# Patient Record
Sex: MALE | Race: BLACK OR AFRICAN AMERICAN | NOT HISPANIC OR LATINO | Employment: OTHER | URBAN - METROPOLITAN AREA
[De-identification: names, ages, dates, MRNs, and addresses within clinical notes are randomized per-mention and may not be internally consistent; named-entity substitution may affect disease eponyms.]

---

## 2017-01-03 ENCOUNTER — PROCEDURE VISIT (OUTPATIENT)
Dept: UROLOGY | Facility: CLINIC | Age: 62
End: 2017-01-03
Payer: COMMERCIAL

## 2017-01-03 DIAGNOSIS — N40.2 PROSTATE NODULE: ICD-10-CM

## 2017-01-03 PROCEDURE — 55700 PR BIOPSY OF PROSTATE,NEEDLE/PUNCH: CPT | Mod: S$GLB,,, | Performed by: UROLOGY

## 2017-01-03 PROCEDURE — 88305 TISSUE EXAM BY PATHOLOGIST: CPT | Mod: 26,,, | Performed by: PATHOLOGY

## 2017-01-03 PROCEDURE — 76872 US TRANSRECTAL: CPT | Mod: S$GLB,,, | Performed by: UROLOGY

## 2017-01-03 PROCEDURE — 76942 ECHO GUIDE FOR BIOPSY: CPT | Mod: 59,S$GLB,, | Performed by: UROLOGY

## 2017-01-03 PROCEDURE — 88305 TISSUE EXAM BY PATHOLOGIST: CPT | Performed by: PATHOLOGY

## 2017-01-03 NOTE — PROCEDURES
Procedures   Pre-procedure diagnosis: right prostate nodule  Post-procedure diagnosis: same    Procedure: Transrectal ultrasound guided prostate biopsy    Complications: none    Blood loss: minimal    Surgeon: Lei Kunz MD    Anesthesia: 10cc lidocaine jelly, 20cc 1% lidocaine for prostatic block    TRUS size: 83.7cc    Procedure: The risks and benefits of the procedure were explained to the patient and consent was obtained.  The patient laid in the lateral decubitus position.  10cc of lidocaine jelly was used for local analgesia in the rectum.  The ultrasound probe was advanced into the rectum. The prostate was visualized.  There was a small median lobe.  20cc of 1% lidocaine without epi was used for a prostatic nerve block.  12 biopsies were then takes, 2 from the apex, mid and base from the right and left side.    The patient tolerated the procedure well and was discharged home.  He will follow-up in 2 weeks when the results are available for review.  He will finish the course of antibiotics.

## 2017-01-04 NOTE — TELEPHONE ENCOUNTER
----- Message from Casi Lopez sent at 1/4/2017  9:56 AM CST -----  Contact: pt [phone]  RX request - refill or new RX.  Is this a refill or new RX:refill    RX name and strength: flomax 0.4 mg  Directions: 1x a day  Is this a 30 day or 90 day RX:  30  Pharmacy name and phone #: [phone]  Comments:  Need authorization    RX request - refill or new RX.  Is this a refill or new RX:  refill  RX name and strength: amlodipine 5 mg  Directions: 1x a day  Is this a 30 day or 90 day RX:  30  Pharmacy name and phone #: [facility] [phone]  Comments:  Need authorization      RX request - refill or new RX.  Is this a refill or new RX:  refill  RX name and strength: meloxicam 15  Directions: 1x a day  Is this a 30 day or 90 day RX:  30  Pharmacy name and phone #: [facility] [phone]  Comments:  Need authorization

## 2017-01-05 RX ORDER — TAMSULOSIN HYDROCHLORIDE 0.4 MG/1
0.4 CAPSULE ORAL DAILY
Qty: 90 CAPSULE | Refills: 3 | Status: SHIPPED | OUTPATIENT
Start: 2017-01-05 | End: 2017-07-25

## 2017-01-05 RX ORDER — MELOXICAM 15 MG/1
15 TABLET ORAL DAILY
Qty: 90 TABLET | Refills: 3 | Status: SHIPPED | OUTPATIENT
Start: 2017-01-05 | End: 2018-03-19 | Stop reason: SDUPTHER

## 2017-01-05 RX ORDER — AMLODIPINE BESYLATE 5 MG/1
5 TABLET ORAL DAILY
Qty: 90 TABLET | Refills: 3 | Status: SHIPPED | OUTPATIENT
Start: 2017-01-05 | End: 2017-03-30 | Stop reason: SDUPTHER

## 2017-01-09 DIAGNOSIS — N40.2 PROSTATE NODULE: Primary | ICD-10-CM

## 2017-02-06 RX ORDER — METFORMIN HYDROCHLORIDE 500 MG/1
TABLET ORAL
Qty: 60 TABLET | Refills: 10 | Status: SHIPPED | OUTPATIENT
Start: 2017-02-06 | End: 2018-06-16 | Stop reason: SDUPTHER

## 2017-03-06 ENCOUNTER — TELEPHONE (OUTPATIENT)
Dept: INTERNAL MEDICINE | Facility: CLINIC | Age: 62
End: 2017-03-06

## 2017-03-06 DIAGNOSIS — E11.9 TYPE II OR UNSPECIFIED TYPE DIABETES MELLITUS WITHOUT MENTION OF COMPLICATION, NOT STATED AS UNCONTROLLED: Primary | ICD-10-CM

## 2017-03-06 DIAGNOSIS — E78.5 HYPERLIPIDEMIA, UNSPECIFIED HYPERLIPIDEMIA TYPE: ICD-10-CM

## 2017-03-06 NOTE — TELEPHONE ENCOUNTER
----- Message from Elle Son sent at 3/6/2017 10:05 AM CST -----  Contact: Self  Doctor appointment and lab have been scheduled.  Please link lab orders to the lab appointment.  Date of doctor appointment:  3/30  Physical or EP:  EP  Date of lab appointment:  3/25  Comments:

## 2017-03-25 ENCOUNTER — LAB VISIT (OUTPATIENT)
Dept: LAB | Facility: HOSPITAL | Age: 62
End: 2017-03-25
Attending: INTERNAL MEDICINE
Payer: COMMERCIAL

## 2017-03-25 DIAGNOSIS — E11.9 TYPE II OR UNSPECIFIED TYPE DIABETES MELLITUS WITHOUT MENTION OF COMPLICATION, NOT STATED AS UNCONTROLLED: ICD-10-CM

## 2017-03-25 DIAGNOSIS — E78.5 HYPERLIPIDEMIA, UNSPECIFIED HYPERLIPIDEMIA TYPE: ICD-10-CM

## 2017-03-25 LAB
ALBUMIN SERPL BCP-MCNC: 3.4 G/DL
ALP SERPL-CCNC: 73 U/L
ALT SERPL W/O P-5'-P-CCNC: 23 U/L
ANION GAP SERPL CALC-SCNC: 9 MMOL/L
AST SERPL-CCNC: 22 U/L
BILIRUB SERPL-MCNC: 0.6 MG/DL
BUN SERPL-MCNC: 16 MG/DL
CALCIUM SERPL-MCNC: 9.8 MG/DL
CHLORIDE SERPL-SCNC: 103 MMOL/L
CHOLEST/HDLC SERPL: 5.4 {RATIO}
CO2 SERPL-SCNC: 26 MMOL/L
CREAT SERPL-MCNC: 1.1 MG/DL
EST. GFR  (AFRICAN AMERICAN): >60 ML/MIN/1.73 M^2
EST. GFR  (NON AFRICAN AMERICAN): >60 ML/MIN/1.73 M^2
GLUCOSE SERPL-MCNC: 97 MG/DL
HDL/CHOLESTEROL RATIO: 18.4 %
HDLC SERPL-MCNC: 207 MG/DL
HDLC SERPL-MCNC: 38 MG/DL
LDLC SERPL CALC-MCNC: 142.6 MG/DL
NONHDLC SERPL-MCNC: 169 MG/DL
POTASSIUM SERPL-SCNC: 3.9 MMOL/L
PROT SERPL-MCNC: 7.6 G/DL
SODIUM SERPL-SCNC: 138 MMOL/L
TRIGL SERPL-MCNC: 132 MG/DL

## 2017-03-25 PROCEDURE — 36415 COLL VENOUS BLD VENIPUNCTURE: CPT | Mod: PO

## 2017-03-25 PROCEDURE — 83036 HEMOGLOBIN GLYCOSYLATED A1C: CPT

## 2017-03-25 PROCEDURE — 80061 LIPID PANEL: CPT

## 2017-03-25 PROCEDURE — 80053 COMPREHEN METABOLIC PANEL: CPT

## 2017-03-27 LAB
ESTIMATED AVG GLUCOSE: 114 MG/DL
HBA1C MFR BLD HPLC: 5.6 %

## 2017-03-30 ENCOUNTER — OFFICE VISIT (OUTPATIENT)
Dept: INTERNAL MEDICINE | Facility: CLINIC | Age: 62
End: 2017-03-30
Payer: COMMERCIAL

## 2017-03-30 VITALS
BODY MASS INDEX: 37.97 KG/M2 | WEIGHT: 256.38 LBS | HEIGHT: 69 IN | DIASTOLIC BLOOD PRESSURE: 86 MMHG | SYSTOLIC BLOOD PRESSURE: 128 MMHG | HEART RATE: 91 BPM | RESPIRATION RATE: 16 BRPM | TEMPERATURE: 98 F

## 2017-03-30 DIAGNOSIS — E66.01 SEVERE OBESITY (BMI 35.0-39.9) WITH COMORBIDITY: Chronic | ICD-10-CM

## 2017-03-30 DIAGNOSIS — E78.5 HYPERLIPIDEMIA, UNSPECIFIED HYPERLIPIDEMIA TYPE: Chronic | ICD-10-CM

## 2017-03-30 DIAGNOSIS — M17.12 PRIMARY OSTEOARTHRITIS OF LEFT KNEE: Chronic | ICD-10-CM

## 2017-03-30 DIAGNOSIS — E11.9 TYPE 2 DIABETES MELLITUS WITHOUT COMPLICATION, WITHOUT LONG-TERM CURRENT USE OF INSULIN: Primary | ICD-10-CM

## 2017-03-30 DIAGNOSIS — I10 ESSENTIAL HYPERTENSION: Chronic | ICD-10-CM

## 2017-03-30 DIAGNOSIS — N41.1 CHRONIC PROSTATITIS: Chronic | ICD-10-CM

## 2017-03-30 PROCEDURE — 99999 PR PBB SHADOW E&M-EST. PATIENT-LVL III: CPT | Mod: PBBFAC,,, | Performed by: INTERNAL MEDICINE

## 2017-03-30 PROCEDURE — 2022F DILAT RTA XM EVC RTNOPTHY: CPT | Mod: S$GLB,,, | Performed by: INTERNAL MEDICINE

## 2017-03-30 PROCEDURE — 3079F DIAST BP 80-89 MM HG: CPT | Mod: S$GLB,,, | Performed by: INTERNAL MEDICINE

## 2017-03-30 PROCEDURE — 4010F ACE/ARB THERAPY RXD/TAKEN: CPT | Mod: S$GLB,,, | Performed by: INTERNAL MEDICINE

## 2017-03-30 PROCEDURE — 99214 OFFICE O/P EST MOD 30 MIN: CPT | Mod: S$GLB,,, | Performed by: INTERNAL MEDICINE

## 2017-03-30 PROCEDURE — 3044F HG A1C LEVEL LT 7.0%: CPT | Mod: S$GLB,,, | Performed by: INTERNAL MEDICINE

## 2017-03-30 PROCEDURE — 1160F RVW MEDS BY RX/DR IN RCRD: CPT | Mod: S$GLB,,, | Performed by: INTERNAL MEDICINE

## 2017-03-30 PROCEDURE — 3074F SYST BP LT 130 MM HG: CPT | Mod: S$GLB,,, | Performed by: INTERNAL MEDICINE

## 2017-03-30 RX ORDER — ATORVASTATIN CALCIUM 20 MG/1
20 TABLET, FILM COATED ORAL DAILY
Qty: 90 TABLET | Refills: 3 | Status: SHIPPED | OUTPATIENT
Start: 2017-03-30 | End: 2017-07-25 | Stop reason: SDUPTHER

## 2017-03-30 RX ORDER — LISINOPRIL AND HYDROCHLOROTHIAZIDE 12.5; 2 MG/1; MG/1
TABLET ORAL
COMMUNITY
Start: 2017-02-05 | End: 2018-03-05 | Stop reason: SDUPTHER

## 2017-03-30 RX ORDER — AMLODIPINE BESYLATE 5 MG/1
5 TABLET ORAL DAILY
Qty: 90 TABLET | Refills: 3 | Status: SHIPPED | OUTPATIENT
Start: 2017-03-30 | End: 2018-05-12 | Stop reason: SDUPTHER

## 2017-03-30 NOTE — PROGRESS NOTES
Subjective:       Patient ID: Celso Hernandez is a 61 y.o. male.    Chief Complaint: Follow-up (4 month)    HPI   The patient presents for follow-up of diabetes and other medical conditions.  The patient has type 2 diabetes mellitus, hypertension, hyperlipidemia, and osteoarthritis, BPH.  He is status post prostate biopsy in 1/17.  Biopsies were negative for cancer but did show chronic prostatitis.  He is followed by Dr. Kunz of the Urology service.    He occasionally checks his blood sugar levels.  Recent blood sugars have ranged from 115-125.  He has not experienced any hypoglycemic episodes.  His vision is good.  No lower extremity paresthesias are noted.    The patient has arthritis with prominent symptoms in the left knee.  He is able to walk without pain and climb a ladder.  He does report that the joint does not feel as strong as it should be however.  Review of Systems   Constitutional: Negative for activity change, appetite change, fatigue and unexpected weight change.        The patient exercises intermittently.   Eyes: Negative for visual disturbance.   Respiratory: Negative for cough, chest tightness, shortness of breath and wheezing.    Cardiovascular: Negative for chest pain, palpitations and leg swelling.   Gastrointestinal: Negative for abdominal pain, blood in stool, nausea and vomiting.   Genitourinary: Negative for dysuria, frequency, hematuria and urgency.        Urinary stream is good following antibiotic therapy.   Musculoskeletal: Positive for arthralgias (mild weakness of the left knee joint is noted.). Negative for back pain, gait problem, joint swelling and myalgias.   Skin: Negative for color change and rash.   Neurological: Negative for dizziness, syncope, weakness, light-headedness, numbness and headaches.   Psychiatric/Behavioral: Negative for sleep disturbance.       Objective:      Physical Exam   Constitutional: He is oriented to person, place, and time. He appears well-developed and  well-nourished. No distress.   The patient has lost 4 pounds since 10/24/2016.   HENT:   Head: Normocephalic and atraumatic.   Eyes: Conjunctivae and EOM are normal. Pupils are equal, round, and reactive to light. No scleral icterus.   Neck: Normal range of motion. Neck supple. No JVD present. No thyromegaly present.   Cardiovascular: Normal rate, regular rhythm, normal heart sounds and intact distal pulses.  Exam reveals no gallop.    No murmur heard.  Pulmonary/Chest: Effort normal and breath sounds normal. No respiratory distress. He has no wheezes. He has no rales.   Abdominal: Soft. Bowel sounds are normal. He exhibits no mass. There is no tenderness.   Musculoskeletal: Normal range of motion. He exhibits no edema or tenderness.   Lymphadenopathy:     He has no cervical adenopathy.   Neurological: He is alert and oriented to person, place, and time. No cranial nerve deficit.   Sensory examination is intact in both feet on monofilament and vibration testing.   Skin: Skin is warm and dry. No rash noted.   No foot lesions are present.   Psychiatric: He has a normal mood and affect. His behavior is normal.   Nursing note and vitals reviewed.      Results for orders placed or performed in visit on 03/25/17   Microalbumin/creatinine urine ratio   Result Value Ref Range    Microalbum.,U,Random 6.0 ug/mL    Creatinine, Random Ur 105.0 23.0 - 375.0 mg/dL    Microalb Creat Ratio 5.7 0.0 - 30.0 ug/mg     Other labs were reviewed with the patient.  Hemoglobin A1c was 5.6%.  Assessment:       1. Type 2 diabetes mellitus without complication, without long-term current use of insulin    2. Essential hypertension    3. Hyperlipidemia, unspecified hyperlipidemia type    4. Primary osteoarthritis of left knee    5. Chronic prostatitis    6. Severe obesity (BMI 35.0-39.9) with comorbidity        Plan:       Celso was seen today for follow-up.  The patient has been encouraged to increase his exercise level and to continue to lose  weight.  He has been encouraged to be more compliant with use of atorvastatin.  He should take it with all of his other medications.  Blood tests and a follow-up visit within 4 months will be obtained.    Diagnoses and all orders for this visit:    Type 2 diabetes mellitus without complication, without long-term current use of insulin  -     Hemoglobin A1c; Future    Essential hypertension    Hyperlipidemia, unspecified hyperlipidemia type  -     Comprehensive metabolic panel; Future  -     Lipid panel; Future    Primary osteoarthritis of left knee    Chronic prostatitis    Severe obesity (BMI 35.0-39.9) with comorbidity    Other orders  -     amlodipine (NORVASC) 5 MG tablet; Take 1 tablet (5 mg total) by mouth once daily. For blood pressure control.  -     atorvastatin (LIPITOR) 20 MG tablet; Take 1 tablet (20 mg total) by mouth once daily.

## 2017-07-21 ENCOUNTER — LAB VISIT (OUTPATIENT)
Dept: LAB | Facility: HOSPITAL | Age: 62
End: 2017-07-21
Attending: INTERNAL MEDICINE
Payer: COMMERCIAL

## 2017-07-21 DIAGNOSIS — E78.5 HYPERLIPIDEMIA, UNSPECIFIED HYPERLIPIDEMIA TYPE: Chronic | ICD-10-CM

## 2017-07-21 DIAGNOSIS — E11.9 TYPE 2 DIABETES MELLITUS WITHOUT COMPLICATION, WITHOUT LONG-TERM CURRENT USE OF INSULIN: ICD-10-CM

## 2017-07-21 LAB
ALBUMIN SERPL BCP-MCNC: 3.6 G/DL
ALP SERPL-CCNC: 65 U/L
ALT SERPL W/O P-5'-P-CCNC: 21 U/L
ANION GAP SERPL CALC-SCNC: 10 MMOL/L
AST SERPL-CCNC: 28 U/L
BILIRUB SERPL-MCNC: 0.8 MG/DL
BUN SERPL-MCNC: 21 MG/DL
CALCIUM SERPL-MCNC: 10.5 MG/DL
CHLORIDE SERPL-SCNC: 105 MMOL/L
CHOLEST/HDLC SERPL: 5.5 {RATIO}
CO2 SERPL-SCNC: 23 MMOL/L
CREAT SERPL-MCNC: 1.3 MG/DL
EST. GFR  (AFRICAN AMERICAN): >60 ML/MIN/1.73 M^2
EST. GFR  (NON AFRICAN AMERICAN): 58.1 ML/MIN/1.73 M^2
ESTIMATED AVG GLUCOSE: 114 MG/DL
GLUCOSE SERPL-MCNC: 88 MG/DL
HBA1C MFR BLD HPLC: 5.6 %
HDL/CHOLESTEROL RATIO: 18.3 %
HDLC SERPL-MCNC: 218 MG/DL
HDLC SERPL-MCNC: 40 MG/DL
LDLC SERPL CALC-MCNC: 154 MG/DL
NONHDLC SERPL-MCNC: 178 MG/DL
POTASSIUM SERPL-SCNC: 4.5 MMOL/L
PROT SERPL-MCNC: 7.6 G/DL
SODIUM SERPL-SCNC: 138 MMOL/L
TRIGL SERPL-MCNC: 120 MG/DL

## 2017-07-21 PROCEDURE — 80053 COMPREHEN METABOLIC PANEL: CPT

## 2017-07-21 PROCEDURE — 36415 COLL VENOUS BLD VENIPUNCTURE: CPT | Mod: PO

## 2017-07-21 PROCEDURE — 80061 LIPID PANEL: CPT

## 2017-07-21 PROCEDURE — 83036 HEMOGLOBIN GLYCOSYLATED A1C: CPT

## 2017-07-25 ENCOUNTER — OFFICE VISIT (OUTPATIENT)
Dept: INTERNAL MEDICINE | Facility: CLINIC | Age: 62
End: 2017-07-25
Payer: COMMERCIAL

## 2017-07-25 VITALS
SYSTOLIC BLOOD PRESSURE: 110 MMHG | HEART RATE: 84 BPM | WEIGHT: 250.88 LBS | HEIGHT: 68 IN | BODY MASS INDEX: 38.02 KG/M2 | TEMPERATURE: 98 F | DIASTOLIC BLOOD PRESSURE: 80 MMHG

## 2017-07-25 DIAGNOSIS — I10 ESSENTIAL HYPERTENSION: Chronic | ICD-10-CM

## 2017-07-25 DIAGNOSIS — N40.1 BENIGN PROSTATIC HYPERPLASIA WITH URINARY OBSTRUCTION: ICD-10-CM

## 2017-07-25 DIAGNOSIS — E78.5 HYPERLIPIDEMIA, UNSPECIFIED HYPERLIPIDEMIA TYPE: Chronic | ICD-10-CM

## 2017-07-25 DIAGNOSIS — E66.01 SEVERE OBESITY (BMI 35.0-39.9) WITH COMORBIDITY: Chronic | ICD-10-CM

## 2017-07-25 DIAGNOSIS — M17.12 PRIMARY OSTEOARTHRITIS OF LEFT KNEE: Chronic | ICD-10-CM

## 2017-07-25 DIAGNOSIS — N13.8 BENIGN PROSTATIC HYPERPLASIA WITH URINARY OBSTRUCTION: ICD-10-CM

## 2017-07-25 DIAGNOSIS — E11.9 TYPE 2 DIABETES MELLITUS WITHOUT COMPLICATION, WITHOUT LONG-TERM CURRENT USE OF INSULIN: Primary | ICD-10-CM

## 2017-07-25 PROCEDURE — 99999 PR PBB SHADOW E&M-EST. PATIENT-LVL III: CPT | Mod: PBBFAC,,, | Performed by: INTERNAL MEDICINE

## 2017-07-25 PROCEDURE — 99214 OFFICE O/P EST MOD 30 MIN: CPT | Mod: S$GLB,,, | Performed by: INTERNAL MEDICINE

## 2017-07-25 PROCEDURE — 3044F HG A1C LEVEL LT 7.0%: CPT | Mod: S$GLB,,, | Performed by: INTERNAL MEDICINE

## 2017-07-25 PROCEDURE — 4010F ACE/ARB THERAPY RXD/TAKEN: CPT | Mod: S$GLB,,, | Performed by: INTERNAL MEDICINE

## 2017-07-25 RX ORDER — ATORVASTATIN CALCIUM 40 MG/1
40 TABLET, FILM COATED ORAL DAILY
Qty: 90 TABLET | Refills: 3 | Status: SHIPPED | OUTPATIENT
Start: 2017-07-25 | End: 2017-11-21 | Stop reason: SDUPTHER

## 2017-07-25 RX ORDER — TAMSULOSIN HYDROCHLORIDE 0.4 MG/1
CAPSULE ORAL
COMMUNITY
Start: 2017-05-06 | End: 2018-07-02

## 2017-07-30 RX ORDER — LISINOPRIL AND HYDROCHLOROTHIAZIDE 12.5; 2 MG/1; MG/1
TABLET ORAL
Qty: 60 TABLET | Refills: 10 | Status: SHIPPED | OUTPATIENT
Start: 2017-07-30 | End: 2018-10-05 | Stop reason: SDUPTHER

## 2017-07-31 NOTE — PROGRESS NOTES
Subjective:       Patient ID: Celso Hernandez is a 62 y.o. male.    Chief Complaint: Follow-up    HPI   The patient presents for follow-up of diabetes, hypertension, and hyperlipidemia.  He is tolerating his medications well without side effects.  Occasional blood glucose monitoring is performed.  He does not monitor his blood pressures.  His vision is stable.  No lower extremity paresthesias are present.  He is sleeping well at night.  He does have intermittent symptoms of acid reflux.  Uses Pepcid Complete which controls his symptoms.  We discussed using medication on a regular basis for prevention of symptoms.    He does have joint pains.  He does report experiencing a good response to a corticosteroid injection of the left knee.    Review of Systems   Constitutional: Negative for activity change, appetite change, fatigue and unexpected weight change.   Eyes: Negative for visual disturbance.   Respiratory: Negative for cough, chest tightness, shortness of breath and wheezing.    Cardiovascular: Negative for chest pain, palpitations and leg swelling.   Gastrointestinal: Negative for abdominal pain, blood in stool, nausea and vomiting.   Genitourinary: Negative for dysuria, hematuria and urgency.             Musculoskeletal: Positive for arthralgias. Negative for back pain, gait problem, joint swelling and myalgias.   Skin: Negative for color change and rash.   Neurological: Negative for dizziness, syncope, weakness, light-headedness, numbness and headaches.   Psychiatric/Behavioral: Negative for sleep disturbance.       Objective:      Physical Exam   Constitutional: He is oriented to person, place, and time. He appears well-developed and well-nourished. No distress.   HENT:   Head: Normocephalic and atraumatic.   Eyes: Conjunctivae and EOM are normal. Pupils are equal, round, and reactive to light. No scleral icterus.   Neck: Normal range of motion. Neck supple. No JVD present. No thyromegaly present.    Cardiovascular: Normal rate, regular rhythm, normal heart sounds and intact distal pulses.  Exam reveals no gallop.    No murmur heard.  Pulmonary/Chest: Effort normal and breath sounds normal. No respiratory distress. He has no wheezes. He has no rales.   Abdominal: Soft. Bowel sounds are normal. He exhibits no mass. There is no tenderness.   Musculoskeletal: Normal range of motion. He exhibits no edema or tenderness.   Lymphadenopathy:     He has no cervical adenopathy.   Neurological: He is alert and oriented to person, place, and time. No cranial nerve deficit.   Sensory examination is intact in both feet on monofilament and vibration testing.   Skin: Skin is warm and dry. No rash noted.   No foot lesions are present.   Psychiatric: He has a normal mood and affect. His behavior is normal.   Nursing note and vitals reviewed.      Results for orders placed or performed in visit on 07/21/17   Comprehensive metabolic panel   Result Value Ref Range    Sodium 138 136 - 145 mmol/L    Potassium 4.5 3.5 - 5.1 mmol/L    Chloride 105 95 - 110 mmol/L    CO2 23 23 - 29 mmol/L    Glucose 88 70 - 110 mg/dL    BUN, Bld 21 8 - 23 mg/dL    Creatinine 1.3 0.5 - 1.4 mg/dL    Calcium 10.5 8.7 - 10.5 mg/dL    Total Protein 7.6 6.0 - 8.4 g/dL    Albumin 3.6 3.5 - 5.2 g/dL    Total Bilirubin 0.8 0.1 - 1.0 mg/dL    Alkaline Phosphatase 65 55 - 135 U/L    AST 28 10 - 40 U/L    ALT 21 10 - 44 U/L    Anion Gap 10 8 - 16 mmol/L    eGFR if African American >60.0 >60 mL/min/1.73 m^2    eGFR if non  58.1 (A) >60 mL/min/1.73 m^2   Hemoglobin A1c   Result Value Ref Range    Hemoglobin A1C 5.6 4.0 - 5.6 %    Estimated Avg Glucose 114 68 - 131 mg/dL   Lipid panel   Result Value Ref Range    Cholesterol 218 (H) 120 - 199 mg/dL    Triglycerides 120 30 - 150 mg/dL    HDL 40 40 - 75 mg/dL    LDL Cholesterol 154.0 63.0 - 159.0 mg/dL    HDL/Chol Ratio 18.3 (L) 20.0 - 50.0 %    Total Cholesterol/HDL Ratio 5.5 (H) 2.0 - 5.0    Non-HDL  Cholesterol 178 mg/dL       Assessment:       1. Type 2 diabetes mellitus without complication, without long-term current use of insulin    2. Essential hypertension    3. Hyperlipidemia, unspecified hyperlipidemia type    4. Primary osteoarthritis of left knee    5. Severe obesity (BMI 35.0-39.9) with comorbidity    6. Benign prostatic hyperplasia with urinary obstruction        Plan:           Celso was seen today for follow-up.  Excellent diabetes control is noted.  The dose of Lipitor will be increased to 40 mg daily.  Antireflux measures were discussed.  The patient may use Pepcid Complete as discussed.  Podiatry consultation for diabetic foot care is ordered.  Fasting blood tests and a follow-up visit in 4 months will be obtained.    Diagnoses and all orders for this visit:    Type 2 diabetes mellitus without complication, without long-term current use of insulin  -     Ambulatory consult to Podiatry  -     Hemoglobin A1c; Future    Essential hypertension    Hyperlipidemia, unspecified hyperlipidemia type  -     Comprehensive metabolic panel; Future  -     Lipid panel; Future    Primary osteoarthritis of left knee    Severe obesity (BMI 35.0-39.9) with comorbidity    Benign prostatic hyperplasia with urinary obstruction  -     Prostate Specific Antigen, Diagnostic; Future    Other orders  -     atorvastatin (LIPITOR) 40 MG tablet; Take 1 tablet (40 mg total) by mouth once daily.

## 2017-08-10 ENCOUNTER — OFFICE VISIT (OUTPATIENT)
Dept: PODIATRY | Facility: CLINIC | Age: 62
End: 2017-08-10
Payer: COMMERCIAL

## 2017-08-10 ENCOUNTER — TELEPHONE (OUTPATIENT)
Dept: PODIATRY | Facility: CLINIC | Age: 62
End: 2017-08-10

## 2017-08-10 VITALS
HEART RATE: 79 BPM | DIASTOLIC BLOOD PRESSURE: 88 MMHG | WEIGHT: 250 LBS | HEIGHT: 68 IN | SYSTOLIC BLOOD PRESSURE: 134 MMHG | BODY MASS INDEX: 37.89 KG/M2

## 2017-08-10 DIAGNOSIS — E11.9 ENCOUNTER FOR COMPREHENSIVE DIABETIC FOOT EXAMINATION, TYPE 2 DIABETES MELLITUS: Primary | ICD-10-CM

## 2017-08-10 DIAGNOSIS — L85.3 DRY SKIN: ICD-10-CM

## 2017-08-10 DIAGNOSIS — R60.0 BILATERAL LOWER EXTREMITY EDEMA: ICD-10-CM

## 2017-08-10 PROCEDURE — 3075F SYST BP GE 130 - 139MM HG: CPT | Mod: S$GLB,,, | Performed by: PODIATRIST

## 2017-08-10 PROCEDURE — 4010F ACE/ARB THERAPY RXD/TAKEN: CPT | Mod: S$GLB,,, | Performed by: PODIATRIST

## 2017-08-10 PROCEDURE — 3079F DIAST BP 80-89 MM HG: CPT | Mod: S$GLB,,, | Performed by: PODIATRIST

## 2017-08-10 PROCEDURE — 99203 OFFICE O/P NEW LOW 30 MIN: CPT | Mod: S$GLB,,, | Performed by: PODIATRIST

## 2017-08-10 PROCEDURE — 3008F BODY MASS INDEX DOCD: CPT | Mod: S$GLB,,, | Performed by: PODIATRIST

## 2017-08-10 PROCEDURE — 99999 PR PBB SHADOW E&M-EST. PATIENT-LVL III: CPT | Mod: PBBFAC,,, | Performed by: PODIATRIST

## 2017-08-10 PROCEDURE — 3044F HG A1C LEVEL LT 7.0%: CPT | Mod: S$GLB,,, | Performed by: PODIATRIST

## 2017-08-10 NOTE — LETTER
August 10, 2017      Inder Quarles MD  [address]  [place] [address]           [place] [facility]  [address] [place] [address]  Phone: [phone]          Patient: Celso Hernandez   MR Number: [id number]   YOB: 1955   Date of Visit: 8/10/2017       Dear Dr. Inder Quarles:    Thank you for referring Celso Hernandez to me for evaluation. Attached you will find relevant portions of my assessment and plan of care.    If you have questions, please do not hesitate to call me. I look forward to following Celso Hernandez along with you.    Sincerely,    Mehrdad Mares, DPM    Enclosure  CC:  No Recipients    If you would like to receive this communication electronically, please contact [email or web address] or [phone] to request more information on [employer] Link access.    For providers and/or their staff who would like to refer a patient to [facility], please contact us through our one-stop-shop provider referral line, [facility], at [phone].    If you feel you have received this communication in error or would no longer like to receive these types of communications, please e-mail [email or web address]

## 2017-08-10 NOTE — PROGRESS NOTES
Subjective:      Patient ID: Celso Hernandez is a 62 y.o. male.    Chief Complaint: Diabetic Foot Exam and Diabetes Mellitus    Celso is a 62 y.o. male who presents to the clinic upon referral from Dr. Quarles  for evaluation and treatment of diabetic feet. Celso has a past medical history of Colon polyps; Diabetes mellitus type II; Hyperlipidemia; Hypertension; and Unspecified hemorrhoids without mention of complication. Patient relates no major problem with feet. .    PCP: Inder Quarles MD    Date Last Seen by PCP: 7/25/17    Current shoe gear: Tennis shoes    Hemoglobin A1C   Date Value Ref Range Status   07/21/2017 5.6 4.0 - 5.6 % Final     Comment:     According to ADA guidelines, hemoglobin A1c <7.0% represents  optimal control in non-pregnant diabetic patients. Different  metrics may apply to specific patient populations.   Standards of Medical Care in Diabetes-2016.  For the purpose of screening for the presence of diabetes:  <5.7%     Consistent with the absence of diabetes  5.7-6.4%  Consistent with increasing risk for diabetes   (prediabetes)  >or=6.5%  Consistent with diabetes  Currently, no consensus exists for use of hemoglobin A1c  for diagnosis of diabetes for children.  This Hemoglobin A1c assay has significant interference with fetal   hemoglobin   (HbF). The results are invalid for patients with abnormal amounts of   HbF,   including those with known Hereditary Persistence   of Fetal Hemoglobin. Heterozygous hemoglobin variants (HbAS, HbAC,   HbAD, HbAE, HbA2) do not significantly interfere with this assay;   however, presence of multiple variants in a sample may impact the %   interference.     03/25/2017 5.6 4.5 - 6.2 % Final     Comment:     According to ADA guidelines, hemoglobin A1C <7.0% represents  optimal control in non-pregnant diabetic patients.  Different  metrics may apply to specific populations.   Standards of Medical Care in Diabetes - 2016.  For the purpose of screening for the presence  "of diabetes:  <5.7%     Consistent with the absence of diabetes  5.7-6.4%  Consistent with increasing risk for diabetes   (prediabetes)  >or=6.5%  Consistent with diabetes  Currently no consensus exists for use of hemoglobin A1C  for diagnosis of diabetes for children.     10/22/2016 6.2 4.5 - 6.2 % Final     Comment:     According to ADA guidelines, hemoglobin A1C <7.0% represents  optimal control in non-pregnant diabetic patients.  Different  metrics may apply to specific populations.   Standards of Medical Care in Diabetes - 2016.  For the purpose of screening for the presence of diabetes:  <5.7%     Consistent with the absence of diabetes  5.7-6.4%  Consistent with increasing risk for diabetes   (prediabetes)  >or=6.5%  Consistent with diabetes  Currently no consensus exists for use of hemoglobin A1C  for diagnosis of diabetes for children.     10/22/2016 6.2 4.5 - 6.2 % Final     Comment:     According to ADA guidelines, hemoglobin A1C <7.0% represents  optimal control in non-pregnant diabetic patients.  Different  metrics may apply to specific populations.   Standards of Medical Care in Diabetes - 2016.  For the purpose of screening for the presence of diabetes:  <5.7%     Consistent with the absence of diabetes  5.7-6.4%  Consistent with increasing risk for diabetes   (prediabetes)  >or=6.5%  Consistent with diabetes  Currently no consensus exists for use of hemoglobin A1C  for diagnosis of diabetes for children.       Vitals:    08/10/17 1613   BP: 134/88   Pulse: 79   Weight: 113.4 kg (250 lb)   Height: 5' 8" (1.727 m)   PainSc: 0-No pain      Past Medical History:   Diagnosis Date    Colon polyps     Diabetes mellitus type II     Hyperlipidemia     Hypertension     Unspecified hemorrhoids without mention of complication        Past Surgical History:   Procedure Laterality Date    none         Family History   Problem Relation Age of Onset    Hyperlipidemia Mother     Cancer Sister      breast    " Hearing loss Sister        Social History     Social History    Marital status:      Spouse name: N/A    Number of children: N/A    Years of education: N/A     Social History Main Topics    Smoking status: Never Smoker    Smokeless tobacco: Never Used    Alcohol use 2.0 oz/week     4 Standard drinks or equivalent per week    Drug use: No    Sexual activity: Not Asked     Other Topics Concern    None     Social History Narrative    None       Current Outpatient Prescriptions   Medication Sig Dispense Refill    amlodipine (NORVASC) 5 MG tablet Take 1 tablet (5 mg total) by mouth once daily. For blood pressure control. 90 tablet 3    atorvastatin (LIPITOR) 40 MG tablet Take 1 tablet (40 mg total) by mouth once daily. 90 tablet 3    cholecalciferol, vitamin D3, (VITAMIN D3) 2,000 unit Cap Take 1 capsule by mouth once daily.      lisinopril-hydrochlorothiazide (PRINZIDE,ZESTORETIC) 20-12.5 mg per tablet       lisinopril-hydrochlorothiazide (PRINZIDE,ZESTORETIC) 20-12.5 mg per tablet TAKE TWO TABLETS BY MOUTH ONCE DAILY 60 tablet 10    meloxicam (MOBIC) 15 MG tablet Take 1 tablet (15 mg total) by mouth once daily. 90 tablet 3    metformin (GLUCOPHAGE) 500 MG tablet TAKE ONE TABLET BY MOUTH TWICE DAILY 60 tablet 10    tamsulosin (FLOMAX) 0.4 mg Cp24        No current facility-administered medications for this visit.        Review of patient's allergies indicates:  No Known Allergies        Review of Systems   Constitution: Negative for chills, fever, weakness and malaise/fatigue.   Cardiovascular: Positive for leg swelling. Negative for chest pain and claudication.   Respiratory: Negative for cough and shortness of breath.    Skin: Positive for dry skin. Negative for itching, nail changes, poor wound healing and rash.   Musculoskeletal: Positive for stiffness. Negative for back pain, joint pain, muscle cramps and muscle weakness.   Gastrointestinal: Negative for nausea and vomiting.   Neurological:  Negative for numbness and paresthesias.   Psychiatric/Behavioral: Negative for altered mental status.           Objective:      Physical Exam   Constitutional: He is oriented to person, place, and time. No distress.   Cardiovascular:   Pulses:       Dorsalis pedis pulses are 2+ on the right side, and 2+ on the left side.        Posterior tibial pulses are 2+ on the right side, and 2+ on the left side.   CFT< 3 secs all toes bilateral foot, skin temp warm bilateral foot, diminished digital hair growth bilateral foot, mild pitting lower extremity edema bilateral.     Musculoskeletal:   Mild pes planus foot type bilateral foot. Mild non-track bound hallux valgus bilateral foot. Mild reducible digital flexion contractures toes 2-5 bilateral foot.    No pain with ROM or MMT bilateral foot.   Feet:   Right Foot:   Protective Sensation: 10 sites tested. 10 sites sensed.   Skin Integrity: Positive for dry skin. Negative for ulcer, blister, skin breakdown, erythema, warmth or callus.   Left Foot:   Protective Sensation: 10 sites tested. 10 sites sensed.   Skin Integrity: Positive for dry skin. Negative for ulcer, blister, skin breakdown, erythema, warmth or callus.   Neurological: He is alert and oriented to person, place, and time. He has normal strength. A sensory deficit is present. Gait normal.   Slight to mild reduction in vibratory sensation bilateral foot.   Skin: Skin is warm, dry and intact. No ecchymosis and no rash noted. He is not diaphoretic. No erythema. Nails show no clubbing.   Nails 1-5 bilateral are normal in appearance and trimmed.    No open lesions or macerations bilateral lower extremity.    Mild dry skin bilateral foot.             Assessment:       Encounter Diagnoses   Name Primary?    Encounter for comprehensive diabetic foot examination, type 2 diabetes mellitus Yes    Dry skin     Bilateral lower extremity edema          Plan:       Celso was seen today for diabetic foot exam and diabetes  mellitus.    Diagnoses and all orders for this visit:    Encounter for comprehensive diabetic foot examination, type 2 diabetes mellitus    Dry skin    Bilateral lower extremity edema      I counseled the patient on his conditions, their implications and medical management.    Shoe inspection. Diabetic Foot Education. Patient reminded of the importance of good nutrition and blood sugar control to help prevent podiatric complications of diabetes. Patient instructed on proper foot hygeine. We discussed wearing proper shoe gear, daily foot inspections, never walking without protective shoe gear, never putting sharp instruments to feet.    Recommended replacing tennis shoes every 6-12 months.    Refused compression stockings.    May use vaseline OTC for dry skin daily avoiding use between toes.    RTC 1 year or prn. Considered low to intermediate risk for diabetic foot complication.

## 2017-08-10 NOTE — PATIENT INSTRUCTIONS
Diabetic Foot Care  Diabetes can lead to a number of foot complications. Fortunately, you can prevent most of these with a little extra foot care. If diabetes is not well controlled, it can cause damage to blood vessels and result in poor circulation to the foot. When the skin does not get enough blood flow, it becomes prone to pressure sores and ulcers, which heal slowly.  Diabetes can also damage nerves, interfering with the ability to feel pain and pressure. When you cant feel your foot normally, it is easy to injure your skin, bones, and joints without knowing it. For these reasons diabetes increases the risk of fungal infections, bunions, and ulcers. An ulcer is a sore or break in the skin. With ulcers, often the skin seems to have worn away. Deep ulcers can lead to bone infection.  Gangrene is the most serious foot complication of diabetes. It usually occurs on the tips of the toes as blackened areas of skin. The black area is dead tissue. In severe cases, gangrene spreads to involve the entire toe, other toes, and the entire foot. Foot or toe amputation may be required. Good foot care and blood sugar control can prevent this.  Home care  · Wear comfortable, well-fitting shoes.  · Wash your feet daily with warm water and mild soap.  · After drying, apply a moisturizing cream or lotion to the top and bottom of your feet. Don't put lotion between toes.  · Check your feet daily for skin breaks, blisters, swelling, or redness. Look between your toes as well. If you cannot see the bottoms of your feet, ask someone to look or use a mirror.  · Wear cotton socks and change them every day.  · Trim toenails carefully, and do not cut your cuticles.  · Strive to keep your blood sugar under control with a combination of medicines, diet, and activity.  · If you smoke and have diabetes, it is very important that you stop. Smoking reduces blood flow to your foot.  · Schedule foot exams at least every year, or more often if  you have foot problems.  · Put your feet up when sitting, wiggle toes, and move ankles to help improve blood flow.  Avoid activities that increase your risk of foot injury:  · Do not walk barefoot.  · Do not use heating pads or hot water bottles on your feet.  · Do not put your foot in a hot tub without first checking the temperature with your hand.  Follow-up care  Follow up with your healthcare provider, or as advised. Be sure to take off your shoes and socks before your appointment starts so your healthcare provider will be sure to check your feet. Report any cut, puncture, scrape, blister, or other injury to your foot. Also report if you have a bunion, hammertoes, ingrown toenail, or ulcer on your foot.  When to seek medical advice  Call your healthcare provider right away if any of these occur:  · Black skin color anywhere on the foot  · Open ulcer with pus draining from the wound  · Increasing foot or leg pain  · New areas of redness or swelling or tender areas of the foot  · Fever of 100.4°F (38°C) or greater  Date Last Reviewed: 5/25/2016  © 2726-4718 [employer]. [address] [place] [address], [place] [address]. All rights reserved. This information is not intended as a substitute for professional medical care. Always follow your healthcare professional's instructions.

## 2017-08-10 NOTE — TELEPHONE ENCOUNTER
----- Message from Edy George sent at 8/10/2017  2:31 PM CDT -----  Contact: [phone]  He has a 2:45pm appointment and he is running 15 minutes late.

## 2017-11-18 ENCOUNTER — LAB VISIT (OUTPATIENT)
Dept: LAB | Facility: HOSPITAL | Age: 62
End: 2017-11-18
Attending: INTERNAL MEDICINE
Payer: COMMERCIAL

## 2017-11-18 DIAGNOSIS — E11.9 TYPE 2 DIABETES MELLITUS WITHOUT COMPLICATION, WITHOUT LONG-TERM CURRENT USE OF INSULIN: ICD-10-CM

## 2017-11-18 DIAGNOSIS — E78.5 HYPERLIPIDEMIA, UNSPECIFIED HYPERLIPIDEMIA TYPE: Chronic | ICD-10-CM

## 2017-11-18 DIAGNOSIS — N40.1 BENIGN PROSTATIC HYPERPLASIA WITH URINARY OBSTRUCTION: ICD-10-CM

## 2017-11-18 DIAGNOSIS — N13.8 BENIGN PROSTATIC HYPERPLASIA WITH URINARY OBSTRUCTION: ICD-10-CM

## 2017-11-18 LAB
ALBUMIN SERPL BCP-MCNC: 3.6 G/DL
ALP SERPL-CCNC: 80 U/L
ALT SERPL W/O P-5'-P-CCNC: 24 U/L
ANION GAP SERPL CALC-SCNC: 7 MMOL/L
AST SERPL-CCNC: 29 U/L
BILIRUB SERPL-MCNC: 0.7 MG/DL
BUN SERPL-MCNC: 27 MG/DL
CALCIUM SERPL-MCNC: 10 MG/DL
CHLORIDE SERPL-SCNC: 102 MMOL/L
CHOLEST SERPL-MCNC: 229 MG/DL
CHOLEST/HDLC SERPL: 5.7 {RATIO}
CO2 SERPL-SCNC: 26 MMOL/L
COMPLEXED PSA SERPL-MCNC: 2.5 NG/ML
CREAT SERPL-MCNC: 1.4 MG/DL
EST. GFR  (AFRICAN AMERICAN): >60 ML/MIN/1.73 M^2
EST. GFR  (NON AFRICAN AMERICAN): 53.1 ML/MIN/1.73 M^2
ESTIMATED AVG GLUCOSE: 117 MG/DL
GLUCOSE SERPL-MCNC: 96 MG/DL
HBA1C MFR BLD HPLC: 5.7 %
HDLC SERPL-MCNC: 40 MG/DL
HDLC SERPL: 17.5 %
LDLC SERPL CALC-MCNC: 158 MG/DL
NONHDLC SERPL-MCNC: 189 MG/DL
POTASSIUM SERPL-SCNC: 4.8 MMOL/L
PROT SERPL-MCNC: 8.2 G/DL
SODIUM SERPL-SCNC: 135 MMOL/L
TRIGL SERPL-MCNC: 155 MG/DL

## 2017-11-18 PROCEDURE — 83036 HEMOGLOBIN GLYCOSYLATED A1C: CPT

## 2017-11-18 PROCEDURE — 80053 COMPREHEN METABOLIC PANEL: CPT

## 2017-11-18 PROCEDURE — 84153 ASSAY OF PSA TOTAL: CPT

## 2017-11-18 PROCEDURE — 36415 COLL VENOUS BLD VENIPUNCTURE: CPT | Mod: PO

## 2017-11-18 PROCEDURE — 80061 LIPID PANEL: CPT

## 2017-11-21 ENCOUNTER — OFFICE VISIT (OUTPATIENT)
Dept: INTERNAL MEDICINE | Facility: CLINIC | Age: 62
End: 2017-11-21
Payer: COMMERCIAL

## 2017-11-21 VITALS
WEIGHT: 254.63 LBS | TEMPERATURE: 98 F | DIASTOLIC BLOOD PRESSURE: 64 MMHG | HEIGHT: 68 IN | HEART RATE: 84 BPM | SYSTOLIC BLOOD PRESSURE: 110 MMHG | BODY MASS INDEX: 38.59 KG/M2

## 2017-11-21 DIAGNOSIS — E78.5 HYPERLIPIDEMIA, UNSPECIFIED HYPERLIPIDEMIA TYPE: Chronic | ICD-10-CM

## 2017-11-21 DIAGNOSIS — I10 ESSENTIAL HYPERTENSION: Chronic | ICD-10-CM

## 2017-11-21 DIAGNOSIS — E11.9 TYPE 2 DIABETES MELLITUS WITHOUT COMPLICATION, WITHOUT LONG-TERM CURRENT USE OF INSULIN: Primary | ICD-10-CM

## 2017-11-21 PROCEDURE — 99214 OFFICE O/P EST MOD 30 MIN: CPT | Mod: S$GLB,,, | Performed by: INTERNAL MEDICINE

## 2017-11-21 PROCEDURE — 99999 PR PBB SHADOW E&M-EST. PATIENT-LVL III: CPT | Mod: PBBFAC,,, | Performed by: INTERNAL MEDICINE

## 2017-11-21 RX ORDER — ATORVASTATIN CALCIUM 80 MG/1
80 TABLET, FILM COATED ORAL DAILY
Qty: 90 TABLET | Refills: 3 | Status: SHIPPED | OUTPATIENT
Start: 2017-11-21 | End: 2019-01-15 | Stop reason: SDUPTHER

## 2017-11-21 NOTE — PROGRESS NOTES
Subjective:       Patient ID: Celso Hernandez is a 62 y.o. male.    Chief Complaint: Follow-up    HPI   The patient presents for follow-up of diabetes therapy.  He has not been monitoring his blood sugars recently.  His vision has been good.  Occasional lower extremity numbness is noted after prolonged sitting.  He reports he has not been exercising much recently.  Occasional ankle swelling is noted.  No PND or orthopnea is noted.  Occasional rhinitis symptoms are present.  He is tolerating his blood pressure medication well.  Review of Systems   Constitutional: Negative for activity change, appetite change, fatigue and unexpected weight change.   HENT: Positive for congestion and postnasal drip.    Eyes: Negative for visual disturbance.   Respiratory: Negative for cough, chest tightness, shortness of breath and wheezing.    Cardiovascular: Positive for leg swelling. Negative for chest pain and palpitations.   Gastrointestinal: Negative for abdominal pain, blood in stool, nausea and vomiting.   Genitourinary: Negative for dysuria, hematuria and urgency.   Musculoskeletal: Negative for arthralgias, back pain, gait problem, joint swelling and myalgias.   Skin: Negative for color change and rash.   Neurological: Positive for numbness. Negative for dizziness, syncope, weakness, light-headedness and headaches.   Psychiatric/Behavioral: Negative for sleep disturbance.       Objective:      Physical Exam   Constitutional: He is oriented to person, place, and time. He appears well-developed and well-nourished. No distress.   HENT:   Head: Normocephalic and atraumatic.   Eyes: Conjunctivae and EOM are normal. Pupils are equal, round, and reactive to light. No scleral icterus.   Neck: Normal range of motion. Neck supple. No JVD present. No thyromegaly present.   Cardiovascular: Normal rate, regular rhythm, normal heart sounds and intact distal pulses.  Exam reveals no gallop.    No murmur heard.  Pulmonary/Chest: Effort normal  and breath sounds normal. No respiratory distress. He has no wheezes. He has no rales.   Abdominal: Soft. Bowel sounds are normal. He exhibits no mass. There is no tenderness.   Musculoskeletal: Normal range of motion. He exhibits no edema or tenderness.   Lymphadenopathy:     He has no cervical adenopathy.   Neurological: He is alert and oriented to person, place, and time. No cranial nerve deficit.   Sensory examination is intact in both feet on monofilament and vibration testing.   Skin: Skin is warm and dry. No rash noted.   No foot lesions are present.   Psychiatric: He has a normal mood and affect. His behavior is normal.   Nursing note and vitals reviewed.      Results for orders placed or performed in visit on 11/18/17   Comprehensive metabolic panel   Result Value Ref Range    Sodium 135 (L) 136 - 145 mmol/L    Potassium 4.8 3.5 - 5.1 mmol/L    Chloride 102 95 - 110 mmol/L    CO2 26 23 - 29 mmol/L    Glucose 96 70 - 110 mg/dL    BUN, Bld 27 (H) 8 - 23 mg/dL    Creatinine 1.4 0.5 - 1.4 mg/dL    Calcium 10.0 8.7 - 10.5 mg/dL    Total Protein 8.2 6.0 - 8.4 g/dL    Albumin 3.6 3.5 - 5.2 g/dL    Total Bilirubin 0.7 0.1 - 1.0 mg/dL    Alkaline Phosphatase 80 55 - 135 U/L    AST 29 10 - 40 U/L    ALT 24 10 - 44 U/L    Anion Gap 7 (L) 8 - 16 mmol/L    eGFR if African American >60.0 >60 mL/min/1.73 m^2    eGFR if non  53.1 (A) >60 mL/min/1.73 m^2   Lipid panel   Result Value Ref Range    Cholesterol 229 (H) 120 - 199 mg/dL    Triglycerides 155 (H) 30 - 150 mg/dL    HDL 40 40 - 75 mg/dL    LDL Cholesterol 158.0 63.0 - 159.0 mg/dL    HDL/Chol Ratio 17.5 (L) 20.0 - 50.0 %    Total Cholesterol/HDL Ratio 5.7 (H) 2.0 - 5.0    Non-HDL Cholesterol 189 mg/dL   Hemoglobin A1c   Result Value Ref Range    Hemoglobin A1C 5.7 (H) 4.0 - 5.6 %    Estimated Avg Glucose 117 68 - 131 mg/dL   Prostate Specific Antigen, Diagnostic   Result Value Ref Range    PSA DIAGNOSTIC 2.5 0.00 - 4.00 ng/mL       Assessment:       1.  Type 2 diabetes mellitus without complication, without long-term current use of insulin    2. Essential hypertension    3. Hyperlipidemia, unspecified hyperlipidemia type        Plan:         Celso was seen today for follow-up.  The patient declines taking the flu vaccine.  The dose of Lipitor will be increased to 80 mg daily.  Laboratory studies will be repeated in 3 months.  A follow-up visit in 4 months is recommended.    Diagnoses and all orders for this visit:    Type 2 diabetes mellitus without complication, without long-term current use of insulin  -     Hemoglobin A1c; Future    Essential hypertension    Hyperlipidemia, unspecified hyperlipidemia type  -     Comprehensive metabolic panel; Future  -     Lipid panel; Future    Other orders  -     atorvastatin (LIPITOR) 80 MG tablet; Take 1 tablet (80 mg total) by mouth once daily.

## 2018-03-03 ENCOUNTER — LAB VISIT (OUTPATIENT)
Dept: LAB | Facility: HOSPITAL | Age: 63
End: 2018-03-03
Attending: INTERNAL MEDICINE
Payer: COMMERCIAL

## 2018-03-03 DIAGNOSIS — E11.9 TYPE 2 DIABETES MELLITUS WITHOUT COMPLICATION, WITHOUT LONG-TERM CURRENT USE OF INSULIN: ICD-10-CM

## 2018-03-03 DIAGNOSIS — E78.5 HYPERLIPIDEMIA, UNSPECIFIED HYPERLIPIDEMIA TYPE: Chronic | ICD-10-CM

## 2018-03-03 LAB
ALBUMIN SERPL BCP-MCNC: 3.6 G/DL
ALP SERPL-CCNC: 88 U/L
ALT SERPL W/O P-5'-P-CCNC: 27 U/L
ANION GAP SERPL CALC-SCNC: 8 MMOL/L
AST SERPL-CCNC: 31 U/L
BILIRUB SERPL-MCNC: 0.6 MG/DL
BUN SERPL-MCNC: 22 MG/DL
CALCIUM SERPL-MCNC: 10 MG/DL
CHLORIDE SERPL-SCNC: 103 MMOL/L
CHOLEST SERPL-MCNC: 209 MG/DL
CHOLEST/HDLC SERPL: 5.4 {RATIO}
CO2 SERPL-SCNC: 25 MMOL/L
CREAT SERPL-MCNC: 1.1 MG/DL
EST. GFR  (AFRICAN AMERICAN): >60 ML/MIN/1.73 M^2
EST. GFR  (NON AFRICAN AMERICAN): >60 ML/MIN/1.73 M^2
ESTIMATED AVG GLUCOSE: 114 MG/DL
GLUCOSE SERPL-MCNC: 73 MG/DL
HBA1C MFR BLD HPLC: 5.6 %
HDLC SERPL-MCNC: 39 MG/DL
HDLC SERPL: 18.7 %
LDLC SERPL CALC-MCNC: 143 MG/DL
NONHDLC SERPL-MCNC: 170 MG/DL
POTASSIUM SERPL-SCNC: 4.6 MMOL/L
PROT SERPL-MCNC: 8 G/DL
SODIUM SERPL-SCNC: 136 MMOL/L
TRIGL SERPL-MCNC: 135 MG/DL

## 2018-03-03 PROCEDURE — 83036 HEMOGLOBIN GLYCOSYLATED A1C: CPT

## 2018-03-03 PROCEDURE — 80061 LIPID PANEL: CPT

## 2018-03-03 PROCEDURE — 80053 COMPREHEN METABOLIC PANEL: CPT

## 2018-03-03 PROCEDURE — 36415 COLL VENOUS BLD VENIPUNCTURE: CPT | Mod: PO

## 2018-03-05 ENCOUNTER — OFFICE VISIT (OUTPATIENT)
Dept: INTERNAL MEDICINE | Facility: CLINIC | Age: 63
End: 2018-03-05
Payer: COMMERCIAL

## 2018-03-05 VITALS
HEIGHT: 68 IN | TEMPERATURE: 99 F | DIASTOLIC BLOOD PRESSURE: 73 MMHG | SYSTOLIC BLOOD PRESSURE: 103 MMHG | OXYGEN SATURATION: 98 % | BODY MASS INDEX: 37.96 KG/M2 | HEART RATE: 94 BPM | WEIGHT: 250.44 LBS

## 2018-03-05 DIAGNOSIS — I10 ESSENTIAL HYPERTENSION: Chronic | ICD-10-CM

## 2018-03-05 DIAGNOSIS — M17.12 PRIMARY OSTEOARTHRITIS OF LEFT KNEE: Chronic | ICD-10-CM

## 2018-03-05 DIAGNOSIS — E78.5 HYPERLIPIDEMIA, UNSPECIFIED HYPERLIPIDEMIA TYPE: Chronic | ICD-10-CM

## 2018-03-05 DIAGNOSIS — E11.9 TYPE 2 DIABETES MELLITUS WITHOUT COMPLICATION, WITHOUT LONG-TERM CURRENT USE OF INSULIN: Primary | ICD-10-CM

## 2018-03-05 DIAGNOSIS — N40.2 PROSTATE NODULE: ICD-10-CM

## 2018-03-05 DIAGNOSIS — M79.671 RIGHT FOOT PAIN: ICD-10-CM

## 2018-03-05 DIAGNOSIS — N40.1 BENIGN PROSTATIC HYPERPLASIA WITH URINARY OBSTRUCTION: ICD-10-CM

## 2018-03-05 DIAGNOSIS — N13.8 BENIGN PROSTATIC HYPERPLASIA WITH URINARY OBSTRUCTION: ICD-10-CM

## 2018-03-05 PROCEDURE — 3078F DIAST BP <80 MM HG: CPT | Mod: S$GLB,,, | Performed by: INTERNAL MEDICINE

## 2018-03-05 PROCEDURE — 99214 OFFICE O/P EST MOD 30 MIN: CPT | Mod: S$GLB,,, | Performed by: INTERNAL MEDICINE

## 2018-03-05 PROCEDURE — 3074F SYST BP LT 130 MM HG: CPT | Mod: S$GLB,,, | Performed by: INTERNAL MEDICINE

## 2018-03-05 PROCEDURE — 99999 PR PBB SHADOW E&M-EST. PATIENT-LVL IV: CPT | Mod: PBBFAC,,, | Performed by: INTERNAL MEDICINE

## 2018-03-12 NOTE — PROGRESS NOTES
Subjective:       Patient ID: Celso Hernandez is a 62 y.o. male.    Chief Complaint: Follow-up    HPI   The patient presents for follow-up of diabetes, hypertension, hyperlipidemia, BPH, and right foot pain.  He has been noting pain in the right foot with walking.  There is no history of injury.  He currently walks 2 miles a day.    Blood sugar levels have been good.  He intermittently checks his blood sugars.  No hypoglycemic episodes have been noted.  No lower extremity paresthesias are noted.    Urine flow is good with use of Flomax.  He experiences one to 2 episodes of nocturia daily.  There is a history of prostate nodule being diagnosed by his urologist.    Review of Systems   Constitutional: Negative for activity change, appetite change, fatigue and unexpected weight change.   Eyes: Negative for visual disturbance.   Respiratory: Negative for cough, chest tightness, shortness of breath and wheezing.    Cardiovascular: Negative for chest pain, palpitations and leg swelling.   Gastrointestinal: Negative for abdominal pain, blood in stool, nausea and vomiting.   Genitourinary: Negative for dysuria, hematuria and urgency.   Musculoskeletal: Negative for arthralgias, back pain, gait problem, joint swelling and myalgias.   Skin: Negative for color change and rash.   Neurological: Negative for dizziness, syncope, weakness, light-headedness, numbness and headaches.   Psychiatric/Behavioral: Negative for sleep disturbance.       Objective:      Physical Exam   Constitutional: He is oriented to person, place, and time. He appears well-developed and well-nourished. No distress.   The patient has lost 4 pounds since 11/21/17.   HENT:   Head: Normocephalic and atraumatic.   Eyes: Conjunctivae and EOM are normal. Pupils are equal, round, and reactive to light. No scleral icterus.   Neck: Normal range of motion. Neck supple. No JVD present. No thyromegaly present.   Cardiovascular: Normal rate, regular rhythm, normal heart  sounds and intact distal pulses.  Exam reveals no gallop.    No murmur heard.  Pulmonary/Chest: Effort normal and breath sounds normal. No respiratory distress. He has no wheezes. He has no rales.   Abdominal: Soft. Bowel sounds are normal. He exhibits no mass. There is no tenderness.   Musculoskeletal: Normal range of motion. He exhibits no edema or tenderness.   Lymphadenopathy:     He has no cervical adenopathy.   Neurological: He is alert and oriented to person, place, and time. No cranial nerve deficit.   Sensory examination is intact in both feet on monofilament and vibration testing.   Skin: Skin is warm and dry. No rash noted.   Examination of the right foot shows the presence of callus between the first and second toes.  Peeling between the fourth and fifth toes is also present.  No ulcerations are noted.   Psychiatric: He has a normal mood and affect. His behavior is normal.   Nursing note and vitals reviewed.      Results for orders placed or performed in visit on 03/03/18   Comprehensive metabolic panel   Result Value Ref Range    Sodium 136 136 - 145 mmol/L    Potassium 4.6 3.5 - 5.1 mmol/L    Chloride 103 95 - 110 mmol/L    CO2 25 23 - 29 mmol/L    Glucose 73 70 - 110 mg/dL    BUN, Bld 22 8 - 23 mg/dL    Creatinine 1.1 0.5 - 1.4 mg/dL    Calcium 10.0 8.7 - 10.5 mg/dL    Total Protein 8.0 6.0 - 8.4 g/dL    Albumin 3.6 3.5 - 5.2 g/dL    Total Bilirubin 0.6 0.1 - 1.0 mg/dL    Alkaline Phosphatase 88 55 - 135 U/L    AST 31 10 - 40 U/L    ALT 27 10 - 44 U/L    Anion Gap 8 8 - 16 mmol/L    eGFR if African American >60.0 >60 mL/min/1.73 m^2    eGFR if non African American >60.0 >60 mL/min/1.73 m^2   Lipid panel   Result Value Ref Range    Cholesterol 209 (H) 120 - 199 mg/dL    Triglycerides 135 30 - 150 mg/dL    HDL 39 (L) 40 - 75 mg/dL    LDL Cholesterol 143.0 63.0 - 159.0 mg/dL    HDL/Chol Ratio 18.7 (L) 20.0 - 50.0 %    Total Cholesterol/HDL Ratio 5.4 (H) 2.0 - 5.0    Non-HDL Cholesterol 170 mg/dL    Hemoglobin A1c   Result Value Ref Range    Hemoglobin A1C 5.6 4.0 - 5.6 %    Estimated Avg Glucose 114 68 - 131 mg/dL       Assessment:       1. Type 2 diabetes mellitus without complication, without long-term current use of insulin    2. Essential hypertension    3. Hyperlipidemia, unspecified hyperlipidemia type    4. Primary osteoarthritis of left knee    5. Benign prostatic hyperplasia with urinary obstruction    6. Prostate nodule    7. Right foot pain        Plan:           Celso was seen today for follow-up.  The patient will follow-up with gastroenterology regarding colon polyps.  Urology consultation will be obtained for follow-up of BPH and history of prostate nodule.  Podiatry consultation will be obtained regarding right foot pain.  Fasting blood tests and a follow-up visit in 4 months will be obtained.    Diagnoses and all orders for this visit:    Type 2 diabetes mellitus without complication, without long-term current use of insulin  -     Ambulatory consult to Podiatry  -     Hemoglobin A1c; Future  -     Lipid panel; Future  -     Comprehensive metabolic panel; Future  -     CBC auto differential; Future    Essential hypertension  -     Hemoglobin A1c; Future  -     Lipid panel; Future  -     Comprehensive metabolic panel; Future  -     CBC auto differential; Future    Hyperlipidemia, unspecified hyperlipidemia type  -     Hemoglobin A1c; Future  -     Lipid panel; Future  -     Comprehensive metabolic panel; Future  -     CBC auto differential; Future    Primary osteoarthritis of left knee    Benign prostatic hyperplasia with urinary obstruction  -     Ambulatory consult to Urology    Prostate nodule  -     Ambulatory consult to Urology    Right foot pain  -     Ambulatory consult to Podiatry

## 2018-03-19 RX ORDER — MELOXICAM 15 MG/1
TABLET ORAL
Qty: 90 TABLET | Refills: 3 | Status: SHIPPED | OUTPATIENT
Start: 2018-03-19 | End: 2019-04-11 | Stop reason: SDUPTHER

## 2018-03-26 ENCOUNTER — OFFICE VISIT (OUTPATIENT)
Dept: PODIATRY | Facility: CLINIC | Age: 63
End: 2018-03-26
Payer: COMMERCIAL

## 2018-03-26 VITALS
DIASTOLIC BLOOD PRESSURE: 90 MMHG | HEIGHT: 68 IN | HEART RATE: 94 BPM | WEIGHT: 250 LBS | BODY MASS INDEX: 37.89 KG/M2 | SYSTOLIC BLOOD PRESSURE: 142 MMHG

## 2018-03-26 DIAGNOSIS — R60.0 BILATERAL LOWER EXTREMITY EDEMA: ICD-10-CM

## 2018-03-26 DIAGNOSIS — B35.3 TINEA PEDIS OF RIGHT FOOT: ICD-10-CM

## 2018-03-26 DIAGNOSIS — E11.9 TYPE 2 DIABETES MELLITUS WITHOUT COMPLICATION, WITHOUT LONG-TERM CURRENT USE OF INSULIN: Primary | ICD-10-CM

## 2018-03-26 PROCEDURE — 3044F HG A1C LEVEL LT 7.0%: CPT | Mod: CPTII,S$GLB,, | Performed by: PODIATRIST

## 2018-03-26 PROCEDURE — 99213 OFFICE O/P EST LOW 20 MIN: CPT | Mod: S$GLB,,, | Performed by: PODIATRIST

## 2018-03-26 PROCEDURE — 99999 PR PBB SHADOW E&M-EST. PATIENT-LVL III: CPT | Mod: PBBFAC,,, | Performed by: PODIATRIST

## 2018-03-26 PROCEDURE — 3080F DIAST BP >= 90 MM HG: CPT | Mod: CPTII,S$GLB,, | Performed by: PODIATRIST

## 2018-03-26 PROCEDURE — 3077F SYST BP >= 140 MM HG: CPT | Mod: CPTII,S$GLB,, | Performed by: PODIATRIST

## 2018-03-26 RX ORDER — ECONAZOLE NITRATE 10 MG/G
CREAM TOPICAL 2 TIMES DAILY
Qty: 85 G | Refills: 1 | Status: SHIPPED | OUTPATIENT
Start: 2018-03-26 | End: 2018-07-02

## 2018-03-26 NOTE — PATIENT INSTRUCTIONS
Diabetic Foot Care  Diabetes can lead to a number of foot complications. Fortunately, you can prevent most of these with a little extra foot care. If diabetes is not well controlled, it can cause damage to blood vessels and result in poor circulation to the foot. When the skin does not get enough blood flow, it becomes prone to pressure sores and ulcers, which heal slowly.  Diabetes can also damage nerves, interfering with the ability to feel pain and pressure. When you cant feel your foot normally, it is easy to injure your skin, bones, and joints without knowing it. For these reasons diabetes increases the risk of fungal infections, bunions, and ulcers. An ulcer is a sore or break in the skin. With ulcers, often the skin seems to have worn away. Deep ulcers can lead to bone infection.  Gangrene is the most serious foot complication of diabetes. It usually occurs on the tips of the toes as blackened areas of skin. The black area is dead tissue. In severe cases, gangrene spreads to involve the entire toe, other toes, and the entire foot. Foot or toe amputation may be required. Good foot care and blood sugar control can prevent this.  Home care  · Wear comfortable, well-fitting shoes.  · Wash your feet daily with warm water and mild soap.  · After drying, apply a moisturizing cream or lotion to the top and bottom of your feet. Don't put lotion between toes.  · Check your feet daily for skin breaks, blisters, swelling, or redness. Look between your toes as well. If you cannot see the bottoms of your feet, ask someone to look or use a mirror.  · Wear cotton socks and change them every day.  · Trim toenails carefully, and do not cut your cuticles.  · Strive to keep your blood sugar under control with a combination of medicines, diet, and activity.  · If you smoke and have diabetes, it is very important that you stop. Smoking reduces blood flow to your foot.  · Schedule foot exams at least every year, or more often if  you have foot problems.  · Put your feet up when sitting, wiggle toes, and move ankles to help improve blood flow.  Avoid activities that increase your risk of foot injury:  · Do not walk barefoot.  · Do not use heating pads or hot water bottles on your feet.  · Do not put your foot in a hot tub without first checking the temperature with your hand.  Follow-up care  Follow up with your healthcare provider, or as advised. Be sure to take off your shoes and socks before your appointment starts so your healthcare provider will be sure to check your feet. Report any cut, puncture, scrape, blister, or other injury to your foot. Also report if you have a bunion, hammertoes, ingrown toenail, or ulcer on your foot.  When to seek medical advice  Call your healthcare provider right away if any of these occur:  · Black skin color anywhere on the foot  · Open ulcer with pus draining from the wound  · Increasing foot or leg pain  · New areas of redness or swelling or tender areas of the foot  · Fever of 100.4°F (38°C) or greater  Date Last Reviewed: 5/25/2016  © 4169-4470 [employer]. [address] [place] [address], [place] [address]. All rights reserved. This information is not intended as a substitute for professional medical care. Always follow your healthcare professional's instructions.

## 2018-03-27 NOTE — PROGRESS NOTES
Subjective:      Patient ID: Celso Hernandez is a 62 y.o. male.    Chief Complaint: Diabetes Mellitus (Dr. Jeri Quarles 11/21/17); Diabetic Foot Exam; and Routine Foot Care    Celso is a 62 y.o. male who presents to the clinic upon referral from Dr. Fany coughlin. provider found  for evaluation and treatment of diabetic feet. Celso has a past medical history of Colon polyps; Diabetes mellitus type II; Hyperlipidemia; Hypertension; and Unspecified hemorrhoids without mention of complication. Patient relates no major problem with feet. .    PCP: Inder Quarles MD    Date Last Seen by PCP: 7/25/17    Current shoe gear: Tennis shoes    Hemoglobin A1C   Date Value Ref Range Status   03/03/2018 5.6 4.0 - 5.6 % Final     Comment:     According to ADA guidelines, hemoglobin A1c <7.0% represents  optimal control in non-pregnant diabetic patients. Different  metrics may apply to specific patient populations.   Standards of Medical Care in Diabetes-2016.  For the purpose of screening for the presence of diabetes:  <5.7%     Consistent with the absence of diabetes  5.7-6.4%  Consistent with increasing risk for diabetes   (prediabetes)  >or=6.5%  Consistent with diabetes  Currently, no consensus exists for use of hemoglobin A1c  for diagnosis of diabetes for children.  This Hemoglobin A1c assay has significant interference with fetal   hemoglobin   (HbF). The results are invalid for patients with abnormal amounts of   HbF,   including those with known Hereditary Persistence   of Fetal Hemoglobin. Heterozygous hemoglobin variants (HbAS, HbAC,   HbAD, HbAE, HbA2) do not significantly interfere with this assay;   however, presence of multiple variants in a sample may impact the %   interference.     11/18/2017 5.7 (H) 4.0 - 5.6 % Final     Comment:     According to ADA guidelines, hemoglobin A1c <7.0% represents  optimal control in non-pregnant diabetic patients. Different  metrics may apply to specific patient populations.   Standards of  "Medical Care in Diabetes-2016.  For the purpose of screening for the presence of diabetes:  <5.7%     Consistent with the absence of diabetes  5.7-6.4%  Consistent with increasing risk for diabetes   (prediabetes)  >or=6.5%  Consistent with diabetes  Currently, no consensus exists for use of hemoglobin A1c  for diagnosis of diabetes for children.  This Hemoglobin A1c assay has significant interference with fetal   hemoglobin   (HbF). The results are invalid for patients with abnormal amounts of   HbF,   including those with known Hereditary Persistence   of Fetal Hemoglobin. Heterozygous hemoglobin variants (HbAS, HbAC,   HbAD, HbAE, HbA2) do not significantly interfere with this assay;   however, presence of multiple variants in a sample may impact the %   interference.     07/21/2017 5.6 4.0 - 5.6 % Final     Comment:     According to ADA guidelines, hemoglobin A1c <7.0% represents  optimal control in non-pregnant diabetic patients. Different  metrics may apply to specific patient populations.   Standards of Medical Care in Diabetes-2016.  For the purpose of screening for the presence of diabetes:  <5.7%     Consistent with the absence of diabetes  5.7-6.4%  Consistent with increasing risk for diabetes   (prediabetes)  >or=6.5%  Consistent with diabetes  Currently, no consensus exists for use of hemoglobin A1c  for diagnosis of diabetes for children.  This Hemoglobin A1c assay has significant interference with fetal   hemoglobin   (HbF). The results are invalid for patients with abnormal amounts of   HbF,   including those with known Hereditary Persistence   of Fetal Hemoglobin. Heterozygous hemoglobin variants (HbAS, HbAC,   HbAD, HbAE, HbA2) do not significantly interfere with this assay;   however, presence of multiple variants in a sample may impact the %   interference.       Vitals:    03/26/18 1503   BP: (!) 142/90   Pulse: 94   Weight: 113.4 kg (250 lb)   Height: 5' 8" (1.727 m)   PainSc: 0-No pain    "   Past Medical History:   Diagnosis Date    Colon polyps     Diabetes mellitus type II     Hyperlipidemia     Hypertension     Unspecified hemorrhoids without mention of complication        Past Surgical History:   Procedure Laterality Date    none         Family History   Problem Relation Age of Onset    Hyperlipidemia Mother     Cancer Sister      breast    Hearing loss Sister        Social History     Social History    Marital status:      Spouse name: N/A    Number of children: N/A    Years of education: N/A     Social History Main Topics    Smoking status: Never Smoker    Smokeless tobacco: Never Used    Alcohol use 2.0 oz/week     4 Standard drinks or equivalent per week    Drug use: No    Sexual activity: Not on file     Other Topics Concern    Not on file     Social History Narrative    No narrative on file       Current Outpatient Prescriptions   Medication Sig Dispense Refill    amlodipine (NORVASC) 5 MG tablet Take 1 tablet (5 mg total) by mouth once daily. For blood pressure control. 90 tablet 3    atorvastatin (LIPITOR) 80 MG tablet Take 1 tablet (80 mg total) by mouth once daily. 90 tablet 3    cholecalciferol, vitamin D3, (VITAMIN D3) 2,000 unit Cap Take 1 capsule by mouth once daily.      lisinopril-hydrochlorothiazide (PRINZIDE,ZESTORETIC) 20-12.5 mg per tablet TAKE TWO TABLETS BY MOUTH ONCE DAILY 60 tablet 10    meloxicam (MOBIC) 15 MG tablet TAKE ONE TABLET BY MOUTH ONCE DAILY 90 tablet 3    metformin (GLUCOPHAGE) 500 MG tablet TAKE ONE TABLET BY MOUTH TWICE DAILY 60 tablet 10    tamsulosin (FLOMAX) 0.4 mg Cp24       econazole nitrate 1 % cream Apply topically 2 (two) times daily. 85 g 1     No current facility-administered medications for this visit.        Review of patient's allergies indicates:  No Known Allergies        Review of Systems   Constitution: Negative for chills, fever, weakness and malaise/fatigue.   Cardiovascular: Positive for leg swelling.  Negative for chest pain and claudication.   Respiratory: Negative for cough and shortness of breath.    Skin: Positive for dry skin. Negative for itching, nail changes, poor wound healing and rash.   Musculoskeletal: Positive for stiffness. Negative for back pain, joint pain, muscle cramps and muscle weakness.   Gastrointestinal: Negative for nausea and vomiting.   Neurological: Negative for numbness and paresthesias.   Psychiatric/Behavioral: Negative for altered mental status.           Objective:      Physical Exam   Constitutional: He is oriented to person, place, and time. No distress.   Cardiovascular:   Pulses:       Dorsalis pedis pulses are 2+ on the right side, and 2+ on the left side.        Posterior tibial pulses are 2+ on the right side, and 2+ on the left side.   CFT< 3 secs all toes bilateral foot, skin temp warm bilateral foot, diminished digital hair growth bilateral foot, mild to moderate pitting lower extremity edema bilateral.     Musculoskeletal:   Mild pes planus foot type bilateral foot. Mild non-track bound hallux valgus bilateral foot. Mild reducible digital flexion contractures toes 2-5 bilateral foot.    No pain with ROM or MMT bilateral foot.   Feet:   Right Foot:   Protective Sensation: 10 sites tested. 10 sites sensed.   Skin Integrity: Positive for dry skin. Negative for ulcer, blister, skin breakdown, erythema, warmth or callus.   Left Foot:   Protective Sensation: 10 sites tested. 10 sites sensed.   Skin Integrity: Positive for dry skin. Negative for ulcer, blister, skin breakdown, erythema, warmth or callus.   Neurological: He is alert and oriented to person, place, and time. He has normal strength. A sensory deficit is present. Gait normal.   Slight reduction in vibratory sensation bilateral foot.   Skin: Skin is warm, dry and intact. Capillary refill takes less than 2 seconds. Rash noted. No ecchymosis noted. He is not diaphoretic. No erythema. No pallor. Nails show no clubbing.    Nails 1-5 bilateral are normal in appearance and trimmed.    Skin is dry and scaly to plantar foot and interdigital areas with mild papular eruption along the medial arch. Also note slightly raised dry hyperkeratotic macules along the hallux and fifth toe.    No open lesions or macerations bilateral lower extremity.    Skin turgor and elasticity WNLs bilateral lower extremity'             Assessment:       Encounter Diagnoses   Name Primary?    Type 2 diabetes mellitus without complication, without long-term current use of insulin Yes    Bilateral lower extremity edema     Tinea pedis of right foot          Plan:       Celso was seen today for diabetes mellitus, diabetic foot exam and routine foot care.    Diagnoses and all orders for this visit:    Type 2 diabetes mellitus without complication, without long-term current use of insulin    Bilateral lower extremity edema    Tinea pedis of right foot    Other orders  -     econazole nitrate 1 % cream; Apply topically 2 (two) times daily.      I counseled the patient on his conditions, their implications and medical management.    Shoe inspection. Diabetic Foot Education. Patient reminded of the importance of good nutrition and blood sugar control to help prevent podiatric complications of diabetes. Patient instructed on proper foot hygeine. We discussed wearing proper shoe gear, daily foot inspections, never walking without protective shoe gear, never putting sharp instruments to feet.    Recommended replacing tennis shoes every 6-12 months.    Refused compression stockings.    Discussed treating contact surfaces and shoes with Lysol and exposing to direct sun light.    Discussed proper foot and hand hygiene.    RTC 1 year or prn.

## 2018-05-14 RX ORDER — AMLODIPINE BESYLATE 5 MG/1
TABLET ORAL
Qty: 90 TABLET | Refills: 3 | Status: SHIPPED | OUTPATIENT
Start: 2018-05-14 | End: 2018-11-05

## 2018-06-18 RX ORDER — METFORMIN HYDROCHLORIDE 500 MG/1
TABLET ORAL
Qty: 180 TABLET | Refills: 2 | Status: SHIPPED | OUTPATIENT
Start: 2018-06-18 | End: 2019-04-09 | Stop reason: SDUPTHER

## 2018-06-26 RX ORDER — TAMSULOSIN HYDROCHLORIDE 0.4 MG/1
CAPSULE ORAL
Qty: 90 CAPSULE | Refills: 3 | Status: SHIPPED | OUTPATIENT
Start: 2018-06-26 | End: 2019-08-03 | Stop reason: SDUPTHER

## 2018-06-30 ENCOUNTER — LAB VISIT (OUTPATIENT)
Dept: LAB | Facility: HOSPITAL | Age: 63
End: 2018-06-30
Attending: INTERNAL MEDICINE
Payer: COMMERCIAL

## 2018-06-30 DIAGNOSIS — E78.5 HYPERLIPIDEMIA, UNSPECIFIED HYPERLIPIDEMIA TYPE: Chronic | ICD-10-CM

## 2018-06-30 DIAGNOSIS — I10 ESSENTIAL HYPERTENSION: Chronic | ICD-10-CM

## 2018-06-30 DIAGNOSIS — E11.9 TYPE 2 DIABETES MELLITUS WITHOUT COMPLICATION, WITHOUT LONG-TERM CURRENT USE OF INSULIN: ICD-10-CM

## 2018-06-30 LAB
ALBUMIN SERPL BCP-MCNC: 3.5 G/DL
ALP SERPL-CCNC: 89 U/L
ALT SERPL W/O P-5'-P-CCNC: 27 U/L
ANION GAP SERPL CALC-SCNC: 11 MMOL/L
AST SERPL-CCNC: 31 U/L
BASOPHILS # BLD AUTO: 0.09 K/UL
BASOPHILS NFR BLD: 0.7 %
BILIRUB SERPL-MCNC: 0.6 MG/DL
BUN SERPL-MCNC: 23 MG/DL
CALCIUM SERPL-MCNC: 10 MG/DL
CHLORIDE SERPL-SCNC: 102 MMOL/L
CHOLEST SERPL-MCNC: 237 MG/DL
CHOLEST/HDLC SERPL: 6.4 {RATIO}
CO2 SERPL-SCNC: 21 MMOL/L
CREAT SERPL-MCNC: 1.4 MG/DL
DIFFERENTIAL METHOD: ABNORMAL
EOSINOPHIL # BLD AUTO: 0.4 K/UL
EOSINOPHIL NFR BLD: 3.4 %
ERYTHROCYTE [DISTWIDTH] IN BLOOD BY AUTOMATED COUNT: 13.5 %
EST. GFR  (AFRICAN AMERICAN): >60 ML/MIN/1.73 M^2
EST. GFR  (NON AFRICAN AMERICAN): 53.1 ML/MIN/1.73 M^2
ESTIMATED AVG GLUCOSE: 123 MG/DL
GLUCOSE SERPL-MCNC: 131 MG/DL
HBA1C MFR BLD HPLC: 5.9 %
HCT VFR BLD AUTO: 52.7 %
HDLC SERPL-MCNC: 37 MG/DL
HDLC SERPL: 15.6 %
HGB BLD-MCNC: 17.6 G/DL
IMM GRANULOCYTES # BLD AUTO: 0.03 K/UL
IMM GRANULOCYTES NFR BLD AUTO: 0.2 %
LDLC SERPL CALC-MCNC: 168.4 MG/DL
LYMPHOCYTES # BLD AUTO: 3.4 K/UL
LYMPHOCYTES NFR BLD: 27.9 %
MCH RBC QN AUTO: 29.6 PG
MCHC RBC AUTO-ENTMCNC: 33.4 G/DL
MCV RBC AUTO: 89 FL
MONOCYTES # BLD AUTO: 1.4 K/UL
MONOCYTES NFR BLD: 11.5 %
NEUTROPHILS # BLD AUTO: 6.8 K/UL
NEUTROPHILS NFR BLD: 56.3 %
NONHDLC SERPL-MCNC: 200 MG/DL
NRBC BLD-RTO: 0 /100 WBC
PLATELET # BLD AUTO: 247 K/UL
PMV BLD AUTO: 11.5 FL
POTASSIUM SERPL-SCNC: 4.2 MMOL/L
PROT SERPL-MCNC: 7.8 G/DL
RBC # BLD AUTO: 5.94 M/UL
SODIUM SERPL-SCNC: 134 MMOL/L
TRIGL SERPL-MCNC: 158 MG/DL
WBC # BLD AUTO: 12.07 K/UL

## 2018-06-30 PROCEDURE — 80053 COMPREHEN METABOLIC PANEL: CPT

## 2018-06-30 PROCEDURE — 80061 LIPID PANEL: CPT

## 2018-06-30 PROCEDURE — 36415 COLL VENOUS BLD VENIPUNCTURE: CPT | Mod: PO

## 2018-06-30 PROCEDURE — 83036 HEMOGLOBIN GLYCOSYLATED A1C: CPT

## 2018-06-30 PROCEDURE — 85025 COMPLETE CBC W/AUTO DIFF WBC: CPT

## 2018-07-02 ENCOUNTER — OFFICE VISIT (OUTPATIENT)
Dept: INTERNAL MEDICINE | Facility: CLINIC | Age: 63
End: 2018-07-02
Payer: COMMERCIAL

## 2018-07-02 VITALS
HEIGHT: 68 IN | DIASTOLIC BLOOD PRESSURE: 72 MMHG | BODY MASS INDEX: 38.76 KG/M2 | HEART RATE: 80 BPM | TEMPERATURE: 98 F | SYSTOLIC BLOOD PRESSURE: 110 MMHG | WEIGHT: 255.75 LBS

## 2018-07-02 DIAGNOSIS — M17.12 PRIMARY OSTEOARTHRITIS OF LEFT KNEE: Chronic | ICD-10-CM

## 2018-07-02 DIAGNOSIS — N40.1 BENIGN PROSTATIC HYPERPLASIA WITH URINARY OBSTRUCTION: ICD-10-CM

## 2018-07-02 DIAGNOSIS — E78.2 MIXED HYPERLIPIDEMIA: Chronic | ICD-10-CM

## 2018-07-02 DIAGNOSIS — N13.8 BENIGN PROSTATIC HYPERPLASIA WITH URINARY OBSTRUCTION: ICD-10-CM

## 2018-07-02 DIAGNOSIS — E11.9 TYPE 2 DIABETES MELLITUS WITHOUT COMPLICATION, WITHOUT LONG-TERM CURRENT USE OF INSULIN: Primary | ICD-10-CM

## 2018-07-02 DIAGNOSIS — I10 ESSENTIAL HYPERTENSION: Chronic | ICD-10-CM

## 2018-07-02 DIAGNOSIS — E55.9 VITAMIN D DEFICIENCY: Chronic | ICD-10-CM

## 2018-07-02 PROCEDURE — 3044F HG A1C LEVEL LT 7.0%: CPT | Mod: CPTII,S$GLB,, | Performed by: INTERNAL MEDICINE

## 2018-07-02 PROCEDURE — 3008F BODY MASS INDEX DOCD: CPT | Mod: CPTII,S$GLB,, | Performed by: INTERNAL MEDICINE

## 2018-07-02 PROCEDURE — 3078F DIAST BP <80 MM HG: CPT | Mod: CPTII,S$GLB,, | Performed by: INTERNAL MEDICINE

## 2018-07-02 PROCEDURE — 99999 PR PBB SHADOW E&M-EST. PATIENT-LVL III: CPT | Mod: PBBFAC,,, | Performed by: INTERNAL MEDICINE

## 2018-07-02 PROCEDURE — 3074F SYST BP LT 130 MM HG: CPT | Mod: CPTII,S$GLB,, | Performed by: INTERNAL MEDICINE

## 2018-07-02 PROCEDURE — 99214 OFFICE O/P EST MOD 30 MIN: CPT | Mod: S$GLB,,, | Performed by: INTERNAL MEDICINE

## 2018-07-02 RX ORDER — BIMATOPROST 0.1 MG/ML
SOLUTION/ DROPS OPHTHALMIC
Refills: 1 | Status: ON HOLD | COMMUNITY
Start: 2018-06-14 | End: 2019-10-08 | Stop reason: CLARIF

## 2018-07-10 NOTE — PROGRESS NOTES
Subjective:       Patient ID: Celso Hernandez is a 62 y.o. male.    Chief Complaint: Follow-up    HPI     The patient presents for follow-up of diabetes, hypertension, mixed hyperlipidemia, vitamin-D deficiency, and BPH.  He also has arthritis involving the left knee.  He has not experienced any exacerbation of joint pain or joint swelling.  The patient admits to being noncompliant with atorvastatin often forgetting to take it at night.  We discussed taking the medication in the morning with his other medications as an alternative.    Blood sugar levels have ranged from 120-125.  No hypoglycemia has been noted.  He has not been exercising regularly for the last 1-1/2 months.  Review of Systems   Constitutional: Negative for activity change, appetite change, fatigue and unexpected weight change.   Eyes: Negative for visual disturbance.   Respiratory: Negative for cough, chest tightness, shortness of breath and wheezing.    Cardiovascular: Negative for chest pain, palpitations and leg swelling.   Gastrointestinal: Negative for abdominal pain, blood in stool, nausea and vomiting.   Genitourinary: Positive for frequency. Negative for dysuria, hematuria and urgency.        Nocturia x 2  is reported.   Musculoskeletal: Positive for arthralgias. Negative for back pain, gait problem, joint swelling and myalgias.   Skin: Negative for color change and rash.   Neurological: Negative for dizziness, syncope, weakness, light-headedness, numbness and headaches.   Psychiatric/Behavioral: Negative for sleep disturbance.       Objective:      Physical Exam   Constitutional: He is oriented to person, place, and time. He appears well-developed and well-nourished. No distress.   HENT:   Head: Normocephalic and atraumatic.   Eyes: Conjunctivae and EOM are normal. Pupils are equal, round, and reactive to light. No scleral icterus.   Neck: Normal range of motion. Neck supple. No JVD present. No thyromegaly present.   Cardiovascular: Normal  rate, regular rhythm, normal heart sounds and intact distal pulses.  Exam reveals no gallop.    No murmur heard.  Pulmonary/Chest: Effort normal and breath sounds normal. No respiratory distress. He has no wheezes. He has no rales.   Abdominal: Soft. Bowel sounds are normal. He exhibits no mass. There is no tenderness.   Musculoskeletal: Normal range of motion. He exhibits no edema or tenderness.   Lymphadenopathy:     He has no cervical adenopathy.   Neurological: He is alert and oriented to person, place, and time. No cranial nerve deficit.   Sensory examination is intact in both feet on monofilament and vibration testing.   Skin: Skin is warm and dry. No rash noted.   No foot lesions are present.   Psychiatric: He has a normal mood and affect. His behavior is normal.   Nursing note and vitals reviewed.      Results for orders placed or performed in visit on 06/30/18   Hemoglobin A1c   Result Value Ref Range    Hemoglobin A1C 5.9 (H) 4.0 - 5.6 %    Estimated Avg Glucose 123 68 - 131 mg/dL   Lipid panel   Result Value Ref Range    Cholesterol 237 (H) 120 - 199 mg/dL    Triglycerides 158 (H) 30 - 150 mg/dL    HDL 37 (L) 40 - 75 mg/dL    LDL Cholesterol 168.4 (H) 63.0 - 159.0 mg/dL    HDL/Chol Ratio 15.6 (L) 20.0 - 50.0 %    Total Cholesterol/HDL Ratio 6.4 (H) 2.0 - 5.0    Non-HDL Cholesterol 200 mg/dL   Comprehensive metabolic panel   Result Value Ref Range    Sodium 134 (L) 136 - 145 mmol/L    Potassium 4.2 3.5 - 5.1 mmol/L    Chloride 102 95 - 110 mmol/L    CO2 21 (L) 23 - 29 mmol/L    Glucose 131 (H) 70 - 110 mg/dL    BUN, Bld 23 8 - 23 mg/dL    Creatinine 1.4 0.5 - 1.4 mg/dL    Calcium 10.0 8.7 - 10.5 mg/dL    Total Protein 7.8 6.0 - 8.4 g/dL    Albumin 3.5 3.5 - 5.2 g/dL    Total Bilirubin 0.6 0.1 - 1.0 mg/dL    Alkaline Phosphatase 89 55 - 135 U/L    AST 31 10 - 40 U/L    ALT 27 10 - 44 U/L    Anion Gap 11 8 - 16 mmol/L    eGFR if African American >60.0 >60 mL/min/1.73 m^2    eGFR if non  53.1  (A) >60 mL/min/1.73 m^2   CBC auto differential   Result Value Ref Range    WBC 12.07 3.90 - 12.70 K/uL    RBC 5.94 4.60 - 6.20 M/uL    Hemoglobin 17.6 14.0 - 18.0 g/dL    Hematocrit 52.7 40.0 - 54.0 %    MCV 89 82 - 98 fL    MCH 29.6 27.0 - 31.0 pg    MCHC 33.4 32.0 - 36.0 g/dL    RDW 13.5 11.5 - 14.5 %    Platelets 247 150 - 350 K/uL    MPV 11.5 9.2 - 12.9 fL    Immature Granulocytes 0.2 0.0 - 0.5 %    Gran # (ANC) 6.8 1.8 - 7.7 K/uL    Immature Grans (Abs) 0.03 0.00 - 0.04 K/uL    Lymph # 3.4 1.0 - 4.8 K/uL    Mono # 1.4 (H) 0.3 - 1.0 K/uL    Eos # 0.4 0.0 - 0.5 K/uL    Baso # 0.09 0.00 - 0.20 K/uL    nRBC 0 0 /100 WBC    Gran% 56.3 38.0 - 73.0 %    Lymph% 27.9 18.0 - 48.0 %    Mono% 11.5 4.0 - 15.0 %    Eosinophil% 3.4 0.0 - 8.0 %    Basophil% 0.7 0.0 - 1.9 %    Differential Method Automated        Assessment:       1. Type 2 diabetes mellitus without complication, without long-term current use of insulin    2. Vitamin D deficiency    3. Essential hypertension    4. Mixed hyperlipidemia    5. Benign prostatic hyperplasia with urinary obstruction    6. Primary osteoarthritis of left knee        Plan:           Celso was seen today for follow-up.  Blood tests will be obtained in 4 months along with a follow-up visit.  The patient may use atorvastatin at noontime with his other medications.  This should improve compliance.     Diagnoses and all orders for this visit:    Type 2 diabetes mellitus without complication, without long-term current use of insulin  -     Comprehensive metabolic panel; Future  -     Vitamin D; Future  -     Prostate Specific Antigen, Diagnostic; Future  -     Hemoglobin A1c; Future  -     Lipid panel; Future    Vitamin D deficiency  -     Comprehensive metabolic panel; Future  -     Vitamin D; Future  -     Prostate Specific Antigen, Diagnostic; Future  -     Hemoglobin A1c; Future  -     Lipid panel; Future    Essential hypertension  -     Comprehensive metabolic panel; Future  -     Vitamin  D; Future  -     Prostate Specific Antigen, Diagnostic; Future  -     Hemoglobin A1c; Future  -     Lipid panel; Future    Mixed hyperlipidemia    Benign prostatic hyperplasia with urinary obstruction  -     Comprehensive metabolic panel; Future  -     Vitamin D; Future  -     Prostate Specific Antigen, Diagnostic; Future  -     Hemoglobin A1c; Future  -     Lipid panel; Future    Primary osteoarthritis of left knee

## 2018-10-05 RX ORDER — LISINOPRIL AND HYDROCHLOROTHIAZIDE 12.5; 2 MG/1; MG/1
TABLET ORAL
Qty: 60 TABLET | Refills: 10 | Status: SHIPPED | OUTPATIENT
Start: 2018-10-05 | End: 2019-03-27 | Stop reason: SDUPTHER

## 2018-11-02 ENCOUNTER — LAB VISIT (OUTPATIENT)
Dept: LAB | Facility: HOSPITAL | Age: 63
End: 2018-11-02
Attending: INTERNAL MEDICINE
Payer: COMMERCIAL

## 2018-11-02 DIAGNOSIS — E11.9 TYPE 2 DIABETES MELLITUS WITHOUT COMPLICATION, WITHOUT LONG-TERM CURRENT USE OF INSULIN: ICD-10-CM

## 2018-11-02 DIAGNOSIS — I10 ESSENTIAL HYPERTENSION: Chronic | ICD-10-CM

## 2018-11-02 DIAGNOSIS — E55.9 VITAMIN D DEFICIENCY: Chronic | ICD-10-CM

## 2018-11-02 DIAGNOSIS — N13.8 BENIGN PROSTATIC HYPERPLASIA WITH URINARY OBSTRUCTION: ICD-10-CM

## 2018-11-02 DIAGNOSIS — N40.1 BENIGN PROSTATIC HYPERPLASIA WITH URINARY OBSTRUCTION: ICD-10-CM

## 2018-11-02 LAB
25(OH)D3+25(OH)D2 SERPL-MCNC: 17 NG/ML
ALBUMIN SERPL BCP-MCNC: 3.7 G/DL
ALP SERPL-CCNC: 71 U/L
ALT SERPL W/O P-5'-P-CCNC: 27 U/L
ANION GAP SERPL CALC-SCNC: 7 MMOL/L
AST SERPL-CCNC: 30 U/L
BILIRUB SERPL-MCNC: 1.1 MG/DL
BUN SERPL-MCNC: 20 MG/DL
CALCIUM SERPL-MCNC: 10.8 MG/DL
CHLORIDE SERPL-SCNC: 102 MMOL/L
CHOLEST SERPL-MCNC: 241 MG/DL
CHOLEST/HDLC SERPL: 6.2 {RATIO}
CO2 SERPL-SCNC: 28 MMOL/L
COMPLEXED PSA SERPL-MCNC: 2.2 NG/ML
CREAT SERPL-MCNC: 1.2 MG/DL
EST. GFR  (AFRICAN AMERICAN): >60 ML/MIN/1.73 M^2
EST. GFR  (NON AFRICAN AMERICAN): >60 ML/MIN/1.73 M^2
ESTIMATED AVG GLUCOSE: 120 MG/DL
GLUCOSE SERPL-MCNC: 88 MG/DL
HBA1C MFR BLD HPLC: 5.8 %
HDLC SERPL-MCNC: 39 MG/DL
HDLC SERPL: 16.2 %
LDLC SERPL CALC-MCNC: 176.4 MG/DL
NONHDLC SERPL-MCNC: 202 MG/DL
POTASSIUM SERPL-SCNC: 4.7 MMOL/L
PROT SERPL-MCNC: 7.9 G/DL
SODIUM SERPL-SCNC: 137 MMOL/L
TRIGL SERPL-MCNC: 128 MG/DL

## 2018-11-02 PROCEDURE — 83036 HEMOGLOBIN GLYCOSYLATED A1C: CPT

## 2018-11-02 PROCEDURE — 80061 LIPID PANEL: CPT

## 2018-11-02 PROCEDURE — 36415 COLL VENOUS BLD VENIPUNCTURE: CPT | Mod: PO

## 2018-11-02 PROCEDURE — 80053 COMPREHEN METABOLIC PANEL: CPT

## 2018-11-02 PROCEDURE — 82306 VITAMIN D 25 HYDROXY: CPT

## 2018-11-02 PROCEDURE — 84153 ASSAY OF PSA TOTAL: CPT

## 2018-11-05 ENCOUNTER — OFFICE VISIT (OUTPATIENT)
Dept: INTERNAL MEDICINE | Facility: CLINIC | Age: 63
End: 2018-11-05
Payer: COMMERCIAL

## 2018-11-05 VITALS
SYSTOLIC BLOOD PRESSURE: 105 MMHG | WEIGHT: 255.31 LBS | DIASTOLIC BLOOD PRESSURE: 70 MMHG | TEMPERATURE: 99 F | RESPIRATION RATE: 14 BRPM | HEART RATE: 81 BPM | BODY MASS INDEX: 38.82 KG/M2

## 2018-11-05 DIAGNOSIS — I10 ESSENTIAL HYPERTENSION: Chronic | ICD-10-CM

## 2018-11-05 DIAGNOSIS — E11.9 TYPE 2 DIABETES MELLITUS WITHOUT COMPLICATION, WITHOUT LONG-TERM CURRENT USE OF INSULIN: Primary | ICD-10-CM

## 2018-11-05 DIAGNOSIS — Z12.11 ENCOUNTER FOR SCREENING COLONOSCOPY: ICD-10-CM

## 2018-11-05 DIAGNOSIS — S90.821A BLISTER (NONTHERMAL), RIGHT FOOT, INITIAL ENCOUNTER: ICD-10-CM

## 2018-11-05 DIAGNOSIS — E78.2 MIXED HYPERLIPIDEMIA: Chronic | ICD-10-CM

## 2018-11-05 DIAGNOSIS — E55.9 VITAMIN D DEFICIENCY: Chronic | ICD-10-CM

## 2018-11-05 DIAGNOSIS — K63.5 POLYP OF COLON, UNSPECIFIED PART OF COLON, UNSPECIFIED TYPE: ICD-10-CM

## 2018-11-05 PROCEDURE — 3008F BODY MASS INDEX DOCD: CPT | Mod: CPTII,S$GLB,, | Performed by: INTERNAL MEDICINE

## 2018-11-05 PROCEDURE — 99999 PR PBB SHADOW E&M-EST. PATIENT-LVL III: CPT | Mod: PBBFAC,,, | Performed by: INTERNAL MEDICINE

## 2018-11-05 PROCEDURE — 3044F HG A1C LEVEL LT 7.0%: CPT | Mod: CPTII,S$GLB,, | Performed by: INTERNAL MEDICINE

## 2018-11-05 PROCEDURE — 3078F DIAST BP <80 MM HG: CPT | Mod: CPTII,S$GLB,, | Performed by: INTERNAL MEDICINE

## 2018-11-05 PROCEDURE — 3074F SYST BP LT 130 MM HG: CPT | Mod: CPTII,S$GLB,, | Performed by: INTERNAL MEDICINE

## 2018-11-05 PROCEDURE — 99214 OFFICE O/P EST MOD 30 MIN: CPT | Mod: S$GLB,,, | Performed by: INTERNAL MEDICINE

## 2018-11-05 NOTE — PROGRESS NOTES
Subjective:       Patient ID: Celso Hernandez is a 63 y.o. male.    Chief Complaint: Follow-up; Hypertension; Hyperlipidemia; and Diabetes    HPI   The patient presents for follow-up of medical conditions which include type 2 diabetes mellitus, hypertension, hyperlipidemia.  The patient has been tolerating his medications well without side effects.  He occasionally checks his blood sugar levels.  Blood pressures have averaged 120/80 on self-monitoring.  He walks 1-1/2 to 2 miles a day for exercise.  He is only using 1 metformin daily for his diabetes and 1 lisinopril -HCTZ daily for his blood pressure.    Review of Systems   Constitutional: Negative for activity change, appetite change, fatigue and unexpected weight change.   Eyes: Negative for visual disturbance.   Respiratory: Negative for cough, chest tightness, shortness of breath and wheezing.    Cardiovascular: Negative for chest pain, palpitations and leg swelling.   Gastrointestinal: Negative for abdominal pain, blood in stool, nausea and vomiting.   Genitourinary: Negative for dysuria, hematuria and urgency.   Musculoskeletal: Negative for arthralgias, back pain, gait problem, joint swelling and myalgias.   Skin: Negative for color change and rash.        A blister is noted on the right foot.   Neurological: Negative for dizziness, syncope, weakness, light-headedness, numbness and headaches.   Psychiatric/Behavioral: Negative for sleep disturbance.       Objective:      Physical Exam   Constitutional: He is oriented to person, place, and time. He appears well-developed and well-nourished. No distress.   The patient's weight has remained stable since 07/02/2018.   HENT:   Head: Normocephalic and atraumatic.   Eyes: Conjunctivae and EOM are normal. Pupils are equal, round, and reactive to light. No scleral icterus.   Neck: Normal range of motion. Neck supple. No JVD present. No thyromegaly present.   Cardiovascular: Normal rate, regular rhythm, normal heart  sounds and intact distal pulses. Exam reveals no gallop.   No murmur heard.  Pulmonary/Chest: Effort normal and breath sounds normal. No respiratory distress. He has no wheezes. He has no rales.   Abdominal: Soft. Bowel sounds are normal. He exhibits no mass. There is no tenderness.   Musculoskeletal: Normal range of motion. He exhibits no edema or tenderness.   Lymphadenopathy:     He has no cervical adenopathy.   Neurological: He is alert and oriented to person, place, and time. No cranial nerve deficit.   Sensory examination is intact in both feet on monofilament and vibration testing.   Skin: Skin is warm and dry. No rash noted.   A small vesicle is noted on the plantar surface of the right foot.   Psychiatric: He has a normal mood and affect. His behavior is normal.   Nursing note and vitals reviewed.      Results for orders placed or performed in visit on 11/02/18   Comprehensive metabolic panel   Result Value Ref Range    Sodium 137 136 - 145 mmol/L    Potassium 4.7 3.5 - 5.1 mmol/L    Chloride 102 95 - 110 mmol/L    CO2 28 23 - 29 mmol/L    Glucose 88 70 - 110 mg/dL    BUN, Bld 20 8 - 23 mg/dL    Creatinine 1.2 0.5 - 1.4 mg/dL    Calcium 10.8 (H) 8.7 - 10.5 mg/dL    Total Protein 7.9 6.0 - 8.4 g/dL    Albumin 3.7 3.5 - 5.2 g/dL    Total Bilirubin 1.1 (H) 0.1 - 1.0 mg/dL    Alkaline Phosphatase 71 55 - 135 U/L    AST 30 10 - 40 U/L    ALT 27 10 - 44 U/L    Anion Gap 7 (L) 8 - 16 mmol/L    eGFR if African American >60.0 >60 mL/min/1.73 m^2    eGFR if non African American >60.0 >60 mL/min/1.73 m^2   Vitamin D   Result Value Ref Range    Vit D, 25-Hydroxy 17 (L) 30 - 96 ng/mL   Prostate Specific Antigen, Diagnostic   Result Value Ref Range    PSA DIAGNOSTIC 2.2 0.00 - 4.00 ng/mL   Hemoglobin A1c   Result Value Ref Range    Hemoglobin A1C 5.8 (H) 4.0 - 5.6 %    Estimated Avg Glucose 120 68 - 131 mg/dL   Lipid panel   Result Value Ref Range    Cholesterol 241 (H) 120 - 199 mg/dL    Triglycerides 128 30 - 150 mg/dL     HDL 39 (L) 40 - 75 mg/dL    LDL Cholesterol 176.4 (H) 63.0 - 159.0 mg/dL    HDL/Chol Ratio 16.2 (L) 20.0 - 50.0 %    Total Cholesterol/HDL Ratio 6.2 (H) 2.0 - 5.0    Non-HDL Cholesterol 202 mg/dL       Assessment:       1. Type 2 diabetes mellitus without complication, without long-term current use of insulin    2. Essential hypertension    3. Mixed hyperlipidemia    4. Vitamin D deficiency    5. Polyp of colon, unspecified part of colon, unspecified type    6. Blister (nonthermal), right foot, initial encounter    7. Encounter for screening colonoscopy        Plan:           Celso was seen today for follow-up, hypertension, hyperlipidemia and diabetes.  Amlodipine will be discontinued in view of the low blood pressure reading.  The patient should continue his current medications.  Podiatry referral will be ordered for diabetic foot care.  The patient use vitamin-D supplement as discussed.  Screening colonoscopy will be ordered in view of his history of colon polyps..  Blood tests and follow-up visit in 4 months will be obtained.    Diagnoses and all orders for this visit:    Type 2 diabetes mellitus without complication, without long-term current use of insulin  -     Ambulatory consult to Podiatry  -     Comprehensive metabolic panel; Future  -     Hemoglobin A1c; Future  -     Lipid panel; Future    Essential hypertension  -     Comprehensive metabolic panel; Future  -     Hemoglobin A1c; Future  -     Lipid panel; Future    Mixed hyperlipidemia    Vitamin D deficiency    Polyp of colon, unspecified part of colon, unspecified type  -     Case request GI: COLONOSCOPY    Blister (nonthermal), right foot, initial encounter  -     Ambulatory consult to Podiatry    Encounter for screening colonoscopy  -     Case request GI: COLONOSCOPY

## 2019-01-16 RX ORDER — ATORVASTATIN CALCIUM 80 MG/1
TABLET, FILM COATED ORAL
Qty: 90 TABLET | Refills: 3 | Status: SHIPPED | OUTPATIENT
Start: 2019-01-16 | End: 2020-01-24

## 2019-01-22 ENCOUNTER — TELEPHONE (OUTPATIENT)
Dept: INTERNAL MEDICINE | Facility: CLINIC | Age: 64
End: 2019-01-22

## 2019-01-22 NOTE — TELEPHONE ENCOUNTER
----- Message from Marce Healy sent at 1/22/2019 12:10 PM CST -----  Contact: Pt Mobile/Home [phone]   Patient is calling in regards to him saying that he spoke with you about him suppose to be scheduled for a colonoscopy. He would like a call back to see what is the status on it please.

## 2019-03-27 RX ORDER — LISINOPRIL AND HYDROCHLOROTHIAZIDE 12.5; 2 MG/1; MG/1
2 TABLET ORAL DAILY
Qty: 180 TABLET | Refills: 2 | Status: SHIPPED | OUTPATIENT
Start: 2019-03-27 | End: 2019-04-09 | Stop reason: SDUPTHER

## 2019-04-08 ENCOUNTER — TELEPHONE (OUTPATIENT)
Dept: INTERNAL MEDICINE | Facility: CLINIC | Age: 64
End: 2019-04-08

## 2019-04-08 NOTE — TELEPHONE ENCOUNTER
----- Message from Marce Healy sent at 4/8/2019 10:10 AM CDT -----  Contact: Pt self Mobile/Home [phone]  Patient would like a call back from you in regards to a pain in the lower part of his stomach.

## 2019-04-09 ENCOUNTER — OFFICE VISIT (OUTPATIENT)
Dept: INTERNAL MEDICINE | Facility: CLINIC | Age: 64
End: 2019-04-09
Payer: COMMERCIAL

## 2019-04-09 VITALS
HEIGHT: 68 IN | BODY MASS INDEX: 38.29 KG/M2 | WEIGHT: 252.63 LBS | DIASTOLIC BLOOD PRESSURE: 93 MMHG | HEART RATE: 104 BPM | SYSTOLIC BLOOD PRESSURE: 112 MMHG | TEMPERATURE: 99 F

## 2019-04-09 DIAGNOSIS — K64.9 HEMORRHOIDS, UNSPECIFIED HEMORRHOID TYPE: ICD-10-CM

## 2019-04-09 DIAGNOSIS — E66.01 SEVERE OBESITY (BMI 35.0-39.9) WITH COMORBIDITY: Chronic | ICD-10-CM

## 2019-04-09 DIAGNOSIS — E55.9 VITAMIN D DEFICIENCY: Chronic | ICD-10-CM

## 2019-04-09 DIAGNOSIS — N41.1 CHRONIC PROSTATITIS: Chronic | ICD-10-CM

## 2019-04-09 DIAGNOSIS — N40.1 BENIGN PROSTATIC HYPERPLASIA WITH URINARY OBSTRUCTION: ICD-10-CM

## 2019-04-09 DIAGNOSIS — K57.92 DIVERTICULITIS: Primary | ICD-10-CM

## 2019-04-09 DIAGNOSIS — I10 ESSENTIAL HYPERTENSION: Chronic | ICD-10-CM

## 2019-04-09 DIAGNOSIS — E78.2 MIXED HYPERLIPIDEMIA: Chronic | ICD-10-CM

## 2019-04-09 DIAGNOSIS — R35.1 NOCTURIA: ICD-10-CM

## 2019-04-09 DIAGNOSIS — N13.8 BENIGN PROSTATIC HYPERPLASIA WITH URINARY OBSTRUCTION: ICD-10-CM

## 2019-04-09 DIAGNOSIS — E11.9 TYPE 2 DIABETES MELLITUS WITHOUT COMPLICATION, WITHOUT LONG-TERM CURRENT USE OF INSULIN: ICD-10-CM

## 2019-04-09 DIAGNOSIS — K63.5 POLYP OF COLON, UNSPECIFIED PART OF COLON, UNSPECIFIED TYPE: ICD-10-CM

## 2019-04-09 PROCEDURE — 3080F DIAST BP >= 90 MM HG: CPT | Mod: CPTII,S$GLB,, | Performed by: FAMILY MEDICINE

## 2019-04-09 PROCEDURE — 3008F PR BODY MASS INDEX (BMI) DOCUMENTED: ICD-10-PCS | Mod: CPTII,S$GLB,, | Performed by: FAMILY MEDICINE

## 2019-04-09 PROCEDURE — 99999 PR PBB SHADOW E&M-EST. PATIENT-LVL III: CPT | Mod: PBBFAC,,, | Performed by: FAMILY MEDICINE

## 2019-04-09 PROCEDURE — 3044F PR MOST RECENT HEMOGLOBIN A1C LEVEL <7.0%: ICD-10-PCS | Mod: CPTII,S$GLB,, | Performed by: FAMILY MEDICINE

## 2019-04-09 PROCEDURE — 3044F HG A1C LEVEL LT 7.0%: CPT | Mod: CPTII,S$GLB,, | Performed by: FAMILY MEDICINE

## 2019-04-09 PROCEDURE — 3008F BODY MASS INDEX DOCD: CPT | Mod: CPTII,S$GLB,, | Performed by: FAMILY MEDICINE

## 2019-04-09 PROCEDURE — 99214 PR OFFICE/OUTPT VISIT, EST, LEVL IV, 30-39 MIN: ICD-10-PCS | Mod: S$GLB,,, | Performed by: FAMILY MEDICINE

## 2019-04-09 PROCEDURE — 99999 PR PBB SHADOW E&M-EST. PATIENT-LVL III: ICD-10-PCS | Mod: PBBFAC,,, | Performed by: FAMILY MEDICINE

## 2019-04-09 PROCEDURE — 3080F PR MOST RECENT DIASTOLIC BLOOD PRESSURE >= 90 MM HG: ICD-10-PCS | Mod: CPTII,S$GLB,, | Performed by: FAMILY MEDICINE

## 2019-04-09 PROCEDURE — 3074F PR MOST RECENT SYSTOLIC BLOOD PRESSURE < 130 MM HG: ICD-10-PCS | Mod: CPTII,S$GLB,, | Performed by: FAMILY MEDICINE

## 2019-04-09 PROCEDURE — 3074F SYST BP LT 130 MM HG: CPT | Mod: CPTII,S$GLB,, | Performed by: FAMILY MEDICINE

## 2019-04-09 PROCEDURE — 99214 OFFICE O/P EST MOD 30 MIN: CPT | Mod: S$GLB,,, | Performed by: FAMILY MEDICINE

## 2019-04-09 RX ORDER — LISINOPRIL AND HYDROCHLOROTHIAZIDE 12.5; 2 MG/1; MG/1
1 TABLET ORAL DAILY
Qty: 90 TABLET | Refills: 1 | Status: SHIPPED | OUTPATIENT
Start: 2019-04-09 | End: 2019-09-05

## 2019-04-09 RX ORDER — METFORMIN HYDROCHLORIDE 500 MG/1
500 TABLET ORAL
Qty: 90 TABLET | Refills: 1 | Status: SHIPPED | OUTPATIENT
Start: 2019-04-09 | End: 2019-12-19 | Stop reason: SDUPTHER

## 2019-04-10 ENCOUNTER — TELEPHONE (OUTPATIENT)
Dept: ENDOSCOPY | Facility: HOSPITAL | Age: 64
End: 2019-04-10

## 2019-04-10 ENCOUNTER — TELEPHONE (OUTPATIENT)
Dept: INTERNAL MEDICINE | Facility: CLINIC | Age: 64
End: 2019-04-10

## 2019-04-10 NOTE — TELEPHONE ENCOUNTER
Attempted to contact patient regarding Colonoscopy order and helping him get scheduled.    Patient did not answer and message was left for him to return call to Endoscopy Scheduling Department at [phone], where anyone can help get him set up for his procedure.

## 2019-04-10 NOTE — TELEPHONE ENCOUNTER
----- Message from Marce Healy sent at 4/8/2019 10:10 AM CDT -----  Contact: Pt self Mobile/Home [phone]  Patient would like a call back from you in regards to a pain in the lower part of his stomach.

## 2019-04-11 RX ORDER — MELOXICAM 15 MG/1
TABLET ORAL
Qty: 30 TABLET | Refills: 11 | Status: SHIPPED | OUTPATIENT
Start: 2019-04-11 | End: 2020-05-04

## 2019-04-11 NOTE — PROGRESS NOTES
Subjective:   Patient ID: Celso Hernandez is a 63 y.o. male.    Chief Complaint: Abdominal Pain      HPI  62 yo male with LLQ abd pain for about two weeks. Sometimes worse approx 30 min after a meal and often worse with movement. No fever or chills. No hematochezia or melena. No nvd. No ho diverticulitis  Had colonoscopy in 2014  Patient queried and denies any further complaints.      ALLERGIES AND MEDICATIONS: updated and reviewed.  Review of patient's allergies indicates:  No Known Allergies    Current Outpatient Medications:     atorvastatin (LIPITOR) 80 MG tablet, TAKE ONE TABLET BY MOUTH ONCE DAILY, Disp: 90 tablet, Rfl: 3    cholecalciferol, vitamin D3, (VITAMIN D3) 2,000 unit Cap, Take 1 capsule by mouth once daily., Disp: , Rfl:     lisinopril-hydrochlorothiazide (PRINZIDE,ZESTORETIC) 20-12.5 mg per tablet, Take 1 tablet by mouth once daily., Disp: 90 tablet, Rfl: 1    LUMIGAN 0.01 % Drop, , Disp: , Rfl: 1    meloxicam (MOBIC) 15 MG tablet, TAKE ONE TABLET BY MOUTH ONCE DAILY, Disp: 90 tablet, Rfl: 3    metFORMIN (GLUCOPHAGE) 500 MG tablet, Take 1 tablet (500 mg total) by mouth daily with breakfast., Disp: 90 tablet, Rfl: 1    tamsulosin (FLOMAX) 0.4 mg Cp24, TAKE ONE CAPSULE BY MOUTH ONCE DAILY, Disp: 90 capsule, Rfl: 3    Review of Systems   Constitutional: Negative for activity change, appetite change, chills, diaphoresis, fatigue, fever and unexpected weight change.   HENT: Negative for congestion, ear discharge, ear pain, facial swelling, hearing loss, nosebleeds, postnasal drip, rhinorrhea, sinus pressure, sneezing, sore throat, tinnitus, trouble swallowing and voice change.    Eyes: Negative for photophobia, pain, discharge, redness, itching and visual disturbance.   Respiratory: Negative for cough, chest tightness, shortness of breath and wheezing.    Cardiovascular: Negative for chest pain, palpitations and leg swelling.   Gastrointestinal: Negative for abdominal distention, abdominal pain,  "anal bleeding, blood in stool, constipation, diarrhea, nausea, rectal pain and vomiting.   Endocrine: Negative for cold intolerance, heat intolerance, polydipsia, polyphagia and polyuria.   Genitourinary: Negative for difficulty urinating, dysuria and flank pain.   Musculoskeletal: Negative for arthralgias, back pain, joint swelling, myalgias and neck pain.   Skin: Negative for rash.   Neurological: Negative for dizziness, tremors, seizures, syncope, speech difficulty, weakness, light-headedness, numbness and headaches.   Psychiatric/Behavioral: Negative for behavioral problems, confusion, decreased concentration, dysphoric mood, sleep disturbance and suicidal ideas. The patient is not nervous/anxious and is not hyperactive.        Objective:     Vitals:    04/09/19 1602   BP: (!) 112/93   Pulse: 104   Temp: 98.8 °F (37.1 °C)   TempSrc: Oral   Weight: 114.6 kg (252 lb 10.4 oz)   Height: 5' 8" (1.727 m)   PainSc:   3   PainLoc: Abdomen     Body mass index is 38.41 kg/m².    Physical Exam   Constitutional: He is oriented to person, place, and time. He appears well-developed and well-nourished. He is cooperative. He does not have a sickly appearance. No distress.   HENT:   Head: Normocephalic and atraumatic.   Right Ear: Hearing, tympanic membrane, external ear and ear canal normal. No tenderness.   Left Ear: Hearing, tympanic membrane, external ear and ear canal normal. No tenderness.   Nose: Nose normal.   Mouth/Throat: Oropharynx is clear and moist.   Eyes: Pupils are equal, round, and reactive to light. Conjunctivae and lids are normal. Right eye exhibits no discharge. Left eye exhibits no discharge. Right conjunctiva is not injected. Left conjunctiva is not injected. No scleral icterus. Right eye exhibits normal extraocular motion. Left eye exhibits normal extraocular motion.   Neck: Normal range of motion. Neck supple. No JVD present. Carotid bruit is not present. No tracheal deviation and no edema present. No " thyromegaly present.   Cardiovascular: Normal rate, regular rhythm, normal heart sounds and normal pulses. Exam reveals no friction rub.   No murmur heard.  Pulmonary/Chest: Effort normal and breath sounds normal. No accessory muscle usage. No respiratory distress. He has no wheezes. He has no rhonchi. He has no rales.   Abdominal: Soft. Bowel sounds are normal. He exhibits no distension, no abdominal bruit, no pulsatile midline mass and no mass. There is no hepatosplenomegaly. There is tenderness in the left lower quadrant. There is no rebound, no guarding, no CVA tenderness, no tenderness at McBurney's point and negative Bhagat's sign.       Musculoskeletal: He exhibits no edema.   Lymphadenopathy:        Head (right side): No submandibular, no preauricular and no posterior auricular adenopathy present.        Head (left side): No submandibular, no preauricular and no posterior auricular adenopathy present.     He has no cervical adenopathy.   Neurological: He is alert and oriented to person, place, and time. GCS eye subscore is 4. GCS verbal subscore is 5. GCS motor subscore is 6.   Skin: Skin is warm and dry. No ecchymosis and no rash noted. Rash is not maculopapular and not urticarial. He is not diaphoretic. No cyanosis or erythema. Nails show no clubbing.   Psychiatric: He has a normal mood and affect. His speech is normal and behavior is normal. Thought content normal. His mood appears not anxious. His affect is not angry and not inappropriate. He does not exhibit a depressed mood.   Nursing note and vitals reviewed.      Assessment and Plan:   Celso was seen today for abdominal pain.    Diagnoses and all orders for this visit:    Diverticulitis    Essential hypertension    Mixed hyperlipidemia    Chronic prostatitis    Nocturia    Benign prostatic hyperplasia with urinary obstruction    Severe obesity (BMI 35.0-39.9) with comorbidity    Vitamin D deficiency    Type 2 diabetes mellitus without complication,  without long-term current use of insulin    Polyp of colon, unspecified part of colon, unspecified type    Hemorrhoids, unspecified hemorrhoid type    Other orders  -     lisinopril-hydrochlorothiazide (PRINZIDE,ZESTORETIC) 20-12.5 mg per tablet; Take 1 tablet by mouth once daily.  -     metFORMIN (GLUCOPHAGE) 500 MG tablet; Take 1 tablet (500 mg total) by mouth daily with breakfast.    very long dw pt about likely dx, treatment plan inc diet and abx, etc. Time spent in the evaluation and management of this patient exceeded 45min and greater than 50% of this time was in face-to-face education regarding diagnoses, medications, plan, and follow-up.  He declined tx ama and will let us know if he worsens. For severe pain or f/c, go to er. Patient expressed understanding of the plan as evidenced by brief summary back to me.       Follow up in about 2 weeks (around 4/23/2019).    THIS NOTE WILL BE SHARED WITH THE PATIENT.

## 2019-06-08 ENCOUNTER — PATIENT MESSAGE (OUTPATIENT)
Dept: ENDOSCOPY | Facility: HOSPITAL | Age: 64
End: 2019-06-08

## 2019-08-05 RX ORDER — TAMSULOSIN HYDROCHLORIDE 0.4 MG/1
CAPSULE ORAL
Qty: 90 CAPSULE | Refills: 3 | Status: SHIPPED | OUTPATIENT
Start: 2019-08-05 | End: 2020-02-18

## 2019-09-03 ENCOUNTER — TELEPHONE (OUTPATIENT)
Dept: INTERNAL MEDICINE | Facility: CLINIC | Age: 64
End: 2019-09-03

## 2019-09-03 NOTE — TELEPHONE ENCOUNTER
----- Message from Claire Fajardo sent at 9/3/2019 11:16 AM CDT -----  Contact: self   Caller is requesting a sooner appointment. Caller declined first available appointment listed below. Caller will not accept being placed on the wait list and is requesting a message be sent to the provider.    When is the next available appointment:  11/11  Did you offer to schedule the next available appt and put the patient on the wait list?:   yes  What visit type: EP  Symptoms:  Stomach pains  Patient preference of timeframe to be scheduled:  ASAP  What is the reason the patient is requesting a sooner appointment? (insurance terminating, changing jobs):    Would the patient rather a call back or a response via [facility]?:  Call back  Comments:

## 2019-09-05 ENCOUNTER — OFFICE VISIT (OUTPATIENT)
Dept: INTERNAL MEDICINE | Facility: CLINIC | Age: 64
End: 2019-09-05
Payer: COMMERCIAL

## 2019-09-05 ENCOUNTER — HOSPITAL ENCOUNTER (OUTPATIENT)
Dept: RADIOLOGY | Facility: HOSPITAL | Age: 64
Discharge: HOME | End: 2019-09-05
Attending: INTERNAL MEDICINE
Payer: COMMERCIAL

## 2019-09-05 VITALS
WEIGHT: 256.63 LBS | RESPIRATION RATE: 15 BRPM | BODY MASS INDEX: 39.02 KG/M2 | TEMPERATURE: 98 F | HEART RATE: 100 BPM | SYSTOLIC BLOOD PRESSURE: 104 MMHG | OXYGEN SATURATION: 93 % | DIASTOLIC BLOOD PRESSURE: 78 MMHG

## 2019-09-05 DIAGNOSIS — E78.2 MIXED HYPERLIPIDEMIA: Chronic | ICD-10-CM

## 2019-09-05 DIAGNOSIS — E11.9 TYPE 2 DIABETES MELLITUS WITHOUT COMPLICATION, WITHOUT LONG-TERM CURRENT USE OF INSULIN: Primary | ICD-10-CM

## 2019-09-05 DIAGNOSIS — N13.8 BENIGN PROSTATIC HYPERPLASIA WITH URINARY OBSTRUCTION: ICD-10-CM

## 2019-09-05 DIAGNOSIS — M17.12 PRIMARY OSTEOARTHRITIS OF LEFT KNEE: Chronic | ICD-10-CM

## 2019-09-05 DIAGNOSIS — E55.9 VITAMIN D DEFICIENCY: Chronic | ICD-10-CM

## 2019-09-05 DIAGNOSIS — R10.10 PAIN OF UPPER ABDOMEN: ICD-10-CM

## 2019-09-05 DIAGNOSIS — M89.8X6 PAIN OF RIGHT TIBIA: ICD-10-CM

## 2019-09-05 DIAGNOSIS — R53.83 FATIGUE, UNSPECIFIED TYPE: ICD-10-CM

## 2019-09-05 DIAGNOSIS — I10 ESSENTIAL HYPERTENSION: Chronic | ICD-10-CM

## 2019-09-05 DIAGNOSIS — D12.6 ADENOMATOUS POLYP OF COLON, UNSPECIFIED PART OF COLON: ICD-10-CM

## 2019-09-05 DIAGNOSIS — M79.604 PAIN OF RIGHT LOWER EXTREMITY: ICD-10-CM

## 2019-09-05 DIAGNOSIS — N40.1 BENIGN PROSTATIC HYPERPLASIA WITH URINARY OBSTRUCTION: ICD-10-CM

## 2019-09-05 DIAGNOSIS — L84 FOOT CALLUS: ICD-10-CM

## 2019-09-05 PROCEDURE — 3078F PR MOST RECENT DIASTOLIC BLOOD PRESSURE < 80 MM HG: ICD-10-PCS | Mod: CPTII,S$GLB,, | Performed by: INTERNAL MEDICINE

## 2019-09-05 PROCEDURE — 99214 PR OFFICE/OUTPT VISIT, EST, LEVL IV, 30-39 MIN: ICD-10-PCS | Mod: S$GLB,,, | Performed by: INTERNAL MEDICINE

## 2019-09-05 PROCEDURE — 99999 PR PBB SHADOW E&M-EST. PATIENT-LVL IV: ICD-10-PCS | Mod: PBBFAC,,, | Performed by: INTERNAL MEDICINE

## 2019-09-05 PROCEDURE — 99999 PR PBB SHADOW E&M-EST. PATIENT-LVL IV: CPT | Mod: PBBFAC,,, | Performed by: INTERNAL MEDICINE

## 2019-09-05 PROCEDURE — 3078F DIAST BP <80 MM HG: CPT | Mod: CPTII,S$GLB,, | Performed by: INTERNAL MEDICINE

## 2019-09-05 PROCEDURE — 99214 OFFICE O/P EST MOD 30 MIN: CPT | Mod: S$GLB,,, | Performed by: INTERNAL MEDICINE

## 2019-09-05 PROCEDURE — 3074F PR MOST RECENT SYSTOLIC BLOOD PRESSURE < 130 MM HG: ICD-10-PCS | Mod: CPTII,S$GLB,, | Performed by: INTERNAL MEDICINE

## 2019-09-05 PROCEDURE — 73590 X-RAY EXAM OF LOWER LEG: CPT | Mod: TC,PO,RT

## 2019-09-05 PROCEDURE — 73590 X-RAY EXAM OF LOWER LEG: CPT | Mod: 26,RT,, | Performed by: RADIOLOGY

## 2019-09-05 PROCEDURE — 1101F PT FALLS ASSESS-DOCD LE1/YR: CPT | Mod: CPTII,S$GLB,, | Performed by: INTERNAL MEDICINE

## 2019-09-05 PROCEDURE — 73590 XR TIBIA FIBULA 2 VIEW RIGHT: ICD-10-PCS | Mod: 26,RT,, | Performed by: RADIOLOGY

## 2019-09-05 PROCEDURE — 3044F HG A1C LEVEL LT 7.0%: CPT | Mod: CPTII,S$GLB,, | Performed by: INTERNAL MEDICINE

## 2019-09-05 PROCEDURE — 3008F BODY MASS INDEX DOCD: CPT | Mod: CPTII,S$GLB,, | Performed by: INTERNAL MEDICINE

## 2019-09-05 PROCEDURE — 3008F PR BODY MASS INDEX (BMI) DOCUMENTED: ICD-10-PCS | Mod: CPTII,S$GLB,, | Performed by: INTERNAL MEDICINE

## 2019-09-05 PROCEDURE — 3044F PR MOST RECENT HEMOGLOBIN A1C LEVEL <7.0%: ICD-10-PCS | Mod: CPTII,S$GLB,, | Performed by: INTERNAL MEDICINE

## 2019-09-05 PROCEDURE — 3074F SYST BP LT 130 MM HG: CPT | Mod: CPTII,S$GLB,, | Performed by: INTERNAL MEDICINE

## 2019-09-05 PROCEDURE — 1101F PR PT FALLS ASSESS DOC 0-1 FALLS W/OUT INJ PAST YR: ICD-10-PCS | Mod: CPTII,S$GLB,, | Performed by: INTERNAL MEDICINE

## 2019-09-05 RX ORDER — LOSARTAN POTASSIUM AND HYDROCHLOROTHIAZIDE 12.5; 1 MG/1; MG/1
1 TABLET ORAL DAILY
Qty: 90 TABLET | Refills: 3 | Status: SHIPPED | OUTPATIENT
Start: 2019-09-05 | End: 2019-12-24 | Stop reason: RX

## 2019-09-05 NOTE — PROGRESS NOTES
Subjective:       Patient ID: Celso Hernandez is a 64 y.o. male.    Chief Complaint: Abdominal Pain    HPI   The patient presents for evaluation abdominal pain and other medical conditions.  Active medical conditions include type 2 diabetes mellitus, hypertension, adenomatous colon polyps, hyperlipidemia BPH, nocturia, chronic prostatitis, vitamin-D deficiency.    The patient has been experiencing recurrent abdominal pain since February 2019.  Episodes occur for 3-4 days at a time.  His last pain episode was 3 days ago.  His pain is in the epigastrium and radiates laterally.  He describes an initial pressure-like pain at becomes sharp as it radiates laterally.  He does experience occasional nocturnal awakening with pain.  Pepcid Complete alleviates the pain. Symptoms are exacerbated after eating.  He reports his diet has remained unchanged.  He does complain of feeling weak.  His blood sugars have been stable.  His most recent blood sugar was 130.  Bowel movements are unchanged.  No rectal bleeding or melena is noted.    The patient has a personal history of adenomatous colon polyps.  His last colonoscopy was in August 2014.  A follow-up examination in 5 years was recommended.      Review of Systems   Constitutional: Positive for fatigue. Negative for activity change, appetite change and unexpected weight change.   Eyes: Negative for visual disturbance.   Respiratory: Negative for cough, chest tightness, shortness of breath and wheezing.    Cardiovascular: Negative for chest pain, palpitations and leg swelling.   Gastrointestinal: Negative for abdominal pain, blood in stool, nausea and vomiting.   Genitourinary: Negative for dysuria, hematuria and urgency.   Musculoskeletal: Negative for arthralgias, back pain, gait problem, joint swelling and myalgias.   Skin: Negative for color change and rash.   Neurological: Negative for dizziness, syncope, weakness, light-headedness, numbness and headaches.    Psychiatric/Behavioral: Negative for sleep disturbance.       Objective:      Physical Exam   Constitutional: He is oriented to person, place, and time. He appears well-developed and well-nourished. No distress.   The patient has gained 4 lb since 04/09/2019.   HENT:   Head: Normocephalic and atraumatic.   Eyes: Pupils are equal, round, and reactive to light. Conjunctivae and EOM are normal. No scleral icterus.   Neck: Normal range of motion. Neck supple. No JVD present. No thyromegaly present.   Cardiovascular: Normal rate, regular rhythm, normal heart sounds and intact distal pulses. Exam reveals no gallop.   No murmur heard.  Pulmonary/Chest: Effort normal and breath sounds normal. No respiratory distress. He has no wheezes. He has no rales.   Abdominal: Soft. Bowel sounds are normal. He exhibits no mass. There is no tenderness.   Musculoskeletal: Normal range of motion. He exhibits tenderness. He exhibits no edema.   Tenderness of the proximal right tibia is noted on palpation.   Lymphadenopathy:     He has no cervical adenopathy.   Neurological: He is alert and oriented to person, place, and time. No cranial nerve deficit.   Sensory examination is intact in both feet on monofilament testing.   Skin: Skin is warm and dry. No rash noted.   Calluses are noted involving the sole of the right foot and the right 3rd toe.  No foot ulcerations are present.   Psychiatric: He has a normal mood and affect. His behavior is normal.   Nursing note and vitals reviewed.      Results for orders placed or performed in visit on 11/02/18   Comprehensive metabolic panel   Result Value Ref Range    Sodium 137 136 - 145 mmol/L    Potassium 4.7 3.5 - 5.1 mmol/L    Chloride 102 95 - 110 mmol/L    CO2 28 23 - 29 mmol/L    Glucose 88 70 - 110 mg/dL    BUN, Bld 20 8 - 23 mg/dL    Creatinine 1.2 0.5 - 1.4 mg/dL    Calcium 10.8 (H) 8.7 - 10.5 mg/dL    Total Protein 7.9 6.0 - 8.4 g/dL    Albumin 3.7 3.5 - 5.2 g/dL    Total Bilirubin 1.1  (H) 0.1 - 1.0 mg/dL    Alkaline Phosphatase 71 55 - 135 U/L    AST 30 10 - 40 U/L    ALT 27 10 - 44 U/L    Anion Gap 7 (L) 8 - 16 mmol/L    eGFR if African American >60.0 >60 mL/min/1.73 m^2    eGFR if non African American >60.0 >60 mL/min/1.73 m^2   Vitamin D   Result Value Ref Range    Vit D, 25-Hydroxy 17 (L) 30 - 96 ng/mL   Prostate Specific Antigen, Diagnostic   Result Value Ref Range    PSA DIAGNOSTIC 2.2 0.00 - 4.00 ng/mL   Hemoglobin A1c   Result Value Ref Range    Hemoglobin A1C 5.8 (H) 4.0 - 5.6 %    Estimated Avg Glucose 120 68 - 131 mg/dL   Lipid panel   Result Value Ref Range    Cholesterol 241 (H) 120 - 199 mg/dL    Triglycerides 128 30 - 150 mg/dL    HDL 39 (L) 40 - 75 mg/dL    LDL Cholesterol 176.4 (H) 63.0 - 159.0 mg/dL    Hdl/Cholesterol Ratio 16.2 (L) 20.0 - 50.0 %    Total Cholesterol/HDL Ratio 6.2 (H) 2.0 - 5.0    Non-HDL Cholesterol 202 mg/dL       Assessment:       1. Type 2 diabetes mellitus without complication, without long-term current use of insulin    2. Pain of upper abdomen    3. Essential hypertension    4. Benign prostatic hyperplasia with urinary obstruction    5. Mixed hyperlipidemia    6. Vitamin D deficiency    7. Primary osteoarthritis of left knee    8. Fatigue, unspecified type    9. Foot callus        Plan:     Celso was seen today for abdominal pain and chronic medical conditions.  An abdominal ultrasound will be obtained.  An EGD and colonoscopy will be ordered.  Lisinopril/HCT will be discontinued due to the concerns regarding the increased incidence of lung cancer in patients on ACE inhibitors.  Losartan/HCTZ will be substituted.    X-ray of the right tibia will be obtained.  Podiatry consultation for diabetic foot care will be ordered.  Fasting blood tests will be obtained later this week.  The patient is to return to clinic in 4 months for routine follow-up.    Diagnoses and all orders for this visit:    Type 2 diabetes mellitus without complication, without long-term  current use of insulin  -     Comprehensive metabolic panel; Future  -     CBC auto differential; Future  -     Hemoglobin A1c; Future  -     Lipid panel; Future  -     TSH; Future  -     PSA, Screening; Future  -     Lipase; Future  -     Ambulatory consult to Podiatry    Pain of upper abdomen  -     Comprehensive metabolic panel; Future  -     CBC auto differential; Future  -     Hemoglobin A1c; Future  -     Lipid panel; Future  -     TSH; Future  -     PSA, Screening; Future  -     Lipase; Future  -     US Abdomen Complete; Future  -     Case request GI: EGD (ESOPHAGOGASTRODUODENOSCOPY), COLONOSCOPY    Essential hypertension  -     Comprehensive metabolic panel; Future  -     CBC auto differential; Future  -     Hemoglobin A1c; Future  -     Lipid panel; Future  -     TSH; Future  -     PSA, Screening; Future  -     Lipase; Future    Benign prostatic hyperplasia with urinary obstruction    Mixed hyperlipidemia    Vitamin D deficiency    Primary osteoarthritis of left knee    Fatigue, unspecified type    Foot callus  -     Ambulatory consult to Podiatry    Adenomatous polyp of colon, unspecified part of colon    Pain of right lower extremity    Pain of right tibia  -     X-Ray Tibia Fibula 2 View Right; Future    Other orders  -     losartan-hydrochlorothiazide 100-12.5 mg (HYZAAR) 100-12.5 mg Tab; Take 1 tablet by mouth once daily. For blood pressure

## 2019-09-14 ENCOUNTER — LAB VISIT (OUTPATIENT)
Dept: LAB | Facility: HOSPITAL | Age: 64
End: 2019-09-14
Attending: INTERNAL MEDICINE
Payer: COMMERCIAL

## 2019-09-14 DIAGNOSIS — R10.10 PAIN OF UPPER ABDOMEN: ICD-10-CM

## 2019-09-14 DIAGNOSIS — E11.9 TYPE 2 DIABETES MELLITUS WITHOUT COMPLICATION, WITHOUT LONG-TERM CURRENT USE OF INSULIN: ICD-10-CM

## 2019-09-14 DIAGNOSIS — I10 ESSENTIAL HYPERTENSION: Chronic | ICD-10-CM

## 2019-09-14 LAB
ALBUMIN SERPL BCP-MCNC: 3.6 G/DL (ref 3.5–5.2)
ALP SERPL-CCNC: 92 U/L (ref 55–135)
ALT SERPL W/O P-5'-P-CCNC: 36 U/L (ref 10–44)
ANION GAP SERPL CALC-SCNC: 9 MMOL/L (ref 8–16)
AST SERPL-CCNC: 32 U/L (ref 10–40)
BASOPHILS # BLD AUTO: 0.11 K/UL (ref 0–0.2)
BASOPHILS NFR BLD: 1 % (ref 0–1.9)
BILIRUB SERPL-MCNC: 0.7 MG/DL (ref 0.1–1)
BUN SERPL-MCNC: 16 MG/DL (ref 8–23)
CALCIUM SERPL-MCNC: 9.4 MG/DL (ref 8.7–10.5)
CHLORIDE SERPL-SCNC: 105 MMOL/L (ref 95–110)
CHOLEST SERPL-MCNC: 217 MG/DL (ref 120–199)
CHOLEST/HDLC SERPL: 5.6 {RATIO} (ref 2–5)
CO2 SERPL-SCNC: 22 MMOL/L (ref 23–29)
COMPLEXED PSA SERPL-MCNC: 3.4 NG/ML (ref 0–4)
CREAT SERPL-MCNC: 1.1 MG/DL (ref 0.5–1.4)
DIFFERENTIAL METHOD: ABNORMAL
EOSINOPHIL # BLD AUTO: 0.4 K/UL (ref 0–0.5)
EOSINOPHIL NFR BLD: 4 % (ref 0–8)
ERYTHROCYTE [DISTWIDTH] IN BLOOD BY AUTOMATED COUNT: 13.6 % (ref 11.5–14.5)
EST. GFR  (AFRICAN AMERICAN): >60 ML/MIN/1.73 M^2
EST. GFR  (NON AFRICAN AMERICAN): >60 ML/MIN/1.73 M^2
ESTIMATED AVG GLUCOSE: 126 MG/DL (ref 68–131)
GLUCOSE SERPL-MCNC: 97 MG/DL (ref 70–110)
HBA1C MFR BLD HPLC: 6 % (ref 4–5.6)
HCT VFR BLD AUTO: 52.4 % (ref 40–54)
HDLC SERPL-MCNC: 39 MG/DL (ref 40–75)
HDLC SERPL: 18 % (ref 20–50)
HGB BLD-MCNC: 17.5 G/DL (ref 14–18)
IMM GRANULOCYTES # BLD AUTO: 0.04 K/UL (ref 0–0.04)
IMM GRANULOCYTES NFR BLD AUTO: 0.4 % (ref 0–0.5)
LDLC SERPL CALC-MCNC: 155.6 MG/DL (ref 63–159)
LIPASE SERPL-CCNC: 144 U/L (ref 4–60)
LYMPHOCYTES # BLD AUTO: 3.5 K/UL (ref 1–4.8)
LYMPHOCYTES NFR BLD: 31.4 % (ref 18–48)
MCH RBC QN AUTO: 29.2 PG (ref 27–31)
MCHC RBC AUTO-ENTMCNC: 33.4 G/DL (ref 32–36)
MCV RBC AUTO: 87 FL (ref 82–98)
MONOCYTES # BLD AUTO: 1.2 K/UL (ref 0.3–1)
MONOCYTES NFR BLD: 10.9 % (ref 4–15)
NEUTROPHILS # BLD AUTO: 5.8 K/UL (ref 1.8–7.7)
NEUTROPHILS NFR BLD: 52.3 % (ref 38–73)
NONHDLC SERPL-MCNC: 178 MG/DL
NRBC BLD-RTO: 0 /100 WBC
PLATELET # BLD AUTO: 207 K/UL (ref 150–350)
PMV BLD AUTO: 11.2 FL (ref 9.2–12.9)
POTASSIUM SERPL-SCNC: 4.2 MMOL/L (ref 3.5–5.1)
PROT SERPL-MCNC: 7.4 G/DL (ref 6–8.4)
RBC # BLD AUTO: 6 M/UL (ref 4.6–6.2)
SODIUM SERPL-SCNC: 136 MMOL/L (ref 136–145)
TRIGL SERPL-MCNC: 112 MG/DL (ref 30–150)
TSH SERPL DL<=0.005 MIU/L-ACNC: 1.3 UIU/ML (ref 0.4–4)
WBC # BLD AUTO: 11.1 K/UL (ref 3.9–12.7)

## 2019-09-14 PROCEDURE — 80061 LIPID PANEL: CPT

## 2019-09-14 PROCEDURE — 83690 ASSAY OF LIPASE: CPT

## 2019-09-14 PROCEDURE — 84443 ASSAY THYROID STIM HORMONE: CPT

## 2019-09-14 PROCEDURE — 80053 COMPREHEN METABOLIC PANEL: CPT

## 2019-09-14 PROCEDURE — 84153 ASSAY OF PSA TOTAL: CPT

## 2019-09-14 PROCEDURE — 36415 COLL VENOUS BLD VENIPUNCTURE: CPT | Mod: PO

## 2019-09-14 PROCEDURE — 85025 COMPLETE CBC W/AUTO DIFF WBC: CPT

## 2019-09-14 PROCEDURE — 83036 HEMOGLOBIN GLYCOSYLATED A1C: CPT

## 2019-09-16 ENCOUNTER — PATIENT OUTREACH (OUTPATIENT)
Dept: ADMINISTRATIVE | Facility: OTHER | Age: 64
End: 2019-09-16

## 2019-09-16 DIAGNOSIS — E11.9 DIABETES MELLITUS WITHOUT COMPLICATION: Primary | ICD-10-CM

## 2019-09-17 RX ORDER — METFORMIN HYDROCHLORIDE 500 MG/1
TABLET ORAL
Qty: 90 TABLET | Refills: 1 | OUTPATIENT
Start: 2019-09-17

## 2019-09-18 ENCOUNTER — OFFICE VISIT (OUTPATIENT)
Dept: PODIATRY | Facility: CLINIC | Age: 64
End: 2019-09-18
Payer: COMMERCIAL

## 2019-09-18 VITALS
HEIGHT: 68 IN | DIASTOLIC BLOOD PRESSURE: 91 MMHG | BODY MASS INDEX: 38.8 KG/M2 | SYSTOLIC BLOOD PRESSURE: 157 MMHG | HEART RATE: 96 BPM | WEIGHT: 256 LBS

## 2019-09-18 DIAGNOSIS — E66.01 SEVERE OBESITY (BMI 35.0-39.9) WITH COMORBIDITY: Chronic | ICD-10-CM

## 2019-09-18 DIAGNOSIS — E11.9 TYPE 2 DIABETES MELLITUS WITHOUT COMPLICATION, WITHOUT LONG-TERM CURRENT USE OF INSULIN: Primary | ICD-10-CM

## 2019-09-18 PROCEDURE — 3044F HG A1C LEVEL LT 7.0%: CPT | Mod: CPTII,S$GLB,, | Performed by: PODIATRIST

## 2019-09-18 PROCEDURE — 1101F PT FALLS ASSESS-DOCD LE1/YR: CPT | Mod: CPTII,S$GLB,, | Performed by: PODIATRIST

## 2019-09-18 PROCEDURE — 99213 OFFICE O/P EST LOW 20 MIN: CPT | Mod: S$GLB,,, | Performed by: PODIATRIST

## 2019-09-18 PROCEDURE — 99999 PR PBB SHADOW E&M-EST. PATIENT-LVL III: CPT | Mod: PBBFAC,,, | Performed by: PODIATRIST

## 2019-09-18 PROCEDURE — 3044F PR MOST RECENT HEMOGLOBIN A1C LEVEL <7.0%: ICD-10-PCS | Mod: CPTII,S$GLB,, | Performed by: PODIATRIST

## 2019-09-18 PROCEDURE — 3008F BODY MASS INDEX DOCD: CPT | Mod: CPTII,S$GLB,, | Performed by: PODIATRIST

## 2019-09-18 PROCEDURE — 3077F SYST BP >= 140 MM HG: CPT | Mod: CPTII,S$GLB,, | Performed by: PODIATRIST

## 2019-09-18 PROCEDURE — 3077F PR MOST RECENT SYSTOLIC BLOOD PRESSURE >= 140 MM HG: ICD-10-PCS | Mod: CPTII,S$GLB,, | Performed by: PODIATRIST

## 2019-09-18 PROCEDURE — 99213 PR OFFICE/OUTPT VISIT, EST, LEVL III, 20-29 MIN: ICD-10-PCS | Mod: S$GLB,,, | Performed by: PODIATRIST

## 2019-09-18 PROCEDURE — 3008F PR BODY MASS INDEX (BMI) DOCUMENTED: ICD-10-PCS | Mod: CPTII,S$GLB,, | Performed by: PODIATRIST

## 2019-09-18 PROCEDURE — 1101F PR PT FALLS ASSESS DOC 0-1 FALLS W/OUT INJ PAST YR: ICD-10-PCS | Mod: CPTII,S$GLB,, | Performed by: PODIATRIST

## 2019-09-18 PROCEDURE — 3080F DIAST BP >= 90 MM HG: CPT | Mod: CPTII,S$GLB,, | Performed by: PODIATRIST

## 2019-09-18 PROCEDURE — 99999 PR PBB SHADOW E&M-EST. PATIENT-LVL III: ICD-10-PCS | Mod: PBBFAC,,, | Performed by: PODIATRIST

## 2019-09-18 PROCEDURE — 3080F PR MOST RECENT DIASTOLIC BLOOD PRESSURE >= 90 MM HG: ICD-10-PCS | Mod: CPTII,S$GLB,, | Performed by: PODIATRIST

## 2019-09-18 NOTE — PROGRESS NOTES
Subjective:      Patient ID: Celso Hernandez is a 64 y.o. male.    Chief Complaint: Diabetes Mellitus (Dr. Quarles 9/5/19); Diabetic Foot Exam; and Routine Foot Care    Celso is a 64 y.o. male who presents to the clinic for evaluation and treatment of high risk feet. Celso has a past medical history of Colon polyps, Diabetes mellitus type II, Hyperlipidemia, Hypertension, and Unspecified hemorrhoids without mention of complication. The patient's chief complaint is long, thick toenails. This patient has documented high risk feet requiring routine maintenance secondary to diabetes mellitis and those secondary complications of diabetes, as mentioned..denies numbness and tingling. No pedal complaints.     PCP: Inder Quarles MD    Date Last Seen by PCP: in epic     Current shoe gear:  Affected Foot: Casual shoes     Unaffected Foot: Casual shoes    Hemoglobin A1C   Date Value Ref Range Status   09/14/2019 6.0 (H) 4.0 - 5.6 % Final     Comment:     ADA Screening Guidelines:  5.7-6.4%  Consistent with prediabetes  >or=6.5%  Consistent with diabetes  High levels of fetal hemoglobin interfere with the HbA1C  assay. Heterozygous hemoglobin variants (HbS, HgC, etc)do  not significantly interfere with this assay.   However, presence of multiple variants may affect accuracy.     11/02/2018 5.8 (H) 4.0 - 5.6 % Final     Comment:     ADA Screening Guidelines:  5.7-6.4%  Consistent with prediabetes  >or=6.5%  Consistent with diabetes  High levels of fetal hemoglobin interfere with the HbA1C  assay. Heterozygous hemoglobin variants (HbS, HgC, etc)do  not significantly interfere with this assay.   However, presence of multiple variants may affect accuracy.     06/30/2018 5.9 (H) 4.0 - 5.6 % Final     Comment:     ADA Screening Guidelines:  5.7-6.4%  Consistent with prediabetes  >or=6.5%  Consistent with diabetes  High levels of fetal hemoglobin interfere with the HbA1C  assay. Heterozygous hemoglobin variants (HbS, HgC, etc)do  not  significantly interfere with this assay.   However, presence of multiple variants may affect accuracy.         Review of Systems   Constitution: Negative for decreased appetite, fever and malaise/fatigue.   HENT: Negative for congestion.    Cardiovascular: Negative for chest pain and leg swelling.   Respiratory: Negative for cough and shortness of breath.    Skin: Negative for color change, nail changes and rash.   Musculoskeletal: Positive for stiffness. Negative for arthritis, joint pain, joint swelling and muscle weakness.   Gastrointestinal: Negative for bloating, abdominal pain, nausea and vomiting.   Neurological: Negative for headaches, numbness and weakness.   Psychiatric/Behavioral: Negative for altered mental status.             Past Medical History:   Diagnosis Date    Colon polyps     Diabetes mellitus type II     Hyperlipidemia     Hypertension     Unspecified hemorrhoids without mention of complication        Past Surgical History:   Procedure Laterality Date    COLONOSCOPY N/A 8/19/2014    Performed by AKASH [facility]y MD at [facility] (4[facility] F[facility])    none         Family History   Problem Relation Age of Onset    Hyperlipidemia Mother     Cancer Sister         breast    Hearing loss Sister        Social History     Socioeconomic History    Marital status:      Spouse name: Not on file    Number of children: Not on file    Years of education: Not on file    Highest education level: Not on file   Occupational History    Not on file   Social Needs    Financial resource strain: Not on file    Food insecurity:     Worry: Not on file     Inability: Not on file    Transportation needs:     Medical: Not on file     Non-medical: Not on file   Tobacco Use    Smoking status: Never Smoker    Smokeless tobacco: Never Used   Substance and Sexual Activity    Alcohol use: Yes     Alcohol/week: 2.0 oz     Types: 4 Standard drinks or equivalent per week    Drug use: No    Sexual activity: Not  "on file   Lifestyle    Physical activity:     Days per week: Not on file     Minutes per session: Not on file    Stress: Not on file   Relationships    Social connections:     Talks on phone: Not on file     Gets together: Not on file     Attends Mosque service: Not on file     Active member of club or organization: Not on file     Attends meetings of clubs or organizations: Not on file     Relationship status: Not on file   Other Topics Concern    Not on file   Social History Narrative    Not on file       Current Outpatient Medications   Medication Sig Dispense Refill    atorvastatin (LIPITOR) 80 MG tablet TAKE ONE TABLET BY MOUTH ONCE DAILY 90 tablet 3    cholecalciferol, vitamin D3, (VITAMIN D3) 2,000 unit Cap Take 1 capsule by mouth once daily.      losartan-hydrochlorothiazide 100-12.5 mg (HYZAAR) 100-12.5 mg Tab Take 1 tablet by mouth once daily. For blood pressure 90 tablet 3    LUMIGAN 0.01 % Drop   1    meloxicam (MOBIC) 15 MG tablet TAKE 1 TABLET BY MOUTH ONCE DAILY 30 tablet 11    metFORMIN (GLUCOPHAGE) 500 MG tablet Take 1 tablet (500 mg total) by mouth daily with breakfast. 90 tablet 1    tamsulosin (FLOMAX) 0.4 mg Cap TAKE 1 CAPSULE BY MOUTH ONCE DAILY 90 capsule 3     No current facility-administered medications for this visit.        Review of patient's allergies indicates:  No Known Allergies    Vitals:    09/18/19 1526   BP: (!) 157/91   Pulse: 96   Weight: 116.1 kg (256 lb)   Height: 5' 8" (1.727 m)   PainSc: 0-No pain       Objective:      Physical Exam    Vascular: Distal DP/PT pulses palpable 2/4. CRT < 3 sec to tips of toes. No vericosities noted to LEs. Hair growth present LE, warm to touch LE, No edema noted to LE.    Dermatologic: No open lesions, lacerations or wounds. Interdigital spaces clean, dry and intact. No erythema, rubor, calor noted LE  B/l feet: mild interdigital maceration w/o signs of infection.     Musculoskeletal: MMT 5/5 in DF/PF/Inv/Ev resistance with no " reproduction of pain in any direction. Passive range of motion of ankle and pedal joints is painless. No calf tenderness LE, Compartments soft/compressible. ROM of ankles with < 10 deg DF is noted b/l     Neurological: Light touch, proprioception, and sharp/dull sensation are all intact. Protective threshold with the [place]-Wienstein monofilament is intact. Vibratory sensation intact.         Assessment:       Encounter Diagnoses   Name Primary?    Type 2 diabetes mellitus without complication, without long-term current use of insulin Yes    Severe obesity (BMI 35.0-39.9) with comorbidity          Plan:       Celso was seen today for diabetes mellitus, diabetic foot exam and routine foot care.    Diagnoses and all orders for this visit:    Type 2 diabetes mellitus without complication, without long-term current use of insulin    Severe obesity (BMI 35.0-39.9) with comorbidity      I counseled the patient on his conditions, their implications and medical management.    64 y.o. male with diabetic foot risk assessment.    -nails x 10 sharply debrided with nail nipper (courtesy)  -Shoe inspection. General Foot Education. Patient reminded of the importance of good nutrition. Patient instructed on proper foot hygeine. We discussed wearing proper shoe gear, daily foot inspections, never walking without protective shoe gear, caution putting sharp instruments to feet. Discussed general foot care:  Wear comfortable, proper fitting shoes. Wash feet daily. Dry well. After drying, apply moisturizer to feet (no lotion to webspaces). Inspect feet daily for skin breaks, blisters, swelling, or redness. Wear cotton socks (preferably white)  Change socks every day. Do NOT walk barefoot. Do NOT use heating pads or warm/hot water soaks   -It was discussed the importance of wearing shoes with adequate room in toe box to accommodate toe deformities. Recommended New Balance/[employer]ics shoe brands with adequate arch supports to alleviate  abnormal pressure and improve stability of foot while walking. Avoid flat shoes and barefoot walking as these will exacerbate or worsen symptoms.   -Advised for optimal glucose control and maintenance per primary care physician. Patient was also educated on healthy diet that is naturally rich in nutrients and low in fat and calories.   -Discussed reducing caloric intake, increase physical activity, weight loss, exercise, low salt diet, which may include blood sugar control to help with foot and ankle complications.  -The nature of the condition, options for management, as well as potential risks and complications were discussed in detail with patient. Patient was amenable to my recommendations and left my office fully informed and will follow up as instructed or sooner if necessary.    -Patient was advised of signs and symptoms of infection including redness, drainage, purulence, odor, streaking, fever, chills and I advised patient to seek medical attention (ER or urgent care) if these symptoms arise.   -f/u 12 months       Note dictated with voice recognition software, please excuse any grammatical errors.

## 2019-09-18 NOTE — LETTER
September 18, 2019      Inder Quarles MD  [address] [facility] [place]  [place] [address]           [facility]  [address], [address]  [facility] [address]  Phone: [phone]  Fax: [phone]          Patient: Celso Hernandez   MR Number: [id number]   YOB: 1955   Date of Visit: 9/18/2019       Dear Dr. Inder Quarles:    Thank you for referring Celso Hernandez to me for evaluation. Attached you will find relevant portions of my assessment and plan of care.    If you have questions, please do not hesitate to call me. I look forward to following Celso Hernandez along with you.    Sincerely,    Radha Beltran, RUPERTO    Enclosure  CC:  No Recipients    If you would like to receive this communication electronically, please contact [email or web address] or [phone] to request more information on [employer] Link access.    For providers and/or their staff who would like to refer a patient to [facility], please contact us through our one-stop-shop provider referral line, [facility], at [phone].    If you feel you have received this communication in error or would no longer like to receive these types of communications, please e-mail [email or web address]

## 2019-09-19 ENCOUNTER — OFFICE VISIT (OUTPATIENT)
Dept: GASTROENTEROLOGY | Facility: CLINIC | Age: 64
End: 2019-09-19
Payer: COMMERCIAL

## 2019-09-19 VITALS — BODY MASS INDEX: 39.09 KG/M2 | HEIGHT: 68 IN | WEIGHT: 257.94 LBS

## 2019-09-19 DIAGNOSIS — D12.6 ADENOMATOUS POLYP OF COLON, UNSPECIFIED PART OF COLON: ICD-10-CM

## 2019-09-19 DIAGNOSIS — K21.9 GASTROESOPHAGEAL REFLUX DISEASE, ESOPHAGITIS PRESENCE NOT SPECIFIED: ICD-10-CM

## 2019-09-19 DIAGNOSIS — R74.8 ELEVATED LIPASE: ICD-10-CM

## 2019-09-19 DIAGNOSIS — R10.13 EPIGASTRIC PAIN: Primary | ICD-10-CM

## 2019-09-19 DIAGNOSIS — E66.01 SEVERE OBESITY WITH BODY MASS INDEX (BMI) OF 35.0 TO 35.9 AND COMORBIDITY: ICD-10-CM

## 2019-09-19 PROCEDURE — 99999 PR PBB SHADOW E&M-EST. PATIENT-LVL III: CPT | Mod: PBBFAC,,, | Performed by: INTERNAL MEDICINE

## 2019-09-19 PROCEDURE — 99205 OFFICE O/P NEW HI 60 MIN: CPT | Mod: S$GLB,,, | Performed by: INTERNAL MEDICINE

## 2019-09-19 PROCEDURE — 99999 PR PBB SHADOW E&M-EST. PATIENT-LVL III: ICD-10-PCS | Mod: PBBFAC,,, | Performed by: INTERNAL MEDICINE

## 2019-09-19 PROCEDURE — 99205 PR OFFICE/OUTPT VISIT, NEW, LEVL V, 60-74 MIN: ICD-10-PCS | Mod: S$GLB,,, | Performed by: INTERNAL MEDICINE

## 2019-09-19 RX ORDER — SODIUM, POTASSIUM,MAG SULFATES 17.5-3.13G
1 SOLUTION, RECONSTITUTED, ORAL ORAL DAILY
Qty: 1 KIT | Refills: 0 | Status: SHIPPED | OUTPATIENT
Start: 2019-09-19 | End: 2019-09-21

## 2019-09-19 NOTE — H&P (VIEW-ONLY)
GASTROENTEROLOGY CLINIC NOTE    Reason for visit: The primary encounter diagnosis was Epigastric pain. Diagnoses of Adenomatous polyp of colon, unspecified part of colon, Gastroesophageal reflux disease, esophagitis presence not specified, Elevated lipase, and Severe obesity with body mass index (BMI) of 35.0 to 35.9 and comorbidity were also pertinent to this visit.  Referring provider/PCP: Inder Quarles MD      HPI:  Celso Hernandez is a 64 y.o. male here today for evaluation of epigastric pain, need for interval colonoscopy. New patient.    Patient has been approximately 6 months of epigastric pain. It does not radiate.  It can occur 3 to 4 times a day.  It is not strictly related to p.o. intake.  Sometimes he will go a week or so without having the pain. It is a sharp pain that has some pressure characteristics.  At times it awakes him from his sleep.  He takes Pepcid and this completely relieves the pain. No blood in the stool no melena.  He is taking Mobic every day for knee pain. He does not take a PPI.  No nausea no vomiting.  The pain is nonexertional, nonpositional.  At times it is worse when he hits bumps in the car.  He has elevated lipase on his recent labs.  He does admit to fairly significant alcohol intake about twice a week.  He states this is more than he should drink.  He does not intake daily alcohol.  He believes he drank a significant amount of alcohol prior to having the lab done with the elevated lipase.  No prior history of pancreatitis.  No back pain.  No nausea or vomiting.     The patient has a personal history of adenomatous colon polyps.  His last colonoscopy was in August 2014.  A follow-up examination in 5 years was recommended.      Prior Endoscopy:  EGD:    Colon:  8/2014 tin  Impression:           - Two 1 to 4 mm polyps in the descending colon and                         at the appendiceal orifice. Resected and retrieved.                        - The examined portion of  the ileum was normal.  Recommendation:       - Repeat colonoscopy in 3 - 5 years for                         surveillance based on pathology results.    (Portions of this note were dictated using voice recognition software and may contain dictation related errors in spelling/grammar/syntax not found on text review)    Review of Systems   Constitutional: Negative for fever and weight loss.   HENT: Negative for nosebleeds and sore throat.    Eyes: Negative for double vision and photophobia.   Respiratory: Negative for cough and shortness of breath.    Cardiovascular: Negative for chest pain and leg swelling.   Gastrointestinal: Positive for abdominal pain. Negative for blood in stool, melena, nausea and vomiting.   Genitourinary: Negative for dysuria and hematuria.   Musculoskeletal: Positive for joint pain. Negative for neck pain.   Skin: Negative for itching and rash.   Neurological: Negative for dizziness and headaches.   Psychiatric/Behavioral: Negative for hallucinations. The patient does not have insomnia.        Past Medical History: has a past medical history of Colon polyps, Diabetes mellitus type II, Hyperlipidemia, Hypertension, and Unspecified hemorrhoids without mention of complication.    Past Surgical History: has a past surgical history that includes none.    Family History:family history includes Cancer in his sister; Hearing loss in his sister; Hyperlipidemia in his mother.    Allergies: Review of patient's allergies indicates:  No Known Allergies    Social History: reports that he has never smoked. He has never used smokeless tobacco. He reports that he drinks about 2.0 oz of alcohol per week. He reports that he does not use drugs.    Home medications:   Current Outpatient Medications on File Prior to Visit   Medication Sig Dispense Refill    atorvastatin (LIPITOR) 80 MG tablet TAKE ONE TABLET BY MOUTH ONCE DAILY 90 tablet 3    cholecalciferol, vitamin D3, (VITAMIN D3) 2,000 unit Cap Take 1  "capsule by mouth once daily.      losartan-hydrochlorothiazide 100-12.5 mg (HYZAAR) 100-12.5 mg Tab Take 1 tablet by mouth once daily. For blood pressure 90 tablet 3    LUMIGAN 0.01 % Drop   1    meloxicam (MOBIC) 15 MG tablet TAKE 1 TABLET BY MOUTH ONCE DAILY 30 tablet 11    metFORMIN (GLUCOPHAGE) 500 MG tablet Take 1 tablet (500 mg total) by mouth daily with breakfast. 90 tablet 1    tamsulosin (FLOMAX) 0.4 mg Cap TAKE 1 CAPSULE BY MOUTH ONCE DAILY 90 capsule 3     No current facility-administered medications on file prior to visit.        Vital signs:  Ht 5' 8" (1.727 m)   Wt 117 kg (257 lb 15 oz)   BMI 39.22 kg/m²     Physical Exam   Constitutional: He is oriented to person, place, and time. He appears well-developed and well-nourished. No distress.   HENT:   Head: Normocephalic and atraumatic.   Eyes: Conjunctivae are normal. No scleral icterus.   Neck: Neck supple. No thyromegaly present.   Cardiovascular: Normal rate, regular rhythm and intact distal pulses.   Pulmonary/Chest: Effort normal and breath sounds normal. No respiratory distress.   Abdominal: Soft. Bowel sounds are normal. He exhibits no distension and no mass. There is no tenderness.   Musculoskeletal: Normal range of motion. He exhibits no tenderness.   Neurological: He is alert and oriented to person, place, and time.   Skin: Skin is warm and dry. No rash noted.   Psychiatric: He has a normal mood and affect. His behavior is normal.   Vitals reviewed.      Routine labs:  Lab Results   Component Value Date    WBC 11.10 09/14/2019    HGB 17.5 09/14/2019    HCT 52.4 09/14/2019    MCV 87 09/14/2019     09/14/2019     No results found for: INR  No results found for: IRON, FERRITIN, TIBC, FESATURATED  Lab Results   Component Value Date     09/14/2019    K 4.2 09/14/2019     09/14/2019    CO2 22 (L) 09/14/2019    BUN 16 09/14/2019    CREATININE 1.1 09/14/2019     Lab Results   Component Value Date    ALBUMIN 3.6 09/14/2019    " ALT 36 09/14/2019    AST 32 09/14/2019    ALKPHOS 92 09/14/2019    BILITOT 0.7 09/14/2019     No results found for: GLUCOSE    Imaging:  Reviewed    I have reviewed his prior labs, progress notes, medications.      Assessment:    1. Epigastric pain    2. Adenomatous polyp of colon, unspecified part of colon    3. Gastroesophageal reflux disease, esophagitis presence not specified    4. Elevated lipase    5. Severe obesity with body mass index (BMI) of 35.0 to 35.9 and comorbidity        Plan:    Orders Placed This Encounter    sodium,potassium,mag sulfates (SUPREP BOWEL PREP KIT) 17.5-3.13-1.6 gram SolR    Case request GI: EGD (ESOPHAGOGASTRODUODENOSCOPY), COLONOSCOPY   - higher than avg risk given severe obesity with complication    I suspect he has a component of peptic ulcer disease versus reflux disease given his fairly substantial symptomatic improvement with taking Pepcid.  I am concerned about the elevated lipase however he does intake a significant amount alcohol.    We will start with an EGD and colonoscopy.  Likely will need to start a PPI after the endoscopy.  Pending on the findings, and likely will proceed with a CT of the abdomen pelvis, pancreas protocol, given the elevated lipase.  He was counseled on reducing his alcohol intake.    We will have him back in clinic within 2 months, we will repeat the lipase and consider a CT at that time.      RTC  2 months      Landon Guajardo MD  [facility] [place]

## 2019-09-19 NOTE — LETTER
September 19, 2019      Inder Quarles MD  [address] [facility]  [place] [address]           [facility]  [address] [place], [place] [address], [address]  [facility] [address]  Phone: [phone]  Fax: [phone]          Patient: Celso Hernandez   MR Number: [id number]   YOB: 1955   Date of Visit: 9/19/2019       Dear Dr. Inder Quarles:    Thank you for referring Celso Hernandez to me for evaluation. Attached you will find relevant portions of my assessment and plan of care.    If you have questions, please do not hesitate to call me. I look forward to following Celso Hernandez along with you.    Sincerely,    Landon Guajardo MD    Enclosure  CC:  No Recipients    If you would like to receive this communication electronically, please contact [email or web address] or [phone] to request more information on [employer] Link access.    For providers and/or their staff who would like to refer a patient to [facility], please contact us through our one-stop-shop provider referral line, [facility], at [phone].    If you feel you have received this communication in error or would no longer like to receive these types of communications, please e-mail [email or web address]

## 2019-09-19 NOTE — PROGRESS NOTES
GASTROENTEROLOGY CLINIC NOTE    Reason for visit: The primary encounter diagnosis was Epigastric pain. Diagnoses of Adenomatous polyp of colon, unspecified part of colon, Gastroesophageal reflux disease, esophagitis presence not specified, Elevated lipase, and Severe obesity with body mass index (BMI) of 35.0 to 35.9 and comorbidity were also pertinent to this visit.  Referring provider/PCP: Inder Quarles MD      HPI:  Celso Hernandez is a 64 y.o. male here today for evaluation of epigastric pain, need for interval colonoscopy. New patient.    Patient has been approximately 6 months of epigastric pain. It does not radiate.  It can occur 3 to 4 times a day.  It is not strictly related to p.o. intake.  Sometimes he will go a week or so without having the pain. It is a sharp pain that has some pressure characteristics.  At times it awakes him from his sleep.  He takes Pepcid and this completely relieves the pain. No blood in the stool no melena.  He is taking Mobic every day for knee pain. He does not take a PPI.  No nausea no vomiting.  The pain is nonexertional, nonpositional.  At times it is worse when he hits bumps in the car.  He has elevated lipase on his recent labs.  He does admit to fairly significant alcohol intake about twice a week.  He states this is more than he should drink.  He does not intake daily alcohol.  He believes he drank a significant amount of alcohol prior to having the lab done with the elevated lipase.  No prior history of pancreatitis.  No back pain.  No nausea or vomiting.     The patient has a personal history of adenomatous colon polyps.  His last colonoscopy was in August 2014.  A follow-up examination in 5 years was recommended.      Prior Endoscopy:  EGD:    Colon:  8/2014 tin  Impression:           - Two 1 to 4 mm polyps in the descending colon and                         at the appendiceal orifice. Resected and retrieved.                        - The examined portion of  the ileum was normal.  Recommendation:       - Repeat colonoscopy in 3 - 5 years for                         surveillance based on pathology results.    (Portions of this note were dictated using voice recognition software and may contain dictation related errors in spelling/grammar/syntax not found on text review)    Review of Systems   Constitutional: Negative for fever and weight loss.   HENT: Negative for nosebleeds and sore throat.    Eyes: Negative for double vision and photophobia.   Respiratory: Negative for cough and shortness of breath.    Cardiovascular: Negative for chest pain and leg swelling.   Gastrointestinal: Positive for abdominal pain. Negative for blood in stool, melena, nausea and vomiting.   Genitourinary: Negative for dysuria and hematuria.   Musculoskeletal: Positive for joint pain. Negative for neck pain.   Skin: Negative for itching and rash.   Neurological: Negative for dizziness and headaches.   Psychiatric/Behavioral: Negative for hallucinations. The patient does not have insomnia.        Past Medical History: has a past medical history of Colon polyps, Diabetes mellitus type II, Hyperlipidemia, Hypertension, and Unspecified hemorrhoids without mention of complication.    Past Surgical History: has a past surgical history that includes none.    Family History:family history includes Cancer in his sister; Hearing loss in his sister; Hyperlipidemia in his mother.    Allergies: Review of patient's allergies indicates:  No Known Allergies    Social History: reports that he has never smoked. He has never used smokeless tobacco. He reports that he drinks about 2.0 oz of alcohol per week. He reports that he does not use drugs.    Home medications:   Current Outpatient Medications on File Prior to Visit   Medication Sig Dispense Refill    atorvastatin (LIPITOR) 80 MG tablet TAKE ONE TABLET BY MOUTH ONCE DAILY 90 tablet 3    cholecalciferol, vitamin D3, (VITAMIN D3) 2,000 unit Cap Take 1  "capsule by mouth once daily.      losartan-hydrochlorothiazide 100-12.5 mg (HYZAAR) 100-12.5 mg Tab Take 1 tablet by mouth once daily. For blood pressure 90 tablet 3    LUMIGAN 0.01 % Drop   1    meloxicam (MOBIC) 15 MG tablet TAKE 1 TABLET BY MOUTH ONCE DAILY 30 tablet 11    metFORMIN (GLUCOPHAGE) 500 MG tablet Take 1 tablet (500 mg total) by mouth daily with breakfast. 90 tablet 1    tamsulosin (FLOMAX) 0.4 mg Cap TAKE 1 CAPSULE BY MOUTH ONCE DAILY 90 capsule 3     No current facility-administered medications on file prior to visit.        Vital signs:  Ht 5' 8" (1.727 m)   Wt 117 kg (257 lb 15 oz)   BMI 39.22 kg/m²     Physical Exam   Constitutional: He is oriented to person, place, and time. He appears well-developed and well-nourished. No distress.   HENT:   Head: Normocephalic and atraumatic.   Eyes: Conjunctivae are normal. No scleral icterus.   Neck: Neck supple. No thyromegaly present.   Cardiovascular: Normal rate, regular rhythm and intact distal pulses.   Pulmonary/Chest: Effort normal and breath sounds normal. No respiratory distress.   Abdominal: Soft. Bowel sounds are normal. He exhibits no distension and no mass. There is no tenderness.   Musculoskeletal: Normal range of motion. He exhibits no tenderness.   Neurological: He is alert and oriented to person, place, and time.   Skin: Skin is warm and dry. No rash noted.   Psychiatric: He has a normal mood and affect. His behavior is normal.   Vitals reviewed.      Routine labs:  Lab Results   Component Value Date    WBC 11.10 09/14/2019    HGB 17.5 09/14/2019    HCT 52.4 09/14/2019    MCV 87 09/14/2019     09/14/2019     No results found for: INR  No results found for: IRON, FERRITIN, TIBC, FESATURATED  Lab Results   Component Value Date     09/14/2019    K 4.2 09/14/2019     09/14/2019    CO2 22 (L) 09/14/2019    BUN 16 09/14/2019    CREATININE 1.1 09/14/2019     Lab Results   Component Value Date    ALBUMIN 3.6 09/14/2019    " ALT 36 09/14/2019    AST 32 09/14/2019    ALKPHOS 92 09/14/2019    BILITOT 0.7 09/14/2019     No results found for: GLUCOSE    Imaging:  Reviewed    I have reviewed his prior labs, progress notes, medications.      Assessment:    1. Epigastric pain    2. Adenomatous polyp of colon, unspecified part of colon    3. Gastroesophageal reflux disease, esophagitis presence not specified    4. Elevated lipase    5. Severe obesity with body mass index (BMI) of 35.0 to 35.9 and comorbidity        Plan:    Orders Placed This Encounter    sodium,potassium,mag sulfates (SUPREP BOWEL PREP KIT) 17.5-3.13-1.6 gram SolR    Case request GI: EGD (ESOPHAGOGASTRODUODENOSCOPY), COLONOSCOPY   - higher than avg risk given severe obesity with complication    I suspect he has a component of peptic ulcer disease versus reflux disease given his fairly substantial symptomatic improvement with taking Pepcid.  I am concerned about the elevated lipase however he does intake a significant amount alcohol.    We will start with an EGD and colonoscopy.  Likely will need to start a PPI after the endoscopy.  Pending on the findings, and likely will proceed with a CT of the abdomen pelvis, pancreas protocol, given the elevated lipase.  He was counseled on reducing his alcohol intake.    We will have him back in clinic within 2 months, we will repeat the lipase and consider a CT at that time.      RTC  2 months      Landon Guajardo MD  [facility] [place]

## 2019-09-19 NOTE — PATIENT INSTRUCTIONS
SUPREP Instructions    You are scheduled for a colonoscopy with Dr. Guajardo on 10-8-19 at [facility] located at [address] [place] [address].  Check in at the admit desk, first floor of the hospital (which is the building on the left).     You will receive a call 2-3 days before your colonoscopy to tell you the time to arrive.  If you have not received a call by the day before your procedure, call the Endoscopy Lab at [phone].    To ensure that your test is accurate and complete, you MUST follow these instructions listed below.  If you have any questions, please call our office at [phone].  Plan on being at the hospital for your procedure for 3-4 hours.    1.  Follow a CLEAR LIQUID DIET for the entire day before your scheduled colonoscopy.  This means no solid food the entire day starting when you wake.  You may have as much of the clear liquids as you want throughout the day.   CLEAR LIQUID DIET:   - Avoid Red, Orange, Purple, and/or Blue food coloring   - NO DAIRY   - You can have:  Coffee with sugar (no creamer), tea, water, soda, apple or white grape juice, chicken or beef broth/bouillon (no meat, noodles, or veggies), green/yellow popsicles, green/yellow Jell-O, lemonade.    2.  AT 5 pm the evening before your colonoscopy, POUR ONE (1) BOTTLE OF SUPREP INTO THE MIXING CONTAINER, PROVIDED INSIDE THE BOX.  ADD WATER TO THE LINE ON THE CONTAINER AND MIX IT WELL.  DRINK THE ENTIRE CONTAINER AND THEN DRINK TWO (2) MORE CONTAINERS OF WATER OVER THE NEXT 1 HOUR.  This is sometimes easier to drink if this solution is cold, so you can mix the solution a few hours ahead of time and place in the refrigerator prior to drinking.  You have to drink the solution within 24 hours of mixing it.  Do NOT put this solution over ice.  It IS ok to drink with a straw.    3.  The endoscopy department will call you 2 days before your colonoscopy to tell you the exact time to arrive, AND to tell you the exact time to  drink the 2nd portion of your prep (which will be FIVE HOURS BEFORE YOUR ARRIVAL TIME).  At this time given to you, POUR ONE (1) BOTTLE OF SUPREP INTO THE MIXING CONTAINER, PROVIDED INSIDE THE BOX.  ADD WATER TO THE LINE ON THE CONTAINER AND MIX IT WELL.  DRINK THE ENTIRE CONTAINER AND THEN DRINK TWO (2) MORE CONTAINERS OF WATER OVER THE NEXT 1 HOUR.  This is sometimes easier to drink if this solution is cold, so you can mix the solution a few hours ahead of time and place in the refrigerator prior to drinking.  You have to drink the solution within 24 hours of mixing it.  Do NOT put this solution over ice.  It IS ok to drink with a straw. Once this is complete, you may not have ANYTHING else by mouth!    4.  You must have someone with you to DRIVE YOU HOME since you will be receiving IV sedation for the colonoscopy.    5.  It is ok to take your heart, blood pressure, and seizure medications in the morning of your test with a SIP of water.  Hold other medications until after your procedure.  Do NOT have anything else to eat or drink the morning of your colonoscopy.  It is ok to brush your teeth.    6.  If you are on blood thinners THAT YOU HAVE BEEN INSTRUCTED TO HOLD BY YOUR DOCTOR FOR THIS PROCEDURE, then do NOT take this the morning of your colonoscopy.  Do NOT stop these medications on your own, they must be approved to be held by your doctor.  Your colonoscopy can NOT be done if you are on these medications.  Examples of blood thinners include: Coumadin, Aggrenox, Plavix, Pradaxa, Reapro, Pletal, Xarelto, Ticagrelor, Brilinta, Eliquis, and high dose aspirin (325 mg).  You do not have to stop baby aspirin 81 mg.    7.  IF YOU ARE DIABETIC:  NO INSULIN OR ORAL MEDICATIONS THE MORNING OF THE COLONOSCOPY.  TAKE ONLY HALF THE DOSE OF YOUR INSULIN THE DAY BEFORE THE COLONOSCOPY.  DO NOT TAKE ANY ORAL DIABETIC MEDICATIONS THE DAY BEFORE THE COLONOSCOPY.  IF YOU ARE AN INSULIN DEPENDENT DIABETIC WITH UNSTABLE BLOOD  SUGARS, NOTIFY YOUR PRIMARY CARE PHYSICIAN FOR INSTRUCTIONS.

## 2019-09-27 ENCOUNTER — TELEPHONE (OUTPATIENT)
Dept: GASTROENTEROLOGY | Facility: CLINIC | Age: 64
End: 2019-09-27

## 2019-09-27 NOTE — TELEPHONE ENCOUNTER
----- Message from Maria Teresa Bronson sent at 9/27/2019  2:10 PM CDT -----  Contact: Self  Pt is calling to speak with Staff regarding being scheduled for an upper & lower procedure; Colonscopy.  Pt says someone was supposed to contact him but hasn't & requests a returned call.    He can be reached at [phone].    Thank you.

## 2019-09-27 NOTE — TELEPHONE ENCOUNTER
Returned patient's call. Informed patient he was already scheduled for his procedure on 10/8/2019 with Dr. Guajardo. Staff asked patient if he wanted to reschedule. Patient stated he will keep that date.

## 2019-09-29 ENCOUNTER — HOSPITAL ENCOUNTER (EMERGENCY)
Facility: HOSPITAL | Age: 64
Discharge: HOME | End: 2019-09-29
Attending: EMERGENCY MEDICINE
Payer: COMMERCIAL

## 2019-09-29 VITALS
SYSTOLIC BLOOD PRESSURE: 113 MMHG | HEART RATE: 84 BPM | WEIGHT: 257 LBS | DIASTOLIC BLOOD PRESSURE: 63 MMHG | TEMPERATURE: 98 F | RESPIRATION RATE: 16 BRPM | OXYGEN SATURATION: 95 % | BODY MASS INDEX: 39.08 KG/M2

## 2019-09-29 DIAGNOSIS — R33.9 URINARY RETENTION: Primary | ICD-10-CM

## 2019-09-29 LAB
BILIRUB UR QL STRIP: NEGATIVE
CLARITY UR: CLEAR
COLOR UR: YELLOW
GLUCOSE UR QL STRIP: NEGATIVE
HGB UR QL STRIP: ABNORMAL
KETONES UR QL STRIP: NEGATIVE
LEUKOCYTE ESTERASE UR QL STRIP: NEGATIVE
MICROSCOPIC COMMENT: NORMAL
NITRITE UR QL STRIP: NEGATIVE
PH UR STRIP: 5 [PH] (ref 5–8)
PROT UR QL STRIP: NEGATIVE
RBC #/AREA URNS HPF: 0 /HPF (ref 0–4)
SP GR UR STRIP: <=1.005 (ref 1–1.03)
URN SPEC COLLECT METH UR: ABNORMAL
UROBILINOGEN UR STRIP-ACNC: NEGATIVE EU/DL

## 2019-09-29 PROCEDURE — 81000 URINALYSIS NONAUTO W/SCOPE: CPT

## 2019-09-29 PROCEDURE — 51702 INSERT TEMP BLADDER CATH: CPT

## 2019-09-29 PROCEDURE — 99283 EMERGENCY DEPT VISIT LOW MDM: CPT | Mod: 25

## 2019-09-29 NOTE — DISCHARGE INSTRUCTIONS
Please follow-up with your urologist for evaluation of urinary retention.  Please return to the ER for any concerning reason.

## 2019-09-29 NOTE — ED TRIAGE NOTES
Pt. To the ER with c/o having urinary retention for the past 4 hours. Pt. Has a history of prostate enlargement and needed a potts in the past. Dr. Dunaway at the bedside.

## 2019-09-29 NOTE — ED PROVIDER NOTES
Encounter Date: 9/29/2019    SCRIBE #1 NOTE: I, Janet Daniels, am scribing for, and in the presence of,  Dr. Dunaway. I have scribed the entire note.       History     Chief Complaint   Patient presents with    Urinary Retention     Patient complains of urinary retention. Has not been able to urinate for 4 hours. Past history of enlarged prostate.      Celso Hernandez is a 64 y.o. male who  has a past medical history of Colon polyps, Diabetes mellitus type II, Hyperlipidemia, Hypertension, and Unspecified hemorrhoids without mention of complication.    The patient presents to the ED due to urinary retention. He reports onset of symptoms was a few hours ago. The patient has been having difficulty urinating. He admits to a history of enlarged prostate. The patient has associated abdominal distension and discomfort but denies any nausea, vomiting, diarrhea, fever, chills, chest pain or shortness of breath. He has experienced similar symptoms about 12 years ago.     The history is provided by the patient.     Review of patient's allergies indicates:  No Known Allergies  Past Medical History:   Diagnosis Date    Colon polyps     Diabetes mellitus type II     Hyperlipidemia     Hypertension     Unspecified hemorrhoids without mention of complication      Past Surgical History:   Procedure Laterality Date    none       Family History   Problem Relation Age of Onset    Hyperlipidemia Mother     Cancer Sister         breast    Hearing loss Sister      Social History     Tobacco Use    Smoking status: Never Smoker    Smokeless tobacco: Never Used   Substance Use Topics    Alcohol use: Yes     Alcohol/week: 3.3 standard drinks     Types: 4 Standard drinks or equivalent per week    Drug use: No     Review of Systems   Constitutional: Negative for chills and fever.   Gastrointestinal: Positive for abdominal pain. Negative for diarrhea, nausea and vomiting.   Genitourinary: Positive for decreased urine volume and  difficulty urinating.   All other systems reviewed and are negative.      Physical Exam     Initial Vitals [09/29/19 0435]   BP Pulse Resp Temp SpO2   (!) 142/85 90 18 98 °F (36.7 °C) 98 %      MAP       --         Physical Exam    Nursing note and vitals reviewed.  Constitutional: He appears well-developed and well-nourished. He is not diaphoretic. No distress.   HENT:   Head: Normocephalic and atraumatic.   Mouth/Throat: Oropharynx is clear and moist.   Eyes: Conjunctivae and EOM are normal.   Neck: Normal range of motion. Neck supple.   Cardiovascular: Normal rate, regular rhythm and normal heart sounds. Exam reveals no gallop and no friction rub.    No murmur heard.  Pulmonary/Chest: Breath sounds normal. He has no wheezes. He has no rhonchi. He has no rales.   Abdominal: Soft. He exhibits distension (suprapubic region likley due to retention). There is no tenderness. There is no rebound and no guarding.   Musculoskeletal: Normal range of motion. He exhibits no edema or tenderness.   Lymphadenopathy:     He has no cervical adenopathy.   Neurological: He is alert and oriented to person, place, and time. He has normal strength.   Skin: Skin is warm and dry. No rash noted.         ED Course   Procedures  Labs Reviewed   URINALYSIS, REFLEX TO URINE CULTURE - Abnormal; Notable for the following components:       Result Value    Specific Gravity, UA <=1.005 (*)     Occult Blood UA 1+ (*)     All other components within normal limits    Narrative:     Preferred Collection Type->Urine, Clean Catch   URINALYSIS MICROSCOPIC    Narrative:     Preferred Collection Type->Urine, Clean Catch          Imaging Results    None          Medical Decision Making:   Initial Assessment:   This is a 64-year-old male, history of hypertension, diabetes, enlarged prostate urinary tension, who presents to the ER for evaluation of acute onset urinary retention.  Happened earlier today, difficulty urinating.  Patient reports abdominal  distention and discomfort, similar to when he had urinary retention a few years ago.  No fever, chills, chest pain shortness of breath. Patient had a large bladder with pain to palpation.  Patient was taken to a room, placed a Lantigua by nurse, 800 cc of urine was removed.  Will obtain UA, and reassess, will plan to discharge with Lantigua and urology follow-up.    Clinical Tests:   Lab Tests: Ordered and Reviewed            Scribe Attestation:   Scribe #1: I performed the above scribed service and the documentation accurately describes the services I performed. I attest to the accuracy of the note.    Attending Attestation:           Physician Attestation for Scribe:  Physician Attestation Statement for Scribe #1: I, Dr. Dunaway, reviewed documentation, as scribed by Janet Daniels in my presence, and it is both accurate and complete.                 ED Course as of Sep 29 0538   Sun Sep 29, 2019   0527 Resting comfortably in bed, no acute distress, UA negative for infection, patient reports he feels much better.  Discussed with patient diagnosis of urinary retention, likely due to enlarged prostate, discussed Lantigua care, discussed plan to discharge home Lantigua bag, urology follow-up.  Patient understood and agreed.  Offered Pyridium to help with possible bladder spasm from Lantigua however patient declined.  The patient will be discharged, he will follow up with his own urologist, return precautions discussed.    [SE]      ED Course User Index  [SE] Alexandria Dunaway MD     Clinical Impression:     1. Urinary retention          Disposition:   Disposition: Discharged  Condition: Stable                        Alexandria Dunaway MD  09/29/19 0582

## 2019-09-30 ENCOUNTER — PES CALL (OUTPATIENT)
Dept: ADMINISTRATIVE | Facility: CLINIC | Age: 64
End: 2019-09-30

## 2019-09-30 ENCOUNTER — TELEPHONE (OUTPATIENT)
Dept: UROLOGY | Facility: CLINIC | Age: 64
End: 2019-09-30

## 2019-09-30 NOTE — TELEPHONE ENCOUNTER
----- Message from Cari Snyder sent at 9/30/2019  8:06 AM CDT -----  Contact: pt @ [phone]  Calling to speak with someone in Dr. Kunz's office regarding a catheter that was place this past weekend, asking to see the doctor to possibly have removed. Please call.

## 2019-10-01 ENCOUNTER — TELEPHONE (OUTPATIENT)
Dept: INTERNAL MEDICINE | Facility: CLINIC | Age: 64
End: 2019-10-01

## 2019-10-01 DIAGNOSIS — R10.10 PAIN OF UPPER ABDOMEN: Primary | ICD-10-CM

## 2019-10-01 DIAGNOSIS — R74.8 ELEVATED LIPASE: ICD-10-CM

## 2019-10-01 NOTE — TELEPHONE ENCOUNTER
The pancreas enzyme level lipase was elevated on the recent blood tests.  This is often seen with there is inflammation of the pancreas gland.  A CT scan of the abdomen with special views of the pancreas will be obtained for further evaluation.  Patient should also obtain the abdominal ultrasound that was previously ordered.  A letter has been sent to the patient.

## 2019-10-02 NOTE — TELEPHONE ENCOUNTER
Spoke with he was notified of his test results and recommendations , he updated me on whats been going with his care , he now has a catheter for urinary retention , and is having gi issues.he wants to wait until this is completed before doing the ct scan ,the gi physician is not sure he will need the test.

## 2019-10-04 ENCOUNTER — TELEPHONE (OUTPATIENT)
Dept: ENDOSCOPY | Facility: HOSPITAL | Age: 64
End: 2019-10-04

## 2019-10-04 ENCOUNTER — PATIENT OUTREACH (OUTPATIENT)
Dept: ADMINISTRATIVE | Facility: OTHER | Age: 64
End: 2019-10-04

## 2019-10-04 NOTE — TELEPHONE ENCOUNTER
Spoke with patient about arrival time @. 0845    Prep instructions reviewed: the day before the procedure, follow a clear liquid diet all day, then start the first 1/2 of prep at 5pm and take 2nd 1/2 of prep @. 0345 Pt must be completely NPO when prep completed @. 0545             Medications: Do not take Insulin or oral diabetic medications the day of the procedure.  Take as prescribed: heart, seizure and blood pressure medication in the morning with a sip of water (less than an ounce).  Take any breathing medications and bring inhalers to hospital with you Leave all valuables and jewelry at home.     Wear comfortable clothes to procedure to change into hospital gown You cannot drive for 24 hours after your procedure because you will receive sedation for your procedure to make you comfortable.  A ride must be provided at discharge.

## 2019-10-07 ENCOUNTER — OFFICE VISIT (OUTPATIENT)
Dept: UROLOGY | Facility: CLINIC | Age: 64
End: 2019-10-07
Payer: COMMERCIAL

## 2019-10-07 ENCOUNTER — TELEPHONE (OUTPATIENT)
Dept: UROLOGY | Facility: CLINIC | Age: 64
End: 2019-10-07

## 2019-10-07 VITALS
HEIGHT: 68 IN | HEART RATE: 109 BPM | SYSTOLIC BLOOD PRESSURE: 124 MMHG | DIASTOLIC BLOOD PRESSURE: 87 MMHG | WEIGHT: 257.69 LBS | BODY MASS INDEX: 39.06 KG/M2

## 2019-10-07 DIAGNOSIS — R97.20 ELEVATED PSA: ICD-10-CM

## 2019-10-07 DIAGNOSIS — N40.1 BPH WITH OBSTRUCTION/LOWER URINARY TRACT SYMPTOMS: ICD-10-CM

## 2019-10-07 DIAGNOSIS — N13.8 BPH WITH OBSTRUCTION/LOWER URINARY TRACT SYMPTOMS: ICD-10-CM

## 2019-10-07 DIAGNOSIS — R33.9 URINARY RETENTION: ICD-10-CM

## 2019-10-07 DIAGNOSIS — Z46.6 ENCOUNTER FOR FOLEY CATHETER REMOVAL: Primary | ICD-10-CM

## 2019-10-07 PROCEDURE — 3008F BODY MASS INDEX DOCD: CPT | Mod: CPTII,S$GLB,, | Performed by: PHYSICIAN ASSISTANT

## 2019-10-07 PROCEDURE — 1101F PT FALLS ASSESS-DOCD LE1/YR: CPT | Mod: CPTII,S$GLB,, | Performed by: PHYSICIAN ASSISTANT

## 2019-10-07 PROCEDURE — 3008F PR BODY MASS INDEX (BMI) DOCUMENTED: ICD-10-PCS | Mod: CPTII,S$GLB,, | Performed by: PHYSICIAN ASSISTANT

## 2019-10-07 PROCEDURE — 51700 PR IRRIGATION, BLADDER: ICD-10-PCS | Mod: S$GLB,,, | Performed by: PHYSICIAN ASSISTANT

## 2019-10-07 PROCEDURE — 3074F SYST BP LT 130 MM HG: CPT | Mod: CPTII,S$GLB,, | Performed by: PHYSICIAN ASSISTANT

## 2019-10-07 PROCEDURE — 1101F PR PT FALLS ASSESS DOC 0-1 FALLS W/OUT INJ PAST YR: ICD-10-PCS | Mod: CPTII,S$GLB,, | Performed by: PHYSICIAN ASSISTANT

## 2019-10-07 PROCEDURE — 99214 PR OFFICE/OUTPT VISIT, EST, LEVL IV, 30-39 MIN: ICD-10-PCS | Mod: 25,S$GLB,, | Performed by: PHYSICIAN ASSISTANT

## 2019-10-07 PROCEDURE — 3079F PR MOST RECENT DIASTOLIC BLOOD PRESSURE 80-89 MM HG: ICD-10-PCS | Mod: CPTII,S$GLB,, | Performed by: PHYSICIAN ASSISTANT

## 2019-10-07 PROCEDURE — 3074F PR MOST RECENT SYSTOLIC BLOOD PRESSURE < 130 MM HG: ICD-10-PCS | Mod: CPTII,S$GLB,, | Performed by: PHYSICIAN ASSISTANT

## 2019-10-07 PROCEDURE — 3079F DIAST BP 80-89 MM HG: CPT | Mod: CPTII,S$GLB,, | Performed by: PHYSICIAN ASSISTANT

## 2019-10-07 PROCEDURE — 99214 OFFICE O/P EST MOD 30 MIN: CPT | Mod: 25,S$GLB,, | Performed by: PHYSICIAN ASSISTANT

## 2019-10-07 PROCEDURE — 51700 IRRIGATION OF BLADDER: CPT | Mod: S$GLB,,, | Performed by: PHYSICIAN ASSISTANT

## 2019-10-07 PROCEDURE — 99999 PR PBB SHADOW E&M-EST. PATIENT-LVL IV: CPT | Mod: PBBFAC,,, | Performed by: PHYSICIAN ASSISTANT

## 2019-10-07 PROCEDURE — 99999 PR PBB SHADOW E&M-EST. PATIENT-LVL IV: ICD-10-PCS | Mod: PBBFAC,,, | Performed by: PHYSICIAN ASSISTANT

## 2019-10-07 NOTE — PATIENT INSTRUCTIONS
Patient was instructed to drink plenty of fluids today.  Instructed patient to call at 2p.m. to give an update on urine output.  I instructed patient to return to clinic or emergency department (if after clinic hours) to have potts catheter put back in if unable to urinate within 5 hours of potts catheter removal or starts to experience bladder pressure/pain, decrease flow, straining/difficulty urinating, urinary frequency.

## 2019-10-07 NOTE — PROGRESS NOTES
CHIEF COMPLAINT:    Mr. Hernandez is a 64 y.o. male presenting for urinary retention.    PRESENTING ILLNESS:    Celso Hernandez is a 64 y.o. male with a PMH of BPH, prostate nodule who presents for urinary retention.  Patient presented to the ED on 9/29/19 for urinary retention.  Patient reports the inability to void after a night of drinks.  That night he reports his stream getting smaller and it becoming difficult for him to void.   A potts catheter was placed in the ED and 800cc of urine was removed.    Prior to that night, he denies straining to void, weak stream. He reports his urination was normal.   He has been taking flomax.  No urinary complaints prior to the night he went into retention.    He reports urinary retention >5 years ago where he had to have a catheter placed.      History of enlarged prostate and right prostate nodule.   (-) prostate Biopsy in 1/3/2017 with Dr. Kunz.  He never followed back up after his biopsy.  Recently had his PSA checked (9/14/19) 3.4 which is up from 2.2 (11/2/2018).  He reports a history of incomplete bladder emptying.   His catheter has been draining well.    He reports irregular bowel movements.      REVIEW OF SYSTEMS:    Constitutional: Negative for fever and chills.   HENT: Negative for hearing loss.   Eyes: Negative for visual disturbance.   Respiratory: Negative for shortness of breath.   Cardiovascular: Negative for chest pain.   Gastrointestinal: Negative for vomiting.   Genitourinary: See HPI  Neurological: Negative for dizziness.   Hematological: Does not bruise/bleed easily.   Psychiatric/Behavioral: Negative for confusion.       PATIENT HISTORY:    Past Medical History:   Diagnosis Date    Colon polyps     Diabetes mellitus type II     Hyperlipidemia     Hypertension     Unspecified hemorrhoids without mention of complication        Past Surgical History:   Procedure Laterality Date    none         Family History   Problem Relation Age of Onset     Hyperlipidemia Mother     Cancer Sister         breast    Hearing loss Sister        Social History     Socioeconomic History    Marital status:      Spouse name: Not on file    Number of children: Not on file    Years of education: Not on file    Highest education level: Not on file   Occupational History    Not on file   Social Needs    Financial resource strain: Not on file    Food insecurity:     Worry: Not on file     Inability: Not on file    Transportation needs:     Medical: Not on file     Non-medical: Not on file   Tobacco Use    Smoking status: Never Smoker    Smokeless tobacco: Never Used   Substance and Sexual Activity    Alcohol use: Yes     Alcohol/week: 3.3 standard drinks     Types: 4 Standard drinks or equivalent per week    Drug use: No    Sexual activity: Not on file   Lifestyle    Physical activity:     Days per week: Not on file     Minutes per session: Not on file    Stress: Not on file   Relationships    Social connections:     Talks on phone: Not on file     Gets together: Not on file     Attends Baptism service: Not on file     Active member of club or organization: Not on file     Attends meetings of clubs or organizations: Not on file     Relationship status: Not on file   Other Topics Concern    Not on file   Social History Narrative    Not on file       Allergies:  Patient has no known allergies.    Medications:    Current Outpatient Medications:     atorvastatin (LIPITOR) 80 MG tablet, TAKE ONE TABLET BY MOUTH ONCE DAILY, Disp: 90 tablet, Rfl: 3    losartan-hydrochlorothiazide 100-12.5 mg (HYZAAR) 100-12.5 mg Tab, Take 1 tablet by mouth once daily. For blood pressure, Disp: 90 tablet, Rfl: 3    meloxicam (MOBIC) 15 MG tablet, TAKE 1 TABLET BY MOUTH ONCE DAILY, Disp: 30 tablet, Rfl: 11    metFORMIN (GLUCOPHAGE) 500 MG tablet, Take 1 tablet (500 mg total) by mouth daily with breakfast., Disp: 90 tablet, Rfl: 1    tamsulosin (FLOMAX) 0.4 mg Cap, TAKE 1  CAPSULE BY MOUTH ONCE DAILY, Disp: 90 capsule, Rfl: 3    cholecalciferol, vitamin D3, (VITAMIN D3) 2,000 unit Cap, Take 1 capsule by mouth once daily., Disp: , Rfl:     LUMIGAN 0.01 % Drop, , Disp: , Rfl: 1    PHYSICAL EXAMINATION:    Constitutional: He appears well-developed and well-nourished.  He is in no apparent distress.    Eyes: No scleral icterus noted bilaterally. No discharge bilaterally.    Nose: No rhinorrhea    Cardiovascular: Normal rate.  No pitting edema noted in lower extremities bilaterally    Pulmonary/Chest: Effort normal. No respiratory distress.     Abdominal:  He exhibits no distension.  There is no CVA tenderness.     Lymphadenopathy:        Right: No supraclavicular adenopathy present.        Left: No supraclavicular adenopathy present.     Neurological: He is alert and oriented to person, place, and time.     Skin: Skin is warm and dry.     Psych: Cooperative with normal affect.    Genitourinary: Normal anal sphincter tone. No rectal mass.    The prostate is enlarged.  Prostate is smooth, non tender.  I did not feel any nodules on today's exam.     Physical Exam      LABS:      Lab Results   Component Value Date    PSA 3.4 09/14/2019    PSA 1.9 05/14/2016    PSA 2.5 09/03/2015    PSA 1.6 03/01/2014    PSA 1.7 10/08/2011    PSA 1.7 11/06/2010    PSA 1.7 08/29/2009    PSA 1.2 12/11/2007    PSA 1.1 05/19/2006    PSA 1.2 02/17/2004    PSADIAG 2.2 11/02/2018    PSADIAG 2.5 11/18/2017       IMPRESSION:    Encounter Diagnoses   Name Primary?    Encounter for Lantigua catheter removal Yes    BPH with obstruction/lower urinary tract symptoms     Urinary retention     Elevated PSA          PLAN:    Voiding trial performed by Nurse Patel.  140ml of sterile water was instilled into bladder.  Lantigua catheter was removed. Patient urinated 150ml without difficulty.     Voiding trial passed  Patient was instructed to drink plenty of fluids today.  Instructed patient to call at 2p.m. to give an update on  urine output.  I instructed patient to return to clinic or emergency department (if after clinic hours) to have potts catheter put back in if unable to urinate within 5 hours of potts catheter removal or starts to experience bladder pressure/pain, decrease flow, straining/difficulty urinating, urinary frequency.    Patient voiced understanding.    Discussed elevated PSA.  Patient reports being scheduled for a colonoscopy tomorrow.  Given his upcoming colonoscopy and recent catheter removal, I will wait 1 month to recheck his PSA as such factors can cause it to be high.         Continue flomax.      Return to clinic in 6 weeks for a  pvr and to discuss repeat psa.    I spent 25 minutes with the patient of which more than half was spent in direct consultation with the patient in regards to our treatment and plan.    Hamida Ayala PA-C

## 2019-10-07 NOTE — TELEPHONE ENCOUNTER
Spoke with pt and he states that he urinating fine and has drank a few bottles of water and has voided 4-5x since leaving. Instructed to call back if he has any issues. Pt verbalized understanding.     ----- Message from Phylicia Torres sent at 10/7/2019  2:20 PM CDT -----  Contact: pt#[phone]  Pt is calling to give update since cath was removed today. Please call

## 2019-10-08 ENCOUNTER — TELEPHONE (OUTPATIENT)
Dept: INTERNAL MEDICINE | Facility: CLINIC | Age: 64
End: 2019-10-08

## 2019-10-08 ENCOUNTER — ANESTHESIA EVENT (OUTPATIENT)
Dept: ENDOSCOPY | Facility: HOSPITAL | Age: 64
End: 2019-10-08
Payer: COMMERCIAL

## 2019-10-08 ENCOUNTER — ANESTHESIA (OUTPATIENT)
Dept: ENDOSCOPY | Facility: HOSPITAL | Age: 64
End: 2019-10-08
Payer: COMMERCIAL

## 2019-10-08 ENCOUNTER — HOSPITAL ENCOUNTER (OUTPATIENT)
Facility: HOSPITAL | Age: 64
Discharge: HOME | End: 2019-10-08
Attending: INTERNAL MEDICINE | Admitting: INTERNAL MEDICINE
Payer: COMMERCIAL

## 2019-10-08 VITALS
HEIGHT: 68 IN | WEIGHT: 255 LBS | HEART RATE: 69 BPM | RESPIRATION RATE: 20 BRPM | BODY MASS INDEX: 38.65 KG/M2 | TEMPERATURE: 98 F | OXYGEN SATURATION: 100 % | SYSTOLIC BLOOD PRESSURE: 130 MMHG | DIASTOLIC BLOOD PRESSURE: 83 MMHG

## 2019-10-08 DIAGNOSIS — D12.6 ADENOMA OF COLON: ICD-10-CM

## 2019-10-08 DIAGNOSIS — K26.9 DUODENAL ULCER: ICD-10-CM

## 2019-10-08 DIAGNOSIS — K63.5 POLYP OF COLON, UNSPECIFIED PART OF COLON, UNSPECIFIED TYPE: Primary | ICD-10-CM

## 2019-10-08 LAB — GLUCOSE SERPL-MCNC: 95 MG/DL (ref 70–110)

## 2019-10-08 PROCEDURE — 43239 EGD BIOPSY SINGLE/MULTIPLE: CPT | Performed by: INTERNAL MEDICINE

## 2019-10-08 PROCEDURE — 63600175 PHARM REV CODE 636 W HCPCS: Performed by: NURSE ANESTHETIST, CERTIFIED REGISTERED

## 2019-10-08 PROCEDURE — 82962 GLUCOSE BLOOD TEST: CPT | Performed by: INTERNAL MEDICINE

## 2019-10-08 PROCEDURE — 25000003 PHARM REV CODE 250: Performed by: NURSE ANESTHETIST, CERTIFIED REGISTERED

## 2019-10-08 PROCEDURE — 43239 EGD BIOPSY SINGLE/MULTIPLE: CPT | Mod: 51,,, | Performed by: INTERNAL MEDICINE

## 2019-10-08 PROCEDURE — 45380 COLONOSCOPY AND BIOPSY: CPT | Mod: 33,,, | Performed by: INTERNAL MEDICINE

## 2019-10-08 PROCEDURE — 45380 COLONOSCOPY AND BIOPSY: CPT

## 2019-10-08 PROCEDURE — 63600175 PHARM REV CODE 636 W HCPCS: Performed by: INTERNAL MEDICINE

## 2019-10-08 PROCEDURE — 43239 PR EGD, FLEX, W/BIOPSY, SGL/MULTI: ICD-10-PCS | Mod: 51,,, | Performed by: INTERNAL MEDICINE

## 2019-10-08 PROCEDURE — 88305 TISSUE EXAM BY PATHOLOGIST: CPT | Mod: 26,,, | Performed by: PATHOLOGY

## 2019-10-08 PROCEDURE — 37000009 HC ANESTHESIA EA ADD 15 MINS: Performed by: INTERNAL MEDICINE

## 2019-10-08 PROCEDURE — 88305 TISSUE SPECIMEN TO PATHOLOGY - SURGERY: ICD-10-PCS | Mod: 26,,, | Performed by: PATHOLOGY

## 2019-10-08 PROCEDURE — 37000008 HC ANESTHESIA 1ST 15 MINUTES: Performed by: INTERNAL MEDICINE

## 2019-10-08 PROCEDURE — 45380 PR COLONOSCOPY,BIOPSY: ICD-10-PCS | Mod: 33,,, | Performed by: INTERNAL MEDICINE

## 2019-10-08 PROCEDURE — 88305 TISSUE EXAM BY PATHOLOGIST: CPT | Performed by: PATHOLOGY

## 2019-10-08 PROCEDURE — 27201012 HC FORCEPS, HOT/COLD, DISP: Performed by: INTERNAL MEDICINE

## 2019-10-08 RX ORDER — SODIUM CHLORIDE 0.9 % (FLUSH) 0.9 %
10 SYRINGE (ML) INJECTION
Status: DISCONTINUED | OUTPATIENT
Start: 2019-10-08 | End: 2019-10-08 | Stop reason: HOSPADM

## 2019-10-08 RX ORDER — PHENYLEPHRINE HYDROCHLORIDE 10 MG/ML
INJECTION INTRAVENOUS
Status: DISCONTINUED | OUTPATIENT
Start: 2019-10-08 | End: 2019-10-08

## 2019-10-08 RX ORDER — PROPOFOL 10 MG/ML
INJECTION, EMULSION INTRAVENOUS CONTINUOUS PRN
Status: DISCONTINUED | OUTPATIENT
Start: 2019-10-08 | End: 2019-10-08

## 2019-10-08 RX ORDER — SODIUM CHLORIDE 9 MG/ML
INJECTION, SOLUTION INTRAVENOUS CONTINUOUS
Status: DISCONTINUED | OUTPATIENT
Start: 2019-10-08 | End: 2019-10-08 | Stop reason: HOSPADM

## 2019-10-08 RX ORDER — KETAMINE HCL IN 0.9 % NACL 50 MG/5 ML
SYRINGE (ML) INTRAVENOUS
Status: DISCONTINUED | OUTPATIENT
Start: 2019-10-08 | End: 2019-10-08

## 2019-10-08 RX ORDER — PANTOPRAZOLE SODIUM 40 MG/1
40 TABLET, DELAYED RELEASE ORAL DAILY
Qty: 30 TABLET | Refills: 11 | Status: ON HOLD | OUTPATIENT
Start: 2019-10-08 | End: 2020-01-29

## 2019-10-08 RX ORDER — PROPOFOL 10 MG/ML
INJECTION, EMULSION INTRAVENOUS
Status: DISCONTINUED | OUTPATIENT
Start: 2019-10-08 | End: 2019-10-08

## 2019-10-08 RX ORDER — GLYCOPYRROLATE 0.2 MG/ML
INJECTION INTRAMUSCULAR; INTRAVENOUS
Status: DISCONTINUED | OUTPATIENT
Start: 2019-10-08 | End: 2019-10-08

## 2019-10-08 RX ORDER — LIDOCAINE HCL/PF 100 MG/5ML
SYRINGE (ML) INTRAVENOUS
Status: DISCONTINUED | OUTPATIENT
Start: 2019-10-08 | End: 2019-10-08

## 2019-10-08 RX ORDER — MIDAZOLAM HYDROCHLORIDE 1 MG/ML
INJECTION, SOLUTION INTRAMUSCULAR; INTRAVENOUS
Status: DISCONTINUED | OUTPATIENT
Start: 2019-10-08 | End: 2019-10-08

## 2019-10-08 RX ADMIN — PHENYLEPHRINE HYDROCHLORIDE 100 MCG: 10 INJECTION INTRAVENOUS at 10:10

## 2019-10-08 RX ADMIN — SODIUM CHLORIDE 20 ML/HR: 0.9 INJECTION, SOLUTION INTRAVENOUS at 08:10

## 2019-10-08 RX ADMIN — SODIUM CHLORIDE: 0.9 INJECTION, SOLUTION INTRAVENOUS at 10:10

## 2019-10-08 RX ADMIN — PHENYLEPHRINE HYDROCHLORIDE 200 MCG: 10 INJECTION INTRAVENOUS at 10:10

## 2019-10-08 RX ADMIN — Medication 10 MG: at 09:10

## 2019-10-08 RX ADMIN — GLYCOPYRROLATE 0.2 MG: 0.2 INJECTION, SOLUTION INTRAMUSCULAR; INTRAVENOUS at 09:10

## 2019-10-08 RX ADMIN — LIDOCAINE HYDROCHLORIDE 50 MG: 20 INJECTION, SOLUTION INTRAVENOUS at 09:10

## 2019-10-08 RX ADMIN — TOPICAL ANESTHETIC 1 EACH: 200 SPRAY DENTAL; PERIODONTAL at 09:10

## 2019-10-08 RX ADMIN — PROPOFOL 60 MG: 10 INJECTION, EMULSION INTRAVENOUS at 09:10

## 2019-10-08 RX ADMIN — MIDAZOLAM 2 MG: 1 INJECTION INTRAMUSCULAR; INTRAVENOUS at 09:10

## 2019-10-08 RX ADMIN — PROPOFOL 120 MCG/KG/MIN: 10 INJECTION, EMULSION INTRAVENOUS at 09:10

## 2019-10-08 NOTE — [employer] PATIENT INSTRUCTIONS
Discharge Summary/Instructions after an Endoscopic Procedure  Patient Name: Celso Hernandez  Patient MRN: [id number]  Patient YOB: 1955 Tuesday, October 08, 2019  Landon Guajardo MD  RESTRICTIONS:  During your procedure today, you received medications for sedation.  These   medications may affect your judgment, balance and coordination.  Therefore,   for 24 hours, you have the following restrictions:   - DO NOT drive a car, operate machinery, make legal/financial decisions,   sign important papers or drink alcohol.    ACTIVITY:  Today: no heavy lifting, straining or running due to procedural   sedation/anesthesia.  The following day: return to full activity including work.  DIET:  Eat and drink normally unless instructed otherwise.     TREATMENT FOR COMMON SIDE EFFECTS:  - Mild abdominal pain, nausea, belching, bloating or excessive gas:  rest,   eat lightly and use a heating pad.  - Sore Throat: treat with throat lozenges and/or gargle with warm salt   water.  - Because air was used during the procedure, expelling large amounts of air   from your rectum or belching is normal.  - If a bowel prep was taken, you may not have a bowel movement for 1-3 days.    This is normal.  SYMPTOMS TO WATCH FOR AND REPORT TO YOUR PHYSICIAN:  1. Abdominal pain or bloating, other than gas cramps.  2. Chest pain.  3. Back pain.  4. Signs of infection such as: chills or fever occurring within 24 hours   after the procedure.  5. Rectal bleeding, which would show as bright red, maroon, or black stools.   (A tablespoon of blood from the rectum is not serious, especially if   hemorrhoids are present.)  6. Vomiting.  7. Weakness or dizziness.  GO DIRECTLY TO THE NEAREST EMERGENCY ROOM IF YOU HAVE ANY OF THE FOLLOWING:      Difficulty breathing              Chills and/or fever over 101 F   Persistent vomiting and/or vomiting blood   Severe abdominal pain   Severe chest pain   Black, tarry stools   Bleeding- more than one  tablespoon   Any other symptom or condition that you feel may need urgent attention  Your doctor recommends these additional instructions:  If any biopsies were taken, your doctors clinic will contact you in 1 to 2   weeks with any results.  - Discharge patient to home.   - Patient has a contact number available for emergencies.  The signs and   symptoms of potential delayed complications were discussed with the   patient.  Return to normal activities tomorrow.  Written discharge   instructions were provided to the patient.   - Continue present medications.   - Resume previous diet.   - Await pathology results.   - Repeat colonoscopy in 3 years for surveillance.  For questions, problems or results please call your physician - Landon Guajardo MD at Work:  [phone].  EMERGENCY PHONE NUMBER: [phone],  LAB RESULTS: [phone]  IF A COMPLICATION OR EMERGENCY SITUATION ARISES AND YOU ARE UNABLE TO REACH   YOUR PHYSICIAN - GO DIRECTLY TO THE EMERGENCY ROOM.  Landon Guajardo MD  10/8/2019 10:38:58 AM  This report has been verified and signed electronically.  [employer]

## 2019-10-08 NOTE — PLAN OF CARE
Past Medical History:   Diagnosis Date    Colon polyps     Diabetes mellitus type II     Hyperlipidemia     Hypertension     Multiple duodenal ulcers 10/08/2019    Unspecified hemorrhoids without mention of complication      Recovery complete. Patient recovered to baseline. Discharge instructions reviewed with patient. VSS.

## 2019-10-08 NOTE — DISCHARGE INSTRUCTIONS
Pantoprazole tablets  What is this medicine?  PANTOPRAZOLE (pan TOE pra zole) prevents the production of acid in the stomach. It is used to treat gastroesophageal reflux disease (GERD), inflammation of the esophagus, and Zollinger-Perez syndrome.  How should I use this medicine?  Take this medicine by mouth. Swallow the tablets whole with a drink of water. Follow the directions on the prescription label. Do not crush, break, or chew. Take your medicine at regular intervals. Do not take your medicine more often than directed.  Talk to your pediatrician regarding the use of this medicine in children. While this drug may be prescribed for children as young as 5 years for selected conditions, precautions do apply.  What side effects may I notice from receiving this medicine?  Side effects that you should report to your doctor or health care professional as soon as possible:  · allergic reactions like skin rash, itching or hives, swelling of the face, lips, or tongue  · bone, muscle or joint pain  · breathing problems  · chest pain or chest tightness  · dark yellow or brown urine  · dizziness  · fast, irregular heartbeat  · feeling faint or lightheaded  · fever or sore throat  · muscle spasm  · palpitations  · rash on cheeks or arms that gets worse in the sun  · redness, blistering, peeling or loosening of the skin, including inside the mouth  · seizures  · tremors  · unusual bleeding or bruising  · unusually weak or tired  · yellowing of the eyes or skin  Side effects that usually do not require medical attention (Report these to your doctor or health care professional if they continue or are bothersome.):  · constipation  · diarrhea  · dry mouth  · headache  · nausea  What may interact with this medicine?  Do not take this medicine with any of the following medications:  · atazanavir  · nelfinavir  This medicine may also interact with the following medications:  · ampicillin  · delavirdine  · erlotinib  · iron  salts  · medicines for fungal infections like ketoconazole, itraconazole and voriconazole  · methotrexate  · mycophenolate mofetil  · warfarin  What if I miss a dose?  If you miss a dose, take it as soon as you can. If it is almost time for your next dose, take only that dose. Do not take double or extra doses.  Where should I keep my medicine?  Keep out of the reach of children.  Store at room temperature between 15 and 30 degrees C (59 and 86 degrees F). Protect from light and moisture. Throw away any unused medicine after the expiration date.  What should I tell my health care provider before I take this medicine?  They need to know if you have any of these conditions:  · liver disease  · low levels of magnesium in the blood  · lupus  · an unusual or allergic reaction to omeprazole, lansoprazole, pantoprazole, rabeprazole, other medicines, foods, dyes, or preservatives  · pregnant or trying to get pregnant  · breast-feeding  What should I watch for while using this medicine?  It can take several days before your stomach pain gets better. Check with your doctor or health care professional if your condition does not start to get better, or if it gets worse.  You may need blood work done while you are taking this medicine.  NOTE:This sheet is a summary. It may not cover all possible information. If you have questions about this medicine, talk to your doctor, pharmacist, or health care provider. Copyright© 2017 [employer]        Eating a High-Fiber Diet  Fiber is what gives strength and structure to plants. Most grains, beans, vegetables, and fruits contain fiber. Foods rich in fiber are often low in calories and fat, and they fill you up more. They may also reduce your risks for certain health problems. To find out the amount of fiber in canned, packaged, or frozen foods, read the Nutrition Facts label. It tells you how much fiber is in a serving.    Types of fiber and their benefits  There are two types of  fiber: insoluble and soluble. They both aid digestion and help you maintain a healthy weight.  · Insoluble fiber. This is found in whole grains, cereals, certain fruits and vegetables such as apple skin, corn, and carrots. Insoluble fiber may prevent constipation and reduce the risk for certain types of cancer.  · Soluble fiber. This type of fiber is in oats, beans, and certain fruits and vegetables such as strawberries and peas. Soluble fiber can reduce cholesterol, which may help lower the risk for heart disease. It also helps control blood sugar levels.  Look for high-fiber foods  Try these foods to add fiber to your diet:  · Whole-grain breads and cereals. Try to eat 6 to 8 ounces a day. Include wheat and oat bran cereals, whole-wheat muffins or toast, and corn tortillas in your meals.  · Fruits. Try to eat 2 cups a day. Apples, oranges, strawberries, pears, and bananas are good sources. (Note: Fruit juice is low in fiber.)  · Vegetables. Try to eat at least 2.5 cups a day. Add asparagus, carrots, broccoli, peas, and corn to your meals.  · Beans. One cup of cooked lentils gives you over 15 grams of fiber. Try navy beans, lentils, and chickpeas.  · Seeds. A small handful of seeds gives you about 3 grams of fiber. Try sunflower seeds.  Keep track of your fiber  Keep track of how much fiber you eat. Start by reading food labels. Then eat a variety of foods high in fiber. As you begin to eat more fiber, ask your healthcare provider how much water you should be drinking to keep your digestive system working smoothly.  You should aim for a certain amount of fiber in your diet each day. If you are a woman, that amount is between 25 and 28 grams per day. Men should aim for 30 to 33 grams per day. After age 50, your daily fiber needs drop to 22 grams for women and 28 grams for men.  Before you reach for the fiber supplements, think about this. Fiber is found naturally in healthy whole foods. It gives you that feeling of  fullness after you eat. Taking fiber supplements or eating fiber-enriched foods will not give you this full feeling.  Your fiber intake is a good measure for the quality of your overall diet. If you are missing out on your daily amount of fiber, you may be lacking other important nutrients as well.  Date Last Reviewed: 5/11/2015  © 7338-8165 [employer]. [address] [place] [address]. All rights reserved. This information is not intended as a substitute for professional medical care. Always follow your healthcare professional's instructions.        Understanding Colon and Rectal Polyps    The colon (also called the large intestine) is a muscular tube that forms the last part of the digestive tract. It absorbs water and stores food waste. The colon is about 4 to 6 feet long. The rectum is the last 6 inches of the colon. The colon and rectum have a smooth lining composed of millions of cells. Changes in these cells can lead to growths in the colon that can become cancerous and should be removed. Multiple tests are available to screen for colon cancer, but the colonoscopy is the most recommended test. During colonoscopy, these polyps can be removed. How often you need this test depends on many things including your condition, your family history, symptoms, and what the findings were at the previous colonoscopy.   When the colon lining changes  Changes that happen in the cells that line the colon or rectum can lead to growths called polyps. Over a period of years, polyps can turn cancerous. Removing polyps early may prevent cancer from ever forming.  Polyps  Polyps are fleshy clumps of tissue that form on the lining of the colon or rectum. Small polyps are usually benign (not cancerous). However, over time, cells in a polyp can change and become cancerous. Certain types of polyps known as adenomatous polyps are premalignant. The risk for invasive cancer increases with the size of the polyp and  certain cell and gene features. This means that they can become cancerous if they're not removed. Hyperplastic polyps are benign. They can grow quite large and not turn cancerous.   Cancer  Almost all colorectal cancers start when polyp cells begin growing abnormally. As a cancerous tumor grows, it may involve more and more of the colon or rectum. In time, cancer can also grow beyond the colon or rectum and spread to nearby organs or to glands called lymph nodes. The cells can also travel to other parts of the body. This is known as metastasis. The earlier a cancerous tumor is removed, the better the chance of preventing its spread.    Date Last Reviewed: 8/1/2016  © 7144-5952 [employer]. [address] [place] [address], [place] [address]. All rights reserved. This information is not intended as a substitute for professional medical care. Always follow your healthcare professional's instructions.        Understanding Gastric Ulcers    A gastric ulcer is an open sore in the stomach lining. It is sometimes called a peptic ulcer. This is a more general term for ulcers that may be in the stomach or the upper part of the small intestine. Ulcers can cause pain. But they may also have no symptoms for a long time.  What causes gastric ulcers?  Gastric ulcers have a few common causes. To find the cause of your ulcer, your healthcare provider will give you an exam and take your health history. He or she may also order some tests. The main causes of gastric ulcers include:  · Infection with the H. pylori (Helicobacter pylori) bacteria. This damages the stomach lining. Digestive juices can then harm the digestive tract.  · Long-term use of some over-the-counter pain medicines. This reduces the bodys ability to protect the stomach from damage.  Other causes of gastric ulcers include:  · Heavy alcohol use  · Having a family history of ulcers  · In rare cases, a tumor in the digestive tract may cause an ulcer  Symptoms of a  gastric ulcer  Ulcer symptoms may appear and then go away for a time. Symptoms of a gastric ulcer may include:  · Stomach pain, often a dull or burning feeling toward the top of your belly  · Feeling full or bloated  · Heartburn or acid reflux  · Upset stomach (nausea) or vomiting  · Vomiting blood  · Lack of appetite  · Weight loss  · Black stool  · Red blood in the stool  Treatment for a gastric ulcer  Treatment for gastric ulcers may depend on what is causing them. Treatment may include:  · Not using over-the-counter medicines. You will likely need to stop taking these medicines. But in some cases these medicines cant be safely stopped. Check with your healthcare provider to see what is best for you.   · Taking medicines to ease symptoms. These medicines may help to reduce the amount of acid your stomach makes. They also may help coat your stomach lining.  · Taking antibiotics. If your ulcer was caused by H. pylori, your provider will likely prescribe antibiotics to get rid of the infection.  · Having an endoscopy. This is often done to check the stomach and diagnose the ulcer. In some cases it can also treat the ulcer. It involves passing a flexible tube through your mouth into your stomach and small intestine.  · Using a tube (catheter) to stop the bleeding. A thin tube is passed into one of your blood vessels. Special tools are used to help stop the bleeding.  · Having surgery. You often may need this if the ulcer has caused severe symptoms.  Making some lifestyle changes can reduce ulcer symptoms. It may also prevent more damage to your digestive tract. These changes include:  · Not taking over-the-counter pain medicines. Talk with your provider about using another type of pain reliever.  · Not taking aspirin unless your provider has advised it  · Limiting the amount of alcohol you drink  · Quitting smoking  Possible complications of a gastric ulcer  Gastric ulcers can have serious complications. These can  include:  · Bleeding into the stomach  · A hole (perforation) in the stomach  · A blockage that interferes with food moving from your stomach to the small intestine  An ongoing infection with H. pylori may be a risk factor for stomach cancer. This is one reason it is important to get rid of this bacteria.  When to call your healthcare provider  Call your healthcare provider right away if you have any of these:  · Vomiting blood, or vomit that looks like coffee grounds  · Bloody, black, or tarry-looking stools  · Fever of 100.4°F (38°C) or higher, or as directed  · Pain that gets worse  · Symptoms that dont get better with treatment, or symptoms that get worse  · New symptoms   Date Last Reviewed: 5/1/2016  © 0326-2168 [employer]. [address] [place] [address], [place] [address]. All rights reserved. This information is not intended as a substitute for professional medical care. Always follow your healthcare professional's instructions.

## 2019-10-08 NOTE — TELEPHONE ENCOUNTER
----- Message from David Weldon sent at 10/8/2019  3:09 PM CDT -----  Contact: Self [phone]  Patient will like to speak to the nurse in regarding to FMLA, pt states the Insurance Company never  it, please advise

## 2019-10-08 NOTE — TRANSFER OF CARE
"Anesthesia Transfer of Care Note    Patient: Celso Hernandez    Procedure(s) Performed: Procedure(s) (LRB):  EGD (ESOPHAGOGASTRODUODENOSCOPY) (N/A)  COLONOSCOPY suprep (N/A)    Patient location: GI    Anesthesia Type: MAC    Transport from OR: Transported from OR on room air with adequate spontaneous ventilation    Post pain: adequate analgesia    Post assessment: no apparent anesthetic complications and tolerated procedure well    Post vital signs: stable    Level of consciousness: awake, alert and oriented    Nausea/Vomiting: no nausea/vomiting    Complications: none    Transfer of care protocol was followed      Last vitals:   Visit Vitals  /84 (BP Location: Left arm, Patient Position: Lying)   Pulse 93   Temp 36.6 °C (97.9 °F) (Temporal)   Resp 20   Ht 5' 8" (1.727 m)   Wt 115.7 kg (255 lb)   SpO2 97%   BMI 38.77 kg/m²     "

## 2019-10-08 NOTE — [employer] PATIENT INSTRUCTIONS
Discharge Summary/Instructions after an Endoscopic Procedure  Patient Name: Celso Hernandez  Patient MRN: [id number]  Patient YOB: 1955 Tuesday, October 08, 2019  Landon Guajardo MD  RESTRICTIONS:  During your procedure today, you received medications for sedation.  These   medications may affect your judgment, balance and coordination.  Therefore,   for 24 hours, you have the following restrictions:   - DO NOT drive a car, operate machinery, make legal/financial decisions,   sign important papers or drink alcohol.    ACTIVITY:  Today: no heavy lifting, straining or running due to procedural   sedation/anesthesia.  The following day: return to full activity including work.  DIET:  Eat and drink normally unless instructed otherwise.     TREATMENT FOR COMMON SIDE EFFECTS:  - Mild abdominal pain, nausea, belching, bloating or excessive gas:  rest,   eat lightly and use a heating pad.  - Sore Throat: treat with throat lozenges and/or gargle with warm salt   water.  - Because air was used during the procedure, expelling large amounts of air   from your rectum or belching is normal.  - If a bowel prep was taken, you may not have a bowel movement for 1-3 days.    This is normal.  SYMPTOMS TO WATCH FOR AND REPORT TO YOUR PHYSICIAN:  1. Abdominal pain or bloating, other than gas cramps.  2. Chest pain.  3. Back pain.  4. Signs of infection such as: chills or fever occurring within 24 hours   after the procedure.  5. Rectal bleeding, which would show as bright red, maroon, or black stools.   (A tablespoon of blood from the rectum is not serious, especially if   hemorrhoids are present.)  6. Vomiting.  7. Weakness or dizziness.  GO DIRECTLY TO THE NEAREST EMERGENCY ROOM IF YOU HAVE ANY OF THE FOLLOWING:      Difficulty breathing              Chills and/or fever over 101 F   Persistent vomiting and/or vomiting blood   Severe abdominal pain   Severe chest pain   Black, tarry stools   Bleeding- more than one  tablespoon   Any other symptom or condition that you feel may need urgent attention  Your doctor recommends these additional instructions:  If any biopsies were taken, your doctors clinic will contact you in 1 to 2   weeks with any results.  - Discharge patient to home.   - Patient has a contact number available for emergencies.  The signs and   symptoms of potential delayed complications were discussed with the   patient.  Return to normal activities tomorrow.  Written discharge   instructions were provided to the patient.   - Resume previous diet.   - Continue present medications.   - Await pathology results.   - No aspirin, ibuprofen, naproxen, or other non-steroidal anti-inflammatory   drugs. unless needed for cardiovascular protection  - Use a proton pump inhibitor PO daily.   - Would proceed with CT of abd (already ordered by his PCP) to further   evaluate elevated lipase.  For questions, problems or results please call your physician - Landon Guajardo MD at Work:  [phone].  EMERGENCY PHONE NUMBER: [phone],  LAB RESULTS: [phone]  IF A COMPLICATION OR EMERGENCY SITUATION ARISES AND YOU ARE UNABLE TO REACH   YOUR PHYSICIAN - GO DIRECTLY TO THE EMERGENCY ROOM.  Landon Guajardo MD  10/8/2019 10:40:57 AM  This report has been verified and signed electronically.  [employer]

## 2019-10-08 NOTE — ANESTHESIA PREPROCEDURE EVALUATION
10/08/2019  Celso Hernandez is a 64 y.o., male for EGD and colonoscopy under MAC    Past Medical History:   Diagnosis Date    Colon polyps     Diabetes mellitus type II     Hyperlipidemia     Hypertension     Unspecified hemorrhoids without mention of complication          Anesthesia Evaluation    I have reviewed the Patient Summary Reports.    I have reviewed the Nursing Notes.   I have reviewed the Medications.     Review of Systems  Social:  Non-Smoker    for     Physical Exam  General:  Obesity    Airway/Jaw/Neck:  Airway Findings: Mallampati: II      Chest/Lungs:  Chest/Lungs Clear    Heart/Vascular:  Heart Findings: Normal            Anesthesia Plan  Type of Anesthesia, risks & benefits discussed:  Anesthesia Type:  MAC  Patient's Preference:   Intra-op Monitoring Plan:   Intra-op Monitoring Plan Comments:   Post Op Pain Control Plan:   Post Op Pain Control Plan Comments:   Induction:    Beta Blocker:  Patient is not currently on a Beta-Blocker (No further documentation required).       Informed Consent: Patient understands risks and agrees with Anesthesia plan.  Questions answered. Anesthesia consent signed with patient.  ASA Score: 3     Day of Surgery Review of History & Physical:            Ready For Surgery From Anesthesia Perspective.

## 2019-10-08 NOTE — INTERVAL H&P NOTE
The patient has been examined and the H&P has been reviewed:    I concur with the findings and no changes have occurred since H&P was written.    Anesthesia/Surgery risks, benefits and alternative options discussed and understood by patient/family.          Active Hospital Problems    Diagnosis  POA    Adenoma of colon [D12.6]  Yes      Resolved Hospital Problems   No resolved problems to display.

## 2019-10-08 NOTE — PLAN OF CARE
Dr. Cota visited at bedside, discussed findings and recommendations with patient and family member; all questions asked and answered. Verbalized understanding of information given. Handout provided at time of discharge.

## 2019-10-08 NOTE — ANESTHESIA POSTPROCEDURE EVALUATION
Anesthesia Post Evaluation    Patient: Celso Hernandez    Procedure(s) Performed: Procedure(s) (LRB):  EGD (ESOPHAGOGASTRODUODENOSCOPY) (N/A)  COLONOSCOPY suprep (N/A)    Final Anesthesia Type: MAC  Patient location during evaluation: GI PACU  Patient participation: Yes- Able to Participate  Level of consciousness: awake and alert and oriented  Post-procedure vital signs: reviewed and stable  Pain management: adequate  Airway patency: patent  PONV status at discharge: No PONV  Anesthetic complications: no      Cardiovascular status: blood pressure returned to baseline, hemodynamically stable and stable  Respiratory status: unassisted, spontaneous ventilation and room air  Hydration status: euvolemic  Follow-up not needed.          Vitals Value Taken Time   /84 10/8/2019  8:26 AM   Temp 36.6 °C (97.9 °F) 10/8/2019  8:26 AM   Pulse 93 10/8/2019  8:26 AM   Resp 20 10/8/2019  8:26 AM   SpO2 97 % 10/8/2019  8:26 AM         No case tracking events are documented in the log.      Pain/Keely Score: No data recorded

## 2019-10-10 ENCOUNTER — TELEPHONE (OUTPATIENT)
Dept: INTERNAL MEDICINE | Facility: CLINIC | Age: 64
End: 2019-10-10

## 2019-10-10 NOTE — TELEPHONE ENCOUNTER
----- Message from Darius Dumont sent at 10/10/2019  9:35 AM CDT -----  Contact: Patient [phone]  Patient would like to get medical advice.  Symptoms (please be specific):  FMLA forms faxed requested    Comments:  Patient stating the other request was sent to the facility requested but still in needing the [place] medical certification documents form Dr office fax still, would like a call to inform has been faxed.    Please call an advise  Thank you

## 2019-10-10 NOTE — TELEPHONE ENCOUNTER
Spoke to pt. Pt advised that there are two sets of forms. I advised that the [employer] forms were completed and faxed already.  Pt stated that there was also a second set of forms for FMLA. He stated that he received a message today that his time is running out it have it submitted.  Pt advised that Dr. Quarles is out of the office. Advised that I will advise his nurse of urgency.  Pt also asked to be called when completed.

## 2019-10-11 ENCOUNTER — HOSPITAL ENCOUNTER (OUTPATIENT)
Dept: RADIOLOGY | Facility: HOSPITAL | Age: 64
Discharge: HOME | End: 2019-10-11
Attending: INTERNAL MEDICINE
Payer: COMMERCIAL

## 2019-10-11 DIAGNOSIS — R74.8 ELEVATED LIPASE: ICD-10-CM

## 2019-10-11 DIAGNOSIS — R10.10 PAIN OF UPPER ABDOMEN: ICD-10-CM

## 2019-10-11 PROCEDURE — 74170 CT ABDOMEN W WO CONTRAST: ICD-10-PCS | Mod: 26,,, | Performed by: RADIOLOGY

## 2019-10-11 PROCEDURE — 74170 CT ABD WO CNTRST FLWD CNTRST: CPT | Mod: TC

## 2019-10-11 PROCEDURE — 25500020 PHARM REV CODE 255: Performed by: INTERNAL MEDICINE

## 2019-10-11 PROCEDURE — 74170 CT ABD WO CNTRST FLWD CNTRST: CPT | Mod: 26,,, | Performed by: RADIOLOGY

## 2019-10-11 RX ADMIN — IOHEXOL 100 ML: 350 INJECTION, SOLUTION INTRAVENOUS at 03:10

## 2019-10-14 ENCOUNTER — TELEPHONE (OUTPATIENT)
Dept: INTERNAL MEDICINE | Facility: CLINIC | Age: 64
End: 2019-10-14

## 2019-10-14 NOTE — TELEPHONE ENCOUNTER
Spoke with patient he was notified that his [facility] papers were faxed to [facility].p   Patient would also like results of ct scan ,upper gi ,and colonoscopy.

## 2019-10-14 NOTE — TELEPHONE ENCOUNTER
----- Message from Darius Dumont sent at 10/14/2019 11:39 AM CDT -----  Contact: Patient [phone]  [facility] update status.    Patient call to check the update status of the forms being filled out and ready for .    Please call an advise  Thank you

## 2019-10-15 DIAGNOSIS — K29.70 HELICOBACTER PYLORI GASTRITIS: Primary | ICD-10-CM

## 2019-10-15 DIAGNOSIS — B96.81 HELICOBACTER PYLORI GASTRITIS: Primary | ICD-10-CM

## 2019-10-15 RX ORDER — AMOXICILLIN 500 MG/1
1000 TABLET, FILM COATED ORAL EVERY 12 HOURS
Qty: 56 TABLET | Refills: 0 | Status: SHIPPED | OUTPATIENT
Start: 2019-10-15 | End: 2019-10-29

## 2019-10-15 RX ORDER — CLARITHROMYCIN 500 MG/1
500 TABLET, FILM COATED ORAL 2 TIMES DAILY
Qty: 28 TABLET | Refills: 0 | Status: SHIPPED | OUTPATIENT
Start: 2019-10-15 | End: 2019-10-29

## 2019-10-15 NOTE — PROGRESS NOTES
Called in treatment triple therapy for H pylori    Treat for H pylori x 14 days  - pantoprazole 40mg BID  - amoxicillin 1gm BID  - clarithromycin 500mg BID      Will need clinic visit in approx 8 weeks to discuss eradication testing.

## 2019-10-16 ENCOUNTER — TELEPHONE (OUTPATIENT)
Dept: GASTROENTEROLOGY | Facility: CLINIC | Age: 64
End: 2019-10-16

## 2019-10-16 NOTE — TELEPHONE ENCOUNTER
----- Message from Lary Lobato sent at 10/16/2019  1:20 PM CDT -----  Contact: Douglas with [facility] - [phone]  The below has a drug reaction with other medications. Please advise             clarithromycin (BIAXIN) 500 MG tablet 28 tablet

## 2019-10-16 NOTE — TELEPHONE ENCOUNTER
----- Message from Poly George sent at 10/16/2019 11:55 AM CDT -----  Contact: [phone]  Patient called to leave you fax# [phone], did not disclose who fax belongs to, is requesting a callback.. Please advise.

## 2019-10-17 ENCOUNTER — TELEPHONE (OUTPATIENT)
Dept: GASTROENTEROLOGY | Facility: CLINIC | Age: 64
End: 2019-10-17

## 2019-10-17 DIAGNOSIS — B96.81 HELICOBACTER PYLORI GASTRITIS: Primary | ICD-10-CM

## 2019-10-17 DIAGNOSIS — K29.70 HELICOBACTER PYLORI GASTRITIS: Primary | ICD-10-CM

## 2019-10-17 RX ORDER — TETRACYCLINE HYDROCHLORIDE 500 MG/1
500 CAPSULE ORAL 4 TIMES DAILY
Qty: 56 CAPSULE | Refills: 0 | Status: SHIPPED | OUTPATIENT
Start: 2019-10-17 | End: 2019-10-31

## 2019-10-17 RX ORDER — METRONIDAZOLE 250 MG/1
250 TABLET ORAL 4 TIMES DAILY
Qty: 56 TABLET | Refills: 0 | Status: SHIPPED | OUTPATIENT
Start: 2019-10-17 | End: 2019-10-31

## 2019-10-17 NOTE — TELEPHONE ENCOUNTER
----- Message from Shruthi Robert sent at 10/17/2019 11:08 AM CDT -----  Contact: Self [phone]  Patient is calling to talk to nurse in regards to questions on his medication.

## 2019-10-17 NOTE — TELEPHONE ENCOUNTER
Spoke with pt and he said that he can not stop the Tamsulosin right now because he has something going on with his bladder. He picked up the Amoxicillin from the pharmacy, but not the clarithromycin. He would like to know if he can be prescribed a different antibiotic that does not interfere with the Tamsulosin.

## 2019-10-17 NOTE — TELEPHONE ENCOUNTER
The other option is quadruple therapy...    Quadruple therapy (Four medicines) treatment for H. Pylori with all of the following four medicines taken together for 14 days of treatment:  - sent Rx to [facility]  - he should not take the amoxicillin as the only regimen with amoxicillin uses clarithromycin    Protonix, take twice a day for 14 days  Tetracycline 500mg by mouth every 6 hours for 14 days  Metronidazole 250mg by mouth every 6 hours for 14 days  Bismuth Subsalicylate 524mg by mouth every 6 hours for 14 days  (the bismuth is OTC and he may have to ask the pharmacist to assist him with getting the medicine and correct dose)    Don't drink alcohol on these medicines. Take after meals and drink plenty of water to make sure all pills clear esophagus.

## 2019-10-18 ENCOUNTER — TELEPHONE (OUTPATIENT)
Dept: GASTROENTEROLOGY | Facility: CLINIC | Age: 64
End: 2019-10-18

## 2019-10-18 NOTE — TELEPHONE ENCOUNTER
----- Message from Pepper Gilmore sent at 10/18/2019 11:20 AM CDT -----  Contact: patient  Patient called to request another prescription for his current stomach infection.    Please call [phone] to discuss today.

## 2019-10-18 NOTE — TELEPHONE ENCOUNTER
----- Message from Rosalia Zaina sent at 10/18/2019  9:44 AM CDT -----  Contact: SERGIO SEWELL [[id number]]  [phone]    Patient is waiting for a prescription refill. The medication he was prescribed has an interaction with medicine he is currently taking and he needs an alternative. Patient stated he spoke with Carolee on Wednesday and has not heard anything since.    [facility] WAN Guido

## 2019-10-23 ENCOUNTER — TELEPHONE (OUTPATIENT)
Dept: INTERNAL MEDICINE | Facility: CLINIC | Age: 64
End: 2019-10-23

## 2019-10-23 NOTE — TELEPHONE ENCOUNTER
----- Message from Yara Enriquez sent at 10/23/2019  2:26 PM CDT -----  Contact: Patient [phone]  Stated paperwork is being faxed from [employer] regarding his disability and wanted to give you a heads up.    Thank you

## 2019-10-30 ENCOUNTER — PATIENT OUTREACH (OUTPATIENT)
Dept: ADMINISTRATIVE | Facility: OTHER | Age: 64
End: 2019-10-30

## 2019-11-01 ENCOUNTER — OFFICE VISIT (OUTPATIENT)
Dept: OPTOMETRY | Facility: CLINIC | Age: 64
End: 2019-11-01
Payer: COMMERCIAL

## 2019-11-01 DIAGNOSIS — H52.4 HYPEROPIA WITH ASTIGMATISM AND PRESBYOPIA, BILATERAL: ICD-10-CM

## 2019-11-01 DIAGNOSIS — H40.1134 PRIMARY OPEN ANGLE GLAUCOMA (POAG) OF BOTH EYES, INDETERMINATE STAGE: Primary | ICD-10-CM

## 2019-11-01 DIAGNOSIS — E11.9 TYPE 2 DIABETES MELLITUS WITHOUT RETINOPATHY: ICD-10-CM

## 2019-11-01 DIAGNOSIS — H52.203 HYPEROPIA WITH ASTIGMATISM AND PRESBYOPIA, BILATERAL: ICD-10-CM

## 2019-11-01 DIAGNOSIS — H52.03 HYPEROPIA WITH ASTIGMATISM AND PRESBYOPIA, BILATERAL: ICD-10-CM

## 2019-11-01 PROCEDURE — 92015 DETERMINE REFRACTIVE STATE: CPT | Mod: S$GLB,,, | Performed by: OPTOMETRIST

## 2019-11-01 PROCEDURE — 92004 PR EYE EXAM, NEW PATIENT,COMPREHESV: ICD-10-PCS | Mod: S$GLB,,, | Performed by: OPTOMETRIST

## 2019-11-01 PROCEDURE — 99999 PR PBB SHADOW E&M-EST. PATIENT-LVL III: CPT | Mod: PBBFAC,,, | Performed by: OPTOMETRIST

## 2019-11-01 PROCEDURE — 99999 PR PBB SHADOW E&M-EST. PATIENT-LVL III: ICD-10-PCS | Mod: PBBFAC,,, | Performed by: OPTOMETRIST

## 2019-11-01 PROCEDURE — 92004 COMPRE OPH EXAM NEW PT 1/>: CPT | Mod: S$GLB,,, | Performed by: OPTOMETRIST

## 2019-11-01 PROCEDURE — 92015 PR REFRACTION: ICD-10-PCS | Mod: S$GLB,,, | Performed by: OPTOMETRIST

## 2019-11-01 RX ORDER — LATANOPROST 50 UG/ML
1 SOLUTION/ DROPS OPHTHALMIC NIGHTLY
Qty: 2.5 ML | Refills: 11 | Status: SHIPPED | OUTPATIENT
Start: 2019-11-01 | End: 2019-12-17 | Stop reason: SDUPTHER

## 2019-11-01 NOTE — PROGRESS NOTES
HPI     KEITH:1-2yrs Dr Huffman  Glasses? no  Contacts? no  H/o eye surgery, injections or laser: no  H/o eye injury: no  Known eye conditions? Glaucoma- was prescribed Lumigan 1.5 yrs ago but Rx   ran out  Family h/o eye conditions? no  Eye gtts?visine PRN     (-) Flashes (-) Floaters (-) Mucous   (-) Tearing (-) Itching (-) Burning   (-) Headaches (-) Eye Pain/discomfort (-) Irritation   (-) Redness (-) Double vision (+) Blurry vision overall va OU     Diabetic? (+)  A1c?  (Hemoglobin A1C       Date                     Value               Ref Range             Status                09/14/2019               6.0 (H)             4.0 - 5.6 %           Final              Comment:    ADA Screening Guidelines:  5.7-6.4%  Consistent with   prediabetes  >or=6.5%  Consistent with diabetes  High levels of fetal   hemoglobin interfere with the HbA1C  assay. Heterozygous hemoglobin   variants (HbS, HgC, etc)do  not significantly interfere with this assay.     However, presence of multiple variants may affect accuracy.         11/02/2018               5.8 (H)             4.0 - 5.6 %           Final              Comment:    ADA Screening Guidelines:  5.7-6.4%  Consistent with   prediabetes  >or=6.5%  Consistent with diabetes  High levels of fetal   hemoglobin interfere with the HbA1C  assay. Heterozygous hemoglobin   variants (HbS, HgC, etc)do  not significantly interfere with this assay.     However, presence of multiple variants may affect accuracy.         06/30/2018               5.9 (H)             4.0 - 5.6 %           Final              Comment:    ADA Screening Guidelines:  5.7-6.4%  Consistent with   prediabetes  >or=6.5%  Consistent with diabetes  High levels of fetal   hemoglobin interfere with the HbA1C  assay. Heterozygous hemoglobin   variants (HbS, HgC, etc)do  not significantly interfere with this assay.     However, presence of multiple variants may affect accuracy.    ----------)        Last edited by Andrea WEAVER  Kirk, OD on 11/1/2019  8:57 AM. (History)        ROS     Negative for: Constitutional, Gastrointestinal, Neurological, Skin,   Genitourinary, Musculoskeletal, HENT, Endocrine, Cardiovascular, Eyes,   Respiratory, Psychiatric, Allergic/Imm, Heme/Lymph    Last edited by Andrea Bradley, OD on 11/1/2019  8:57 AM. (History)        Assessment /Plan     For exam results, see Encounter Report.    Primary open angle glaucoma (POAG) of both eyes, indeterminate stage  -     Blake Visual Field - OU - Extended - Both Eyes; Future  -     Posterior Segment OCT Optic Nerve- Both eyes; Future  -     latanoprost 0.005 % ophthalmic solution; Place 1 drop into both eyes every evening.  Dispense: 2.5 mL; Refill: 11    Type 2 diabetes mellitus without retinopathy    Hyperopia with astigmatism and presbyopia, bilateral      1. (-) FHx. IOP 21 OD 17 OS. Pt has been off of Lumigan for several months. Prev pt of Dr Huffman. C/d 0.55 OD, 0.65 OS. Educated pt on findings. Rx Latanoprost qHS OU. RTC 6 weeks IOP/pachy/OCT/HVf.   2. BS control. No signs of diabetic retinopathy. Monitor with annual exam.  3. SRx released to patient. Patient educated on lens options. Normal ocular health. RTC 1 year for routine exam.

## 2019-11-04 ENCOUNTER — TELEPHONE (OUTPATIENT)
Dept: INTERNAL MEDICINE | Facility: CLINIC | Age: 64
End: 2019-11-04

## 2019-11-04 ENCOUNTER — TELEPHONE (OUTPATIENT)
Dept: GASTROENTEROLOGY | Facility: CLINIC | Age: 64
End: 2019-11-04

## 2019-11-04 NOTE — TELEPHONE ENCOUNTER
----- Message from Krysta Guido sent at 11/1/2019 12:44 PM CDT -----  Contact: [phone]  Patient called and stated he contact the disability office for his FMLA forms, and was told that they will be faxing over documentation for the doctor to complete.     Patient stated because the documents are time sensitive he would like a call back letting him know that the office received the forms.    Please advise, thank you.

## 2019-11-04 NOTE — TELEPHONE ENCOUNTER
----- Message from Krysta Guido sent at 11/1/2019 12:44 PM CDT -----  Contact: [phone]  Patient called and stated he contact the disability office for his FMLA forms, and was told that they will be faxing over documentation for the doctor to complete.     Patient stated because the documents are time sensitive he would like a call back letting him know that the office received the forms.    Please advise, thank you.

## 2019-11-04 NOTE — TELEPHONE ENCOUNTER
Spoke with pt and he would like FMLA paperwork filled out. I informed pt that we can only fill this out from the Gastro standpoint since we saw him on 9/19/19. Patient stated that the due date for the paperwork would be 11/6/19. Dr. Guajardo will be away from the clinic until Friday so patient would have to get an extension. Pt said that he would fax over FMLA forms.

## 2019-11-04 NOTE — TELEPHONE ENCOUNTER
----- Message from Krysta Guido sent at 11/1/2019 12:44 PM CDT -----  Contact: [phone]  Patient called and stated he contact the disability office for his FMLA forms, and was told that they will be faxing over documentation for the doctor to complete.     Patient stated because the documents are time sensitive he would like a call back letting him know that the office received the forms.    Please advise, thank you.

## 2019-11-04 NOTE — TELEPHONE ENCOUNTER
----- Message from Rosalia Mahajan sent at 11/4/2019 11:38 AM CST -----  Contact: SERGIO SEWELL [[id number]]  [phone]    Patient stated he needs to speak to Carolee and it is urgent.

## 2019-11-04 NOTE — TELEPHONE ENCOUNTER
----- Message from Krysta Guido sent at 11/1/2019 12:44 PM CDT -----  Contact: [phone]  Patient called and stated he contact the disability office for his FMLA forms, and was told that they will be faxing over documentation for the doctor to complete.     Patient stated because the documents are time sensitive he would like a call back letting him know that the office received the forms.    Please advise, thank you.

## 2019-11-07 ENCOUNTER — OFFICE VISIT (OUTPATIENT)
Dept: INTERNAL MEDICINE | Facility: CLINIC | Age: 64
End: 2019-11-07
Payer: COMMERCIAL

## 2019-11-07 VITALS
DIASTOLIC BLOOD PRESSURE: 78 MMHG | WEIGHT: 253.31 LBS | BODY MASS INDEX: 38.39 KG/M2 | RESPIRATION RATE: 18 BRPM | TEMPERATURE: 98 F | HEIGHT: 68 IN | SYSTOLIC BLOOD PRESSURE: 110 MMHG | HEART RATE: 109 BPM

## 2019-11-07 DIAGNOSIS — R53.1 WEAKNESS GENERALIZED: ICD-10-CM

## 2019-11-07 DIAGNOSIS — N40.1 BENIGN PROSTATIC HYPERPLASIA WITH URINARY OBSTRUCTION: ICD-10-CM

## 2019-11-07 DIAGNOSIS — I10 ESSENTIAL HYPERTENSION: Chronic | ICD-10-CM

## 2019-11-07 DIAGNOSIS — E78.2 MIXED HYPERLIPIDEMIA: Chronic | ICD-10-CM

## 2019-11-07 DIAGNOSIS — A04.8 BACTERIAL INFECTION DUE TO H. PYLORI: Chronic | ICD-10-CM

## 2019-11-07 DIAGNOSIS — N13.8 BENIGN PROSTATIC HYPERPLASIA WITH URINARY OBSTRUCTION: ICD-10-CM

## 2019-11-07 DIAGNOSIS — E11.9 TYPE 2 DIABETES MELLITUS WITHOUT COMPLICATION, WITHOUT LONG-TERM CURRENT USE OF INSULIN: Primary | ICD-10-CM

## 2019-11-07 DIAGNOSIS — R53.83 DECREASED STAMINA: ICD-10-CM

## 2019-11-07 DIAGNOSIS — Z87.898 H/O URINARY RETENTION: ICD-10-CM

## 2019-11-07 DIAGNOSIS — K63.5 POLYP OF COLON, UNSPECIFIED PART OF COLON, UNSPECIFIED TYPE: ICD-10-CM

## 2019-11-07 DIAGNOSIS — R53.83 FATIGUE DUE TO TREATMENT: ICD-10-CM

## 2019-11-07 PROCEDURE — 3078F DIAST BP <80 MM HG: CPT | Mod: CPTII,S$GLB,, | Performed by: INTERNAL MEDICINE

## 2019-11-07 PROCEDURE — 99214 OFFICE O/P EST MOD 30 MIN: CPT | Mod: S$GLB,,, | Performed by: INTERNAL MEDICINE

## 2019-11-07 PROCEDURE — 1101F PR PT FALLS ASSESS DOC 0-1 FALLS W/OUT INJ PAST YR: ICD-10-PCS | Mod: CPTII,S$GLB,, | Performed by: INTERNAL MEDICINE

## 2019-11-07 PROCEDURE — 3008F PR BODY MASS INDEX (BMI) DOCUMENTED: ICD-10-PCS | Mod: CPTII,S$GLB,, | Performed by: INTERNAL MEDICINE

## 2019-11-07 PROCEDURE — 99214 PR OFFICE/OUTPT VISIT, EST, LEVL IV, 30-39 MIN: ICD-10-PCS | Mod: S$GLB,,, | Performed by: INTERNAL MEDICINE

## 2019-11-07 PROCEDURE — 3044F HG A1C LEVEL LT 7.0%: CPT | Mod: CPTII,S$GLB,, | Performed by: INTERNAL MEDICINE

## 2019-11-07 PROCEDURE — 3074F SYST BP LT 130 MM HG: CPT | Mod: CPTII,S$GLB,, | Performed by: INTERNAL MEDICINE

## 2019-11-07 PROCEDURE — 3044F PR MOST RECENT HEMOGLOBIN A1C LEVEL <7.0%: ICD-10-PCS | Mod: CPTII,S$GLB,, | Performed by: INTERNAL MEDICINE

## 2019-11-07 PROCEDURE — 3078F PR MOST RECENT DIASTOLIC BLOOD PRESSURE < 80 MM HG: ICD-10-PCS | Mod: CPTII,S$GLB,, | Performed by: INTERNAL MEDICINE

## 2019-11-07 PROCEDURE — 99999 PR PBB SHADOW E&M-EST. PATIENT-LVL III: CPT | Mod: PBBFAC,,, | Performed by: INTERNAL MEDICINE

## 2019-11-07 PROCEDURE — 1101F PT FALLS ASSESS-DOCD LE1/YR: CPT | Mod: CPTII,S$GLB,, | Performed by: INTERNAL MEDICINE

## 2019-11-07 PROCEDURE — 3008F BODY MASS INDEX DOCD: CPT | Mod: CPTII,S$GLB,, | Performed by: INTERNAL MEDICINE

## 2019-11-07 PROCEDURE — 3074F PR MOST RECENT SYSTOLIC BLOOD PRESSURE < 130 MM HG: ICD-10-PCS | Mod: CPTII,S$GLB,, | Performed by: INTERNAL MEDICINE

## 2019-11-07 PROCEDURE — 99999 PR PBB SHADOW E&M-EST. PATIENT-LVL III: ICD-10-PCS | Mod: PBBFAC,,, | Performed by: INTERNAL MEDICINE

## 2019-11-08 ENCOUNTER — TELEPHONE (OUTPATIENT)
Dept: OPTOMETRY | Facility: CLINIC | Age: 64
End: 2019-11-08

## 2019-11-08 ENCOUNTER — TELEPHONE (OUTPATIENT)
Dept: GASTROENTEROLOGY | Facility: CLINIC | Age: 64
End: 2019-11-08

## 2019-11-08 NOTE — TELEPHONE ENCOUNTER
Spoke with pt and in formed him that we can not fill[out the disability papers. Pt stated that his PCP filled them out. He would like the papers mailed back to his home address.

## 2019-11-08 NOTE — PROGRESS NOTES
Subjective:       Patient ID: Celso Hernandez is a 64 y.o. male.    Chief Complaint: Follow-up    HPI   The patient presents for follow-up medical conditions which include recent H pylori infection with associated abdominal.  The patient's recovery has been complicated recurrent diarrhea, generalized weakness, and urinary retention.  He still notes easy fatigability and  experiences lightheadedness with physical exertion such as walking or climbing stairs.  He  notes increased urinary frequency and nocturia.  He states his urinary flow has improved.  He has not experienced any incontinence or recurrent urinary retention since last month.  He relates his abdominal pain has resolved.  He is averaging 2-3 bowel movements per day; previously he averaged 1 bowel movement per day.  He relates that his appetite has improved.  He denies having any abdominal cramping, nausea, or vomiting at this time.    Review of Systems   Constitutional: Positive for fatigue. Negative for activity change, appetite change and unexpected weight change.   Eyes: Negative for visual disturbance.   Respiratory: Negative for cough, chest tightness, shortness of breath and wheezing.    Cardiovascular: Negative for chest pain, palpitations and leg swelling.   Gastrointestinal: Positive for diarrhea. Negative for abdominal pain, blood in stool, nausea and vomiting.   Genitourinary: Negative for dysuria, hematuria and urgency.   Musculoskeletal: Negative for arthralgias, back pain, gait problem, joint swelling and myalgias.   Skin: Negative for color change and rash.   Neurological: Positive for weakness and light-headedness. Negative for dizziness, syncope, numbness and headaches.   Psychiatric/Behavioral: Negative for sleep disturbance.        He is resting well at night except for episodes of nocturia every 2-3 hours.       Objective:      Physical Exam   Constitutional: He is oriented to person, place, and time. He appears well-developed and  well-nourished. No distress.   The patient has lost 3 lb since 09/05/2019.   HENT:   Head: Normocephalic and atraumatic.   Mouth/Throat: Oropharynx is clear and moist. No oropharyngeal exudate.   Eyes: Pupils are equal, round, and reactive to light. Conjunctivae and EOM are normal. No scleral icterus.   Neck: Normal range of motion. Neck supple. No JVD present. No thyromegaly present.   Cardiovascular: Normal rate, regular rhythm, normal heart sounds and intact distal pulses. Exam reveals no gallop.   No murmur heard.  Pulmonary/Chest: Effort normal and breath sounds normal. No respiratory distress. He has no wheezes. He has no rales.   Abdominal: Soft. Bowel sounds are normal. He exhibits no distension and no mass. There is no tenderness.   Musculoskeletal: Normal range of motion. He exhibits no edema or tenderness.   Lymphadenopathy:     He has no cervical adenopathy.   Neurological: He is alert and oriented to person, place, and time. No cranial nerve deficit.   Sensory examination is intact in both feet on monofilament testing.  Gait is normal.  No ataxia is noted.   Skin: Skin is warm and dry. No rash noted.   No foot lesions are present.   Psychiatric: He has a normal mood and affect. His behavior is normal.   Nursing note and vitals reviewed.      Assessment:       1. Type 2 diabetes mellitus without complication, without long-term current use of insulin    2. Benign prostatic hyperplasia with urinary obstruction    3. Essential hypertension    4. Bacterial infection due to H. pylori    5. Polyp of colon, unspecified part of colon, unspecified type    6. Mixed hyperlipidemia    7. Weakness generalized    8. Decreased stamina    9. Fatigue due to treatment    10. H/O urinary retention        Plan:     Celso was seen today for follow-up.  The patient has been gradually recovering from the effects of H pylori infection and subsequent aggressive antibiotic therapy.  Intermittent episodes of diarrhea are noted.  Abdominal pain has resolved however the patient still notes exertional weakness and loss of stamina.  He has noted lightheadedness with exertion.  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  His current level of generalized weakness has limited his level of physical activity such as walking and climbing.  He is noting urinary symptoms of increased frequency and urgency but he denies having any urinary leakage.  He has not had another episode of urinary retention since his episode last month.    Updated laboratory testing will be obtained on 11/16/2019 along with other labs already scheduled.  The patient will follow up with his urologist on 11/18/2019 as scheduled.  The patient will also follow up with his gastroenterologist on 11/26/2019 regarding his H pylori infection and current GI symptoms.  At this time the patient remains unable to return to work.  Also, clearance from his urologist and gastroenterologist will be sought concerning his return work activities A follow-up visit in 2 months will be obtained as scheduled.  The patient has been encouraged to gradually increase his physical activity level on a daily basis to improve stamina and strength.  Current medications will be continued.    Diagnoses and all orders for this visit:    Type 2 diabetes mellitus without complication, without long-term current use of insulin  -     Comprehensive metabolic panel; Future  -     Hemoglobin A1c; Future  -     CBC auto differential; Future  -     Lipid panel; Future  -     Urine culture; Future  -     URINALYSIS; Future    Benign prostatic hyperplasia with urinary obstruction  -     Comprehensive metabolic panel; Future  -     Hemoglobin A1c; Future  -     CBC auto differential; Future  -     Lipid panel; Future  -     Urine culture; Future  -     URINALYSIS; Future    Essential hypertension  -     Comprehensive metabolic panel; Future  -     Hemoglobin A1c;  Future  -     CBC auto differential; Future  -     Lipid panel; Future  -     Urine culture; Future  -     URINALYSIS; Future    Bacterial infection due to H. pylori    Polyp of colon, unspecified part of colon, unspecified type    Mixed hyperlipidemia    Weakness generalized    Decreased stamina    Fatigue due to treatment    H/O urinary retention

## 2019-11-13 ENCOUNTER — TELEPHONE (OUTPATIENT)
Dept: INTERNAL MEDICINE | Facility: CLINIC | Age: 64
End: 2019-11-13

## 2019-11-13 ENCOUNTER — PATIENT OUTREACH (OUTPATIENT)
Dept: ADMINISTRATIVE | Facility: OTHER | Age: 64
End: 2019-11-13

## 2019-11-16 ENCOUNTER — LAB VISIT (OUTPATIENT)
Dept: LAB | Facility: HOSPITAL | Age: 64
End: 2019-11-16
Attending: INTERNAL MEDICINE
Payer: COMMERCIAL

## 2019-11-16 DIAGNOSIS — N40.1 BENIGN PROSTATIC HYPERPLASIA WITH URINARY OBSTRUCTION: ICD-10-CM

## 2019-11-16 DIAGNOSIS — R97.20 ELEVATED PSA: ICD-10-CM

## 2019-11-16 DIAGNOSIS — N13.8 BENIGN PROSTATIC HYPERPLASIA WITH URINARY OBSTRUCTION: ICD-10-CM

## 2019-11-16 DIAGNOSIS — I10 ESSENTIAL HYPERTENSION: Chronic | ICD-10-CM

## 2019-11-16 DIAGNOSIS — N13.8 BPH WITH OBSTRUCTION/LOWER URINARY TRACT SYMPTOMS: ICD-10-CM

## 2019-11-16 DIAGNOSIS — N40.1 BPH WITH OBSTRUCTION/LOWER URINARY TRACT SYMPTOMS: ICD-10-CM

## 2019-11-16 DIAGNOSIS — E11.9 TYPE 2 DIABETES MELLITUS WITHOUT COMPLICATION, WITHOUT LONG-TERM CURRENT USE OF INSULIN: ICD-10-CM

## 2019-11-16 LAB
ALBUMIN SERPL BCP-MCNC: 3.3 G/DL (ref 3.5–5.2)
ALBUMIN SERPL BCP-MCNC: 3.3 G/DL (ref 3.5–5.2)
ALP SERPL-CCNC: 97 U/L (ref 55–135)
ALP SERPL-CCNC: 97 U/L (ref 55–135)
ALT SERPL W/O P-5'-P-CCNC: 39 U/L (ref 10–44)
ALT SERPL W/O P-5'-P-CCNC: 39 U/L (ref 10–44)
ANION GAP SERPL CALC-SCNC: 8 MMOL/L (ref 8–16)
ANION GAP SERPL CALC-SCNC: 8 MMOL/L (ref 8–16)
AST SERPL-CCNC: 27 U/L (ref 10–40)
AST SERPL-CCNC: 27 U/L (ref 10–40)
BASOPHILS # BLD AUTO: 0.1 K/UL (ref 0–0.2)
BASOPHILS NFR BLD: 1 % (ref 0–1.9)
BILIRUB SERPL-MCNC: 0.7 MG/DL (ref 0.1–1)
BILIRUB SERPL-MCNC: 0.7 MG/DL (ref 0.1–1)
BUN SERPL-MCNC: 21 MG/DL (ref 8–23)
BUN SERPL-MCNC: 21 MG/DL (ref 8–23)
CALCIUM SERPL-MCNC: 9.6 MG/DL (ref 8.7–10.5)
CALCIUM SERPL-MCNC: 9.6 MG/DL (ref 8.7–10.5)
CHLORIDE SERPL-SCNC: 106 MMOL/L (ref 95–110)
CHLORIDE SERPL-SCNC: 106 MMOL/L (ref 95–110)
CHOLEST SERPL-MCNC: 269 MG/DL (ref 120–199)
CHOLEST SERPL-MCNC: 269 MG/DL (ref 120–199)
CHOLEST/HDLC SERPL: 7.5 {RATIO} (ref 2–5)
CHOLEST/HDLC SERPL: 7.5 {RATIO} (ref 2–5)
CO2 SERPL-SCNC: 25 MMOL/L (ref 23–29)
CO2 SERPL-SCNC: 25 MMOL/L (ref 23–29)
COMPLEXED PSA SERPL-MCNC: 2.8 NG/ML (ref 0–4)
CREAT SERPL-MCNC: 1.2 MG/DL (ref 0.5–1.4)
CREAT SERPL-MCNC: 1.2 MG/DL (ref 0.5–1.4)
DIFFERENTIAL METHOD: ABNORMAL
EOSINOPHIL # BLD AUTO: 0.4 K/UL (ref 0–0.5)
EOSINOPHIL NFR BLD: 4.2 % (ref 0–8)
ERYTHROCYTE [DISTWIDTH] IN BLOOD BY AUTOMATED COUNT: 14 % (ref 11.5–14.5)
EST. GFR  (AFRICAN AMERICAN): >60 ML/MIN/1.73 M^2
EST. GFR  (AFRICAN AMERICAN): >60 ML/MIN/1.73 M^2
EST. GFR  (NON AFRICAN AMERICAN): >60 ML/MIN/1.73 M^2
EST. GFR  (NON AFRICAN AMERICAN): >60 ML/MIN/1.73 M^2
ESTIMATED AVG GLUCOSE: 137 MG/DL (ref 68–131)
ESTIMATED AVG GLUCOSE: 137 MG/DL (ref 68–131)
GLUCOSE SERPL-MCNC: 119 MG/DL (ref 70–110)
GLUCOSE SERPL-MCNC: 119 MG/DL (ref 70–110)
HBA1C MFR BLD HPLC: 6.4 % (ref 4–5.6)
HBA1C MFR BLD HPLC: 6.4 % (ref 4–5.6)
HCT VFR BLD AUTO: 54.5 % (ref 40–54)
HDLC SERPL-MCNC: 36 MG/DL (ref 40–75)
HDLC SERPL-MCNC: 36 MG/DL (ref 40–75)
HDLC SERPL: 13.4 % (ref 20–50)
HDLC SERPL: 13.4 % (ref 20–50)
HGB BLD-MCNC: 16.9 G/DL (ref 14–18)
IMM GRANULOCYTES # BLD AUTO: 0.03 K/UL (ref 0–0.04)
IMM GRANULOCYTES NFR BLD AUTO: 0.3 % (ref 0–0.5)
LDLC SERPL CALC-MCNC: 174 MG/DL (ref 63–159)
LDLC SERPL CALC-MCNC: 174 MG/DL (ref 63–159)
LYMPHOCYTES # BLD AUTO: 3.6 K/UL (ref 1–4.8)
LYMPHOCYTES NFR BLD: 34.4 % (ref 18–48)
MCH RBC QN AUTO: 28.1 PG (ref 27–31)
MCHC RBC AUTO-ENTMCNC: 31 G/DL (ref 32–36)
MCV RBC AUTO: 91 FL (ref 82–98)
MONOCYTES # BLD AUTO: 1.2 K/UL (ref 0.3–1)
MONOCYTES NFR BLD: 11.3 % (ref 4–15)
NEUTROPHILS # BLD AUTO: 5.1 K/UL (ref 1.8–7.7)
NEUTROPHILS NFR BLD: 48.8 % (ref 38–73)
NONHDLC SERPL-MCNC: 233 MG/DL
NONHDLC SERPL-MCNC: 233 MG/DL
NRBC BLD-RTO: 0 /100 WBC
PLATELET # BLD AUTO: 208 K/UL (ref 150–350)
PMV BLD AUTO: 11.4 FL (ref 9.2–12.9)
POTASSIUM SERPL-SCNC: 4.2 MMOL/L (ref 3.5–5.1)
POTASSIUM SERPL-SCNC: 4.2 MMOL/L (ref 3.5–5.1)
PROT SERPL-MCNC: 7.5 G/DL (ref 6–8.4)
PROT SERPL-MCNC: 7.5 G/DL (ref 6–8.4)
RBC # BLD AUTO: 6.02 M/UL (ref 4.6–6.2)
SODIUM SERPL-SCNC: 139 MMOL/L (ref 136–145)
SODIUM SERPL-SCNC: 139 MMOL/L (ref 136–145)
TRIGL SERPL-MCNC: 295 MG/DL (ref 30–150)
TRIGL SERPL-MCNC: 295 MG/DL (ref 30–150)
WBC # BLD AUTO: 10.36 K/UL (ref 3.9–12.7)

## 2019-11-16 PROCEDURE — 85025 COMPLETE CBC W/AUTO DIFF WBC: CPT

## 2019-11-16 PROCEDURE — 80053 COMPREHEN METABOLIC PANEL: CPT

## 2019-11-16 PROCEDURE — 84153 ASSAY OF PSA TOTAL: CPT

## 2019-11-16 PROCEDURE — 83036 HEMOGLOBIN GLYCOSYLATED A1C: CPT

## 2019-11-16 PROCEDURE — 36415 COLL VENOUS BLD VENIPUNCTURE: CPT | Mod: PO

## 2019-11-16 PROCEDURE — 80061 LIPID PANEL: CPT

## 2019-11-18 ENCOUNTER — OFFICE VISIT (OUTPATIENT)
Dept: UROLOGY | Facility: CLINIC | Age: 64
End: 2019-11-18
Payer: COMMERCIAL

## 2019-11-18 VITALS
SYSTOLIC BLOOD PRESSURE: 138 MMHG | HEART RATE: 92 BPM | HEIGHT: 68 IN | DIASTOLIC BLOOD PRESSURE: 87 MMHG | WEIGHT: 260.81 LBS | BODY MASS INDEX: 39.53 KG/M2

## 2019-11-18 DIAGNOSIS — N40.1 BPH WITH OBSTRUCTION/LOWER URINARY TRACT SYMPTOMS: ICD-10-CM

## 2019-11-18 DIAGNOSIS — N40.2 PROSTATE NODULE: ICD-10-CM

## 2019-11-18 DIAGNOSIS — N13.8 BPH WITH OBSTRUCTION/LOWER URINARY TRACT SYMPTOMS: ICD-10-CM

## 2019-11-18 DIAGNOSIS — R33.9 URINARY RETENTION: Primary | ICD-10-CM

## 2019-11-18 PROCEDURE — 1101F PT FALLS ASSESS-DOCD LE1/YR: CPT | Mod: CPTII,S$GLB,, | Performed by: PHYSICIAN ASSISTANT

## 2019-11-18 PROCEDURE — 51798 US URINE CAPACITY MEASURE: CPT | Mod: S$GLB,,, | Performed by: PHYSICIAN ASSISTANT

## 2019-11-18 PROCEDURE — 99999 PR PBB SHADOW E&M-EST. PATIENT-LVL III: CPT | Mod: PBBFAC,,, | Performed by: PHYSICIAN ASSISTANT

## 2019-11-18 PROCEDURE — 3079F PR MOST RECENT DIASTOLIC BLOOD PRESSURE 80-89 MM HG: ICD-10-PCS | Mod: CPTII,S$GLB,, | Performed by: PHYSICIAN ASSISTANT

## 2019-11-18 PROCEDURE — 99213 OFFICE O/P EST LOW 20 MIN: CPT | Mod: S$GLB,,, | Performed by: PHYSICIAN ASSISTANT

## 2019-11-18 PROCEDURE — 3075F SYST BP GE 130 - 139MM HG: CPT | Mod: CPTII,S$GLB,, | Performed by: PHYSICIAN ASSISTANT

## 2019-11-18 PROCEDURE — 3008F BODY MASS INDEX DOCD: CPT | Mod: CPTII,S$GLB,, | Performed by: PHYSICIAN ASSISTANT

## 2019-11-18 PROCEDURE — 3075F PR MOST RECENT SYSTOLIC BLOOD PRESS GE 130-139MM HG: ICD-10-PCS | Mod: CPTII,S$GLB,, | Performed by: PHYSICIAN ASSISTANT

## 2019-11-18 PROCEDURE — 1101F PR PT FALLS ASSESS DOC 0-1 FALLS W/OUT INJ PAST YR: ICD-10-PCS | Mod: CPTII,S$GLB,, | Performed by: PHYSICIAN ASSISTANT

## 2019-11-18 PROCEDURE — 3079F DIAST BP 80-89 MM HG: CPT | Mod: CPTII,S$GLB,, | Performed by: PHYSICIAN ASSISTANT

## 2019-11-18 PROCEDURE — 99999 PR PBB SHADOW E&M-EST. PATIENT-LVL III: ICD-10-PCS | Mod: PBBFAC,,, | Performed by: PHYSICIAN ASSISTANT

## 2019-11-18 PROCEDURE — 99213 PR OFFICE/OUTPT VISIT, EST, LEVL III, 20-29 MIN: ICD-10-PCS | Mod: S$GLB,,, | Performed by: PHYSICIAN ASSISTANT

## 2019-11-18 PROCEDURE — 51798 PR MEAS,POST-VOID RES,US,NON-IMAGING: ICD-10-PCS | Mod: S$GLB,,, | Performed by: PHYSICIAN ASSISTANT

## 2019-11-18 PROCEDURE — 3008F PR BODY MASS INDEX (BMI) DOCUMENTED: ICD-10-PCS | Mod: CPTII,S$GLB,, | Performed by: PHYSICIAN ASSISTANT

## 2019-11-18 NOTE — PROGRESS NOTES
CHIEF COMPLAINT:    Mr. Hernandez is a 64 y.o. male presenting for retention follow up  PRESENTING ILLNESS:    Celso Hernandez is a 64 y.o. male with a PMH of BPH, retention, prostate nodule who presents for retention follow up.   He was last seen in clinic on 10/7/19 for a voiding trial in which he passed.  He is here today to check a PVR to ensure he is emptying well.    For the first 10 days after the catheter was removed he reports intermittency, not being able to control his stream and dysuria.  Since then he has no urinary complaints. Currently no hesitancy, intermittency, frequency, and dysuria.    He is still taking flomax.      His PSA was checked on 9/14/19 and was 3.4 which is up from 2.2 (11/2/2018).  It was repeated 11/16/19 and is now 2.8.    History of enlarged prostate and right prostate nodule.   (-) prostate Biopsy in 1/3/2017 with Dr. Kunz.       REVIEW OF SYSTEMS:    Constitutional: Negative for fever and chills.   HENT: Negative for hearing loss.   Eyes: Negative for visual disturbance.   Respiratory: Negative for shortness of breath.   Cardiovascular: Negative for chest pain.   Gastrointestinal: Negative for vomiting, and constipation.   Genitourinary: See HPI  Neurological: Negative for dizziness.   Hematological: Does not bruise/bleed easily.   Psychiatric/Behavioral: Negative for confusion.       PATIENT HISTORY:    Past Medical History:   Diagnosis Date    Colon polyps     Diabetes mellitus type II     Hyperlipidemia     Hypertension     Multiple duodenal ulcers 10/08/2019    Unspecified hemorrhoids without mention of complication        Past Surgical History:   Procedure Laterality Date    COLONOSCOPY N/A 10/8/2019    Procedure: COLONOSCOPY suprep;  Surgeon: Landon Guajardo MD;  Location: [facility];  Service: General;  Laterality: N/A;    COLONOSCOPY W/ POLYPECTOMY  10/08/2019    ESOPHAGOGASTRODUODENOSCOPY  10/08/2019    w/biopsies    ESOPHAGOGASTRODUODENOSCOPY N/A 10/8/2019     Procedure: EGD (ESOPHAGOGASTRODUODENOSCOPY);  Surgeon: Landon Guajardo MD;  Location: [facility];  Service: General;  Laterality: N/A;    none         Family History   Problem Relation Age of Onset    Hyperlipidemia Mother     Cancer Sister         breast    Hearing loss Sister        Social History     Socioeconomic History    Marital status:      Spouse name: Not on file    Number of children: Not on file    Years of education: Not on file    Highest education level: Not on file   Occupational History    Not on file   Social Needs    Financial resource strain: Not on file    Food insecurity:     Worry: Not on file     Inability: Not on file    Transportation needs:     Medical: Not on file     Non-medical: Not on file   Tobacco Use    Smoking status: Never Smoker    Smokeless tobacco: Never Used   Substance and Sexual Activity    Alcohol use: Yes     Alcohol/week: 3.3 standard drinks     Types: 4 Standard drinks or equivalent per week    Drug use: No    Sexual activity: Not on file   Lifestyle    Physical activity:     Days per week: Not on file     Minutes per session: Not on file    Stress: Not on file   Relationships    Social connections:     Talks on phone: Not on file     Gets together: Not on file     Attends [employer] service: Not on file     Active member of club or organization: Not on file     Attends meetings of clubs or organizations: Not on file     Relationship status: Not on file   Other Topics Concern    Not on file   Social History Narrative    Not on file       Allergies:  Patient has no known allergies.    Medications:    Current Outpatient Medications:     atorvastatin (LIPITOR) 80 MG tablet, TAKE ONE TABLET BY MOUTH ONCE DAILY, Disp: 90 tablet, Rfl: 3    latanoprost 0.005 % ophthalmic solution, Place 1 drop into both eyes every evening., Disp: 2.5 mL, Rfl: 11    losartan-hydrochlorothiazide 100-12.5 mg (HYZAAR) 100-12.5 mg Tab, Take 1 tablet by mouth once  daily. For blood pressure, Disp: 90 tablet, Rfl: 3    metFORMIN (GLUCOPHAGE) 500 MG tablet, Take 1 tablet (500 mg total) by mouth daily with breakfast., Disp: 90 tablet, Rfl: 1    pantoprazole (PROTONIX) 40 MG tablet, Take 1 tablet (40 mg total) by mouth once daily., Disp: 30 tablet, Rfl: 11    tamsulosin (FLOMAX) 0.4 mg Cap, TAKE 1 CAPSULE BY MOUTH ONCE DAILY, Disp: 90 capsule, Rfl: 3    cholecalciferol, vitamin D3, (VITAMIN D3) 2,000 unit Cap, Take 1 capsule by mouth once daily., Disp: , Rfl:     meloxicam (MOBIC) 15 MG tablet, TAKE 1 TABLET BY MOUTH ONCE DAILY (Patient not taking: Reported on 11/7/2019), Disp: 30 tablet, Rfl: 11    PHYSICAL EXAMINATION:    Constitutional: He appears well-developed and well-nourished.  He is in no apparent distress.    Eyes: No scleral icterus noted bilaterally. No discharge bilaterally.    Nose: No rhinorrhea    Cardiovascular: Normal rate.  No pitting edema noted in lower extremities bilaterally    Pulmonary/Chest: Effort normal. No respiratory distress.     Abdominal:  He exhibits no distension.  There is no CVA tenderness.     Lymphadenopathy:        Right: No supraclavicular adenopathy present.        Left: No supraclavicular adenopathy present.     Neurological: He is alert and oriented to person, place, and time.     Skin: Skin is warm and dry.     Psych: Cooperative with normal affect.    Genitourinary: EMILY done 10/7/19  Physical Exam    Bladder scan performed by Nurse Patel.  PVR 63ml    LABS:    U/a: 1.010, pH 5, negative.    Lab Results   Component Value Date    PSA 3.4 09/14/2019    PSA 1.9 05/14/2016    PSA 2.5 09/03/2015    PSA 1.6 03/01/2014    PSA 1.7 10/08/2011    PSA 1.7 11/06/2010    PSA 1.7 08/29/2009    PSA 1.2 12/11/2007    PSA 1.1 05/19/2006    PSA 1.2 02/17/2004    PSADIAG 2.8 11/16/2019    PSADIAG 2.2 11/02/2018    PSADIAG 2.5 11/18/2017       IMPRESSION:    Encounter Diagnoses   Name Primary?    Urinary retention Yes    BPH with obstruction/lower  urinary tract symptoms     Prostate nodule          PLAN:    Retention resolved.   Continue flomax.    Reviewed PSA.  PSA back to baseline  Patient with history of prostate nodule, he will follow up in 6 months with PSA and EMILY.     Hamida Ayala PA-C

## 2019-11-20 ENCOUNTER — TELEPHONE (OUTPATIENT)
Dept: INTERNAL MEDICINE | Facility: CLINIC | Age: 64
End: 2019-11-20

## 2019-11-20 NOTE — TELEPHONE ENCOUNTER
----- Message from Lavern Bauer sent at 11/20/2019 11:55 AM CST -----  Contact: [phone]  Patient is requesting a call from the office in regards to his disability paperwork.  Please advise ,thanks

## 2019-11-21 ENCOUNTER — OFFICE VISIT (OUTPATIENT)
Dept: INTERNAL MEDICINE | Facility: CLINIC | Age: 64
End: 2019-11-21
Payer: COMMERCIAL

## 2019-11-21 VITALS
RESPIRATION RATE: 18 BRPM | BODY MASS INDEX: 39.2 KG/M2 | HEIGHT: 68 IN | WEIGHT: 258.63 LBS | DIASTOLIC BLOOD PRESSURE: 84 MMHG | SYSTOLIC BLOOD PRESSURE: 120 MMHG | HEART RATE: 80 BPM | TEMPERATURE: 99 F

## 2019-11-21 DIAGNOSIS — E11.9 TYPE 2 DIABETES MELLITUS WITHOUT COMPLICATION, WITHOUT LONG-TERM CURRENT USE OF INSULIN: ICD-10-CM

## 2019-11-21 DIAGNOSIS — N40.1 BENIGN PROSTATIC HYPERPLASIA WITH URINARY OBSTRUCTION: ICD-10-CM

## 2019-11-21 DIAGNOSIS — R10.32 LEFT GROIN PAIN: ICD-10-CM

## 2019-11-21 DIAGNOSIS — N13.8 BENIGN PROSTATIC HYPERPLASIA WITH URINARY OBSTRUCTION: ICD-10-CM

## 2019-11-21 DIAGNOSIS — I10 ESSENTIAL HYPERTENSION: Chronic | ICD-10-CM

## 2019-11-21 DIAGNOSIS — M54.16 LUMBAR BACK PAIN WITH RADICULOPATHY AFFECTING LEFT LOWER EXTREMITY: Primary | ICD-10-CM

## 2019-11-21 DIAGNOSIS — M54.16 LUMBAR RADICULOPATHY: ICD-10-CM

## 2019-11-21 PROCEDURE — 3079F PR MOST RECENT DIASTOLIC BLOOD PRESSURE 80-89 MM HG: ICD-10-PCS | Mod: CPTII,S$GLB,, | Performed by: INTERNAL MEDICINE

## 2019-11-21 PROCEDURE — 3044F HG A1C LEVEL LT 7.0%: CPT | Mod: CPTII,S$GLB,, | Performed by: INTERNAL MEDICINE

## 2019-11-21 PROCEDURE — 99999 PR PBB SHADOW E&M-EST. PATIENT-LVL III: CPT | Mod: PBBFAC,,, | Performed by: INTERNAL MEDICINE

## 2019-11-21 PROCEDURE — 3074F SYST BP LT 130 MM HG: CPT | Mod: CPTII,S$GLB,, | Performed by: INTERNAL MEDICINE

## 2019-11-21 PROCEDURE — 99213 PR OFFICE/OUTPT VISIT, EST, LEVL III, 20-29 MIN: ICD-10-PCS | Mod: S$GLB,,, | Performed by: INTERNAL MEDICINE

## 2019-11-21 PROCEDURE — 3008F PR BODY MASS INDEX (BMI) DOCUMENTED: ICD-10-PCS | Mod: CPTII,S$GLB,, | Performed by: INTERNAL MEDICINE

## 2019-11-21 PROCEDURE — 3044F PR MOST RECENT HEMOGLOBIN A1C LEVEL <7.0%: ICD-10-PCS | Mod: CPTII,S$GLB,, | Performed by: INTERNAL MEDICINE

## 2019-11-21 PROCEDURE — 3079F DIAST BP 80-89 MM HG: CPT | Mod: CPTII,S$GLB,, | Performed by: INTERNAL MEDICINE

## 2019-11-21 PROCEDURE — 99213 OFFICE O/P EST LOW 20 MIN: CPT | Mod: S$GLB,,, | Performed by: INTERNAL MEDICINE

## 2019-11-21 PROCEDURE — 1101F PT FALLS ASSESS-DOCD LE1/YR: CPT | Mod: CPTII,S$GLB,, | Performed by: INTERNAL MEDICINE

## 2019-11-21 PROCEDURE — 3008F BODY MASS INDEX DOCD: CPT | Mod: CPTII,S$GLB,, | Performed by: INTERNAL MEDICINE

## 2019-11-21 PROCEDURE — 99999 PR PBB SHADOW E&M-EST. PATIENT-LVL III: ICD-10-PCS | Mod: PBBFAC,,, | Performed by: INTERNAL MEDICINE

## 2019-11-21 PROCEDURE — 1101F PR PT FALLS ASSESS DOC 0-1 FALLS W/OUT INJ PAST YR: ICD-10-PCS | Mod: CPTII,S$GLB,, | Performed by: INTERNAL MEDICINE

## 2019-11-21 PROCEDURE — 3074F PR MOST RECENT SYSTOLIC BLOOD PRESSURE < 130 MM HG: ICD-10-PCS | Mod: CPTII,S$GLB,, | Performed by: INTERNAL MEDICINE

## 2019-11-21 RX ORDER — TRAMADOL HYDROCHLORIDE 50 MG/1
TABLET ORAL
Qty: 60 TABLET | Refills: 2 | Status: SHIPPED | OUTPATIENT
Start: 2019-11-21 | End: 2020-06-05

## 2019-11-21 RX ORDER — CYCLOBENZAPRINE HCL 10 MG
10 TABLET ORAL NIGHTLY PRN
Qty: 30 TABLET | Refills: 2 | Status: SHIPPED | OUTPATIENT
Start: 2019-11-21 | End: 2019-12-01

## 2019-11-21 NOTE — PROGRESS NOTES
Subjective:       Patient ID: Celso Hernandez is a 64 y.o. male.    Chief Complaint: Pain (left side in the groin area)    HPI   The patient presents today with complaints left lumbar pain radiating into left groin area the past 2 days.    The patient states 2 nights ago he was seated on his sofa at home.  When he got P began experiencing left lower back pain which gradually progressed in severity.  The pain radiates into left groin, medial thigh, and left testicle.  The pain varies in intensity.  Pain is noted at rest and with walking.  Walking seems to exacerbate the pain in the hip and groin area.  Pain is diminished by lying supine in bed.  When he awoke yesterday morning the pain had nearly resolved; however when he started ambulating he began experiencing pain again in the lower back, left hip, left groin as.  He has not taking any pain medications.    He denies experiencing any fever, chills, or sweats.  No dysuria gross hematuria has been noted.  Bowel movements are normal.  No urinary retention or incontinence has been noted. He has not experienced any nausea or vomiting.  His appetite is normal.    He does not have a history of kidney stones.  He saw his urologist on 11/18/2019.  He was noted to have 63 cc of postvoid residual urine in the bladder.  Flomax therapy was continued.  Patient's urinary retention was felt to have resolved.  Gastrointestinal symptoms been improving.  His appointment with his gastroenterologist is pending at this.  The patient had been noting continued unsteadiness of gait.  He was concerned about difficulty climbing ladders with his job as an .      Review of Systems   Constitutional: Positive for activity change and fatigue. Negative for chills, fever and unexpected weight change.   Respiratory: Negative for shortness of breath.    Cardiovascular: Negative for chest pain.   Gastrointestinal: Negative for abdominal pain, diarrhea, nausea and vomiting.   Genitourinary:  Positive for testicular pain. Negative for difficulty urinating, dysuria, frequency, hematuria, scrotal swelling and urgency.   Musculoskeletal: Positive for arthralgias and back pain. Negative for joint swelling and myalgias.   Skin: Negative for rash.   Psychiatric/Behavioral: Negative for agitation and dysphoric mood. The patient is not nervous/anxious.        Objective:      Physical Exam   Constitutional: He is oriented to person, place, and time. He appears well-developed and well-nourished.   The patient is complaining of left lumbar and hip pain.   HENT:   Head: Normocephalic and atraumatic.   Eyes: Conjunctivae are normal. No scleral icterus.   Cardiovascular: Normal rate, regular rhythm, normal heart sounds and intact distal pulses.   Pulmonary/Chest: Effort normal and breath sounds normal. He exhibits no tenderness.   Abdominal: Soft. Bowel sounds are normal. He exhibits no distension and no mass. There is no tenderness. No hernia.   Musculoskeletal:   Left lumbar paraspinous muscle tenderness with spasm is present on palpation.  No vertebral percussion tenderness is present.  Negative straight leg raising test bilaterally. Hip range of motion is intact. No tenderness is present along the lateral aspect of the hips on palpation.   Neurological: He is alert and oriented to person, place, and time. No sensory deficit. He exhibits normal muscle tone.   Lower extremity strength is symmetrical-5/5 bilaterally. Gait is antalgic with a limp noted on the left side.   Skin: Skin is warm and dry.   Psychiatric: He has a normal mood and affect. His behavior is normal.   Nursing note and vitals reviewed.      Assessment:       1. Lumbar back pain with radiculopathy affecting left lower extremity    2. Left groin pain    3. Benign prostatic hyperplasia with urinary obstruction    4. Type 2 diabetes mellitus without complication, without long-term current use of insulin    5. Essential hypertension    6. Lumbar  radiculopathy        Plan:       Celso was seen today for pain. He is experiencing acute left lumbar pain which is radiating into the hip groin and scrotal area on the left side.  Symptoms are consistent with the left lumbar radiculopathy.  Left renal colic appears less likely.  Urine studies will be obtained with lumbar spine x-rays.  Flexeril and meloxicam will be prescribed.  The patient may use tramadol as needed for severe pain episodes.  In the meantime, he should avoid lifting, bending, stooping, crawling or climbing.  We discussed use of conservative measures to treat his acute lumbar and radicular pain. No interventional procedures are indicated at this time.  Continued medical leave from work is recommended until his symptoms have significantly improved.  A follow-up evaluation in approximately 3 weeks is recommended.    Diagnoses and all orders for this visit:    Lumbar back pain with radiculopathy affecting left lower extremity    Left groin pain  -     URINALYSIS; Future  -     Urine culture; Future    Benign prostatic hyperplasia with urinary obstruction  -     URINALYSIS; Future  -     Urine culture; Future    Type 2 diabetes mellitus without complication, without long-term current use of insulin    Essential hypertension    Lumbar radiculopathy  -     X-Ray Lumbar Spine Ap And Lateral; Future    Other orders  -     cyclobenzaprine (FLEXERIL) 10 MG tablet; Take 1 tablet (10 mg total) by mouth nightly as needed for Muscle spasms.  -     traMADol (ULTRAM) 50 mg tablet; Take 1-2 tabs bid prn severe pain

## 2019-11-22 ENCOUNTER — TELEPHONE (OUTPATIENT)
Dept: INTERNAL MEDICINE | Facility: CLINIC | Age: 64
End: 2019-11-22

## 2019-11-22 ENCOUNTER — HOSPITAL ENCOUNTER (OUTPATIENT)
Dept: RADIOLOGY | Facility: HOSPITAL | Age: 64
Discharge: HOME | End: 2019-11-22
Attending: INTERNAL MEDICINE
Payer: COMMERCIAL

## 2019-11-22 DIAGNOSIS — M54.16 LUMBAR RADICULOPATHY: ICD-10-CM

## 2019-11-22 PROCEDURE — 72100 X-RAY EXAM L-S SPINE 2/3 VWS: CPT | Mod: 26,,, | Performed by: RADIOLOGY

## 2019-11-22 PROCEDURE — 72100 XR LUMBAR SPINE AP AND LATERAL: ICD-10-PCS | Mod: 26,,, | Performed by: RADIOLOGY

## 2019-11-22 PROCEDURE — 72100 X-RAY EXAM L-S SPINE 2/3 VWS: CPT | Mod: TC,PO

## 2019-11-22 NOTE — TELEPHONE ENCOUNTER
----- Message from Jenny Crenshaw sent at 11/22/2019 12:12 PM CST -----  Contact: [facility] [phone]  You prescribed flexeril for this patient and pharmacy states that it is contraindicated in patients with hyperthyroidism. Please call to advise if you want this to be filled .

## 2019-11-22 NOTE — TELEPHONE ENCOUNTER
----- Message from Yara Enriquez sent at 11/22/2019  2:21 PM CST -----  Contact: Patient [phone]  Patient calling to check the status of Rx cyclobenzaprine (FLEXERIL) 10 MG tablet. Pharmacy still will not give it to him.    Please call and advise.    Thank You

## 2019-11-25 ENCOUNTER — PATIENT OUTREACH (OUTPATIENT)
Dept: ADMINISTRATIVE | Facility: OTHER | Age: 64
End: 2019-11-25

## 2019-11-26 ENCOUNTER — OFFICE VISIT (OUTPATIENT)
Dept: GASTROENTEROLOGY | Facility: CLINIC | Age: 64
End: 2019-11-26
Payer: COMMERCIAL

## 2019-11-26 DIAGNOSIS — K29.70 HELICOBACTER PYLORI GASTRITIS: Primary | ICD-10-CM

## 2019-11-26 DIAGNOSIS — B96.81 HELICOBACTER PYLORI GASTRITIS: Primary | ICD-10-CM

## 2019-11-26 DIAGNOSIS — K59.00 CONSTIPATION, UNSPECIFIED CONSTIPATION TYPE: ICD-10-CM

## 2019-11-26 PROCEDURE — 99999 PR PBB SHADOW E&M-EST. PATIENT-LVL III: CPT | Mod: PBBFAC,,, | Performed by: INTERNAL MEDICINE

## 2019-11-26 PROCEDURE — 99999 PR PBB SHADOW E&M-EST. PATIENT-LVL III: ICD-10-PCS | Mod: PBBFAC,,, | Performed by: INTERNAL MEDICINE

## 2019-11-26 PROCEDURE — 99214 PR OFFICE/OUTPT VISIT, EST, LEVL IV, 30-39 MIN: ICD-10-PCS | Mod: S$GLB,,, | Performed by: INTERNAL MEDICINE

## 2019-11-26 PROCEDURE — 99214 OFFICE O/P EST MOD 30 MIN: CPT | Mod: S$GLB,,, | Performed by: INTERNAL MEDICINE

## 2019-11-26 NOTE — PATIENT INSTRUCTIONS
Constipation:  Use miralax 4.1 oz bottle and ddrink over 1 - 2 nights  If that doesn't work, use 2 bottles mag citrate    Daily regimen:  Use metamucil or benefiber and take 1 tablespoon twice a day  Increase your water intake - at least 100 oz / day   Senna - S over the counter and take daily    ==============  Will need testing for H pylori eradication with stool antigen:    1. Off all Antibiotics for 4 (Four) weeks before stool collection.   2. Off all Proton Pump Inhibitors medications for 2 (Two) weeks before collecting stool for H. Pylori Antigen:  :  Nexium (esomeprazole)  Prilosec (omeprazole)   Protonix (pantoprazole)  Prevacid (lansoprazole)  Aciphex (rabeprazole)  Dexilant (dexlansoprazole)   Zegerid   3. Off all H2 blockers medications for 2 (Two) weeks before collecting stool for H. Pylori Antigen:  Zantac (ranitidine)  Tagamet (cimetadine)  Axid (nizatidine)   Pepcid (famotidine)  4. Off Pepto-Bismol for 4 (four) weeks prior to collecting stool for H. Pylori Antigen.

## 2019-11-26 NOTE — PROGRESS NOTES
GASTROENTEROLOGY CLINIC NOTE    Reason for visit: The primary encounter diagnosis was Helicobacter pylori gastritis. A diagnosis of Constipation, unspecified constipation type was also pertinent to this visit.  Referring provider/PCP: Inder Quarles MD      HPI:  Celso Hernandez is a 64 y.o. male here today for follow up of H pylori and constipation. Wife accompanies him.    Patient completed his H-pylori quadruple therapy about 1 month ago.  He states the medications were little unsettling to stomach but he is overall feeling much better now.  He does not have any further epigastric pain. He had H pylori positive gastritis with some duodenal ulcers on endoscopy as well.  We discussed the treatment and testing for eradication.  Handout was given on the instructions and instructions were verbally given.    He states recently he has been constipated and has not had a bowel movement in 6 days.  He has tried to sit on the toilet and strain but he has been ineffective.  He usually goes every other day but has been less mobile now with his recent back pain and he is currently in a wheelchair right now.  He is getting disability is working on this with his PCP.      Prior Endoscopy:  EGD:  10/2019 with me  Impression:           - Normal esophagus.                        - Congestive gastropathy. Biopsied. Suspect H                         pylori, biopsied.                        - Non-bleeding erosive gastropathy. Biopsied.                        - Multiple non-bleeding duodenal ulcers with a                         clean ulcer base (Kelvin Class III). likely NSAID                         induced  PATH + h pylori  Treated with quad therapy    Colon: 10/2019 with me  Impression:           - Six 2 to 4 mm polyps in the rectum, in the                         sigmoid colon and in the cecum, removed with a                         jumbo cold forceps. Resected and retrieved.                        - The examined portion of  the ileum was normal.  Repeat 5 years.    (Portions of this note were dictated using voice recognition software and may contain dictation related errors in spelling/grammar/syntax not found on text review)    Review of Systems   Constitutional: Negative for chills and fever.   Gastrointestinal: Positive for constipation. Negative for abdominal pain.   Musculoskeletal: Positive for back pain.       Past Medical History: has a past medical history of Colon polyps, Diabetes mellitus type II, Hyperlipidemia, Hypertension, Multiple duodenal ulcers, and Unspecified hemorrhoids without mention of complication.    Past Surgical History: has a past surgical history that includes none; Esophagogastroduodenoscopy (10/08/2019); Colonoscopy w/ polypectomy (10/08/2019); Esophagogastroduodenoscopy (N/A, 10/8/2019); and Colonoscopy (N/A, 10/8/2019).    Family History:family history includes Cancer in his sister; Hearing loss in his sister; Hyperlipidemia in his mother.    Allergies: Review of patient's allergies indicates:  No Known Allergies    Social History: reports that he has never smoked. He has never used smokeless tobacco. He reports that he drinks about 3.3 standard drinks of alcohol per week. He reports that he does not use drugs.    Home medications:   Current Outpatient Medications on File Prior to Visit   Medication Sig Dispense Refill    atorvastatin (LIPITOR) 80 MG tablet TAKE ONE TABLET BY MOUTH ONCE DAILY 90 tablet 3    cholecalciferol, vitamin D3, (VITAMIN D3) 2,000 unit Cap Take 1 capsule by mouth once daily.      cyclobenzaprine (FLEXERIL) 10 MG tablet Take 1 tablet (10 mg total) by mouth nightly as needed for Muscle spasms. 30 tablet 2    latanoprost 0.005 % ophthalmic solution Place 1 drop into both eyes every evening. 2.5 mL 11    losartan-hydrochlorothiazide 100-12.5 mg (HYZAAR) 100-12.5 mg Tab Take 1 tablet by mouth once daily. For blood pressure 90 tablet 3    meloxicam (MOBIC) 15 MG tablet TAKE 1  TABLET BY MOUTH ONCE DAILY (Patient not taking: Reported on 11/7/2019) 30 tablet 11    metFORMIN (GLUCOPHAGE) 500 MG tablet Take 1 tablet (500 mg total) by mouth daily with breakfast. 90 tablet 1    pantoprazole (PROTONIX) 40 MG tablet Take 1 tablet (40 mg total) by mouth once daily. 30 tablet 11    tamsulosin (FLOMAX) 0.4 mg Cap TAKE 1 CAPSULE BY MOUTH ONCE DAILY 90 capsule 3    traMADol (ULTRAM) 50 mg tablet Take 1-2 tabs bid prn severe pain 60 tablet 2     No current facility-administered medications on file prior to visit.        Vital signs:  There were no vitals taken for this visit.    Physical Exam   Constitutional: He appears well-nourished. No distress.   Sitting in wheelchair   Eyes: Conjunctivae are normal. No scleral icterus.   Pulmonary/Chest: Effort normal. No respiratory distress.   Abdominal: Soft. He exhibits no distension. There is no tenderness. There is no guarding.   Vitals reviewed.      Routine labs:  Lab Results   Component Value Date    WBC 10.36 11/16/2019    HGB 16.9 11/16/2019    HCT 54.5 (H) 11/16/2019    MCV 91 11/16/2019     11/16/2019     No results found for: INR  No results found for: IRON, FERRITIN, TIBC, FESATURATED  Lab Results   Component Value Date     11/16/2019     11/16/2019    K 4.2 11/16/2019    K 4.2 11/16/2019     11/16/2019     11/16/2019    CO2 25 11/16/2019    CO2 25 11/16/2019    BUN 21 11/16/2019    BUN 21 11/16/2019    CREATININE 1.2 11/16/2019    CREATININE 1.2 11/16/2019     Lab Results   Component Value Date    ALBUMIN 3.3 (L) 11/16/2019    ALBUMIN 3.3 (L) 11/16/2019    ALT 39 11/16/2019    ALT 39 11/16/2019    AST 27 11/16/2019    AST 27 11/16/2019    ALKPHOS 97 11/16/2019    ALKPHOS 97 11/16/2019    BILITOT 0.7 11/16/2019    BILITOT 0.7 11/16/2019     No results found for: GLUCOSE    Imaging:  Reviewed  Ct with / without  No pancreatic mass or ductal dilatation.  No peripancreatic inflammatory stranding.  Several nonspecific  subcentimeter peripancreatic lymph nodes.      Assessment:    1. Helicobacter pylori gastritis    2. Constipation, unspecified constipation type        Plan:    Orders Placed This Encounter    H. pylori antigen, stool     Instructions given for eradication testing    Constipation  - instruction given for miralax purge  - daily regimen of senna - S and fiber daily with increased water  - stressed importance of exercise       RTC prn    A total of 25 minutes were spent face-to-face with the patient during this encounter and over half of that time was spent on counseling and coordination of care.       Landon Guajardo MD  [facility] [place]

## 2019-12-12 ENCOUNTER — PATIENT OUTREACH (OUTPATIENT)
Dept: ADMINISTRATIVE | Facility: OTHER | Age: 64
End: 2019-12-12

## 2019-12-16 RX ORDER — METFORMIN HYDROCHLORIDE 500 MG/1
TABLET ORAL
Refills: 0 | OUTPATIENT
Start: 2019-12-16

## 2019-12-17 ENCOUNTER — CLINICAL SUPPORT (OUTPATIENT)
Dept: OPHTHALMOLOGY | Facility: CLINIC | Age: 64
End: 2019-12-17
Payer: COMMERCIAL

## 2019-12-17 ENCOUNTER — OFFICE VISIT (OUTPATIENT)
Dept: OPTOMETRY | Facility: CLINIC | Age: 64
End: 2019-12-17
Payer: COMMERCIAL

## 2019-12-17 DIAGNOSIS — H04.123 DRY EYE SYNDROME OF BOTH EYES: ICD-10-CM

## 2019-12-17 DIAGNOSIS — H40.1132 PRIMARY OPEN ANGLE GLAUCOMA (POAG) OF BOTH EYES, MODERATE STAGE: Primary | ICD-10-CM

## 2019-12-17 DIAGNOSIS — H40.1134 PRIMARY OPEN ANGLE GLAUCOMA (POAG) OF BOTH EYES, INDETERMINATE STAGE: ICD-10-CM

## 2019-12-17 PROCEDURE — 92012 PR EYE EXAM, EST PATIENT,INTERMED: ICD-10-PCS | Mod: S$GLB,,, | Performed by: OPTOMETRIST

## 2019-12-17 PROCEDURE — 76514 PR  US, EYE, FOR CORNEAL THICKNESS: ICD-10-PCS | Mod: S$GLB,,, | Performed by: OPTOMETRIST

## 2019-12-17 PROCEDURE — 76514 ECHO EXAM OF EYE THICKNESS: CPT | Mod: S$GLB,,, | Performed by: OPTOMETRIST

## 2019-12-17 PROCEDURE — 92083 HUMPHREY VISUAL FIELD - OU - BOTH EYES: ICD-10-PCS | Mod: S$GLB,,, | Performed by: OPTOMETRIST

## 2019-12-17 PROCEDURE — 92133 POSTERIOR SEGMENT OCT OPTIC NERVE(OCULAR COHERENCE TOMOGRAPHY) - OU - BOTH EYES: ICD-10-PCS | Mod: S$GLB,,, | Performed by: OPTOMETRIST

## 2019-12-17 PROCEDURE — 92012 INTRM OPH EXAM EST PATIENT: CPT | Mod: S$GLB,,, | Performed by: OPTOMETRIST

## 2019-12-17 PROCEDURE — 92083 EXTENDED VISUAL FIELD XM: CPT | Mod: S$GLB,,, | Performed by: OPTOMETRIST

## 2019-12-17 PROCEDURE — 92133 CPTRZD OPH DX IMG PST SGM ON: CPT | Mod: S$GLB,,, | Performed by: OPTOMETRIST

## 2019-12-17 PROCEDURE — 99999 PR PBB SHADOW E&M-EST. PATIENT-LVL II: ICD-10-PCS | Mod: PBBFAC,,, | Performed by: OPTOMETRIST

## 2019-12-17 PROCEDURE — 99999 PR PBB SHADOW E&M-EST. PATIENT-LVL II: CPT | Mod: PBBFAC,,, | Performed by: OPTOMETRIST

## 2019-12-17 RX ORDER — LATANOPROST 50 UG/ML
1 SOLUTION/ DROPS OPHTHALMIC NIGHTLY
Qty: 2.5 ML | Refills: 11 | Status: SHIPPED | OUTPATIENT
Start: 2019-12-17 | End: 2020-11-30

## 2019-12-17 RX ORDER — TIMOLOL MALEATE 5 MG/ML
1 SOLUTION/ DROPS OPHTHALMIC 2 TIMES DAILY
Qty: 10 ML | Refills: 11 | Status: SHIPPED | OUTPATIENT
Start: 2019-12-17 | End: 2020-12-15

## 2019-12-17 NOTE — PROGRESS NOTES
HPI     Pt is coming in for IOP check for glaucoma   Latanoprost QHS OU currently 7/7 days pt is using the drops    Last edited by Kyara Burrell on 12/17/2019  2:42 PM. (History)            Assessment /Plan     For exam results, see Encounter Report.    Primary open angle glaucoma (POAG) of both eyes, moderate stage  -     latanoprost 0.005 % ophthalmic solution; Place 1 drop into both eyes every evening.  Dispense: 2.5 mL; Refill: 11  -     timolol maleate 0.5% (TIMOPTIC) 0.5 % Drop; Place 1 drop into both eyes 2 (two) times daily.  Dispense: 10 mL; Refill: 11    Dry eye syndrome of both eyes      1. (-) FHx. IOP 17 OD, 21 OS. Last IOP 21 OD 17 OS. Pt has been off of Lumigan for several months. Prev pt of Dr Huffman. C/d 0.55 OD, 0.65 OS. 12/17/19 HVF OD low reliability w/inf arcuate, OS sup arcuate. OCT OD thin NS, TS, T, TI and G, OS Thin TS, T, TI and G. Educated pt on findings w/understanding. Need for IOp to be lower. Add Timolol BId OU today. Cont Latanoprost QHS OU. Importance of drop compliance and f/u discussed with pt. RTC 6 weeks iOP check.  2. Pt has c/o redness primarily in the morning. Recommend Systane Ultra or Refresh Optive BID-TID OU to aid with symptoms of dry eyes.

## 2019-12-18 ENCOUNTER — TELEPHONE (OUTPATIENT)
Dept: GASTROENTEROLOGY | Facility: CLINIC | Age: 64
End: 2019-12-18

## 2019-12-18 ENCOUNTER — LAB VISIT (OUTPATIENT)
Dept: LAB | Facility: HOSPITAL | Age: 64
End: 2019-12-18
Attending: INTERNAL MEDICINE
Payer: COMMERCIAL

## 2019-12-18 DIAGNOSIS — B96.81 HELICOBACTER PYLORI GASTRITIS: ICD-10-CM

## 2019-12-18 DIAGNOSIS — K29.70 HELICOBACTER PYLORI GASTRITIS: ICD-10-CM

## 2019-12-18 PROCEDURE — 87338 HPYLORI STOOL AG IA: CPT

## 2019-12-18 NOTE — TELEPHONE ENCOUNTER
----- Message from Naz Mccauley sent at 12/18/2019 10:21 AM CST -----  Contact: [phone]/self  Patient would like to know where should he drop off the stool sample   Please call back to assist at [phone]

## 2019-12-19 RX ORDER — METFORMIN HYDROCHLORIDE 500 MG/1
500 TABLET ORAL
Qty: 90 TABLET | Refills: 3 | Status: SHIPPED | OUTPATIENT
Start: 2019-12-19 | End: 2021-02-28

## 2019-12-24 ENCOUNTER — OFFICE VISIT (OUTPATIENT)
Dept: INTERNAL MEDICINE | Facility: CLINIC | Age: 64
End: 2019-12-24
Payer: COMMERCIAL

## 2019-12-24 ENCOUNTER — LAB VISIT (OUTPATIENT)
Dept: LAB | Facility: HOSPITAL | Age: 64
End: 2019-12-24
Attending: INTERNAL MEDICINE
Payer: COMMERCIAL

## 2019-12-24 VITALS
DIASTOLIC BLOOD PRESSURE: 88 MMHG | RESPIRATION RATE: 16 BRPM | BODY MASS INDEX: 40.69 KG/M2 | WEIGHT: 267.63 LBS | HEART RATE: 83 BPM | OXYGEN SATURATION: 96 % | TEMPERATURE: 98 F | SYSTOLIC BLOOD PRESSURE: 154 MMHG

## 2019-12-24 DIAGNOSIS — E78.2 MIXED HYPERLIPIDEMIA: ICD-10-CM

## 2019-12-24 DIAGNOSIS — E11.9 TYPE 2 DIABETES MELLITUS WITHOUT COMPLICATION, WITHOUT LONG-TERM CURRENT USE OF INSULIN: ICD-10-CM

## 2019-12-24 DIAGNOSIS — I10 ESSENTIAL HYPERTENSION: Primary | ICD-10-CM

## 2019-12-24 DIAGNOSIS — N13.8 BENIGN PROSTATIC HYPERPLASIA WITH URINARY OBSTRUCTION: ICD-10-CM

## 2019-12-24 DIAGNOSIS — N40.1 BENIGN PROSTATIC HYPERPLASIA WITH URINARY OBSTRUCTION: ICD-10-CM

## 2019-12-24 DIAGNOSIS — I10 ESSENTIAL HYPERTENSION: ICD-10-CM

## 2019-12-24 LAB
ANION GAP SERPL CALC-SCNC: 9 MMOL/L (ref 8–16)
BUN SERPL-MCNC: 16 MG/DL (ref 8–23)
CALCIUM SERPL-MCNC: 9.2 MG/DL (ref 8.7–10.5)
CHLORIDE SERPL-SCNC: 106 MMOL/L (ref 95–110)
CO2 SERPL-SCNC: 25 MMOL/L (ref 23–29)
CREAT SERPL-MCNC: 1.2 MG/DL (ref 0.5–1.4)
EST. GFR  (AFRICAN AMERICAN): >60 ML/MIN/1.73 M^2
EST. GFR  (NON AFRICAN AMERICAN): >60 ML/MIN/1.73 M^2
ESTIMATED AVG GLUCOSE: 137 MG/DL (ref 68–131)
GLUCOSE SERPL-MCNC: 115 MG/DL (ref 70–110)
H PYLORI AG STL QL IA: NOT DETECTED
HBA1C MFR BLD HPLC: 6.4 % (ref 4–5.6)
POTASSIUM SERPL-SCNC: 4.3 MMOL/L (ref 3.5–5.1)
SODIUM SERPL-SCNC: 140 MMOL/L (ref 136–145)

## 2019-12-24 PROCEDURE — 3044F HG A1C LEVEL LT 7.0%: CPT | Mod: CPTII,S$GLB,, | Performed by: INTERNAL MEDICINE

## 2019-12-24 PROCEDURE — 80048 BASIC METABOLIC PNL TOTAL CA: CPT

## 2019-12-24 PROCEDURE — 1101F PR PT FALLS ASSESS DOC 0-1 FALLS W/OUT INJ PAST YR: ICD-10-PCS | Mod: CPTII,S$GLB,, | Performed by: INTERNAL MEDICINE

## 2019-12-24 PROCEDURE — 3079F DIAST BP 80-89 MM HG: CPT | Mod: CPTII,S$GLB,, | Performed by: INTERNAL MEDICINE

## 2019-12-24 PROCEDURE — 99999 PR PBB SHADOW E&M-EST. PATIENT-LVL III: ICD-10-PCS | Mod: PBBFAC,,, | Performed by: INTERNAL MEDICINE

## 2019-12-24 PROCEDURE — 99999 PR PBB SHADOW E&M-EST. PATIENT-LVL III: CPT | Mod: PBBFAC,,, | Performed by: INTERNAL MEDICINE

## 2019-12-24 PROCEDURE — 3077F SYST BP >= 140 MM HG: CPT | Mod: CPTII,S$GLB,, | Performed by: INTERNAL MEDICINE

## 2019-12-24 PROCEDURE — 36415 COLL VENOUS BLD VENIPUNCTURE: CPT | Mod: PO

## 2019-12-24 PROCEDURE — 3079F PR MOST RECENT DIASTOLIC BLOOD PRESSURE 80-89 MM HG: ICD-10-PCS | Mod: CPTII,S$GLB,, | Performed by: INTERNAL MEDICINE

## 2019-12-24 PROCEDURE — 1101F PT FALLS ASSESS-DOCD LE1/YR: CPT | Mod: CPTII,S$GLB,, | Performed by: INTERNAL MEDICINE

## 2019-12-24 PROCEDURE — 99214 PR OFFICE/OUTPT VISIT, EST, LEVL IV, 30-39 MIN: ICD-10-PCS | Mod: S$GLB,,, | Performed by: INTERNAL MEDICINE

## 2019-12-24 PROCEDURE — 3077F PR MOST RECENT SYSTOLIC BLOOD PRESSURE >= 140 MM HG: ICD-10-PCS | Mod: CPTII,S$GLB,, | Performed by: INTERNAL MEDICINE

## 2019-12-24 PROCEDURE — 3044F PR MOST RECENT HEMOGLOBIN A1C LEVEL <7.0%: ICD-10-PCS | Mod: CPTII,S$GLB,, | Performed by: INTERNAL MEDICINE

## 2019-12-24 PROCEDURE — 3008F PR BODY MASS INDEX (BMI) DOCUMENTED: ICD-10-PCS | Mod: CPTII,S$GLB,, | Performed by: INTERNAL MEDICINE

## 2019-12-24 PROCEDURE — 3008F BODY MASS INDEX DOCD: CPT | Mod: CPTII,S$GLB,, | Performed by: INTERNAL MEDICINE

## 2019-12-24 PROCEDURE — 83036 HEMOGLOBIN GLYCOSYLATED A1C: CPT

## 2019-12-24 PROCEDURE — 99214 OFFICE O/P EST MOD 30 MIN: CPT | Mod: S$GLB,,, | Performed by: INTERNAL MEDICINE

## 2019-12-24 RX ORDER — HYDROCHLOROTHIAZIDE 12.5 MG/1
12.5 TABLET ORAL DAILY
Qty: 90 TABLET | Refills: 3 | Status: SHIPPED | OUTPATIENT
Start: 2019-12-24 | End: 2021-03-04

## 2019-12-24 RX ORDER — LOSARTAN POTASSIUM 100 MG/1
100 TABLET ORAL DAILY
Qty: 90 TABLET | Refills: 3 | Status: SHIPPED | OUTPATIENT
Start: 2019-12-24 | End: 2021-12-12 | Stop reason: DRUGHIGH

## 2019-12-24 NOTE — PROGRESS NOTES
Subjective:       Patient ID: Celso Hernandez is a 64 y.o. male.    Chief Complaint: Follow-up; Diabetes; and Hypertension    HPI     The patient presents for follow-up of medical conditions.  At his last visit of 11/21/2019 he was experiencing left lumbar pain radiating into left growing left leg.  Review of Systems   Constitutional: Negative for activity change, appetite change, fatigue and unexpected weight change.   Eyes: Negative for visual disturbance.   Respiratory: Negative for cough, chest tightness, shortness of breath and wheezing.    Cardiovascular: Negative for chest pain, palpitations and leg swelling.   Gastrointestinal: Negative for abdominal pain, blood in stool, nausea and vomiting.   Genitourinary: Negative for dysuria, hematuria and urgency.   Musculoskeletal: Negative for arthralgias, back pain, gait problem, joint swelling and myalgias.   Skin: Negative for color change and rash.   Neurological: Negative for dizziness, syncope, weakness, light-headedness, numbness and headaches.   Psychiatric/Behavioral: Negative for sleep disturbance.       Objective:      Physical Exam   Constitutional: He is oriented to person, place, and time. He appears well-developed and well-nourished. No distress.   The patient has gained 9 lb since 11/21/2019.   HENT:   Head: Normocephalic and atraumatic.   Eyes: Pupils are equal, round, and reactive to light. Conjunctivae and EOM are normal. No scleral icterus.   Neck: Normal range of motion. Neck supple. No JVD present. No thyromegaly present.   Cardiovascular: Normal rate, regular rhythm, normal heart sounds and intact distal pulses. Exam reveals no gallop.   No murmur heard.  Pulmonary/Chest: Effort normal and breath sounds normal. No respiratory distress. He has no wheezes. He has no rales.   Abdominal: Soft. Bowel sounds are normal. He exhibits no mass. There is no tenderness.   Musculoskeletal: Normal range of motion. He exhibits no edema or tenderness.   Negative  straight leg raising test bilaterally. No lumbar paraspinous muscle tenderness palpation.  Mild left SI joint tenderness is present.  Hip range of motion is intact.   Lymphadenopathy:     He has no cervical adenopathy.   Neurological: He is alert and oriented to person, place, and time. No cranial nerve deficit.   Sensory examination is intact in both feet on monofilament and vibration testing.   Skin: Skin is warm and dry. No rash noted.   No foot lesions are present.   Psychiatric: He has a normal mood and affect. His behavior is normal.   Nursing note and vitals reviewed.      Results for orders placed or performed in visit on 11/22/19   Urine culture   Result Value Ref Range    Urine Culture, Routine No significant growth    URINALYSIS   Result Value Ref Range    Specimen UA Urine, Clean Catch     Color, UA Yellow Yellow, Straw, [place]    Appearance, UA Clear Clear    pH, UA 5.0 5.0 - 8.0    Specific Gravity, UA 1.015 1.005 - 1.030    Protein, UA Negative Negative    Glucose, UA Negative Negative    Ketones, UA Negative Negative    Bilirubin (UA) Negative Negative    Occult Blood UA Negative Negative    Nitrite, UA Negative Negative    Leukocytes, UA Negative Negative       Assessment:       1. Essential hypertension    2. Type 2 diabetes mellitus without complication, without long-term current use of insulin    3. Mixed hyperlipidemia    4. Benign prostatic hyperplasia with urinary obstruction        Plan:       Celso was seen today for follow-up, diabetes and hypertension.  The patient will be given separate prescriptions for losartan and hydrochlorothiazide since the combination pill is currently on back order.  Blood tests will be obtained today.  A follow-up visit in 4 months is recommended.  The patient should continue weight loss efforts.    The patient has been on recent medical leave since 11/21/2019.  The patient may return to work on 12/09/2019.    Diagnoses and all orders for this visit:    Essential  hypertension    Type 2 diabetes mellitus without complication, without long-term current use of insulin    Mixed hyperlipidemia    Benign prostatic hyperplasia with urinary obstruction    Other orders  -     losartan (COZAAR) 100 MG tablet; Take 1 tablet (100 mg total) by mouth once daily.  -     hydroCHLOROthiazide (HYDRODIURIL) 12.5 MG Tab; Take 1 tablet (12.5 mg total) by mouth once daily.             Pt. was seen and examined. Agree with above. Plan d/w the team.  Pt. c/o abd pain and diarrhea for 10d. Resolved today. able to tolerate  reg diet now.   On exam: abd soft, nt, nd  CT reviewed which showed distal thrombus in SMA with moderate narrowing  A/P  SMA thrombus, unclear of chronicity and if it would explain her symptoms.   Would recommend 3m course of A/C for now  will reimage as outpt

## 2020-01-24 RX ORDER — ATORVASTATIN CALCIUM 80 MG/1
TABLET, FILM COATED ORAL
Qty: 90 TABLET | Refills: 3 | Status: SHIPPED | OUTPATIENT
Start: 2020-01-24 | End: 2020-11-29

## 2020-01-25 ENCOUNTER — HOSPITAL ENCOUNTER (EMERGENCY)
Facility: HOSPITAL | Age: 65
Discharge: HOME | End: 2020-01-25
Attending: EMERGENCY MEDICINE
Payer: COMMERCIAL

## 2020-01-25 VITALS
OXYGEN SATURATION: 99 % | HEART RATE: 78 BPM | DIASTOLIC BLOOD PRESSURE: 91 MMHG | HEIGHT: 68 IN | TEMPERATURE: 98 F | SYSTOLIC BLOOD PRESSURE: 182 MMHG | WEIGHT: 262 LBS | BODY MASS INDEX: 39.71 KG/M2 | RESPIRATION RATE: 18 BRPM

## 2020-01-25 DIAGNOSIS — R33.9 URINARY RETENTION: Primary | ICD-10-CM

## 2020-01-25 LAB
ALBUMIN SERPL BCP-MCNC: 3.7 G/DL (ref 3.5–5.2)
ALP SERPL-CCNC: 74 U/L (ref 55–135)
ALT SERPL W/O P-5'-P-CCNC: 30 U/L (ref 10–44)
ANION GAP SERPL CALC-SCNC: 13 MMOL/L (ref 8–16)
AST SERPL-CCNC: 31 U/L (ref 10–40)
BACTERIA #/AREA URNS HPF: ABNORMAL /HPF
BASOPHILS # BLD AUTO: 0.02 K/UL (ref 0–0.2)
BASOPHILS NFR BLD: 0.2 % (ref 0–1.9)
BILIRUB SERPL-MCNC: 0.6 MG/DL (ref 0.1–1)
BILIRUB UR QL STRIP: NEGATIVE
BUN SERPL-MCNC: 10 MG/DL (ref 8–23)
CALCIUM SERPL-MCNC: 8.9 MG/DL (ref 8.7–10.5)
CHLORIDE SERPL-SCNC: 98 MMOL/L (ref 95–110)
CLARITY UR: CLEAR
CO2 SERPL-SCNC: 19 MMOL/L (ref 23–29)
COLOR UR: YELLOW
CREAT SERPL-MCNC: 1 MG/DL (ref 0.5–1.4)
DIFFERENTIAL METHOD: ABNORMAL
EOSINOPHIL # BLD AUTO: 0 K/UL (ref 0–0.5)
EOSINOPHIL NFR BLD: 0.1 % (ref 0–8)
ERYTHROCYTE [DISTWIDTH] IN BLOOD BY AUTOMATED COUNT: 14.9 % (ref 11.5–14.5)
EST. GFR  (AFRICAN AMERICAN): >60 ML/MIN/1.73 M^2
EST. GFR  (NON AFRICAN AMERICAN): >60 ML/MIN/1.73 M^2
GLUCOSE SERPL-MCNC: 151 MG/DL (ref 70–110)
GLUCOSE UR QL STRIP: NEGATIVE
HCT VFR BLD AUTO: 50.2 % (ref 40–54)
HGB BLD-MCNC: 16.9 G/DL (ref 14–18)
HGB UR QL STRIP: ABNORMAL
KETONES UR QL STRIP: NEGATIVE
LEUKOCYTE ESTERASE UR QL STRIP: NEGATIVE
LYMPHOCYTES # BLD AUTO: 1.3 K/UL (ref 1–4.8)
LYMPHOCYTES NFR BLD: 10.5 % (ref 18–48)
MCH RBC QN AUTO: 29.3 PG (ref 27–31)
MCHC RBC AUTO-ENTMCNC: 33.7 G/DL (ref 32–36)
MCV RBC AUTO: 87 FL (ref 82–98)
MICROSCOPIC COMMENT: ABNORMAL
MONOCYTES # BLD AUTO: 1.4 K/UL (ref 0.3–1)
MONOCYTES NFR BLD: 11.5 % (ref 4–15)
NEUTROPHILS # BLD AUTO: 9.3 K/UL (ref 1.8–7.7)
NEUTROPHILS NFR BLD: 77.7 % (ref 38–73)
NITRITE UR QL STRIP: NEGATIVE
PH UR STRIP: 6 [PH] (ref 5–8)
PLATELET # BLD AUTO: 183 K/UL (ref 150–350)
PMV BLD AUTO: 10.1 FL (ref 9.2–12.9)
POCT GLUCOSE: 139 MG/DL (ref 70–110)
POTASSIUM SERPL-SCNC: 3.7 MMOL/L (ref 3.5–5.1)
PROT SERPL-MCNC: 7.8 G/DL (ref 6–8.4)
PROT UR QL STRIP: NEGATIVE
RBC # BLD AUTO: 5.76 M/UL (ref 4.6–6.2)
RBC #/AREA URNS HPF: 4 /HPF (ref 0–4)
SODIUM SERPL-SCNC: 130 MMOL/L (ref 136–145)
SP GR UR STRIP: 1.01 (ref 1–1.03)
SQUAMOUS #/AREA URNS HPF: 1 /HPF
URN SPEC COLLECT METH UR: ABNORMAL
UROBILINOGEN UR STRIP-ACNC: NEGATIVE EU/DL
WBC # BLD AUTO: 11.95 K/UL (ref 3.9–12.7)
WBC #/AREA URNS HPF: 0 /HPF (ref 0–5)

## 2020-01-25 PROCEDURE — 80053 COMPREHEN METABOLIC PANEL: CPT

## 2020-01-25 PROCEDURE — 82962 GLUCOSE BLOOD TEST: CPT

## 2020-01-25 PROCEDURE — 99283 EMERGENCY DEPT VISIT LOW MDM: CPT | Mod: 25

## 2020-01-25 PROCEDURE — 81000 URINALYSIS NONAUTO W/SCOPE: CPT

## 2020-01-25 PROCEDURE — 85025 COMPLETE CBC W/AUTO DIFF WBC: CPT

## 2020-01-25 PROCEDURE — 51702 INSERT TEMP BLADDER CATH: CPT

## 2020-01-25 NOTE — ED NOTES
To ER with c/o not urinating since last night.  Pt awake and alert, oriented x4.  Resp even and unlabored with clear breath sounds. Denies abd pain, nausea or vomiting.  Pt with pain to bladder 8/10.  No peripheral edema noted at this time.  16 F potts cath inserted with 1100 cc clear yellow urine noted.

## 2020-01-25 NOTE — ED PROVIDER NOTES
Encounter Date: 1/25/2020       History     Chief Complaint   Patient presents with    Male  Problem     To ER with c/o not being able to urinates since 2200 last night.     Afebrile 64-year-old male with PMH of DM, HLD, HTN, BPH, duodenal ulcer and hemorrhoids presents to the ED for evaluation of urinary retention.  He states that his last void was last night.  He does report a history of this requiring Lantigua catheter in the past and that he is followed by Urology.  He does report abdominal distention and suprapubic discomfort. He mook penile discharge. He is taking Flomax as directed.    The history is provided by the patient.     Review of patient's allergies indicates:  No Known Allergies  Past Medical History:   Diagnosis Date    Colon polyps     Diabetes mellitus type II     Hyperlipidemia     Hypertension     Multiple duodenal ulcers 10/08/2019    Unspecified hemorrhoids without mention of complication      Past Surgical History:   Procedure Laterality Date    COLONOSCOPY N/A 10/8/2019    Procedure: COLONOSCOPY suprep;  Surgeon: Landon Guajardo MD;  Location: [facility];  Service: General;  Laterality: N/A;    COLONOSCOPY W/ POLYPECTOMY  10/08/2019    ESOPHAGOGASTRODUODENOSCOPY  10/08/2019    w/biopsies    ESOPHAGOGASTRODUODENOSCOPY N/A 10/8/2019    Procedure: EGD (ESOPHAGOGASTRODUODENOSCOPY);  Surgeon: Landon Guajardo MD;  Location: [facility];  Service: General;  Laterality: N/A;    none       Family History   Problem Relation Age of Onset    Hyperlipidemia Mother     Cancer Sister         breast    Hearing loss Sister      Social History     Tobacco Use    Smoking status: Never Smoker    Smokeless tobacco: Never Used   Substance Use Topics    Alcohol use: Yes     Alcohol/week: 3.3 standard drinks     Types: 4 Standard drinks or equivalent per week    Drug use: No     Review of Systems   Constitutional: Negative for chills and fever.   Respiratory: Negative for shortness of breath.     Cardiovascular: Negative for chest pain, palpitations and leg swelling.   Gastrointestinal: Positive for abdominal pain. Negative for constipation, diarrhea, nausea and vomiting.   Genitourinary: Positive for decreased urine volume and difficulty urinating. Negative for discharge, dysuria, hematuria, penile pain, penile swelling, scrotal swelling and testicular pain.   Musculoskeletal: Negative for arthralgias and back pain.   Skin: Negative for rash.   Allergic/Immunologic: Positive for immunocompromised state (DM).   Neurological: Negative for weakness.   Hematological: Does not bruise/bleed easily.       Physical Exam     Initial Vitals [01/25/20 0857]   BP Pulse Resp Temp SpO2   (!) 214/98 108 20 97.9 °F (36.6 °C) 95 %      MAP       --         Physical Exam    Constitutional: Vital signs are normal. He appears well-developed and well-nourished. He is cooperative.  Non-toxic appearance. He does not appear ill. He appears distressed.   HENT:   Head: Normocephalic and atraumatic.   Mouth/Throat: Mucous membranes are not dry.   Eyes: Conjunctivae and lids are normal.   Neck: Trachea normal and normal range of motion. Neck supple. No stridor present. No tracheal deviation present.   Cardiovascular: Normal rate and regular rhythm.   Pulmonary/Chest: No respiratory distress.   Abdominal: Soft. Normal appearance. He exhibits distension.   Tenderness to palpation of the suprapubic region; distention   Musculoskeletal: Normal range of motion.   Neurological: He is alert and oriented to person, place, and time. He has normal strength. GCS eye subscore is 4. GCS verbal subscore is 5. GCS motor subscore is 6.   Skin: Skin is warm, dry and intact. No rash noted.   Psychiatric: He has a normal mood and affect. His speech is normal and behavior is normal. Thought content normal.         ED Course   Procedures  Labs Reviewed   CBC W/ AUTO DIFFERENTIAL   COMPREHENSIVE METABOLIC PANEL   URINALYSIS, REFLEX TO URINE CULTURE    POCT GLUCOSE MONITORING CONTINUOUS          Imaging Results    None          Medical Decision Making:   Initial Assessment:   64-year-old male with past medical history of BPH and urinary retention presents to the ED for evaluation of urinary retention.  He states symptoms began last night.  He states that he has not had any urination since this time.  He does report some abdominal distention and lower abdominal discomfort.  He denies any other symptoms at this time including fever, chills, hematuria, penile pain or discharge. He has not tried any medications other than previously prescribed Flomax for the symptoms. He does follow with Urology.  He appears well however in some distress secondary to complaint.  Abdomen with distention and suprapubic discomfort.  This did improve on re-evaluation after Lantigua catheter was placed.  Remaining exam unremarkable.  Differential Diagnosis:   Differential Diagnosis includes, but is not limited to:  STI, urethritis, testicular/appendix torsion, epididymitis, orchitis, UTI, kidney stone, urinary retention, appendicitis, prostatitis, testicular mass/neoplasm, incarcerated/strangulated hernia.    Clinical Tests:   Lab Tests: Ordered and Reviewed  ED Management:  Given the last void, suprapubic discomfort and distention a Lantigua catheter was placed.  Patient did put out immediately approximately 1100 with moderate improvement symptoms. He continued to void well in the ED.  Given the duration and complaint labs assess for any acute kidney injury obstructive process were sent.  Unremarkable labs.  Urine does reveal 1 bacteria however squamous cell appreciated and no leukocytes or nitrite.  Did discuss all findings with patient and that he should follow up with Urology for voiding trial and that he would benefit from going home with Lantigua given the complaint.  He was instructed to return should any new symptoms onset her symptoms worsen. Strict instructions to follow up with primary  care physician or reference provided for further assessment and evaluation. Given instructions to return for any acute symptoms and verbalized understanding of this medical plan.                                   Clinical Impression:       ICD-10-CM ICD-9-CM   1. Urinary retention R33.9 788.20                             LAWRENCE Segura  01/25/20 1136

## 2020-01-27 ENCOUNTER — OFFICE VISIT (OUTPATIENT)
Dept: UROLOGY | Facility: CLINIC | Age: 65
End: 2020-01-27
Payer: COMMERCIAL

## 2020-01-27 ENCOUNTER — TELEPHONE (OUTPATIENT)
Dept: UROLOGY | Facility: CLINIC | Age: 65
End: 2020-01-27

## 2020-01-27 VITALS
WEIGHT: 265.88 LBS | HEART RATE: 81 BPM | HEIGHT: 68 IN | DIASTOLIC BLOOD PRESSURE: 91 MMHG | SYSTOLIC BLOOD PRESSURE: 150 MMHG | BODY MASS INDEX: 40.3 KG/M2

## 2020-01-27 DIAGNOSIS — R33.9 URINARY RETENTION: ICD-10-CM

## 2020-01-27 DIAGNOSIS — N40.1 BPH WITH OBSTRUCTION/LOWER URINARY TRACT SYMPTOMS: ICD-10-CM

## 2020-01-27 DIAGNOSIS — Z46.6 ENCOUNTER FOR FOLEY CATHETER REMOVAL: Primary | ICD-10-CM

## 2020-01-27 DIAGNOSIS — N13.8 BPH WITH OBSTRUCTION/LOWER URINARY TRACT SYMPTOMS: ICD-10-CM

## 2020-01-27 PROCEDURE — 3077F SYST BP >= 140 MM HG: CPT | Mod: CPTII,S$GLB,, | Performed by: PHYSICIAN ASSISTANT

## 2020-01-27 PROCEDURE — 3008F PR BODY MASS INDEX (BMI) DOCUMENTED: ICD-10-PCS | Mod: CPTII,S$GLB,, | Performed by: PHYSICIAN ASSISTANT

## 2020-01-27 PROCEDURE — 51700 PR IRRIGATION, BLADDER: ICD-10-PCS | Mod: S$GLB,,, | Performed by: PHYSICIAN ASSISTANT

## 2020-01-27 PROCEDURE — 3008F BODY MASS INDEX DOCD: CPT | Mod: CPTII,S$GLB,, | Performed by: PHYSICIAN ASSISTANT

## 2020-01-27 PROCEDURE — 99213 PR OFFICE/OUTPT VISIT, EST, LEVL III, 20-29 MIN: ICD-10-PCS | Mod: 25,S$GLB,, | Performed by: PHYSICIAN ASSISTANT

## 2020-01-27 PROCEDURE — 1100F PR PT FALLS ASSESS DOC 2+ FALLS/FALL W/INJURY/YR: ICD-10-PCS | Mod: CPTII,S$GLB,, | Performed by: PHYSICIAN ASSISTANT

## 2020-01-27 PROCEDURE — 99213 OFFICE O/P EST LOW 20 MIN: CPT | Mod: 25,S$GLB,, | Performed by: PHYSICIAN ASSISTANT

## 2020-01-27 PROCEDURE — 3288F PR FALLS RISK ASSESSMENT DOCUMENTED: ICD-10-PCS | Mod: CPTII,S$GLB,, | Performed by: PHYSICIAN ASSISTANT

## 2020-01-27 PROCEDURE — 99999 PR PBB SHADOW E&M-EST. PATIENT-LVL III: ICD-10-PCS | Mod: PBBFAC,,, | Performed by: PHYSICIAN ASSISTANT

## 2020-01-27 PROCEDURE — 3077F PR MOST RECENT SYSTOLIC BLOOD PRESSURE >= 140 MM HG: ICD-10-PCS | Mod: CPTII,S$GLB,, | Performed by: PHYSICIAN ASSISTANT

## 2020-01-27 PROCEDURE — 99999 PR PBB SHADOW E&M-EST. PATIENT-LVL III: CPT | Mod: PBBFAC,,, | Performed by: PHYSICIAN ASSISTANT

## 2020-01-27 PROCEDURE — 51700 IRRIGATION OF BLADDER: CPT | Mod: S$GLB,,, | Performed by: PHYSICIAN ASSISTANT

## 2020-01-27 PROCEDURE — 1100F PTFALLS ASSESS-DOCD GE2>/YR: CPT | Mod: CPTII,S$GLB,, | Performed by: PHYSICIAN ASSISTANT

## 2020-01-27 PROCEDURE — 3080F PR MOST RECENT DIASTOLIC BLOOD PRESSURE >= 90 MM HG: ICD-10-PCS | Mod: CPTII,S$GLB,, | Performed by: PHYSICIAN ASSISTANT

## 2020-01-27 PROCEDURE — 3080F DIAST BP >= 90 MM HG: CPT | Mod: CPTII,S$GLB,, | Performed by: PHYSICIAN ASSISTANT

## 2020-01-27 PROCEDURE — 3288F FALL RISK ASSESSMENT DOCD: CPT | Mod: CPTII,S$GLB,, | Performed by: PHYSICIAN ASSISTANT

## 2020-01-27 NOTE — PROGRESS NOTES
" CHIEF COMPLAINT:    Mr. Hernandez is a 64 y.o. male presenting for urinary retention.    PRESENTING ILLNESS:    Celso Hernandez is a 64 y.o. male with a PMH of BPH, retention, prostate nodule who presents for urinary retention.    Saturday night he started having trouble urinating and presented to the ED on 1/25/20 due to inability to void.  Potts catheter was placed in the ED with 1100cc clear urine return.  He denies difficulty urinating prior to Saturday.  He has a history of frequency but did not notice an increase in frequency.  No noticeable change in stream.  He reports his retention came on suddenly.  He reports taking flomax consistently for the most part, maybe "missing a dose here and there".      History of retention in September 2019 requiring a potts catheter.    History of enlarged prostate and right prostate nodule.   (-) prostate Biopsy in 1/3/2017 with Dr. Kunz.      REVIEW OF SYSTEMS:    Constitutional: Negative for fever and chills.   HENT: Negative for hearing loss.   Eyes: Negative for visual disturbance.   Respiratory: Negative for shortness of breath.   Cardiovascular: Negative for chest pain.   Gastrointestinal: Negative for vomiting, and constipation.   Genitourinary: See HPI  Neurological: Negative for dizziness.   Hematological: Does not bruise/bleed easily.   Psychiatric/Behavioral: Negative for confusion.       PATIENT HISTORY:    Past Medical History:   Diagnosis Date    Colon polyps     Diabetes mellitus type II     Hyperlipidemia     Hypertension     Multiple duodenal ulcers 10/08/2019    Unspecified hemorrhoids without mention of complication        Past Surgical History:   Procedure Laterality Date    COLONOSCOPY N/A 10/8/2019    Procedure: COLONOSCOPY suprep;  Surgeon: Landon Guajardo MD;  Location: [facility];  Service: General;  Laterality: N/A;    COLONOSCOPY W/ POLYPECTOMY  10/08/2019    ESOPHAGOGASTRODUODENOSCOPY  10/08/2019    w/biopsies    " ESOPHAGOGASTRODUODENOSCOPY N/A 10/8/2019    Procedure: EGD (ESOPHAGOGASTRODUODENOSCOPY);  Surgeon: Landon Guajardo MD;  Location: [facility];  Service: General;  Laterality: N/A;    none         Family History   Problem Relation Age of Onset    Hyperlipidemia Mother     Cancer Sister         breast    Hearing loss Sister        Social History     Socioeconomic History    Marital status:      Spouse name: Not on file    Number of children: Not on file    Years of education: Not on file    Highest education level: Not on file   Occupational History    Not on file   Social Needs    Financial resource strain: Not on file    Food insecurity:     Worry: Not on file     Inability: Not on file    Transportation needs:     Medical: Not on file     Non-medical: Not on file   Tobacco Use    Smoking status: Never Smoker    Smokeless tobacco: Never Used   Substance and Sexual Activity    Alcohol use: Yes     Alcohol/week: 3.3 standard drinks     Types: 4 Standard drinks or equivalent per week    Drug use: No    Sexual activity: Not on file   Lifestyle    Physical activity:     Days per week: Not on file     Minutes per session: Not on file    Stress: Not on file   Relationships    Social connections:     Talks on phone: Not on file     Gets together: Not on file     Attends Che[employer] service: Not on file     Active member of club or organization: Not on file     Attends meetings of clubs or organizations: Not on file     Relationship status: Not on file   Other Topics Concern    Not on file   Social History Narrative    Not on file       Allergies:  Patient has no known allergies.    Medications:    Current Outpatient Medications:     atorvastatin (LIPITOR) 80 MG tablet, TAKE 1 TABLET BY MOUTH ONCE DAILY, Disp: 90 tablet, Rfl: 3    hydroCHLOROthiazide (HYDRODIURIL) 12.5 MG Tab, Take 1 tablet (12.5 mg total) by mouth once daily., Disp: 90 tablet, Rfl: 3    latanoprost 0.005 % ophthalmic solution,  Place 1 drop into both eyes every evening., Disp: 2.5 mL, Rfl: 11    losartan (COZAAR) 100 MG tablet, Take 1 tablet (100 mg total) by mouth once daily., Disp: 90 tablet, Rfl: 3    meloxicam (MOBIC) 15 MG tablet, TAKE 1 TABLET BY MOUTH ONCE DAILY, Disp: 30 tablet, Rfl: 11    metFORMIN (GLUCOPHAGE) 500 MG tablet, Take 1 tablet (500 mg total) by mouth daily with breakfast., Disp: 90 tablet, Rfl: 3    tamsulosin (FLOMAX) 0.4 mg Cap, TAKE 1 CAPSULE BY MOUTH ONCE DAILY, Disp: 90 capsule, Rfl: 3    timolol maleate 0.5% (TIMOPTIC) 0.5 % Drop, Place 1 drop into both eyes 2 (two) times daily., Disp: 10 mL, Rfl: 11    traMADol (ULTRAM) 50 mg tablet, Take 1-2 tabs bid prn severe pain, Disp: 60 tablet, Rfl: 2    cholecalciferol, vitamin D3, (VITAMIN D3) 2,000 unit Cap, Take 1 capsule by mouth once daily., Disp: , Rfl:     pantoprazole (PROTONIX) 40 MG tablet, Take 1 tablet (40 mg total) by mouth once daily. (Patient not taking: Reported on 1/27/2020), Disp: 30 tablet, Rfl: 11    PHYSICAL EXAMINATION:    Constitutional: He appears well-developed and well-nourished.  He is in no apparent distress.    Eyes: No scleral icterus noted bilaterally. No discharge bilaterally.    Nose: No rhinorrhea    Cardiovascular: Normal rate.  No pitting edema noted in lower extremities bilaterally    Pulmonary/Chest: Effort normal. No respiratory distress.     Abdominal:  He exhibits no distension.  There is no CVA tenderness.     Lymphadenopathy:        Right: No supraclavicular adenopathy present.        Left: No supraclavicular adenopathy present.     Neurological: He is alert and oriented to person, place, and time.     Skin: Skin is warm and dry.     Psych: Cooperative with normal affect.    Physical Exam      LABS:    Lab Results   Component Value Date    PSA 3.4 09/14/2019    PSA 1.9 05/14/2016    PSA 2.5 09/03/2015    PSA 1.6 03/01/2014    PSA 1.7 10/08/2011    PSA 1.7 11/06/2010    PSA 1.7 08/29/2009    PSA 1.2 12/11/2007    PSA 1.1  05/19/2006    PSA 1.2 02/17/2004    PSADIAG 2.8 11/16/2019    PSADIAG 2.2 11/02/2018    PSADIAG 2.5 11/18/2017       IMPRESSION:    Encounter Diagnoses   Name Primary?    Encounter for Potts catheter removal Yes    BPH with obstruction/lower urinary tract symptoms     Urinary retention          PLAN:    Voiding trial performed by Nurse Tanya.  80ml of sterile water was instilled into bladder before bladder began to spasm.  Potts catheter was removed. Patient was instructed to drink plenty of fluids.       Instructed patient to call at 2p.m. to give an update on urine output.    I instructed patient to return to clinic to have potts catheter put back in if unable to urinate within 5 hours of potts catheter removal or starts to experience bladder pressure/pain, decrease flow, straining/difficulty urinating, urinary frequency.      Continue flomax.        Hamida Ayala PA-C

## 2020-01-27 NOTE — TELEPHONE ENCOUNTER
I called patient.  He states he is voiding well.  He denies straining.  No bladder pressure/pain.    He will follow up in a couple of weeks to check a pvr.

## 2020-01-28 ENCOUNTER — HOSPITAL ENCOUNTER (OUTPATIENT)
Facility: HOSPITAL | Age: 65
Discharge: HOME HEALTH | End: 2020-01-31
Attending: EMERGENCY MEDICINE | Admitting: FAMILY MEDICINE
Payer: COMMERCIAL

## 2020-01-28 DIAGNOSIS — R33.9 URINARY RETENTION: ICD-10-CM

## 2020-01-28 DIAGNOSIS — A41.9 SEPSIS DUE TO URINARY TRACT INFECTION: ICD-10-CM

## 2020-01-28 DIAGNOSIS — N39.0 SEPSIS DUE TO URINARY TRACT INFECTION: ICD-10-CM

## 2020-01-28 DIAGNOSIS — A41.9 SEPSIS, DUE TO UNSPECIFIED ORGANISM, UNSPECIFIED WHETHER ACUTE ORGAN DYSFUNCTION PRESENT: Primary | ICD-10-CM

## 2020-01-28 DIAGNOSIS — N30.00 ACUTE CYSTITIS WITHOUT HEMATURIA: ICD-10-CM

## 2020-01-28 DIAGNOSIS — R00.0 TACHYCARDIA: ICD-10-CM

## 2020-01-28 DIAGNOSIS — E86.0 DEHYDRATION: ICD-10-CM

## 2020-01-28 LAB
BACTERIA #/AREA URNS HPF: ABNORMAL /HPF
BASOPHILS # BLD AUTO: 0.03 K/UL (ref 0–0.2)
BASOPHILS NFR BLD: 0.2 % (ref 0–1.9)
BILIRUB UR QL STRIP: NEGATIVE
CLARITY UR: CLEAR
COLOR UR: YELLOW
DIFFERENTIAL METHOD: ABNORMAL
EOSINOPHIL # BLD AUTO: 0.1 K/UL (ref 0–0.5)
EOSINOPHIL NFR BLD: 0.5 % (ref 0–8)
ERYTHROCYTE [DISTWIDTH] IN BLOOD BY AUTOMATED COUNT: 14.6 % (ref 11.5–14.5)
GLUCOSE UR QL STRIP: NEGATIVE
HCT VFR BLD AUTO: 49 % (ref 40–54)
HGB BLD-MCNC: 16.8 G/DL (ref 14–18)
HGB UR QL STRIP: ABNORMAL
INFLUENZA A, MOLECULAR: NEGATIVE
INFLUENZA B, MOLECULAR: NEGATIVE
KETONES UR QL STRIP: NEGATIVE
LEUKOCYTE ESTERASE UR QL STRIP: ABNORMAL
LYMPHOCYTES # BLD AUTO: 0.7 K/UL (ref 1–4.8)
LYMPHOCYTES NFR BLD: 3.9 % (ref 18–48)
MCH RBC QN AUTO: 29.9 PG (ref 27–31)
MCHC RBC AUTO-ENTMCNC: 34.3 G/DL (ref 32–36)
MCV RBC AUTO: 87 FL (ref 82–98)
MICROSCOPIC COMMENT: ABNORMAL
MONOCYTES # BLD AUTO: 1.4 K/UL (ref 0.3–1)
MONOCYTES NFR BLD: 7.5 % (ref 4–15)
NEUTROPHILS # BLD AUTO: 16.6 K/UL (ref 1.8–7.7)
NEUTROPHILS NFR BLD: 87.9 % (ref 38–73)
NITRITE UR QL STRIP: POSITIVE
PH UR STRIP: 5 [PH] (ref 5–8)
PLATELET # BLD AUTO: 187 K/UL (ref 150–350)
PMV BLD AUTO: 10.7 FL (ref 9.2–12.9)
PROT UR QL STRIP: NEGATIVE
RBC # BLD AUTO: 5.61 M/UL (ref 4.6–6.2)
RBC #/AREA URNS HPF: 4 /HPF (ref 0–4)
SP GR UR STRIP: 1.02 (ref 1–1.03)
SPECIMEN SOURCE: NORMAL
SQUAMOUS #/AREA URNS HPF: 1 /HPF
URN SPEC COLLECT METH UR: ABNORMAL
UROBILINOGEN UR STRIP-ACNC: 1 EU/DL
WBC # BLD AUTO: 18.96 K/UL (ref 3.9–12.7)
WBC #/AREA URNS HPF: 15 /HPF (ref 0–5)

## 2020-01-28 PROCEDURE — 87086 URINE CULTURE/COLONY COUNT: CPT

## 2020-01-28 PROCEDURE — 96367 TX/PROPH/DG ADDL SEQ IV INF: CPT | Mod: 59

## 2020-01-28 PROCEDURE — 80053 COMPREHEN METABOLIC PANEL: CPT

## 2020-01-28 PROCEDURE — 99291 CRITICAL CARE FIRST HOUR: CPT | Mod: 25

## 2020-01-28 PROCEDURE — 87502 INFLUENZA DNA AMP PROBE: CPT

## 2020-01-28 PROCEDURE — 87077 CULTURE AEROBIC IDENTIFY: CPT

## 2020-01-28 PROCEDURE — 51702 INSERT TEMP BLADDER CATH: CPT

## 2020-01-28 PROCEDURE — 87088 URINE BACTERIA CULTURE: CPT

## 2020-01-28 PROCEDURE — 63600175 PHARM REV CODE 636 W HCPCS: Performed by: EMERGENCY MEDICINE

## 2020-01-28 PROCEDURE — 81000 URINALYSIS NONAUTO W/SCOPE: CPT

## 2020-01-28 PROCEDURE — 85025 COMPLETE CBC W/AUTO DIFF WBC: CPT

## 2020-01-28 PROCEDURE — 25000003 PHARM REV CODE 250: Performed by: EMERGENCY MEDICINE

## 2020-01-28 PROCEDURE — 87186 SC STD MICRODIL/AGAR DIL: CPT

## 2020-01-28 PROCEDURE — 87040 BLOOD CULTURE FOR BACTERIA: CPT

## 2020-01-28 PROCEDURE — 96365 THER/PROPH/DIAG IV INF INIT: CPT

## 2020-01-28 PROCEDURE — 93005 ELECTROCARDIOGRAM TRACING: CPT | Mod: 59

## 2020-01-28 RX ORDER — SODIUM CHLORIDE 9 MG/ML
1000 INJECTION, SOLUTION INTRAVENOUS
Status: COMPLETED | OUTPATIENT
Start: 2020-01-28 | End: 2020-01-28

## 2020-01-28 RX ORDER — ACETAMINOPHEN 325 MG/1
650 TABLET ORAL
Status: COMPLETED | OUTPATIENT
Start: 2020-01-28 | End: 2020-01-28

## 2020-01-28 RX ADMIN — VANCOMYCIN HYDROCHLORIDE 2000 MG: 100 INJECTION, POWDER, LYOPHILIZED, FOR SOLUTION INTRAVENOUS at 11:01

## 2020-01-28 RX ADMIN — DEXTROSE MONOHYDRATE 4.5 G: 50 INJECTION, SOLUTION INTRAVENOUS at 10:01

## 2020-01-28 RX ADMIN — SODIUM CHLORIDE 1000 ML: 0.9 INJECTION, SOLUTION INTRAVENOUS at 10:01

## 2020-01-28 RX ADMIN — ACETAMINOPHEN 650 MG: 325 TABLET ORAL at 09:01

## 2020-01-29 ENCOUNTER — PATIENT OUTREACH (OUTPATIENT)
Dept: ADMINISTRATIVE | Facility: OTHER | Age: 65
End: 2020-01-29

## 2020-01-29 PROBLEM — N39.0 SEPSIS DUE TO URINARY TRACT INFECTION: Status: ACTIVE | Noted: 2020-01-29

## 2020-01-29 PROBLEM — R33.9 URINARY RETENTION: Status: ACTIVE | Noted: 2020-01-29

## 2020-01-29 PROBLEM — A41.9 SEPSIS: Status: ACTIVE | Noted: 2020-01-29

## 2020-01-29 LAB
ALBUMIN SERPL BCP-MCNC: 3.1 G/DL (ref 3.5–5.2)
ALBUMIN SERPL BCP-MCNC: 3.2 G/DL (ref 3.5–5.2)
ALP SERPL-CCNC: 102 U/L (ref 55–135)
ALP SERPL-CCNC: 106 U/L (ref 55–135)
ALT SERPL W/O P-5'-P-CCNC: 35 U/L (ref 10–44)
ALT SERPL W/O P-5'-P-CCNC: 39 U/L (ref 10–44)
ANION GAP SERPL CALC-SCNC: 9 MMOL/L (ref 8–16)
ANION GAP SERPL CALC-SCNC: 9 MMOL/L (ref 8–16)
ANISOCYTOSIS BLD QL SMEAR: SLIGHT
AST SERPL-CCNC: 36 U/L (ref 10–40)
AST SERPL-CCNC: 38 U/L (ref 10–40)
BASOPHILS # BLD AUTO: ABNORMAL K/UL (ref 0–0.2)
BASOPHILS NFR BLD: 0 % (ref 0–1.9)
BILIRUB SERPL-MCNC: 0.8 MG/DL (ref 0.1–1)
BILIRUB SERPL-MCNC: 1.3 MG/DL (ref 0.1–1)
BUN SERPL-MCNC: 15 MG/DL (ref 8–23)
BUN SERPL-MCNC: 18 MG/DL (ref 8–23)
CALCIUM SERPL-MCNC: 8.7 MG/DL (ref 8.7–10.5)
CALCIUM SERPL-MCNC: 9.2 MG/DL (ref 8.7–10.5)
CHLORIDE SERPL-SCNC: 100 MMOL/L (ref 95–110)
CHLORIDE SERPL-SCNC: 102 MMOL/L (ref 95–110)
CO2 SERPL-SCNC: 21 MMOL/L (ref 23–29)
CO2 SERPL-SCNC: 25 MMOL/L (ref 23–29)
CREAT SERPL-MCNC: 1 MG/DL (ref 0.5–1.4)
CREAT SERPL-MCNC: 1.1 MG/DL (ref 0.5–1.4)
DIFFERENTIAL METHOD: ABNORMAL
EOSINOPHIL # BLD AUTO: ABNORMAL K/UL (ref 0–0.5)
EOSINOPHIL NFR BLD: 0 % (ref 0–8)
ERYTHROCYTE [DISTWIDTH] IN BLOOD BY AUTOMATED COUNT: 14.9 % (ref 11.5–14.5)
EST. GFR  (AFRICAN AMERICAN): >60 ML/MIN/1.73 M^2
EST. GFR  (AFRICAN AMERICAN): >60 ML/MIN/1.73 M^2
EST. GFR  (NON AFRICAN AMERICAN): >60 ML/MIN/1.73 M^2
EST. GFR  (NON AFRICAN AMERICAN): >60 ML/MIN/1.73 M^2
ESTIMATED AVG GLUCOSE: 128 MG/DL (ref 68–131)
GLUCOSE SERPL-MCNC: 109 MG/DL (ref 70–110)
GLUCOSE SERPL-MCNC: 128 MG/DL (ref 70–110)
HBA1C MFR BLD HPLC: 6.1 % (ref 4–5.6)
HCT VFR BLD AUTO: 47.2 % (ref 40–54)
HGB BLD-MCNC: 16.2 G/DL (ref 14–18)
LACTATE SERPL-SCNC: 1.5 MMOL/L (ref 0.5–2.2)
LYMPHOCYTES # BLD AUTO: ABNORMAL K/UL (ref 1–4.8)
LYMPHOCYTES NFR BLD: 8 % (ref 18–48)
MAGNESIUM SERPL-MCNC: 1.5 MG/DL (ref 1.6–2.6)
MCH RBC QN AUTO: 29.8 PG (ref 27–31)
MCHC RBC AUTO-ENTMCNC: 34.3 G/DL (ref 32–36)
MCV RBC AUTO: 87 FL (ref 82–98)
MONOCYTES # BLD AUTO: ABNORMAL K/UL (ref 0.3–1)
MONOCYTES NFR BLD: 9 % (ref 4–15)
NEUTROPHILS NFR BLD: 72 % (ref 38–73)
NEUTS BAND NFR BLD MANUAL: 11 %
PHOSPHATE SERPL-MCNC: 3.2 MG/DL (ref 2.7–4.5)
PLATELET # BLD AUTO: 196 K/UL (ref 150–350)
PLATELET BLD QL SMEAR: ABNORMAL
PMV BLD AUTO: 10 FL (ref 9.2–12.9)
POCT GLUCOSE: 143 MG/DL (ref 70–110)
POCT GLUCOSE: 157 MG/DL (ref 70–110)
POCT GLUCOSE: 159 MG/DL (ref 70–110)
POCT GLUCOSE: 161 MG/DL (ref 70–110)
POCT GLUCOSE: 162 MG/DL (ref 70–110)
POCT GLUCOSE: 171 MG/DL (ref 70–110)
POTASSIUM SERPL-SCNC: 3.6 MMOL/L (ref 3.5–5.1)
POTASSIUM SERPL-SCNC: 3.9 MMOL/L (ref 3.5–5.1)
PROT SERPL-MCNC: 6.9 G/DL (ref 6–8.4)
PROT SERPL-MCNC: 7.2 G/DL (ref 6–8.4)
RBC # BLD AUTO: 5.43 M/UL (ref 4.6–6.2)
SODIUM SERPL-SCNC: 132 MMOL/L (ref 136–145)
SODIUM SERPL-SCNC: 134 MMOL/L (ref 136–145)
WBC # BLD AUTO: 24.19 K/UL (ref 3.9–12.7)

## 2020-01-29 PROCEDURE — 36415 COLL VENOUS BLD VENIPUNCTURE: CPT

## 2020-01-29 PROCEDURE — 83605 ASSAY OF LACTIC ACID: CPT

## 2020-01-29 PROCEDURE — 85007 BL SMEAR W/DIFF WBC COUNT: CPT | Mod: NCS

## 2020-01-29 PROCEDURE — 94761 N-INVAS EAR/PLS OXIMETRY MLT: CPT

## 2020-01-29 PROCEDURE — 63600175 PHARM REV CODE 636 W HCPCS: Performed by: NURSE PRACTITIONER

## 2020-01-29 PROCEDURE — 96376 TX/PRO/DX INJ SAME DRUG ADON: CPT

## 2020-01-29 PROCEDURE — 96366 THER/PROPH/DIAG IV INF ADDON: CPT

## 2020-01-29 PROCEDURE — 83735 ASSAY OF MAGNESIUM: CPT

## 2020-01-29 PROCEDURE — 25000003 PHARM REV CODE 250: Performed by: FAMILY MEDICINE

## 2020-01-29 PROCEDURE — 80053 COMPREHEN METABOLIC PANEL: CPT

## 2020-01-29 PROCEDURE — 84100 ASSAY OF PHOSPHORUS: CPT

## 2020-01-29 PROCEDURE — 83036 HEMOGLOBIN GLYCOSYLATED A1C: CPT

## 2020-01-29 PROCEDURE — 85027 COMPLETE CBC AUTOMATED: CPT

## 2020-01-29 PROCEDURE — G0378 HOSPITAL OBSERVATION PER HR: HCPCS

## 2020-01-29 PROCEDURE — 25000003 PHARM REV CODE 250: Performed by: NURSE PRACTITIONER

## 2020-01-29 RX ORDER — LOSARTAN POTASSIUM 50 MG/1
100 TABLET ORAL DAILY
Status: DISCONTINUED | OUTPATIENT
Start: 2020-01-29 | End: 2020-01-31 | Stop reason: HOSPADM

## 2020-01-29 RX ORDER — TAMSULOSIN HYDROCHLORIDE 0.4 MG/1
1 CAPSULE ORAL DAILY
Status: DISCONTINUED | OUTPATIENT
Start: 2020-01-29 | End: 2020-01-31 | Stop reason: HOSPADM

## 2020-01-29 RX ORDER — LATANOPROST 50 UG/ML
1 SOLUTION/ DROPS OPHTHALMIC NIGHTLY
Status: DISCONTINUED | OUTPATIENT
Start: 2020-01-29 | End: 2020-01-31 | Stop reason: HOSPADM

## 2020-01-29 RX ORDER — HYDROCHLOROTHIAZIDE 12.5 MG/1
12.5 TABLET ORAL DAILY
Status: DISCONTINUED | OUTPATIENT
Start: 2020-01-29 | End: 2020-01-31 | Stop reason: HOSPADM

## 2020-01-29 RX ORDER — GLUCAGON 1 MG
1 KIT INJECTION
Status: DISCONTINUED | OUTPATIENT
Start: 2020-01-29 | End: 2020-01-31 | Stop reason: HOSPADM

## 2020-01-29 RX ORDER — LANOLIN ALCOHOL/MO/W.PET/CERES
400 CREAM (GRAM) TOPICAL 2 TIMES DAILY
Status: DISCONTINUED | OUTPATIENT
Start: 2020-01-29 | End: 2020-01-31 | Stop reason: HOSPADM

## 2020-01-29 RX ORDER — IBUPROFEN 200 MG
24 TABLET ORAL
Status: DISCONTINUED | OUTPATIENT
Start: 2020-01-29 | End: 2020-01-31 | Stop reason: HOSPADM

## 2020-01-29 RX ORDER — IBUPROFEN 200 MG
16 TABLET ORAL
Status: DISCONTINUED | OUTPATIENT
Start: 2020-01-29 | End: 2020-01-31 | Stop reason: HOSPADM

## 2020-01-29 RX ORDER — PANTOPRAZOLE SODIUM 40 MG/1
40 TABLET, DELAYED RELEASE ORAL DAILY
Status: DISCONTINUED | OUTPATIENT
Start: 2020-01-29 | End: 2020-01-31 | Stop reason: HOSPADM

## 2020-01-29 RX ORDER — ONDANSETRON 2 MG/ML
4 INJECTION INTRAMUSCULAR; INTRAVENOUS EVERY 6 HOURS PRN
Status: DISCONTINUED | OUTPATIENT
Start: 2020-01-29 | End: 2020-01-31 | Stop reason: HOSPADM

## 2020-01-29 RX ORDER — ACETAMINOPHEN 325 MG/1
650 TABLET ORAL EVERY 6 HOURS PRN
Status: DISCONTINUED | OUTPATIENT
Start: 2020-01-29 | End: 2020-01-31 | Stop reason: HOSPADM

## 2020-01-29 RX ORDER — ATORVASTATIN CALCIUM 40 MG/1
80 TABLET, FILM COATED ORAL DAILY
Status: DISCONTINUED | OUTPATIENT
Start: 2020-01-29 | End: 2020-01-31 | Stop reason: HOSPADM

## 2020-01-29 RX ORDER — INSULIN ASPART 100 [IU]/ML
0-5 INJECTION, SOLUTION INTRAVENOUS; SUBCUTANEOUS
Status: DISCONTINUED | OUTPATIENT
Start: 2020-01-29 | End: 2020-01-31 | Stop reason: HOSPADM

## 2020-01-29 RX ORDER — TIMOLOL MALEATE 5 MG/ML
1 SOLUTION/ DROPS OPHTHALMIC 2 TIMES DAILY
Status: DISCONTINUED | OUTPATIENT
Start: 2020-01-29 | End: 2020-01-31 | Stop reason: HOSPADM

## 2020-01-29 RX ADMIN — PIPERACILLIN SODIUM AND TAZOBACTAM SODIUM 4.5 G: 4; .5 INJECTION, POWDER, LYOPHILIZED, FOR SOLUTION INTRAVENOUS at 04:01

## 2020-01-29 RX ADMIN — VANCOMYCIN HYDROCHLORIDE 1750 MG: 100 INJECTION, POWDER, LYOPHILIZED, FOR SOLUTION INTRAVENOUS at 11:01

## 2020-01-29 RX ADMIN — MAGNESIUM OXIDE 400 MG (241.3 MG MAGNESIUM) TABLET 400 MG: TABLET at 04:01

## 2020-01-29 RX ADMIN — PANTOPRAZOLE SODIUM 40 MG: 40 TABLET, DELAYED RELEASE ORAL at 08:01

## 2020-01-29 RX ADMIN — TIMOLOL MALEATE 1 DROP: 5 SOLUTION OPHTHALMIC at 08:01

## 2020-01-29 RX ADMIN — TIMOLOL MALEATE 1 DROP: 5 SOLUTION OPHTHALMIC at 09:01

## 2020-01-29 RX ADMIN — TAMSULOSIN HYDROCHLORIDE 0.4 MG: 0.4 CAPSULE ORAL at 08:01

## 2020-01-29 RX ADMIN — PIPERACILLIN SODIUM AND TAZOBACTAM SODIUM 4.5 G: 4; .5 INJECTION, POWDER, LYOPHILIZED, FOR SOLUTION INTRAVENOUS at 06:01

## 2020-01-29 RX ADMIN — ATORVASTATIN CALCIUM 80 MG: 40 TABLET, FILM COATED ORAL at 08:01

## 2020-01-29 RX ADMIN — HYDROCHLOROTHIAZIDE 12.5 MG: 12.5 TABLET ORAL at 08:01

## 2020-01-29 RX ADMIN — LATANOPROST 1 DROP: 50 SOLUTION OPHTHALMIC at 06:01

## 2020-01-29 RX ADMIN — LATANOPROST 1 DROP: 50 SOLUTION OPHTHALMIC at 09:01

## 2020-01-29 RX ADMIN — LOSARTAN POTASSIUM 100 MG: 50 TABLET ORAL at 08:01

## 2020-01-29 NOTE — ASSESSMENT & PLAN NOTE
Urinary retention    UA: concerning for UTI  Urine Cultures: pending  Blood Cultures: pending  Continue Vanc and Zosyn  Continue flomax

## 2020-01-29 NOTE — ASSESSMENT & PLAN NOTE
Urinary retention    UA: concerning for UTI  Urine Cultures: NGTD  Blood Cultures: NGTD  Continue Vanc and Zosyn  Continue flomax

## 2020-01-29 NOTE — PLAN OF CARE
Pt vitals were maintained. Pt denied any pain. Iv antibiotics were given, no temp from pt and pt BG was in normal range. Pt potts was intact with no discomfort.

## 2020-01-29 NOTE — ED PROVIDER NOTES
"Encounter Date: 1/28/2020    SCRIBE #1 NOTE: I, Lennieeliz Medina, am scribing for, and in the presence of,  Dr. Gisbon. I have scribed the entire note.       History     Chief Complaint   Patient presents with    Urinary Retention     pt states "I have a full bladder and I can't empty it." pt states he was seen a couple days ago with similar symptoms     Celso Hernandez is a 64 y.o. male who  has a past medical history of Colon polyps, Diabetes mellitus type II, Hyperlipidemia, Hypertension, Multiple duodenal ulcers (10/08/2019), and Unspecified hemorrhoids without mention of complication.    The patient presents to the ED due to urinary retention.  He states that he last urinated around 1 hour ago, but endorses some dribbling with decreased stream since that time.   He has history of urinary retention, and was seen in the ED on 1/25 and had a potts catheter inserted. It was removed on 1/27 after evaluation by Urology. He endorses some associated dysuria starting today, and noticed he had a low grade fever at home earlier today. He denies any blood in his urine, abdominal pain, N/V, recent sick contacts or other recent illness.  He did not take any medications prior to arrival. He denies any recent ABX or other complaints currently.    The history is provided by the patient.     Review of patient's allergies indicates:  No Known Allergies  Past Medical History:   Diagnosis Date    Colon polyps     Diabetes mellitus type II     Hyperlipidemia     Hypertension     Multiple duodenal ulcers 10/08/2019    Unspecified hemorrhoids without mention of complication      Past Surgical History:   Procedure Laterality Date    COLONOSCOPY N/A 10/8/2019    Procedure: COLONOSCOPY suprep;  Surgeon: Landon Guajardo MD;  Location: [facility];  Service: General;  Laterality: N/A;    COLONOSCOPY W/ POLYPECTOMY  10/08/2019    ESOPHAGOGASTRODUODENOSCOPY  10/08/2019    w/biopsies    ESOPHAGOGASTRODUODENOSCOPY N/A 10/8/2019    Procedure: " EGD (ESOPHAGOGASTRODUODENOSCOPY);  Surgeon: Landon Guajardo MD;  Location: [facility];  Service: General;  Laterality: N/A;    none       Family History   Problem Relation Age of Onset    Hyperlipidemia Mother     Cancer Sister         breast    Hearing loss Sister      Social History     Tobacco Use    Smoking status: Never Smoker    Smokeless tobacco: Never Used   Substance Use Topics    Alcohol use: Yes     Alcohol/week: 3.3 standard drinks     Types: 4 Standard drinks or equivalent per week    Drug use: No     Review of Systems   Constitutional: Positive for fever. Negative for chills.   HENT: Negative for sore throat.    Respiratory: Negative for shortness of breath.    Cardiovascular: Negative for chest pain.   Gastrointestinal: Negative for abdominal pain, constipation, diarrhea, nausea and vomiting.   Genitourinary: Positive for decreased urine volume, difficulty urinating and dysuria. Negative for frequency and urgency.   Musculoskeletal: Negative for back pain.   Skin: Negative for rash and wound.   Neurological: Negative for weakness.   Hematological: Does not bruise/bleed easily.   Psychiatric/Behavioral: Negative for agitation, behavioral problems and confusion.       Physical Exam     Initial Vitals [01/28/20 2048]   BP Pulse Resp Temp SpO2   (!) 182/97 (!) 149 20 (!) 102.5 °F (39.2 °C) 96 %      MAP       --         Physical Exam    Nursing note and vitals reviewed.  Constitutional: He appears well-developed and well-nourished. He is not diaphoretic. No distress.   HENT:   Head: Normocephalic and atraumatic.   Mouth/Throat: Oropharynx is clear and moist.   Eyes: EOM are normal. Pupils are equal, round, and reactive to light.   Neck: No tracheal deviation present.   Cardiovascular: Regular rhythm, normal heart sounds and intact distal pulses.   Tachycardic   Pulmonary/Chest: Breath sounds normal. No stridor. No respiratory distress.   Abdominal: Soft. Bowel sounds are normal. He exhibits no  distension and no mass. There is tenderness (mild) in the suprapubic area. There is no rigidity, no rebound, no guarding, no CVA tenderness, no tenderness at McBurney's point and negative Bhagat's sign.   Obese abdomen   Musculoskeletal: Normal range of motion. He exhibits no edema.   Neurological: He is alert and oriented to person, place, and time. No cranial nerve deficit or sensory deficit.   Skin: Skin is warm and dry. Capillary refill takes less than 2 seconds. No rash noted.   Psychiatric: He has a normal mood and affect. His behavior is normal. Thought content normal.         ED Course   Procedures  Labs Reviewed   CBC W/ AUTO DIFFERENTIAL - Abnormal; Notable for the following components:       Result Value    WBC 18.96 (*)     RDW 14.6 (*)     Gran # (ANC) 16.6 (*)     Lymph # 0.7 (*)     Mono # 1.4 (*)     Gran% 87.9 (*)     Lymph% 3.9 (*)     All other components within normal limits   URINALYSIS, REFLEX TO URINE CULTURE - Abnormal; Notable for the following components:    Occult Blood UA Trace (*)     Nitrite, UA Positive (*)     Leukocytes, UA Trace (*)     All other components within normal limits    Narrative:     Preferred Collection Type->Urine, Clean Catch   URINALYSIS MICROSCOPIC - Abnormal; Notable for the following components:    WBC, UA 15 (*)     Bacteria Moderate (*)     All other components within normal limits    Narrative:     Preferred Collection Type->Urine, Clean Catch   COMPREHENSIVE METABOLIC PANEL - Abnormal; Notable for the following components:    Sodium 132 (*)     CO2 21 (*)     Albumin 3.1 (*)     All other components within normal limits   INFLUENZA A & B BY MOLECULAR   CULTURE, BLOOD   CULTURE, BLOOD   CULTURE, URINE   LACTIC ACID, PLASMA     EKG Readings: (Independently Interpreted)   Sinus tachycardia with a rate of 137 bpm. Non-specifc ST changes in inferior leads. No ST elevation. No signs of ischemia. Normal intervals. Grossly stable from 12/2014.       Imaging Results           X-Ray Chest AP Portable (Final result)  Result time 01/28/20 22:16:44    Final result by Cristi Sheldon MD (01/28/20 22:16:44)                 Impression:      No acute cardiopulmonary process identified.      Electronically signed by: Cristi Sheldon MD  Date:    01/28/2020  Time:    22:16             Narrative:    EXAMINATION:  XR CHEST AP PORTABLE    CLINICAL HISTORY:  Sepsis;    TECHNIQUE:  Single frontal view of the chest was performed.    COMPARISON:  September 2015.    FINDINGS:  Cardiac silhouette is normal in size.  Lungs are expanded.  There is mild elevation of the right hemidiaphragm.  No evidence of focal consolidative process, pneumothorax, or significant effusion.  No acute osseous abnormality identified.                                 Medical Decision Making:   History:   Old Medical Records: I decided to obtain old medical records.  Old Records Summarized: other records.       <> Summary of Records: The patient was seen in the ED on 1/25 due to urinary retention and a potts catheter was placed. He followed up in the clinic on 1/27 and had the catheter removed.  Initial Assessment:   65 yo M with history of urinary retention presents to ED due to difficulty urinating starting 1 hour prior to arrival.  Febrile and tachycardic on arrival. Due to concern for infection, sepsis evaluation initiated. IVF, broad-spectrum ABX given. Labs, blood cultures obtained.  Potts inserted with improvement in urinary retention.  Will follow-up on results and anticipate admission for continued IV ABX and further management of possible sepsis.  Differential Diagnosis:   Differential Diagnosis includes, but is not limited to:  Sepsis, bacteremia, UTI, pneumonia, cellulitis, abscess, indwelling line/catheter infection, cholecystitis, viral URI, gastroenteritis, viral syndrome, sinusitis, otitis media/externa, neoplasm, drug reaction, serotonin syndrome, intoxication/withdrawal syndrome.    Independently  Interpreted Test(s):   I have ordered and independently interpreted EKG Reading(s) - see prior notes  Clinical Tests:   Lab Tests: Ordered and Reviewed  Radiological Study: Ordered and Reviewed  Medical Tests: Ordered and Reviewed  Sepsis Perfusion Assessment: I attest, a sepsis perfusion exam was performed within 6 hours of Septic Shock presentation, following fluid resuscitation.  ED Management:  Lantigua inserted with improvement in symptoms.  UA nitrite+ with 15 WBCs, suspected source of sepsis given urinary retention.  Labs appear stable from baseline, Cr WNL, lactate WNL.    Vitals have improved, but patient remains febrile and tachycardic.  Will request admission for further management of UTI, urinary retention, sepsis.  Case discussed with [facility] hospital medicine.     On re-evaluation, the patient's status has remained stable.  At this time, I believe the patient should be admitted to the hospital for further evaluation and management of sepsis.  [facility] service was contacted and the case was discussed.   The consulting physician/team agrees with plan and will admit under their service.   The patient and family were updated with test results, overall impression, and further plan of care. All questions were answered. The patient expressed understanding and agrees with the current plan.                Attending Attestation:         Attending Critical Care:   Critical Care Times:   Direct Patient Care (initial evaluation, reassessments, and time considering the case)................................................................15 minutes.   Additional History from reviewing old medical records or taking additional history from the family, EMS, PCP, etc.......................5 minutes.   Ordering, Reviewing, and Interpreting Diagnostic Studies...............................................................................................................5 minutes.    Documentation..................................................................................................................................................................................8 minutes.   Consultation with other Physicians. .................................................................................................................................................5 minutes.   Consultation with the patient's family directly relating to the patient's condition, care, and DNR status (when patient unable)......5 minutes.   ==============================================================  · Total Critical Care Time - exclusive of procedural time: 43 minutes.  ==============================================================  Critical care was necessary to treat or prevent imminent or life-threatening deterioration of the following conditions: sepsis.   Critical care was time spent personally by me on the following activities: obtaining history from patient or relative, examination of patient, ordering lab, x-rays, and/or EKG, interpretation of cardiac measurements, re-evaluation of patient's conition, ordering and performing treatments and interventions, development of treatment plan with patient or relative, review of old charts, discussion with consultants and evaluation of patient's response to treatment.   Critical Care Condition: critical                             Clinical Impression:     1. Sepsis, due to unspecified organism, unspecified whether acute organ dysfunction present    2. Tachycardia    3. Urinary retention    4. Acute cystitis without hematuria    5. Dehydration          Disposition:   Disposition: Admitted  Condition: Fair        I, Dr. Fidencio Gibson, personally performed the services described in this documentation. All medical record entries made by the scribe were at my direction and in my presence.  I have reviewed the chart and agree that the record reflects my personal  performance and is accurate and complete.     Fidencio Gibson MD.         Fidencio Gibson MD  01/29/20 0136

## 2020-01-29 NOTE — HPI
64 y.o. male who has a past medical history of colon polyps, Diabetes mellitus type II, Hyperlipidemia, Hypertension, Multiple duodenal ulcers, and unspecified hemorrhoids without mention of complication who presents to the ED due to urinary retention.  He has history of urinary retention, and was seen in the ED on 1/25/20 and had a potts catheter was inserted. He followed up with urology on 1/27/20 and potts catheter was removed. He endorses some associated dysuria starting today, and noticed he had a low grade fever at home earlier today. He endorses decreased urinary stream.  He denies any blood in his urine, abdominal pain, nausea, vomiting, chills, recent sick contacts or other recent illness.  ED findings: WBCs 18.96, Na 132, U/A concerning for UTI, Urine and Blood cultures pending, chest x-ray showed no acute processes.  Patient admitted to hospital medicine for further medical management.

## 2020-01-29 NOTE — SUBJECTIVE & OBJECTIVE
Past Medical History:   Diagnosis Date    Colon polyps     Diabetes mellitus type II     Hyperlipidemia     Hypertension     Multiple duodenal ulcers 10/08/2019    Unspecified hemorrhoids without mention of complication        Past Surgical History:   Procedure Laterality Date    COLONOSCOPY N/A 10/8/2019    Procedure: COLONOSCOPY suprep;  Surgeon: Landon Guajardo MD;  Location: [facility];  Service: General;  Laterality: N/A;    COLONOSCOPY W/ POLYPECTOMY  10/08/2019    ESOPHAGOGASTRODUODENOSCOPY  10/08/2019    w/biopsies    ESOPHAGOGASTRODUODENOSCOPY N/A 10/8/2019    Procedure: EGD (ESOPHAGOGASTRODUODENOSCOPY);  Surgeon: Landon Guajardo MD;  Location: [facility];  Service: General;  Laterality: N/A;    none         Review of patient's allergies indicates:  No Known Allergies    No current facility-administered medications on file prior to encounter.      Current Outpatient Medications on File Prior to Encounter   Medication Sig    atorvastatin (LIPITOR) 80 MG tablet TAKE 1 TABLET BY MOUTH ONCE DAILY    cholecalciferol, vitamin D3, (VITAMIN D3) 2,000 unit Cap Take 1 capsule by mouth once daily.    hydroCHLOROthiazide (HYDRODIURIL) 12.5 MG Tab Take 1 tablet (12.5 mg total) by mouth once daily.    latanoprost 0.005 % ophthalmic solution Place 1 drop into both eyes every evening.    losartan (COZAAR) 100 MG tablet Take 1 tablet (100 mg total) by mouth once daily.    meloxicam (MOBIC) 15 MG tablet TAKE 1 TABLET BY MOUTH ONCE DAILY    metFORMIN (GLUCOPHAGE) 500 MG tablet Take 1 tablet (500 mg total) by mouth daily with breakfast.    pantoprazole (PROTONIX) 40 MG tablet Take 1 tablet (40 mg total) by mouth once daily. (Patient not taking: Reported on 1/27/2020)    tamsulosin (FLOMAX) 0.4 mg Cap TAKE 1 CAPSULE BY MOUTH ONCE DAILY    timolol maleate 0.5% (TIMOPTIC) 0.5 % Drop Place 1 drop into both eyes 2 (two) times daily.    traMADol (ULTRAM) 50 mg tablet Take 1-2 tabs bid prn severe pain     Family  History     Problem Relation (Age of Onset)    Cancer Sister    Hearing loss Sister    Hyperlipidemia Mother        Tobacco Use    Smoking status: Never Smoker    Smokeless tobacco: Never Used   Substance and Sexual Activity    Alcohol use: Yes     Alcohol/week: 3.3 standard drinks     Types: 4 Standard drinks or equivalent per week    Drug use: No    Sexual activity: Not on file     Review of Systems   Constitutional: Positive for fever. Negative for chills.   HENT: Negative for congestion, postnasal drip and rhinorrhea.    Eyes: Negative for visual disturbance.   Gastrointestinal: Negative for abdominal distention.   Genitourinary: Positive for difficulty urinating and dysuria.   Musculoskeletal: Negative for arthralgias and back pain.   Skin: Negative for color change.   Neurological: Negative for dizziness, weakness and light-headedness.   Hematological: Does not bruise/bleed easily.   Psychiatric/Behavioral: Negative for agitation.     Objective:     Vital Signs (Most Recent):  Temp: 98.2 °F (36.8 °C) (01/29/20 0155)  Pulse: 92 (01/29/20 0155)  Resp: 17 (01/29/20 0155)  BP: 117/73 (01/29/20 0155)  SpO2: 98 % (01/29/20 0155) Vital Signs (24h Range):  Temp:  [98.2 °F (36.8 °C)-102.8 °F (39.3 °C)] 98.2 °F (36.8 °C)  Pulse:  [] 92  Resp:  [12-27] 17  SpO2:  [93 %-98 %] 98 %  BP: (117-182)/(65-97) 117/73     Weight: 119.3 kg (263 lb)  Body mass index is 39.99 kg/m².    Physical Exam   Constitutional: He is oriented to person, place, and time. He appears well-developed and well-nourished.   HENT:   Head: Normocephalic and atraumatic.   Eyes: Pupils are equal, round, and reactive to light. EOM are normal.   Neck: Normal range of motion. Neck supple.   Cardiovascular: Normal rate.   Pulmonary/Chest: Effort normal and breath sounds normal.   Abdominal: Soft. Bowel sounds are normal. There is tenderness (mild tenderness noted in epigastric area).   Musculoskeletal: He exhibits no edema or deformity.    Neurological: He is alert and oriented to person, place, and time.   Skin: Skin is warm and dry.   Psychiatric: He has a normal mood and affect. His behavior is normal.         CRANIAL NERVES     CN III, IV, VI   Pupils are equal, round, and reactive to light.  Extraocular motions are normal.        Significant Labs:   A1C:   Recent Labs   Lab 09/14/19  1040 11/16/19  0959 12/24/19  0839   HGBA1C 6.0* 6.4*  6.4* 6.4*     ABilirubin:   Recent Labs   Lab 01/25/20  0919 01/28/20  2247   BILITOT 0.6 0.8     BMP:   Recent Labs   Lab 01/28/20 2247      *   K 3.6      CO2 21*   BUN 18   CREATININE 1.0   CALCIUM 8.7     CBC:   Recent Labs   Lab 01/28/20  2145   WBC 18.96*   HGB 16.8   HCT 49.0        CMP:   Recent Labs   Lab 01/28/20 2247   *   K 3.6      CO2 21*      BUN 18   CREATININE 1.0   CALCIUM 8.7   PROT 6.9   ALBUMIN 3.1*   BILITOT 0.8   ALKPHOS 106   AST 38   ALT 35   ANIONGAP 9   EGFRNONAA >60     Lactic Acid:   Recent Labs   Lab 01/29/20  0001   LACTATE 1.5       Pathology Results  (Last 10 years)    None        POCT Glucose:   Recent Labs   Lab 01/29/20  0138 01/29/20  0226   POCTGLUCOSE 171* 159*     TSH:   Recent Labs   Lab 09/14/19  1040   TSH 1.301     Urine Culture: No results for input(s): LABURIN in the last 48 hours.  Urine Studies:   Recent Labs   Lab 01/28/20 2106   COLORU Yellow   APPEARANCEUA Clear   PHUR 5.0   SPECGRAV 1.025   PROTEINUA Negative   GLUCUA Negative   KETONESU Negative   BILIRUBINUA Negative   OCCULTUA Trace*   NITRITE Positive*   UROBILINOGEN 1.0   LEUKOCYTESUR Trace*   RBCUA 4   WBCUA 15*   BACTERIA Moderate*   SQUAMEPITHEL 1     Recent Lab Results       01/29/20  0226   01/29/20  0138   01/29/20  0001   01/28/20  2247   01/28/20  2145        Influenza A, Molecular         Negative     Influenza B, Molecular         Negative     Albumin       3.1       Alkaline Phosphatase       106       ALT       35       Anion Gap       9        Appearance, UA               AST       38  Comment:  Specimen slightly hemolyzed       Bacteria, UA               Baso #         0.03     Basophil%         0.2     Bilirubin (UA)               BILIRUBIN TOTAL       0.8  Comment:  For infants and newborns, interpretation of results should be based  on gestational age, weight and in agreement with clinical  observations.  Premature Infant recommended reference ranges:  Up to 24 hours.............<8.0 mg/dL  Up to 48 hours............<12.0 mg/dL  3-5 days..................<15.0 mg/dL  6-29 days.................<15.0 mg/dL         BUN, Bld       18       Calcium       8.7       Chloride       102       CO2       21       Color, UA               Creatinine       1.0       Differential Method         Automated     eGFR if        >60       eGFR if non        >60  Comment:  Calculation used to obtain the estimated glomerular filtration  rate (eGFR) is the CKD-EPI equation.          Eos #         0.1     Eosinophil%         0.5     Flu A & B Source         Nasal swab     Glucose       109       Glucose, UA               Gran # (ANC)         16.6     Gran%         87.9     Hematocrit         49.0     Hemoglobin         16.8     Ketones, UA               Lactate, Quirino     1.5  Comment:  Falsely low lactic acid results can be found in samples   containing >=13.0 mg/dL total bilirubin and/or >=3.5 mg/dL   direct bilirubin.  Specimen slightly hemolyzed           Leukocytes, UA               Lymph #         0.7     Lymph%         3.9     MCH         29.9     MCHC         34.3     MCV         87     Microscopic Comment               Mono #         1.4     Mono%         7.5     MPV         10.7     NITRITE UA               Occult Blood UA               pH, UA               Platelets         187     POCT Glucose 159 171           Potassium       3.6  Comment:  Specimen slightly hemolyzed       PROTEIN TOTAL       6.9       Protein, UA               RBC          5.61     RBC, UA               RDW         14.6     Sodium       132       Specific Burnett, UA               Specimen UA               Squam Epithel, UA               UROBILINOGEN UA               WBC, UA               WBC         18.96                      01/28/20  2106        Influenza A, Molecular       Influenza B, Molecular       Albumin       Alkaline Phosphatase       ALT       Anion Gap       Appearance, UA Clear     AST       Bacteria, UA Moderate     Baso #       Basophil%       Bilirubin (UA) Negative     BILIRUBIN TOTAL       BUN, Bld       Calcium       Chloride       CO2       Color, UA Yellow     Creatinine       Differential Method       eGFR if        eGFR if non        Eos #       Eosinophil%       Flu A & B Source       Glucose       Glucose, UA Negative     Gran # (ANC)       Gran%       Hematocrit       Hemoglobin       Ketones, UA Negative     Lactate, Quirino       Leukocytes, UA Trace     Lymph #       Lymph%       MCH       MCHC       MCV       Microscopic Comment SEE COMMENT  Comment:  Other formed elements not mentioned in the report are not   present in the microscopic examination.        Mono #       Mono%       MPV       NITRITE UA Positive     Occult Blood UA Trace     pH, UA 5.0     Platelets       POCT Glucose       Potassium       PROTEIN TOTAL       Protein, UA Negative  Comment:  Recommend a 24 hour urine protein or a urine   protein/creatinine ratio if globulin induced proteinuria is  clinically suspected.       RBC       RBC, UA 4     RDW       Sodium       Specific Gravity, UA 1.025     Specimen UA Urine, Catheterized     Squam Epithel, UA 1     UROBILINOGEN UA 1.0     WBC, UA 15     WBC           All pertinent labs within the past 24 hours have been reviewed.    Significant Imaging: I have reviewed all pertinent imaging results/findings within the past 24 hours.

## 2020-01-29 NOTE — ASSESSMENT & PLAN NOTE
Hemoglobin A1c pending  POC glucose checks ac meals and nightly  Low dose SSI PRN  Hypoglycemia protocol PRN  Hold oral hypoglycemic agents  Diabetic Diet

## 2020-01-29 NOTE — PROGRESS NOTES
[facility] Hospital Medicine  Progress Note    Patient Name: Celso Hernandez  MRN: [id number]  Patient Class: OP- Observation   Admission Date: 1/28/2020  Length of Stay: 0 days  Attending Physician: Cheryl Kaiser*  Primary Care Provider: Inder Quarles MD        Subjective:     Principal Problem:Sepsis due to urinary tract infection        HPI:  64 y.o. male who has a past medical history of colon polyps, Diabetes mellitus type II, Hyperlipidemia, Hypertension, Multiple duodenal ulcers, and unspecified hemorrhoids without mention of complication who presents to the ED due to urinary retention.  He has history of urinary retention, and was seen in the ED on 1/25/20 and had a potts catheter was inserted. He followed up with urology on 1/27/20 and potts catheter was removed. He endorses some associated dysuria starting today, and noticed he had a low grade fever at home earlier today. He endorses decreased urinary stream.  He denies any blood in his urine, abdominal pain, nausea, vomiting, chills, recent sick contacts or other recent illness.  ED findings: WBCs 18.96, Na 132, U/A concerning for UTI, Urine and Blood cultures pending, chest x-ray showed no acute processes.  Patient admitted to hospital medicine for further medical management.    Overview/Hospital Course:  No notes on file    Interval History: awake and alert, reported suprapubic pain    All cx neg so far continue Zosyn and vanc, possible de-escalate in AM. D/c potts  Replace mag    Review of Systems   Constitutional: Negative for chills and fever.   Eyes: Negative for visual disturbance.   Gastrointestinal: Positive for abdominal pain. Negative for abdominal distention.   Genitourinary: Positive for dysuria. Negative for difficulty urinating.   Musculoskeletal: Negative for arthralgias and back pain.   Skin: Negative for color change.   Neurological: Negative for dizziness, weakness and light-headedness.   Hematological: Does  not bruise/bleed easily.   Psychiatric/Behavioral: Negative for agitation.     Objective:     Vital Signs (Most Recent):  Temp: 98.2 °F (36.8 °C) (01/29/20 1111)  Pulse: 99 (01/29/20 1554)  Resp: 17 (01/29/20 1111)  BP: 125/73 (01/29/20 1111)  SpO2: 98 % (01/29/20 1554) Vital Signs (24h Range):  Temp:  [98.2 °F (36.8 °C)-102.8 °F (39.3 °C)] 98.2 °F (36.8 °C)  Pulse:  [] 99  Resp:  [12-27] 17  SpO2:  [93 %-98 %] 98 %  BP: (117-182)/(65-97) 125/73     Weight: 126 kg (277 lb 12.5 oz)  Body mass index is 42.24 kg/m².    Intake/Output Summary (Last 24 hours) at 1/29/2020 1610  Last data filed at 1/29/2020 0131  Gross per 24 hour   Intake 1600 ml   Output 1000 ml   Net 600 ml      Physical Exam   Constitutional: He is oriented to person, place, and time. He appears well-developed and well-nourished.   HENT:   Head: Normocephalic and atraumatic.   Neck: Neck supple.   Cardiovascular: Normal rate.   Pulmonary/Chest: Effort normal and breath sounds normal.   Abdominal: Soft. Bowel sounds are normal. There is tenderness.   Obese     Musculoskeletal: He exhibits no edema or deformity.   Neurological: He is alert and oriented to person, place, and time.   Skin: Skin is warm and dry.   Psychiatric: He has a normal mood and affect.       Significant Labs:   Bilirubin:   Recent Labs   Lab 01/25/20  0919 01/28/20 2247 01/29/20  0611   BILITOT 0.6 0.8 1.3*     CBC:   Recent Labs   Lab 01/28/20 2145 01/29/20  0611   WBC 18.96* 24.19*   HGB 16.8 16.2   HCT 49.0 47.2    196     CMP:   Recent Labs   Lab 01/28/20 2247 01/29/20  0611   * 134*   K 3.6 3.9    100   CO2 21* 25    128*   BUN 18 15   CREATININE 1.0 1.1   CALCIUM 8.7 9.2   PROT 6.9 7.2   ALBUMIN 3.1* 3.2*   BILITOT 0.8 1.3*   ALKPHOS 106 102   AST 38 36   ALT 35 39   ANIONGAP 9 9   EGFRNONAA >60 >60     Cardiac Markers: No results for input(s): CKMB, MYOGLOBIN, BNP, TROPISTAT in the last 48 hours.  Lactic Acid:   Recent Labs   Lab  01/29/20  0001   LACTATE 1.5     Magnesium:   Recent Labs   Lab 01/29/20  0611   MG 1.5*     Troponin: No results for input(s): TROPONINI in the last 48 hours.  Urine Culture: No results for input(s): LABURIN in the last 48 hours.  Urine Studies:   Recent Labs   Lab 01/28/20  2106   COLORU Yellow   APPEARANCEUA Clear   PHUR 5.0   SPECGRAV 1.025   PROTEINUA Negative   GLUCUA Negative   KETONESU Negative   BILIRUBINUA Negative   OCCULTUA Trace*   NITRITE Positive*   UROBILINOGEN 1.0   LEUKOCYTESUR Trace*   RBCUA 4   WBCUA 15*   BACTERIA Moderate*   SQUAMEPITHEL 1       Significant Imaging: I have reviewed all pertinent imaging results/findings within the past 24 hours.      Assessment/Plan:      * Sepsis due to urinary tract infection  Urinary retention    UA: concerning for UTI  Urine Cultures: NGTD  Blood Cultures: NGTD  Continue Vanc and Zosyn  Continue flomax    Mixed hyperlipidemia  Continue statin      Essential hypertension  Continue HCTZ, and losartan      Diabetes mellitus, type 2  Hemoglobin A1c pending  POC glucose checks ac meals and nightly  Low dose SSI PRN  Hypoglycemia protocol PRN  Hold oral hypoglycemic agents  Diabetic Diet              VTE Risk Mitigation (From admission, onward)         Ordered     Place sequential compression device  Until discontinued      01/29/20 0600                      Cheryl Kaiser MD  Department of Hospital Medicine   [facility]

## 2020-01-29 NOTE — PLAN OF CARE
met with patient at bedside. Wife present during visit.  Patient reported he independent with adls.  Not current with any home health agencies and does not use DME.    Patient admitted for UTI.  Will likely need urology follow up.    This  put name on white board. Discharge planning brochure  given to patient.  Patient verbalized understanding.       01/29/20 151   Discharge Assessment   Assessment Type Discharge Planning Assessment   Confirmed/corrected address and phone number on facesheet? Yes   Assessment information obtained from? Patient   Expected Length of Stay (days) 2   Communicated expected length of stay with patient/caregiver yes   Prior to hospitilization cognitive status: Alert/Oriented   Prior to hospitalization functional status: Independent   Current cognitive status: Alert/Oriented   Current Functional Status: Independent   Lives With spouse   Able to Return to Prior Arrangements yes   Is patient able to care for self after discharge? Yes   Who are your caregiver(s) and their phone number(s)?   (Spouse: Carol Hernandez [phone])   Readmission Within the Last 30 Days no previous admission in last 30 days   Patient currently being followed by outpatient case management? No   Equipment Currently Used at Home none   Do you have any problems affording any of your prescribed medications? No   Is the patient taking medications as prescribed? yes   Does the patient have transportation home? Yes   Transportation Anticipated family or friend will provide   Discharge Plan A Home with family   Discharge Plan B Other   DME Needed Upon Discharge  none   Patient/Family in Agreement with Plan yes

## 2020-01-29 NOTE — PLAN OF CARE
Patient admitted with Dx of Sepsis and urinary retention. Patient is AAOX4. Resp. Unlabored, clear lungs sound. No skin breakdowns on admission. Denies pain or any other discomfort at this time. Antibiotic therapy initiated. Care plan discussed with patient and family. Fall protocol and safety measures explained. Instructed to report any change and to call for assistance to get out from the bed. Patient verbalized understanding. Bed alarm activated. Call light at reach.

## 2020-01-29 NOTE — ED NOTES
Updated pt on status of moving to the floor. Turned the lights off at pt request. Pt denies all other needs at this time.

## 2020-01-29 NOTE — PLAN OF CARE
Patient A&O x 4. On IV antibiotics no adverse reactions noted. With intact potts catheter draining to clear yellowish urine. Pt c/o feeling pressure in his bladder, non tender upon palpation. Bladder scan revealed 44ml retention. Dr. Reeves informed. With orders to delay potts removal until after the morning rounds.

## 2020-01-29 NOTE — H&P
"[facility] Hospital Medicine  History & Physical    Patient Name: Celso Hernandez  MRN: [id number]  Admission Date: 1/28/2020  Attending Physician: Cheryl Kaiser*   Primary Care Provider: Inder Quarles MD         Patient information was obtained from patient, past medical records and ER records.     Subjective:     Principal Problem:Sepsis due to urinary tract infection    Chief Complaint:   Chief Complaint   Patient presents with    Urinary Retention     pt states "I have a full bladder and I can't empty it." pt states he was seen a couple days ago with similar symptoms        HPI: 64 y.o. male who has a past medical history of colon polyps, Diabetes mellitus type II, Hyperlipidemia, Hypertension, Multiple duodenal ulcers, and unspecified hemorrhoids without mention of complication who presents to the ED due to urinary retention.  He has history of urinary retention, and was seen in the ED on 1/25/20 and had a potts catheter was inserted. He followed up with urology on 1/27/20 and potts catheter was removed. He endorses some associated dysuria starting today, and noticed he had a low grade fever at home earlier today. He endorses decreased urinary stream.  He denies any blood in his urine, abdominal pain, nausea, vomiting, chills, recent sick contacts or other recent illness.  ED findings: WBCs 18.96, Na 132, U/A concerning for UTI, Urine and Blood cultures pending, chest x-ray showed no acute processes.  Patient admitted to hospital medicine for further medical management.    Past Medical History:   Diagnosis Date    Colon polyps     Diabetes mellitus type II     Hyperlipidemia     Hypertension     Multiple duodenal ulcers 10/08/2019    Unspecified hemorrhoids without mention of complication        Past Surgical History:   Procedure Laterality Date    COLONOSCOPY N/A 10/8/2019    Procedure: COLONOSCOPY suprep;  Surgeon: Landon Guajardo MD;  Location: [facility];  Service: " General;  Laterality: N/A;    COLONOSCOPY W/ POLYPECTOMY  10/08/2019    ESOPHAGOGASTRODUODENOSCOPY  10/08/2019    w/biopsies    ESOPHAGOGASTRODUODENOSCOPY N/A 10/8/2019    Procedure: EGD (ESOPHAGOGASTRODUODENOSCOPY);  Surgeon: Landon Guajardo MD;  Location: [facility];  Service: General;  Laterality: N/A;    none         Review of patient's allergies indicates:  No Known Allergies    No current facility-administered medications on file prior to encounter.      Current Outpatient Medications on File Prior to Encounter   Medication Sig    atorvastatin (LIPITOR) 80 MG tablet TAKE 1 TABLET BY MOUTH ONCE DAILY    cholecalciferol, vitamin D3, (VITAMIN D3) 2,000 unit Cap Take 1 capsule by mouth once daily.    hydroCHLOROthiazide (HYDRODIURIL) 12.5 MG Tab Take 1 tablet (12.5 mg total) by mouth once daily.    latanoprost 0.005 % ophthalmic solution Place 1 drop into both eyes every evening.    losartan (COZAAR) 100 MG tablet Take 1 tablet (100 mg total) by mouth once daily.    meloxicam (MOBIC) 15 MG tablet TAKE 1 TABLET BY MOUTH ONCE DAILY    metFORMIN (GLUCOPHAGE) 500 MG tablet Take 1 tablet (500 mg total) by mouth daily with breakfast.    pantoprazole (PROTONIX) 40 MG tablet Take 1 tablet (40 mg total) by mouth once daily. (Patient not taking: Reported on 1/27/2020)    tamsulosin (FLOMAX) 0.4 mg Cap TAKE 1 CAPSULE BY MOUTH ONCE DAILY    timolol maleate 0.5% (TIMOPTIC) 0.5 % Drop Place 1 drop into both eyes 2 (two) times daily.    traMADol (ULTRAM) 50 mg tablet Take 1-2 tabs bid prn severe pain     Family History     Problem Relation (Age of Onset)    Cancer Sister    Hearing loss Sister    Hyperlipidemia Mother        Tobacco Use    Smoking status: Never Smoker    Smokeless tobacco: Never Used   Substance and Sexual Activity    Alcohol use: Yes     Alcohol/week: 3.3 standard drinks     Types: 4 Standard drinks or equivalent per week    Drug use: No    Sexual activity: Not on file     Review of Systems    Constitutional: Positive for fever. Negative for chills.   HENT: Negative for congestion, postnasal drip and rhinorrhea.    Eyes: Negative for visual disturbance.   Gastrointestinal: Negative for abdominal distention.   Genitourinary: Positive for difficulty urinating and dysuria.   Musculoskeletal: Negative for arthralgias and back pain.   Skin: Negative for color change.   Neurological: Negative for dizziness, weakness and light-headedness.   Hematological: Does not bruise/bleed easily.   Psychiatric/Behavioral: Negative for agitation.     Objective:     Vital Signs (Most Recent):  Temp: 98.2 °F (36.8 °C) (01/29/20 0155)  Pulse: 92 (01/29/20 0155)  Resp: 17 (01/29/20 0155)  BP: 117/73 (01/29/20 0155)  SpO2: 98 % (01/29/20 0155) Vital Signs (24h Range):  Temp:  [98.2 °F (36.8 °C)-102.8 °F (39.3 °C)] 98.2 °F (36.8 °C)  Pulse:  [] 92  Resp:  [12-27] 17  SpO2:  [93 %-98 %] 98 %  BP: (117-182)/(65-97) 117/73     Weight: 119.3 kg (263 lb)  Body mass index is 39.99 kg/m².    Physical Exam   Constitutional: He is oriented to person, place, and time. He appears well-developed and well-nourished.   HENT:   Head: Normocephalic and atraumatic.   Eyes: Pupils are equal, round, and reactive to light. EOM are normal.   Neck: Normal range of motion. Neck supple.   Cardiovascular: Normal rate.   Pulmonary/Chest: Effort normal and breath sounds normal.   Abdominal: Soft. Bowel sounds are normal. There is tenderness (mild tenderness noted in epigastric area).   Musculoskeletal: He exhibits no edema or deformity.   Neurological: He is alert and oriented to person, place, and time.   Skin: Skin is warm and dry.   Psychiatric: He has a normal mood and affect. His behavior is normal.         CRANIAL NERVES     CN III, IV, VI   Pupils are equal, round, and reactive to light.  Extraocular motions are normal.        Significant Labs:   A1C:   Recent Labs   Lab 09/14/19  1040 11/16/19  0959 12/24/19  0839   HGBA1C 6.0* 6.4*  6.4*  6.4*     ABilirubin:   Recent Labs   Lab 01/25/20  0919 01/28/20  2247   BILITOT 0.6 0.8     BMP:   Recent Labs   Lab 01/28/20  2247      *   K 3.6      CO2 21*   BUN 18   CREATININE 1.0   CALCIUM 8.7     CBC:   Recent Labs   Lab 01/28/20  2145   WBC 18.96*   HGB 16.8   HCT 49.0        CMP:   Recent Labs   Lab 01/28/20 2247   *   K 3.6      CO2 21*      BUN 18   CREATININE 1.0   CALCIUM 8.7   PROT 6.9   ALBUMIN 3.1*   BILITOT 0.8   ALKPHOS 106   AST 38   ALT 35   ANIONGAP 9   EGFRNONAA >60     Lactic Acid:   Recent Labs   Lab 01/29/20  0001   LACTATE 1.5       Pathology Results  (Last 10 years)    None        POCT Glucose:   Recent Labs   Lab 01/29/20  0138 01/29/20  0226   POCTGLUCOSE 171* 159*     TSH:   Recent Labs   Lab 09/14/19  1040   TSH 1.301     Urine Culture: No results for input(s): LABURIN in the last 48 hours.  Urine Studies:   Recent Labs   Lab 01/28/20  2106   COLORU Yellow   APPEARANCEUA Clear   PHUR 5.0   SPECGRAV 1.025   PROTEINUA Negative   GLUCUA Negative   KETONESU Negative   BILIRUBINUA Negative   OCCULTUA Trace*   NITRITE Positive*   UROBILINOGEN 1.0   LEUKOCYTESUR Trace*   RBCUA 4   WBCUA 15*   BACTERIA Moderate*   SQUAMEPITHEL 1     Recent Lab Results       01/29/20  0226   01/29/20  0138   01/29/20  0001   01/28/20  2247   01/28/20  2145        Influenza A, Molecular         Negative     Influenza B, Molecular         Negative     Albumin       3.1       Alkaline Phosphatase       106       ALT       35       Anion Gap       9       Appearance, UA               AST       38  Comment:  Specimen slightly hemolyzed       Bacteria, UA               Baso #         0.03     Basophil%         0.2     Bilirubin (UA)               BILIRUBIN TOTAL       0.8  Comment:  For infants and newborns, interpretation of results should be based  on gestational age, weight and in agreement with clinical  observations.  Premature Infant recommended reference  ranges:  Up to 24 hours.............<8.0 mg/dL  Up to 48 hours............<12.0 mg/dL  3-5 days..................<15.0 mg/dL  6-29 days.................<15.0 mg/dL         BUN, Bld       18       Calcium       8.7       Chloride       102       CO2       21       Color, UA               Creatinine       1.0       Differential Method         Automated     eGFR if        >60       eGFR if non        >60  Comment:  Calculation used to obtain the estimated glomerular filtration  rate (eGFR) is the CKD-EPI equation.          Eos #         0.1     Eosinophil%         0.5     Flu A & B Source         Nasal swab     Glucose       109       Glucose, UA               Gran # (ANC)         16.6     Gran%         87.9     Hematocrit         49.0     Hemoglobin         16.8     Ketones, UA               Lactate, Quirino     1.5  Comment:  Falsely low lactic acid results can be found in samples   containing >=13.0 mg/dL total bilirubin and/or >=3.5 mg/dL   direct bilirubin.  Specimen slightly hemolyzed           Leukocytes, UA               Lymph #         0.7     Lymph%         3.9     MCH         29.9     MCHC         34.3     MCV         87     Microscopic Comment               Mono #         1.4     Mono%         7.5     MPV         10.7     NITRITE UA               Occult Blood UA               pH, UA               Platelets         187     POCT Glucose 159 171           Potassium       3.6  Comment:  Specimen slightly hemolyzed       PROTEIN TOTAL       6.9       Protein, UA               RBC         5.61     RBC, UA               RDW         14.6     Sodium       132       Specific [place], UA               Specimen UA               Squam Epithel, UA               UROBILINOGEN UA               WBC, UA               WBC         18.96                      01/28/20  2106        Influenza A, Molecular       Influenza B, Molecular       Albumin       Alkaline Phosphatase       ALT       Anion Gap        Appearance, UA Clear     AST       Bacteria, UA Moderate     Baso #       Basophil%       Bilirubin (UA) Negative     BILIRUBIN TOTAL       BUN, Bld       Calcium       Chloride       CO2       Color, UA Yellow     Creatinine       Differential Method       eGFR if        eGFR if non        Eos #       Eosinophil%       Flu A & B Source       Glucose       Glucose, UA Negative     Gran # (ANC)       Gran%       Hematocrit       Hemoglobin       Ketones, UA Negative     Lactate, Quirino       Leukocytes, UA Trace     Lymph #       Lymph%       MCH       MCHC       MCV       Microscopic Comment SEE COMMENT  Comment:  Other formed elements not mentioned in the report are not   present in the microscopic examination.        Mono #       Mono%       MPV       NITRITE UA Positive     Occult Blood UA Trace     pH, UA 5.0     Platelets       POCT Glucose       Potassium       PROTEIN TOTAL       Protein, UA Negative  Comment:  Recommend a 24 hour urine protein or a urine   protein/creatinine ratio if globulin induced proteinuria is  clinically suspected.       RBC       RBC, UA 4     RDW       Sodium       Specific Gravity, UA 1.025     Specimen UA Urine, Catheterized     Squam Epithel, UA 1     UROBILINOGEN UA 1.0     WBC, UA 15     WBC           All pertinent labs within the past 24 hours have been reviewed.    Significant Imaging: I have reviewed all pertinent imaging results/findings within the past 24 hours.    Assessment/Plan:     * Sepsis due to urinary tract infection  Urinary retention    UA: concerning for UTI  Urine Cultures: pending  Blood Cultures: pending  Continue Vanc and Zosyn  Continue flomax    Mixed hyperlipidemia  Continue statin      Essential hypertension  Continue HCTZ, and losartan      Diabetes mellitus, type 2  Hemoglobin A1c pending  POC glucose checks ac meals and nightly  Low dose SSI PRN  Hypoglycemia protocol PRN  Hold oral hypoglycemic agents  Diabetic  Diet              VTE Risk Mitigation (From admission, onward)         Ordered     Place sequential compression device  Until discontinued      01/29/20 0600                   Kenisha Gonzalez NP  Department of Hospital Medicine   [facility]

## 2020-01-29 NOTE — SUBJECTIVE & OBJECTIVE
Interval History: awake and alert, reported suprapubic pain    All cx neg so far continue Zosyn and vanc, possible de-escalate in AM. D/c potts  Replace mag    Review of Systems   Constitutional: Negative for chills and fever.   Eyes: Negative for visual disturbance.   Gastrointestinal: Positive for abdominal pain. Negative for abdominal distention.   Genitourinary: Positive for dysuria. Negative for difficulty urinating.   Musculoskeletal: Negative for arthralgias and back pain.   Skin: Negative for color change.   Neurological: Negative for dizziness, weakness and light-headedness.   Hematological: Does not bruise/bleed easily.   Psychiatric/Behavioral: Negative for agitation.     Objective:     Vital Signs (Most Recent):  Temp: 98.2 °F (36.8 °C) (01/29/20 1111)  Pulse: 99 (01/29/20 1554)  Resp: 17 (01/29/20 1111)  BP: 125/73 (01/29/20 1111)  SpO2: 98 % (01/29/20 1554) Vital Signs (24h Range):  Temp:  [98.2 °F (36.8 °C)-102.8 °F (39.3 °C)] 98.2 °F (36.8 °C)  Pulse:  [] 99  Resp:  [12-27] 17  SpO2:  [93 %-98 %] 98 %  BP: (117-182)/(65-97) 125/73     Weight: 126 kg (277 lb 12.5 oz)  Body mass index is 42.24 kg/m².    Intake/Output Summary (Last 24 hours) at 1/29/2020 1610  Last data filed at 1/29/2020 0131  Gross per 24 hour   Intake 1600 ml   Output 1000 ml   Net 600 ml      Physical Exam   Constitutional: He is oriented to person, place, and time. He appears well-developed and well-nourished.   HENT:   Head: Normocephalic and atraumatic.   Neck: Neck supple.   Cardiovascular: Normal rate.   Pulmonary/Chest: Effort normal and breath sounds normal.   Abdominal: Soft. Bowel sounds are normal. There is tenderness.   Obese     Musculoskeletal: He exhibits no edema or deformity.   Neurological: He is alert and oriented to person, place, and time.   Skin: Skin is warm and dry.   Psychiatric: He has a normal mood and affect.       Significant Labs:   Bilirubin:   Recent Labs   Lab 01/25/20  0919 01/28/20  2243  01/29/20  0611   BILITOT 0.6 0.8 1.3*     CBC:   Recent Labs   Lab 01/28/20  2145 01/29/20  0611   WBC 18.96* 24.19*   HGB 16.8 16.2   HCT 49.0 47.2    196     CMP:   Recent Labs   Lab 01/28/20  2247 01/29/20  0611   * 134*   K 3.6 3.9    100   CO2 21* 25    128*   BUN 18 15   CREATININE 1.0 1.1   CALCIUM 8.7 9.2   PROT 6.9 7.2   ALBUMIN 3.1* 3.2*   BILITOT 0.8 1.3*   ALKPHOS 106 102   AST 38 36   ALT 35 39   ANIONGAP 9 9   EGFRNONAA >60 >60     Cardiac Markers: No results for input(s): CKMB, MYOGLOBIN, BNP, TROPISTAT in the last 48 hours.  Lactic Acid:   Recent Labs   Lab 01/29/20  0001   LACTATE 1.5     Magnesium:   Recent Labs   Lab 01/29/20  0611   MG 1.5*     Troponin: No results for input(s): TROPONINI in the last 48 hours.  Urine Culture: No results for input(s): LABURIN in the last 48 hours.  Urine Studies:   Recent Labs   Lab 01/28/20 2106   COLORU Yellow   APPEARANCEUA Clear   PHUR 5.0   SPECGRAV 1.025   PROTEINUA Negative   GLUCUA Negative   KETONESU Negative   BILIRUBINUA Negative   OCCULTUA Trace*   NITRITE Positive*   UROBILINOGEN 1.0   LEUKOCYTESUR Trace*   RBCUA 4   WBCUA 15*   BACTERIA Moderate*   SQUAMEPITHEL 1       Significant Imaging: I have reviewed all pertinent imaging results/findings within the past 24 hours.

## 2020-01-29 NOTE — PROGRESS NOTES
Pharmacokinetic Initial Assessment: IV Vancomycin    Assessment/Plan:    Initiate intravenous vancomycin with loading dose of 2000 mg once followed by a maintenance dose of vancomycin 1750mg IV every 12 hours  Desired empiric serum trough concentration is 10 to 20 mcg/mL  Draw vancomycin trough level 30 min prior to fourth dose on 1/30/20 at approximately 1100.   Pharmacy will continue to follow and monitor vancomycin.      Please contact pharmacy at extension [phone] with any questions regarding this assessment.     Thank you for the consult,   Holden TUNDE Nikkie       Patient brief summary:  Celso Hernandez is a 64 y.o. male initiated on antimicrobial therapy with IV Vancomycin for treatment of suspected urinary tract infection    Drug Allergies:   Review of patient's allergies indicates:  No Known Allergies    Actual Body Weight:   119.3 kg    Renal Function:   Estimated Creatinine Clearance: 92.5 mL/min (based on SCr of 1 mg/dL).,     Dialysis Method (if applicable):  N/A    CBC (last 72 hours):  Recent Labs   Lab Result Units 01/28/20  2145   WBC K/uL 18.96*   Hemoglobin g/dL 16.8   Hematocrit % 49.0   Platelets K/uL 187   Gran% % 87.9*   Lymph% % 3.9*   Mono% % 7.5   Eosinophil% % 0.5   Basophil% % 0.2   Differential Method  Automated       Metabolic Panel (last 72 hours):  Recent Labs   Lab Result Units 01/28/20  2106 01/28/20  2247   Sodium mmol/L  --  132*   Potassium mmol/L  --  3.6   Chloride mmol/L  --  102   CO2 mmol/L  --  21*   Glucose mg/dL  --  109   Glucose, UA  Negative  --    BUN, Bld mg/dL  --  18   Creatinine mg/dL  --  1.0   Albumin g/dL  --  3.1*   Total Bilirubin mg/dL  --  0.8   Alkaline Phosphatase U/L  --  106   AST U/L  --  38   ALT U/L  --  35       Drug levels (last 3 results):  No results for input(s): VANCOMYCINRA, VANCOMYCINPE, VANCOMYCINTR in the last 72 hours.    Microbiologic Results:  Microbiology Results (last 7 days)       Procedure Component Value Units Date/Time    Blood culture x  two cultures. Draw prior to antibiotics. [[id number]] Collected:  01/28/20 2145    Order Status:  Sent Specimen:  Blood from Peripheral, Antecubital, Right Updated:  01/29/20 0147    Blood culture x two cultures. Draw prior to antibiotics. [[id number]] Collected:  01/28/20 2201    Order Status:  Sent Specimen:  Blood from Peripheral, Hand, Right Updated:  01/29/20 0147    Influenza A & B by Molecular [[id number]] Collected:  01/28/20 2145    Order Status:  Completed Specimen:  Nasopharyngeal Swab Updated:  01/28/20 2246     Influenza A, Molecular Negative     Influenza B, Molecular Negative     Flu A & B Source Nasal swab    Urine culture [[id number]] Collected:  01/28/20 2106    Order Status:  No result Specimen:  Urine Updated:  01/28/20 2210

## 2020-01-30 LAB
BACTERIA UR CULT: ABNORMAL
BASOPHILS # BLD AUTO: 0.04 K/UL (ref 0–0.2)
BASOPHILS NFR BLD: 0.2 % (ref 0–1.9)
DIFFERENTIAL METHOD: ABNORMAL
EOSINOPHIL # BLD AUTO: 0.2 K/UL (ref 0–0.5)
EOSINOPHIL NFR BLD: 0.9 % (ref 0–8)
ERYTHROCYTE [DISTWIDTH] IN BLOOD BY AUTOMATED COUNT: 15.1 % (ref 11.5–14.5)
HCT VFR BLD AUTO: 47.3 % (ref 40–54)
HGB BLD-MCNC: 15.8 G/DL (ref 14–18)
LYMPHOCYTES # BLD AUTO: 2 K/UL (ref 1–4.8)
LYMPHOCYTES NFR BLD: 8.2 % (ref 18–48)
MAGNESIUM SERPL-MCNC: 1.7 MG/DL (ref 1.6–2.6)
MCH RBC QN AUTO: 29.3 PG (ref 27–31)
MCHC RBC AUTO-ENTMCNC: 33.4 G/DL (ref 32–36)
MCV RBC AUTO: 88 FL (ref 82–98)
MONOCYTES # BLD AUTO: 2.3 K/UL (ref 0.3–1)
MONOCYTES NFR BLD: 9.6 % (ref 4–15)
NEUTROPHILS # BLD AUTO: 19.4 K/UL (ref 1.8–7.7)
NEUTROPHILS NFR BLD: 81.1 % (ref 38–73)
PHOSPHATE SERPL-MCNC: 2 MG/DL (ref 2.7–4.5)
PLATELET # BLD AUTO: 206 K/UL (ref 150–350)
PMV BLD AUTO: 10.2 FL (ref 9.2–12.9)
POCT GLUCOSE: 114 MG/DL (ref 70–110)
POCT GLUCOSE: 142 MG/DL (ref 70–110)
POCT GLUCOSE: 160 MG/DL (ref 70–110)
POCT GLUCOSE: 162 MG/DL (ref 70–110)
RBC # BLD AUTO: 5.4 M/UL (ref 4.6–6.2)
WBC # BLD AUTO: 24.01 K/UL (ref 3.9–12.7)

## 2020-01-30 PROCEDURE — 25000003 PHARM REV CODE 250: Performed by: NURSE PRACTITIONER

## 2020-01-30 PROCEDURE — 84100 ASSAY OF PHOSPHORUS: CPT

## 2020-01-30 PROCEDURE — 96376 TX/PRO/DX INJ SAME DRUG ADON: CPT

## 2020-01-30 PROCEDURE — 51702 INSERT TEMP BLADDER CATH: CPT

## 2020-01-30 PROCEDURE — 63600175 PHARM REV CODE 636 W HCPCS: Performed by: FAMILY MEDICINE

## 2020-01-30 PROCEDURE — 63600175 PHARM REV CODE 636 W HCPCS: Performed by: NURSE PRACTITIONER

## 2020-01-30 PROCEDURE — G0378 HOSPITAL OBSERVATION PER HR: HCPCS

## 2020-01-30 PROCEDURE — 85025 COMPLETE CBC W/AUTO DIFF WBC: CPT

## 2020-01-30 PROCEDURE — 51701 INSERT BLADDER CATHETER: CPT

## 2020-01-30 PROCEDURE — 36415 COLL VENOUS BLD VENIPUNCTURE: CPT

## 2020-01-30 PROCEDURE — 25000003 PHARM REV CODE 250: Performed by: FAMILY MEDICINE

## 2020-01-30 PROCEDURE — 83735 ASSAY OF MAGNESIUM: CPT

## 2020-01-30 PROCEDURE — 94761 N-INVAS EAR/PLS OXIMETRY MLT: CPT

## 2020-01-30 RX ADMIN — PANTOPRAZOLE SODIUM 40 MG: 40 TABLET, DELAYED RELEASE ORAL at 09:01

## 2020-01-30 RX ADMIN — PIPERACILLIN SODIUM AND TAZOBACTAM SODIUM 4.5 G: 4; .5 INJECTION, POWDER, LYOPHILIZED, FOR SOLUTION INTRAVENOUS at 03:01

## 2020-01-30 RX ADMIN — TIMOLOL MALEATE 1 DROP: 5 SOLUTION OPHTHALMIC at 09:01

## 2020-01-30 RX ADMIN — ACETAMINOPHEN 650 MG: 325 TABLET ORAL at 11:01

## 2020-01-30 RX ADMIN — MAGNESIUM OXIDE 400 MG (241.3 MG MAGNESIUM) TABLET 400 MG: TABLET at 08:01

## 2020-01-30 RX ADMIN — HYDROCHLOROTHIAZIDE 12.5 MG: 12.5 TABLET ORAL at 09:01

## 2020-01-30 RX ADMIN — TIMOLOL MALEATE 1 DROP: 5 SOLUTION OPHTHALMIC at 08:01

## 2020-01-30 RX ADMIN — TAMSULOSIN HYDROCHLORIDE 0.4 MG: 0.4 CAPSULE ORAL at 09:01

## 2020-01-30 RX ADMIN — ATORVASTATIN CALCIUM 80 MG: 40 TABLET, FILM COATED ORAL at 09:01

## 2020-01-30 RX ADMIN — CEFTRIAXONE 1 G: 1 INJECTION, SOLUTION INTRAVENOUS at 09:01

## 2020-01-30 RX ADMIN — LOSARTAN POTASSIUM 100 MG: 50 TABLET ORAL at 09:01

## 2020-01-30 RX ADMIN — LATANOPROST 1 DROP: 50 SOLUTION OPHTHALMIC at 08:01

## 2020-01-30 RX ADMIN — MAGNESIUM OXIDE 400 MG (241.3 MG MAGNESIUM) TABLET 400 MG: TABLET at 09:01

## 2020-01-30 NOTE — PLAN OF CARE
Requested to room. Panned camera with patient permission. Pt stated he was trying to turn the monitor off and refused the VN to round on him. Pt informed that it will be noted .

## 2020-01-30 NOTE — ASSESSMENT & PLAN NOTE
Urinary retention    UA: concerning for UTI  Urine Cultures: NGTD  Blood Cultures: NGTD  Continue Vanc and Zosyn. Switch to Rocephin  Continue flomax

## 2020-01-30 NOTE — SUBJECTIVE & OBJECTIVE
Interval History: awake and alert, reported suprapubic pain    All cx neg so far, wbc up trending switch to Rocephin    Replace mag    Review of Systems   Constitutional: Negative for chills and fever.   Eyes: Negative for visual disturbance.   Gastrointestinal: Positive for abdominal pain. Negative for abdominal distention.   Genitourinary: Positive for dysuria. Negative for difficulty urinating.   Musculoskeletal: Negative for arthralgias and back pain.   Skin: Negative for color change.   Neurological: Negative for dizziness, weakness and light-headedness.   Hematological: Does not bruise/bleed easily.   Psychiatric/Behavioral: Negative for agitation.     Objective:     Vital Signs (Most Recent):  Temp: 98 °F (36.7 °C) (01/30/20 1130)  Pulse: 80 (01/30/20 1146)  Resp: 20 (01/30/20 1130)  BP: (!) 145/73 (01/30/20 1130)  SpO2: 97 % (01/30/20 1216) Vital Signs (24h Range):  Temp:  [98 °F (36.7 °C)-99.1 °F (37.3 °C)] 98 °F (36.7 °C)  Pulse:  [80-99] 80  Resp:  [17-20] 20  SpO2:  [95 %-98 %] 97 %  BP: (117-145)/(64-75) 145/73     Weight: 126.8 kg (279 lb 8.7 oz)  Body mass index is 42.5 kg/m².    Intake/Output Summary (Last 24 hours) at 1/30/2020 1428  Last data filed at 1/30/2020 1414  Gross per 24 hour   Intake 200 ml   Output 3900 ml   Net -3700 ml      Physical Exam   Constitutional: He is oriented to person, place, and time. He appears well-developed and well-nourished.   HENT:   Head: Normocephalic and atraumatic.   Neck: Neck supple.   Cardiovascular: Normal rate.   Pulmonary/Chest: Effort normal and breath sounds normal.   Abdominal: Soft. Bowel sounds are normal. There is tenderness.   Obese     Musculoskeletal: He exhibits no edema or deformity.   Neurological: He is alert and oriented to person, place, and time.   Skin: Skin is warm and dry.   Psychiatric: He has a normal mood and affect.       Significant Labs:   Bilirubin:   Recent Labs   Lab 01/25/20  0919 01/28/20  2247 01/29/20  0611   BILITOT 0.6 0.8  1.3*     CBC:   Recent Labs   Lab 01/28/20  2145 01/29/20  0611 01/30/20  0546   WBC 18.96* 24.19* 24.01*   HGB 16.8 16.2 15.8   HCT 49.0 47.2 47.3    196 206     CMP:   Recent Labs   Lab 01/28/20  2247 01/29/20  0611   * 134*   K 3.6 3.9    100   CO2 21* 25    128*   BUN 18 15   CREATININE 1.0 1.1   CALCIUM 8.7 9.2   PROT 6.9 7.2   ALBUMIN 3.1* 3.2*   BILITOT 0.8 1.3*   ALKPHOS 106 102   AST 38 36   ALT 35 39   ANIONGAP 9 9   EGFRNONAA >60 >60     Cardiac Markers: No results for input(s): CKMB, MYOGLOBIN, BNP, TROPISTAT in the last 48 hours.  Lactic Acid:   Recent Labs   Lab 01/29/20  0001   LACTATE 1.5     Magnesium:   Recent Labs   Lab 01/29/20  0611 01/30/20  0546   MG 1.5* 1.7     Troponin: No results for input(s): TROPONINI in the last 48 hours.  Urine Culture:   Recent Labs   Lab 01/28/20 2106   LABURIN GRAM NEGATIVE YAW  >100,000 cfu/ml  Identification and susceptibility pending  *     Urine Studies:   Recent Labs   Lab 01/28/20 2106   COLORU Yellow   APPEARANCEUA Clear   PHUR 5.0   SPECGRAV 1.025   PROTEINUA Negative   GLUCUA Negative   KETONESU Negative   BILIRUBINUA Negative   OCCULTUA Trace*   NITRITE Positive*   UROBILINOGEN 1.0   LEUKOCYTESUR Trace*   RBCUA 4   WBCUA 15*   BACTERIA Moderate*   SQUAMEPITHEL 1       Significant Imaging: I have reviewed all pertinent imaging results/findings within the past 24 hours.

## 2020-01-30 NOTE — PLAN OF CARE
Pt. Awake and alert.  Lantigua Dc'd per order.  Pt with complaints of pain in the bladder area; bladder scan of 850 cc.  In and out cath performed per order for output of 1000 cc.  Pain relieved post urine removal.  Safety maintained.  Wife at bedside.  Pt instructed to call for assistance; verbalized understanding.

## 2020-01-30 NOTE — PLAN OF CARE
Pt vitals were maintained. Pt denied any pain. During shift change am nurse gave report stating that potts was originally d/c but pt started to have flank and lower ABD pain. And MD stated to keep the potts in until she visit the PT the next morning and then will D/c potts. Pt tolerated IV antibiotics well. No other issues with pt

## 2020-01-30 NOTE — PROGRESS NOTES
Therapy with vancomycin complete and/or consult discontinued by provider.  Pharmacy will sign off, please re-consult as needed.    Marybel Barrow, SuriD, BCPS

## 2020-01-30 NOTE — PROGRESS NOTES
[facility] Hospital Medicine  Progress Note    Patient Name: Celso Hernandez  MRN: [id number]  Patient Class: OP- Observation   Admission Date: 1/28/2020  Length of Stay: 0 days  Attending Physician: Cheryl Kaiser*  Primary Care Provider: Inder Quarles MD        Subjective:     Principal Problem:Sepsis due to urinary tract infection        HPI:  64 y.o. male who has a past medical history of colon polyps, Diabetes mellitus type II, Hyperlipidemia, Hypertension, Multiple duodenal ulcers, and unspecified hemorrhoids without mention of complication who presents to the ED due to urinary retention.  He has history of urinary retention, and was seen in the ED on 1/25/20 and had a potts catheter was inserted. He followed up with urology on 1/27/20 and potts catheter was removed. He endorses some associated dysuria starting today, and noticed he had a low grade fever at home earlier today. He endorses decreased urinary stream.  He denies any blood in his urine, abdominal pain, nausea, vomiting, chills, recent sick contacts or other recent illness.  ED findings: WBCs 18.96, Na 132, U/A concerning for UTI, Urine and Blood cultures pending, chest x-ray showed no acute processes.  Patient admitted to hospital medicine for further medical management.    Overview/Hospital Course:  No notes on file    Interval History: awake and alert, reported suprapubic pain    All cx neg so far, wbc up trending switch to Rocephin    Replace mag    Review of Systems   Constitutional: Negative for chills and fever.   Eyes: Negative for visual disturbance.   Gastrointestinal: Positive for abdominal pain. Negative for abdominal distention.   Genitourinary: Positive for dysuria. Negative for difficulty urinating.   Musculoskeletal: Negative for arthralgias and back pain.   Skin: Negative for color change.   Neurological: Negative for dizziness, weakness and light-headedness.   Hematological: Does not bruise/bleed easily.    Psychiatric/Behavioral: Negative for agitation.     Objective:     Vital Signs (Most Recent):  Temp: 98 °F (36.7 °C) (01/30/20 1130)  Pulse: 80 (01/30/20 1146)  Resp: 20 (01/30/20 1130)  BP: (!) 145/73 (01/30/20 1130)  SpO2: 97 % (01/30/20 1216) Vital Signs (24h Range):  Temp:  [98 °F (36.7 °C)-99.1 °F (37.3 °C)] 98 °F (36.7 °C)  Pulse:  [80-99] 80  Resp:  [17-20] 20  SpO2:  [95 %-98 %] 97 %  BP: (117-145)/(64-75) 145/73     Weight: 126.8 kg (279 lb 8.7 oz)  Body mass index is 42.5 kg/m².    Intake/Output Summary (Last 24 hours) at 1/30/2020 1428  Last data filed at 1/30/2020 1414  Gross per 24 hour   Intake 200 ml   Output 3900 ml   Net -3700 ml      Physical Exam   Constitutional: He is oriented to person, place, and time. He appears well-developed and well-nourished.   HENT:   Head: Normocephalic and atraumatic.   Neck: Neck supple.   Cardiovascular: Normal rate.   Pulmonary/Chest: Effort normal and breath sounds normal.   Abdominal: Soft. Bowel sounds are normal. There is tenderness.   Obese     Musculoskeletal: He exhibits no edema or deformity.   Neurological: He is alert and oriented to person, place, and time.   Skin: Skin is warm and dry.   Psychiatric: He has a normal mood and affect.       Significant Labs:   Bilirubin:   Recent Labs   Lab 01/25/20  0919 01/28/20 2247 01/29/20  0611   BILITOT 0.6 0.8 1.3*     CBC:   Recent Labs   Lab 01/28/20  2145 01/29/20  0611 01/30/20  0546   WBC 18.96* 24.19* 24.01*   HGB 16.8 16.2 15.8   HCT 49.0 47.2 47.3    196 206     CMP:   Recent Labs   Lab 01/28/20 2247 01/29/20  0611   * 134*   K 3.6 3.9    100   CO2 21* 25    128*   BUN 18 15   CREATININE 1.0 1.1   CALCIUM 8.7 9.2   PROT 6.9 7.2   ALBUMIN 3.1* 3.2*   BILITOT 0.8 1.3*   ALKPHOS 106 102   AST 38 36   ALT 35 39   ANIONGAP 9 9   EGFRNONAA >60 >60     Cardiac Markers: No results for input(s): CKMB, MYOGLOBIN, BNP, TROPISTAT in the last 48 hours.  Lactic Acid:   Recent Labs   Lab  01/29/20  0001   LACTATE 1.5     Magnesium:   Recent Labs   Lab 01/29/20  0611 01/30/20  0546   MG 1.5* 1.7     Troponin: No results for input(s): TROPONINI in the last 48 hours.  Urine Culture:   Recent Labs   Lab 01/28/20 2106   LABURIN GRAM NEGATIVE YAW  >100,000 cfu/ml  Identification and susceptibility pending  *     Urine Studies:   Recent Labs   Lab 01/28/20 2106   COLORU Yellow   APPEARANCEUA Clear   PHUR 5.0   SPECGRAV 1.025   PROTEINUA Negative   GLUCUA Negative   KETONESU Negative   BILIRUBINUA Negative   OCCULTUA Trace*   NITRITE Positive*   UROBILINOGEN 1.0   LEUKOCYTESUR Trace*   RBCUA 4   WBCUA 15*   BACTERIA Moderate*   SQUAMEPITHEL 1       Significant Imaging: I have reviewed all pertinent imaging results/findings within the past 24 hours.      Assessment/Plan:      * Sepsis due to urinary tract infection  Urinary retention    UA: concerning for UTI  Urine Cultures: NGTD  Blood Cultures: NGTD  Continue Vanc and Zosyn. Switch to Rocephin  Continue flomax    Mixed hyperlipidemia  Continue statin      Essential hypertension  Continue HCTZ, and losartan      Diabetes mellitus, type 2  Hemoglobin A1c pending  POC glucose checks ac meals and nightly  Low dose SSI PRN  Hypoglycemia protocol PRN  Hold oral hypoglycemic agents  Diabetic Diet              VTE Risk Mitigation (From admission, onward)         Ordered     Place sequential compression device  Until discontinued      01/29/20 0600                      Cheryl Kaiser MD  Department of Hospital Medicine   [facility]

## 2020-01-30 NOTE — PLAN OF CARE
VN rounds: VN cued into pt's room with pt's permission. Pt resting in bed. Fall risk protocol discussed with pt. VN instructed pt to call for assistance. Pt aware and agreeable. Pt c/o severe pain. Floor nurse at bedside. Stated pt having retention and will do in & out cath. Recheck b/p 180/120, pulse 120.. Allowed time for questions.  Will cont to be available and intervene as needed.

## 2020-01-31 VITALS
HEIGHT: 68 IN | BODY MASS INDEX: 41.1 KG/M2 | SYSTOLIC BLOOD PRESSURE: 121 MMHG | WEIGHT: 271.19 LBS | OXYGEN SATURATION: 96 % | TEMPERATURE: 98 F | HEART RATE: 93 BPM | RESPIRATION RATE: 20 BRPM | DIASTOLIC BLOOD PRESSURE: 70 MMHG

## 2020-01-31 LAB
ANION GAP SERPL CALC-SCNC: 7 MMOL/L (ref 8–16)
BASOPHILS # BLD AUTO: 0.04 K/UL (ref 0–0.2)
BASOPHILS NFR BLD: 0.3 % (ref 0–1.9)
BUN SERPL-MCNC: 18 MG/DL (ref 8–23)
CALCIUM SERPL-MCNC: 9.6 MG/DL (ref 8.7–10.5)
CHLORIDE SERPL-SCNC: 101 MMOL/L (ref 95–110)
CO2 SERPL-SCNC: 24 MMOL/L (ref 23–29)
CREAT SERPL-MCNC: 1.1 MG/DL (ref 0.5–1.4)
DIFFERENTIAL METHOD: ABNORMAL
EOSINOPHIL # BLD AUTO: 0.1 K/UL (ref 0–0.5)
EOSINOPHIL NFR BLD: 1.2 % (ref 0–8)
ERYTHROCYTE [DISTWIDTH] IN BLOOD BY AUTOMATED COUNT: 15 % (ref 11.5–14.5)
EST. GFR  (AFRICAN AMERICAN): >60 ML/MIN/1.73 M^2
EST. GFR  (NON AFRICAN AMERICAN): >60 ML/MIN/1.73 M^2
GLUCOSE SERPL-MCNC: 133 MG/DL (ref 70–110)
HCT VFR BLD AUTO: 50.7 % (ref 40–54)
HGB BLD-MCNC: 17.1 G/DL (ref 14–18)
LYMPHOCYTES # BLD AUTO: 0.8 K/UL (ref 1–4.8)
LYMPHOCYTES NFR BLD: 7 % (ref 18–48)
MAGNESIUM SERPL-MCNC: 2 MG/DL (ref 1.6–2.6)
MCH RBC QN AUTO: 29.5 PG (ref 27–31)
MCHC RBC AUTO-ENTMCNC: 33.7 G/DL (ref 32–36)
MCV RBC AUTO: 88 FL (ref 82–98)
MONOCYTES # BLD AUTO: 1.1 K/UL (ref 0.3–1)
MONOCYTES NFR BLD: 9.5 % (ref 4–15)
NEUTROPHILS # BLD AUTO: 9.6 K/UL (ref 1.8–7.7)
NEUTROPHILS NFR BLD: 82 % (ref 38–73)
PHOSPHATE SERPL-MCNC: 2.9 MG/DL (ref 2.7–4.5)
PLATELET # BLD AUTO: 187 K/UL (ref 150–350)
PMV BLD AUTO: 9.5 FL (ref 9.2–12.9)
POCT GLUCOSE: 118 MG/DL (ref 70–110)
POCT GLUCOSE: 157 MG/DL (ref 70–110)
POTASSIUM SERPL-SCNC: 3.8 MMOL/L (ref 3.5–5.1)
RBC # BLD AUTO: 5.79 M/UL (ref 4.6–6.2)
SODIUM SERPL-SCNC: 132 MMOL/L (ref 136–145)
WBC # BLD AUTO: 11.74 K/UL (ref 3.9–12.7)

## 2020-01-31 PROCEDURE — 80048 BASIC METABOLIC PNL TOTAL CA: CPT

## 2020-01-31 PROCEDURE — 90471 IMMUNIZATION ADMIN: CPT | Performed by: FAMILY MEDICINE

## 2020-01-31 PROCEDURE — 63600175 PHARM REV CODE 636 W HCPCS: Performed by: FAMILY MEDICINE

## 2020-01-31 PROCEDURE — 25000003 PHARM REV CODE 250: Performed by: NURSE PRACTITIONER

## 2020-01-31 PROCEDURE — 94761 N-INVAS EAR/PLS OXIMETRY MLT: CPT

## 2020-01-31 PROCEDURE — 90670 PCV13 VACCINE IM: CPT | Performed by: FAMILY MEDICINE

## 2020-01-31 PROCEDURE — 85025 COMPLETE CBC W/AUTO DIFF WBC: CPT

## 2020-01-31 PROCEDURE — 83735 ASSAY OF MAGNESIUM: CPT

## 2020-01-31 PROCEDURE — 90472 IMMUNIZATION ADMIN EACH ADD: CPT | Performed by: FAMILY MEDICINE

## 2020-01-31 PROCEDURE — G0378 HOSPITAL OBSERVATION PER HR: HCPCS

## 2020-01-31 PROCEDURE — 96376 TX/PRO/DX INJ SAME DRUG ADON: CPT

## 2020-01-31 PROCEDURE — 90662 IIV NO PRSV INCREASED AG IM: CPT | Performed by: FAMILY MEDICINE

## 2020-01-31 PROCEDURE — 36415 COLL VENOUS BLD VENIPUNCTURE: CPT

## 2020-01-31 PROCEDURE — 25000003 PHARM REV CODE 250: Performed by: FAMILY MEDICINE

## 2020-01-31 PROCEDURE — 84100 ASSAY OF PHOSPHORUS: CPT

## 2020-01-31 RX ORDER — CIPROFLOXACIN 500 MG/1
500 TABLET ORAL 2 TIMES DAILY
Qty: 20 TABLET | Refills: 0 | Status: SHIPPED | OUTPATIENT
Start: 2020-01-31 | End: 2020-02-10

## 2020-01-31 RX ORDER — FINASTERIDE 5 MG/1
5 TABLET, FILM COATED ORAL DAILY
Qty: 30 TABLET | Refills: 11 | Status: SHIPPED | OUTPATIENT
Start: 2020-01-31 | End: 2020-02-18 | Stop reason: SDUPTHER

## 2020-01-31 RX ORDER — FINASTERIDE 5 MG/1
5 TABLET, FILM COATED ORAL DAILY
Status: DISCONTINUED | OUTPATIENT
Start: 2020-01-31 | End: 2020-01-31 | Stop reason: HOSPADM

## 2020-01-31 RX ORDER — PANTOPRAZOLE SODIUM 40 MG/1
40 TABLET, DELAYED RELEASE ORAL DAILY
Qty: 30 TABLET | Refills: 11 | Status: SHIPPED | OUTPATIENT
Start: 2020-01-31 | End: 2020-04-01 | Stop reason: CLARIF

## 2020-01-31 RX ORDER — LANOLIN ALCOHOL/MO/W.PET/CERES
400 CREAM (GRAM) TOPICAL 2 TIMES DAILY
Refills: 0 | COMMUNITY
Start: 2020-01-31 | End: 2020-04-01 | Stop reason: CLARIF

## 2020-01-31 RX ORDER — DOCUSATE SODIUM 100 MG/1
100 CAPSULE, LIQUID FILLED ORAL 2 TIMES DAILY
Status: DISCONTINUED | OUTPATIENT
Start: 2020-01-31 | End: 2020-01-31 | Stop reason: HOSPADM

## 2020-01-31 RX ORDER — DOCUSATE SODIUM 100 MG/1
100 CAPSULE, LIQUID FILLED ORAL 2 TIMES DAILY
Qty: 60 CAPSULE | Refills: 2 | Status: SHIPPED | OUTPATIENT
Start: 2020-01-31 | End: 2020-04-01 | Stop reason: CLARIF

## 2020-01-31 RX ADMIN — PNEUMOCOCCAL 13-VALENT CONJUGATE VACCINE 0.5 ML: 2.2; 2.2; 2.2; 2.2; 2.2; 4.4; 2.2; 2.2; 2.2; 2.2; 2.2; 2.2; 2.2 INJECTION, SUSPENSION INTRAMUSCULAR at 02:01

## 2020-01-31 RX ADMIN — ATORVASTATIN CALCIUM 80 MG: 40 TABLET, FILM COATED ORAL at 08:01

## 2020-01-31 RX ADMIN — LOSARTAN POTASSIUM 100 MG: 50 TABLET ORAL at 08:01

## 2020-01-31 RX ADMIN — HYDROCHLOROTHIAZIDE 12.5 MG: 12.5 TABLET ORAL at 08:01

## 2020-01-31 RX ADMIN — MAGNESIUM OXIDE 400 MG (241.3 MG MAGNESIUM) TABLET 400 MG: TABLET at 08:01

## 2020-01-31 RX ADMIN — TAMSULOSIN HYDROCHLORIDE 0.4 MG: 0.4 CAPSULE ORAL at 08:01

## 2020-01-31 RX ADMIN — PANTOPRAZOLE SODIUM 40 MG: 40 TABLET, DELAYED RELEASE ORAL at 08:01

## 2020-01-31 RX ADMIN — FINASTERIDE 5 MG: 5 TABLET, FILM COATED ORAL at 11:01

## 2020-01-31 RX ADMIN — TIMOLOL MALEATE 1 DROP: 5 SOLUTION OPHTHALMIC at 08:01

## 2020-01-31 RX ADMIN — CEFTRIAXONE 1 G: 1 INJECTION, SOLUTION INTRAVENOUS at 08:01

## 2020-01-31 RX ADMIN — INFLUENZA A VIRUS A/MICHIGAN/45/2015 X-275 (H1N1) ANTIGEN (FORMALDEHYDE INACTIVATED), INFLUENZA A VIRUS A/SINGAPORE/INFIMH-16-0019/2016 IVR-186 (H3N2) ANTIGEN (FORMALDEHYDE INACTIVATED), AND INFLUENZA B VIRUS B/MARYLAND/15/2016 BX-69A (A B/COLORADO/6/2017-LIKE VIRUS) ANTIGEN (FORMALDEHYDE INACTIVATED) 0.5 ML: 60; 60; 60 INJECTION, SUSPENSION INTRAMUSCULAR at 02:01

## 2020-01-31 NOTE — PLAN OF CARE
Discharge rounds on patient. Discussed followup appointments, blue discharge folder, discharge nurse will go over home medications and reasons for medications and final discharge instructions. All patient/caregiver questions answered. Patient verbalized understanding.         01/31/20 1152   Final Note   Assessment Type Final Discharge Note   Anticipated Discharge Disposition Home   What phone number can be called within the next 1-3 days to see how you are doing after discharge? [phone]   Hospital Follow Up  Appt(s) scheduled? Yes   Discharge plans and expectations educations in teach back method with documentation complete? Yes   Right Care Referral Info   Post Acute Recommendation No Care   Referral Type None     Future Appointments   Date Time Provider Department Center   2/3/2020  1:20 PM Hamida Ayala PA-C [facility] UROLOGY [facility]   4/28/2020  9:40 AM Inder Quarles MD [facility] [place]

## 2020-01-31 NOTE — ASSESSMENT & PLAN NOTE
Urinary retention  UA: concerning for UTI-  Urine Cultures:  E.coli  Blood Cultures: NGTD  Continue Rocephin  Continue Flomax, add Finasteride per urology

## 2020-01-31 NOTE — PLAN OF CARE
Patient resting in bed, AAOx4. Medications administered as ordered. No complaints of pain or discomfort. Lantigua in place draining clear yellow urine. Patient ambulatory around room. Encouraged to call with needs or concerns. Will continue to monitor.

## 2020-01-31 NOTE — HPI
Celso Hernandez is a 64 y.o male with a history of BPH, prostate nodule and urinary retention. He takes flomax. He has has several voiding trials in the past. He is an established patient of LAWRENCE Davies at [facility]. He completed a successful voiding trial with her on 1/25/2020. He presented to the ED on 1/27/2020 with dysuria, decreased stream and fevers.

## 2020-01-31 NOTE — PROGRESS NOTES
[facility] Hospital Medicine  Progress Note    Patient Name: Celso Hernandez  MRN: [id number]  Patient Class: OP- Observation   Admission Date: 1/28/2020  Length of Stay: 0 days  Attending Physician: Cheryl Kaiser*  Primary Care Provider: Inder Quarles MD        Subjective:     Principal Problem:Sepsis due to urinary tract infection        HPI:  64 y.o. male who has a past medical history of colon polyps, Diabetes mellitus type II, Hyperlipidemia, Hypertension, Multiple duodenal ulcers, and unspecified hemorrhoids without mention of complication who presents to the ED due to urinary retention.  He has history of urinary retention, and was seen in the ED on 1/25/20 and had a potts catheter was inserted. He followed up with urology on 1/27/20 and potts catheter was removed. He endorses some associated dysuria starting today, and noticed he had a low grade fever at home earlier today. He endorses decreased urinary stream.  He denies any blood in his urine, abdominal pain, nausea, vomiting, chills, recent sick contacts or other recent illness.  ED findings: WBCs 18.96, Na 132, U/A concerning for UTI, Urine and Blood cultures pending, chest x-ray showed no acute processes.  Patient admitted to hospital medicine for further medical management.    Overview/Hospital Course:  Admitted for UTI and now with urinary retention, requiring potts, urology recommended adding Finasteride to Flomax. Leave potts in until FU in outpatient clinics    Interval History: awake and alert, re-inserted potts yesterday due to decrease output < 50cc bladder scan with > 800 cc  Kidney USS, consult urology.     Urine culture with Klebsiella sensitive to Ceftriaxone- continue and de-escalate switch to PO today  Discharge today on Cipro.      Review of Systems   Constitutional: Negative for chills and fever.   Gastrointestinal: Negative for abdominal distention and abdominal pain.   Genitourinary: Negative for difficulty  urinating and dysuria.   Musculoskeletal: Negative for arthralgias and back pain.   Skin: Negative for color change.   Neurological: Negative for weakness and light-headedness.   Psychiatric/Behavioral: Negative for agitation.     Objective:     Vital Signs (Most Recent):  Temp: 99.9 °F (37.7 °C) (01/31/20 0715)  Pulse: 92 (01/31/20 0730)  Resp: 20 (01/31/20 0715)  BP: (!) 141/80 (01/31/20 0715)  SpO2: 95 % (01/31/20 0400) Vital Signs (24h Range):  Temp:  [97.6 °F (36.4 °C)-100.7 °F (38.2 °C)] 99.9 °F (37.7 °C)  Pulse:  [] 92  Resp:  [16-20] 20  SpO2:  [93 %-97 %] 95 %  BP: (113-214)/() 141/80     Weight: 123 kg (271 lb 2.7 oz)  Body mass index is 41.23 kg/m².    Intake/Output Summary (Last 24 hours) at 1/31/2020 0832  Last data filed at 1/31/2020 0600  Gross per 24 hour   Intake 700 ml   Output 3350 ml   Net -2650 ml      Physical Exam   Constitutional: He is oriented to person, place, and time. He appears well-developed and well-nourished.   HENT:   Head: Normocephalic and atraumatic.   Neck: Neck supple.   Cardiovascular: Normal rate.   Pulmonary/Chest: Effort normal and breath sounds normal.   Abdominal: Soft. Bowel sounds are normal. There is no tenderness.   Obese     Musculoskeletal: He exhibits no edema or deformity.   Neurological: He is alert and oriented to person, place, and time.   Skin: Skin is warm and dry.   Psychiatric: He has a normal mood and affect.       Significant Labs:   Bilirubin:   Recent Labs   Lab 01/25/20  0919 01/28/20  2247 01/29/20  0611   BILITOT 0.6 0.8 1.3*     CBC:   Recent Labs   Lab 01/30/20  0546 01/31/20  0722   WBC 24.01* 11.74   HGB 15.8 17.1   HCT 47.3 50.7    187     CMP:   No results for input(s): NA, K, CL, CO2, GLU, BUN, CREATININE, CALCIUM, PROT, ALBUMIN, BILITOT, ALKPHOS, AST, ALT, ANIONGAP, EGFRNONAA in the last 48 hours.    Invalid input(s): ESTGFAFRICA  Cardiac Markers: No results for input(s): CKMB, MYOGLOBIN, BNP, TROPISTAT in the last 48  hours.  Lactic Acid:   No results for input(s): LACTATE in the last 48 hours.  Magnesium:   Recent Labs   Lab 01/30/20  0546 01/31/20  0722   MG 1.7 2.0     Troponin: No results for input(s): TROPONINI in the last 48 hours.  Urine Culture:   No results for input(s): LABURIN in the last 48 hours.  Urine Studies:   No results for input(s): COLORU, APPEARANCEUA, PHUR, SPECGRAV, PROTEINUA, GLUCUA, KETONESU, BILIRUBINUA, OCCULTUA, NITRITE, UROBILINOGEN, LEUKOCYTESUR, RBCUA, WBCUA, BACTERIA, SQUAMEPITHEL, HYALINECASTS in the last 48 hours.    Invalid input(s): WRIGHTSUR    Significant Imaging: I have reviewed all pertinent imaging results/findings within the past 24 hours.      Assessment/Plan:      * Sepsis due to urinary tract infection  Urinary retention  UA: concerning for UTI-  Urine Cultures:  E.coli  Blood Cultures: NGTD  Continue Rocephin  Continue Flomax, add Finasteride per urology    Urinary retention  Benign prostatic hyperplasia with urinary obstruction  Reinserted Lantigua  Consult urology appreciates rec's - leave in Lantigua until outpatient follow up 2/10, for voiding trial, add finasteride    Mixed hyperlipidemia  Continue statin      Essential hypertension  Continue HCTZ, and losartan      Diabetes mellitus, type 2  Hemoglobin A1c pending  POC glucose checks ac meals and nightly  Low dose SSI PRN  Hypoglycemia protocol PRN  Hold oral hypoglycemic agents  Diabetic Diet              VTE Risk Mitigation (From admission, onward)         Ordered     Place sequential compression device  Until discontinued      01/29/20 0600                      Cheryl Kaiser MD  Department of Hospital Medicine   [facility]

## 2020-01-31 NOTE — ASSESSMENT & PLAN NOTE
- Continue Flomax and Finasteride daily  - Patient will be discharged with potts. He has a follow-up visit with LAWRENCE Davies on 2/3/2020. He would like to discuss with her at that visit voiding trial vs. Urodynamics study.

## 2020-01-31 NOTE — DISCHARGE SUMMARY
[facility] Hospital Medicine  Discharge Summary      Patient Name: Celso Hernandez  MRN: [id number]  Admission Date: 1/28/2020  Hospital Length of Stay: 0 days  Discharge Date and Time: 1/31/2020  2:38 PM  Attending Physician: Cheryl Kaiser*   Discharging Provider: Cheryl Kaiser MD  Primary Care Provider: Inder Quarles MD      HPI:   64 y.o. male who has a past medical history of colon polyps, Diabetes mellitus type II, Hyperlipidemia, Hypertension, Multiple duodenal ulcers, and unspecified hemorrhoids without mention of complication who presents to the ED due to urinary retention.  He has history of urinary retention, and was seen in the ED on 1/25/20 and had a potts catheter was inserted. He followed up with urology on 1/27/20 and potts catheter was removed. He endorses some associated dysuria starting today, and noticed he had a low grade fever at home earlier today. He endorses decreased urinary stream.  He denies any blood in his urine, abdominal pain, nausea, vomiting, chills, recent sick contacts or other recent illness.  ED findings: WBCs 18.96, Na 132, U/A concerning for UTI, Urine and Blood cultures pending, chest x-ray showed no acute processes.  Patient admitted to hospital medicine for further medical management.    * No surgery found *      Hospital Course:   Admitted for UTI and now with urinary retention, requiring potts, urology recommended adding Finasteride to Flomax. Leave potts in until FU in outpatient clinics     Consults:   Consults (From admission, onward)        Status Ordering Provider     Inpatient consult to Social Work/Case Management  Once     Provider:  (Not yet assigned)    Acknowledged CHERYL KAISER     Inpatient consult to Urology  Once     Provider:  Arti Navarro MD    Acknowledged CHERYL KAISER          * Sepsis due to urinary tract infection  Urinary retention  UA: concerning for UTI-  Urine Cultures:   E.coli  Blood Cultures: NGTD  Continue Rocephin  Continue Flomax, add Finasteride per urology    Urinary retention  Benign prostatic hyperplasia with urinary obstruction  Reinserted Lantigua  Consult urology appreciates rec's - leave in Lantigua until outpatient follow up 2/10, for voiding trial, add finasteride    Benign prostatic hyperplasia with urinary obstruction        Essential hypertension  Continue HCTZ, and losartan      Diabetes mellitus, type 2  Hemoglobin A1c pending  POC glucose checks ac meals and nightly  Low dose SSI PRN  Hypoglycemia protocol PRN  Hold oral hypoglycemic agents  Diabetic Diet              Final Active Diagnoses:    Diagnosis Date Noted POA    PRINCIPAL PROBLEM:  Sepsis due to urinary tract infection [A41.9, N39.0] 01/29/2020 Yes    Urinary retention [R33.9] 01/29/2020 Yes    Benign prostatic hyperplasia with urinary obstruction [N40.1, N13.8] 01/19/2015 Yes    Mixed hyperlipidemia [E78.2] 03/10/2014 Yes     Chronic    Essential hypertension [I10]  Yes     Chronic    Diabetes mellitus, type 2 [E11.9]  Yes      Problems Resolved During this Admission:       Discharged Condition: stable    [place] [facility]    Follow Up:  Follow-up Information     Inder Quarles MD In 1 week.    Specialty:  Internal Medicine  Contact information:  [address] [facility] [address]  [phone]             Hamida Ayala PA-C On 2/3/2020.    Specialty:  Urology  Contact information:  [address] [facility] [facility] [address]  [phone]                 Patient Instructions:      Ambulatory referral to Home Health   Referral Priority: Routine Referral Type: Home Health   Referral Reason: Specialty Services Required   Requested Specialty: Home Health Services   Number of Visits Requested: 1     Diet Cardiac     Activity as tolerated       Significant Diagnostic Studies:     Pending Diagnostic Studies:     Procedure Component Value Units Date/Time    Basic metabolic  panel [[id number]]     Order Status:  Sent Lab Status:  No result     Specimen:  Blood          Medications:  Reconciled Home Medications:      Medication List      START taking these medications    ciprofloxacin HCl 500 MG tablet  Commonly known as:  CIPRO  Take 1 tablet (500 mg total) by mouth 2 (two) times daily. for 10 days     finasteride 5 mg tablet  Commonly known as:  PROSCAR  Take 1 tablet (5 mg total) by mouth once daily.     magnesium oxide 400 mg (241.3 mg magnesium) tablet  Commonly known as:  MAG-OX  Take 1 tablet (400 mg total) by mouth 2 (two) times daily.     Stool Softener 100 MG capsule  Generic drug:  docusate sodium  Take 1 capsule (100 mg total) by mouth 2 (two) times daily.        CONTINUE taking these medications    atorvastatin 80 MG tablet  Commonly known as:  LIPITOR  TAKE 1 TABLET BY MOUTH ONCE DAILY     hydroCHLOROthiazide 12.5 MG Tab  Commonly known as:  HYDRODIURIL  Take 1 tablet (12.5 mg total) by mouth once daily.     latanoprost 0.005 % ophthalmic solution  Place 1 drop into both eyes every evening.     losartan 100 MG tablet  Commonly known as:  COZAAR  Take 1 tablet (100 mg total) by mouth once daily.     meloxicam 15 MG tablet  Commonly known as:  MOBIC  TAKE 1 TABLET BY MOUTH ONCE DAILY     metFORMIN 500 MG tablet  Commonly known as:  GLUCOPHAGE  Take 1 tablet (500 mg total) by mouth daily with breakfast.     pantoprazole 40 MG tablet  Commonly known as:  PROTONIX  Take 1 tablet (40 mg total) by mouth once daily.     tamsulosin 0.4 mg Cap  Commonly known as:  FLOMAX  TAKE 1 CAPSULE BY MOUTH ONCE DAILY     timolol maleate 0.5% 0.5 % Drop  Commonly known as:  TIMOPTIC  Place 1 drop into both eyes 2 (two) times daily.     traMADol 50 mg tablet  Commonly known as:  ULTRAM  Take 1-2 tabs bid prn severe pain     Vitamin D3 50 mcg (2,000 unit) Cap  Generic drug:  cholecalciferol (vitamin D3)  Take 1 capsule by mouth once daily.            Indwelling Lines/Drains at time of discharge:    Lines/Drains/Airways     Drain                 Urethral Catheter 01/30/20 1900 Straight-tip 14 Fr. less than 1 day                Time spent on the discharge of patient: 35 minutes  Patient was seen and examined on the date of discharge and determined to be suitable for discharge.         Cheryl Kaiser MD  Department of Hospital Medicine  [facility]

## 2020-01-31 NOTE — PROGRESS NOTES
TN met with patient and wife at bedside. Patient and wife are declining home health services at this time. TN provide a list of home health agencies to patient's wife. Discharge brochure given to patient and wife.TN instructed patient and wife to contact TN if they have any further questions or concerns.

## 2020-01-31 NOTE — DISCHARGE INSTRUCTIONS
Lantigua Catheter, Care (English) View Edit Remove   Indwelling Urinary Catheter, Discharge Instructions (English) View Edit Remove   Sepsis, Understanding (English) View Edit Remove   Urinary Tract Infections in Men (English) View Edit Remove   Urinary Tract Infections (UTIs), Understanding (English) View Edit Remove   Ciprofloxacin tablets (English) View Edit Remove   Finasteride (Proscar) tablets (English) View Edit Remove     Diet, Discharge Instructions for Eating a Low-Salt (English) View Edit Remove

## 2020-01-31 NOTE — PLAN OF CARE
VN note: VN cued into patient's room for introduction. Patient off floor in ultrasound, wife at bedside. VN informed wife that VN would be working closely along side bedside nurse, PCT, and the rest of the care team and making rounds throughout the shift. She verbalized understanding. Allowed time for questions. VN will continue to be available to patient and intervene prn.

## 2020-01-31 NOTE — NURSING
Pt with complaints of feeling of bladder fullness post in and out cath. Nurse notified MD.  Orders put in by MD for potts cath.  Pt with relief from potts insertion.

## 2020-01-31 NOTE — NURSING
Pt switched over from standard urimeter to leg bag for potts use at home. Teaching done, pt verbalized understanding.    Yes

## 2020-01-31 NOTE — ASSESSMENT & PLAN NOTE
Benign prostatic hyperplasia with urinary obstruction  Reinserted Lantigua  Consult urology appreciates rec's - leave in Lantigua until outpatient follow up 2/10, for voiding trial, add finasteride

## 2020-01-31 NOTE — CONSULTS
[facility]  Urology  Consult Note    Patient Name: Celso Hernandez  MRN: [id number]  Admission Date: 1/28/2020  Hospital Length of Stay: 0   Code Status: Full Code   Attending Provider: Cheryl Kaiser*   Consulting Provider: Mirella Frederick NP  Primary Care Physician: Inder Quarles MD  Principal Problem:Sepsis due to urinary tract infection    Inpatient consult to Urology  Consult performed by: Mirella Frederick NP  Consult ordered by: Cheryl Kaiser MD  Assessment/Recommendations: - Continue Flomax and Finasteride daily  - Patient will be discharged with potts. He has a follow-up visit with LAWRENCE Davies on 2/3/2020. He would like to discuss with her at that visit voiding trial vs. Urodynamics study.           Subjective:     HPI:   Celso Hernandez is a 64 y.o male with a history of BPH, prostate nodule and urinary retention. He takes flomax. He has has several voiding trials in the past. He is an established patient of LAWRENCE Davies at [facility]. He completed a successful voiding trial with her on 1/25/2020. He presented to the ED on 1/27/2020 with dysuria, decreased stream and fevers.     Past Medical History:   Diagnosis Date    Colon polyps     Diabetes mellitus type II     Hyperlipidemia     Hypertension     Multiple duodenal ulcers 10/08/2019    Unspecified hemorrhoids without mention of complication        Past Surgical History:   Procedure Laterality Date    COLONOSCOPY N/A 10/8/2019    Procedure: COLONOSCOPY suprep;  Surgeon: Landon Guajardo MD;  Location: [facility];  Service: General;  Laterality: N/A;    COLONOSCOPY W/ POLYPECTOMY  10/08/2019    ESOPHAGOGASTRODUODENOSCOPY  10/08/2019    w/biopsies    ESOPHAGOGASTRODUODENOSCOPY N/A 10/8/2019    Procedure: EGD (ESOPHAGOGASTRODUODENOSCOPY);  Surgeon: Landon Guajardo MD;  Location: [facility];  Service: General;  Laterality: N/A;    none         Review of patient's allergies indicates:  No Known  Allergies    Family History     Problem Relation (Age of Onset)    Cancer Sister    Hearing loss Sister    Hyperlipidemia Mother          Tobacco Use    Smoking status: Never Smoker    Smokeless tobacco: Never Used   Substance and Sexual Activity    Alcohol use: Yes     Alcohol/week: 3.3 standard drinks     Types: 4 Standard drinks or equivalent per week     Comment: socially    Drug use: No    Sexual activity: Not on file       Review of Systems   Constitutional: Positive for fever.   HENT: Negative for facial swelling.    Respiratory: Negative for shortness of breath.    Cardiovascular: Negative for chest pain.   Gastrointestinal: Negative for abdominal distention.   Genitourinary: Positive for decreased urine volume and dysuria.   Musculoskeletal: Negative for arthralgias.   Skin: Negative for color change.   Neurological: Negative for dizziness.   Psychiatric/Behavioral: Negative for agitation.       Objective:     Temp:  [97.6 °F (36.4 °C)-100.7 °F (38.2 °C)] 99.9 °F (37.7 °C)  Pulse:  [] 92  Resp:  [16-20] 20  SpO2:  [93 %-97 %] 95 %  BP: (113-214)/() 141/80     Body mass index is 41.23 kg/m².    Date 01/31/20 0700 - 02/01/20 0659   Shift 7286-3134 6187-8344 9796-5014 24 Hour Total   INTAKE   Shift Total(mL/kg)       OUTPUT   Urine(mL/kg/hr) 425   425   Shift Total(mL/kg) 425(3.5)   425(3.5)   Weight (kg) 123 123 123 123     Bladder Scan Volume (mL): 880 mL (01/30/20 1513)    Drains     Drain                 Urethral Catheter 01/30/20 1900 Straight-tip 14 Fr. less than 1 day                Physical Exam   Constitutional: He is oriented to person, place, and time. He appears well-developed and well-nourished.   HENT:   Head: Normocephalic and atraumatic.   Eyes: Pupils are equal, round, and reactive to light.   Neck: Normal range of motion. Neck supple.   Pulmonary/Chest: Effort normal.   Abdominal: Soft.   Genitourinary:   Genitourinary Comments: Lantigua in place draining clear yellow urine     Musculoskeletal: Normal range of motion.   Neurological: He is alert and oriented to person, place, and time.   Skin: Skin is warm and dry.         Significant Labs:    BMP:  Recent Labs   Lab 01/25/20  0919 01/28/20  2247 01/29/20  0611   * 132* 134*   K 3.7 3.6 3.9   CL 98 102 100   CO2 19* 21* 25   BUN 10 18 15   CREATININE 1.0 1.0 1.1   CALCIUM 8.9 8.7 9.2       CBC:  Recent Labs   Lab 01/29/20  0611 01/30/20  0546 01/31/20  0722   WBC 24.19* 24.01* 11.74   HGB 16.2 15.8 17.1   HCT 47.2 47.3 50.7    206 187       All pertinent labs results from the past 24 hours have been reviewed.    Significant Imaging:  All pertinent imaging results/findings from the past 24 hours have been reviewed.                    Assessment and Plan:     Urinary retention  - Continue Flomax and Finasteride daily  - Patient will be discharged with potts. He has a follow-up visit with LAWRENCE Davies on 2/3/2020. He would like to discuss with her at that visit voiding trial vs. Urodynamics study.         VTE Risk Mitigation (From admission, onward)         Ordered     Place sequential compression device  Until discontinued      01/29/20 0600                Thank you for your consult.      Mirella Frederick NP  Urology  [facility]

## 2020-01-31 NOTE — SUBJECTIVE & OBJECTIVE
Past Medical History:   Diagnosis Date    Colon polyps     Diabetes mellitus type II     Hyperlipidemia     Hypertension     Multiple duodenal ulcers 10/08/2019    Unspecified hemorrhoids without mention of complication        Past Surgical History:   Procedure Laterality Date    COLONOSCOPY N/A 10/8/2019    Procedure: COLONOSCOPY suprep;  Surgeon: Landon Guajardo MD;  Location: [facility];  Service: General;  Laterality: N/A;    COLONOSCOPY W/ POLYPECTOMY  10/08/2019    ESOPHAGOGASTRODUODENOSCOPY  10/08/2019    w/biopsies    ESOPHAGOGASTRODUODENOSCOPY N/A 10/8/2019    Procedure: EGD (ESOPHAGOGASTRODUODENOSCOPY);  Surgeon: Landon Guajardo MD;  Location: [facility];  Service: General;  Laterality: N/A;    none         Review of patient's allergies indicates:  No Known Allergies    Family History     Problem Relation (Age of Onset)    Cancer Sister    Hearing loss Sister    Hyperlipidemia Mother          Tobacco Use    Smoking status: Never Smoker    Smokeless tobacco: Never Used   Substance and Sexual Activity    Alcohol use: Yes     Alcohol/week: 3.3 standard drinks     Types: 4 Standard drinks or equivalent per week     Comment: socially    Drug use: No    Sexual activity: Not on file       Review of Systems   Constitutional: Positive for fever.   HENT: Negative for facial swelling.    Respiratory: Negative for shortness of breath.    Cardiovascular: Negative for chest pain.   Gastrointestinal: Negative for abdominal distention.   Genitourinary: Positive for decreased urine volume and dysuria.   Musculoskeletal: Negative for arthralgias.   Skin: Negative for color change.   Neurological: Negative for dizziness.   Psychiatric/Behavioral: Negative for agitation.       Objective:     Temp:  [97.6 °F (36.4 °C)-100.7 °F (38.2 °C)] 99.9 °F (37.7 °C)  Pulse:  [] 92  Resp:  [16-20] 20  SpO2:  [93 %-97 %] 95 %  BP: (113-214)/() 141/80     Body mass index is 41.23 kg/m².    Date 01/31/20 0700 -  02/01/20 0659   Shift 2541-4106 4165-9552 9424-0127 24 Hour Total   INTAKE   Shift Total(mL/kg)       OUTPUT   Urine(mL/kg/hr) 425   425   Shift Total(mL/kg) 425(3.5)   425(3.5)   Weight (kg) 123 123 123 123     Bladder Scan Volume (mL): 880 mL (01/30/20 1513)    Drains     Drain                 Urethral Catheter 01/30/20 1900 Straight-tip 14 Fr. less than 1 day                Physical Exam   Constitutional: He is oriented to person, place, and time. He appears well-developed and well-nourished.   HENT:   Head: Normocephalic and atraumatic.   Eyes: Pupils are equal, round, and reactive to light.   Neck: Normal range of motion. Neck supple.   Pulmonary/Chest: Effort normal.   Abdominal: Soft.   Genitourinary:   Genitourinary Comments: Lantigua in place draining clear yellow urine    Musculoskeletal: Normal range of motion.   Neurological: He is alert and oriented to person, place, and time.   Skin: Skin is warm and dry.         Significant Labs:    BMP:  Recent Labs   Lab 01/25/20  0919 01/28/20  2247 01/29/20  0611   * 132* 134*   K 3.7 3.6 3.9   CL 98 102 100   CO2 19* 21* 25   BUN 10 18 15   CREATININE 1.0 1.0 1.1   CALCIUM 8.9 8.7 9.2       CBC:  Recent Labs   Lab 01/29/20  0611 01/30/20  0546 01/31/20  0722   WBC 24.19* 24.01* 11.74   HGB 16.2 15.8 17.1   HCT 47.2 47.3 50.7    206 187       All pertinent labs results from the past 24 hours have been reviewed.    Significant Imaging:  All pertinent imaging results/findings from the past 24 hours have been reviewed.

## 2020-01-31 NOTE — SUBJECTIVE & OBJECTIVE
Interval History: awake and alert, re-inserted potts yesterday due to decrease output < 50cc bladder scan with > 800 cc  Kidney USS, consult urology.     Urine culture with Klebsiella sensitive to Ceftriaxone- continue and de-escalate switch to PO today  Discharge today on Cipro.      Review of Systems   Constitutional: Negative for chills and fever.   Gastrointestinal: Negative for abdominal distention and abdominal pain.   Genitourinary: Negative for difficulty urinating and dysuria.   Musculoskeletal: Negative for arthralgias and back pain.   Skin: Negative for color change.   Neurological: Negative for weakness and light-headedness.   Psychiatric/Behavioral: Negative for agitation.     Objective:     Vital Signs (Most Recent):  Temp: 99.9 °F (37.7 °C) (01/31/20 0715)  Pulse: 92 (01/31/20 0730)  Resp: 20 (01/31/20 0715)  BP: (!) 141/80 (01/31/20 0715)  SpO2: 95 % (01/31/20 0400) Vital Signs (24h Range):  Temp:  [97.6 °F (36.4 °C)-100.7 °F (38.2 °C)] 99.9 °F (37.7 °C)  Pulse:  [] 92  Resp:  [16-20] 20  SpO2:  [93 %-97 %] 95 %  BP: (113-214)/() 141/80     Weight: 123 kg (271 lb 2.7 oz)  Body mass index is 41.23 kg/m².    Intake/Output Summary (Last 24 hours) at 1/31/2020 0832  Last data filed at 1/31/2020 0600  Gross per 24 hour   Intake 700 ml   Output 3350 ml   Net -2650 ml      Physical Exam   Constitutional: He is oriented to person, place, and time. He appears well-developed and well-nourished.   HENT:   Head: Normocephalic and atraumatic.   Neck: Neck supple.   Cardiovascular: Normal rate.   Pulmonary/Chest: Effort normal and breath sounds normal.   Abdominal: Soft. Bowel sounds are normal. There is no tenderness.   Obese     Musculoskeletal: He exhibits no edema or deformity.   Neurological: He is alert and oriented to person, place, and time.   Skin: Skin is warm and dry.   Psychiatric: He has a normal mood and affect.       Significant Labs:   Bilirubin:   Recent Labs   Lab 01/25/20  0919  01/28/20  2247 01/29/20  0611   BILITOT 0.6 0.8 1.3*     CBC:   Recent Labs   Lab 01/30/20  0546 01/31/20  0722   WBC 24.01* 11.74   HGB 15.8 17.1   HCT 47.3 50.7    187     CMP:   No results for input(s): NA, K, CL, CO2, GLU, BUN, CREATININE, CALCIUM, PROT, ALBUMIN, BILITOT, ALKPHOS, AST, ALT, ANIONGAP, EGFRNONAA in the last 48 hours.    Invalid input(s): ESTGFAFRICA  Cardiac Markers: No results for input(s): CKMB, MYOGLOBIN, BNP, TROPISTAT in the last 48 hours.  Lactic Acid:   No results for input(s): LACTATE in the last 48 hours.  Magnesium:   Recent Labs   Lab 01/30/20  0546 01/31/20  0722   MG 1.7 2.0     Troponin: No results for input(s): TROPONINI in the last 48 hours.  Urine Culture:   No results for input(s): LABURIN in the last 48 hours.  Urine Studies:   No results for input(s): COLORU, APPEARANCEUA, PHUR, SPECGRAV, PROTEINUA, GLUCUA, KETONESU, BILIRUBINUA, OCCULTUA, NITRITE, UROBILINOGEN, LEUKOCYTESUR, RBCUA, WBCUA, BACTERIA, SQUAMEPITHEL, HYALINECASTS in the last 48 hours.    Invalid input(s): WRIGHTSUR    Significant Imaging: I have reviewed all pertinent imaging results/findings within the past 24 hours.

## 2020-01-31 NOTE — NURSING
AVS packet delivered to patient with spouse at bedside. AVS reviewed thoroughly with patient and spouse. All meds explained with name, dose, reason, and side effects. All questions answered with no further questions at this time. IV discontinued. Transport requested.

## 2020-01-31 NOTE — HOSPITAL COURSE
Admitted for UTI and now with urinary retention, requiring potts, urology recommended adding Finasteride to Flomax. Leave potts in until FU in outpatient clinics

## 2020-02-03 ENCOUNTER — OFFICE VISIT (OUTPATIENT)
Dept: UROLOGY | Facility: CLINIC | Age: 65
End: 2020-02-03
Payer: COMMERCIAL

## 2020-02-03 VITALS
BODY MASS INDEX: 39.09 KG/M2 | SYSTOLIC BLOOD PRESSURE: 111 MMHG | WEIGHT: 257.94 LBS | HEART RATE: 81 BPM | DIASTOLIC BLOOD PRESSURE: 76 MMHG | HEIGHT: 68 IN

## 2020-02-03 DIAGNOSIS — R33.9 URINARY RETENTION: ICD-10-CM

## 2020-02-03 DIAGNOSIS — N40.1 BPH WITH OBSTRUCTION/LOWER URINARY TRACT SYMPTOMS: Primary | ICD-10-CM

## 2020-02-03 DIAGNOSIS — N13.8 BPH WITH OBSTRUCTION/LOWER URINARY TRACT SYMPTOMS: Primary | ICD-10-CM

## 2020-02-03 LAB
BACTERIA BLD CULT: NORMAL
BACTERIA BLD CULT: NORMAL

## 2020-02-03 PROCEDURE — 3078F DIAST BP <80 MM HG: CPT | Mod: CPTII,S$GLB,, | Performed by: PHYSICIAN ASSISTANT

## 2020-02-03 PROCEDURE — 3074F SYST BP LT 130 MM HG: CPT | Mod: CPTII,S$GLB,, | Performed by: PHYSICIAN ASSISTANT

## 2020-02-03 PROCEDURE — 99999 PR PBB SHADOW E&M-EST. PATIENT-LVL III: CPT | Mod: PBBFAC,,, | Performed by: PHYSICIAN ASSISTANT

## 2020-02-03 PROCEDURE — 3078F PR MOST RECENT DIASTOLIC BLOOD PRESSURE < 80 MM HG: ICD-10-PCS | Mod: CPTII,S$GLB,, | Performed by: PHYSICIAN ASSISTANT

## 2020-02-03 PROCEDURE — 3074F PR MOST RECENT SYSTOLIC BLOOD PRESSURE < 130 MM HG: ICD-10-PCS | Mod: CPTII,S$GLB,, | Performed by: PHYSICIAN ASSISTANT

## 2020-02-03 PROCEDURE — 3288F PR FALLS RISK ASSESSMENT DOCUMENTED: ICD-10-PCS | Mod: CPTII,S$GLB,, | Performed by: PHYSICIAN ASSISTANT

## 2020-02-03 PROCEDURE — 99214 PR OFFICE/OUTPT VISIT, EST, LEVL IV, 30-39 MIN: ICD-10-PCS | Mod: S$GLB,,, | Performed by: PHYSICIAN ASSISTANT

## 2020-02-03 PROCEDURE — 1100F PR PT FALLS ASSESS DOC 2+ FALLS/FALL W/INJURY/YR: ICD-10-PCS | Mod: CPTII,S$GLB,, | Performed by: PHYSICIAN ASSISTANT

## 2020-02-03 PROCEDURE — 3008F BODY MASS INDEX DOCD: CPT | Mod: CPTII,S$GLB,, | Performed by: PHYSICIAN ASSISTANT

## 2020-02-03 PROCEDURE — 3008F PR BODY MASS INDEX (BMI) DOCUMENTED: ICD-10-PCS | Mod: CPTII,S$GLB,, | Performed by: PHYSICIAN ASSISTANT

## 2020-02-03 PROCEDURE — 1100F PTFALLS ASSESS-DOCD GE2>/YR: CPT | Mod: CPTII,S$GLB,, | Performed by: PHYSICIAN ASSISTANT

## 2020-02-03 PROCEDURE — 3288F FALL RISK ASSESSMENT DOCD: CPT | Mod: CPTII,S$GLB,, | Performed by: PHYSICIAN ASSISTANT

## 2020-02-03 PROCEDURE — 99999 PR PBB SHADOW E&M-EST. PATIENT-LVL III: ICD-10-PCS | Mod: PBBFAC,,, | Performed by: PHYSICIAN ASSISTANT

## 2020-02-03 PROCEDURE — 99214 OFFICE O/P EST MOD 30 MIN: CPT | Mod: S$GLB,,, | Performed by: PHYSICIAN ASSISTANT

## 2020-02-03 RX ORDER — CIPROFLOXACIN 250 MG/1
500 TABLET, FILM COATED ORAL ONCE
Status: CANCELLED | OUTPATIENT
Start: 2020-02-03 | End: 2020-02-03

## 2020-02-03 RX ORDER — LIDOCAINE HYDROCHLORIDE 20 MG/ML
JELLY TOPICAL ONCE
Status: CANCELLED | OUTPATIENT
Start: 2020-02-03 | End: 2020-02-03

## 2020-02-03 NOTE — PROGRESS NOTES
CHIEF COMPLAINT:    Mr. Hernandez is a 64 y.o. male presenting for urinary retention  PRESENTING ILLNESS:    Celso Hernandez is a 64 y.o. male with a PMH of urinary retention, prostate nodule who presents for urinary retention.     Patient was last seen in clinic on 1/27/20 for a voiding trial.  He presented to the ED 2 days prior for urinary retention.  Potts catheter was removed on 1/27/20.  Patient then presented to the ED 2 days later for urinary retention. He also reported fever and dysuria.  He was admitted and treated for sepsis.  He was treated with rocephin.  Finasteride was initiated.  He was discharged with cipro which he is currently taking.  Blood cultures NGTD.     Renal ultrasound 1/31/20: Chronic renal medical disease, decreased renal vascular flow left kidney.    He reports that his urine stream was stop and go after his catheter was removed on 1/27.  He also has to push to start his stream.      History of retention in September 2019 requiring a potts catheter.     History of enlarged prostate and right prostate nodule.   (-) prostate Biopsy in 1/3/2017 with Dr. Kunz.      REVIEW OF SYSTEMS:    Constitutional: Negative for fever and chills.   HENT: Negative for hearing loss.   Eyes: Negative for visual disturbance.   Respiratory: Negative for shortness of breath.   Cardiovascular: Negative for chest pain.   Gastrointestinal: Negative for vomiting, and constipation.   Genitourinary: See HPI  Neurological: Negative for dizziness.   Hematological: Does not bruise/bleed easily.   Psychiatric/Behavioral: Negative for confusion.       PATIENT HISTORY:    Past Medical History:   Diagnosis Date    Colon polyps     Diabetes mellitus type II     Hyperlipidemia     Hypertension     Multiple duodenal ulcers 10/08/2019    Unspecified hemorrhoids without mention of complication        Past Surgical History:   Procedure Laterality Date    COLONOSCOPY N/A 10/8/2019    Procedure: COLONOSCOPY suprep;  Surgeon:  Landon Guajardo MD;  Location: [facility];  Service: General;  Laterality: N/A;    COLONOSCOPY W/ POLYPECTOMY  10/08/2019    ESOPHAGOGASTRODUODENOSCOPY  10/08/2019    w/biopsies    ESOPHAGOGASTRODUODENOSCOPY N/A 10/8/2019    Procedure: EGD (ESOPHAGOGASTRODUODENOSCOPY);  Surgeon: Landon Guajardo MD;  Location: [facility];  Service: General;  Laterality: N/A;    none         Family History   Problem Relation Age of Onset    Hyperlipidemia Mother     Cancer Sister         breast    Hearing loss Sister        Social History     Socioeconomic History    Marital status:      Spouse name: Not on file    Number of children: Not on file    Years of education: Not on file    Highest education level: Not on file   Occupational History    Not on file   Social Needs    Financial resource strain: Not on file    Food insecurity:     Worry: Not on file     Inability: Not on file    Transportation needs:     Medical: Not on file     Non-medical: Not on file   Tobacco Use    Smoking status: Never Smoker    Smokeless tobacco: Never Used   Substance and Sexual Activity    Alcohol use: Yes     Alcohol/week: 3.3 standard drinks     Types: 4 Standard drinks or equivalent per week     Comment: socially    Drug use: No    Sexual activity: Not on file   Lifestyle    Physical activity:     Days per week: Not on file     Minutes per session: Not on file    Stress: Not on file   Relationships    Social connections:     Talks on phone: Not on file     Gets together: Not on file     Attends Baptism service: Not on file     Active member of club or organization: Not on file     Attends meetings of clubs or organizations: Not on file     Relationship status: Not on file   Other Topics Concern    Not on file   Social History Narrative    Not on file       Allergies:  Patient has no known allergies.    Medications:    Current Outpatient Medications:     atorvastatin (LIPITOR) 80 MG tablet, TAKE 1 TABLET BY MOUTH  ONCE DAILY, Disp: 90 tablet, Rfl: 3    cholecalciferol, vitamin D3, (VITAMIN D3) 2,000 unit Cap, Take 1 capsule by mouth once daily., Disp: , Rfl:     ciprofloxacin HCl (CIPRO) 500 MG tablet, Take 1 tablet (500 mg total) by mouth 2 (two) times daily. for 10 days, Disp: 20 tablet, Rfl: 0    docusate sodium (COLACE) 100 MG capsule, Take 1 capsule (100 mg total) by mouth 2 (two) times daily., Disp: 60 capsule, Rfl: 2    finasteride (PROSCAR) 5 mg tablet, Take 1 tablet (5 mg total) by mouth once daily., Disp: 30 tablet, Rfl: 11    hydroCHLOROthiazide (HYDRODIURIL) 12.5 MG Tab, Take 1 tablet (12.5 mg total) by mouth once daily., Disp: 90 tablet, Rfl: 3    latanoprost 0.005 % ophthalmic solution, Place 1 drop into both eyes every evening., Disp: 2.5 mL, Rfl: 11    losartan (COZAAR) 100 MG tablet, Take 1 tablet (100 mg total) by mouth once daily., Disp: 90 tablet, Rfl: 3    magnesium oxide (MAG-OX) 400 mg (241.3 mg magnesium) tablet, Take 1 tablet (400 mg total) by mouth 2 (two) times daily., Disp: , Rfl: 0    meloxicam (MOBIC) 15 MG tablet, TAKE 1 TABLET BY MOUTH ONCE DAILY, Disp: 30 tablet, Rfl: 11    metFORMIN (GLUCOPHAGE) 500 MG tablet, Take 1 tablet (500 mg total) by mouth daily with breakfast., Disp: 90 tablet, Rfl: 3    pantoprazole (PROTONIX) 40 MG tablet, Take 1 tablet (40 mg total) by mouth once daily., Disp: 30 tablet, Rfl: 11    tamsulosin (FLOMAX) 0.4 mg Cap, TAKE 1 CAPSULE BY MOUTH ONCE DAILY, Disp: 90 capsule, Rfl: 3    timolol maleate 0.5% (TIMOPTIC) 0.5 % Drop, Place 1 drop into both eyes 2 (two) times daily., Disp: 10 mL, Rfl: 11    traMADol (ULTRAM) 50 mg tablet, Take 1-2 tabs bid prn severe pain, Disp: 60 tablet, Rfl: 2    PHYSICAL EXAMINATION:    Constitutional: He appears well-developed and well-nourished.  He is in no apparent distress.    Eyes: No scleral icterus noted bilaterally. No discharge bilaterally.    Nose: No rhinorrhea    Cardiovascular: Normal rate.  No pitting edema noted  in lower extremities bilaterally    Pulmonary/Chest: Effort normal. No respiratory distress.     Abdominal:  He exhibits no distension.  There is no CVA tenderness.     Lymphadenopathy:        Right: No supraclavicular adenopathy present.        Left: No supraclavicular adenopathy present.     Neurological: He is alert and oriented to person, place, and time.     Skin: Skin is warm and dry.     Psych: Cooperative with normal affect.    Yellow urine noted in leg bag.      Physical Exam      LABS:      Lab Results   Component Value Date    PSA 3.4 09/14/2019    PSA 1.9 05/14/2016    PSA 2.5 09/03/2015    PSA 1.6 03/01/2014    PSA 1.7 10/08/2011    PSA 1.7 11/06/2010    PSA 1.7 08/29/2009    PSA 1.2 12/11/2007    PSA 1.1 05/19/2006    PSA 1.2 02/17/2004    PSADIAG 2.8 11/16/2019    PSADIAG 2.2 11/02/2018    PSADIAG 2.5 11/18/2017       IMPRESSION:    Encounter Diagnoses   Name Primary?    BPH with obstruction/lower urinary tract symptoms Yes    Urinary retention          PLAN:  Will increase flomax to BID.  He was instructed to reduce dosage if he starts to feel lightheaded, faint, etc.   Continue finasteride  Complete cipro    Will schedule suds cysto.  If patient passes voiding trial next week then I will cancel urodynamics.      Follow up in 1 week for a voiding trial.      Hamida Ayala PA-C

## 2020-02-03 NOTE — PROGRESS NOTES
3:30pm- TN received a call from the patient stating that he would like to be set up with home health and that his home health choice is [facility]. TN sent referral via [facility].    5:00pm- Patient has been accepted to [facility] for HH service. Patient is aware.

## 2020-02-05 ENCOUNTER — OFFICE VISIT (OUTPATIENT)
Dept: UROLOGY | Facility: CLINIC | Age: 65
End: 2020-02-05
Payer: COMMERCIAL

## 2020-02-05 VITALS
DIASTOLIC BLOOD PRESSURE: 86 MMHG | WEIGHT: 257.94 LBS | HEART RATE: 79 BPM | BODY MASS INDEX: 39.22 KG/M2 | SYSTOLIC BLOOD PRESSURE: 126 MMHG

## 2020-02-05 DIAGNOSIS — R33.9 URINARY RETENTION: ICD-10-CM

## 2020-02-05 DIAGNOSIS — T83.011A MALFUNCTION OF FOLEY CATHETER, INITIAL ENCOUNTER: Primary | ICD-10-CM

## 2020-02-05 PROCEDURE — 3079F PR MOST RECENT DIASTOLIC BLOOD PRESSURE 80-89 MM HG: ICD-10-PCS | Mod: CPTII,S$GLB,, | Performed by: PHYSICIAN ASSISTANT

## 2020-02-05 PROCEDURE — 1100F PTFALLS ASSESS-DOCD GE2>/YR: CPT | Mod: CPTII,S$GLB,, | Performed by: PHYSICIAN ASSISTANT

## 2020-02-05 PROCEDURE — 99999 PR PBB SHADOW E&M-EST. PATIENT-LVL III: ICD-10-PCS | Mod: PBBFAC,,, | Performed by: PHYSICIAN ASSISTANT

## 2020-02-05 PROCEDURE — 3008F PR BODY MASS INDEX (BMI) DOCUMENTED: ICD-10-PCS | Mod: CPTII,S$GLB,, | Performed by: PHYSICIAN ASSISTANT

## 2020-02-05 PROCEDURE — G0180 MD CERTIFICATION HHA PATIENT: HCPCS | Mod: ,,, | Performed by: FAMILY MEDICINE

## 2020-02-05 PROCEDURE — 99213 OFFICE O/P EST LOW 20 MIN: CPT | Mod: 25,S$GLB,, | Performed by: PHYSICIAN ASSISTANT

## 2020-02-05 PROCEDURE — 3079F DIAST BP 80-89 MM HG: CPT | Mod: CPTII,S$GLB,, | Performed by: PHYSICIAN ASSISTANT

## 2020-02-05 PROCEDURE — G0180 PR HOME HEALTH MD CERTIFICATION: ICD-10-PCS | Mod: ,,, | Performed by: FAMILY MEDICINE

## 2020-02-05 PROCEDURE — 3288F PR FALLS RISK ASSESSMENT DOCUMENTED: ICD-10-PCS | Mod: CPTII,S$GLB,, | Performed by: PHYSICIAN ASSISTANT

## 2020-02-05 PROCEDURE — 3074F PR MOST RECENT SYSTOLIC BLOOD PRESSURE < 130 MM HG: ICD-10-PCS | Mod: CPTII,S$GLB,, | Performed by: PHYSICIAN ASSISTANT

## 2020-02-05 PROCEDURE — 3008F BODY MASS INDEX DOCD: CPT | Mod: CPTII,S$GLB,, | Performed by: PHYSICIAN ASSISTANT

## 2020-02-05 PROCEDURE — 99213 PR OFFICE/OUTPT VISIT, EST, LEVL III, 20-29 MIN: ICD-10-PCS | Mod: 25,S$GLB,, | Performed by: PHYSICIAN ASSISTANT

## 2020-02-05 PROCEDURE — 3288F FALL RISK ASSESSMENT DOCD: CPT | Mod: CPTII,S$GLB,, | Performed by: PHYSICIAN ASSISTANT

## 2020-02-05 PROCEDURE — 1100F PR PT FALLS ASSESS DOC 2+ FALLS/FALL W/INJURY/YR: ICD-10-PCS | Mod: CPTII,S$GLB,, | Performed by: PHYSICIAN ASSISTANT

## 2020-02-05 PROCEDURE — 51702 INSERT TEMP BLADDER CATH: CPT | Mod: S$GLB,,, | Performed by: PHYSICIAN ASSISTANT

## 2020-02-05 PROCEDURE — 51702 PR INSERTION OF TEMPORARY INDWELLING BLADDER CATHETER, SIMPLE: ICD-10-PCS | Mod: S$GLB,,, | Performed by: PHYSICIAN ASSISTANT

## 2020-02-05 PROCEDURE — 3074F SYST BP LT 130 MM HG: CPT | Mod: CPTII,S$GLB,, | Performed by: PHYSICIAN ASSISTANT

## 2020-02-05 PROCEDURE — 99999 PR PBB SHADOW E&M-EST. PATIENT-LVL III: CPT | Mod: PBBFAC,,, | Performed by: PHYSICIAN ASSISTANT

## 2020-02-05 NOTE — LETTER
February 5, 2020      Inder Quarles MD  [address] [facility] [place]  [place] [address]           [facility] - Urology 4th Floor  [address]  [place] [address]  Phone: [phone]          Patient: Celso Hernandez   MR Number: [id number]   YOB: 1955   Date of Visit: 2/5/2020       Dear Dr. Inder Quarles:    Thank you for referring Celso Hernandez to me for evaluation. Attached you will find relevant portions of my assessment and plan of care.    If you have questions, please do not hesitate to call me. I look forward to following Celso Hernandez along with you.    Sincerely,    Hamida Ayala PA-C    Enclosure  CC:  No Recipients    If you would like to receive this communication electronically, please contact [email or web address] or [phone] to request more information on [employer] access.    For providers and/or their staff who would like to refer a patient to [facility], please contact us through our one-stop-shop provider referral line, [facility] , at [phone].    If you feel you have received this communication in error or would no longer like to receive these types of communications, please e-mail [email or web address]

## 2020-02-05 NOTE — PROGRESS NOTES
CHIEF COMPLAINT:    Mr. Hernandez is a 64 y.o. male presenting for catheter not draining.  PRESENTING ILLNESS:    Celso Hernandez is a 64 y.o. male with a PMH of BPH, urinary retention, prostate nodule who presents for catheter not draining.      He was last seen in clinic 2 days ago following his admission for sepsis and urinary retention.      He last emptied his bag at 9:30 last night.  He noticed around midnight that no urine was in his bag.  He reports bladder pressure.  He reports pushing urine around the catheter.  He denies gross hematuria.  Denies his catheter pulling.    He is still taking cipro.    Prior to this he has not had any other issues with his catheter draining.    He is taking flomax 0.8mg. He is tolerating it well.  He is also taking finasteride.      REVIEW OF SYSTEMS:    Constitutional: Negative for fever and chills.   HENT: Negative for hearing loss.   Eyes: Negative for visual disturbance.   Respiratory: Negative for shortness of breath.   Cardiovascular: Negative for chest pain.   Gastrointestinal: Negative for vomiting, and constipation.   Genitourinary: See HPI  Neurological: Negative for dizziness.   Hematological: Does not bruise/bleed easily.   Psychiatric/Behavioral: Negative for confusion.       PATIENT HISTORY:    Past Medical History:   Diagnosis Date    Colon polyps     Diabetes mellitus type II     Hyperlipidemia     Hypertension     Multiple duodenal ulcers 10/08/2019    Unspecified hemorrhoids without mention of complication        Past Surgical History:   Procedure Laterality Date    COLONOSCOPY N/A 10/8/2019    Procedure: COLONOSCOPY suprep;  Surgeon: Landon Guajardo MD;  Location: [facility];  Service: General;  Laterality: N/A;    COLONOSCOPY W/ POLYPECTOMY  10/08/2019    ESOPHAGOGASTRODUODENOSCOPY  10/08/2019    w/biopsies    ESOPHAGOGASTRODUODENOSCOPY N/A 10/8/2019    Procedure: EGD (ESOPHAGOGASTRODUODENOSCOPY);  Surgeon: Landon Guajardo MD;  Location: [facility];   Service: General;  Laterality: N/A;    none         Family History   Problem Relation Age of Onset    Hyperlipidemia Mother     Cancer Sister         breast    Hearing loss Sister        Social History     Socioeconomic History    Marital status:      Spouse name: Not on file    Number of children: Not on file    Years of education: Not on file    Highest education level: Not on file   Occupational History    Not on file   Social Needs    Financial resource strain: Not on file    Food insecurity:     Worry: Not on file     Inability: Not on file    Transportation needs:     Medical: Not on file     Non-medical: Not on file   Tobacco Use    Smoking status: Never Smoker    Smokeless tobacco: Never Used   Substance and Sexual Activity    Alcohol use: Yes     Alcohol/week: 3.3 standard drinks     Types: 4 Standard drinks or equivalent per week     Comment: socially    Drug use: No    Sexual activity: Not on file   Lifestyle    Physical activity:     Days per week: Not on file     Minutes per session: Not on file    Stress: Not on file   Relationships    Social connections:     Talks on phone: Not on file     Gets together: Not on file     Attends Faith service: Not on file     Active member of club or organization: Not on file     Attends meetings of clubs or organizations: Not on file     Relationship status: Not on file   Other Topics Concern    Not on file   Social History Narrative    Not on file       Allergies:  Patient has no known allergies.    Medications:    Current Outpatient Medications:     atorvastatin (LIPITOR) 80 MG tablet, TAKE 1 TABLET BY MOUTH ONCE DAILY, Disp: 90 tablet, Rfl: 3    cholecalciferol, vitamin D3, (VITAMIN D3) 2,000 unit Cap, Take 1 capsule by mouth once daily., Disp: , Rfl:     ciprofloxacin HCl (CIPRO) 500 MG tablet, Take 1 tablet (500 mg total) by mouth 2 (two) times daily. for 10 days, Disp: 20 tablet, Rfl: 0    docusate sodium (COLACE) 100 MG  capsule, Take 1 capsule (100 mg total) by mouth 2 (two) times daily., Disp: 60 capsule, Rfl: 2    finasteride (PROSCAR) 5 mg tablet, Take 1 tablet (5 mg total) by mouth once daily., Disp: 30 tablet, Rfl: 11    hydroCHLOROthiazide (HYDRODIURIL) 12.5 MG Tab, Take 1 tablet (12.5 mg total) by mouth once daily., Disp: 90 tablet, Rfl: 3    latanoprost 0.005 % ophthalmic solution, Place 1 drop into both eyes every evening., Disp: 2.5 mL, Rfl: 11    losartan (COZAAR) 100 MG tablet, Take 1 tablet (100 mg total) by mouth once daily., Disp: 90 tablet, Rfl: 3    magnesium oxide (MAG-OX) 400 mg (241.3 mg magnesium) tablet, Take 1 tablet (400 mg total) by mouth 2 (two) times daily., Disp: , Rfl: 0    meloxicam (MOBIC) 15 MG tablet, TAKE 1 TABLET BY MOUTH ONCE DAILY, Disp: 30 tablet, Rfl: 11    metFORMIN (GLUCOPHAGE) 500 MG tablet, Take 1 tablet (500 mg total) by mouth daily with breakfast., Disp: 90 tablet, Rfl: 3    pantoprazole (PROTONIX) 40 MG tablet, Take 1 tablet (40 mg total) by mouth once daily., Disp: 30 tablet, Rfl: 11    tamsulosin (FLOMAX) 0.4 mg Cap, TAKE 1 CAPSULE BY MOUTH ONCE DAILY, Disp: 90 capsule, Rfl: 3    timolol maleate 0.5% (TIMOPTIC) 0.5 % Drop, Place 1 drop into both eyes 2 (two) times daily., Disp: 10 mL, Rfl: 11    traMADol (ULTRAM) 50 mg tablet, Take 1-2 tabs bid prn severe pain, Disp: 60 tablet, Rfl: 2    PHYSICAL EXAMINATION:    Constitutional: He appears well-developed and well-nourished.  He is in no apparent distress.    Eyes: No scleral icterus noted bilaterally. No discharge bilaterally.    Nose: No rhinorrhea    Cardiovascular: Normal rate.      Pulmonary/Chest: Effort normal. No respiratory distress.     Abdominal:  He exhibits no distension.  .     Neurological: He is alert and oriented to person, place, and time.     Skin: Skin is warm and dry.     Psych: Cooperative with normal affect.    Physical Exam    No urine noted in leg bag upon arrival.      LABS:    Lab Results    Component Value Date    PSA 3.4 09/14/2019    PSA 1.9 05/14/2016    PSA 2.5 09/03/2015    PSA 1.6 03/01/2014    PSA 1.7 10/08/2011    PSA 1.7 11/06/2010    PSA 1.7 08/29/2009    PSA 1.2 12/11/2007    PSA 1.1 05/19/2006    PSA 1.2 02/17/2004    PSADIAG 2.8 11/16/2019    PSADIAG 2.2 11/02/2018    PSADIAG 2.5 11/18/2017       IMPRESSION:    Encounter Diagnoses   Name Primary?    Malfunction of Potts catheter, initial encounter Yes    Urinary retention          PLAN:  Unable to flush catheter.    14fr catheter removed by Nurse Christine and noted to be occluded.  A 16fr potts catheter was placed by Nurse Christine.  10cc of water used to inflate the balloon.  Catheter attached to bag and secured to leg.  Leg bag secured to leg.    400 ml of yellow urine was drained from bladder.    Continue cipro.   Continue flomax and finasteride.       I spent 15 minutes with the patient of which more than half was spent in direct consultation with the patient in regards to our treatment and plan.      Follow up next week for a voiding trial.        Hamida Ayala PA-C

## 2020-02-11 ENCOUNTER — OFFICE VISIT (OUTPATIENT)
Dept: UROLOGY | Facility: CLINIC | Age: 65
End: 2020-02-11
Payer: COMMERCIAL

## 2020-02-11 VITALS
WEIGHT: 259.5 LBS | DIASTOLIC BLOOD PRESSURE: 86 MMHG | BODY MASS INDEX: 39.33 KG/M2 | SYSTOLIC BLOOD PRESSURE: 130 MMHG | HEIGHT: 68 IN | HEART RATE: 108 BPM

## 2020-02-11 DIAGNOSIS — N13.8 BPH WITH OBSTRUCTION/LOWER URINARY TRACT SYMPTOMS: Primary | ICD-10-CM

## 2020-02-11 DIAGNOSIS — N40.1 BPH WITH OBSTRUCTION/LOWER URINARY TRACT SYMPTOMS: Primary | ICD-10-CM

## 2020-02-11 DIAGNOSIS — R33.9 URINARY RETENTION: ICD-10-CM

## 2020-02-11 PROCEDURE — 3079F PR MOST RECENT DIASTOLIC BLOOD PRESSURE 80-89 MM HG: ICD-10-PCS | Mod: CPTII,S$GLB,, | Performed by: PHYSICIAN ASSISTANT

## 2020-02-11 PROCEDURE — 51702 INSERT TEMP BLADDER CATH: CPT | Mod: S$GLB,,, | Performed by: PHYSICIAN ASSISTANT

## 2020-02-11 PROCEDURE — 3288F FALL RISK ASSESSMENT DOCD: CPT | Mod: CPTII,S$GLB,, | Performed by: PHYSICIAN ASSISTANT

## 2020-02-11 PROCEDURE — 3008F PR BODY MASS INDEX (BMI) DOCUMENTED: ICD-10-PCS | Mod: CPTII,S$GLB,, | Performed by: PHYSICIAN ASSISTANT

## 2020-02-11 PROCEDURE — 99215 OFFICE O/P EST HI 40 MIN: CPT | Mod: 25,S$GLB,, | Performed by: PHYSICIAN ASSISTANT

## 2020-02-11 PROCEDURE — 99999 PR PBB SHADOW E&M-EST. PATIENT-LVL IV: ICD-10-PCS | Mod: PBBFAC,,, | Performed by: PHYSICIAN ASSISTANT

## 2020-02-11 PROCEDURE — 3075F SYST BP GE 130 - 139MM HG: CPT | Mod: CPTII,S$GLB,, | Performed by: PHYSICIAN ASSISTANT

## 2020-02-11 PROCEDURE — 3008F BODY MASS INDEX DOCD: CPT | Mod: CPTII,S$GLB,, | Performed by: PHYSICIAN ASSISTANT

## 2020-02-11 PROCEDURE — 99215 PR OFFICE/OUTPT VISIT, EST, LEVL V, 40-54 MIN: ICD-10-PCS | Mod: 25,S$GLB,, | Performed by: PHYSICIAN ASSISTANT

## 2020-02-11 PROCEDURE — 87086 URINE CULTURE/COLONY COUNT: CPT

## 2020-02-11 PROCEDURE — 1100F PTFALLS ASSESS-DOCD GE2>/YR: CPT | Mod: CPTII,S$GLB,, | Performed by: PHYSICIAN ASSISTANT

## 2020-02-11 PROCEDURE — 3075F PR MOST RECENT SYSTOLIC BLOOD PRESS GE 130-139MM HG: ICD-10-PCS | Mod: CPTII,S$GLB,, | Performed by: PHYSICIAN ASSISTANT

## 2020-02-11 PROCEDURE — 1100F PR PT FALLS ASSESS DOC 2+ FALLS/FALL W/INJURY/YR: ICD-10-PCS | Mod: CPTII,S$GLB,, | Performed by: PHYSICIAN ASSISTANT

## 2020-02-11 PROCEDURE — 3079F DIAST BP 80-89 MM HG: CPT | Mod: CPTII,S$GLB,, | Performed by: PHYSICIAN ASSISTANT

## 2020-02-11 PROCEDURE — 3288F PR FALLS RISK ASSESSMENT DOCUMENTED: ICD-10-PCS | Mod: CPTII,S$GLB,, | Performed by: PHYSICIAN ASSISTANT

## 2020-02-11 PROCEDURE — 51702 PR INSERTION OF TEMPORARY INDWELLING BLADDER CATHETER, SIMPLE: ICD-10-PCS | Mod: S$GLB,,, | Performed by: PHYSICIAN ASSISTANT

## 2020-02-11 PROCEDURE — 99999 PR PBB SHADOW E&M-EST. PATIENT-LVL IV: CPT | Mod: PBBFAC,,, | Performed by: PHYSICIAN ASSISTANT

## 2020-02-11 NOTE — PROGRESS NOTES
CHIEF COMPLAINT:    Mr. Hernandez is a 64 y.o. male presenting for voiding trial.    PRESENTING ILLNESS:    Celso Hernandez is a 64 y.o. male with a PMH of BPH, urinary retention, prostate nodule who presents for voiding trial.      Patient seen on 1/27/20 following an episode of urinary retention.  Catheter was placed in the ED on 1/25/20.  His catheter was removed on 1/27/20.  He presented to the hospital the following day due to difficulty urinating.  He was admitted for sepsis and discharged with cipro.  He presented back to clinic on 2/3/20 and his flomax was increased.  He returned 2 days later due to his catheter not draining.  His catheter was replaced.  He presents today for a voiding trial.  He reports his catheter draining without difficulty.  He does not have any urinary complaints.  He has been taking flomax 0.8mg and finasteride.  He completes the cipro today.         REVIEW OF SYSTEMS:    Constitutional: Negative for fever and chills.   HENT: Negative for hearing loss.   Eyes: Negative for visual disturbance.   Respiratory: Negative for shortness of breath.   Cardiovascular: Negative for chest pain.   Gastrointestinal: Negative for vomiting, and constipation.   Genitourinary: See HPI  Neurological: Negative for dizziness.   Hematological: Does not bruise/bleed easily.   Psychiatric/Behavioral: Negative for confusion.       PATIENT HISTORY:    Past Medical History:   Diagnosis Date    Colon polyps     Diabetes mellitus type II     Hyperlipidemia     Hypertension     Multiple duodenal ulcers 10/08/2019    Unspecified hemorrhoids without mention of complication        Past Surgical History:   Procedure Laterality Date    COLONOSCOPY N/A 10/8/2019    Procedure: COLONOSCOPY suprep;  Surgeon: Landon Guajardo MD;  Location: [facility];  Service: General;  Laterality: N/A;    COLONOSCOPY W/ POLYPECTOMY  10/08/2019    ESOPHAGOGASTRODUODENOSCOPY  10/08/2019    w/biopsies    ESOPHAGOGASTRODUODENOSCOPY N/A  10/8/2019    Procedure: EGD (ESOPHAGOGASTRODUODENOSCOPY);  Surgeon: Landon Guajardo MD;  Location: [facility];  Service: General;  Laterality: N/A;    none         Family History   Problem Relation Age of Onset    Hyperlipidemia Mother     Cancer Sister         breast    Hearing loss Sister        Social History     Socioeconomic History    Marital status:      Spouse name: Not on file    Number of children: Not on file    Years of education: Not on file    Highest education level: Not on file   Occupational History    Not on file   Social Needs    Financial resource strain: Not on file    Food insecurity:     Worry: Not on file     Inability: Not on file    Transportation needs:     Medical: Not on file     Non-medical: Not on file   Tobacco Use    Smoking status: Never Smoker    Smokeless tobacco: Never Used   Substance and Sexual Activity    Alcohol use: Yes     Alcohol/week: 3.3 standard drinks     Types: 4 Standard drinks or equivalent per week     Comment: socially    Drug use: No    Sexual activity: Not on file   Lifestyle    Physical activity:     Days per week: Not on file     Minutes per session: Not on file    Stress: Not on file   Relationships    Social connections:     Talks on phone: Not on file     Gets together: Not on file     Attends Oriental orthodox service: Not on file     Active member of club or organization: Not on file     Attends meetings of clubs or organizations: Not on file     Relationship status: Not on file   Other Topics Concern    Not on file   Social History Narrative    Not on file       Allergies:  Patient has no known allergies.    Medications:    Current Outpatient Medications:     atorvastatin (LIPITOR) 80 MG tablet, TAKE 1 TABLET BY MOUTH ONCE DAILY, Disp: 90 tablet, Rfl: 3    cholecalciferol, vitamin D3, (VITAMIN D3) 2,000 unit Cap, Take 1 capsule by mouth once daily., Disp: , Rfl:     docusate sodium (COLACE) 100 MG capsule, Take 1 capsule (100 mg  total) by mouth 2 (two) times daily., Disp: 60 capsule, Rfl: 2    finasteride (PROSCAR) 5 mg tablet, Take 1 tablet (5 mg total) by mouth once daily., Disp: 30 tablet, Rfl: 11    hydroCHLOROthiazide (HYDRODIURIL) 12.5 MG Tab, Take 1 tablet (12.5 mg total) by mouth once daily., Disp: 90 tablet, Rfl: 3    latanoprost 0.005 % ophthalmic solution, Place 1 drop into both eyes every evening., Disp: 2.5 mL, Rfl: 11    losartan (COZAAR) 100 MG tablet, Take 1 tablet (100 mg total) by mouth once daily., Disp: 90 tablet, Rfl: 3    magnesium oxide (MAG-OX) 400 mg (241.3 mg magnesium) tablet, Take 1 tablet (400 mg total) by mouth 2 (two) times daily., Disp: , Rfl: 0    meloxicam (MOBIC) 15 MG tablet, TAKE 1 TABLET BY MOUTH ONCE DAILY, Disp: 30 tablet, Rfl: 11    metFORMIN (GLUCOPHAGE) 500 MG tablet, Take 1 tablet (500 mg total) by mouth daily with breakfast., Disp: 90 tablet, Rfl: 3    pantoprazole (PROTONIX) 40 MG tablet, Take 1 tablet (40 mg total) by mouth once daily., Disp: 30 tablet, Rfl: 11    tamsulosin (FLOMAX) 0.4 mg Cap, TAKE 1 CAPSULE BY MOUTH ONCE DAILY, Disp: 90 capsule, Rfl: 3    timolol maleate 0.5% (TIMOPTIC) 0.5 % Drop, Place 1 drop into both eyes 2 (two) times daily., Disp: 10 mL, Rfl: 11    traMADol (ULTRAM) 50 mg tablet, Take 1-2 tabs bid prn severe pain, Disp: 60 tablet, Rfl: 2    PHYSICAL EXAMINATION:    Constitutional: He appears well-developed and well-nourished.  He is in no apparent distress.    Eyes: No scleral icterus noted bilaterally. No discharge bilaterally.    Nose: No rhinorrhea    Cardiovascular: Normal rate.  No pitting edema noted in lower extremities bilaterally    Pulmonary/Chest: Effort normal. No respiratory distress.     Abdominal:  He exhibits no distension.  There is no CVA tenderness.     Lymphadenopathy:        Right: No supraclavicular adenopathy present.        Left: No supraclavicular adenopathy present.     Neurological: He is alert and oriented to person, place, and  time.     Skin: Skin is warm and dry.     Psych: Cooperative with normal affect.    EMILY done 10/2019    Physical Exam    Yellow urine noted in leg bag.    LABS:      Lab Results   Component Value Date    PSA 3.4 09/14/2019    PSA 1.9 05/14/2016    PSA 2.5 09/03/2015    PSA 1.6 03/01/2014    PSA 1.7 10/08/2011    PSA 1.7 11/06/2010    PSA 1.7 08/29/2009    PSA 1.2 12/11/2007    PSA 1.1 05/19/2006    PSA 1.2 02/17/2004    PSADIAG 2.8 11/16/2019    PSADIAG 2.2 11/02/2018    PSADIAG 2.5 11/18/2017       IMPRESSION:    Encounter Diagnoses   Name Primary?    BPH with obstruction/lower urinary tract symptoms Yes    Urinary retention          PLAN:  Attempted voiding trial.  Water would not drain to difficult.  Resistance met when water was instilled into bladder using the plunger.  Therefore catheter was removed. There was some orange residue and mucus in catheter.  Patient was instructed to drink plenty of fluids and then return at 2p to check a pvr.   He voiced understanding.      ----------------------------------------------------------------------------------------------------------------  Patient returned at 2p.  He drank 5 bottles of water and urinated 350ml.  He is complaining of bladder pressure.  PVR by bladder scan showed >450ml  A 16fr catheter was placed by me and 900ml of light yellow urine was drained from bladder. 10cc of sterile water used to inflate the balloon.  Catheter connected to bag and secured to leg.      Urine sent for culture   Continue flomax and finasteride.   Rescheduled suds/cysto for next week.  Discussed procedure in detail to patient and wife.      Follow up for urodynamics.     I spent 45 minutes with the patient of which more than half was spent in direct consultation with the patient in regards to our treatment and plan.    Hamida Ayala PA-C

## 2020-02-12 LAB — BACTERIA UR CULT: NO GROWTH

## 2020-02-18 ENCOUNTER — TELEPHONE (OUTPATIENT)
Dept: UROLOGY | Facility: CLINIC | Age: 65
End: 2020-02-18

## 2020-02-18 ENCOUNTER — PROCEDURE VISIT (OUTPATIENT)
Dept: UROLOGY | Facility: CLINIC | Age: 65
End: 2020-02-18
Payer: COMMERCIAL

## 2020-02-18 VITALS
DIASTOLIC BLOOD PRESSURE: 88 MMHG | SYSTOLIC BLOOD PRESSURE: 161 MMHG | WEIGHT: 258 LBS | HEIGHT: 68 IN | HEART RATE: 75 BPM | TEMPERATURE: 99 F | BODY MASS INDEX: 39.1 KG/M2

## 2020-02-18 DIAGNOSIS — R33.9 URINARY RETENTION: Primary | ICD-10-CM

## 2020-02-18 DIAGNOSIS — E11.9 TYPE 2 DIABETES MELLITUS WITHOUT COMPLICATION, WITHOUT LONG-TERM CURRENT USE OF INSULIN: Primary | ICD-10-CM

## 2020-02-18 DIAGNOSIS — N13.8 BPH WITH OBSTRUCTION/LOWER URINARY TRACT SYMPTOMS: ICD-10-CM

## 2020-02-18 DIAGNOSIS — N40.1 BPH WITH OBSTRUCTION/LOWER URINARY TRACT SYMPTOMS: ICD-10-CM

## 2020-02-18 DIAGNOSIS — R33.9 URINARY RETENTION: ICD-10-CM

## 2020-02-18 PROCEDURE — 51728 CYSTOMETROGRAM W/VP: CPT | Mod: 26,51,S$GLB, | Performed by: UROLOGY

## 2020-02-18 PROCEDURE — 51741 ELECTRO-UROFLOWMETRY FIRST: CPT | Mod: 26,51,S$GLB, | Performed by: UROLOGY

## 2020-02-18 PROCEDURE — 52000 CYSTOURETHROSCOPY: CPT | Mod: 59,S$GLB,, | Performed by: UROLOGY

## 2020-02-18 PROCEDURE — 51728 PR COMPLEX CYSTOMETROGRAM VOIDING PRESSURE STUDIES: ICD-10-PCS | Mod: 26,51,S$GLB, | Performed by: UROLOGY

## 2020-02-18 PROCEDURE — 51741 PR UROFLOWMETRY, COMPLEX: ICD-10-PCS | Mod: 26,51,S$GLB, | Performed by: UROLOGY

## 2020-02-18 PROCEDURE — 51797 PR VOIDING PRESS STUDY INTRA-ABDOMINAL VOID: ICD-10-PCS | Mod: 26,S$GLB,, | Performed by: UROLOGY

## 2020-02-18 PROCEDURE — 51784 PR ANAL/URINARY MUSCLE STUDY: ICD-10-PCS | Mod: 26,S$GLB,, | Performed by: UROLOGY

## 2020-02-18 PROCEDURE — 51797 INTRAABDOMINAL PRESSURE TEST: CPT | Mod: 26,S$GLB,, | Performed by: UROLOGY

## 2020-02-18 PROCEDURE — 52000 PR CYSTOURETHROSCOPY: ICD-10-PCS | Mod: 59,S$GLB,, | Performed by: UROLOGY

## 2020-02-18 PROCEDURE — 51784 ANAL/URINARY MUSCLE STUDY: CPT | Mod: 26,S$GLB,, | Performed by: UROLOGY

## 2020-02-18 RX ORDER — TAMSULOSIN HYDROCHLORIDE 0.4 MG/1
0.8 CAPSULE ORAL DAILY
Qty: 180 CAPSULE | Refills: 3 | Status: SHIPPED | OUTPATIENT
Start: 2020-02-18 | End: 2021-10-05 | Stop reason: ALTCHOICE

## 2020-02-18 RX ORDER — CIPROFLOXACIN 250 MG/1
500 TABLET, FILM COATED ORAL ONCE
Status: COMPLETED | OUTPATIENT
Start: 2020-02-18 | End: 2020-02-18

## 2020-02-18 RX ORDER — LIDOCAINE HYDROCHLORIDE 20 MG/ML
JELLY TOPICAL ONCE
Status: COMPLETED | OUTPATIENT
Start: 2020-02-18 | End: 2020-02-18

## 2020-02-18 RX ORDER — FINASTERIDE 5 MG/1
5 TABLET, FILM COATED ORAL DAILY
Qty: 30 TABLET | Refills: 11 | Status: SHIPPED | OUTPATIENT
Start: 2020-02-18 | End: 2021-04-26

## 2020-02-18 RX ORDER — AMOXICILLIN AND CLAVULANATE POTASSIUM 875; 125 MG/1; MG/1
1 TABLET, FILM COATED ORAL 2 TIMES DAILY
Qty: 14 TABLET | Refills: 0 | Status: SHIPPED | OUTPATIENT
Start: 2020-02-18 | End: 2020-02-25

## 2020-02-18 RX ADMIN — CIPROFLOXACIN 500 MG: 250 TABLET, FILM COATED ORAL at 10:02

## 2020-02-18 RX ADMIN — LIDOCAINE HYDROCHLORIDE: 20 JELLY TOPICAL at 10:02

## 2020-02-18 NOTE — TELEPHONE ENCOUNTER
Urinary retention  -     Case Request Operating Room: TURP (TRANSURETHRAL RESECTION OF PROSTATE) bipolar

## 2020-02-18 NOTE — PATIENT INSTRUCTIONS
SIMPLE URODYNAMIC STUDY (SUDS) & CYSTOSCOPY  UROLOGY CLINIC DISCHARGE INSTRUCTIONS    You have had a procedure that will require time to properly heal. Follow the instructions you have been given on how to care for yourself once you are home. Below is additional information to help in your recovery.    ACTIVITY  · There are no restrictions in activity. Start doing again the things you did before the procedure.  · You may experience a slight burning sensation. You may notice a small amount of blood in your urine. This will clear up within a day. Call the clinic if this continues beyond 48 hours.    DIET  · Continue your normal diet. You may eat the same foods you ate before your procedure.  · Drink plenty of fluids during the first 24-48 hours following your procedure.    MEDICATIONS  · Resume all other previous medications from your prescribing physician.  · Continue any pre=procedure antibiotics until they are all gone.    SIGNS AND SYMPTOMS TO REPORT TO THE DOCTOR  · Chills or fever greater than 101° F within 24 hours of procedure.  · Changes in urination, such as increased bleeding, foul smell, cloudy urine, or painful urination.  · Call your doctor with any questions or concerns.    For any emergency situation, call 161 immediately or go to your nearest emergency room.    [facility]  [phone]    _                                                                                                                                                                                             If any problems after hours or weekends, you may call [phone] and ask for the urology resident on call.

## 2020-02-18 NOTE — PROCEDURES
Simple Urodynamics w/ Cysto  Date/Time: 2/18/2020 9:30 AM  Performed by: Arcadio Vaughn MD  Authorized by: Hamida Ayala PA-C   Comments: Procedure Date:  02/18/2020    Procedure:   Diagnostic Cystourethroscopy   Complex Cystometrogram   Voiding / Pressure Study with Intrarectal Balloon   Complex Uroflow   Electromyogram of Anal Sphincter.     Pre-OP Diagnosis:   Urinary retention, bph with obstruction   Post-OP Diagnosis:   same   Anesthesia:   Anesthesia Administered:   Intraurethral instillation of 10 mL 2% lidocaine (Xylocaine) jelly.   Findings:   --- Bladder ---   CYSTOMETROGRAM ( Filling Phase ):   Cystometric Numeric Data:   - First Desire (Sensation): 120 mL at 5cm of water.   - Normal Desire: 179mL at 6 cm of water.   - Strong Desire: 234 mL at 88 cm of water.   - Urgency (Imminent Void) : 234 mL at 100 cm of water.   - Maximum Cystometric Capacity: 235 mL.   Compliance:   - low.   Leak Point Pressure:   - Valsalva ( Abdominal ) Leak Point Pressure: none.   UROFLOW:   - Voided Volume: 71 mL.   - Residual Urine: 175 mL.   - Maximum Flow Rate: 2 mL/sec.   - Flow Pattern: obstructive and low flow  VOIDING PRESSURE STUDY ( Voiding Phase ):   Detrusor Pressure:   - Maximum Detrusor Pressure: 120cm of water.   - Detrusor Pressure at Maximum Flow: 115 cm of water.   - Detrusor Contraction Characteristics: Sustained contraction(s).   ELECTROMYOGRAM:   - normal.     ---Diagnostic Cystourethroscopy ---   Normal urethra.    Prostate: 7 cm trilobar obstruction with darby intravesical lobe of the prostate.  Many stone debri particles with no obvious stone.    Width of Bladder Neck Opening: Approximately 18 Fr.   Normal bladder.   Normal ureteral orifices bilaterally.       Description of Procedure:   Informed Consent:   - Risks, benefits and alternatives of procedure discussed with   patient and informed consent obtained.   Patient Position:   - Supine.   --- Bladder ---   Prep and Drape:   - Patient prepped and  draped in usual sterile fashion using povidone   iodine (Betadine).   --- Diagnostic Cystourethroscopy ---   Instruments:   - 16 Fr flexible cystoscope with 0 degree lens.   Procedure Details:   - Cystoscope passed under vision into bladder.   - Bladder and urethra examined in their entirety with findings as   above.   --- Urodynamic Studies ---   Procedure Details:   Cystometrogram:   - Catheter(s) passed into the bladder.   - Rectal balloon inserted.   - Catheter(s) connected to infusion medium and to pressure recording   device.   - Infusion Rate: 30 mL / min.   Electromyogram:   - Perineal electromyogram pad placed and connected to electromyogram   recording device.   Equipment:   - Catheters: Double lumen catheter.   - Medium: Liquid.   - Pressure Recording Device: Calibrated electronic equipment.   Complications:   No immediate complications.    CONCLUSIONS:   1. Urinary retnetion  He learned how to do CIC using 14 F catheter.  He also watched the video instruction.  Sample catheters given.    2. BPH with obstruction. ( at least 80 grams or more in size).  Is on flomax 0.8 mg and finasteride.  Has a hx of recurrent urinary retention.  Recommend bipolar TURP. First get rid of large intravesical lobe obstruction then creat a nice channel of the prostate in order to improve his bladder emptying.  Nature and risks of the surgery, including but not limited to pain, hematuria, infection, incontinence, impotence, persistent urinary symptoms discussed in depth.  Patient was advised to stop aspirin and any other blood thinning medication 1 week before surgery.  All questions were answered regarding surgery, hospital course and recovery.  Patient understand and agrees to proceed with the proposed surgery.  Will get pre-op anesthesia clearance.    3. Decreased bladder capacity with bladder instability.  Most likely he will need OAB meds after TURP and be able to void well.      Post-OP Plan:   Patient was discharged home  in a stable condition.  Medications: Augmentin  Follow up:  Bipolar TURP     Type 2 diabetes mellitus without complication, without long-term current use of insulin     BPH with obstruction/lower urinary tract symptoms  Simple Urodynamics w/ Cysto  tamsulosin (FLOMAX) 0.4 mg Cap; Take 2 capsules (0.8 mg total) by mouth once daily.  Dispense: 180 capsule; Refill: 3  finasteride (PROSCAR) 5 mg tablet; Take 1 tablet (5 mg total) by mouth once daily.  Dispense: 30 tablet; Refill: 11  Basic metabolic panel; Future; Expected date: 02/18/2020  CBC Without Differential; Future; Expected date: 02/18/2020  Type & Screen; Future; Expected date: 02/18/2020  Urine culture; Future; Expected date: 02/18/2020     Urinary retention  Simple Urodynamics w/ Cysto  amoxicillin-clavulanate 875-125mg (AUGMENTIN) 875-125 mg per tablet; Take 1 tablet by mouth 2 (two) times daily. for 7 days  Dispense: 14 tablet; Refill: 0  Urine culture; Future; Expected date: 02/18/2020     Other orders  ciprofloxacin HCl tablet 500 mg  lidocaine HCl 2% urojet

## 2020-02-20 ENCOUNTER — TELEPHONE (OUTPATIENT)
Dept: INTERNAL MEDICINE | Facility: CLINIC | Age: 65
End: 2020-02-20

## 2020-02-20 NOTE — TELEPHONE ENCOUNTER
Fax received from [facility]    Medication Reconciliation, placed on Dr Quarles's desk for signature.

## 2020-02-21 ENCOUNTER — TELEPHONE (OUTPATIENT)
Dept: INTERNAL MEDICINE | Facility: CLINIC | Age: 65
End: 2020-02-21

## 2020-02-21 DIAGNOSIS — N13.8 BPH WITH OBSTRUCTION/LOWER URINARY TRACT SYMPTOMS: ICD-10-CM

## 2020-02-21 DIAGNOSIS — N40.1 BPH WITH OBSTRUCTION/LOWER URINARY TRACT SYMPTOMS: ICD-10-CM

## 2020-02-21 DIAGNOSIS — R33.9 URINARY RETENTION: Primary | ICD-10-CM

## 2020-02-21 NOTE — TELEPHONE ENCOUNTER
----- Message from Steph Sinclair sent at 2/21/2020  1:27 PM CST -----  Contact: Gov[facility]/[phone] ext [phone]  Type:Home Health(orders,updates,clarifications,etc)    Home Health Agency/Nurse: [facility].     Phone number: [phone] ex [phone]    Reason for call:    Comments: would like to get additional home health visit once a week and prn for catheter care. Fax # [phone].

## 2020-02-24 NOTE — TELEPHONE ENCOUNTER
[facility] is closed today.    Left message with answering service for Govind.  Please call urology for orders.

## 2020-02-27 NOTE — TELEPHONE ENCOUNTER
Contact: [facility]/[phone] ext [phone]  Type:Home Health(orders,updates,clarifications,etc)     Home Health Agency/Nurse: [facility].      Phone number: [phone] ex [phone]     Reason for call:     Comments: would like to get additional home health visit once a week and prn for catheter care. Fax # [phone].      Simple Urodynamics w/ Cysto  Date/Time: 2/18/2020 9:30 AM  Performed by: Arcadio Vaughn MD  Authorized by: Hamida Ayala PA-C   Comments: Procedure Date:  02/18/2020    Procedure:   Diagnostic Cystourethroscopy   Complex Cystometrogram   Voiding / Pressure Study with Intrarectal Balloon   Complex Uroflow   Electromyogram of Anal Sphincter.     Pre-OP Diagnosis:   Urinary retention, bph with obstruction   Post-OP Diagnosis:   same   Anesthesia:   Anesthesia Administered:   Intraurethral instillation of 10 mL 2% lidocaine (Xylocaine) jelly.   Findings:   --- Bladder ---   CYSTOMETROGRAM ( Filling Phase ):   Cystometric Numeric Data:   - First Desire (Sensation): 120 mL at 5cm of water.   - Normal Desire: 179mL at 6 cm of water.   - Strong Desire: 234 mL at 88 cm of water.   - Urgency (Imminent Void) : 234 mL at 100 cm of water.   - Maximum Cystometric Capacity: 235 mL.   Compliance:   - low.   Leak Point Pressure:   - Valsalva ( Abdominal ) Leak Point Pressure: none.   UROFLOW:   - Voided Volume: 71 mL.   - Residual Urine: 175 mL.   - Maximum Flow Rate: 2 mL/sec.   - Flow Pattern: obstructive and low flow  VOIDING PRESSURE STUDY ( Voiding Phase ):   Detrusor Pressure:   - Maximum Detrusor Pressure: 120cm of water.   - Detrusor Pressure at Maximum Flow: 115 cm of water.   - Detrusor Contraction Characteristics: Sustained contraction(s).   ELECTROMYOGRAM:   - normal.     ---Diagnostic Cystourethroscopy ---   Normal urethra.    Prostate: 7 cm trilobar obstruction with darby intravesical lobe of the prostate.  Many stone debri particles with no obvious stone.    Width of  Bladder Neck Opening: Approximately 18 Fr.   Normal bladder.   Normal ureteral orifices bilaterally.       Description of Procedure:   Informed Consent:   - Risks, benefits and alternatives of procedure discussed with   patient and informed consent obtained.   Patient Position:   - Supine.   --- Bladder ---   Prep and Drape:   - Patient prepped and draped in usual sterile fashion using povidone   iodine (Betadine).   --- Diagnostic Cystourethroscopy ---   Instruments:   - 16 Fr flexible cystoscope with 0 degree lens.   Procedure Details:   - Cystoscope passed under vision into bladder.   - Bladder and urethra examined in their entirety with findings as   above.   --- Urodynamic Studies ---   Procedure Details:   Cystometrogram:   - Catheter(s) passed into the bladder.   - Rectal balloon inserted.   - Catheter(s) connected to infusion medium and to pressure recording   device.   - Infusion Rate: 30 mL / min.   Electromyogram:   - Perineal electromyogram pad placed and connected to electromyogram   recording device.   Equipment:   - Catheters: Double lumen catheter.   - Medium: Liquid.   - Pressure Recording Device: Calibrated electronic equipment.   Complications:   No immediate complications.    CONCLUSIONS:   1. Urinary retnetion  He learned how to do CIC using 14 F catheter.  He also watched the video instruction.  Sample catheters given.    2. BPH with obstruction. ( at least 80 grams or more in size).  Is on flomax 0.8 mg and finasteride.  Has a hx of recurrent urinary retention.  Recommend bipolar TURP. First get rid of large intravesical lobe obstruction then creat a nice channel of the prostate in order to improve his bladder emptying.  Nature and risks of the surgery, including but not limited to pain, hematuria, infection, incontinence, impotence, persistent urinary symptoms discussed in depth.  Patient was advised to stop aspirin and any other blood thinning medication 1 week before surgery.  All questions  were answered regarding surgery, hospital course and recovery.  Patient understand and agrees to proceed with the proposed surgery.  Will get pre-op anesthesia clearance.    3. Decreased bladder capacity with bladder instability.  Most likely he will need OAB meds after TURP and be able to void well.       Urinary retention    BPH with obstruction/lower urinary tract symptoms    He is scheduled to undergo bipolar TURP on 3/25/20.  Please find out what orders are needed.  He can do CIC 3 x a day using 14 F catheter for incomplete bladder emptying indefinitely.

## 2020-02-28 ENCOUNTER — TELEPHONE (OUTPATIENT)
Dept: UROLOGY | Facility: CLINIC | Age: 65
End: 2020-02-28

## 2020-02-28 NOTE — PROGRESS NOTES
HVF done;rel/fix fair OD Good OS coop. Good ou./chart checked for latex allergy.-[facility]   no

## 2020-02-28 NOTE — TELEPHONE ENCOUNTER
Spoke with angelina , home health nurse. They discharged him since he no longer has the catheter, however he is c/o of voiding every 1 1/2 hours .

## 2020-03-02 NOTE — TELEPHONE ENCOUNTER
He is scheduled to undergo TURP on 3/25/20/  As long as he is able to void, I prefer to avoid having an indwelling catheter.  If pt prefers to keep an indwelling catheter, go ahead and place a 16 F potts catheter with urine culture.  We need to cover him with abx prior to his surgery.

## 2020-03-10 ENCOUNTER — EXTERNAL HOME HEALTH (OUTPATIENT)
Dept: HOME HEALTH SERVICES | Facility: HOSPITAL | Age: 65
End: 2020-03-10
Payer: COMMERCIAL

## 2020-03-17 ENCOUNTER — TELEPHONE (OUTPATIENT)
Dept: INTERNAL MEDICINE | Facility: CLINIC | Age: 65
End: 2020-03-17

## 2020-03-17 NOTE — TELEPHONE ENCOUNTER
----- Message from Summer Su sent at 3/17/2020  1:49 PM CDT -----  Contact: Sally colon/ [facility]   Pt has return to work and insurance will no longer provide home health. States they have not seen pt since 2/19/2020.

## 2020-03-18 ENCOUNTER — OFFICE VISIT (OUTPATIENT)
Dept: UROLOGY | Facility: CLINIC | Age: 65
End: 2020-03-18
Payer: COMMERCIAL

## 2020-03-18 ENCOUNTER — LAB VISIT (OUTPATIENT)
Dept: LAB | Facility: HOSPITAL | Age: 65
End: 2020-03-18
Attending: UROLOGY
Payer: COMMERCIAL

## 2020-03-18 DIAGNOSIS — N40.1 BPH WITH OBSTRUCTION/LOWER URINARY TRACT SYMPTOMS: ICD-10-CM

## 2020-03-18 DIAGNOSIS — N40.1 BPH WITH OBSTRUCTION/LOWER URINARY TRACT SYMPTOMS: Primary | ICD-10-CM

## 2020-03-18 DIAGNOSIS — N13.8 BPH WITH OBSTRUCTION/LOWER URINARY TRACT SYMPTOMS: Primary | ICD-10-CM

## 2020-03-18 DIAGNOSIS — N13.8 BPH WITH OBSTRUCTION/LOWER URINARY TRACT SYMPTOMS: ICD-10-CM

## 2020-03-18 LAB
ANION GAP SERPL CALC-SCNC: 8 MMOL/L (ref 8–16)
BUN SERPL-MCNC: 20 MG/DL (ref 8–23)
CALCIUM SERPL-MCNC: 10.2 MG/DL (ref 8.7–10.5)
CHLORIDE SERPL-SCNC: 101 MMOL/L (ref 95–110)
CO2 SERPL-SCNC: 25 MMOL/L (ref 23–29)
CREAT SERPL-MCNC: 1.3 MG/DL (ref 0.5–1.4)
ERYTHROCYTE [DISTWIDTH] IN BLOOD BY AUTOMATED COUNT: 13.7 % (ref 11.5–14.5)
EST. GFR  (AFRICAN AMERICAN): >60 ML/MIN/1.73 M^2
EST. GFR  (NON AFRICAN AMERICAN): 57.3 ML/MIN/1.73 M^2
GLUCOSE SERPL-MCNC: 101 MG/DL (ref 70–110)
HCT VFR BLD AUTO: 53.2 % (ref 40–54)
HGB BLD-MCNC: 17.6 G/DL (ref 14–18)
MCH RBC QN AUTO: 29.3 PG (ref 27–31)
MCHC RBC AUTO-ENTMCNC: 33.1 G/DL (ref 32–36)
MCV RBC AUTO: 89 FL (ref 82–98)
PLATELET # BLD AUTO: 196 K/UL (ref 150–350)
PMV BLD AUTO: 10.3 FL (ref 9.2–12.9)
POTASSIUM SERPL-SCNC: 4.8 MMOL/L (ref 3.5–5.1)
RBC # BLD AUTO: 6.01 M/UL (ref 4.6–6.2)
SODIUM SERPL-SCNC: 134 MMOL/L (ref 136–145)
WBC # BLD AUTO: 8.91 K/UL (ref 3.9–12.7)

## 2020-03-18 PROCEDURE — 85027 COMPLETE CBC AUTOMATED: CPT

## 2020-03-18 PROCEDURE — 87086 URINE CULTURE/COLONY COUNT: CPT

## 2020-03-18 PROCEDURE — 99499 NO LOS: ICD-10-PCS | Mod: S$GLB,,, | Performed by: UROLOGY

## 2020-03-18 PROCEDURE — 36415 COLL VENOUS BLD VENIPUNCTURE: CPT

## 2020-03-18 PROCEDURE — 99499 UNLISTED E&M SERVICE: CPT | Mod: S$GLB,,, | Performed by: UROLOGY

## 2020-03-18 PROCEDURE — 80048 BASIC METABOLIC PNL TOTAL CA: CPT

## 2020-03-18 NOTE — PROGRESS NOTES
Urology ([facility]) H&P  Staff: Arcadio Vaughn MD    CC: BPH with urinary retention.     HPI: Celso Hernandez is a 64 y.o. male with BPH, urinary retention, UTI.        Patient presents today for discussion regarding TURP. He has been to the ED several times over the past few months for urinary retention. He was treated for UTI and admitted overnight on one occasion. Urine grew Klebsiella on that occasion. He has failed voiding trials. He underwent SUDS/cysto which revealed low compliance (2.3mL/cmH2O), capacity 250mL, high Pdet at Q max (799yfL5O at 2mL/s), PVR of 175.     - has been been on finasteride.  - has been on flomax.  - He does not have a catheter in place. He has been voiding on his own. He learned CIC after his SUDS but has not had to do it.   - Has had cystoscopy. This demonstrated 7 cm trilobar obstruction with large median lobe of the prostate, stone debris present.  - has not had history of elevated PSA. TRUS 1/3/2017 demonstrated volume 83.7cc. Biopsy demonstrated no cancer.  - Last urine culture on 2/11/2020 demonstrated no growth.     Has a history of hypertension, hyperlipidemia, DM II (last A1c 6.1)    ROS:  Neg except per HPI    Past Medical History:   Diagnosis Date    Colon polyps     Diabetes mellitus type II     Hyperlipidemia     Hypertension     Multiple duodenal ulcers 10/08/2019    Unspecified hemorrhoids without mention of complication        Past Surgical History:   Procedure Laterality Date    COLONOSCOPY N/A 10/8/2019    Procedure: COLONOSCOPY suprep;  Surgeon: Landon Guajardo MD;  Location: [facility];  Service: General;  Laterality: N/A;    COLONOSCOPY W/ POLYPECTOMY  10/08/2019    ESOPHAGOGASTRODUODENOSCOPY  10/08/2019    w/biopsies    ESOPHAGOGASTRODUODENOSCOPY N/A 10/8/2019    Procedure: EGD (ESOPHAGOGASTRODUODENOSCOPY);  Surgeon: Landon Guajardo MD;  Location: [facility];  Service: General;  Laterality: N/A;    none         Social History     Socioeconomic History     Marital status:      Spouse name: Not on file    Number of children: Not on file    Years of education: Not on file    Highest education level: Not on file   Occupational History    Not on file   Social Needs    Financial resource strain: Not on file    Food insecurity:     Worry: Not on file     Inability: Not on file    Transportation needs:     Medical: Not on file     Non-medical: Not on file   Tobacco Use    Smoking status: Never Smoker    Smokeless tobacco: Never Used   Substance and Sexual Activity    Alcohol use: Yes     Alcohol/week: 3.3 standard drinks     Types: 4 Standard drinks or equivalent per week     Comment: socially    Drug use: No    Sexual activity: Not on file   Lifestyle    Physical activity:     Days per week: Not on file     Minutes per session: Not on file    Stress: Not on file   Relationships    Social connections:     Talks on phone: Not on file     Gets together: Not on file     Attends Yarsanism service: Not on file     Active member of club or organization: Not on file     Attends meetings of clubs or organizations: Not on file     Relationship status: Not on file   Other Topics Concern    Not on file   Social History Narrative    Not on file       Family History   Problem Relation Age of Onset    Hyperlipidemia Mother     Cancer Sister         breast    Hearing loss Sister        Review of patient's allergies indicates:  No Known Allergies    Current Outpatient Medications on File Prior to Visit   Medication Sig Dispense Refill    atorvastatin (LIPITOR) 80 MG tablet TAKE 1 TABLET BY MOUTH ONCE DAILY 90 tablet 3    cholecalciferol, vitamin D3, (VITAMIN D3) 2,000 unit Cap Take 1 capsule by mouth once daily.      docusate sodium (COLACE) 100 MG capsule Take 1 capsule (100 mg total) by mouth 2 (two) times daily. 60 capsule 2    finasteride (PROSCAR) 5 mg tablet Take 1 tablet (5 mg total) by mouth once daily. 30 tablet 11    hydroCHLOROthiazide  "(HYDRODIURIL) 12.5 MG Tab Take 1 tablet (12.5 mg total) by mouth once daily. 90 tablet 3    latanoprost 0.005 % ophthalmic solution Place 1 drop into both eyes every evening. 2.5 mL 11    losartan (COZAAR) 100 MG tablet Take 1 tablet (100 mg total) by mouth once daily. 90 tablet 3    magnesium oxide (MAG-OX) 400 mg (241.3 mg magnesium) tablet Take 1 tablet (400 mg total) by mouth 2 (two) times daily.  0    meloxicam (MOBIC) 15 MG tablet TAKE 1 TABLET BY MOUTH ONCE DAILY 30 tablet 11    metFORMIN (GLUCOPHAGE) 500 MG tablet Take 1 tablet (500 mg total) by mouth daily with breakfast. 90 tablet 3    pantoprazole (PROTONIX) 40 MG tablet Take 1 tablet (40 mg total) by mouth once daily. 30 tablet 11    tamsulosin (FLOMAX) 0.4 mg Cap Take 2 capsules (0.8 mg total) by mouth once daily. 180 capsule 3    timolol maleate 0.5% (TIMOPTIC) 0.5 % Drop Place 1 drop into both eyes 2 (two) times daily. 10 mL 11    traMADol (ULTRAM) 50 mg tablet Take 1-2 tabs bid prn severe pain 60 tablet 2     No current facility-administered medications on file prior to visit.        Anticoagulation:  No    Physical Exam:  Estimated body mass index is 39.23 kg/m² as calculated from the following:    Height as of 2/18/20: 5' 8" (1.727 m).    Weight as of 2/18/20: 117 kg (258 lb).      AAOx4, NAD, WDWN  NC/AT, EOMI, PER, sclerae anicteric, speech normal, tongue midline  Nl effort  RRR  Soft, non-tender, non-distended  Phallus uncircumcised, orthotopic meatus, no meatal stenosis  EMILY 50+ g, no nodules or asymmetry, could not palpate base    Labs:    Urine dipstick today - negative for all compoenents    Lab Results   Component Value Date    WBC 11.74 01/31/2020    HGB 17.1 01/31/2020    HCT 50.7 01/31/2020    MCV 88 01/31/2020     01/31/2020       BMP  Lab Results   Component Value Date     (L) 01/31/2020    K 3.8 01/31/2020     01/31/2020    CO2 24 01/31/2020    BUN 18 01/31/2020    CREATININE 1.1 01/31/2020    CALCIUM 9.6 " 01/31/2020    ANIONGAP 7 (L) 01/31/2020    ESTGFRAFRICA >60 01/31/2020    EGFRNONAA >60 01/31/2020       Lab Results   Component Value Date    PSA 3.4 09/14/2019    PSA 1.9 05/14/2016    PSA 2.5 09/03/2015    PSA 1.6 03/01/2014    PSA 1.7 10/08/2011    PSA 1.7 11/06/2010    PSA 1.7 08/29/2009    PSA 1.2 12/11/2007    PSA 1.1 05/19/2006    PSA 1.2 02/17/2004    PSADIAG 2.8 11/16/2019    PSADIAG 2.2 11/02/2018    PSADIAG 2.5 11/18/2017         Assessment: Celso Hernandez is a 64 y.o. male with history of urinary retention, BPH, UTI, bladder stones    Plan:     1. To OR 03/25/2020 for bipolar TURP  2. Consents signed for surgery.  3. I have explained the risk, benefits, and alternatives of the procedure in detail. The patient voices understanding and all questions have been answered. The patient agrees to proceed as planned.    4.  CBC, BMP, urine culture today    Shay Dorantes MD

## 2020-03-19 LAB — BACTERIA UR CULT: NO GROWTH

## 2020-04-01 NOTE — ANESTHESIA PAT ROS NOTE
04/01/2020  Celso Hernandez is a 64 y.o., male.      Pre-op Assessment         Review of Systems  Anesthesia Hx:  History of prior surgery of interest to airway management or planning: Previous anesthesia: MAC  10/8/19-EGD with MAC.  Denies Family Hx of Anesthesia complications.   Denies Personal Hx of Anesthesia complications.   Social:  Non-Smoker, No Alcohol Use    Hematology/Oncology:  Hematology Normal   Oncology Normal     EENT/Dental:EENT/Dental Normal   Cardiovascular:   Hypertension  Denies Angina. hyperlipidemia  Functional Capacity 4 METS    Pulmonary:  Pulmonary Normal  Denies Shortness of breath.  Denies Recent URI.    Renal/:   BPH  Renal Symptoms/Infections/Stones: retention.  Urinary Tract Infection (UTI)   Hepatic/GI:   GERD, well controlled    Musculoskeletal:   Arthritis     Neurological:  Neurology Normal    Endocrine:   Diabetes, type 2    Psych:  Psychiatric Normal              Anesthesia Assessment: Preoperative EQUATION    Planned Procedure: Procedure(s) (LRB):  TURP (TRANSURETHRAL RESECTION OF PROSTATE) bipolar (N/A)  Requested Anesthesia Type:General  Surgeon: Arcadio Vaughn MD  Service: Urology  Known or anticipated Date of Surgery:5/27/2020    Surgeon notes: reviewed    Electronic QUestionnaire Assessment completed via nurse interview with patient.        Triage considerations:     The patient has no apparent active cardiac condition (No unstable coronary Syndrome such as severe unstable angina or recent [<1 month] myocardial infarction, decompensated CHF, severe valvular   disease or significant arrhythmia)    Previous anesthesia records:GETA, MAC and No problems   Airway/Jaw/Neck:  Airway Findings: Mouth Opening: Normal Tongue: Normal  General Airway Assessment: Adult  Mallampati: II  Improves to II with phonation.  TM Distance: Normal, at least 6 cm  Jaw/Neck Findings:  Neck  ROM: Normal ROM     Last PCP note: 3-6 months ago , within [facility]   Subspecialty notes: Gastroenterology, Urology, OPTOMETRY    Other important co-morbidities: DM2, HLD and HTN      Tests already available:  Available tests,  within 1 month , within [facility] .3/18/2020-CBC,BMP    1/28/2020-EKG            Instructions given. (See in Nurse's note)    Optimization:  Anesthesia Preop Clinic Assessment  Indicated-NOT INDICATED      Plan:    Testing:  None Needed     Patient  has previously scheduled Medical Appointment:4/28    Navigation: Straight Line to surgery.               No tests, anesthesia preop clinic visit, or consult required.

## 2020-04-13 NOTE — ED NOTES
Urinary catheter bag changed to a leg bag. Pt. instructed on the care of potts and given a urinal for home use.   Never smoker

## 2020-04-20 ENCOUNTER — TELEPHONE (OUTPATIENT)
Dept: INTERNAL MEDICINE | Facility: CLINIC | Age: 65
End: 2020-04-20

## 2020-04-20 NOTE — TELEPHONE ENCOUNTER
----- Message from Krysta Guido sent at 4/20/2020 10:20 AM CDT -----  Contact: [phone]  Patient is requesting a call from the office regarding a return to work letter from the doctor.    Please advise, thank you.

## 2020-04-20 NOTE — TELEPHONE ENCOUNTER
We had a cancellation for tomorrow and left msg to call us to move appt up to Tuesday so can get letter to return to work.

## 2020-04-21 ENCOUNTER — OFFICE VISIT (OUTPATIENT)
Dept: INTERNAL MEDICINE | Facility: CLINIC | Age: 65
End: 2020-04-21
Payer: MEDICARE

## 2020-04-21 ENCOUNTER — LAB VISIT (OUTPATIENT)
Dept: LAB | Facility: HOSPITAL | Age: 65
End: 2020-04-21
Attending: INTERNAL MEDICINE
Payer: COMMERCIAL

## 2020-04-21 VITALS
TEMPERATURE: 98 F | BODY MASS INDEX: 40.6 KG/M2 | SYSTOLIC BLOOD PRESSURE: 130 MMHG | HEART RATE: 90 BPM | HEIGHT: 68 IN | WEIGHT: 267.88 LBS | OXYGEN SATURATION: 96 % | DIASTOLIC BLOOD PRESSURE: 74 MMHG

## 2020-04-21 DIAGNOSIS — Z87.898 H/O URINARY RETENTION: ICD-10-CM

## 2020-04-21 DIAGNOSIS — E11.9 TYPE 2 DIABETES MELLITUS WITHOUT COMPLICATION, WITHOUT LONG-TERM CURRENT USE OF INSULIN: Primary | ICD-10-CM

## 2020-04-21 DIAGNOSIS — I10 ESSENTIAL HYPERTENSION: ICD-10-CM

## 2020-04-21 DIAGNOSIS — E11.9 TYPE 2 DIABETES MELLITUS WITHOUT COMPLICATION, WITHOUT LONG-TERM CURRENT USE OF INSULIN: ICD-10-CM

## 2020-04-21 DIAGNOSIS — S90.821S: ICD-10-CM

## 2020-04-21 DIAGNOSIS — N13.8 BPH WITH OBSTRUCTION/LOWER URINARY TRACT SYMPTOMS: ICD-10-CM

## 2020-04-21 DIAGNOSIS — N40.1 BPH WITH OBSTRUCTION/LOWER URINARY TRACT SYMPTOMS: ICD-10-CM

## 2020-04-21 DIAGNOSIS — E78.2 MIXED HYPERLIPIDEMIA: ICD-10-CM

## 2020-04-21 LAB
ALBUMIN SERPL BCP-MCNC: 3.8 G/DL (ref 3.5–5.2)
ALP SERPL-CCNC: 102 U/L (ref 55–135)
ALT SERPL W/O P-5'-P-CCNC: 37 U/L (ref 10–44)
ANION GAP SERPL CALC-SCNC: 7 MMOL/L (ref 8–16)
AST SERPL-CCNC: 39 U/L (ref 10–40)
BASOPHILS # BLD AUTO: 0.1 K/UL (ref 0–0.2)
BASOPHILS NFR BLD: 0.8 % (ref 0–1.9)
BILIRUB SERPL-MCNC: 0.7 MG/DL (ref 0.1–1)
BUN SERPL-MCNC: 21 MG/DL (ref 8–23)
CALCIUM SERPL-MCNC: 10.2 MG/DL (ref 8.7–10.5)
CHLORIDE SERPL-SCNC: 99 MMOL/L (ref 95–110)
CHOLEST SERPL-MCNC: 305 MG/DL (ref 120–199)
CHOLEST/HDLC SERPL: 7.6 {RATIO} (ref 2–5)
CO2 SERPL-SCNC: 26 MMOL/L (ref 23–29)
CREAT SERPL-MCNC: 1.3 MG/DL (ref 0.5–1.4)
DIFFERENTIAL METHOD: ABNORMAL
EOSINOPHIL # BLD AUTO: 0.3 K/UL (ref 0–0.5)
EOSINOPHIL NFR BLD: 2.6 % (ref 0–8)
ERYTHROCYTE [DISTWIDTH] IN BLOOD BY AUTOMATED COUNT: 13.4 % (ref 11.5–14.5)
EST. GFR  (AFRICAN AMERICAN): >60 ML/MIN/1.73 M^2
EST. GFR  (NON AFRICAN AMERICAN): 57.3 ML/MIN/1.73 M^2
ESTIMATED AVG GLUCOSE: 134 MG/DL (ref 68–131)
GLUCOSE SERPL-MCNC: 83 MG/DL (ref 70–110)
HBA1C MFR BLD HPLC: 6.3 % (ref 4–5.6)
HCT VFR BLD AUTO: 52.7 % (ref 40–54)
HDLC SERPL-MCNC: 40 MG/DL (ref 40–75)
HDLC SERPL: 13.1 % (ref 20–50)
HGB BLD-MCNC: 16.9 G/DL (ref 14–18)
IMM GRANULOCYTES # BLD AUTO: 0.05 K/UL (ref 0–0.04)
IMM GRANULOCYTES NFR BLD AUTO: 0.4 % (ref 0–0.5)
LDLC SERPL CALC-MCNC: 229.2 MG/DL (ref 63–159)
LYMPHOCYTES # BLD AUTO: 4.1 K/UL (ref 1–4.8)
LYMPHOCYTES NFR BLD: 32 % (ref 18–48)
MCH RBC QN AUTO: 28.4 PG (ref 27–31)
MCHC RBC AUTO-ENTMCNC: 32.1 G/DL (ref 32–36)
MCV RBC AUTO: 89 FL (ref 82–98)
MONOCYTES # BLD AUTO: 1.6 K/UL (ref 0.3–1)
MONOCYTES NFR BLD: 12.3 % (ref 4–15)
NEUTROPHILS # BLD AUTO: 6.7 K/UL (ref 1.8–7.7)
NEUTROPHILS NFR BLD: 51.9 % (ref 38–73)
NONHDLC SERPL-MCNC: 265 MG/DL
NRBC BLD-RTO: 0 /100 WBC
PLATELET # BLD AUTO: 226 K/UL (ref 150–350)
PMV BLD AUTO: 11.3 FL (ref 9.2–12.9)
POTASSIUM SERPL-SCNC: 4.1 MMOL/L (ref 3.5–5.1)
PROT SERPL-MCNC: 8.1 G/DL (ref 6–8.4)
RBC # BLD AUTO: 5.95 M/UL (ref 4.6–6.2)
SODIUM SERPL-SCNC: 132 MMOL/L (ref 136–145)
TRIGL SERPL-MCNC: 179 MG/DL (ref 30–150)
WBC # BLD AUTO: 12.87 K/UL (ref 3.9–12.7)

## 2020-04-21 PROCEDURE — 99999 PR PBB SHADOW E&M-EST. PATIENT-LVL III: CPT | Mod: PBBFAC,,, | Performed by: INTERNAL MEDICINE

## 2020-04-21 PROCEDURE — 99214 PR OFFICE/OUTPT VISIT, EST, LEVL IV, 30-39 MIN: ICD-10-PCS | Mod: S$PBB,,, | Performed by: INTERNAL MEDICINE

## 2020-04-21 PROCEDURE — 80053 COMPREHEN METABOLIC PANEL: CPT

## 2020-04-21 PROCEDURE — 99214 OFFICE O/P EST MOD 30 MIN: CPT | Mod: S$PBB,,, | Performed by: INTERNAL MEDICINE

## 2020-04-21 PROCEDURE — 83036 HEMOGLOBIN GLYCOSYLATED A1C: CPT

## 2020-04-21 PROCEDURE — 80061 LIPID PANEL: CPT

## 2020-04-21 PROCEDURE — 36415 COLL VENOUS BLD VENIPUNCTURE: CPT | Mod: PO

## 2020-04-21 PROCEDURE — 99213 OFFICE O/P EST LOW 20 MIN: CPT | Mod: PBBFAC,PO | Performed by: INTERNAL MEDICINE

## 2020-04-21 PROCEDURE — 99999 PR PBB SHADOW E&M-EST. PATIENT-LVL III: ICD-10-PCS | Mod: PBBFAC,,, | Performed by: INTERNAL MEDICINE

## 2020-04-21 PROCEDURE — 85025 COMPLETE CBC W/AUTO DIFF WBC: CPT

## 2020-04-21 RX ORDER — ORPHENADRINE CITRATE 100 MG/1
TABLET, EXTENDED RELEASE ORAL
COMMUNITY
Start: 2020-01-24 | End: 2020-04-21

## 2020-04-21 RX ORDER — PRENATAL VIT 91/IRON/FOLIC/DHA 28-975-200
COMBINATION PACKAGE (EA) ORAL 2 TIMES DAILY
Qty: 30 G | Refills: 1 | Status: SHIPPED | OUTPATIENT
Start: 2020-04-21 | End: 2021-04-26 | Stop reason: ALTCHOICE

## 2020-04-21 RX ORDER — LOSARTAN POTASSIUM AND HYDROCHLOROTHIAZIDE 12.5; 1 MG/1; MG/1
TABLET ORAL
COMMUNITY
Start: 2020-04-13 | End: 2020-04-21

## 2020-04-27 ENCOUNTER — OFFICE VISIT (OUTPATIENT)
Dept: PODIATRY | Facility: CLINIC | Age: 65
End: 2020-04-27
Payer: COMMERCIAL

## 2020-04-27 VITALS — WEIGHT: 267 LBS | BODY MASS INDEX: 40.47 KG/M2 | HEIGHT: 68 IN

## 2020-04-27 DIAGNOSIS — L08.0 PUSTULAR RASH: Primary | ICD-10-CM

## 2020-04-27 DIAGNOSIS — S90.821S: ICD-10-CM

## 2020-04-27 DIAGNOSIS — B35.3 TINEA PEDIS, RIGHT: ICD-10-CM

## 2020-04-27 DIAGNOSIS — E11.9 TYPE 2 DIABETES MELLITUS WITHOUT COMPLICATION, WITHOUT LONG-TERM CURRENT USE OF INSULIN: ICD-10-CM

## 2020-04-27 PROCEDURE — 3044F HG A1C LEVEL LT 7.0%: CPT | Mod: CPTII,S$GLB,, | Performed by: PODIATRIST

## 2020-04-27 PROCEDURE — 3008F PR BODY MASS INDEX (BMI) DOCUMENTED: ICD-10-PCS | Mod: CPTII,S$GLB,, | Performed by: PODIATRIST

## 2020-04-27 PROCEDURE — 87070 CULTURE OTHR SPECIMN AEROBIC: CPT

## 2020-04-27 PROCEDURE — 99213 PR OFFICE/OUTPT VISIT, EST, LEVL III, 20-29 MIN: ICD-10-PCS | Mod: S$GLB,,, | Performed by: PODIATRIST

## 2020-04-27 PROCEDURE — 1101F PT FALLS ASSESS-DOCD LE1/YR: CPT | Mod: CPTII,S$GLB,, | Performed by: PODIATRIST

## 2020-04-27 PROCEDURE — 99999 PR PBB SHADOW E&M-EST. PATIENT-LVL III: CPT | Mod: PBBFAC,,, | Performed by: PODIATRIST

## 2020-04-27 PROCEDURE — 99213 OFFICE O/P EST LOW 20 MIN: CPT | Mod: S$GLB,,, | Performed by: PODIATRIST

## 2020-04-27 PROCEDURE — 3044F PR MOST RECENT HEMOGLOBIN A1C LEVEL <7.0%: ICD-10-PCS | Mod: CPTII,S$GLB,, | Performed by: PODIATRIST

## 2020-04-27 PROCEDURE — 99999 PR PBB SHADOW E&M-EST. PATIENT-LVL III: ICD-10-PCS | Mod: PBBFAC,,, | Performed by: PODIATRIST

## 2020-04-27 PROCEDURE — 1101F PR PT FALLS ASSESS DOC 0-1 FALLS W/OUT INJ PAST YR: ICD-10-PCS | Mod: CPTII,S$GLB,, | Performed by: PODIATRIST

## 2020-04-27 PROCEDURE — 3008F BODY MASS INDEX DOCD: CPT | Mod: CPTII,S$GLB,, | Performed by: PODIATRIST

## 2020-04-27 RX ORDER — KETOCONAZOLE 20 MG/G
CREAM TOPICAL DAILY
Qty: 30 G | Refills: 1 | Status: SHIPPED | OUTPATIENT
Start: 2020-04-27 | End: 2021-04-26 | Stop reason: ALTCHOICE

## 2020-04-27 NOTE — PATIENT INSTRUCTIONS
Use [employer] Foot Powder available over the counter daily and antifungal cream to the foot at night. Cover blister site in the arch with large band aid to protect the superficial wound until healed within 5-7 days

## 2020-04-27 NOTE — PROGRESS NOTES
Subjective:      Patient ID: Celso Hernandez is a 64 y.o. male.    Chief Complaint: Diabetes Mellitus (Dr. Quarles 4/20/20) and Diabetic Foot Exam (blister on right foot )    Celso is a 64 y.o. male who presents to the clinic upon referral from Dr. Quarles  for evaluation and treatment of diabetic feet. Celso has a past medical history of Colon polyps, Diabetes mellitus type II, Hyperlipidemia, Hypertension, Multiple duodenal ulcers (10/08/2019), and Unspecified hemorrhoids without mention of complication. Patient relates no major problem with feet. Only complaints today consist of blisters a form to the right foot pointing to the plantar right arch and to the plantar right 3rd toe.  Denies any trauma.  Works in building maintenance.  No pain today.    PCP: Inder Quarles MD    Date Last Seen by PCP:  04/20/2020    Current shoe gear: Tennis shoes    Hemoglobin A1C   Date Value Ref Range Status   04/21/2020 6.3 (H) 4.0 - 5.6 % Final     Comment:     ADA Screening Guidelines:  5.7-6.4%  Consistent with prediabetes  >or=6.5%  Consistent with diabetes  High levels of fetal hemoglobin interfere with the HbA1C  assay. Heterozygous hemoglobin variants (HbS, HgC, etc)do  not significantly interfere with this assay.   However, presence of multiple variants may affect accuracy.     01/29/2020 6.1 (H) 4.0 - 5.6 % Final     Comment:     ADA Screening Guidelines:  5.7-6.4%  Consistent with prediabetes  >or=6.5%  Consistent with diabetes  High levels of fetal hemoglobin interfere with the HbA1C  assay. Heterozygous hemoglobin variants (HbS, HgC, etc)do  not significantly interfere with this assay.   However, presence of multiple variants may affect accuracy.     12/24/2019 6.4 (H) 4.0 - 5.6 % Final     Comment:     ADA Screening Guidelines:  5.7-6.4%  Consistent with prediabetes  >or=6.5%  Consistent with diabetes  High levels of fetal hemoglobin interfere with the HbA1C  assay. Heterozygous hemoglobin variants (HbS, HgC, etc)do  not  "significantly interfere with this assay.   However, presence of multiple variants may affect accuracy.       Vitals:    04/27/20 1549   Weight: 121.1 kg (267 lb)   Height: 5' 8" (1.727 m)   PainSc: 0-No pain      Past Medical History:   Diagnosis Date    Colon polyps     Diabetes mellitus type II     Hyperlipidemia     Hypertension     Multiple duodenal ulcers 10/08/2019    Unspecified hemorrhoids without mention of complication        Past Surgical History:   Procedure Laterality Date    COLONOSCOPY N/A 10/8/2019    Procedure: COLONOSCOPY suprep;  Surgeon: Landon Guajardo MD;  Location: [facility];  Service: General;  Laterality: N/A;    COLONOSCOPY W/ POLYPECTOMY  10/08/2019    ESOPHAGOGASTRODUODENOSCOPY  10/08/2019    w/biopsies    ESOPHAGOGASTRODUODENOSCOPY N/A 10/8/2019    Procedure: EGD (ESOPHAGOGASTRODUODENOSCOPY);  Surgeon: Landon Guajardo MD;  Location: [facility];  Service: General;  Laterality: N/A;    none         Family History   Problem Relation Age of Onset    Hyperlipidemia Mother     Cancer Sister         breast    Hearing loss Sister        Social History     Socioeconomic History    Marital status:      Spouse name: Not on file    Number of children: Not on file    Years of education: Not on file    Highest education level: Not on file   Occupational History    Not on file   Social Needs    Financial resource strain: Not on file    Food insecurity:     Worry: Not on file     Inability: Not on file    Transportation needs:     Medical: Not on file     Non-medical: Not on file   Tobacco Use    Smoking status: Never Smoker    Smokeless tobacco: Never Used   Substance and Sexual Activity    Alcohol use: Yes     Alcohol/week: 3.3 standard drinks     Types: 4 Standard drinks or equivalent per week     Comment: socially    Drug use: No    Sexual activity: Not on file   Lifestyle    Physical activity:     Days per week: Not on file     Minutes per session: Not on file "    Stress: Not on file   Relationships    Social connections:     Talks on phone: Not on file     Gets together: Not on file     Attends [employer] service: Not on file     Active member of club or organization: Not on file     Attends meetings of clubs or organizations: Not on file     Relationship status: Not on file   Other Topics Concern    Not on file   Social History Narrative    Not on file       Current Outpatient Medications   Medication Sig Dispense Refill    atorvastatin (LIPITOR) 80 MG tablet TAKE 1 TABLET BY MOUTH ONCE DAILY 90 tablet 3    cholecalciferol, vitamin D3, (VITAMIN D3) 2,000 unit Cap Take 1 capsule by mouth once daily.      finasteride (PROSCAR) 5 mg tablet Take 1 tablet (5 mg total) by mouth once daily. 30 tablet 11    hydroCHLOROthiazide (HYDRODIURIL) 12.5 MG Tab Take 1 tablet (12.5 mg total) by mouth once daily. 90 tablet 3    latanoprost 0.005 % ophthalmic solution Place 1 drop into both eyes every evening. 2.5 mL 11    losartan (COZAAR) 100 MG tablet Take 1 tablet (100 mg total) by mouth once daily. 90 tablet 3    meloxicam (MOBIC) 15 MG tablet TAKE 1 TABLET BY MOUTH ONCE DAILY 30 tablet 11    metFORMIN (GLUCOPHAGE) 500 MG tablet Take 1 tablet (500 mg total) by mouth daily with breakfast. 90 tablet 3    tamsulosin (FLOMAX) 0.4 mg Cap Take 2 capsules (0.8 mg total) by mouth once daily. 180 capsule 3    timolol maleate 0.5% (TIMOPTIC) 0.5 % Drop Place 1 drop into both eyes 2 (two) times daily. 10 mL 11    ketoconazole (NIZORAL) 2 % cream Apply topically once daily. Apply to foot nightly. 30 g 1    terbinafine HCL (LAMISIL AT) 1 % cream Apply topically 2 (two) times daily. (Patient not taking: Reported on 4/27/2020) 30 g 1    traMADol (ULTRAM) 50 mg tablet Take 1-2 tabs bid prn severe pain (Patient not taking: Reported on 4/27/2020) 60 tablet 2     No current facility-administered medications for this visit.        Review of patient's allergies indicates:  No Known  Allergies        Review of Systems   Constitution: Negative for chills, fever and malaise/fatigue.   HENT: Negative for congestion and hearing loss.    Cardiovascular: Negative for chest pain, claudication and leg swelling.   Respiratory: Negative for cough and shortness of breath.    Skin: Positive for dry skin and itching.   Musculoskeletal: Negative for back pain, joint pain, muscle cramps and muscle weakness.   Gastrointestinal: Negative for nausea and vomiting.   Neurological: Negative for numbness, paresthesias and weakness.   Psychiatric/Behavioral: Negative for altered mental status.           Objective:      Physical Exam   Constitutional: He is oriented to person, place, and time. He appears well-developed and well-nourished. No distress.   Cardiovascular:   Pulses:       Dorsalis pedis pulses are 2+ on the right side, and 2+ on the left side.        Posterior tibial pulses are 2+ on the right side, and 2+ on the left side.   No lower extremity edema bilateral.  Skin temp is warm to foot bilateral.  Reduced digital hair growth bilateral.   Musculoskeletal:   Mild depression medial longitudinal arch with loading of the foot otherwise rectus appearing foot type bilateral.    No pain with ROM or MMT bilateral lower extremity.     Feet:   Right Foot:   Protective Sensation: 10 sites tested. 10 sites sensed.   Skin Integrity: Positive for skin breakdown and dry skin.   Left Foot:   Protective Sensation: 10 sites tested. 10 sites sensed.   Skin Integrity: Negative for ulcer, blister, skin breakdown, erythema, warmth, callus or dry skin.   Neurological: He is alert and oriented to person, place, and time. He has normal strength. No sensory deficit.   Vibratory sensation intact bilateral foot.   Skin: Skin is warm and dry. Capillary refill takes less than 2 seconds. No ecchymosis noted. He is not diaphoretic. No cyanosis or erythema. Nails show no clubbing.   Plantar medial right arch with blister containing  viscous yellow serous fluid upon de richelle the lesion.  Mild surrounding scale.    Healed previous erosion along the plantar medial 1 MTP region right foot.    Mild erosion of the epidermis with scaling along the medial right heel.    Small clear fluid-filled blister plantar right 3rd toe digital tuft without localized signs of infection.    Nails 1-5 bilateral foot are mildly thickened and elongated without significant discoloration.    Skin turgor elasticity appear within normal limits to lower extremity bilateral.                 Assessment:       Encounter Diagnoses   Name Primary?    Type 2 diabetes mellitus without complication, without long-term current use of insulin     Friction blisters of sole of right foot, sequela     Pustular rash Yes    Tinea pedis, right          Plan:       Celso was seen today for diabetes mellitus and diabetic foot exam.    Diagnoses and all orders for this visit:    Pustular rash  -     Aerobic culture (Specify Source)  -     CULTURE, ANAEROBE    Type 2 diabetes mellitus without complication, without long-term current use of insulin  -     Ambulatory referral/consult to Podiatry    Friction blisters of sole of right foot, sequela  -     Ambulatory referral/consult to Podiatry    Tinea pedis, right  -     Aerobic culture (Specify Source)  -     CULTURE, ANAEROBE    Other orders  -     ketoconazole (NIZORAL) 2 % cream; Apply topically once daily. Apply to foot nightly.      I counseled the patient on his conditions, their implications and medical management.    Shoe inspection. Diabetic Foot Education. Patient reminded of the importance of good nutrition and blood sugar control to help prevent podiatric complications of diabetes. Patient instructed on proper foot hygeine. We discussed wearing proper shoe gear, daily foot inspections, never walking without protective shoe gear, never putting sharp instruments to feet.      From clinical standpoint appears as though the patient  may pustular tinea pedis right foot.  The posterior lesion/blister plantar medial right arch was de roof with a 15.  Scalpel and a wound C&S was taken.  Applied Betadine and Mepilex border to the area.  Home care instructions reviewed in detail with the patient.  He may use the ketoconazole to this area followed by large Band-Aid to protective during the day.  He is encouraged use Gold Adams foot powder during the day to prevent moisture buildup in his socks and ketoconazole cream at night.    Patient also has stable blister to the right 3rd toe that is clear fluid-filled without signs infection.  Do not recommend de richelle this blister.    Discussed with the patient that the blister may be from bacterial source however unlikely.    Also discussed routine replacement of his shoe since there about a year old.    RTC 4 weeks or p.r.n. as discussed with the patient.    A portion of this note was generated by voice recognition software and may contain topical graphical errors.

## 2020-04-30 LAB — BACTERIA SPEC AEROBE CULT: NORMAL

## 2020-05-04 RX ORDER — MELOXICAM 15 MG/1
TABLET ORAL
Qty: 30 TABLET | Refills: 4 | Status: SHIPPED | OUTPATIENT
Start: 2020-05-04 | End: 2021-01-25

## 2020-05-14 NOTE — PROGRESS NOTES
Subjective:       Patient ID: Celso Hernandez is a 64 y.o. male.    Chief Complaint: Follow-up (4m f/u)    HPI   Patient presents for follow-up of medical conditions.  The patient is scheduled for a TURP on 05/27/2020 in view of symptoms lower urinary tract obstruction related to BPH.  The patient had a recent episode of urinary retention on 01/25/2020.  His last preop assessment by Urology was on 03/18/2020.  He was instructed on self catheterization should urinary retention recur but he has not had to use the catheter.  He relates that the episodes urinary retention followed heavy alcohol ingestion.  Has not used alcohol since 01/25/2020.    The patient's self-quarantined from 03/18/2020 until he returned to work on 04/17/2020 due to the high risk of COVID -19 exposure.  He has resumed all of his usual work duties.  He denies having any fever or cough.  He states that he feels well.  He does have occasional rhinitis symptoms which are exacerbated by dust exposure.  He uses a humidifier at night but has not used any oral antihistamines or nasal sprays.  He does experience 2-3 episodes of nocturia daily.  Urinary flow has been good with no leakage noted.    Active medical conditions also include hypertension mixed hyperlipidemia, type 2 diabetes mellitus, chronic prostatitis, obesity, and vitamin-D deficiency.  Blood sugar levels usually run from 120-130 fasting.  No hypoglycemic episodes have been reported.  His exercise tolerance remains good.  No PND or orthopnea is described.    Allergies:  No known drug allergies.    Surgical history:  The patient has had no prior surgeries except for endoscopies.  There is no history of any anesthesia allergies.    Review of Systems   Constitutional: Negative for appetite change, chills, fatigue, fever and unexpected weight change.   HENT: Negative for sore throat.    Respiratory: Negative for cough, chest tightness, shortness of breath and wheezing.    Cardiovascular: Negative  for chest pain and palpitations.   Gastrointestinal: Negative for abdominal pain, diarrhea and vomiting.   Genitourinary: Positive for difficulty urinating. Negative for flank pain and hematuria.   Musculoskeletal: Negative for arthralgias and back pain.   Skin: Negative for rash.   Psychiatric/Behavioral: Negative for dysphoric mood and sleep disturbance. The patient is not nervous/anxious.        Objective:      Physical Exam   Constitutional: He is oriented to person, place, and time. He appears well-developed and well-nourished. No distress.   HENT:   Head: Normocephalic and atraumatic.   Eyes: Pupils are equal, round, and reactive to light. Conjunctivae and EOM are normal. No scleral icterus.   Neck: Normal range of motion. Neck supple. No JVD present. No thyromegaly present.   Cardiovascular: Normal rate, regular rhythm, normal heart sounds and intact distal pulses. Exam reveals no gallop.   No murmur heard.  Pulmonary/Chest: Effort normal and breath sounds normal. No respiratory distress. He has no wheezes. He has no rales.   Abdominal: Soft. Bowel sounds are normal. He exhibits no mass. There is no tenderness.   Musculoskeletal: Normal range of motion. He exhibits no edema or tenderness.   Lymphadenopathy:     He has no cervical adenopathy.   Neurological: He is alert and oriented to person, place, and time. No cranial nerve deficit.   Sensory examination is intact in both feet on monofilament testing.   Skin: Skin is warm and dry. No rash noted.   Right foot exhibits blisters along the medial aspect of the sole of the foot.  Superficial peeling of the skin on the dorsal aspect of the great toe is present.  Small blisters are noted on the plantar surface of the foot.  Skin erosions are noted on the right heel.  The left foot is clear with no skin lesions.  No interdigital fissuring of the skin is present.   Psychiatric: He has a normal mood and affect. His behavior is normal.   Nursing note and vitals  reviewed.      Assessment:       1. Type 2 diabetes mellitus without complication, without long-term current use of insulin    2. Essential hypertension    3. Mixed hyperlipidemia    4. BPH with obstruction/lower urinary tract symptoms    5. H/O urinary retention    6. Friction blisters of sole of right foot, sequela        Plan:     Celso was seen today for follow-up.  Podiatry consultation will be obtained for diabetic foot care.  Lamisil cream is recommended to apply to the affected foot twice a day.  The patient may return to work on 04/17/2020 as discussed.  Blood tests will be obtained today (nonfasting).  The patient is medically stable for surgery as planned.    Diagnoses and all orders for this visit:    Type 2 diabetes mellitus without complication, without long-term current use of insulin  -     Ambulatory referral/consult to Podiatry; Future  -     Hemoglobin A1c; Future  -     Comprehensive metabolic panel; Future  -     Lipid panel; Future  -     CBC auto differential; Future    Essential hypertension  -     Hemoglobin A1c; Future  -     Comprehensive metabolic panel; Future  -     Lipid panel; Future  -     CBC auto differential; Future    Mixed hyperlipidemia    BPH with obstruction/lower urinary tract symptoms    H/O urinary retention    Friction blisters of sole of right foot, sequela  -     Ambulatory referral/consult to Podiatry; Future    Other orders  -     terbinafine HCL (LAMISIL AT) 1 % cream; Apply topically 2 (two) times daily. (Patient not taking: Reported on 4/27/2020)

## 2020-05-18 ENCOUNTER — TELEPHONE (OUTPATIENT)
Dept: UROLOGY | Facility: CLINIC | Age: 65
End: 2020-05-18

## 2020-05-18 ENCOUNTER — PATIENT MESSAGE (OUTPATIENT)
Dept: UROLOGY | Facility: CLINIC | Age: 65
End: 2020-05-18

## 2020-05-18 DIAGNOSIS — Z13.9 SCREENING FOR UNSPECIFIED CONDITION: Primary | ICD-10-CM

## 2020-05-18 NOTE — TELEPHONE ENCOUNTER
Spoke with mr rose to confirm covid screening date on 5-25, the location will be [facility] in [place], he was advised that the covid location address and time of operation was sent to his my [facility] pt portal. Pt agreed and verbally understood

## 2020-05-19 ENCOUNTER — TELEPHONE (OUTPATIENT)
Dept: UROLOGY | Facility: CLINIC | Age: 65
End: 2020-05-19

## 2020-05-19 ENCOUNTER — PATIENT MESSAGE (OUTPATIENT)
Dept: UROLOGY | Facility: CLINIC | Age: 65
End: 2020-05-19

## 2020-05-19 DIAGNOSIS — N30.00 ACUTE CYSTITIS WITHOUT HEMATURIA: Primary | ICD-10-CM

## 2020-05-19 NOTE — TELEPHONE ENCOUNTER
Pt was contacted to have a urine culture per dr adame this week, pt agreed and verbally understood. ([facility])

## 2020-05-19 NOTE — TELEPHONE ENCOUNTER
----- Message from Darshana Milan MA sent at 5/18/2020 10:56 AM CDT -----  Regarding: Pre op appt  Mr Hernandez H/P appt was scheduled on 3-18, but the turp procedure was moved from 3-25 to 5-27 because of covid. Do you want him to come in again for another H/P?

## 2020-05-20 ENCOUNTER — LAB VISIT (OUTPATIENT)
Dept: LAB | Facility: HOSPITAL | Age: 65
End: 2020-05-20
Attending: UROLOGY
Payer: COMMERCIAL

## 2020-05-20 DIAGNOSIS — N30.00 ACUTE CYSTITIS WITHOUT HEMATURIA: ICD-10-CM

## 2020-05-20 PROCEDURE — 87086 URINE CULTURE/COLONY COUNT: CPT

## 2020-05-21 ENCOUNTER — CLINICAL SUPPORT (OUTPATIENT)
Dept: URGENT CARE | Facility: CLINIC | Age: 65
End: 2020-05-21
Payer: MEDICARE

## 2020-05-21 VITALS — TEMPERATURE: 99 F

## 2020-05-21 DIAGNOSIS — Z13.9 SCREENING FOR UNSPECIFIED CONDITION: ICD-10-CM

## 2020-05-21 LAB — BACTERIA UR CULT: NO GROWTH

## 2020-05-21 PROCEDURE — U0003 INFECTIOUS AGENT DETECTION BY NUCLEIC ACID (DNA OR RNA); SEVERE ACUTE RESPIRATORY SYNDROME CORONAVIRUS 2 (SARS-COV-2) (CORONAVIRUS DISEASE [COVID-19]), AMPLIFIED PROBE TECHNIQUE, MAKING USE OF HIGH THROUGHPUT TECHNOLOGIES AS DESCRIBED BY CMS-2020-01-R: HCPCS

## 2020-05-22 ENCOUNTER — PATIENT MESSAGE (OUTPATIENT)
Dept: UROLOGY | Facility: CLINIC | Age: 65
End: 2020-05-22

## 2020-05-22 LAB — SARS-COV-2 RNA RESP QL NAA+PROBE: NOT DETECTED

## 2020-05-25 ENCOUNTER — OFFICE VISIT (OUTPATIENT)
Dept: URGENT CARE | Facility: CLINIC | Age: 65
End: 2020-05-25
Payer: COMMERCIAL

## 2020-05-25 VITALS
OXYGEN SATURATION: 96 % | TEMPERATURE: 98 F | WEIGHT: 267 LBS | BODY MASS INDEX: 40.47 KG/M2 | HEART RATE: 108 BPM | HEIGHT: 68 IN

## 2020-05-25 DIAGNOSIS — Z01.89 ENCOUNTER FOR LABORATORY TEST: Primary | ICD-10-CM

## 2020-05-25 PROCEDURE — U0003 INFECTIOUS AGENT DETECTION BY NUCLEIC ACID (DNA OR RNA); SEVERE ACUTE RESPIRATORY SYNDROME CORONAVIRUS 2 (SARS-COV-2) (CORONAVIRUS DISEASE [COVID-19]), AMPLIFIED PROBE TECHNIQUE, MAKING USE OF HIGH THROUGHPUT TECHNOLOGIES AS DESCRIBED BY CMS-2020-01-R: HCPCS

## 2020-05-25 PROCEDURE — 99211 PR OFFICE/OUTPT VISIT, EST, LEVL I: ICD-10-PCS | Mod: S$GLB,,, | Performed by: PHYSICIAN ASSISTANT

## 2020-05-25 PROCEDURE — 99211 OFF/OP EST MAY X REQ PHY/QHP: CPT | Mod: S$GLB,,, | Performed by: PHYSICIAN ASSISTANT

## 2020-05-25 NOTE — PROGRESS NOTES
"Subjective:       Patient ID: Celso Hernandez is a 64 y.o. male.    Vitals:  height is 5' 8" (1.727 m) and weight is 121.1 kg (267 lb). His oral temperature is 98 °F (36.7 °C). His pulse is 108. His oxygen saturation is 96%.     Chief Complaint: COVID-19 Concerns    No symptoms for Covid-19 patient is having a procedure.     Other   This is a new problem. Pertinent negatives include no chills, congestion, coughing, diaphoresis, fatigue, fever, myalgias, nausea, rash, sore throat or vomiting.       Constitution: Negative for chills, sweating, fatigue and fever.   HENT: Negative for ear pain, congestion, sinus pain, sinus pressure, sore throat and voice change.    Neck: Negative for painful lymph nodes.   Eyes: Negative for eye redness.   Respiratory: Negative for chest tightness, cough, sputum production, bloody sputum, COPD, shortness of breath, stridor, wheezing and asthma.    Gastrointestinal: Negative for nausea and vomiting.   Musculoskeletal: Negative for muscle ache.   Skin: Negative for rash.   Allergic/Immunologic: Negative for seasonal allergies and asthma.   Hematologic/Lymphatic: Negative for swollen lymph nodes.       Objective:      Physical Exam   Constitutional: He is oriented to person, place, and time. He appears well-developed and well-nourished. No distress.   HENT:   Head: Normocephalic.   Eyes: Conjunctivae are normal.   Neck: Normal range of motion.   Cardiovascular: Normal rate.   Pulmonary/Chest: Effort normal.   Neurological: He is alert and oriented to person, place, and time.   Skin: Skin is not diaphoretic.         Assessment:       1. Encounter for laboratory test        Plan:         Encounter for laboratory test  -     COVID-19 Routine Screening         counseled the patient, explained what the test results mean, and answered questions       "

## 2020-05-26 ENCOUNTER — TELEPHONE (OUTPATIENT)
Dept: UROLOGY | Facility: CLINIC | Age: 65
End: 2020-05-26

## 2020-05-26 LAB — SARS-COV-2 RNA RESP QL NAA+PROBE: NOT DETECTED

## 2020-05-26 NOTE — TELEPHONE ENCOUNTER
Called pt to confirm arrival time of 630am for procedure on 5-27-20. Gave pt NPO instructions and gave pt opportunity to ask questions. Pt verbalized understanding.

## 2020-05-27 ENCOUNTER — HOSPITAL ENCOUNTER (OUTPATIENT)
Facility: HOSPITAL | Age: 65
Discharge: HOME | End: 2020-05-28
Attending: UROLOGY | Admitting: UROLOGY
Payer: COMMERCIAL

## 2020-05-27 ENCOUNTER — ANESTHESIA EVENT (OUTPATIENT)
Dept: SURGERY | Facility: HOSPITAL | Age: 65
End: 2020-05-27
Payer: COMMERCIAL

## 2020-05-27 ENCOUNTER — TELEPHONE (OUTPATIENT)
Dept: URGENT CARE | Facility: CLINIC | Age: 65
End: 2020-05-27

## 2020-05-27 ENCOUNTER — ANESTHESIA (OUTPATIENT)
Dept: SURGERY | Facility: HOSPITAL | Age: 65
End: 2020-05-27
Payer: COMMERCIAL

## 2020-05-27 DIAGNOSIS — N13.8 BPH WITH URINARY OBSTRUCTION: Primary | ICD-10-CM

## 2020-05-27 DIAGNOSIS — N13.8 BPH WITH OBSTRUCTION/LOWER URINARY TRACT SYMPTOMS: ICD-10-CM

## 2020-05-27 DIAGNOSIS — N40.1 BPH WITH URINARY OBSTRUCTION: Primary | ICD-10-CM

## 2020-05-27 DIAGNOSIS — N40.1 BPH WITH OBSTRUCTION/LOWER URINARY TRACT SYMPTOMS: ICD-10-CM

## 2020-05-27 LAB
ABO + RH BLD: NORMAL
ANION GAP SERPL CALC-SCNC: 7 MMOL/L (ref 8–16)
ANION GAP SERPL CALC-SCNC: 7 MMOL/L (ref 8–16)
BASOPHILS # BLD AUTO: 0.05 K/UL (ref 0–0.2)
BASOPHILS NFR BLD: 0.3 % (ref 0–1.9)
BLD GP AB SCN CELLS X3 SERPL QL: NORMAL
BUN SERPL-MCNC: 18 MG/DL (ref 8–23)
BUN SERPL-MCNC: 18 MG/DL (ref 8–23)
CALCIUM SERPL-MCNC: 8.7 MG/DL (ref 8.7–10.5)
CALCIUM SERPL-MCNC: 8.7 MG/DL (ref 8.7–10.5)
CHLORIDE SERPL-SCNC: 104 MMOL/L (ref 95–110)
CHLORIDE SERPL-SCNC: 104 MMOL/L (ref 95–110)
CO2 SERPL-SCNC: 25 MMOL/L (ref 23–29)
CO2 SERPL-SCNC: 25 MMOL/L (ref 23–29)
CREAT SERPL-MCNC: 1.1 MG/DL (ref 0.5–1.4)
CREAT SERPL-MCNC: 1.1 MG/DL (ref 0.5–1.4)
DIFFERENTIAL METHOD: ABNORMAL
EOSINOPHIL # BLD AUTO: 0 K/UL (ref 0–0.5)
EOSINOPHIL NFR BLD: 0.1 % (ref 0–8)
ERYTHROCYTE [DISTWIDTH] IN BLOOD BY AUTOMATED COUNT: 13.6 % (ref 11.5–14.5)
EST. GFR  (AFRICAN AMERICAN): >60 ML/MIN/1.73 M^2
EST. GFR  (AFRICAN AMERICAN): >60 ML/MIN/1.73 M^2
EST. GFR  (NON AFRICAN AMERICAN): >60 ML/MIN/1.73 M^2
EST. GFR  (NON AFRICAN AMERICAN): >60 ML/MIN/1.73 M^2
GLUCOSE SERPL-MCNC: 132 MG/DL (ref 70–110)
GLUCOSE SERPL-MCNC: 132 MG/DL (ref 70–110)
HCT VFR BLD AUTO: 52.2 % (ref 40–54)
HGB BLD-MCNC: 16.8 G/DL (ref 14–18)
IMM GRANULOCYTES # BLD AUTO: 0.05 K/UL (ref 0–0.04)
IMM GRANULOCYTES NFR BLD AUTO: 0.3 % (ref 0–0.5)
LYMPHOCYTES # BLD AUTO: 1 K/UL (ref 1–4.8)
LYMPHOCYTES NFR BLD: 6.6 % (ref 18–48)
MCH RBC QN AUTO: 28.9 PG (ref 27–31)
MCHC RBC AUTO-ENTMCNC: 32.2 G/DL (ref 32–36)
MCV RBC AUTO: 90 FL (ref 82–98)
MONOCYTES # BLD AUTO: 0.3 K/UL (ref 0.3–1)
MONOCYTES NFR BLD: 2.2 % (ref 4–15)
NEUTROPHILS # BLD AUTO: 13.4 K/UL (ref 1.8–7.7)
NEUTROPHILS NFR BLD: 90.5 % (ref 38–73)
NRBC BLD-RTO: 0 /100 WBC
PLATELET # BLD AUTO: 168 K/UL (ref 150–350)
PMV BLD AUTO: 10.1 FL (ref 9.2–12.9)
POCT GLUCOSE: 109 MG/DL (ref 70–110)
POCT GLUCOSE: 126 MG/DL (ref 70–110)
POCT GLUCOSE: 133 MG/DL (ref 70–110)
POCT GLUCOSE: 168 MG/DL (ref 70–110)
POTASSIUM SERPL-SCNC: 3.7 MMOL/L (ref 3.5–5.1)
POTASSIUM SERPL-SCNC: 3.7 MMOL/L (ref 3.5–5.1)
RBC # BLD AUTO: 5.82 M/UL (ref 4.6–6.2)
SODIUM SERPL-SCNC: 136 MMOL/L (ref 136–145)
SODIUM SERPL-SCNC: 136 MMOL/L (ref 136–145)
WBC # BLD AUTO: 14.76 K/UL (ref 3.9–12.7)

## 2020-05-27 PROCEDURE — 25000003 PHARM REV CODE 250: Performed by: UROLOGY

## 2020-05-27 PROCEDURE — 63600175 PHARM REV CODE 636 W HCPCS: Performed by: NURSE ANESTHETIST, CERTIFIED REGISTERED

## 2020-05-27 PROCEDURE — 36415 COLL VENOUS BLD VENIPUNCTURE: CPT

## 2020-05-27 PROCEDURE — 82962 GLUCOSE BLOOD TEST: CPT | Performed by: UROLOGY

## 2020-05-27 PROCEDURE — C1758 CATHETER, URETERAL: HCPCS | Performed by: UROLOGY

## 2020-05-27 PROCEDURE — 94799 UNLISTED PULMONARY SVC/PX: CPT

## 2020-05-27 PROCEDURE — 80048 BASIC METABOLIC PNL TOTAL CA: CPT

## 2020-05-27 PROCEDURE — 88305 TISSUE EXAM BY PATHOLOGIST: CPT | Mod: 26,,, | Performed by: PATHOLOGY

## 2020-05-27 PROCEDURE — D9220A PRA ANESTHESIA: ICD-10-PCS | Mod: CRNA,,, | Performed by: NURSE ANESTHETIST, CERTIFIED REGISTERED

## 2020-05-27 PROCEDURE — 88305 TISSUE EXAM BY PATHOLOGIST: CPT | Performed by: PATHOLOGY

## 2020-05-27 PROCEDURE — 25000003 PHARM REV CODE 250: Performed by: STUDENT IN AN ORGANIZED HEALTH CARE EDUCATION/TRAINING PROGRAM

## 2020-05-27 PROCEDURE — 52601 PROSTATECTOMY (TURP): CPT | Mod: ,,, | Performed by: UROLOGY

## 2020-05-27 PROCEDURE — 94761 N-INVAS EAR/PLS OXIMETRY MLT: CPT

## 2020-05-27 PROCEDURE — 27201423 OPTIME MED/SURG SUP & DEVICES STERILE SUPPLY: Performed by: UROLOGY

## 2020-05-27 PROCEDURE — D9220A PRA ANESTHESIA: Mod: CRNA,,, | Performed by: NURSE ANESTHETIST, CERTIFIED REGISTERED

## 2020-05-27 PROCEDURE — 36000706: Performed by: UROLOGY

## 2020-05-27 PROCEDURE — 71000044 HC DOSC ROUTINE RECOVERY FIRST HOUR: Performed by: UROLOGY

## 2020-05-27 PROCEDURE — D9220A PRA ANESTHESIA: Mod: ANES,,, | Performed by: ANESTHESIOLOGY

## 2020-05-27 PROCEDURE — 37000008 HC ANESTHESIA 1ST 15 MINUTES: Performed by: UROLOGY

## 2020-05-27 PROCEDURE — 88305 TISSUE EXAM BY PATHOLOGIST: ICD-10-PCS | Mod: 26,,, | Performed by: PATHOLOGY

## 2020-05-27 PROCEDURE — 71000015 HC POSTOP RECOV 1ST HR: Performed by: UROLOGY

## 2020-05-27 PROCEDURE — 36000707: Performed by: UROLOGY

## 2020-05-27 PROCEDURE — 63600175 PHARM REV CODE 636 W HCPCS: Performed by: STUDENT IN AN ORGANIZED HEALTH CARE EDUCATION/TRAINING PROGRAM

## 2020-05-27 PROCEDURE — 85025 COMPLETE CBC W/AUTO DIFF WBC: CPT

## 2020-05-27 PROCEDURE — 37000009 HC ANESTHESIA EA ADD 15 MINS: Performed by: UROLOGY

## 2020-05-27 PROCEDURE — 86850 RBC ANTIBODY SCREEN: CPT

## 2020-05-27 PROCEDURE — 63600175 PHARM REV CODE 636 W HCPCS: Performed by: ANESTHESIOLOGY

## 2020-05-27 PROCEDURE — D9220A PRA ANESTHESIA: ICD-10-PCS | Mod: ANES,,, | Performed by: ANESTHESIOLOGY

## 2020-05-27 PROCEDURE — 52601 PR TRANSURETHRAL ELEC-SURG PROSTATECTOM: ICD-10-PCS | Mod: ,,, | Performed by: UROLOGY

## 2020-05-27 PROCEDURE — 25000003 PHARM REV CODE 250: Performed by: NURSE ANESTHETIST, CERTIFIED REGISTERED

## 2020-05-27 RX ORDER — ROCURONIUM BROMIDE 10 MG/ML
INJECTION, SOLUTION INTRAVENOUS
Status: DISCONTINUED | OUTPATIENT
Start: 2020-05-27 | End: 2020-05-27

## 2020-05-27 RX ORDER — SODIUM CHLORIDE 0.9 % (FLUSH) 0.9 %
10 SYRINGE (ML) INJECTION
Status: DISCONTINUED | OUTPATIENT
Start: 2020-05-27 | End: 2020-05-27 | Stop reason: HOSPADM

## 2020-05-27 RX ORDER — PROPOFOL 10 MG/ML
VIAL (ML) INTRAVENOUS
Status: DISCONTINUED | OUTPATIENT
Start: 2020-05-27 | End: 2020-05-27

## 2020-05-27 RX ORDER — MIDAZOLAM HYDROCHLORIDE 5 MG/ML
INJECTION INTRAMUSCULAR; INTRAVENOUS
Status: DISCONTINUED | OUTPATIENT
Start: 2020-05-27 | End: 2020-05-27

## 2020-05-27 RX ORDER — LIDOCAINE HYDROCHLORIDE 10 MG/ML
1 INJECTION, SOLUTION EPIDURAL; INFILTRATION; INTRACAUDAL; PERINEURAL ONCE
Status: DISCONTINUED | OUTPATIENT
Start: 2020-05-27 | End: 2020-05-27 | Stop reason: HOSPADM

## 2020-05-27 RX ORDER — DEXAMETHASONE SODIUM PHOSPHATE 4 MG/ML
INJECTION, SOLUTION INTRA-ARTICULAR; INTRALESIONAL; INTRAMUSCULAR; INTRAVENOUS; SOFT TISSUE
Status: DISCONTINUED | OUTPATIENT
Start: 2020-05-27 | End: 2020-05-27

## 2020-05-27 RX ORDER — LIDOCAINE HCL/PF 100 MG/5ML
SYRINGE (ML) INTRAVENOUS
Status: DISCONTINUED | OUTPATIENT
Start: 2020-05-27 | End: 2020-05-27

## 2020-05-27 RX ORDER — ATORVASTATIN CALCIUM 20 MG/1
80 TABLET, FILM COATED ORAL DAILY
Status: DISCONTINUED | OUTPATIENT
Start: 2020-05-28 | End: 2020-05-28 | Stop reason: HOSPADM

## 2020-05-27 RX ORDER — TIMOLOL MALEATE 5 MG/ML
1 SOLUTION/ DROPS OPHTHALMIC 2 TIMES DAILY
Status: DISCONTINUED | OUTPATIENT
Start: 2020-05-27 | End: 2020-05-28 | Stop reason: HOSPADM

## 2020-05-27 RX ORDER — IBUPROFEN 200 MG
16 TABLET ORAL
Status: DISCONTINUED | OUTPATIENT
Start: 2020-05-27 | End: 2020-05-28 | Stop reason: HOSPADM

## 2020-05-27 RX ORDER — OXYCODONE HYDROCHLORIDE 10 MG/1
10 TABLET ORAL EVERY 4 HOURS PRN
Status: DISCONTINUED | OUTPATIENT
Start: 2020-05-27 | End: 2020-05-28 | Stop reason: HOSPADM

## 2020-05-27 RX ORDER — SULFAMETHOXAZOLE AND TRIMETHOPRIM 800; 160 MG/1; MG/1
1 TABLET ORAL 2 TIMES DAILY
Status: DISCONTINUED | OUTPATIENT
Start: 2020-05-27 | End: 2020-05-28 | Stop reason: HOSPADM

## 2020-05-27 RX ORDER — HYDROCHLOROTHIAZIDE 12.5 MG/1
12.5 TABLET ORAL DAILY
Status: DISCONTINUED | OUTPATIENT
Start: 2020-05-28 | End: 2020-05-28 | Stop reason: HOSPADM

## 2020-05-27 RX ORDER — DIPHENHYDRAMINE HCL 25 MG
25 CAPSULE ORAL EVERY 12 HOURS PRN
Status: DISCONTINUED | OUTPATIENT
Start: 2020-05-27 | End: 2020-05-28 | Stop reason: HOSPADM

## 2020-05-27 RX ORDER — SODIUM CHLORIDE 9 MG/ML
INJECTION, SOLUTION INTRAVENOUS CONTINUOUS
Status: DISCONTINUED | OUTPATIENT
Start: 2020-05-27 | End: 2020-05-28 | Stop reason: HOSPADM

## 2020-05-27 RX ORDER — FINASTERIDE 5 MG/1
5 TABLET, FILM COATED ORAL DAILY
Status: DISCONTINUED | OUTPATIENT
Start: 2020-05-27 | End: 2020-05-28 | Stop reason: HOSPADM

## 2020-05-27 RX ORDER — OXYBUTYNIN CHLORIDE 5 MG/5ML
5 SYRUP ORAL 3 TIMES DAILY PRN
Status: DISCONTINUED | OUTPATIENT
Start: 2020-05-27 | End: 2020-05-28 | Stop reason: HOSPADM

## 2020-05-27 RX ORDER — OXYCODONE HYDROCHLORIDE 5 MG/1
5 TABLET ORAL EVERY 4 HOURS PRN
Status: DISCONTINUED | OUTPATIENT
Start: 2020-05-27 | End: 2020-05-28 | Stop reason: HOSPADM

## 2020-05-27 RX ORDER — GLUCAGON 1 MG
1 KIT INJECTION
Status: DISCONTINUED | OUTPATIENT
Start: 2020-05-27 | End: 2020-05-28 | Stop reason: HOSPADM

## 2020-05-27 RX ORDER — LATANOPROST 50 UG/ML
1 SOLUTION/ DROPS OPHTHALMIC NIGHTLY
Status: DISCONTINUED | OUTPATIENT
Start: 2020-05-27 | End: 2020-05-28 | Stop reason: HOSPADM

## 2020-05-27 RX ORDER — TALC
6 POWDER (GRAM) TOPICAL NIGHTLY PRN
Status: DISCONTINUED | OUTPATIENT
Start: 2020-05-27 | End: 2020-05-28 | Stop reason: HOSPADM

## 2020-05-27 RX ORDER — CEFAZOLIN SODIUM 1 G/3ML
2 INJECTION, POWDER, FOR SOLUTION INTRAMUSCULAR; INTRAVENOUS
Status: COMPLETED | OUTPATIENT
Start: 2020-05-27 | End: 2020-05-27

## 2020-05-27 RX ORDER — FENTANYL CITRATE 50 UG/ML
INJECTION, SOLUTION INTRAMUSCULAR; INTRAVENOUS
Status: DISCONTINUED | OUTPATIENT
Start: 2020-05-27 | End: 2020-05-27

## 2020-05-27 RX ORDER — ONDANSETRON HYDROCHLORIDE 2 MG/ML
INJECTION, SOLUTION INTRAMUSCULAR; INTRAVENOUS
Status: DISCONTINUED | OUTPATIENT
Start: 2020-05-27 | End: 2020-05-27

## 2020-05-27 RX ORDER — GLYCOPYRROLATE 0.2 MG/ML
INJECTION INTRAMUSCULAR; INTRAVENOUS
Status: DISCONTINUED | OUTPATIENT
Start: 2020-05-27 | End: 2020-05-27

## 2020-05-27 RX ORDER — SODIUM CHLORIDE 9 MG/ML
INJECTION, SOLUTION INTRAVENOUS CONTINUOUS
Status: DISCONTINUED | OUTPATIENT
Start: 2020-05-27 | End: 2020-05-27

## 2020-05-27 RX ORDER — KETAMINE HCL IN 0.9 % NACL 50 MG/5 ML
SYRINGE (ML) INTRAVENOUS
Status: DISCONTINUED | OUTPATIENT
Start: 2020-05-27 | End: 2020-05-27

## 2020-05-27 RX ORDER — IBUPROFEN 200 MG
24 TABLET ORAL
Status: DISCONTINUED | OUTPATIENT
Start: 2020-05-27 | End: 2020-05-28 | Stop reason: HOSPADM

## 2020-05-27 RX ORDER — FENTANYL CITRATE 50 UG/ML
25 INJECTION, SOLUTION INTRAMUSCULAR; INTRAVENOUS EVERY 5 MIN PRN
Status: DISCONTINUED | OUTPATIENT
Start: 2020-05-27 | End: 2020-05-27 | Stop reason: HOSPADM

## 2020-05-27 RX ORDER — SODIUM CHLORIDE 0.9 % (FLUSH) 0.9 %
10 SYRINGE (ML) INJECTION
Status: DISCONTINUED | OUTPATIENT
Start: 2020-05-27 | End: 2020-05-28 | Stop reason: HOSPADM

## 2020-05-27 RX ORDER — TAMSULOSIN HYDROCHLORIDE 0.4 MG/1
0.8 CAPSULE ORAL DAILY
Status: DISCONTINUED | OUTPATIENT
Start: 2020-05-27 | End: 2020-05-28 | Stop reason: HOSPADM

## 2020-05-27 RX ORDER — LOSARTAN POTASSIUM 25 MG/1
100 TABLET ORAL DAILY
Status: DISCONTINUED | OUTPATIENT
Start: 2020-05-28 | End: 2020-05-28 | Stop reason: HOSPADM

## 2020-05-27 RX ORDER — ONDANSETRON 2 MG/ML
4 INJECTION INTRAMUSCULAR; INTRAVENOUS EVERY 6 HOURS PRN
Status: DISCONTINUED | OUTPATIENT
Start: 2020-05-27 | End: 2020-05-28 | Stop reason: HOSPADM

## 2020-05-27 RX ORDER — INSULIN ASPART 100 [IU]/ML
0-5 INJECTION, SOLUTION INTRAVENOUS; SUBCUTANEOUS
Status: DISCONTINUED | OUTPATIENT
Start: 2020-05-27 | End: 2020-05-28 | Stop reason: HOSPADM

## 2020-05-27 RX ADMIN — LIDOCAINE HYDROCHLORIDE 100 MG: 20 INJECTION, SOLUTION INTRAVENOUS at 08:05

## 2020-05-27 RX ADMIN — SODIUM CHLORIDE: 0.9 INJECTION, SOLUTION INTRAVENOUS at 06:05

## 2020-05-27 RX ADMIN — SUGAMMADEX 400 MG: 100 INJECTION, SOLUTION INTRAVENOUS at 09:05

## 2020-05-27 RX ADMIN — SULFAMETHOXAZOLE AND TRIMETHOPRIM 1 TABLET: 800; 160 TABLET ORAL at 01:05

## 2020-05-27 RX ADMIN — LATANOPROST 1 DROP: 50 SOLUTION OPHTHALMIC at 10:05

## 2020-05-27 RX ADMIN — GLYCOPYRROLATE 0.2 MG: 0.2 INJECTION INTRAMUSCULAR; INTRAVENOUS at 08:05

## 2020-05-27 RX ADMIN — ONDANSETRON 4 MG: 2 INJECTION, SOLUTION INTRAMUSCULAR; INTRAVENOUS at 09:05

## 2020-05-27 RX ADMIN — TAMSULOSIN HYDROCHLORIDE 0.8 MG: 0.4 CAPSULE ORAL at 12:05

## 2020-05-27 RX ADMIN — DEXAMETHASONE SODIUM PHOSPHATE 8 MG: 4 INJECTION, SOLUTION INTRAMUSCULAR; INTRAVENOUS at 08:05

## 2020-05-27 RX ADMIN — SULFAMETHOXAZOLE AND TRIMETHOPRIM 1 TABLET: 800; 160 TABLET ORAL at 10:05

## 2020-05-27 RX ADMIN — FENTANYL CITRATE 50 MCG: 50 INJECTION, SOLUTION INTRAMUSCULAR; INTRAVENOUS at 08:05

## 2020-05-27 RX ADMIN — FINASTERIDE 5 MG: 5 TABLET, FILM COATED ORAL at 12:05

## 2020-05-27 RX ADMIN — FENTANYL CITRATE 25 MCG: 50 INJECTION INTRAMUSCULAR; INTRAVENOUS at 10:05

## 2020-05-27 RX ADMIN — LIDOCAINE HYDROCHLORIDE 100 MG: 20 INJECTION, SOLUTION INTRAVENOUS at 09:05

## 2020-05-27 RX ADMIN — OXYCODONE HYDROCHLORIDE 10 MG: 5 TABLET ORAL at 10:05

## 2020-05-27 RX ADMIN — Medication 25 MG: at 08:05

## 2020-05-27 RX ADMIN — SODIUM CHLORIDE, SODIUM GLUCONATE, SODIUM ACETATE, POTASSIUM CHLORIDE, MAGNESIUM CHLORIDE, SODIUM PHOSPHATE, DIBASIC, AND POTASSIUM PHOSPHATE: .53; .5; .37; .037; .03; .012; .00082 INJECTION, SOLUTION INTRAVENOUS at 09:05

## 2020-05-27 RX ADMIN — Medication 10 MG: at 08:05

## 2020-05-27 RX ADMIN — TIMOLOL MALEATE 1 DROP: 5 SOLUTION/ DROPS OPHTHALMIC at 12:05

## 2020-05-27 RX ADMIN — ROCURONIUM BROMIDE 20 MG: 10 INJECTION, SOLUTION INTRAVENOUS at 08:05

## 2020-05-27 RX ADMIN — SODIUM CHLORIDE: 0.9 INJECTION, SOLUTION INTRAVENOUS at 12:05

## 2020-05-27 RX ADMIN — TIMOLOL MALEATE 1 DROP: 5 SOLUTION/ DROPS OPHTHALMIC at 10:05

## 2020-05-27 RX ADMIN — PROPOFOL 200 MG: 10 INJECTION, EMULSION INTRAVENOUS at 08:05

## 2020-05-27 RX ADMIN — CEFAZOLIN 2 G: 330 INJECTION, POWDER, FOR SOLUTION INTRAMUSCULAR; INTRAVENOUS at 08:05

## 2020-05-27 RX ADMIN — MIDAZOLAM 2 MG: 5 INJECTION INTRAMUSCULAR; INTRAVENOUS at 08:05

## 2020-05-27 RX ADMIN — ROCURONIUM BROMIDE 30 MG: 10 INJECTION, SOLUTION INTRAVENOUS at 08:05

## 2020-05-27 RX ADMIN — PROPOFOL 50 MG: 10 INJECTION, EMULSION INTRAVENOUS at 08:05

## 2020-05-27 NOTE — TRANSFER OF CARE
"Anesthesia Transfer of Care Note    Patient: Celso Hernandez    Procedure(s) Performed: Procedure(s) (LRB):  TURP (TRANSURETHRAL RESECTION OF PROSTATE) bipolar (N/A)    Patient location: PACU    Anesthesia Type: general    Transport from OR: Transported from OR on 6-10 L/min O2 by face mask with adequate spontaneous ventilation    Post pain: adequate analgesia    Post assessment: no apparent anesthetic complications and tolerated procedure well    Post vital signs: stable    Level of consciousness: awake    Nausea/Vomiting: no nausea/vomiting    Complications: none    Transfer of care protocol was followed      Last vitals:   Visit Vitals  BP (!) 145/78 (BP Location: Left arm, Patient Position: Lying)   Pulse 90   Temp 36.7 °C (98.1 °F) (Temporal)   Resp 18   Ht 5' 8" (1.727 m)   Wt 120.2 kg (265 lb)   SpO2 97%   BMI 40.29 kg/m²     "

## 2020-05-27 NOTE — PLAN OF CARE
05/27/20 1415   Discharge Assessment   Assessment Type Discharge Planning Assessment   Confirmed/corrected address and phone number on facesheet? Yes   Assessment information obtained from? Patient   Expected Length of Stay (days)   (1)   Communicated expected length of stay with patient/caregiver yes   Prior to hospitilization cognitive status: Alert/Oriented   Prior to hospitalization functional status: Independent   Current cognitive status: Alert/Oriented   Current Functional Status: Independent   Facility Arrived From: Home   Lives With spouse   Able to Return to Prior Arrangements yes   Patient's perception of discharge disposition home or selfcare   Readmission Within the Last 30 Days no previous admission in last 30 days   Patient currently being followed by outpatient case management? No   Patient currently receives any other outside agency services? No   Discharge Plan A Home with family   Discharge Plan B Home with family   DME Needed Upon Discharge  none

## 2020-05-27 NOTE — OP NOTE
[facility]  Operative Note    Date: 05/27/2020    Pre-Op Diagnosis: BPH with bladder outlet obstruction    Post-Op Diagnosis: same    Procedure(s) Performed:   TURP    Specimen(s): prostate chips    Staff Surgeon: Arcadio Vaughn MD    Assistant Surgeon: Shay Almendarez MD    Anesthesia: General endotracheal anesthesia    Indications: Celso Hernandez is a 64 y.o. male with BPH and recurrent urinary retention presenting for TURP.    Findings:   Trilobar prostatic enlargement with significant intravesical median lobe  Wide open channel at conclusion of procedure    Estimated Blood Loss: min    Drains: 24 Fr 3-way potts catheter    Procedure in detail:  After the risks and benefits of the procedure were explained, consent was obtained, and the patient was taken to the operating suite and placed in the supine position.  Anesthesia was administered.  When adequately sedated, the patient was placed in dorsal lithotomy position and prepped and draped in normal sterile fashion.  Timeout was performed and preoperative ABx were confirmed.      A 28-St Helenian sheath resectoscope was placed into the bladder using a visual obturator. The visual obturator was exchanged for the resecting mechanism. The ureteral orifices were identified. The median lobe was first resected with care to avoid the ureteral orifices. Once the median lobe was resected, we then turned our attention to the right lateral lobe of the prostate.The right lateral lobe was then resected in a stepwise fashion from 7 o'clock to 12 o'clock with care to leave the verumontanum and sphincter intact. Once this was performed we directed our attention to the {left lobe of the prostate and again the lateral lobe was resected from the 5 o'clock to the 12 o'clock position. Hemostasis was then achieved.    The ureteral orifices, verumontanum and sphincter were intact. The ClariceBotanoCap evacuator was used to remove all prostate chips and again hemostasis was obtained.      Once the bladder was drained, we could see that he had an open channel and the scope was removed.  A 24 Fr 3-way catheter was placed in the bladder with 60 mL of water in the balloon. The patient was placed on traction and on CBI. The patient was transferred to recovery in stable condition.     Disposition: The patient will remain on the urology service overnight for observation and CBI will be titrated.    Shay Almendarez MD

## 2020-05-27 NOTE — ANESTHESIA PROCEDURE NOTES
Intubation  Performed by: Aries Burrell CRNA  Authorized by: Poly Adams MD     Intubation:     Induction:  Intravenous    Intubated:  Postinduction    Mask Ventilation:  Easy mask    Attempts:  3    Attempted By:  Student    Method of Intubation:  Direct    Blade:  Cevallos 1 (MIL 2 x 1, MAC 3 X 1, )    Laryngeal View Grade: Grade III - only epiglottis visible      Attempted By (2nd Attempt):  Staff anesthesiologist    Method of Intubation (2nd Attempt):  Video laryngoscopy    Blade (2nd Attempt):  Hilario 3    Laryngeal View Grade (2nd Attempt): Grade IIb - only the arytenoids and epiglottis seen      Attempted By (3rd Attempt):  Staff anesthesiologist    Method of Intubation (3rd Attempt):  Video laryngoscopy    Blade (3rd Attempt):  Weinberg 3    Laryngeal View Grade (3rd Attempt): Grade IIa - cords partially seen      Difficult Airway Encountered?: Yes      Complications:  None    Airway Device:  Oral endotracheal tube    Airway Device Size:  7.5    Style/Cuff Inflation:  Cuffed (inflated to minimal occlusive pressure)    Inflation Amount (mL):  8    Tube secured:  21    Secured at:  The lips    Placement Verified By:  Capnometry    Complicating Factors:  Anterior larynx, large/floppy epiglottis, obesity and oropharyngeal edema or fat    Findings Post-Intubation:  BS equal bilateral and atraumatic/condition of teeth unchanged

## 2020-05-27 NOTE — H&P
Urology ([facility]) H&P  Staff: Arcadio Vaughn MD    CC: BPH with urinary obstruction    HPI:  Celso Hernandez is a 64 y.o. male with BPH, urinary retention, UTI.       He has been to the ED several times over the past few months for urinary retention. He was treated for UTI and admitted overnight on one occasion. Urine grew Klebsiella on that occasion. He has failed voiding trials. He underwent SUDS/cysto which revealed low compliance (2.3mL/cmH2O), capacity 250mL, high Pdet at Q max (635anN8P at 2mL/s), PVR of 175.      - has been been on finasteride.  - has been on flomax.  - He does not have a catheter in place. He has been voiding on his own. He learned CIC after his SUDS but has not had to do it.   - Has had cystoscopy. This demonstrated 7 cm trilobar obstruction with large median lobe of the prostate, stone debris present.  - has not had history of elevated PSA. TRUS 1/3/2017 demonstrated volume 83.7cc. Biopsy demonstrated no cancer.  - Last urine culture on 5/20/20 demonstrated no growth.      Has a history of hypertension, hyperlipidemia, DM II    ROS:  Neg except per HPI    Past Medical History:   Diagnosis Date    Colon polyps     Diabetes mellitus type II     Hyperlipidemia     Hypertension     Multiple duodenal ulcers 10/08/2019    Unspecified hemorrhoids without mention of complication        Past Surgical History:   Procedure Laterality Date    COLONOSCOPY N/A 10/8/2019    Procedure: COLONOSCOPY suprep;  Surgeon: Landon Guajardo MD;  Location: [facility];  Service: General;  Laterality: N/A;    COLONOSCOPY W/ POLYPECTOMY  10/08/2019    ESOPHAGOGASTRODUODENOSCOPY  10/08/2019    w/biopsies    ESOPHAGOGASTRODUODENOSCOPY N/A 10/8/2019    Procedure: EGD (ESOPHAGOGASTRODUODENOSCOPY);  Surgeon: Landon Guajardo MD;  Location: [facility];  Service: General;  Laterality: N/A;    none         Social History     Socioeconomic History    Marital status:      Spouse name: Not on file    Number of  children: Not on file    Years of education: Not on file    Highest education level: Not on file   Occupational History    Not on file   Social Needs    Financial resource strain: Not on file    Food insecurity:     Worry: Not on file     Inability: Not on file    Transportation needs:     Medical: Not on file     Non-medical: Not on file   Tobacco Use    Smoking status: Never Smoker    Smokeless tobacco: Never Used   Substance and Sexual Activity    Alcohol use: Yes     Alcohol/week: 3.3 standard drinks     Types: 4 Standard drinks or equivalent per week     Comment: socially    Drug use: No    Sexual activity: Not on file   Lifestyle    Physical activity:     Days per week: Not on file     Minutes per session: Not on file    Stress: Not on file   Relationships    Social connections:     Talks on phone: Not on file     Gets together: Not on file     Attends Restorationism service: Not on file     Active member of club or organization: Not on file     Attends meetings of clubs or organizations: Not on file     Relationship status: Not on file   Other Topics Concern    Not on file   Social History Narrative    Not on file       Family History   Problem Relation Age of Onset    Hyperlipidemia Mother     Cancer Sister         breast    Hearing loss Sister        Review of patient's allergies indicates:  No Known Allergies    No current facility-administered medications on file prior to encounter.      Current Outpatient Medications on File Prior to Encounter   Medication Sig Dispense Refill    atorvastatin (LIPITOR) 80 MG tablet TAKE 1 TABLET BY MOUTH ONCE DAILY 90 tablet 3    cholecalciferol, vitamin D3, (VITAMIN D3) 2,000 unit Cap Take 1 capsule by mouth once daily.      finasteride (PROSCAR) 5 mg tablet Take 1 tablet (5 mg total) by mouth once daily. 30 tablet 11    hydroCHLOROthiazide (HYDRODIURIL) 12.5 MG Tab Take 1 tablet (12.5 mg total) by mouth once daily. 90 tablet 3    latanoprost 0.005 %  ophthalmic solution Place 1 drop into both eyes every evening. 2.5 mL 11    losartan (COZAAR) 100 MG tablet Take 1 tablet (100 mg total) by mouth once daily. 90 tablet 3    metFORMIN (GLUCOPHAGE) 500 MG tablet Take 1 tablet (500 mg total) by mouth daily with breakfast. 90 tablet 3    tamsulosin (FLOMAX) 0.4 mg Cap Take 2 capsules (0.8 mg total) by mouth once daily. 180 capsule 3    timolol maleate 0.5% (TIMOPTIC) 0.5 % Drop Place 1 drop into both eyes 2 (two) times daily. 10 mL 11    traMADol (ULTRAM) 50 mg tablet Take 1-2 tabs bid prn severe pain 60 tablet 2       Anticoagulation:  No    Physical Exam:  General: No acute distress, well developed. AAOx3  Head: Normocephalic, Atraumatic  Eyes: Extra-occular movements intact, No discharge  Neck: supple, symmetrical, trachea midline  Lungs: normal respiratory effort, no respiratory distress, no wheezes  CV: regular rate, 2+ pulses  Abdomen: soft, non-tender, non-distended, no organomegaly  MSK: no edema, no deformities, normal ROM  Skin: skin color, texture, turgor normal.  Neurologic: no focal deficits, sensation intact    Labs:    Urine dipstick today - Negative for all tested components.    Lab Results   Component Value Date    WBC 12.87 (H) 04/21/2020    HGB 16.9 04/21/2020    HCT 52.7 04/21/2020    MCV 89 04/21/2020     04/21/2020           BMP  Lab Results   Component Value Date     (L) 04/21/2020    K 4.1 04/21/2020    CL 99 04/21/2020    CO2 26 04/21/2020    BUN 21 04/21/2020    CREATININE 1.3 04/21/2020    CALCIUM 10.2 04/21/2020    ANIONGAP 7 (L) 04/21/2020    ESTGFRAFRICA >60.0 04/21/2020    EGFRNONAA 57.3 (A) 04/21/2020       Lab Results   Component Value Date    PSA 3.4 09/14/2019    PSA 1.9 05/14/2016    PSA 2.5 09/03/2015       Assessment: Celso Hernandez is a 64 y.o. male with BPH, urinary retention, UTI.      Plan:     1. To OR today for bipolar TURP.  2. Consents signed   3. I have explained the risk, benefits, and alternatives of the  procedure in detail. The patient voices understanding and all questions have been answered. The patient agrees to proceed as planned.     Fredo Gilmore MD

## 2020-05-27 NOTE — ANESTHESIA POSTPROCEDURE EVALUATION
Anesthesia Post Evaluation    Patient: Celso Hernandez    Procedure(s) Performed: Procedure(s) (LRB):  TURP (TRANSURETHRAL RESECTION OF PROSTATE) bipolar (N/A)    Final Anesthesia Type: general    Patient location during evaluation: PACU  Patient participation: Yes- Able to Participate  Level of consciousness: awake and alert  Post-procedure vital signs: reviewed and stable  Pain management: adequate  Airway patency: patent    PONV status at discharge: No PONV  Anesthetic complications: no      Cardiovascular status: blood pressure returned to baseline  Respiratory status: unassisted  Hydration status: euvolemic  Follow-up not needed.          Vitals Value Taken Time   /91 5/27/2020  3:01 PM   Temp 35.8 °C (96.4 °F) 5/27/2020  3:01 PM   Pulse 82 5/27/2020  3:01 PM   Resp 18 5/27/2020  3:01 PM   SpO2 96 % 5/27/2020  3:01 PM         No case tracking events are documented in the log.      Pain/Keely Score: Pain Rating Prior to Med Admin: 6 (5/27/2020 10:28 AM)

## 2020-05-27 NOTE — ANESTHESIA PREPROCEDURE EVALUATION
[facility]  Anesthesia Pre-Operative Evaluation       Patient Name: Celso Hernandez  YOB: 1955  MRN: [id number]  [facility]: [id number]      Code Status: Prior   Date of Procedure: 5/27/2020  Procedure: Procedure(s) (LRB):  TURP (TRANSURETHRAL RESECTION OF PROSTATE) bipolar (N/A)  Anesthesia: General  Pre-Operative Diagnosis: Final diagnoses:  [N40.1, N13.8] BPH with obstruction/lower urinary tract symptoms  Proceduralist/Surgeon(s) and Role:     * Arcadio Vaughn MD - Primary    SUBJECTIVE:   Celso Hernandez is a 64 y.o. male who  has a past medical history of Colon polyps, Diabetes mellitus type II, Hyperlipidemia, Hypertension, Multiple duodenal ulcers (10/08/2019), and Unspecified hemorrhoids without mention of complication.    Previous anesthesia records:GETA, MAC and No problems   Airway/Jaw/Neck:  Airway Findings: Mouth Opening: Normal Tongue: Normal  General Airway Assessment: Adult  Mallampati: II  Improves to II with phonation.  TM Distance: Normal, at least 6 cm  Jaw/Neck Findings:  Neck ROM: Normal ROM       Patient now presents for the above procedure(s). Pt appropriately NPO.   ALLERGIES:   Review of patient's allergies indicates:  No Known Allergies  LDA:      Lines/Drains/Airways     Drain                 Urethral Catheter 01/30/20 1900 Straight-tip;Double-lumen 14 Fr. 117 days          Peripheral Intravenous Line                 Peripheral IV - Single Lumen 01/31/20 1001 20 G Left Forearm 116 days         Peripheral IV - Single Lumen 05/27/20 0638 18 G Left Wrist less than 1 day               Anesthesia Evaluation      Airway   Mallampati: II  Dental      Pulmonary    Cardiovascular   Exercise tolerance: good  (+) hypertension,     Neuro/Psych      GI/Hepatic/Renal      Endo/Other    (+) diabetes mellitus, arthritis  Abdominal                  MEDICATIONS:     Antibiotics (From admission, onward)    Start     Stop Route Frequency Ordered    05/27/20 0619  ceFAZolin  injection 2 g      -- IV On Call Procedure 05/27/20 0619        VTE Risk Mitigation (From admission, onward)         Ordered     IP VTE LOW RISK PATIENT  Once      05/27/20 0619     Place sequential compression device  Until discontinued      05/27/20 0619              Anticoagulants     None        Current Outpatient Medications on File Prior to Encounter   Medication Sig Dispense Refill Last Dose    atorvastatin (LIPITOR) 80 MG tablet TAKE 1 TABLET BY MOUTH ONCE DAILY 90 tablet 3 5/26/2020 at 1200    cholecalciferol, vitamin D3, (VITAMIN D3) 2,000 unit Cap Take 1 capsule by mouth once daily.   5/26/2020 at 0800    finasteride (PROSCAR) 5 mg tablet Take 1 tablet (5 mg total) by mouth once daily. 30 tablet 11 5/26/2020 at 1200    hydroCHLOROthiazide (HYDRODIURIL) 12.5 MG Tab Take 1 tablet (12.5 mg total) by mouth once daily. 90 tablet 3 5/26/2020 at 1200    latanoprost 0.005 % ophthalmic solution Place 1 drop into both eyes every evening. 2.5 mL 11 Past Week at Unknown time    losartan (COZAAR) 100 MG tablet Take 1 tablet (100 mg total) by mouth once daily. 90 tablet 3 5/26/2020 at 1200    metFORMIN (GLUCOPHAGE) 500 MG tablet Take 1 tablet (500 mg total) by mouth daily with breakfast. 90 tablet 3 5/26/2020 at 1200    tamsulosin (FLOMAX) 0.4 mg Cap Take 2 capsules (0.8 mg total) by mouth once daily. 180 capsule 3 5/26/2020 at 1200    timolol maleate 0.5% (TIMOPTIC) 0.5 % Drop Place 1 drop into both eyes 2 (two) times daily. 10 mL 11 Past Week at Unknown time    traMADol (ULTRAM) 50 mg tablet Take 1-2 tabs bid prn severe pain 60 tablet 2 Past Month at Unknown time     Current Facility-Administered Medications   Medication Dose Route Frequency Provider Last Rate Last Dose    0.9%  NaCl infusion   Intravenous Continuous Arcadio Vaughn MD 10 mL/hr at 05/27/20 0648      ceFAZolin injection 2 g  2 g Intravenous On Call Procedure Shay Dorantes MD        lidocaine (PF) 10 mg/ml (1%) injection 10 mg  1 mL  Intradermal Once Arcadio Vaughn MD              History:     Active Hospital Problems    Diagnosis  POA    *BPH with obstruction/lower urinary tract symptoms [N40.1, N13.8]  Yes    Urinary retention [R33.9]  Yes    Bacterial infection due to H. pylori [A04.8]  Yes     Chronic    Vitamin D deficiency [E55.9]  Yes     Chronic    Chronic prostatitis [N41.1]  Yes     Chronic    Severe obesity (BMI 35.0-39.9) with comorbidity [E66.01]  Yes     Chronic    Primary osteoarthritis of left knee [M17.12]  Yes     Chronic    Nocturia [R35.1]  Yes    Mixed hyperlipidemia [E78.2]  Yes     Chronic    Essential hypertension [I10]  Yes     Chronic    Diabetes mellitus, type 2 [E11.9]  Yes      Resolved Hospital Problems   No resolved problems to display.     Past Surgical History:   Procedure Laterality Date    COLONOSCOPY N/A 10/8/2019    Procedure: COLONOSCOPY suprep;  Surgeon: Landon Guajardo MD;  Location: [facility];  Service: General;  Laterality: N/A;    COLONOSCOPY W/ POLYPECTOMY  10/08/2019    ESOPHAGOGASTRODUODENOSCOPY  10/08/2019    w/biopsies    ESOPHAGOGASTRODUODENOSCOPY N/A 10/8/2019    Procedure: EGD (ESOPHAGOGASTRODUODENOSCOPY);  Surgeon: Landon Guajardo MD;  Location: [facility];  Service: General;  Laterality: N/A;    none       Alcohol use:    Social History     Substance and Sexual Activity   Alcohol Use Yes    Alcohol/week: 3.3 standard drinks    Types: 4 Standard drinks or equivalent per week    Comment: socially          OBJECTIVE:     Vital Signs (Most Recent):  Temp: 36.7 °C (98.1 °F) (05/27/20 0635)  Pulse: 90 (05/27/20 0635)  Resp: 18 (05/27/20 0635)  BP: (!) 145/78 (05/27/20 0635)  SpO2: 97 % (05/27/20 0635) Vital Signs Range (Last 24H):  Temp:  [36.7 °C (98.1 °F)]   Pulse:  [90]   Resp:  [18]   BP: (145)/(78)   SpO2:  [97 %]        Body mass index is 40.29 kg/m².   Wt Readings from Last 4 Encounters:   05/27/20 120.2 kg (265 lb)   05/25/20 121.1 kg (267 lb)   04/27/20 121.1 kg (267 lb)    04/21/20 121.5 kg (267 lb 13.7 oz)       Significant Labs:  Lab Results   Component Value Date    WBC 12.87 (H) 04/21/2020    HGB 16.9 04/21/2020    HCT 52.7 04/21/2020     04/21/2020     (L) 04/21/2020    K 4.1 04/21/2020    CL 99 04/21/2020    CREATININE 1.3 04/21/2020    BUN 21 04/21/2020    CO2 26 04/21/2020    GLU 83 04/21/2020    CALCIUM 10.2 04/21/2020    MG 2.0 01/31/2020    PHOS 2.9 01/31/2020    ALKPHOS 102 04/21/2020    ALT 37 04/21/2020    AST 39 04/21/2020    ALBUMIN 3.8 04/21/2020    HGBA1C 6.3 (H) 04/21/2020     No LMP for male patient.  Recent Results (from the past 60 hour(s))   COVID-19 Routine Screening    Collection Time: 05/25/20  4:04 PM   Result Value Ref Range    SARS-CoV2 (COVID-19) Qualitative PCR Not Detected Not Detected   POCT glucose    Collection Time: 05/27/20  6:40 AM   Result Value Ref Range    POCT Glucose 109 70 - 110 mg/dL       EKG:   Results for orders placed or performed during the hospital encounter of 01/28/20   EKG 12-lead    Collection Time: 01/28/20  9:12 PM    Narrative    Test Reason : R00.0,    Vent. Rate : 137 BPM     Atrial Rate : 137 BPM     P-R Int : 144 ms          QRS Dur : 082 ms      QT Int : 294 ms       P-R-T Axes : 054 123 022 degrees     QTc Int : 443 ms    Sinus tachycardia  Right axis deviation  poor precordial R wave progression   Abnormal ECG  When compared with ECG of 02-DEC-2014 17:34,  Vent. rate has increased BY  63 BPM  Confirmed by Jarett Mims MD ([id number]) on 2/4/2020 10:10:05 AM    Referred By: [email or web address]   SELF           Confirmed By:Jarett Mims MD       ASSESSMENT/PLAN:       Pre-op Assessment    I have reviewed the Patient Summary Reports.    I have reviewed the Nursing Notes.   I have reviewed the Medications.     Review of Systems  Anesthesia Hx:  No problems with previous Anesthesia    Social:  Non-Smoker    Hematology/Oncology:  Hematology Normal   Oncology Normal     EENT/Dental:EENT/Dental Normal    Cardiovascular:   Exercise tolerance: good Hypertension    Pulmonary:  Pulmonary Normal    Renal/:  Renal/ Normal     Hepatic/GI:  Hepatic/GI Normal    Musculoskeletal:   Arthritis     Neurological:  Neurology Normal    Endocrine:   Diabetes    Dermatological:  Skin Normal    Psych:  Psychiatric Normal           Physical Exam  General:  Obesity    Airway/Jaw/Neck:  Airway Findings: Mallampati: II      Chest/Lungs:  Chest/Lungs Clear    Heart/Vascular:  Heart Findings: Normal            Anesthesia Plan  Type of Anesthesia, risks & benefits discussed:  Anesthesia Type:  MAC, general  Patient's Preference:   Intra-op Monitoring Plan: standard ASA monitors  Intra-op Monitoring Plan Comments:   Post Op Pain Control Plan: per primary service following discharge from PACU and multimodal analgesia  Post Op Pain Control Plan Comments:   Induction:   IV  Beta Blocker:  Patient is not currently on a Beta-Blocker (No further documentation required).       Informed Consent: Patient understands risks and agrees with Anesthesia plan.  Questions answered. Anesthesia consent signed with patient.  ASA Score: 3     Day of Surgery Review of History & Physical:     H&P completed by Anesthesiologist.   Anesthesia Plan Notes: Chart reviewed, patient interviewed and examined.  The anesthetic plan was explained.  Risks, benefits, and alternatives were discussed. Questions were answered and the consent was signed.        SHERRILL Adams M.D.         Ready For Surgery From Anesthesia Perspective.

## 2020-05-27 NOTE — NURSING TRANSFER
Nursing Transfer Note      5/27/2020     Transfer to [facility] from  15    Transfer via stretcher    Transfer with CBI and personal belongings    Transported by transport    Medicines sent: N/a    Chart send with patient: YES    Notified: ALIYHA Coy    Patient reassessed at: 5/27/2020 @1100    Awaiting pt escort to transport pt to room

## 2020-05-27 NOTE — TELEPHONE ENCOUNTER
Call back covid results - left message to call back regarding lab results.     ----- Message from Rosalia Bradley NP sent at 5/26/2020 12:10 PM CDT -----  Negative result for COVID swab, call to check on patient and give result to patient.

## 2020-05-27 NOTE — PROGRESS NOTES
Pt arrived to room from PACU via stretcher. Ambulated to bed. VSS. Pain under control, currently rates pain at 2. IS teaching done with return demonstration. SCDs on. CBI titrated to clear pink, no s/s of discomfort. Lantigua taped to leg. Call light within reach. Will admit pt and resume care.

## 2020-05-28 ENCOUNTER — TELEPHONE (OUTPATIENT)
Dept: UROLOGY | Facility: CLINIC | Age: 65
End: 2020-05-28

## 2020-05-28 VITALS
TEMPERATURE: 98 F | HEART RATE: 76 BPM | DIASTOLIC BLOOD PRESSURE: 78 MMHG | BODY MASS INDEX: 40.93 KG/M2 | RESPIRATION RATE: 16 BRPM | WEIGHT: 270.06 LBS | OXYGEN SATURATION: 95 % | HEIGHT: 68 IN | SYSTOLIC BLOOD PRESSURE: 146 MMHG

## 2020-05-28 LAB
ANION GAP SERPL CALC-SCNC: 8 MMOL/L (ref 8–16)
BASOPHILS # BLD AUTO: 0.06 K/UL (ref 0–0.2)
BASOPHILS NFR BLD: 0.3 % (ref 0–1.9)
BUN SERPL-MCNC: 17 MG/DL (ref 8–23)
CALCIUM SERPL-MCNC: 8.9 MG/DL (ref 8.7–10.5)
CHLORIDE SERPL-SCNC: 105 MMOL/L (ref 95–110)
CO2 SERPL-SCNC: 23 MMOL/L (ref 23–29)
CREAT SERPL-MCNC: 1.1 MG/DL (ref 0.5–1.4)
DIFFERENTIAL METHOD: ABNORMAL
EOSINOPHIL # BLD AUTO: 0.1 K/UL (ref 0–0.5)
EOSINOPHIL NFR BLD: 0.7 % (ref 0–8)
ERYTHROCYTE [DISTWIDTH] IN BLOOD BY AUTOMATED COUNT: 13.6 % (ref 11.5–14.5)
EST. GFR  (AFRICAN AMERICAN): >60 ML/MIN/1.73 M^2
EST. GFR  (NON AFRICAN AMERICAN): >60 ML/MIN/1.73 M^2
FINAL PATHOLOGIC DIAGNOSIS: NORMAL
GLUCOSE SERPL-MCNC: 103 MG/DL (ref 70–110)
GROSS: NORMAL
HCT VFR BLD AUTO: 49.2 % (ref 40–54)
HGB BLD-MCNC: 16.3 G/DL (ref 14–18)
IMM GRANULOCYTES # BLD AUTO: 0.08 K/UL (ref 0–0.04)
IMM GRANULOCYTES NFR BLD AUTO: 0.5 % (ref 0–0.5)
LYMPHOCYTES # BLD AUTO: 3.2 K/UL (ref 1–4.8)
LYMPHOCYTES NFR BLD: 18.2 % (ref 18–48)
MCH RBC QN AUTO: 29.7 PG (ref 27–31)
MCHC RBC AUTO-ENTMCNC: 33.1 G/DL (ref 32–36)
MCV RBC AUTO: 90 FL (ref 82–98)
MONOCYTES # BLD AUTO: 2 K/UL (ref 0.3–1)
MONOCYTES NFR BLD: 11.1 % (ref 4–15)
NEUTROPHILS # BLD AUTO: 12.3 K/UL (ref 1.8–7.7)
NEUTROPHILS NFR BLD: 69.2 % (ref 38–73)
NRBC BLD-RTO: 0 /100 WBC
PLATELET # BLD AUTO: 187 K/UL (ref 150–350)
PMV BLD AUTO: 11 FL (ref 9.2–12.9)
POTASSIUM SERPL-SCNC: 3.8 MMOL/L (ref 3.5–5.1)
RBC # BLD AUTO: 5.49 M/UL (ref 4.6–6.2)
SODIUM SERPL-SCNC: 136 MMOL/L (ref 136–145)
WBC # BLD AUTO: 17.73 K/UL (ref 3.9–12.7)

## 2020-05-28 PROCEDURE — 80048 BASIC METABOLIC PNL TOTAL CA: CPT

## 2020-05-28 PROCEDURE — 85025 COMPLETE CBC W/AUTO DIFF WBC: CPT

## 2020-05-28 PROCEDURE — 36415 COLL VENOUS BLD VENIPUNCTURE: CPT

## 2020-05-28 PROCEDURE — 25000003 PHARM REV CODE 250: Performed by: STUDENT IN AN ORGANIZED HEALTH CARE EDUCATION/TRAINING PROGRAM

## 2020-05-28 RX ORDER — HYDROCODONE BITARTRATE AND ACETAMINOPHEN 5; 325 MG/1; MG/1
1 TABLET ORAL EVERY 6 HOURS PRN
Qty: 7 TABLET | Refills: 0 | Status: SHIPPED | OUTPATIENT
Start: 2020-05-28 | End: 2020-06-05

## 2020-05-28 RX ORDER — SULFAMETHOXAZOLE AND TRIMETHOPRIM 800; 160 MG/1; MG/1
1 TABLET ORAL 2 TIMES DAILY
Qty: 10 TABLET | Refills: 0 | Status: SHIPPED | OUTPATIENT
Start: 2020-05-28 | End: 2020-06-02

## 2020-05-28 RX ADMIN — TAMSULOSIN HYDROCHLORIDE 0.8 MG: 0.4 CAPSULE ORAL at 10:05

## 2020-05-28 RX ADMIN — TIMOLOL MALEATE 1 DROP: 5 SOLUTION/ DROPS OPHTHALMIC at 10:05

## 2020-05-28 RX ADMIN — FINASTERIDE 5 MG: 5 TABLET, FILM COATED ORAL at 10:05

## 2020-05-28 RX ADMIN — SULFAMETHOXAZOLE AND TRIMETHOPRIM 1 TABLET: 800; 160 TABLET ORAL at 10:05

## 2020-05-28 RX ADMIN — HYDROCHLOROTHIAZIDE 12.5 MG: 12.5 TABLET ORAL at 10:05

## 2020-05-28 RX ADMIN — LOSARTAN POTASSIUM 100 MG: 25 TABLET ORAL at 10:05

## 2020-05-28 RX ADMIN — ATORVASTATIN CALCIUM 80 MG: 20 TABLET, FILM COATED ORAL at 10:05

## 2020-05-28 NOTE — DISCHARGE SUMMARY
[facility]  Urology  Discharge Summary      Patient Name: Celso Hernandez  MRN: [id number]  Admission Date: 5/27/2020  Hospital Length of Stay: 0 days  Discharge Date and Time: 5/28/2020 11:49 AM  Attending Physician: Arcadio Vaughn MD  Discharging Provider: Shay Almendarez MD  Primary Care Physician: Inder Quarles MD    HPI:   Celso Hernandez is a 64 y.o. male with BPH, urinary retention, UTI.       He has been to the ED several times over the past few months for urinary retention. He was treated for UTI and admitted overnight on one occasion. Urine grew Klebsiella on that occasion. He has failed voiding trials. He underwent SUDS/cysto which revealed low compliance (2.3mL/cmH2O), capacity 250mL, high Pdet at Q max (331ezD7V at 2mL/s), PVR of 175.      - has been been on finasteride.  - has been on flomax.  - He does not have a catheter in place. He has been voiding on his own. He learned CIC after his SUDS but has not had to do it.   - Has had cystoscopy. This demonstrated 7 cm trilobar obstruction with large median lobe of the prostate, stone debris present.  - has not had history of elevated PSA. TRUS 1/3/2017 demonstrated volume 83.7cc. Biopsy demonstrated no cancer.  - Last urine culture on 5/20/20 demonstrated no growth.      Has a history of hypertension, hyperlipidemia, DM II      Procedure(s) (LRB):  TURP (TRANSURETHRAL RESECTION OF PROSTATE) bipolar (N/A)     Indwelling Lines/Drains at time of discharge:   Lines/Drains/Airways     Drain                 Urethral Catheter 01/30/20 1900 Straight-tip;Double-lumen 14 Fr. 118 days         Urethral Catheter 05/27/20 0943 Straight-tip;Latex;Triple-lumen 24 Fr. 1 day                Hospital Course   5/27/20: underwent TURP and tolerated the procedure well  5/28/20: No acute events overnight, CBI clamped at 4am, Ambulating, Tolerated diet, Pain controlled, Patient discharged with Lantigua catheter in place draining pink urine. He will follow up with an JOSE for  Lantigua removal and voiding trial on Monday.    Consults: none    Significant Diagnostic Studies: none    Pending Diagnostic Studies:     None          Final Active Diagnoses:    Diagnosis Date Noted POA    PRINCIPAL PROBLEM:  BPH with obstruction/lower urinary tract symptoms [N40.1, N13.8] 01/19/2015 Yes    Urinary retention [R33.9] 01/29/2020 Yes    Bacterial infection due to H. pylori [A04.8] 11/07/2019 Yes     Chronic    Vitamin D deficiency [E55.9] 07/02/2018 Yes     Chronic    Chronic prostatitis [N41.1] 03/30/2017 Yes     Chronic    Severe obesity (BMI 35.0-39.9) with comorbidity [E66.01] 03/30/2017 Yes     Chronic    Primary osteoarthritis of left knee [M17.12] 10/24/2016 Yes     Chronic    Nocturia [R35.1] 01/19/2015 Yes    Mixed hyperlipidemia [E78.2] 03/10/2014 Yes     Chronic    Essential hypertension [I10]  Yes     Chronic    Diabetes mellitus, type 2 [E11.9]  Yes      Problems Resolved During this Admission:       Discharged Condition: good    Disposition: Home or Self Care    Follow Up:  Follow-up Information     [facility] - Urology 4th Floor On 6/1/2020.    Specialty:  Urology  Why:  For Lantigua catheter removal  Contact information:  [address]  [facility] [address] [phone]  Additional information:  [facility] - 4th Floor               Patient Instructions:      Lifting restrictions     Call MD for:  temperature >100.4     Call MD for:  persistent nausea and vomiting     Call MD for:  severe uncontrolled pain     Call MD for:  difficulty breathing, headache or visual disturbances     Call MD for:  hives     Call MD for:  persistent dizziness or light-headedness     Call MD for:  extreme fatigue     Type And Screen Preop   Standing Status: Future Standing Exp. Date: 05/17/21     Activity as tolerated     Weight bearing restrictions (specify)     Medications:  Reconciled Home Medications:      Medication List      START taking these medications    HYDROcodone-acetaminophen  5-325 mg per tablet  Commonly known as:  NORCO  Take 1 tablet by mouth every 6 (six) hours as needed for Pain.     sulfamethoxazole-trimethoprim 800-160mg 800-160 mg Tab  Commonly known as:  BACTRIM DS  Take 1 tablet by mouth 2 (two) times daily. for 5 days        CONTINUE taking these medications    atorvastatin 80 MG tablet  Commonly known as:  LIPITOR  TAKE 1 TABLET BY MOUTH ONCE DAILY     finasteride 5 mg tablet  Commonly known as:  PROSCAR  Take 1 tablet (5 mg total) by mouth once daily.     hydroCHLOROthiazide 12.5 MG Tab  Commonly known as:  HYDRODIURIL  Take 1 tablet (12.5 mg total) by mouth once daily.     ketoconazole 2 % cream  Commonly known as:  NIZORAL  Apply topically once daily. Apply to foot nightly.     latanoprost 0.005 % ophthalmic solution  Place 1 drop into both eyes every evening.     losartan 100 MG tablet  Commonly known as:  COZAAR  Take 1 tablet (100 mg total) by mouth once daily.     meloxicam 15 MG tablet  Commonly known as:  MOBIC  Take 1 tablet by mouth once daily     metFORMIN 500 MG tablet  Commonly known as:  GLUCOPHAGE  Take 1 tablet (500 mg total) by mouth daily with breakfast.     tamsulosin 0.4 mg Cap  Commonly known as:  FLOMAX  Take 2 capsules (0.8 mg total) by mouth once daily.     terbinafine HCL 1 % cream  Commonly known as:  LamISIl AT  Apply topically 2 (two) times daily.     timolol maleate 0.5% 0.5 % Drop  Commonly known as:  TIMOPTIC  Place 1 drop into both eyes 2 (two) times daily.     traMADoL 50 mg tablet  Commonly known as:  ULTRAM  Take 1-2 tabs bid prn severe pain     VITAMIN D3 50 mcg (2,000 unit) Cap  Generic drug:  cholecalciferol (vitamin D3)  Take 1 capsule by mouth once daily.            Time spent on the discharge of patient: 25 minutes    Shay Almendarez MD  Urology  [facility]

## 2020-05-28 NOTE — SUBJECTIVE & OBJECTIVE
Interval History:   No acute events overnight  CBI clamped at 4am  Ambulating  Tolerated diet  Pain controlled    Review of Systems  Objective:     Temp:  [96.3 °F (35.7 °C)-98.2 °F (36.8 °C)] 96.3 °F (35.7 °C)  Pulse:  [72-90] 72  Resp:  [12-18] 16  SpO2:  [93 %-100 %] 93 %  BP: (109-185)/(57-96) 169/87     Body mass index is 41.06 kg/m².           Drains     Drain                 Urethral Catheter 01/30/20 1900 Straight-tip;Double-lumen 14 Fr. 118 days         Urethral Catheter 05/27/20 0943 Straight-tip;Latex;Triple-lumen 24 Fr. less than 1 day                Physical Exam   Nursing note and vitals reviewed.  Constitutional: He is oriented to person, place, and time. He appears well-developed and well-nourished. No distress.   HENT:   Head: Normocephalic and atraumatic.   Eyes: Conjunctivae are normal. Right eye exhibits no discharge. Left eye exhibits no discharge.   Neck: Normal range of motion.   Cardiovascular: Normal rate.    Pulmonary/Chest: Effort normal. No accessory muscle usage. No respiratory distress.   Abdominal: Soft. He exhibits no distension. There is no tenderness.   Genitourinary:   Genitourinary Comments: 24 Fr 3-way potts in place draining pink urine  30ml in balloon, off of traction   Musculoskeletal: He exhibits no edema.   Neurological: He is alert and oriented to person, place, and time.   Skin: Skin is warm and dry.     Psychiatric: He has a normal mood and affect. His behavior is normal. Judgment and thought content normal.       Significant Labs:    BMP:  Recent Labs   Lab 05/27/20  1217     136   K 3.7  3.7     104   CO2 25  25   BUN 18  18   CREATININE 1.1  1.1   CALCIUM 8.7  8.7       CBC:   Recent Labs   Lab 05/27/20  1217   WBC 14.76*   HGB 16.8   HCT 52.2          Urine Culture: No results for input(s): LABURIN in the last 168 hours.  Urine Studies: No results for input(s): COLORU, APPEARANCEUA, PHUR, SPECGRAV, PROTEINUA, GLUCUA, KETONESU, BILIRUBINUA,  OCCULTUA, NITRITE, UROBILINOGEN, LEUKOCYTESUR, RBCUA, WBCUA, BACTERIA, SQUAMEPITHEL, HYALINECASTS in the last 168 hours.    Invalid input(s): SHAYYSUR  All pertinent labs results from the past 24 hours have been reviewed.    Significant Imaging:  All pertinent imaging results/findings from the past 24 hours have been reviewed.

## 2020-05-28 NOTE — PROGRESS NOTES
[facility]  Urology  Progress Note    Patient Name: Celso Hernandez  MRN: [id number]  Admission Date: 5/27/2020  Hospital Length of Stay: 0 days  Code Status: Full Code   Attending Provider: Arcadio Vaughn MD   Primary Care Physician: Inder Quarles MD    Subjective:     HPI:  Celso Hernandez is a 64 y.o. male with BPH, urinary retention, UTI.       He has been to the ED several times over the past few months for urinary retention. He was treated for UTI and admitted overnight on one occasion. Urine grew Klebsiella on that occasion. He has failed voiding trials. He underwent SUDS/cysto which revealed low compliance (2.3mL/cmH2O), capacity 250mL, high Pdet at Q max (390olC7K at 2mL/s), PVR of 175.      - has been been on finasteride.  - has been on flomax.  - He does not have a catheter in place. He has been voiding on his own. He learned CIC after his SUDS but has not had to do it.   - Has had cystoscopy. This demonstrated 7 cm trilobar obstruction with large median lobe of the prostate, stone debris present.  - has not had history of elevated PSA. TRUS 1/3/2017 demonstrated volume 83.7cc. Biopsy demonstrated no cancer.  - Last urine culture on 5/20/20 demonstrated no growth.      Has a history of hypertension, hyperlipidemia, DM II      Interval History:   No acute events overnight  CBI clamped at 4am  Ambulating  Tolerated diet  Pain controlled    Review of Systems  Objective:     Temp:  [96.3 °F (35.7 °C)-98.2 °F (36.8 °C)] 96.3 °F (35.7 °C)  Pulse:  [72-90] 72  Resp:  [12-18] 16  SpO2:  [93 %-100 %] 93 %  BP: (109-185)/(57-96) 169/87     Body mass index is 41.06 kg/m².           Drains     Drain                 Urethral Catheter 01/30/20 1900 Straight-tip;Double-lumen 14 Fr. 118 days         Urethral Catheter 05/27/20 0943 Straight-tip;Latex;Triple-lumen 24 Fr. less than 1 day                Physical Exam   Nursing note and vitals reviewed.  Constitutional: He is oriented to person, place, and  time. He appears well-developed and well-nourished. No distress.   HENT:   Head: Normocephalic and atraumatic.   Eyes: Conjunctivae are normal. Right eye exhibits no discharge. Left eye exhibits no discharge.   Neck: Normal range of motion.   Cardiovascular: Normal rate.    Pulmonary/Chest: Effort normal. No accessory muscle usage. No respiratory distress.   Abdominal: Soft. He exhibits no distension. There is no tenderness.   Genitourinary:   Genitourinary Comments: 24 Fr 3-way potts in place draining pink urine  30ml in balloon, off of traction   Musculoskeletal: He exhibits no edema.   Neurological: He is alert and oriented to person, place, and time.   Skin: Skin is warm and dry.     Psychiatric: He has a normal mood and affect. His behavior is normal. Judgment and thought content normal.       Significant Labs:    BMP:  Recent Labs   Lab 05/27/20  1217     136   K 3.7  3.7     104   CO2 25  25   BUN 18  18   CREATININE 1.1  1.1   CALCIUM 8.7  8.7       CBC:   Recent Labs   Lab 05/27/20  1217   WBC 14.76*   HGB 16.8   HCT 52.2          Urine Culture: No results for input(s): LABURIN in the last 168 hours.  Urine Studies: No results for input(s): COLORU, APPEARANCEUA, PHUR, SPECGRAV, PROTEINUA, GLUCUA, KETONESU, BILIRUBINUA, OCCULTUA, NITRITE, UROBILINOGEN, LEUKOCYTESUR, RBCUA, WBCUA, BACTERIA, SQUAMEPITHEL, HYALINECASTS in the last 168 hours.    Invalid input(s): WRIGHTSUR  All pertinent labs results from the past 24 hours have been reviewed.    Significant Imaging:  All pertinent imaging results/findings from the past 24 hours have been reviewed.                  Assessment/Plan:     * BPH with obstruction/lower urinary tract symptoms  -IVF Hep lock  -PO pain meds  -Diabetic diet  -Home meds  -Home with Potts today, 30ml in balloon  -Bactrim ppx until Monday  -DVT ppx: SCDs          VTE Risk Mitigation (From admission, onward)    None          Shay Almendarez MD  Urology  [facility]  [place]

## 2020-05-28 NOTE — TELEPHONE ENCOUNTER
----- Message from Munir Mckee sent at 5/28/2020  8:12 AM CDT -----  Contact: Arti ([facility]): [phone]  Called to clarify HYDROcodone-acetaminophen (NORCO)      Please contact Arti ([facility]): [phone]

## 2020-05-28 NOTE — PLAN OF CARE
Patient discharged home to care of family on 5/28/20.     05/28/20 1545   Final Note   Assessment Type Final Discharge Note   Anticipated Discharge Disposition Home-Health   What phone number can be called within the next 1-3 days to see how you are doing after discharge?   ([phone])   Hospital Follow Up  Appt(s) scheduled? Yes   Discharge plans and expectations educations in teach back method with documentation complete? Yes   Right Care Referral Info   Post Acute Recommendation Home-care   Referral Type [facility]

## 2020-05-28 NOTE — TELEPHONE ENCOUNTER
----- Message from Arcadio Vaughn MD sent at 5/28/2020  9:04 AM CDT -----  That will be fine.  ----- Message -----  From: Isa Ramos LPN  Sent: 5/28/2020   8:53 AM CDT  To: Arcadio Vaughn MD    I had to scheduled him for Tuesday for VT, mian is the only JOSE here Monday and she if full. Cristina's schedule is blocked cause It is the first [place] and [place].

## 2020-05-28 NOTE — PLAN OF CARE
Problem: Diabetes Comorbidity  Goal: Blood Glucose Level Within Desired Range  Outcome: Ongoing, Progressing     Problem: Infection (Sepsis/Septic Shock)  Goal: Absence of Infection Signs/Symptoms  Outcome: Ongoing, Progressing     Problem: Adult Inpatient Plan of Care  Goal: Plan of Care Review  Outcome: Ongoing, Progressing     Problem: Adult Inpatient Plan of Care  Goal: Optimal Comfort and Wellbeing  Outcome: Ongoing, Progressing     Problem: Fall Injury Risk  Goal: Absence of Fall and Fall-Related Injury  Outcome: Ongoing, Progressing     Problem: Infection  Goal: Infection Symptom Resolution  Outcome: Ongoing, Progressing     Pt sitting up in bed with no s/s of distress and no c/o pain or discomfort.  3 way potts patent and irrigating at a slow continuous rate with light red urine note in the tubing and bag.  Potts secured to leg with tape from OR.  Pt request for the tape to not be removed because it will hurt and that it is fine the way it is.  IV to left wrist 18 gauge with NS @ 125cc/hr and infusing without difficulty.  SCDs in place.  IS at bedside and in reach of pt.  Pt demonstrates appropriate use and frequency of use.  .  Pt ambulated whole length of hallway without difficulty.  Requires stand by assistance for safety.  Call light within reach of pt and side rails up x 2 for safety.  Bed locked and lowered.  Non skid socks on feet.

## 2020-05-28 NOTE — ASSESSMENT & PLAN NOTE
-IVF Hep lock  -PO pain meds  -Diabetic diet  -Home meds  -Home with Grzegorz mata, 30ml in balloon  -Bactrim ppx until Monday  -DVT ppx: SCDs

## 2020-06-02 ENCOUNTER — TELEPHONE (OUTPATIENT)
Dept: UROLOGY | Facility: CLINIC | Age: 65
End: 2020-06-02

## 2020-06-02 ENCOUNTER — OFFICE VISIT (OUTPATIENT)
Dept: UROLOGY | Facility: CLINIC | Age: 65
End: 2020-06-02
Payer: COMMERCIAL

## 2020-06-02 VITALS
DIASTOLIC BLOOD PRESSURE: 77 MMHG | BODY MASS INDEX: 39.71 KG/M2 | SYSTOLIC BLOOD PRESSURE: 125 MMHG | HEART RATE: 90 BPM | WEIGHT: 262 LBS | HEIGHT: 68 IN

## 2020-06-02 DIAGNOSIS — N13.8 BPH WITH OBSTRUCTION/LOWER URINARY TRACT SYMPTOMS: ICD-10-CM

## 2020-06-02 DIAGNOSIS — Z46.6 ENCOUNTER FOR FOLEY CATHETER REMOVAL: ICD-10-CM

## 2020-06-02 DIAGNOSIS — R33.9 URINARY RETENTION: ICD-10-CM

## 2020-06-02 DIAGNOSIS — N40.1 BPH WITH OBSTRUCTION/LOWER URINARY TRACT SYMPTOMS: ICD-10-CM

## 2020-06-02 DIAGNOSIS — Z98.890 POST-OPERATIVE STATE: Primary | ICD-10-CM

## 2020-06-02 PROCEDURE — 99024 PR POST-OP FOLLOW-UP VISIT: ICD-10-PCS | Mod: S$GLB,,, | Performed by: PHYSICIAN ASSISTANT

## 2020-06-02 PROCEDURE — 51700 PR IRRIGATION, BLADDER: ICD-10-PCS | Mod: 78,S$GLB,, | Performed by: PHYSICIAN ASSISTANT

## 2020-06-02 PROCEDURE — 99024 POSTOP FOLLOW-UP VISIT: CPT | Mod: S$GLB,,, | Performed by: PHYSICIAN ASSISTANT

## 2020-06-02 PROCEDURE — 99999 PR PBB SHADOW E&M-EST. PATIENT-LVL IV: ICD-10-PCS | Mod: PBBFAC,,, | Performed by: PHYSICIAN ASSISTANT

## 2020-06-02 PROCEDURE — 99999 PR PBB SHADOW E&M-EST. PATIENT-LVL IV: CPT | Mod: PBBFAC,,, | Performed by: PHYSICIAN ASSISTANT

## 2020-06-02 PROCEDURE — 51700 IRRIGATION OF BLADDER: CPT | Mod: 78,S$GLB,, | Performed by: PHYSICIAN ASSISTANT

## 2020-06-02 NOTE — PROGRESS NOTES
CHIEF COMPLAINT:    Mr. Hernandez is a 64 y.o. male presenting for voiding trial.  PRESENTING ILLNESS:    Celso Hernandez is a 64 y.o. male who presents for voiding trial.    He had the following procedure on 5/27/20:    Procedure(s) Performed:   TURP   Specimen(s): prostate chips  Pathology:   18 G OF PROSTATE CHIPS:  PROSTATIC HYPERPLASIA   Staff Surgeon: Arcadio Vaughn MD   Findings:   Trilobar prostatic enlargement with significant intravesical median lobe  Wide open channel at conclusion of procedure    His catheter has been draining well.  He denies fevers.  He is having good bowel movements.  He is taking bactrim.        PATIENT HISTORY:    Past Medical History:   Diagnosis Date    Colon polyps     Diabetes mellitus type II     Hyperlipidemia     Hypertension     Multiple duodenal ulcers 10/08/2019    Unspecified hemorrhoids without mention of complication        Past Surgical History:   Procedure Laterality Date    COLONOSCOPY N/A 10/8/2019    Procedure: COLONOSCOPY suprep;  Surgeon: Landon Guajardo MD;  Location[facility];  Service: General;  Laterality: N/A;    COLONOSCOPY W/ POLYPECTOMY  10/08/2019    ESOPHAGOGASTRODUODENOSCOPY  10/08/2019    w/biopsies    ESOPHAGOGASTRODUODENOSCOPY N/A 10/8/2019    Procedure: EGD (ESOPHAGOGASTRODUODENOSCOPY);  Surgeon: Landon Guajardo MD;  Location: [facility];  Service: General;  Laterality: N/A;    none      TRANSURETHRAL RESECTION OF PROSTATE N/A 5/27/2020    Procedure: TURP (TRANSURETHRAL RESECTION OF PROSTATE) bipolar;  Surgeon: Arcadio Vaughn MD;  Location: [address];  Service: Urology;  Laterality: N/A;  2hr       Family History   Problem Relation Age of Onset    Hyperlipidemia Mother     Cancer Sister         breast    Hearing loss Sister        Social History     Socioeconomic History    Marital status:      Spouse name: Not on file    Number of children: Not on file    Years of education: Not on file    Highest education level: Not on  file   Occupational History    Not on file   Social Needs    Financial resource strain: Not on file    Food insecurity:     Worry: Not on file     Inability: Not on file    Transportation needs:     Medical: Not on file     Non-medical: Not on file   Tobacco Use    Smoking status: Never Smoker    Smokeless tobacco: Never Used   Substance and Sexual Activity    Alcohol use: Yes     Alcohol/week: 3.3 standard drinks     Types: 4 Standard drinks or equivalent per week     Comment: socially    Drug use: No    Sexual activity: Not on file   Lifestyle    Physical activity:     Days per week: Not on file     Minutes per session: Not on file    Stress: Not on file   Relationships    Social connections:     Talks on phone: Not on file     Gets together: Not on file     Attends Scientologist service: Not on file     Active member of club or organization: Not on file     Attends meetings of clubs or organizations: Not on file     Relationship status: Not on file   Other Topics Concern    Not on file   Social History Narrative    Not on file       Allergies:  Patient has no known allergies.    Medications:    Current Outpatient Medications:     atorvastatin (LIPITOR) 80 MG tablet, TAKE 1 TABLET BY MOUTH ONCE DAILY, Disp: 90 tablet, Rfl: 3    cholecalciferol, vitamin D3, (VITAMIN D3) 2,000 unit Cap, Take 1 capsule by mouth once daily., Disp: , Rfl:     finasteride (PROSCAR) 5 mg tablet, Take 1 tablet (5 mg total) by mouth once daily., Disp: 30 tablet, Rfl: 11    hydroCHLOROthiazide (HYDRODIURIL) 12.5 MG Tab, Take 1 tablet (12.5 mg total) by mouth once daily., Disp: 90 tablet, Rfl: 3    HYDROcodone-acetaminophen (NORCO) 5-325 mg per tablet, Take 1 tablet by mouth every 6 (six) hours as needed for Pain., Disp: 7 tablet, Rfl: 0    ketoconazole (NIZORAL) 2 % cream, Apply topically once daily. Apply to foot nightly., Disp: 30 g, Rfl: 1    latanoprost 0.005 % ophthalmic solution, Place 1 drop into both eyes every  evening., Disp: 2.5 mL, Rfl: 11    losartan (COZAAR) 100 MG tablet, Take 1 tablet (100 mg total) by mouth once daily., Disp: 90 tablet, Rfl: 3    meloxicam (MOBIC) 15 MG tablet, Take 1 tablet by mouth once daily, Disp: 30 tablet, Rfl: 4    metFORMIN (GLUCOPHAGE) 500 MG tablet, Take 1 tablet (500 mg total) by mouth daily with breakfast., Disp: 90 tablet, Rfl: 3    sulfamethoxazole-trimethoprim 800-160mg (BACTRIM DS) 800-160 mg Tab, Take 1 tablet by mouth 2 (two) times daily. for 5 days, Disp: 10 tablet, Rfl: 0    tamsulosin (FLOMAX) 0.4 mg Cap, Take 2 capsules (0.8 mg total) by mouth once daily., Disp: 180 capsule, Rfl: 3    terbinafine HCL (LAMISIL AT) 1 % cream, Apply topically 2 (two) times daily., Disp: 30 g, Rfl: 1    timolol maleate 0.5% (TIMOPTIC) 0.5 % Drop, Place 1 drop into both eyes 2 (two) times daily., Disp: 10 mL, Rfl: 11    traMADol (ULTRAM) 50 mg tablet, Take 1-2 tabs bid prn severe pain, Disp: 60 tablet, Rfl: 2    PHYSICAL EXAMINATION:    Constitutional: He appears well-developed and well-nourished.  He is in no apparent distress.    Eyes: No scleral icterus noted bilaterally. No discharge bilaterally.    Cardiovascular: Normal rate.      Pulmonary/Chest: Effort normal. No respiratory distress.      Neurological: He is alert and oriented to person, place, and time.     Psych: Cooperative with normal affect.    Potts catheter in place.  Yellow urine was noted in bag.    IMPRESSION:    Encounter Diagnoses   Name Primary?    Post-operative state Yes    BPH with obstruction/lower urinary tract symptoms     Encounter for Potts catheter removal     Urinary retention          PLAN:    Voiding trial performed by Nurse Lemus.  50ml of sterile water was instilled into bladder before the bladder began to spasm.  Therefore,  potts catheter was removed.    Patient was instructed to drink plenty of fluids today.  Instructed patient to call at 1p.m. to give an update on urine output.  I instructed  patient to return to clinic or emergency department (if after clinic hours) to have potts catheter put back in if unable to urinate within 5 hours of potts catheter removal or starts to experience bladder pressure/pain, decrease flow, straining/difficulty urinating, urinary frequency.      Continue flomax and finasteride.   Complete bactrim.       Follow up with Dr. Vaughn in 3 months.

## 2020-06-02 NOTE — PATIENT INSTRUCTIONS
Drink plenty of fluids today.  Call at 1p.m. to give an update on urine output.  Return to clinic or emergency department (if after clinic hours) to have potts catheter put back in if unable to urinate within 5 hours of potts catheter removal or starts to experience bladder pressure/pain, decrease flow, straining/difficulty urinating, urinary frequency.      Continue flomax and finasteride.   Complete bactrim.

## 2020-06-02 NOTE — TELEPHONE ENCOUNTER
Called to check in on patient after vt.  Patient states he urinated about 3-4 times since he left us and have a strong but short stream. Denies straining states theres a little burning at the end I explained to him that that is normal after a potts has been removed and it should subside. I explained to him that if he has any signs or symptoms of retention to give us a call but if its after hours we would recommend he go to his nearest ER.

## 2020-06-02 NOTE — TELEPHONE ENCOUNTER
----- Message from Isa Ramos LPN sent at 6/1/2020  3:34 PM CDT -----  Contact: Kristina Miles @ [phone] ext       ----- Message -----  From: Cody Walsh  Sent: 6/1/2020   3:01 PM CDT  To: Roder[facility]ST[facility] Staff    Caller requesting a return call regarding the past surgery, \[facility] [place] [facility]\"" call and reference this claim # [id number]

## 2020-06-03 ENCOUNTER — TELEPHONE (OUTPATIENT)
Dept: UROLOGY | Facility: CLINIC | Age: 65
End: 2020-06-03

## 2020-06-03 NOTE — TELEPHONE ENCOUNTER
I spoke with patient, who stated his uncircumcised foreskin is swollen and he can't pull it back over the head of his penis, I advised him to put cold compress off and on and to use some Vaseline jelly to the foreskin to try to pull foreskin back over the head of penis. Patient advised if penis starts to hurt or swelling becomes worse he needs to go to the ER, if he still can't get the foreskin to comeback over the head of his penis he will keep his appt with LAWRENCE Renee

## 2020-06-03 NOTE — TELEPHONE ENCOUNTER
----- Message from Deanna Lion sent at 6/3/2020 12:00 PM CDT -----  Contact: pt called [phone]  Requesting to speak with Nurse states that he is having complications since removal of Cath on yesterday.  Please contact patient

## 2020-06-04 ENCOUNTER — OFFICE VISIT (OUTPATIENT)
Dept: UROLOGY | Facility: CLINIC | Age: 65
End: 2020-06-04
Payer: COMMERCIAL

## 2020-06-04 VITALS
DIASTOLIC BLOOD PRESSURE: 79 MMHG | HEART RATE: 94 BPM | SYSTOLIC BLOOD PRESSURE: 138 MMHG | BODY MASS INDEX: 38.76 KG/M2 | HEIGHT: 68 IN | WEIGHT: 255.75 LBS

## 2020-06-04 DIAGNOSIS — N47.2 PARAPHIMOSIS: Primary | ICD-10-CM

## 2020-06-04 PROCEDURE — 99024 PR POST-OP FOLLOW-UP VISIT: ICD-10-PCS | Mod: S$GLB,,, | Performed by: PHYSICIAN ASSISTANT

## 2020-06-04 PROCEDURE — 99024 POSTOP FOLLOW-UP VISIT: CPT | Mod: S$GLB,,, | Performed by: PHYSICIAN ASSISTANT

## 2020-06-04 PROCEDURE — 99999 PR PBB SHADOW E&M-EST. PATIENT-LVL III: ICD-10-PCS | Mod: PBBFAC,,, | Performed by: PHYSICIAN ASSISTANT

## 2020-06-04 PROCEDURE — 99999 PR PBB SHADOW E&M-EST. PATIENT-LVL III: CPT | Mod: PBBFAC,,, | Performed by: PHYSICIAN ASSISTANT

## 2020-06-04 RX ORDER — PANTOPRAZOLE SODIUM 40 MG/1
40 TABLET, DELAYED RELEASE ORAL EVERY MORNING
COMMUNITY
Start: 2020-05-03 | End: 2021-04-26

## 2020-06-04 NOTE — LETTER
June 4, 2020      Inder Quarles MD  [address] [facility] [place]  [place] [address]           [facility] - Urology 4th Floor  [address]  [place] [address]  Phone: [phone]          Patient: Celso Hernandez   MR Number: [id number]   YOB: 1955   Date of Visit: 6/4/2020       Dear Dr. Inder Quarles:    Thank you for referring Celso Hernandez to me for evaluation. Attached you will find relevant portions of my assessment and plan of care.    If you have questions, please do not hesitate to call me. I look forward to following Celso Hernandez along with you.    Sincerely,    Hamida Ayala PA-C    Enclosure  CC:  No Recipients    If you would like to receive this communication electronically, please contact [email or web address] or [phone] to request more information on [employer] access.    For providers and/or their staff who would like to refer a patient to [facility], please contact us through our one-stop-shop provider referral line, [facility] , at [phone].    If you feel you have received this communication in error or would no longer like to receive these types of communications, please e-mail [email or web address]

## 2020-06-04 NOTE — PROGRESS NOTES
CHIEF COMPLAINT:    Mr. Hernandez is a 64 y.o. male presenting for penile swelling.    PRESENTING ILLNESS:    Celso Hernandez is a 64 y.o. male with a PMH of HTN, BPH, retention, DM who presents for penile swelling.    He had the following procedure on 5/27/20:     Procedure(s) Performed:   TURP   Specimen(s): prostate chips  Pathology:   18 G OF PROSTATE CHIPS:  PROSTATIC HYPERPLASIA   Staff Surgeon: Arcadio Vaughn MD   Findings:   Trilobar prostatic enlargement with significant intravesical median lobe  Wide open channel at conclusion of procedure    He was seen in clinic on 6/2/20 for voiding trial in which he passed.  He presents today for inability to pull the foreskin swelling.  It started 6p 2 days ago.  He was unable to pull the foreskin back.  It is not painful.    He is voiding well.  His stream is good. No trouble urinating.        PATIENT HISTORY:    Past Medical History:   Diagnosis Date    Colon polyps     Diabetes mellitus type II     Hyperlipidemia     Hypertension     Multiple duodenal ulcers 10/08/2019    Unspecified hemorrhoids without mention of complication        Past Surgical History:   Procedure Laterality Date    COLONOSCOPY N/A 10/8/2019    Procedure: COLONOSCOPY suprep;  Surgeon: Landon Guajardo MD;  Location: [facility];  Service: General;  Laterality: N/A;    COLONOSCOPY W/ POLYPECTOMY  10/08/2019    ESOPHAGOGASTRODUODENOSCOPY  10/08/2019    w/biopsies    ESOPHAGOGASTRODUODENOSCOPY N/A 10/8/2019    Procedure: EGD (ESOPHAGOGASTRODUODENOSCOPY);  Surgeon: Landon Guajardo MD;  Location: [facility];  Service: General;  Laterality: N/A;    none      TRANSURETHRAL RESECTION OF PROSTATE N/A 5/27/2020    Procedure: TURP (TRANSURETHRAL RESECTION OF PROSTATE) bipolar;  Surgeon: Arcadio Vaughn MD;  Location: [address];  Service: Urology;  Laterality: N/A;  2hr       Family History   Problem Relation Age of Onset    Hyperlipidemia Mother     Cancer Sister         breast    Hearing loss  Sister        Social History     Socioeconomic History    Marital status:      Spouse name: Not on file    Number of children: Not on file    Years of education: Not on file    Highest education level: Not on file   Occupational History    Not on file   Social Needs    Financial resource strain: Not on file    Food insecurity:     Worry: Not on file     Inability: Not on file    Transportation needs:     Medical: Not on file     Non-medical: Not on file   Tobacco Use    Smoking status: Never Smoker    Smokeless tobacco: Never Used   Substance and Sexual Activity    Alcohol use: Yes     Alcohol/week: 3.3 standard drinks     Types: 4 Standard drinks or equivalent per week     Comment: socially    Drug use: No    Sexual activity: Not on file   Lifestyle    Physical activity:     Days per week: Not on file     Minutes per session: Not on file    Stress: Not on file   Relationships    Social connections:     Talks on phone: Not on file     Gets together: Not on file     Attends Restorationist service: Not on file     Active member of club or organization: Not on file     Attends meetings of clubs or organizations: Not on file     Relationship status: Not on file   Other Topics Concern    Not on file   Social History Narrative    Not on file       Allergies:  Patient has no known allergies.    Medications:    Current Outpatient Medications:     atorvastatin (LIPITOR) 80 MG tablet, TAKE 1 TABLET BY MOUTH ONCE DAILY, Disp: 90 tablet, Rfl: 3    cholecalciferol, vitamin D3, (VITAMIN D3) 2,000 unit Cap, Take 1 capsule by mouth once daily., Disp: , Rfl:     finasteride (PROSCAR) 5 mg tablet, Take 1 tablet (5 mg total) by mouth once daily., Disp: 30 tablet, Rfl: 11    hydroCHLOROthiazide (HYDRODIURIL) 12.5 MG Tab, Take 1 tablet (12.5 mg total) by mouth once daily., Disp: 90 tablet, Rfl: 3    ketoconazole (NIZORAL) 2 % cream, Apply topically once daily. Apply to foot nightly., Disp: 30 g, Rfl: 1     latanoprost 0.005 % ophthalmic solution, Place 1 drop into both eyes every evening., Disp: 2.5 mL, Rfl: 11    losartan (COZAAR) 100 MG tablet, Take 1 tablet (100 mg total) by mouth once daily., Disp: 90 tablet, Rfl: 3    meloxicam (MOBIC) 15 MG tablet, Take 1 tablet by mouth once daily, Disp: 30 tablet, Rfl: 4    metFORMIN (GLUCOPHAGE) 500 MG tablet, Take 1 tablet (500 mg total) by mouth daily with breakfast., Disp: 90 tablet, Rfl: 3    pantoprazole (PROTONIX) 40 MG tablet, , Disp: , Rfl:     tamsulosin (FLOMAX) 0.4 mg Cap, Take 2 capsules (0.8 mg total) by mouth once daily., Disp: 180 capsule, Rfl: 3    terbinafine HCL (LAMISIL AT) 1 % cream, Apply topically 2 (two) times daily., Disp: 30 g, Rfl: 1    timolol maleate 0.5% (TIMOPTIC) 0.5 % Drop, Place 1 drop into both eyes 2 (two) times daily., Disp: 10 mL, Rfl: 11    HYDROcodone-acetaminophen (NORCO) 5-325 mg per tablet, Take 1 tablet by mouth every 6 (six) hours as needed for Pain. (Patient not taking: Reported on 6/4/2020), Disp: 7 tablet, Rfl: 0    traMADol (ULTRAM) 50 mg tablet, Take 1-2 tabs bid prn severe pain (Patient not taking: Reported on 6/4/2020), Disp: 60 tablet, Rfl: 2    PHYSICAL EXAMINATION:    Constitutional: He appears well-developed and well-nourished.  He is in no apparent distress.    Eyes: No scleral icterus noted bilaterally. No discharge bilaterally.    Cardiovascular: Normal rate.      Pulmonary/Chest: Effort normal. No respiratory distress.     Abdominal:  He exhibits no distension.     Neurological: He is alert and oriented to person, place, and time.     Skin: Skin is warm and dry.     Psych: Cooperative with normal affect.    Genitourinary: The penis is uncircumcised with evidence of paraphimosis.     Physical Exam      LABS:    Lab Results   Component Value Date    PSA 3.4 09/14/2019    PSA 1.9 05/14/2016    PSA 2.5 09/03/2015    PSA 1.6 03/01/2014    PSA 1.7 10/08/2011    PSA 1.7 11/06/2010    PSA 1.7 08/29/2009    PSA 1.2  12/11/2007    PSA 1.1 05/19/2006    PSA 1.2 02/17/2004    PSADIAG 2.8 11/16/2019    PSADIAG 2.2 11/02/2018    PSADIAG 2.5 11/18/2017       IMPRESSION:    No diagnosis found.      PLAN:    Paraphimosis reduced by me.   Educated patient on paraphimosis and instructed him to always pull the foreskin back over the penis.      Follow up in 3 months.      Hamida Ayala PA-C

## 2020-06-05 ENCOUNTER — OFFICE VISIT (OUTPATIENT)
Dept: PODIATRY | Facility: CLINIC | Age: 65
End: 2020-06-05
Payer: COMMERCIAL

## 2020-06-05 VITALS
SYSTOLIC BLOOD PRESSURE: 141 MMHG | BODY MASS INDEX: 38.65 KG/M2 | DIASTOLIC BLOOD PRESSURE: 89 MMHG | HEIGHT: 68 IN | HEART RATE: 99 BPM | WEIGHT: 255 LBS

## 2020-06-05 DIAGNOSIS — B35.3 TINEA PEDIS, RIGHT: ICD-10-CM

## 2020-06-05 DIAGNOSIS — E11.9 TYPE 2 DIABETES MELLITUS WITHOUT COMPLICATION, WITHOUT LONG-TERM CURRENT USE OF INSULIN: Primary | ICD-10-CM

## 2020-06-05 PROCEDURE — 3044F PR MOST RECENT HEMOGLOBIN A1C LEVEL <7.0%: ICD-10-PCS | Mod: CPTII,S$GLB,, | Performed by: PODIATRIST

## 2020-06-05 PROCEDURE — 1101F PT FALLS ASSESS-DOCD LE1/YR: CPT | Mod: CPTII,S$GLB,, | Performed by: PODIATRIST

## 2020-06-05 PROCEDURE — 3077F PR MOST RECENT SYSTOLIC BLOOD PRESSURE >= 140 MM HG: ICD-10-PCS | Mod: CPTII,S$GLB,, | Performed by: PODIATRIST

## 2020-06-05 PROCEDURE — 3079F DIAST BP 80-89 MM HG: CPT | Mod: CPTII,S$GLB,, | Performed by: PODIATRIST

## 2020-06-05 PROCEDURE — 99212 PR OFFICE/OUTPT VISIT, EST, LEVL II, 10-19 MIN: ICD-10-PCS | Mod: S$GLB,,, | Performed by: PODIATRIST

## 2020-06-05 PROCEDURE — 3008F BODY MASS INDEX DOCD: CPT | Mod: CPTII,S$GLB,, | Performed by: PODIATRIST

## 2020-06-05 PROCEDURE — 1101F PR PT FALLS ASSESS DOC 0-1 FALLS W/OUT INJ PAST YR: ICD-10-PCS | Mod: CPTII,S$GLB,, | Performed by: PODIATRIST

## 2020-06-05 PROCEDURE — 3077F SYST BP >= 140 MM HG: CPT | Mod: CPTII,S$GLB,, | Performed by: PODIATRIST

## 2020-06-05 PROCEDURE — 99212 OFFICE O/P EST SF 10 MIN: CPT | Mod: S$GLB,,, | Performed by: PODIATRIST

## 2020-06-05 PROCEDURE — 3044F HG A1C LEVEL LT 7.0%: CPT | Mod: CPTII,S$GLB,, | Performed by: PODIATRIST

## 2020-06-05 PROCEDURE — 3079F PR MOST RECENT DIASTOLIC BLOOD PRESSURE 80-89 MM HG: ICD-10-PCS | Mod: CPTII,S$GLB,, | Performed by: PODIATRIST

## 2020-06-05 PROCEDURE — 3008F PR BODY MASS INDEX (BMI) DOCUMENTED: ICD-10-PCS | Mod: CPTII,S$GLB,, | Performed by: PODIATRIST

## 2020-06-05 PROCEDURE — 99999 PR PBB SHADOW E&M-EST. PATIENT-LVL III: CPT | Mod: PBBFAC,,, | Performed by: PODIATRIST

## 2020-06-05 PROCEDURE — 99999 PR PBB SHADOW E&M-EST. PATIENT-LVL III: ICD-10-PCS | Mod: PBBFAC,,, | Performed by: PODIATRIST

## 2020-06-05 NOTE — PROGRESS NOTES
Subjective:      Patient ID: Celso Hernandez is a 64 y.o. male.    Chief Complaint: Follow-up (Right foot)    Celso is a 64 y.o. male who presents to the clinic upon referral from Dr. Fany coughlin. provider found  for evaluation and treatment of diabetic feet. Celso has a past medical history of Colon polyps, Diabetes mellitus type II, Hyperlipidemia, Hypertension, Multiple duodenal ulcers (10/08/2019), and Unspecified hemorrhoids without mention of complication. Patient relates no major problem with feet. Only complaints today consist of blisters a form to the right foot pointing to the plantar right arch and to the plantar right 3rd toe.  Denies any trauma.  Works in building maintenance.  No pain today.    06/05/2020:  Returns for foot check right foot.  He has been applying ketoconazole cream at night and using the [employer] foot powder during the day.  Relates that the previous blistering has stopped.  No new complaints.    PCP: Inder Quarles MD    Date Last Seen by PCP:  04/20/2020    Current shoe gear: Tennis shoes    Hemoglobin A1C   Date Value Ref Range Status   04/21/2020 6.3 (H) 4.0 - 5.6 % Final     Comment:     ADA Screening Guidelines:  5.7-6.4%  Consistent with prediabetes  >or=6.5%  Consistent with diabetes  High levels of fetal hemoglobin interfere with the HbA1C  assay. Heterozygous hemoglobin variants (HbS, HgC, etc)do  not significantly interfere with this assay.   However, presence of multiple variants may affect accuracy.     01/29/2020 6.1 (H) 4.0 - 5.6 % Final     Comment:     ADA Screening Guidelines:  5.7-6.4%  Consistent with prediabetes  >or=6.5%  Consistent with diabetes  High levels of fetal hemoglobin interfere with the HbA1C  assay. Heterozygous hemoglobin variants (HbS, HgC, etc)do  not significantly interfere with this assay.   However, presence of multiple variants may affect accuracy.     12/24/2019 6.4 (H) 4.0 - 5.6 % Final     Comment:     ADA Screening Guidelines:  5.7-6.4%  Consistent  "with prediabetes  >or=6.5%  Consistent with diabetes  High levels of fetal hemoglobin interfere with the HbA1C  assay. Heterozygous hemoglobin variants (HbS, HgC, etc)do  not significantly interfere with this assay.   However, presence of multiple variants may affect accuracy.       Vitals:    06/05/20 1545   BP: (!) 141/89   Pulse: 99   Weight: 115.7 kg (255 lb)   Height: 5' 8" (1.727 m)   PainSc: 0-No pain      Past Medical History:   Diagnosis Date    Colon polyps     Diabetes mellitus type II     Hyperlipidemia     Hypertension     Multiple duodenal ulcers 10/08/2019    Unspecified hemorrhoids without mention of complication        Past Surgical History:   Procedure Laterality Date    COLONOSCOPY N/A 10/8/2019    Procedure: COLONOSCOPY suprep;  Surgeon: Landon Guajardo MD;  Location: [facility];  Service: General;  Laterality: N/A;    COLONOSCOPY W/ POLYPECTOMY  10/08/2019    ESOPHAGOGASTRODUODENOSCOPY  10/08/2019    w/biopsies    ESOPHAGOGASTRODUODENOSCOPY N/A 10/8/2019    Procedure: EGD (ESOPHAGOGASTRODUODENOSCOPY);  Surgeon: Landon Guajardo MD;  Location: [facility];  Service: General;  Laterality: N/A;    none      TRANSURETHRAL RESECTION OF PROSTATE N/A 5/27/2020    Procedure: TURP (TRANSURETHRAL RESECTION OF PROSTATE) bipolar;  Surgeon: Arcadio Vaughn MD;  Location: [address];  Service: Urology;  Laterality: N/A;  2hr       Family History   Problem Relation Age of Onset    Hyperlipidemia Mother     Cancer Sister         breast    Hearing loss Sister        Social History     Socioeconomic History    Marital status:      Spouse name: Not on file    Number of children: Not on file    Years of education: Not on file    Highest education level: Not on file   Occupational History    Not on file   Social Needs    Financial resource strain: Not on file    Food insecurity:     Worry: Not on file     Inability: Not on file    Transportation needs:     Medical: Not on file     " Non-medical: Not on file   Tobacco Use    Smoking status: Never Smoker    Smokeless tobacco: Never Used   Substance and Sexual Activity    Alcohol use: Yes     Alcohol/week: 3.3 standard drinks     Types: 4 Standard drinks or equivalent per week     Comment: socially    Drug use: No    Sexual activity: Not on file   Lifestyle    Physical activity:     Days per week: Not on file     Minutes per session: Not on file    Stress: Not on file   Relationships    Social connections:     Talks on phone: Not on file     Gets together: Not on file     Attends Latter-day service: Not on file     Active member of club or organization: Not on file     Attends meetings of clubs or organizations: Not on file     Relationship status: Not on file   Other Topics Concern    Not on file   Social History Narrative    Not on file       Current Outpatient Medications   Medication Sig Dispense Refill    atorvastatin (LIPITOR) 80 MG tablet TAKE 1 TABLET BY MOUTH ONCE DAILY 90 tablet 3    cholecalciferol, vitamin D3, (VITAMIN D3) 2,000 unit Cap Take 1 capsule by mouth once daily.      finasteride (PROSCAR) 5 mg tablet Take 1 tablet (5 mg total) by mouth once daily. 30 tablet 11    hydroCHLOROthiazide (HYDRODIURIL) 12.5 MG Tab Take 1 tablet (12.5 mg total) by mouth once daily. 90 tablet 3    ketoconazole (NIZORAL) 2 % cream Apply topically once daily. Apply to foot nightly. 30 g 1    latanoprost 0.005 % ophthalmic solution Place 1 drop into both eyes every evening. 2.5 mL 11    losartan (COZAAR) 100 MG tablet Take 1 tablet (100 mg total) by mouth once daily. 90 tablet 3    meloxicam (MOBIC) 15 MG tablet Take 1 tablet by mouth once daily 30 tablet 4    metFORMIN (GLUCOPHAGE) 500 MG tablet Take 1 tablet (500 mg total) by mouth daily with breakfast. 90 tablet 3    pantoprazole (PROTONIX) 40 MG tablet       tamsulosin (FLOMAX) 0.4 mg Cap Take 2 capsules (0.8 mg total) by mouth once daily. 180 capsule 3    terbinafine HCL  (LAMISIL AT) 1 % cream Apply topically 2 (two) times daily. 30 g 1    timolol maleate 0.5% (TIMOPTIC) 0.5 % Drop Place 1 drop into both eyes 2 (two) times daily. 10 mL 11     No current facility-administered medications for this visit.        Review of patient's allergies indicates:  No Known Allergies        Review of Systems   Constitution: Negative for chills, fever and malaise/fatigue.   HENT: Negative for congestion and hearing loss.    Cardiovascular: Negative for chest pain, claudication and leg swelling.   Respiratory: Negative for cough and shortness of breath.    Skin: Positive for dry skin. Negative for itching.   Musculoskeletal: Negative for back pain, joint pain, muscle cramps and muscle weakness.   Gastrointestinal: Negative for nausea and vomiting.   Neurological: Negative for numbness, paresthesias and weakness.   Psychiatric/Behavioral: Negative for altered mental status.           Objective:      Physical Exam   Constitutional: He is oriented to person, place, and time. He appears well-developed and well-nourished. No distress.   Cardiovascular:   Pulses:       Dorsalis pedis pulses are 2+ on the right side, and 2+ on the left side.        Posterior tibial pulses are 2+ on the right side, and 2+ on the left side.   No lower extremity edema bilateral.  Skin temp is warm to foot bilateral.  Reduced digital hair growth bilateral.   Musculoskeletal:   Mild depression medial longitudinal arch with loading of the foot otherwise rectus appearing foot type bilateral.    No pain with ROM or MMT bilateral lower extremity.     Feet:   Right Foot:   Protective Sensation: 10 sites tested. 10 sites sensed.   Skin Integrity: Positive for skin breakdown and dry skin.   Left Foot:   Protective Sensation: 10 sites tested. 10 sites sensed.   Skin Integrity: Negative for ulcer, blister, skin breakdown, erythema, warmth, callus or dry skin.   Neurological: He is alert and oriented to person, place, and time. He has  normal strength. No sensory deficit.   Vibratory sensation intact bilateral foot.   Skin: Skin is warm and dry. Capillary refill takes less than 2 seconds. No ecchymosis noted. He is not diaphoretic. No cyanosis or erythema. No pallor. Nails show no clubbing.   Skin is dry to foot bilateral.  Previous area of blistering is completely resolved plantar right foot.    Nails 1-5 bilateral foot are mildly thickened and elongated without significant discoloration.    Skin turgor elasticity appear within normal limits to lower extremity bilateral.             Assessment:       Encounter Diagnoses   Name Primary?    Type 2 diabetes mellitus without complication, without long-term current use of insulin Yes    Tinea pedis, right          Plan:       Celso was seen today for follow-up.    Diagnoses and all orders for this visit:    Type 2 diabetes mellitus without complication, without long-term current use of insulin    Tinea pedis, right      I counseled the patient on his conditions, their implications and medical management.    Shoe inspection. Diabetic Foot Education. Patient reminded of the importance of good nutrition and blood sugar control to help prevent podiatric complications of diabetes. Patient instructed on proper foot hygeine. We discussed wearing proper shoe gear, daily foot inspections, never walking without protective shoe gear, never putting sharp instruments to feet.      Previous tinea pedis is fully resolved.  We discussed preventive measures such as using the goal bone foot powder on a daily basis.    RTC 1 year p.r.n. as discussed    A portion of this note was generated by voice recognition software and may contain topical graphical errors.

## 2020-06-22 ENCOUNTER — TELEPHONE (OUTPATIENT)
Dept: UROLOGY | Facility: CLINIC | Age: 65
End: 2020-06-22

## 2020-06-22 NOTE — TELEPHONE ENCOUNTER
----- Message from Mariah Mercado LPN sent at 6/22/2020  8:49 AM CDT -----    ----- Message -----  From: Sheldon Humphrey  Sent: 6/22/2020   8:41 AM CDT  To: [facility] Mei Staff    Patient called to speak w/ someone regarding him still experiencing complications as well as his disability renewal, requesting callback     Callback [phone]

## 2020-06-22 NOTE — TELEPHONE ENCOUNTER
Pt had TURP on , he said his urine flow is slow . He was also on short term disability and It  tomorrow, he was it extended. I scheduled him for an office visit with dr. Roderick daugherty

## 2020-06-23 ENCOUNTER — OFFICE VISIT (OUTPATIENT)
Dept: UROLOGY | Facility: CLINIC | Age: 65
End: 2020-06-23
Payer: COMMERCIAL

## 2020-06-23 VITALS
SYSTOLIC BLOOD PRESSURE: 135 MMHG | HEIGHT: 68 IN | DIASTOLIC BLOOD PRESSURE: 84 MMHG | BODY MASS INDEX: 38.77 KG/M2 | HEART RATE: 99 BPM

## 2020-06-23 DIAGNOSIS — N13.8 BPH WITH URINARY OBSTRUCTION: ICD-10-CM

## 2020-06-23 DIAGNOSIS — N32.81 OAB (OVERACTIVE BLADDER): ICD-10-CM

## 2020-06-23 DIAGNOSIS — Z90.79 S/P TURP: ICD-10-CM

## 2020-06-23 DIAGNOSIS — N41.1 CHRONIC PROSTATITIS: Primary | Chronic | ICD-10-CM

## 2020-06-23 DIAGNOSIS — N40.1 BPH WITH URINARY OBSTRUCTION: ICD-10-CM

## 2020-06-23 PROCEDURE — 99999 PR PBB SHADOW E&M-EST. PATIENT-LVL III: CPT | Mod: PBBFAC,,, | Performed by: UROLOGY

## 2020-06-23 PROCEDURE — 99499 NO LOS: ICD-10-PCS | Mod: S$GLB,,, | Performed by: UROLOGY

## 2020-06-23 PROCEDURE — 99999 PR PBB SHADOW E&M-EST. PATIENT-LVL III: ICD-10-PCS | Mod: PBBFAC,,, | Performed by: UROLOGY

## 2020-06-23 PROCEDURE — 87086 URINE CULTURE/COLONY COUNT: CPT

## 2020-06-23 PROCEDURE — 99499 UNLISTED E&M SERVICE: CPT | Mod: S$GLB,,, | Performed by: UROLOGY

## 2020-06-23 RX ORDER — CIPROFLOXACIN 500 MG/1
500 TABLET ORAL 2 TIMES DAILY
Qty: 20 TABLET | Refills: 0 | Status: SHIPPED | OUTPATIENT
Start: 2020-06-23 | End: 2020-07-03

## 2020-06-23 RX ORDER — OXYBUTYNIN CHLORIDE 10 MG/1
10 TABLET, EXTENDED RELEASE ORAL DAILY
Qty: 30 TABLET | Refills: 11 | Status: SHIPPED | OUTPATIENT
Start: 2020-06-23 | End: 2021-04-26

## 2020-06-23 NOTE — PROGRESS NOTES
CHIEF COMPLAINT:    Mr. Hernandez is a 64 y.o. male presenting for penile swelling.    PRESENTING ILLNESS:    Celso Hernandez is a 64 y.o. male with a PMH of HTN, BPH, retention, DM who presents for post op.    He had the following procedure on 5/27/20:     Procedure(s) Performed:   TURP   Specimen(s): prostate chips  Pathology:   18 G OF PROSTATE CHIPS:  PROSTATIC HYPERPLASIA   Findings:   Trilobar prostatic enlargement with significant intravesical median lobe  Wide open channel at conclusion of procedure    He was seen in clinic on 6/2/20 for voiding trial in which he passed.  He presents today for inability to pull the foreskin swelling.  It started 6p 2 days ago.  He was unable to pull the foreskin back.  It is not painful.    He is voiding well.  His stream is good. No trouble urinating.      He works as a building maintenance.  He is not sure whether he can return to work yet because of pain in the groin area when he stands for a while.  C/o mild burning in the beginning of urination.  C/o frequency and urgency 1 to 1 1/2 hours.        PATIENT HISTORY:    Past Medical History:   Diagnosis Date    Colon polyps     Diabetes mellitus type II     Hyperlipidemia     Hypertension     Multiple duodenal ulcers 10/08/2019    Unspecified hemorrhoids without mention of complication        Past Surgical History:   Procedure Laterality Date    COLONOSCOPY N/A 10/8/2019    Procedure: COLONOSCOPY suprep;  Surgeon: Landon Guajardo MD;  Location: [facility];  Service: General;  Laterality: N/A;    COLONOSCOPY W/ POLYPECTOMY  10/08/2019    ESOPHAGOGASTRODUODENOSCOPY  10/08/2019    w/biopsies    ESOPHAGOGASTRODUODENOSCOPY N/A 10/8/2019    Procedure: EGD (ESOPHAGOGASTRODUODENOSCOPY);  Surgeon: Landon Guajardo MD;  Location: [facility];  Service: General;  Laterality: N/A;    none      TRANSURETHRAL RESECTION OF PROSTATE N/A 5/27/2020    Procedure: TURP (TRANSURETHRAL RESECTION OF PROSTATE) bipolar;  Surgeon: Arcadio BUSTOS  MD Roderick;  Location: [facility] OR [address] [place] [address];  Service: Urology;  Laterality: N/A;  2hr       Family History   Problem Relation Age of Onset    Hyperlipidemia Mother     Cancer Sister         breast    Hearing loss Sister        Social History     Socioeconomic History    Marital status:      Spouse name: Not on file    Number of children: Not on file    Years of education: Not on file    Highest education level: Not on file   Occupational History    Not on file   Social Needs    Financial resource strain: Not on file    Food insecurity     Worry: Not on file     Inability: Not on file    Transportation needs     Medical: Not on file     Non-medical: Not on file   Tobacco Use    Smoking status: Never Smoker    Smokeless tobacco: Never Used   Substance and Sexual Activity    Alcohol use: Yes     Alcohol/week: 3.3 standard drinks     Types: 4 Standard drinks or equivalent per week     Comment: socially    Drug use: No    Sexual activity: Not on file   Lifestyle    Physical activity     Days per week: Not on file     Minutes per session: Not on file    Stress: Not on file   Relationships    Social connections     Talks on phone: Not on file     Gets together: Not on file     Attends Hindu service: Not on file     Active member of club or organization: Not on file     Attends meetings of clubs or organizations: Not on file     Relationship status: Not on file   Other Topics Concern    Not on file   Social History Narrative    Not on file       Allergies:  Patient has no known allergies.    Medications:    Current Outpatient Medications:     finasteride (PROSCAR) 5 mg tablet, Take 1 tablet (5 mg total) by mouth once daily., Disp: 30 tablet, Rfl: 11    tamsulosin (FLOMAX) 0.4 mg Cap, Take 2 capsules (0.8 mg total) by mouth once daily., Disp: 180 capsule, Rfl: 3    atorvastatin (LIPITOR) 80 MG tablet, TAKE 1 TABLET BY MOUTH ONCE DAILY, Disp: 90 tablet, Rfl: 3    cholecalciferol, vitamin D3,  (VITAMIN D3) 2,000 unit Cap, Take 1 capsule by mouth once daily., Disp: , Rfl:     hydroCHLOROthiazide (HYDRODIURIL) 12.5 MG Tab, Take 1 tablet (12.5 mg total) by mouth once daily., Disp: 90 tablet, Rfl: 3    ketoconazole (NIZORAL) 2 % cream, Apply topically once daily. Apply to foot nightly., Disp: 30 g, Rfl: 1    latanoprost 0.005 % ophthalmic solution, Place 1 drop into both eyes every evening., Disp: 2.5 mL, Rfl: 11    losartan (COZAAR) 100 MG tablet, Take 1 tablet (100 mg total) by mouth once daily., Disp: 90 tablet, Rfl: 3    meloxicam (MOBIC) 15 MG tablet, Take 1 tablet by mouth once daily, Disp: 30 tablet, Rfl: 4    metFORMIN (GLUCOPHAGE) 500 MG tablet, Take 1 tablet (500 mg total) by mouth daily with breakfast., Disp: 90 tablet, Rfl: 3    pantoprazole (PROTONIX) 40 MG tablet, , Disp: , Rfl:     terbinafine HCL (LAMISIL AT) 1 % cream, Apply topically 2 (two) times daily., Disp: 30 g, Rfl: 1    timolol maleate 0.5% (TIMOPTIC) 0.5 % Drop, Place 1 drop into both eyes 2 (two) times daily., Disp: 10 mL, Rfl: 11    PHYSICAL EXAMINATION:    Constitutional: He appears well-developed and well-nourished.  He is in no apparent distress.    Eyes: No scleral icterus noted bilaterally. No discharge bilaterally.    Cardiovascular: Normal rate.      Pulmonary/Chest: Effort normal. No respiratory distress.     Abdominal:  He exhibits no distension.     Neurological: He is alert and oriented to person, place, and time.     Skin: Skin is warm and dry.     Psych: Cooperative with normal affect.    Genitourinary: The penis is uncircumcised with evidence of paraphimosis.     Physical Exam      LABS:    Lab Results   Component Value Date    PSA 3.4 09/14/2019    PSA 1.9 05/14/2016    PSA 2.5 09/03/2015    PSA 1.6 03/01/2014    PSA 1.7 10/08/2011    PSA 1.7 11/06/2010    PSA 1.7 08/29/2009    PSA 1.2 12/11/2007    PSA 1.1 05/19/2006    PSA 1.2 02/17/2004    PSADIAG 2.8 11/16/2019    PSADIAG 2.2 11/02/2018    PSADIAG 2.5  11/18/2017       IMPRESSION:  Chronic prostatitis  -     ciprofloxacin HCl (CIPRO) 500 MG tablet; Take 1 tablet (500 mg total) by mouth 2 (two) times daily. for 10 days  Dispense: 20 tablet; Refill: 0  -     Urine culture    S/P TURP    OAB (overactive bladder)  -     oxybutynin (DITROPAN-XL) 10 MG 24 hr tablet; Take 1 tablet (10 mg total) by mouth once daily.  Dispense: 30 tablet; Refill: 11    BPH with urinary obstruction  -     Prostate Specific Antigen, Diagnostic; Future; Expected date: 06/23/2020      PLAN:    Continue flomax and finasteride for now.  Start oxybutynin xl daily.  cipro for 10 days.  Urine culture today.    OK to extend his post-op recovery until 7/5/20.  He can return to work without limitation on 7/6/20.    Follow up in about 6 months (around 12/23/2020) for PSA.

## 2020-06-24 LAB — BACTERIA UR CULT: NO GROWTH

## 2020-07-11 ENCOUNTER — NURSE TRIAGE (OUTPATIENT)
Dept: ADMINISTRATIVE | Facility: CLINIC | Age: 65
End: 2020-07-11

## 2020-07-11 ENCOUNTER — HOSPITAL ENCOUNTER (EMERGENCY)
Facility: HOSPITAL | Age: 65
Discharge: HOME | End: 2020-07-11
Attending: EMERGENCY MEDICINE
Payer: MEDICARE

## 2020-07-11 VITALS
HEART RATE: 90 BPM | SYSTOLIC BLOOD PRESSURE: 143 MMHG | TEMPERATURE: 98 F | HEIGHT: 68 IN | WEIGHT: 262 LBS | RESPIRATION RATE: 18 BRPM | OXYGEN SATURATION: 98 % | BODY MASS INDEX: 39.71 KG/M2 | DIASTOLIC BLOOD PRESSURE: 79 MMHG

## 2020-07-11 DIAGNOSIS — R33.9 URINARY RETENTION: Primary | ICD-10-CM

## 2020-07-11 LAB
BACTERIA #/AREA URNS HPF: ABNORMAL /HPF
BILIRUB UR QL STRIP: NEGATIVE
CLARITY UR: ABNORMAL
COLOR UR: YELLOW
GLUCOSE UR QL STRIP: NEGATIVE
HGB UR QL STRIP: ABNORMAL
HYALINE CASTS #/AREA URNS LPF: 0 /LPF
KETONES UR QL STRIP: NEGATIVE
LEUKOCYTE ESTERASE UR QL STRIP: ABNORMAL
MICROSCOPIC COMMENT: ABNORMAL
NITRITE UR QL STRIP: NEGATIVE
PH UR STRIP: 6 [PH] (ref 5–8)
PROT UR QL STRIP: ABNORMAL
RBC #/AREA URNS HPF: >100 /HPF (ref 0–4)
SP GR UR STRIP: 1.02 (ref 1–1.03)
SQUAMOUS #/AREA URNS HPF: 0 /HPF
URN SPEC COLLECT METH UR: ABNORMAL
UROBILINOGEN UR STRIP-ACNC: NEGATIVE EU/DL
WBC #/AREA URNS HPF: >100 /HPF (ref 0–5)

## 2020-07-11 PROCEDURE — 99283 EMERGENCY DEPT VISIT LOW MDM: CPT | Mod: 25

## 2020-07-11 PROCEDURE — 87086 URINE CULTURE/COLONY COUNT: CPT

## 2020-07-11 PROCEDURE — 51798 US URINE CAPACITY MEASURE: CPT

## 2020-07-11 PROCEDURE — 25000003 PHARM REV CODE 250: Performed by: EMERGENCY MEDICINE

## 2020-07-11 PROCEDURE — P9612 CATHETERIZE FOR URINE SPEC: HCPCS

## 2020-07-11 PROCEDURE — 81000 URINALYSIS NONAUTO W/SCOPE: CPT

## 2020-07-11 RX ORDER — LIDOCAINE HYDROCHLORIDE 20 MG/ML
JELLY TOPICAL
Status: COMPLETED | OUTPATIENT
Start: 2020-07-11 | End: 2020-07-11

## 2020-07-11 RX ORDER — FENTANYL CITRATE 50 UG/ML
100 INJECTION, SOLUTION INTRAMUSCULAR; INTRAVENOUS
Status: DISCONTINUED | OUTPATIENT
Start: 2020-07-11 | End: 2020-07-11 | Stop reason: HOSPADM

## 2020-07-11 RX ORDER — CIPROFLOXACIN 500 MG/1
500 TABLET ORAL 2 TIMES DAILY
Qty: 14 TABLET | Refills: 0 | Status: SHIPPED | OUTPATIENT
Start: 2020-07-11 | End: 2020-07-18

## 2020-07-11 RX ORDER — CIPROFLOXACIN 500 MG/1
500 TABLET ORAL 2 TIMES DAILY
Qty: 20 TABLET | Refills: 0 | Status: SHIPPED | OUTPATIENT
Start: 2020-07-11 | End: 2020-07-11 | Stop reason: SDUPTHER

## 2020-07-11 RX ADMIN — LIDOCAINE HYDROCHLORIDE 5 ML: 20 JELLY TOPICAL at 09:07

## 2020-07-11 NOTE — DISCHARGE INSTRUCTIONS
Please follow up with your urologist as scheduled on Monday.     Please return immediately if he have worsening urinary retention fever abdominal pain or any other concerns.

## 2020-07-11 NOTE — ED NOTES
"Pt presents with c/o urinary retention since 0400 this morning. C/o bladder pressure, states "it just feels like a lot of restriction."   "

## 2020-07-11 NOTE — ED NOTES
Pt felt the urge to urinate, and the pressure pushed the in and out catheter out of him. Pt states he had a good stream of urine after the catheter came out.

## 2020-07-11 NOTE — TELEPHONE ENCOUNTER
Patient states he has had a full bladder for several hours.he has tried to self cath but was not able to complete.  Reason for Disposition   [1] Unable to urinate (or only a few drops) > 4 hours AND     [2] bladder feels very full (e.g., palpable bladder or strong urge to urinate)    Additional Information   Negative: Shock suspected (e.g., cold/pale/clammy skin, too weak to stand, low BP, rapid pulse)   Negative: Sounds like a life-threatening emergency to the triager   Negative: Followed a genital area injury   Negative: Followed a genital area injury (penis, scrotum)   Negative: Vaginal discharge   Negative: Pus (white, yellow) or bloody discharge from end of penis   Negative: [1] Taking antibiotic for urinary tract infection (UTI) AND [2] female   Negative: [1] Taking antibiotic for urinary tract infection (UTI) AND [2] male   Negative: [1] Discomfort (pain, burning or stinging) when passing urine AND [2] pregnant   Negative: [1] Discomfort (pain, burning or stinging) when passing urine AND [2] postpartum (from 0 to 6 weeks after delivery)   Negative: [1] Discomfort (pain, burning or stinging) when passing urine AND [2] female   Negative: [1] Discomfort (pain, burning or stinging) when passing urine AND [2] male   Negative: Pain or itching in the vulvar area   Negative: Pain in scrotum is main symptom   Negative: Blood in the urine is main symptom   Negative: Symptoms arising from use of a urinary catheter (Lantigua or Coude)    Protocols used: URINARY SYMPTOMS-A-AH

## 2020-07-11 NOTE — ED PROVIDER NOTES
Encounter Date: 7/11/2020    SCRIBE #1 NOTE: I, Sun Vasquez, am scribing for, and in the presence of,  Dr. Murphy . I have scribed the entire note.       History     Chief Complaint   Patient presents with    Urinary Retention     c/o urinary retention today. was last able to urinate last night. pt had a turp procedure with Dr. Vaughn in May     This is a 64 year-old male with a past medical history of diabetes and hypertension, presents to the Emergency Department with complaints of difficulty urinating for the past 3 hours. Patient recently underwent transurethral resection of the prostate (TURP) with Dr. Vaughn on 5/27/2020. Patient endorses associated abdominal pain. He denies any fever, vomiting or diarrhea, reports he has had a difficult time self-catheterizing at home this morning. He last urinated at 12 AM. Patient has a follow up appointment with Dr. Vaughn in August 2020.     The history is provided by the patient.     Review of patient's allergies indicates:  No Known Allergies  Past Medical History:   Diagnosis Date    Colon polyps     Diabetes mellitus type II     Hyperlipidemia     Hypertension     Multiple duodenal ulcers 10/08/2019    Unspecified hemorrhoids without mention of complication      Past Surgical History:   Procedure Laterality Date    COLONOSCOPY N/A 10/8/2019    Procedure: COLONOSCOPY suprep;  Surgeon: Landon Guajardo MD;  Location: [facility];  Service: General;  Laterality: N/A;    COLONOSCOPY W/ POLYPECTOMY  10/08/2019    ESOPHAGOGASTRODUODENOSCOPY  10/08/2019    w/biopsies    ESOPHAGOGASTRODUODENOSCOPY N/A 10/8/2019    Procedure: EGD (ESOPHAGOGASTRODUODENOSCOPY);  Surgeon: Landon Guajardo MD;  Location: [facility];  Service: General;  Laterality: N/A;    none      TRANSURETHRAL RESECTION OF PROSTATE N/A 5/27/2020    Procedure: TURP (TRANSURETHRAL RESECTION OF PROSTATE) bipolar;  Surgeon: Arcadio Vaughn MD;  Location: [address];  Service: Urology;  Laterality: N/A;  2hr      Family History   Problem Relation Age of Onset    Hyperlipidemia Mother     Cancer Sister         breast    Hearing loss Sister      Social History     Tobacco Use    Smoking status: Never Smoker    Smokeless tobacco: Never Used   Substance Use Topics    Alcohol use: Yes     Alcohol/week: 3.3 standard drinks     Types: 4 Standard drinks or equivalent per week     Comment: socially    Drug use: No     Review of Systems   Constitutional: Negative for chills and fever.   HENT: Negative for rhinorrhea, sore throat and trouble swallowing.    Eyes: Negative for visual disturbance.   Respiratory: Negative for cough and shortness of breath.    Cardiovascular: Negative for chest pain.   Gastrointestinal: Positive for abdominal pain. Negative for diarrhea and vomiting.   Genitourinary: Positive for difficulty urinating. Negative for dysuria and hematuria.   Musculoskeletal: Negative for back pain.   Skin: Negative for rash.   Neurological: Negative for numbness and headaches.   Hematological: Negative for adenopathy.       Physical Exam     Initial Vitals [07/11/20 0726]   BP Pulse Resp Temp SpO2   (!) 237/116 (!) 117 (!) 22 98.4 °F (36.9 °C) 97 %      MAP       --         Physical Exam    Nursing note and vitals reviewed.  Constitutional: He appears well-developed and well-nourished. He is not diaphoretic. No distress.   Appears uncomfortable    HENT:   Head: Normocephalic and atraumatic.   Mouth/Throat: Oropharynx is clear and moist.   Eyes: Conjunctivae and EOM are normal. Pupils are equal, round, and reactive to light.   Cardiovascular: Normal rate, regular rhythm, normal heart sounds and intact distal pulses.   Pulmonary/Chest: Breath sounds normal. No respiratory distress. He has no wheezes. He has no rales.   Abdominal: Soft. He exhibits no distension. There is abdominal tenderness. There is no rebound and no guarding.   Generalized lower abdominal tenderness    Musculoskeletal: Normal range of motion. No  tenderness or edema.   Neurological: He is alert and oriented to person, place, and time. No cranial nerve deficit.   Skin: Skin is warm and dry. No rash noted.         ED Course   Procedures  Labs Reviewed   URINALYSIS, REFLEX TO URINE CULTURE - Abnormal; Notable for the following components:       Result Value    Appearance, UA Cloudy (*)     Protein, UA 2+ (*)     Occult Blood UA 3+ (*)     Leukocytes, UA 2+ (*)     All other components within normal limits    Narrative:     Specimen Source->Urine   URINALYSIS MICROSCOPIC - Abnormal; Notable for the following components:    RBC, UA >100 (*)     WBC, UA >100 (*)     Bacteria Many (*)     All other components within normal limits    Narrative:     Specimen Source->Urine   CULTURE, URINE          Imaging Results    None          Medical Decision Making:   Differential Diagnosis:   Differential diagnosis includes but is not limited to:   Urinary retention, UTI  Clinical Tests:   Lab Tests: Ordered and Reviewed  ED Management:  After taking into careful account the historical factors and physical exam findings of the patient's presentation today, in conjunction with the empirical and objective data obtained on ED workup, no acute emergent medical condition has been identified. The patient appears to be low risk for an emergent medical condition and I feel it is safe and appropriate at this time for the patient to be discharged to follow-up as detailed in their discharge instructions for reevaluation and possible continued outpatient workup and management. I have discussed the specifics of the workup with the patient and the patient has verbalized understanding of the details of the workup, the diagnosis, the treatment plan, and the need for outpatient follow-up.  Although the patient has no emergent etiology today this does not preclude the development of an emergent condition so in addition, I have advised the patient that they can return to the ED and/or activate  EMS at any time with worsening of their symptoms, change of their symptoms, or with any other medical complaint.  The patient remained comfortable and stable during their visit in the ED.  Discharge and follow-up instructions discussed with the patient who expressed understanding and willingness to comply with my recommendations.                     ED Course as of Jul 11 1729   Sat Jul 11, 2020   0737 Patient presents with acute urinary retention.  His vital signs with hypertension.  Appears uncomfortable on exam and pain.  Bladder scan greater than 500 cc.  Will place Potts and reassess.    [RN]   8646 Dr. Stafford recommends using  dilation of urethra with haymen dilator to glans. And to reattempt potts placement. Transfer if unsuccessful     [RN]   0923 Unable to pass dilate past 12 Fr    [RN]   1020 Called transfer center x 3 - will call back     [RN]   1028 Patient on reassessment reports urinating twice a spontaneously with a good stream.  He has no abdominal tenderness and he is back in his normal state of health.  He states he will follow-up with his urologist on Monday in 2 days.  I think this is reasonable.  Will discharge with ciprofloxacin for suspected urinary tract infection, urine culture obtained.  Return precautions discussed for abdominal pain recurrent urinary retention fever or any other concerns.    [RN]      ED Course User Index  [RN] Hollis Murphy Jr., MD                Clinical Impression:       ICD-10-CM ICD-9-CM   1. Urinary retention  R33.9 788.20         Scribe Attestation I, Hollis Murphy,  personally performed the services described in this documentation. All medical record entries made by the scribe were at my direction and in my presence.  I have reviewed the chart and agree that the record reflects my personal performance and is accurate and complete. Hollis Murphy M.D. 5:31 PM07/11/2020        Portions of this note were dictated using voice recognition software and may contain  dictation related errors in spelling/grammar/syntax not found on text review      Disposition:   Disposition: Transferred  Condition: Stable     ED Disposition Condition    Transfer to Another Facility Stable                             Hollis Murphy Jr., MD  07/11/20 0910

## 2020-07-11 NOTE — ED NOTES
Attempted to insert 14 Yoruba catheter, but met too much resistance at the tip of the urethra. Attempted to insert 5 Yoruba in and out catheter.

## 2020-07-11 NOTE — ED NOTES
8 Luxembourgish in and out catheter inserted with patient in standing position. Tolerated well. Pt now urinating through catheter in urinal.

## 2020-07-12 LAB — BACTERIA UR CULT: NO GROWTH

## 2020-07-13 NOTE — TELEPHONE ENCOUNTER
Went to ER because he had a difficulty of voiding.  He tried to pass a catheter and a hospital staff tried to catheterize him with a smaller catheter.  Then he had a strong urge and was able to urinate with a strong urine flow.  Since then he has been doing well.  Will see him as scheduled on 8/28/20.

## 2020-08-03 ENCOUNTER — TELEPHONE (OUTPATIENT)
Dept: UROLOGY | Facility: CLINIC | Age: 65
End: 2020-08-03

## 2020-08-03 ENCOUNTER — CLINICAL SUPPORT (OUTPATIENT)
Dept: URGENT CARE | Facility: CLINIC | Age: 65
End: 2020-08-03
Payer: MEDICARE

## 2020-08-03 ENCOUNTER — OFFICE VISIT (OUTPATIENT)
Dept: UROLOGY | Facility: CLINIC | Age: 65
End: 2020-08-03
Payer: MEDICARE

## 2020-08-03 VITALS
DIASTOLIC BLOOD PRESSURE: 79 MMHG | SYSTOLIC BLOOD PRESSURE: 124 MMHG | WEIGHT: 255.75 LBS | BODY MASS INDEX: 38.76 KG/M2 | HEART RATE: 86 BPM | HEIGHT: 68 IN

## 2020-08-03 VITALS — TEMPERATURE: 98 F

## 2020-08-03 DIAGNOSIS — Z01.818 PRE-OP TESTING: ICD-10-CM

## 2020-08-03 DIAGNOSIS — R30.0 DYSURIA: Primary | ICD-10-CM

## 2020-08-03 DIAGNOSIS — N99.114 POSTPROCEDURAL STRICTURE OF ANTERIOR URETHRA: ICD-10-CM

## 2020-08-03 DIAGNOSIS — Z90.79 S/P TURP: Primary | ICD-10-CM

## 2020-08-03 DIAGNOSIS — R30.0 DYSURIA: ICD-10-CM

## 2020-08-03 PROCEDURE — 99213 OFFICE O/P EST LOW 20 MIN: CPT | Mod: PBBFAC,PO | Performed by: UROLOGY

## 2020-08-03 PROCEDURE — 99214 PR OFFICE/OUTPT VISIT, EST, LEVL IV, 30-39 MIN: ICD-10-PCS | Mod: S$GLB,,, | Performed by: UROLOGY

## 2020-08-03 PROCEDURE — 99999 PR PBB SHADOW E&M-EST. PATIENT-LVL III: CPT | Mod: PBBFAC,,, | Performed by: UROLOGY

## 2020-08-03 PROCEDURE — 87086 URINE CULTURE/COLONY COUNT: CPT

## 2020-08-03 PROCEDURE — U0003 INFECTIOUS AGENT DETECTION BY NUCLEIC ACID (DNA OR RNA); SEVERE ACUTE RESPIRATORY SYNDROME CORONAVIRUS 2 (SARS-COV-2) (CORONAVIRUS DISEASE [COVID-19]), AMPLIFIED PROBE TECHNIQUE, MAKING USE OF HIGH THROUGHPUT TECHNOLOGIES AS DESCRIBED BY CMS-2020-01-R: HCPCS

## 2020-08-03 PROCEDURE — 99999 PR PBB SHADOW E&M-EST. PATIENT-LVL III: ICD-10-PCS | Mod: PBBFAC,,, | Performed by: UROLOGY

## 2020-08-03 PROCEDURE — 99214 OFFICE O/P EST MOD 30 MIN: CPT | Mod: S$GLB,,, | Performed by: UROLOGY

## 2020-08-03 RX ORDER — LEVOFLOXACIN 500 MG/1
500 TABLET, FILM COATED ORAL DAILY
Qty: 10 TABLET | Status: SHIPPED | OUTPATIENT
Start: 2020-08-03 | End: 2020-08-13

## 2020-08-03 NOTE — PROGRESS NOTES
CHIEF COMPLAINT:    Mr. Hernandez is a 65 y.o. male presenting for urethral swelling, dysuria and bladder pain with urination following TURP.  PRESENTING ILLNESS:    Celso Hernandez is a 65 y.o. male with a PMH of HTN, BPH, retention, DM who presents for post op.    He had the following procedure on 5/27/20:     Procedure(s) Performed:   TURP   Specimen(s): prostate chips  Pathology:   18 G OF PROSTATE CHIPS:  PROSTATIC HYPERPLASIA   Findings:   Trilobar prostatic enlargement with significant intravesical median lobe  Wide open channel at conclusion of procedure    C/o urethral swelling, dysuria and bladder pain with urination following TURP  Went to an ER recently and they had a difficulty of placing a urethral cathter.    He works as a building maintenance.  He is not sure whether he can return to work yet because of pain in the groin area when he stands for a while.  C/o mild burning in the beginning of urination.  C/o frequency and urgency 1 to 1 1/2 hours.        PATIENT HISTORY:    Past Medical History:   Diagnosis Date    Colon polyps     Diabetes mellitus type II     Hyperlipidemia     Hypertension     Multiple duodenal ulcers 10/08/2019    Unspecified hemorrhoids without mention of complication        Past Surgical History:   Procedure Laterality Date    COLONOSCOPY N/A 10/8/2019    Procedure: COLONOSCOPY suprep;  Surgeon: Landon Guajardo MD;  Location: [facility];  Service: General;  Laterality: N/A;    COLONOSCOPY W/ POLYPECTOMY  10/08/2019    ESOPHAGOGASTRODUODENOSCOPY  10/08/2019    w/biopsies    ESOPHAGOGASTRODUODENOSCOPY N/A 10/8/2019    Procedure: EGD (ESOPHAGOGASTRODUODENOSCOPY);  Surgeon: Landon Guajardo MD;  Location: [facility];  Service: General;  Laterality: N/A;    none      TRANSURETHRAL RESECTION OF PROSTATE N/A 5/27/2020    Procedure: TURP (TRANSURETHRAL RESECTION OF PROSTATE) bipolar;  Surgeon: Arcadio Vaughn MD;  Location: [address];  Service: Urology;  Laterality: N/A;  2hr        Family History   Problem Relation Age of Onset    Hyperlipidemia Mother     Cancer Sister         breast    Hearing loss Sister        Social History     Socioeconomic History    Marital status:      Spouse name: Not on file    Number of children: Not on file    Years of education: Not on file    Highest education level: Not on file   Occupational History    Not on file   Social Needs    Financial resource strain: Not on file    Food insecurity     Worry: Not on file     Inability: Not on file    Transportation needs     Medical: Not on file     Non-medical: Not on file   Tobacco Use    Smoking status: Never Smoker    Smokeless tobacco: Never Used   Substance and Sexual Activity    Alcohol use: Yes     Alcohol/week: 3.3 standard drinks     Types: 4 Standard drinks or equivalent per week     Comment: socially    Drug use: No    Sexual activity: Not on file   Lifestyle    Physical activity     Days per week: Not on file     Minutes per session: Not on file    Stress: Not on file   Relationships    Social connections     Talks on phone: Not on file     Gets together: Not on file     Attends Jainism service: Not on file     Active member of club or organization: Not on file     Attends meetings of clubs or organizations: Not on file     Relationship status: Not on file   Other Topics Concern    Not on file   Social History Narrative    Not on file       Allergies:  Patient has no known allergies.    Medications:    Current Outpatient Medications:     atorvastatin (LIPITOR) 80 MG tablet, TAKE 1 TABLET BY MOUTH ONCE DAILY, Disp: 90 tablet, Rfl: 3    cholecalciferol, vitamin D3, (VITAMIN D3) 2,000 unit Cap, Take 1 capsule by mouth once daily., Disp: , Rfl:     finasteride (PROSCAR) 5 mg tablet, Take 1 tablet (5 mg total) by mouth once daily., Disp: 30 tablet, Rfl: 11    hydroCHLOROthiazide (HYDRODIURIL) 12.5 MG Tab, Take 1 tablet (12.5 mg total) by mouth once daily., Disp: 90 tablet,  Rfl: 3    ketoconazole (NIZORAL) 2 % cream, Apply topically once daily. Apply to foot nightly., Disp: 30 g, Rfl: 1    latanoprost 0.005 % ophthalmic solution, Place 1 drop into both eyes every evening., Disp: 2.5 mL, Rfl: 11    levoFLOXacin (LEVAQUIN) 500 MG tablet, Take 1 tablet (500 mg total) by mouth once daily. for 10 days, Disp: 10 tablet, Rfl: prn    losartan (COZAAR) 100 MG tablet, Take 1 tablet (100 mg total) by mouth once daily., Disp: 90 tablet, Rfl: 3    meloxicam (MOBIC) 15 MG tablet, Take 1 tablet by mouth once daily, Disp: 30 tablet, Rfl: 4    metFORMIN (GLUCOPHAGE) 500 MG tablet, Take 1 tablet (500 mg total) by mouth daily with breakfast., Disp: 90 tablet, Rfl: 3    oxybutynin (DITROPAN-XL) 10 MG 24 hr tablet, Take 1 tablet (10 mg total) by mouth once daily., Disp: 30 tablet, Rfl: 11    pantoprazole (PROTONIX) 40 MG tablet, , Disp: , Rfl:     tamsulosin (FLOMAX) 0.4 mg Cap, Take 2 capsules (0.8 mg total) by mouth once daily., Disp: 180 capsule, Rfl: 3    terbinafine HCL (LAMISIL AT) 1 % cream, Apply topically 2 (two) times daily., Disp: 30 g, Rfl: 1    timolol maleate 0.5% (TIMOPTIC) 0.5 % Drop, Place 1 drop into both eyes 2 (two) times daily., Disp: 10 mL, Rfl: 11    PHYSICAL EXAMINATION:    Constitutional: He appears well-developed and well-nourished.  He is in no apparent distress.    Eyes: No scleral icterus noted bilaterally. No discharge bilaterally.    Cardiovascular: Normal rate.      Pulmonary/Chest: Effort normal. No respiratory distress.     Abdominal:  He exhibits no distension.     Neurological: He is alert and oriented to person, place, and time.     Skin: Skin is warm and dry.     Psych: Cooperative with normal affect.    Genitourinary: The penis is uncircumcised with evidence of paraphimosis.     Physical Exam      LABS:    Lab Results   Component Value Date    PSA 3.4 09/14/2019    PSA 1.9 05/14/2016    PSA 2.5 09/03/2015    PSA 1.6 03/01/2014    PSA 1.7 10/08/2011    PSA  1.7 11/06/2010    PSA 1.7 08/29/2009    PSA 1.2 12/11/2007    PSA 1.1 05/19/2006    PSA 1.2 02/17/2004    PSADIAG 2.8 11/16/2019    PSADIAG 2.2 11/02/2018    PSADIAG 2.5 11/18/2017     PVR per bladder scan: 131 ml  UA trace blood and protein      IMPRESSION:  S/P TURP    Dysuria  -     levoFLOXacin (LEVAQUIN) 500 MG tablet; Take 1 tablet (500 mg total) by mouth once daily. for 10 days  Dispense: 10 tablet; Refill: prn  -     Urine culture    Postprocedural stricture of anterior urethra  -     COVID-19 Routine Screening      PLAN:    Start Levaquin daily  Urine culture today.  Suspect urethral stricture following TURP.  Will schedule cysto under anestheisa with possible urethral dilation    Follow up in about 2 days (around 8/5/2020), or cysto under anestheisa with possible urethral dilation.

## 2020-08-03 NOTE — H&P (VIEW-ONLY)
CHIEF COMPLAINT:    Mr. Hernandez is a 65 y.o. male presenting for urethral swelling, dysuria and bladder pain with urination following TURP.  PRESENTING ILLNESS:    Celso Hernandez is a 65 y.o. male with a PMH of HTN, BPH, retention, DM who presents for post op.    He had the following procedure on 5/27/20:     Procedure(s) Performed:   TURP   Specimen(s): prostate chips  Pathology:   18 G OF PROSTATE CHIPS:  PROSTATIC HYPERPLASIA   Findings:   Trilobar prostatic enlargement with significant intravesical median lobe  Wide open channel at conclusion of procedure    C/o urethral swelling, dysuria and bladder pain with urination following TURP  Went to an ER recently and they had a difficulty of placing a urethral cathter.    He works as a building maintenance.  He is not sure whether he can return to work yet because of pain in the groin area when he stands for a while.  C/o mild burning in the beginning of urination.  C/o frequency and urgency 1 to 1 1/2 hours.        PATIENT HISTORY:    Past Medical History:   Diagnosis Date    Colon polyps     Diabetes mellitus type II     Hyperlipidemia     Hypertension     Multiple duodenal ulcers 10/08/2019    Unspecified hemorrhoids without mention of complication        Past Surgical History:   Procedure Laterality Date    COLONOSCOPY N/A 10/8/2019    Procedure: COLONOSCOPY suprep;  Surgeon: Landon Guajardo MD;  Location: [facility];  Service: General;  Laterality: N/A;    COLONOSCOPY W/ POLYPECTOMY  10/08/2019    ESOPHAGOGASTRODUODENOSCOPY  10/08/2019    w/biopsies    ESOPHAGOGASTRODUODENOSCOPY N/A 10/8/2019    Procedure: EGD (ESOPHAGOGASTRODUODENOSCOPY);  Surgeon: Landon Guajardo MD;  Location: [facility];  Service: General;  Laterality: N/A;    none      TRANSURETHRAL RESECTION OF PROSTATE N/A 5/27/2020    Procedure: TURP (TRANSURETHRAL RESECTION OF PROSTATE) bipolar;  Surgeon: Arcadio Vaughn MD;  Location: [address];  Service: Urology;  Laterality: N/A;  2hr        Family History   Problem Relation Age of Onset    Hyperlipidemia Mother     Cancer Sister         breast    Hearing loss Sister        Social History     Socioeconomic History    Marital status:      Spouse name: Not on file    Number of children: Not on file    Years of education: Not on file    Highest education level: Not on file   Occupational History    Not on file   Social Needs    Financial resource strain: Not on file    Food insecurity     Worry: Not on file     Inability: Not on file    Transportation needs     Medical: Not on file     Non-medical: Not on file   Tobacco Use    Smoking status: Never Smoker    Smokeless tobacco: Never Used   Substance and Sexual Activity    Alcohol use: Yes     Alcohol/week: 3.3 standard drinks     Types: 4 Standard drinks or equivalent per week     Comment: socially    Drug use: No    Sexual activity: Not on file   Lifestyle    Physical activity     Days per week: Not on file     Minutes per session: Not on file    Stress: Not on file   Relationships    Social connections     Talks on phone: Not on file     Gets together: Not on file     Attends Baptism service: Not on file     Active member of club or organization: Not on file     Attends meetings of clubs or organizations: Not on file     Relationship status: Not on file   Other Topics Concern    Not on file   Social History Narrative    Not on file       Allergies:  Patient has no known allergies.    Medications:    Current Outpatient Medications:     atorvastatin (LIPITOR) 80 MG tablet, TAKE 1 TABLET BY MOUTH ONCE DAILY, Disp: 90 tablet, Rfl: 3    cholecalciferol, vitamin D3, (VITAMIN D3) 2,000 unit Cap, Take 1 capsule by mouth once daily., Disp: , Rfl:     finasteride (PROSCAR) 5 mg tablet, Take 1 tablet (5 mg total) by mouth once daily., Disp: 30 tablet, Rfl: 11    hydroCHLOROthiazide (HYDRODIURIL) 12.5 MG Tab, Take 1 tablet (12.5 mg total) by mouth once daily., Disp: 90 tablet,  Rfl: 3    ketoconazole (NIZORAL) 2 % cream, Apply topically once daily. Apply to foot nightly., Disp: 30 g, Rfl: 1    latanoprost 0.005 % ophthalmic solution, Place 1 drop into both eyes every evening., Disp: 2.5 mL, Rfl: 11    levoFLOXacin (LEVAQUIN) 500 MG tablet, Take 1 tablet (500 mg total) by mouth once daily. for 10 days, Disp: 10 tablet, Rfl: prn    losartan (COZAAR) 100 MG tablet, Take 1 tablet (100 mg total) by mouth once daily., Disp: 90 tablet, Rfl: 3    meloxicam (MOBIC) 15 MG tablet, Take 1 tablet by mouth once daily, Disp: 30 tablet, Rfl: 4    metFORMIN (GLUCOPHAGE) 500 MG tablet, Take 1 tablet (500 mg total) by mouth daily with breakfast., Disp: 90 tablet, Rfl: 3    oxybutynin (DITROPAN-XL) 10 MG 24 hr tablet, Take 1 tablet (10 mg total) by mouth once daily., Disp: 30 tablet, Rfl: 11    pantoprazole (PROTONIX) 40 MG tablet, , Disp: , Rfl:     tamsulosin (FLOMAX) 0.4 mg Cap, Take 2 capsules (0.8 mg total) by mouth once daily., Disp: 180 capsule, Rfl: 3    terbinafine HCL (LAMISIL AT) 1 % cream, Apply topically 2 (two) times daily., Disp: 30 g, Rfl: 1    timolol maleate 0.5% (TIMOPTIC) 0.5 % Drop, Place 1 drop into both eyes 2 (two) times daily., Disp: 10 mL, Rfl: 11    PHYSICAL EXAMINATION:    Constitutional: He appears well-developed and well-nourished.  He is in no apparent distress.    Eyes: No scleral icterus noted bilaterally. No discharge bilaterally.    Cardiovascular: Normal rate.      Pulmonary/Chest: Effort normal. No respiratory distress.     Abdominal:  He exhibits no distension.     Neurological: He is alert and oriented to person, place, and time.     Skin: Skin is warm and dry.     Psych: Cooperative with normal affect.    Genitourinary: The penis is uncircumcised with evidence of paraphimosis.     Physical Exam      LABS:    Lab Results   Component Value Date    PSA 3.4 09/14/2019    PSA 1.9 05/14/2016    PSA 2.5 09/03/2015    PSA 1.6 03/01/2014    PSA 1.7 10/08/2011    PSA  1.7 11/06/2010    PSA 1.7 08/29/2009    PSA 1.2 12/11/2007    PSA 1.1 05/19/2006    PSA 1.2 02/17/2004    PSADIAG 2.8 11/16/2019    PSADIAG 2.2 11/02/2018    PSADIAG 2.5 11/18/2017     PVR per bladder scan: 131 ml  UA trace blood and protein      IMPRESSION:  S/P TURP    Dysuria  -     levoFLOXacin (LEVAQUIN) 500 MG tablet; Take 1 tablet (500 mg total) by mouth once daily. for 10 days  Dispense: 10 tablet; Refill: prn  -     Urine culture    Postprocedural stricture of anterior urethra  -     COVID-19 Routine Screening      PLAN:    Start Levaquin daily  Urine culture today.  Suspect urethral stricture following TURP.  Will schedule cysto under anestheisa with possible urethral dilation    Follow up in about 2 days (around 8/5/2020), or cysto under anestheisa with possible urethral dilation.

## 2020-08-03 NOTE — TELEPHONE ENCOUNTER
----- Message from Zully Woody sent at 8/3/2020  8:58 AM CDT -----  Contact: pt  Please call pt at [phone]    Patient is having extreme pain when urinating for the last 3 days    Patient stated it could be related to the last procedure    Thank you

## 2020-08-04 ENCOUNTER — TELEPHONE (OUTPATIENT)
Dept: UROLOGY | Facility: CLINIC | Age: 65
End: 2020-08-04

## 2020-08-04 LAB
BACTERIA UR CULT: NO GROWTH
SARS-COV-2 RNA RESP QL NAA+PROBE: NOT DETECTED

## 2020-08-04 NOTE — PRE-PROCEDURE INSTRUCTIONS
PREOP INSTRUCTIONS:No solid food ,milk or milk products for 8 hours prior to procedure.Clear liquids are allowed up to 2 hours before procedure.Clear liquids are:water,apple juice,gatorade & powerade.Shower instructions as well as directions to the [facility] were given.Patient encouraged to wear loose fitting,comfortable clothing.Medication instructions for pm prior to and am of procedure reviewed.Instructed patient to avoid taking vitamins,supplements,aspirin and ibuprofen the morning of surgery. Patient stated an understanding.Patient informed of the current visitor policy and advised patient that one visitor may accompany them into the hospital and wait (socially distanced) until a member of the medical team provides an update at the conclusion of the procedure.When they enter the hospital both patient and visitor will have their temperature checked.All visitors are asked to arrive with a mask and to keep their mask on throughout the visit.    Covid test completed 8/3/2020 with result pending    Patient denies any side effects or issues with anesthesia or sedation.    Patient does not know arrival time.Explained that this information comes from the surgeon's office and if they haven't heard from them by 2 or 3 pm to call the office.Patient stated an understanding.

## 2020-08-04 NOTE — TELEPHONE ENCOUNTER
Called pt to confirm arrival time of 10:00 for procedure on 8/5/2020. Gave pt NPO instructions and gave pt opportunity to ask questions. Pt verbalized understanding.

## 2020-08-05 ENCOUNTER — HOSPITAL ENCOUNTER (OUTPATIENT)
Facility: HOSPITAL | Age: 65
Discharge: HOME | End: 2020-08-05
Attending: UROLOGY | Admitting: UROLOGY
Payer: MEDICARE

## 2020-08-05 ENCOUNTER — ANESTHESIA EVENT (OUTPATIENT)
Dept: SURGERY | Facility: HOSPITAL | Age: 65
End: 2020-08-05
Payer: MEDICARE

## 2020-08-05 ENCOUNTER — ANESTHESIA (OUTPATIENT)
Dept: SURGERY | Facility: HOSPITAL | Age: 65
End: 2020-08-05
Payer: MEDICARE

## 2020-08-05 VITALS
SYSTOLIC BLOOD PRESSURE: 165 MMHG | WEIGHT: 255 LBS | HEIGHT: 69 IN | OXYGEN SATURATION: 99 % | DIASTOLIC BLOOD PRESSURE: 103 MMHG | RESPIRATION RATE: 18 BRPM | BODY MASS INDEX: 37.77 KG/M2 | TEMPERATURE: 99 F | HEART RATE: 70 BPM

## 2020-08-05 DIAGNOSIS — N35.919 URETHRAL STRICTURE: ICD-10-CM

## 2020-08-05 LAB
POCT GLUCOSE: 124 MG/DL (ref 70–110)
POCT GLUCOSE: 96 MG/DL (ref 70–110)

## 2020-08-05 PROCEDURE — 25000003 PHARM REV CODE 250: Performed by: STUDENT IN AN ORGANIZED HEALTH CARE EDUCATION/TRAINING PROGRAM

## 2020-08-05 PROCEDURE — 37000009 HC ANESTHESIA EA ADD 15 MINS: Performed by: UROLOGY

## 2020-08-05 PROCEDURE — 25000003 PHARM REV CODE 250: Performed by: UROLOGY

## 2020-08-05 PROCEDURE — 52281 CYSTOSCOPY AND TREATMENT: CPT | Mod: 58,,, | Performed by: UROLOGY

## 2020-08-05 PROCEDURE — 36000707: Performed by: UROLOGY

## 2020-08-05 PROCEDURE — D9220A PRA ANESTHESIA: Mod: ,,, | Performed by: ANESTHESIOLOGY

## 2020-08-05 PROCEDURE — 63600175 PHARM REV CODE 636 W HCPCS: Performed by: STUDENT IN AN ORGANIZED HEALTH CARE EDUCATION/TRAINING PROGRAM

## 2020-08-05 PROCEDURE — 52281 PR CYSTOSCOPY,DIL URETHRAL STRICTURE: ICD-10-PCS | Mod: 58,,, | Performed by: UROLOGY

## 2020-08-05 PROCEDURE — 71000033 HC RECOVERY, INTIAL HOUR: Performed by: UROLOGY

## 2020-08-05 PROCEDURE — 36000706: Performed by: UROLOGY

## 2020-08-05 PROCEDURE — C1769 GUIDE WIRE: HCPCS | Performed by: UROLOGY

## 2020-08-05 PROCEDURE — 71000016 HC POSTOP RECOV ADDL HR: Performed by: UROLOGY

## 2020-08-05 PROCEDURE — D9220A PRA ANESTHESIA: ICD-10-PCS | Mod: ,,, | Performed by: ANESTHESIOLOGY

## 2020-08-05 PROCEDURE — 71000015 HC POSTOP RECOV 1ST HR: Performed by: UROLOGY

## 2020-08-05 PROCEDURE — 27200651 HC AIRWAY, LMA: Performed by: ANESTHESIOLOGY

## 2020-08-05 PROCEDURE — C1758 CATHETER, URETERAL: HCPCS | Performed by: UROLOGY

## 2020-08-05 PROCEDURE — 82962 GLUCOSE BLOOD TEST: CPT | Performed by: UROLOGY

## 2020-08-05 PROCEDURE — 71000044 HC DOSC ROUTINE RECOVERY FIRST HOUR: Performed by: UROLOGY

## 2020-08-05 PROCEDURE — 37000008 HC ANESTHESIA 1ST 15 MINUTES: Performed by: UROLOGY

## 2020-08-05 RX ORDER — ONDANSETRON 2 MG/ML
4 INJECTION INTRAMUSCULAR; INTRAVENOUS DAILY PRN
Status: CANCELLED | OUTPATIENT
Start: 2020-08-05

## 2020-08-05 RX ORDER — SODIUM CHLORIDE 9 MG/ML
INJECTION, SOLUTION INTRAVENOUS CONTINUOUS
Status: DISCONTINUED | OUTPATIENT
Start: 2020-08-05 | End: 2020-08-05 | Stop reason: HOSPADM

## 2020-08-05 RX ORDER — LIDOCAINE HYDROCHLORIDE 20 MG/ML
INJECTION, SOLUTION EPIDURAL; INFILTRATION; INTRACAUDAL; PERINEURAL
Status: DISCONTINUED | OUTPATIENT
Start: 2020-08-05 | End: 2020-08-05

## 2020-08-05 RX ORDER — ONDANSETRON 2 MG/ML
INJECTION INTRAMUSCULAR; INTRAVENOUS
Status: DISCONTINUED | OUTPATIENT
Start: 2020-08-05 | End: 2020-08-05

## 2020-08-05 RX ORDER — CEFAZOLIN SODIUM 1 G/3ML
2 INJECTION, POWDER, FOR SOLUTION INTRAMUSCULAR; INTRAVENOUS
Status: COMPLETED | OUTPATIENT
Start: 2020-08-05 | End: 2020-08-05

## 2020-08-05 RX ORDER — FENTANYL CITRATE 50 UG/ML
INJECTION, SOLUTION INTRAMUSCULAR; INTRAVENOUS
Status: DISCONTINUED | OUTPATIENT
Start: 2020-08-05 | End: 2020-08-05

## 2020-08-05 RX ORDER — FENTANYL CITRATE 50 UG/ML
25 INJECTION, SOLUTION INTRAMUSCULAR; INTRAVENOUS EVERY 5 MIN PRN
Status: CANCELLED | OUTPATIENT
Start: 2020-08-05

## 2020-08-05 RX ORDER — MIDAZOLAM HYDROCHLORIDE 1 MG/ML
INJECTION, SOLUTION INTRAMUSCULAR; INTRAVENOUS
Status: DISCONTINUED | OUTPATIENT
Start: 2020-08-05 | End: 2020-08-05

## 2020-08-05 RX ORDER — TRAMADOL HYDROCHLORIDE 50 MG/1
50 TABLET ORAL EVERY 6 HOURS PRN
Qty: 5 TABLET | Refills: 0 | Status: SHIPPED | OUTPATIENT
Start: 2020-08-05 | End: 2021-12-14 | Stop reason: SDUPTHER

## 2020-08-05 RX ORDER — HALOPERIDOL 5 MG/ML
0.5 INJECTION INTRAMUSCULAR ONCE AS NEEDED
Status: CANCELLED | OUTPATIENT
Start: 2020-08-05 | End: 2032-01-02

## 2020-08-05 RX ORDER — DEXAMETHASONE SODIUM PHOSPHATE 4 MG/ML
INJECTION, SOLUTION INTRA-ARTICULAR; INTRALESIONAL; INTRAMUSCULAR; INTRAVENOUS; SOFT TISSUE
Status: DISCONTINUED | OUTPATIENT
Start: 2020-08-05 | End: 2020-08-05

## 2020-08-05 RX ORDER — LIDOCAINE HYDROCHLORIDE 20 MG/ML
JELLY TOPICAL
Status: DISCONTINUED | OUTPATIENT
Start: 2020-08-05 | End: 2020-08-05 | Stop reason: HOSPADM

## 2020-08-05 RX ORDER — PROPOFOL 10 MG/ML
VIAL (ML) INTRAVENOUS
Status: DISCONTINUED | OUTPATIENT
Start: 2020-08-05 | End: 2020-08-05

## 2020-08-05 RX ORDER — BETAMETHASONE DIPROPIONATE 0.5 MG/G
OINTMENT TOPICAL 2 TIMES DAILY
Qty: 15 G | Refills: 1 | Status: SHIPPED | OUTPATIENT
Start: 2020-08-05 | End: 2020-08-28 | Stop reason: SDUPTHER

## 2020-08-05 RX ADMIN — LIDOCAINE HYDROCHLORIDE 100 MG: 20 INJECTION, SOLUTION EPIDURAL; INFILTRATION; INTRACAUDAL; PERINEURAL at 11:08

## 2020-08-05 RX ADMIN — ONDANSETRON 4 MG: 2 INJECTION, SOLUTION INTRAMUSCULAR; INTRAVENOUS at 12:08

## 2020-08-05 RX ADMIN — PROPOFOL 200 MG: 10 INJECTION, EMULSION INTRAVENOUS at 11:08

## 2020-08-05 RX ADMIN — MIDAZOLAM HYDROCHLORIDE 2 MG: 1 INJECTION, SOLUTION INTRAMUSCULAR; INTRAVENOUS at 11:08

## 2020-08-05 RX ADMIN — CEFAZOLIN 2 G: 330 INJECTION, POWDER, FOR SOLUTION INTRAMUSCULAR; INTRAVENOUS at 11:08

## 2020-08-05 RX ADMIN — FENTANYL CITRATE 25 MCG: 50 INJECTION, SOLUTION INTRAMUSCULAR; INTRAVENOUS at 11:08

## 2020-08-05 RX ADMIN — DEXAMETHASONE SODIUM PHOSPHATE 4 MG: 4 INJECTION, SOLUTION INTRAMUSCULAR; INTRAVENOUS at 11:08

## 2020-08-05 RX ADMIN — SODIUM CHLORIDE: 0.9 INJECTION, SOLUTION INTRAVENOUS at 11:08

## 2020-08-05 RX ADMIN — FENTANYL CITRATE 50 MCG: 50 INJECTION, SOLUTION INTRAMUSCULAR; INTRAVENOUS at 12:08

## 2020-08-05 NOTE — OP NOTE
[facility]  Operative Note    Date: 08/05/2020    Pre-Op Diagnosis: urethral stricture s/p TURP    Patient Active Problem List    Diagnosis Date Noted    Urethral stricture 08/05/2020    Dysuria 08/03/2020    Postprocedural stricture of anterior urethra 08/03/2020    S/P TURP 06/23/2020    OAB (overactive bladder) 06/23/2020    Sepsis due to urinary tract infection 01/29/2020    Urinary retention 01/29/2020    Constipated 11/26/2019    Bacterial infection due to H. pylori 11/07/2019    Adenoma of colon 10/08/2019    Vitamin D deficiency 07/02/2018    Chronic prostatitis 03/30/2017    Severe obesity (BMI 35.0-39.9) with comorbidity 03/30/2017    Primary osteoarthritis of left knee 10/24/2016    Nocturia 01/19/2015    BPH with obstruction/lower urinary tract symptoms 01/19/2015    Mixed hyperlipidemia 03/10/2014    Diabetes mellitus, type 2     Essential hypertension     Polyp of colon     Hemorrhoids      Post-Op Diagnosis: fossa navicularis stricture    Procedure(s) Performed:   1.  Cystoscopy   2.  Urethral dilation using Glover sounds     Specimen(s): None    Staff Surgeon: Arcadio Vaughn MD    Assistant Surgeon: Annette Ojeda MD    Anesthesia: General endotracheal anesthesia    Indications: Celso Hernandez is a 65 y.o. male with urethral swelling, dysuria, and pain with urination s/p TURP in 5/2020. He also reports difficulty with potts catheter placement. He presents today for cystoscopy with possible dilation of urethral stricture.    Findings:   Fossa navicularis stricture present - dilated from 12 F to 26 Fr using Glover sounds  Bladder neck open with healing prostatic fossa     Estimated Blood Loss: min    Drains: None    Procedure in Detail:  After risks, benefits and possible complications of cystoscopy were explained, the patient elected to undergo the procedure and informed consent was obtained. All questions were answered in the ekaterina-operative area. The patient  was transferred to the cystoscopy suite and placed in the supine position.  SCDs were applied and working.  Anesthesia was administered.  Once the patient was adequately sedated, he was placed in the dorsal lithotomy position and prepped and draped in the usual sterile fashion.  Time out was performed, ekaterina-procedural antibiotics were confirmed.     A rigid cystoscope in a 22 Fr sheath was introduced into the bladder per urethra. Initially it would not pass due to fossa navicularis stricture. We were able to insert a 12 Fr Glover sound and dilate up to 26 Fr. Then the scope passed without issue. The rest of the urethra was visualized which showed no masses or strictures.  The right and left ureteral orifices were identified in the normal anatomic position and were seen effluxing clear urine.  Formal cystoscopy was performed which revealed no masses or lesions suspicious for malignancy, no trabeculations, no bladder stones and no bladder diverticuli.  There was a large defect present with some necrotic debris as the prostatic fossa is still healing.     The bladder was drained, and the patient was removed from lithotomy.     The patient tolerated the procedure well and was transferred to recovery in stable condition.    Disposition:  The patient will follow up with Dr. Vaughn in 3 months.  The patient was given prescriptions for betamethasone ointment and tramadol.      Annette Ojeda MD

## 2020-08-05 NOTE — INTERVAL H&P NOTE
The patient has been examined and the H&P has been reviewed:    I concur with the findings and no changes have occurred since H&P was written.    Anesthesia/Surgery risks, benefits and alternative options discussed and understood by patient/family.    Urine culture from 8/3 negative.           Active Hospital Problems    Diagnosis  POA    Urethral stricture [N35.919]  Yes      Resolved Hospital Problems   No resolved problems to display.

## 2020-08-05 NOTE — ANESTHESIA PROCEDURE NOTES
Intubation  Performed by: Jessica Murdock MD  Authorized by: Maximo Draper Jr., MD     Intubation:     Induction:  Intravenous    Intubated:  Postinduction    Mask Ventilation:  Easy mask    Attempts:  1    Attempted By:  CRNA    Difficult Airway Encountered?: No      Complications:  None    Airway Device:  Supraglottic airway/LMA    Airway Device Size:  5.0    Style/Cuff Inflation:  Cuffed    Secured at:  The lips    Placement Verified By:  Capnometry    Complicating Factors:  None    Findings Post-Intubation:  BS equal bilateral and atraumatic/condition of teeth unchanged

## 2020-08-05 NOTE — DISCHARGE INSTRUCTIONS
Cystoscopy    Cystoscopy is a procedure that lets your doctor look directly inside your urethra and bladder. It can be used to:  · Help diagnose a problem with your urethra, bladder, or kidneys.  · Take a sample (biopsy) of bladder or urethral tissue.  · Treat certain problems (such as removing kidney stones).  · Place a stent to bypass an obstruction.  · Take special X-rays of the kidneys.  Based on the findings, your doctor may recommend other tests or treatments.  What is a cystoscope?  A cystoscope is a telescope-like instrument that contains lenses and fiberoptics (small glass wires that make bright light). The cystoscope may be straight and rigid, or flexible to bend around curves in the urethra. The doctor may look directly into the cystoscope, or project the image onto a monitor.  Getting ready  · Ask your doctor if you should stop taking any medicines before the procedure.  · Ask whether you should avoid eating or drinking anything after midnight before the procedure.  · Follow any other instructions your doctor gives you.  Tell your doctor before the exam if you:  · Take any medicines, such as aspirin or blood thinners  · Have allergies to any medicines  · Are pregnant   The procedure  Cystoscopy is done in the doctors office, surgery center, or hospital. The doctor and a nurse are present during the procedure. It takes only a few minutes, longer if a biopsy, X-ray, or treatment needs to be done.  During the procedure:  · You lie on an exam table on your back, knees bent and legs apart. You are covered with a drape.  · Your urethra and the area around it are washed. Anesthetic jelly may be applied to numb the urethra. Other pain medicine is usually not needed. In some cases, you may be offered a mild sedative to help you relax. If a more extensive procedure is to be done, such as a biopsy or kidney stone removal, general anesthesia may be needed.  · The cystoscope is inserted. A sterile fluid is put  into the bladder to expand it. You may feel pressure from this fluid.  · When the procedure is done, the cystoscope is removed.  After the procedure  If you had a sedative, general anesthesia, or spinal anesthesia, you must have someone drive you home. Once youre home:  · Drink plenty of fluids.  · You may have burning or light bleeding when you urinate--this is normal.  · Medicines may be prescribed to ease any discomfort or prevent infection. Take these as directed.  · Call your doctor if you have heavy bleeding or blood clots, burning that lasts more than a day, a fever over 100°F  (38° C), or trouble urinating.  Date Last Reviewed: 1/1/2017  © 4948-0976 [employer]. [address] [place] [address], [place] [address]. All rights reserved. This information is not intended as a substitute for professional medical care. Always follow your healthcare professional's instructions.

## 2020-08-05 NOTE — PLAN OF CARE
Patient tolerated anesthesia and procedure. Discharge materials given and explained to patient and spouse. Both verbalized understanding. Patient's blood pressure was 177/109 at time of discharge. Dr. Draper notified ([id number]). He verbalized to have the patient take his home blood pressure medicine upon arrival home. Patient verbalized understanding. Prescriptions delivered to bedside. Patient taken to ride via wheelchair in stable condition with no complaints.

## 2020-08-05 NOTE — TRANSFER OF CARE
"Anesthesia Transfer of Care Note    Patient: Celso Hernandez    Procedure(s) Performed: Procedure(s) (LRB):  CYSTOSCOPY (N/A)  DILATION, URETHRA (N/A)    Patient location: PACU    Anesthesia Type: general    Transport from OR: Transported from OR on 6-10 L/min O2 by face mask with adequate spontaneous ventilation    Post pain: adequate analgesia    Post assessment: no apparent anesthetic complications and tolerated procedure well    Post vital signs: stable    Level of consciousness: awake and responds to stimulation    Nausea/Vomiting: no nausea/vomiting    Complications: none    Transfer of care protocol was followed      Last vitals:   Visit Vitals  BP (!) 147/79   Pulse 80   Temp 36.9 °C (98.5 °F) (Temporal)   Resp 17   Ht 5' 9" (1.753 m)   Wt 115.7 kg (255 lb)   SpO2 100%   BMI 37.66 kg/m²     "

## 2020-08-05 NOTE — ANESTHESIA PREPROCEDURE EVALUATION
08/05/2020  Celso Hernandez is a 65 y.o., male.    Anesthesia Evaluation    I have reviewed the Patient Summary Reports.     I have reviewed the Nursing Notes.       Review of Systems  Anesthesia Hx:  No problems with previous Anesthesia    Hematology/Oncology:  Hematology Normal   Oncology Normal     EENT/Dental:EENT/Dental Normal   Cardiovascular:   Hypertension    Pulmonary:  Pulmonary Normal    Renal/:  Renal/ Normal     Hepatic/GI:  Hepatic/GI Normal    Musculoskeletal:  Musculoskeletal Normal    Neurological:  Neurology Normal    Endocrine:   Diabetes    Dermatological:  Skin Normal    Psych:  Psychiatric Normal           Physical Exam  General:  Well nourished    Airway/Jaw/Neck:  Airway Findings: Mouth Opening: Normal Tongue: Normal  General Airway Assessment: Adult  Mallampati: II  TM Distance: Normal, at least 6 cm        Eyes/Ears/Nose:  EYES/EARS/NOSE FINDINGS: Normal   Dental:  Dental Findings: In tact   Chest/Lungs:  Chest/Lungs Clear    Heart/Vascular:  Heart Findings: Normal Heart murmur: negative Vascular Findings: Normal    Abdomen:  Abdomen Findings: Normal    Musculoskeletal:  Musculoskeletal Findings: Normal   Skin:  Skin Findings: Normal    Mental Status:  Mental Status Findings: Normal        Anesthesia Plan  Type of Anesthesia, risks & benefits discussed:  Anesthesia Type:  general  Patient's Preference:   Intra-op Monitoring Plan:   Intra-op Monitoring Plan Comments:   Post Op Pain Control Plan:   Post Op Pain Control Plan Comments:   Induction:   IV  Beta Blocker:  Patient is not currently on a Beta-Blocker (No further documentation required).       Informed Consent: Patient understands risks and agrees with Anesthesia plan.  Questions answered. Anesthesia consent signed with patient.  ASA Score: 2     Day of Surgery Review of History & Physical:    H&P update referred to the  surgeon.         Ready For Surgery From Anesthesia Perspective.

## 2020-08-05 NOTE — DISCHARGE SUMMARY
[facility]  Discharge Note  Short Stay    Admit Date: 8/5/2020    Discharge Date and Time: 08/05/2020 12:16 PM      Attending Physician: Arcadio Vaughn MD     Discharge Provider: Annette Ojeda    Diagnoses:  Active Hospital Problems    Diagnosis  POA    *Urethral stricture [N35.919]  Yes      Resolved Hospital Problems   No resolved problems to display.       Discharged Condition: good    Hospital Course: Patient was admitted for urethral dilation and tolerated the procedure well with no complications. The patient was discharged home in good condition on the same day.       Final Diagnoses: Same as principal problem.    Disposition: Home or Self Care    Follow up/Patient Instructions:    Medications:  Reconciled Home Medications:   Current Discharge Medication List      START taking these medications    Details   betamethasone dipropionate (DIPROLENE) 0.05 % ointment Apply topically 2 (two) times daily. Apply to tip of penis for 14 days  Qty: 15 g, Refills: 1      traMADoL (ULTRAM) 50 mg tablet Take 1 tablet (50 mg total) by mouth every 6 (six) hours as needed for Pain.  Qty: 5 tablet, Refills: 0    Comments: n/a          CONTINUE these medications which have NOT CHANGED    Details   atorvastatin (LIPITOR) 80 MG tablet TAKE 1 TABLET BY MOUTH ONCE DAILY  Qty: 90 tablet, Refills: 3      cholecalciferol, vitamin D3, (VITAMIN D3) 2,000 unit Cap Take 1 capsule by mouth every morning.       finasteride (PROSCAR) 5 mg tablet Take 1 tablet (5 mg total) by mouth once daily.  Qty: 30 tablet, Refills: 11    Associated Diagnoses: BPH with obstruction/lower urinary tract symptoms      hydroCHLOROthiazide (HYDRODIURIL) 12.5 MG Tab Take 1 tablet (12.5 mg total) by mouth once daily.  Qty: 90 tablet, Refills: 3      ketoconazole (NIZORAL) 2 % cream Apply topically once daily. Apply to foot nightly.  Qty: 30 g, Refills: 1      latanoprost 0.005 % ophthalmic solution Place 1 drop into both eyes every evening.  Qty:  2.5 mL, Refills: 11    Associated Diagnoses: Primary open angle glaucoma (POAG) of both eyes, moderate stage      levoFLOXacin (LEVAQUIN) 500 MG tablet Take 1 tablet (500 mg total) by mouth once daily. for 10 days  Qty: 10 tablet, Refills: prn    Associated Diagnoses: Dysuria      losartan (COZAAR) 100 MG tablet Take 1 tablet (100 mg total) by mouth once daily.  Qty: 90 tablet, Refills: 3      meloxicam (MOBIC) 15 MG tablet Take 1 tablet by mouth once daily  Qty: 30 tablet, Refills: 4      metFORMIN (GLUCOPHAGE) 500 MG tablet Take 1 tablet (500 mg total) by mouth daily with breakfast.  Qty: 90 tablet, Refills: 3      oxybutynin (DITROPAN-XL) 10 MG 24 hr tablet Take 1 tablet (10 mg total) by mouth once daily.  Qty: 30 tablet, Refills: 11    Associated Diagnoses: OAB (overactive bladder)      pantoprazole (PROTONIX) 40 MG tablet Take 40 mg by mouth every morning.       tamsulosin (FLOMAX) 0.4 mg Cap Take 2 capsules (0.8 mg total) by mouth once daily.  Qty: 180 capsule, Refills: 3    Associated Diagnoses: BPH with obstruction/lower urinary tract symptoms      terbinafine HCL (LAMISIL AT) 1 % cream Apply topically 2 (two) times daily.  Qty: 30 g, Refills: 1      timolol maleate 0.5% (TIMOPTIC) 0.5 % Drop Place 1 drop into both eyes 2 (two) times daily.  Qty: 10 mL, Refills: 11    Associated Diagnoses: Primary open angle glaucoma (POAG) of both eyes, moderate stage           Discharge Procedure Orders   Diet Adult Regular     Notify your health care provider if you experience any of the following:  temperature >100.4     Notify your health care provider if you experience any of the following:  persistent nausea and vomiting or diarrhea     Notify your health care provider if you experience any of the following:  severe uncontrolled pain     Notify your health care provider if you experience any of the following:  redness, tenderness, or signs of infection (pain, swelling, redness, odor or green/yellow discharge around  incision site)     No dressing needed     Activity as tolerated     Follow-up Information     Arcadio Vaughn MD In 3 months.    Specialty: Urology  Why: post op  Contact information:  [address]  [facility] [id number] [phone]                   Discharge Procedure Orders (must include Diet, Follow-up, Activity):   Discharge Procedure Orders (must include Diet, Follow-up, Activity)   Diet Adult Regular     Notify your health care provider if you experience any of the following:  temperature >100.4     Notify your health care provider if you experience any of the following:  persistent nausea and vomiting or diarrhea     Notify your health care provider if you experience any of the following:  severe uncontrolled pain     Notify your health care provider if you experience any of the following:  redness, tenderness, or signs of infection (pain, swelling, redness, odor or green/yellow discharge around incision site)     No dressing needed     Activity as tolerated

## 2020-08-26 ENCOUNTER — TELEPHONE (OUTPATIENT)
Dept: OPTOMETRY | Facility: CLINIC | Age: 65
End: 2020-08-26

## 2020-08-26 NOTE — TELEPHONE ENCOUNTER
Pt needs IOP check . Scheduled 09/16 @ 3pm . Will scheduled Routine and VF in NOV. Pt verbalized understanding by saying ok and thank you .

## 2020-08-28 ENCOUNTER — OFFICE VISIT (OUTPATIENT)
Dept: UROLOGY | Facility: CLINIC | Age: 65
End: 2020-08-28
Payer: MEDICARE

## 2020-08-28 VITALS
SYSTOLIC BLOOD PRESSURE: 124 MMHG | BODY MASS INDEX: 39.42 KG/M2 | WEIGHT: 260.13 LBS | DIASTOLIC BLOOD PRESSURE: 85 MMHG | HEART RATE: 73 BPM | HEIGHT: 68 IN

## 2020-08-28 DIAGNOSIS — Z90.79 S/P TURP: ICD-10-CM

## 2020-08-28 DIAGNOSIS — N30.00 ACUTE CYSTITIS WITHOUT HEMATURIA: Primary | ICD-10-CM

## 2020-08-28 DIAGNOSIS — N99.110 POSTPROCEDURAL MALE URETHRAL MEATAL STRICTURE: ICD-10-CM

## 2020-08-28 PROBLEM — R33.9 URINARY RETENTION: Status: RESOLVED | Noted: 2020-01-29 | Resolved: 2020-08-28

## 2020-08-28 PROBLEM — N32.81 OAB (OVERACTIVE BLADDER): Status: RESOLVED | Noted: 2020-06-23 | Resolved: 2020-08-28

## 2020-08-28 PROCEDURE — 1126F PR PAIN SEVERITY QUANTIFIED, NO PAIN PRESENT: ICD-10-PCS | Mod: S$GLB,,, | Performed by: UROLOGY

## 2020-08-28 PROCEDURE — 3008F BODY MASS INDEX DOCD: CPT | Mod: CPTII,S$GLB,, | Performed by: UROLOGY

## 2020-08-28 PROCEDURE — 99999 PR PBB SHADOW E&M-EST. PATIENT-LVL III: CPT | Mod: PBBFAC,,, | Performed by: UROLOGY

## 2020-08-28 PROCEDURE — 53600 PR DIL URETHRA STRIC,MALE,INITIAL: ICD-10-PCS | Mod: S$GLB,,, | Performed by: UROLOGY

## 2020-08-28 PROCEDURE — 99999 PR PBB SHADOW E&M-EST. PATIENT-LVL III: ICD-10-PCS | Mod: PBBFAC,,, | Performed by: UROLOGY

## 2020-08-28 PROCEDURE — 3079F PR MOST RECENT DIASTOLIC BLOOD PRESSURE 80-89 MM HG: ICD-10-PCS | Mod: CPTII,S$GLB,, | Performed by: UROLOGY

## 2020-08-28 PROCEDURE — 1159F PR MEDICATION LIST DOCUMENTED IN MEDICAL RECORD: ICD-10-PCS | Mod: S$GLB,,, | Performed by: UROLOGY

## 2020-08-28 PROCEDURE — 3008F PR BODY MASS INDEX (BMI) DOCUMENTED: ICD-10-PCS | Mod: CPTII,S$GLB,, | Performed by: UROLOGY

## 2020-08-28 PROCEDURE — 1159F MED LIST DOCD IN RCRD: CPT | Mod: S$GLB,,, | Performed by: UROLOGY

## 2020-08-28 PROCEDURE — 3079F DIAST BP 80-89 MM HG: CPT | Mod: CPTII,S$GLB,, | Performed by: UROLOGY

## 2020-08-28 PROCEDURE — 87086 URINE CULTURE/COLONY COUNT: CPT

## 2020-08-28 PROCEDURE — 53600 DILATE URETHRA STRICTURE: CPT | Mod: S$GLB,,, | Performed by: UROLOGY

## 2020-08-28 PROCEDURE — 99213 OFFICE O/P EST LOW 20 MIN: CPT | Mod: 25,S$GLB,, | Performed by: UROLOGY

## 2020-08-28 PROCEDURE — 99213 PR OFFICE/OUTPT VISIT, EST, LEVL III, 20-29 MIN: ICD-10-PCS | Mod: 25,S$GLB,, | Performed by: UROLOGY

## 2020-08-28 PROCEDURE — 1126F AMNT PAIN NOTED NONE PRSNT: CPT | Mod: S$GLB,,, | Performed by: UROLOGY

## 2020-08-28 PROCEDURE — 1101F PT FALLS ASSESS-DOCD LE1/YR: CPT | Mod: CPTII,S$GLB,, | Performed by: UROLOGY

## 2020-08-28 PROCEDURE — 3074F SYST BP LT 130 MM HG: CPT | Mod: CPTII,S$GLB,, | Performed by: UROLOGY

## 2020-08-28 PROCEDURE — 1101F PR PT FALLS ASSESS DOC 0-1 FALLS W/OUT INJ PAST YR: ICD-10-PCS | Mod: CPTII,S$GLB,, | Performed by: UROLOGY

## 2020-08-28 PROCEDURE — 87088 URINE BACTERIA CULTURE: CPT

## 2020-08-28 PROCEDURE — 87077 CULTURE AEROBIC IDENTIFY: CPT

## 2020-08-28 PROCEDURE — 3074F PR MOST RECENT SYSTOLIC BLOOD PRESSURE < 130 MM HG: ICD-10-PCS | Mod: CPTII,S$GLB,, | Performed by: UROLOGY

## 2020-08-28 PROCEDURE — 87186 SC STD MICRODIL/AGAR DIL: CPT

## 2020-08-28 RX ORDER — SULFAMETHOXAZOLE AND TRIMETHOPRIM 800; 160 MG/1; MG/1
1 TABLET ORAL 2 TIMES DAILY
Qty: 14 TABLET | Refills: 0 | Status: SHIPPED | OUTPATIENT
Start: 2020-08-28 | End: 2020-09-04

## 2020-08-28 RX ORDER — BETAMETHASONE DIPROPIONATE 0.5 MG/G
OINTMENT TOPICAL DAILY
Qty: 15 G | Refills: 1 | Status: SHIPPED | OUTPATIENT
Start: 2020-08-28 | End: 2021-04-26 | Stop reason: ALTCHOICE

## 2020-08-28 NOTE — PROGRESS NOTES
CHIEF COMPLAINT:    Mr. Hernandez is a 65 y.o. male presenting for urethral swelling, dysuria and bladder pain with urination following TURP.  PRESENTING ILLNESS:    Celso Hernnadez is a 65 y.o. male with a PMH of HTN, BPH, retention, DM   Had TURP on 5/27/20.   Did well initially and then developed dysuria  Underwent cysto on 8/5 and found the very urethral stricture at the fossa navicularis.  It was dilated up to 26 F in size.  He did well for the first couple of week then he began experiencing spraying of the urine.  C/o foul smell urine as well.     Procedure(s) Performed: 5/27/20  TURP  Specimen(s): prostate chips  Pathology:   18 G OF PROSTATE CHIPS:  PROSTATIC HYPERPLASIA   Findings:   Trilobar prostatic enlargement with significant intravesical median lobe  Wide open channel at conclusion of procedure    Procedure(s) Performed: 8/5/20  1.  Cystoscopy   2.  Urethral dilation using Glover sounds   Findings:   Fossa navicularis stricture present - dilated from 12 F to 26 Fr using Glover sounds  Bladder neck open with healing prostatic fossa     He works as a building maintenance.      PATIENT HISTORY:    Past Medical History:   Diagnosis Date    Colon polyps     Diabetes mellitus type II     Hyperlipidemia     Hypertension     Multiple duodenal ulcers 10/08/2019    Unspecified hemorrhoids without mention of complication        Past Surgical History:   Procedure Laterality Date    COLONOSCOPY N/A 10/8/2019    Procedure: COLONOSCOPY suprep;  Surgeon: Landon Guajardo MD;  Location: [facility];  Service: General;  Laterality: N/A;    COLONOSCOPY W/ POLYPECTOMY  10/08/2019    CYSTOSCOPY N/A 8/5/2020    Procedure: CYSTOSCOPY;  Surgeon: Arcadio Vaughn MD;  Location: [address];  Service: Urology;  Laterality: N/A;    DILATION OF URETHRA N/A 8/5/2020    Procedure: DILATION, URETHRA;  Surgeon: Arcadio Vaughn MD;  [facility] OR [address] [place] [address];  Service: Urology;  Laterality: N/A;    ESOPHAGOGASTRODUODENOSCOPY   10/08/2019    w/biopsies    ESOPHAGOGASTRODUODENOSCOPY N/A 10/8/2019    Procedure: EGD (ESOPHAGOGASTRODUODENOSCOPY);  Surgeon: Landon Guajardo MD;  Location[facility];  Service: General;  Laterality: N/A;    none      TRANSURETHRAL RESECTION OF PROSTATE N/A 5/27/2020    Procedure: TURP (TRANSURETHRAL RESECTION OF PROSTATE) bipolar;  Surgeon: Arcadio Vaughn MD;  Location: [address];  Service: Urology;  Laterality: N/A;  2hr       Family History   Problem Relation Age of Onset    Hyperlipidemia Mother     Cancer Sister         breast    Hearing loss Sister        Social History     Socioeconomic History    Marital status:      Spouse name: Not on file    Number of children: Not on file    Years of education: Not on file    Highest education level: Not on file   Occupational History    Not on file   Social Needs    Financial resource strain: Not on file    Food insecurity     Worry: Not on file     Inability: Not on file    Transportation needs     Medical: Not on file     Non-medical: Not on file   Tobacco Use    Smoking status: Never Smoker    Smokeless tobacco: Never Used   Substance and Sexual Activity    Alcohol use: Yes     Alcohol/week: 3.3 standard drinks     Types: 4 Standard drinks or equivalent per week     Comment: socially    Drug use: No    Sexual activity: Not on file   Lifestyle    Physical activity     Days per week: Not on file     Minutes per session: Not on file    Stress: Not on file   Relationships    Social connections     Talks on phone: Not on file     Gets together: Not on file     Attends Sikhism service: Not on file     Active member of club or organization: Not on file     Attends meetings of clubs or organizations: Not on file     Relationship status: Not on file   Other Topics Concern    Not on file   Social History Narrative    Not on file       Allergies:  Patient has no known allergies.    Medications:    Current Outpatient Medications:      atorvastatin (LIPITOR) 80 MG tablet, TAKE 1 TABLET BY MOUTH ONCE DAILY (Patient taking differently: Take 80 mg by mouth every morning. ), Disp: 90 tablet, Rfl: 3    betamethasone dipropionate (DIPROLENE) 0.05 % ointment, Apply topically once daily. Apply topically once a day at the urethral meatus prior to self urethral dialtion, Disp: 15 g, Rfl: 1    cholecalciferol, vitamin D3, (VITAMIN D3) 2,000 unit Cap, Take 1 capsule by mouth every morning. , Disp: , Rfl:     finasteride (PROSCAR) 5 mg tablet, Take 1 tablet (5 mg total) by mouth once daily. (Patient taking differently: Take 5 mg by mouth every morning. ), Disp: 30 tablet, Rfl: 11    hydroCHLOROthiazide (HYDRODIURIL) 12.5 MG Tab, Take 1 tablet (12.5 mg total) by mouth once daily. (Patient taking differently: Take 12.5 mg by mouth every morning. ), Disp: 90 tablet, Rfl: 3    ketoconazole (NIZORAL) 2 % cream, Apply topically once daily. Apply to foot nightly., Disp: 30 g, Rfl: 1    latanoprost 0.005 % ophthalmic solution, Place 1 drop into both eyes every evening., Disp: 2.5 mL, Rfl: 11    losartan (COZAAR) 100 MG tablet, Take 1 tablet (100 mg total) by mouth once daily. (Patient taking differently: Take 100 mg by mouth every morning. ), Disp: 90 tablet, Rfl: 3    meloxicam (MOBIC) 15 MG tablet, Take 1 tablet by mouth once daily (Patient taking differently: Take by mouth every morning. ), Disp: 30 tablet, Rfl: 4    metFORMIN (GLUCOPHAGE) 500 MG tablet, Take 1 tablet (500 mg total) by mouth daily with breakfast., Disp: 90 tablet, Rfl: 3    oxybutynin (DITROPAN-XL) 10 MG 24 hr tablet, Take 1 tablet (10 mg total) by mouth once daily. (Patient taking differently: Take 10 mg by mouth every morning. ), Disp: 30 tablet, Rfl: 11    pantoprazole (PROTONIX) 40 MG tablet, Take 40 mg by mouth every morning. , Disp: , Rfl:     sulfamethoxazole-trimethoprim 800-160mg (BACTRIM DS) 800-160 mg Tab, Take 1 tablet by mouth 2 (two) times daily. for 7 days, Disp: 14  tablet, Rfl: 0    tamsulosin (FLOMAX) 0.4 mg Cap, Take 2 capsules (0.8 mg total) by mouth once daily. (Patient taking differently: Take 0.8 mg by mouth every morning. ), Disp: 180 capsule, Rfl: 3    terbinafine HCL (LAMISIL AT) 1 % cream, Apply topically 2 (two) times daily., Disp: 30 g, Rfl: 1    timolol maleate 0.5% (TIMOPTIC) 0.5 % Drop, Place 1 drop into both eyes 2 (two) times daily., Disp: 10 mL, Rfl: 11    traMADoL (ULTRAM) 50 mg tablet, Take 1 tablet (50 mg total) by mouth every 6 (six) hours as needed for Pain., Disp: 5 tablet, Rfl: 0    PHYSICAL EXAMINATION:    Constitutional: He appears well-developed and well-nourished.  He is in no apparent distress.    Eyes: No scleral icterus noted bilaterally. No discharge bilaterally.    Cardiovascular: Normal rate.      Pulmonary/Chest: Effort normal. No respiratory distress.     Abdominal:  He exhibits no distension.     Neurological: He is alert and oriented to person, place, and time.     Skin: Skin is warm and dry.     Psych: Cooperative with normal affect.    Genitourinary: The penis is uncircumcised with evidence of paraphimosis.     Physical Exam      LABS:    Lab Results   Component Value Date    PSA 3.4 09/14/2019    PSA 1.9 05/14/2016    PSA 2.5 09/03/2015    PSA 1.6 03/01/2014    PSA 1.7 10/08/2011    PSA 1.7 11/06/2010    PSA 1.7 08/29/2009    PSA 1.2 12/11/2007    PSA 1.1 05/19/2006    PSA 1.2 02/17/2004    PSADIAG 2.8 11/16/2019    PSADIAG 2.2 11/02/2018    PSADIAG 2.5 11/18/2017     UA positve protein and leukocytes    Procedure  Urethral meatus was clean and prepped.  After injection Urojet into the urethra, the urethral meatus was dilated from 14 F to 22 F using a female sounds.  Pt tolerated the procedure well.  Pt was instructed to dilate the meatus using either the Urojet tip or 18 F catheter daily.  Use betamethasone ointment at the tip prior to dilation.  Recommend to take Vit E 400 units daily.      IMPRESSION:  Acute cystitis without  hematuria  -     Urine culture  -     sulfamethoxazole-trimethoprim 800-160mg (BACTRIM DS) 800-160 mg Tab; Take 1 tablet by mouth 2 (two) times daily. for 7 days  Dispense: 14 tablet; Refill: 0    Postprocedural male urethral meatal stricture  -     betamethasone dipropionate (DIPROLENE) 0.05 % ointment; Apply topically once daily. Apply topically once a day at the urethral meatus prior to self urethral dialtion  Dispense: 15 g; Refill: 1    S/P TURP      PLAN:  Daily meatal dilation instructed.  Will cover him with abx.  contiue finasteride and flomax until I see him again in 3 months.      Follow up in about 3 months (around 11/28/2020).

## 2020-08-31 ENCOUNTER — TELEPHONE (OUTPATIENT)
Dept: UROLOGY | Facility: CLINIC | Age: 65
End: 2020-08-31

## 2020-08-31 DIAGNOSIS — N30.00 ACUTE CYSTITIS WITHOUT HEMATURIA: Primary | ICD-10-CM

## 2020-08-31 LAB — BACTERIA UR CULT: ABNORMAL

## 2020-08-31 RX ORDER — SULFAMETHOXAZOLE AND TRIMETHOPRIM 800; 160 MG/1; MG/1
1 TABLET ORAL 2 TIMES DAILY
Qty: 14 TABLET | Refills: 0 | Status: SHIPPED | OUTPATIENT
Start: 2020-08-31 | End: 2020-09-07

## 2020-08-31 NOTE — TELEPHONE ENCOUNTER
Acute cystitis without hematuria  -     sulfamethoxazole-trimethoprim 800-160mg (BACTRIM DS) 800-160 mg Tab; Take 1 tablet by mouth 2 (two) times daily. for 7 days  Dispense: 14 tablet; Refill: 0    eRxed to the pharmacy.  Please call and advise the patient to take the medication as given.

## 2020-09-15 ENCOUNTER — PATIENT OUTREACH (OUTPATIENT)
Dept: ADMINISTRATIVE | Facility: OTHER | Age: 65
End: 2020-09-15

## 2020-09-16 ENCOUNTER — OFFICE VISIT (OUTPATIENT)
Dept: OPTOMETRY | Facility: CLINIC | Age: 65
End: 2020-09-16
Payer: MEDICARE

## 2020-09-16 ENCOUNTER — TELEPHONE (OUTPATIENT)
Dept: DERMATOLOGY | Facility: CLINIC | Age: 65
End: 2020-09-16

## 2020-09-16 DIAGNOSIS — H40.1132 PRIMARY OPEN ANGLE GLAUCOMA (POAG) OF BOTH EYES, MODERATE STAGE: Primary | ICD-10-CM

## 2020-09-16 PROCEDURE — 92012 INTRM OPH EXAM EST PATIENT: CPT | Mod: S$GLB,,, | Performed by: OPTOMETRIST

## 2020-09-16 PROCEDURE — 1101F PT FALLS ASSESS-DOCD LE1/YR: CPT | Mod: CPTII,S$GLB,, | Performed by: OPTOMETRIST

## 2020-09-16 PROCEDURE — 99999 PR PBB SHADOW E&M-EST. PATIENT-LVL III: CPT | Mod: PBBFAC,,, | Performed by: OPTOMETRIST

## 2020-09-16 PROCEDURE — 3288F PR FALLS RISK ASSESSMENT DOCUMENTED: ICD-10-PCS | Mod: CPTII,S$GLB,, | Performed by: OPTOMETRIST

## 2020-09-16 PROCEDURE — 3288F FALL RISK ASSESSMENT DOCD: CPT | Mod: CPTII,S$GLB,, | Performed by: OPTOMETRIST

## 2020-09-16 PROCEDURE — 99999 PR PBB SHADOW E&M-EST. PATIENT-LVL III: ICD-10-PCS | Mod: PBBFAC,,, | Performed by: OPTOMETRIST

## 2020-09-16 PROCEDURE — 1101F PR PT FALLS ASSESS DOC 0-1 FALLS W/OUT INJ PAST YR: ICD-10-PCS | Mod: CPTII,S$GLB,, | Performed by: OPTOMETRIST

## 2020-09-16 PROCEDURE — 92012 PR EYE EXAM, EST PATIENT,INTERMED: ICD-10-PCS | Mod: S$GLB,,, | Performed by: OPTOMETRIST

## 2020-09-16 PROCEDURE — 1126F AMNT PAIN NOTED NONE PRSNT: CPT | Mod: S$GLB,,, | Performed by: OPTOMETRIST

## 2020-09-16 PROCEDURE — 1126F PR PAIN SEVERITY QUANTIFIED, NO PAIN PRESENT: ICD-10-PCS | Mod: S$GLB,,, | Performed by: OPTOMETRIST

## 2020-09-16 RX ORDER — BETAMETHASONE DIPROPIONATE 0.5 MG/G
OINTMENT, AUGMENTED TOPICAL
COMMUNITY
Start: 2020-08-28 | End: 2021-04-26 | Stop reason: ALTCHOICE

## 2020-09-16 NOTE — TELEPHONE ENCOUNTER
Informed pt that Dr. Lozano only sees pt with skin cancer. Pt expressed verbal understanding and stated that he would find a Doctor himself.

## 2020-09-16 NOTE — PROGRESS NOTES
TAYLOR PARKER 12/2019 Diabetic  yesterday. patient is back late for a 6 month   follow up.  Using Timoptic 0.5% BID OU, Latanoprost OU Q HS  Lat used   Latanoprost this morning and Timoptic 2 days ago.   Patient states he is   non compliant and on average uses his drops 3 timess a week.  Educated   patient on vision loss due to glaucoma and stressed importance of   compliance. Patient had new glasses made and states with them on he sees   good but thinks vision without them seems worse than it used to be.      Hemoglobin A1C       Date                     Value               Ref Range             Status                04/21/2020               6.3 (H)             4.0 - 5.6 %           Final               01/29/2020               6.1 (H)             4.0 - 5.6 %           Final                 12/24/2019               6.4 (H)             4.0 - 5.6 %           Final                  Last edited by Estella Kebede on 9/16/2020  3:30 PM. (History)            Assessment /Plan     For exam results, see Encounter Report.    Primary open angle glaucoma (POAG) of both eyes, moderate stage  -     Blake Visual Field - OU - Extended - Both Eyes; Future  -     Posterior Segment OCT Optic Nerve- Both eyes; Future    (-) FHx. IOP 22 OD, 23 OS. Last IOP 17 OD, 21 OS. Pt has failed to f/u since 12/17/2019. Pt reports noncompliance w/drops. Pt has been taking latanoprost in the morning and hasn't taken Timolol in 2 days ago. Prev pt of Dr Huffman. C/d 0.55 OD, 0.65 OS.   12/17/19 HVF OD low reliability w/inf arcuate, OS sup arcuate. OCT OD thin NS, TS, T, TI and G, OS Thin TS, T, TI and G. Educated pt on findings w/understanding.   Need for IOp to be lower.  Resume Timolol BID OU and Latanoprost QHS OU. Importance of drop compliance and f/u discussed with pt. RTC 6 weeks iOP check.

## 2020-09-16 NOTE — TELEPHONE ENCOUNTER
----- Message from Zully Jameson sent at 9/16/2020  1:19 PM CDT -----  Regarding: pt advice  Pt is calling to schedule an appt with Dr. Lozano. Please give pt a call back at [phone] .

## 2020-09-17 ENCOUNTER — OFFICE VISIT (OUTPATIENT)
Dept: PODIATRY | Facility: CLINIC | Age: 65
End: 2020-09-17
Payer: MEDICARE

## 2020-09-17 VITALS
WEIGHT: 260.13 LBS | DIASTOLIC BLOOD PRESSURE: 91 MMHG | HEART RATE: 68 BPM | SYSTOLIC BLOOD PRESSURE: 167 MMHG | HEIGHT: 68 IN | BODY MASS INDEX: 39.42 KG/M2

## 2020-09-17 DIAGNOSIS — E11.9 TYPE 2 DIABETES MELLITUS WITHOUT COMPLICATION, WITHOUT LONG-TERM CURRENT USE OF INSULIN: Primary | ICD-10-CM

## 2020-09-17 DIAGNOSIS — B35.3 TINEA PEDIS, RIGHT: ICD-10-CM

## 2020-09-17 PROCEDURE — 3008F PR BODY MASS INDEX (BMI) DOCUMENTED: ICD-10-PCS | Mod: CPTII,S$GLB,, | Performed by: PODIATRIST

## 2020-09-17 PROCEDURE — 3080F PR MOST RECENT DIASTOLIC BLOOD PRESSURE >= 90 MM HG: ICD-10-PCS | Mod: CPTII,S$GLB,, | Performed by: PODIATRIST

## 2020-09-17 PROCEDURE — 99212 PR OFFICE/OUTPT VISIT, EST, LEVL II, 10-19 MIN: ICD-10-PCS | Mod: S$GLB,,, | Performed by: PODIATRIST

## 2020-09-17 PROCEDURE — 99499 UNLISTED E&M SERVICE: CPT | Mod: S$GLB,,, | Performed by: PODIATRIST

## 2020-09-17 PROCEDURE — 3044F HG A1C LEVEL LT 7.0%: CPT | Mod: CPTII,S$GLB,, | Performed by: PODIATRIST

## 2020-09-17 PROCEDURE — 99999 PR PBB SHADOW E&M-EST. PATIENT-LVL IV: ICD-10-PCS | Mod: PBBFAC,,, | Performed by: PODIATRIST

## 2020-09-17 PROCEDURE — 3077F SYST BP >= 140 MM HG: CPT | Mod: CPTII,S$GLB,, | Performed by: PODIATRIST

## 2020-09-17 PROCEDURE — 1101F PT FALLS ASSESS-DOCD LE1/YR: CPT | Mod: CPTII,S$GLB,, | Performed by: PODIATRIST

## 2020-09-17 PROCEDURE — 99999 PR PBB SHADOW E&M-EST. PATIENT-LVL IV: CPT | Mod: PBBFAC,,, | Performed by: PODIATRIST

## 2020-09-17 PROCEDURE — 3080F DIAST BP >= 90 MM HG: CPT | Mod: CPTII,S$GLB,, | Performed by: PODIATRIST

## 2020-09-17 PROCEDURE — 99499 RISK ADDL DX/OHS AUDIT: ICD-10-PCS | Mod: S$GLB,,, | Performed by: PODIATRIST

## 2020-09-17 PROCEDURE — 1159F MED LIST DOCD IN RCRD: CPT | Mod: S$GLB,,, | Performed by: PODIATRIST

## 2020-09-17 PROCEDURE — 1126F AMNT PAIN NOTED NONE PRSNT: CPT | Mod: S$GLB,,, | Performed by: PODIATRIST

## 2020-09-17 PROCEDURE — 1101F PR PT FALLS ASSESS DOC 0-1 FALLS W/OUT INJ PAST YR: ICD-10-PCS | Mod: CPTII,S$GLB,, | Performed by: PODIATRIST

## 2020-09-17 PROCEDURE — 3008F BODY MASS INDEX DOCD: CPT | Mod: CPTII,S$GLB,, | Performed by: PODIATRIST

## 2020-09-17 PROCEDURE — 1159F PR MEDICATION LIST DOCUMENTED IN MEDICAL RECORD: ICD-10-PCS | Mod: S$GLB,,, | Performed by: PODIATRIST

## 2020-09-17 PROCEDURE — 3044F PR MOST RECENT HEMOGLOBIN A1C LEVEL <7.0%: ICD-10-PCS | Mod: CPTII,S$GLB,, | Performed by: PODIATRIST

## 2020-09-17 PROCEDURE — 3077F PR MOST RECENT SYSTOLIC BLOOD PRESSURE >= 140 MM HG: ICD-10-PCS | Mod: CPTII,S$GLB,, | Performed by: PODIATRIST

## 2020-09-17 PROCEDURE — 1126F PR PAIN SEVERITY QUANTIFIED, NO PAIN PRESENT: ICD-10-PCS | Mod: S$GLB,,, | Performed by: PODIATRIST

## 2020-09-17 PROCEDURE — 99212 OFFICE O/P EST SF 10 MIN: CPT | Mod: S$GLB,,, | Performed by: PODIATRIST

## 2020-09-17 NOTE — PROGRESS NOTES
Subjective:      Patient ID: Celso Hernandez is a 65 y.o. male.    Chief Complaint: Diabetic Foot Exam    Celso is a 65 y.o. male who presents to the clinic upon referral from Dr. Fany coughlin. provider found  for evaluation and treatment of diabetic feet. Celso has a past medical history of Arthritis, Cataract, Colon polyps, Diabetes mellitus type II, Glaucoma, Hyperlipidemia, Hypertension, Multiple duodenal ulcers (10/08/2019), and Unspecified hemorrhoids without mention of complication. Patient relates no major problem with feet. Only complaints today consist of blisters a form to the right foot pointing to the plantar right arch and to the plantar right 3rd toe.  Denies any trauma.  Works in building maintenance.  No pain today.    06/05/2020:  Returns for foot check right foot.  He has been applying ketoconazole cream at night and using the [employer] foot powder during the day.  Relates that the previous blistering has stopped.  No new complaints.      09/17/2020:  Presents today concerned about a small bump that is recurring on the medial side of his right heel.  He has a history previous pustular tinea pedis which would resolved with ketoconazole cream.  Denies any pain.  Denies any trauma.    PCP: Inder Quarles MD    Date Last Seen by PCP:  04/20/2020    Current shoe gear: Flip-flops    Hemoglobin A1C   Date Value Ref Range Status   04/21/2020 6.3 (H) 4.0 - 5.6 % Final     Comment:     ADA Screening Guidelines:  5.7-6.4%  Consistent with prediabetes  >or=6.5%  Consistent with diabetes  High levels of fetal hemoglobin interfere with the HbA1C  assay. Heterozygous hemoglobin variants (HbS, HgC, etc)do  not significantly interfere with this assay.   However, presence of multiple variants may affect accuracy.     01/29/2020 6.1 (H) 4.0 - 5.6 % Final     Comment:     ADA Screening Guidelines:  5.7-6.4%  Consistent with prediabetes  >or=6.5%  Consistent with diabetes  High levels of fetal hemoglobin interfere with the  "HbA1C  assay. Heterozygous hemoglobin variants (HbS, HgC, etc)do  not significantly interfere with this assay.   However, presence of multiple variants may affect accuracy.     12/24/2019 6.4 (H) 4.0 - 5.6 % Final     Comment:     ADA Screening Guidelines:  5.7-6.4%  Consistent with prediabetes  >or=6.5%  Consistent with diabetes  High levels of fetal hemoglobin interfere with the HbA1C  assay. Heterozygous hemoglobin variants (HbS, HgC, etc)do  not significantly interfere with this assay.   However, presence of multiple variants may affect accuracy.       Vitals:    09/17/20 1355   BP: (!) 167/91   Pulse: 68   Weight: 118 kg (260 lb 2.3 oz)   Height: 5' 8" (1.727 m)   PainSc: 0-No pain      Past Medical History:   Diagnosis Date    Arthritis     Cataract     Colon polyps     Diabetes mellitus type II     Glaucoma     Hyperlipidemia     Hypertension     Multiple duodenal ulcers 10/08/2019    Unspecified hemorrhoids without mention of complication        Past Surgical History:   Procedure Laterality Date    COLONOSCOPY N/A 10/8/2019    Procedure: COLONOSCOPY suprep;  Surgeon: Landon Guajardo MD;  Location: [facility];  Service: General;  Laterality: N/A;    COLONOSCOPY W/ POLYPECTOMY  10/08/2019    CYSTOSCOPY N/A 8/5/2020    Procedure: CYSTOSCOPY;  Surgeon: Arcadio Vaughn MD;  Location: [facility] OR [address] [place] [address];  Service: Urology;  Laterality: N/A;    DILATION OF URETHRA N/A 8/5/2020    Procedure: DILATION, URETHRA;  Surgeon: Arcadio Vaughn MD;  Location: [facility] OR [address] [place] [address];  Service: Urology;  Laterality: N/A;    ESOPHAGOGASTRODUODENOSCOPY  10/08/2019    w/biopsies    ESOPHAGOGASTRODUODENOSCOPY N/A 10/8/2019    Procedure: EGD (ESOPHAGOGASTRODUODENOSCOPY);  Surgeon: Landon Guajardo MD;  Location: [facility];  Service: General;  Laterality: N/A;    none      TRANSURETHRAL RESECTION OF PROSTATE N/A 5/27/2020    Procedure: TURP (TRANSURETHRAL RESECTION OF PROSTATE) bipolar;  Surgeon: Arcadio Vaughn MD;  Location: " [facility] OR 1ST FLR;  Service: Urology;  Laterality: N/A;  2hr       Family History   Problem Relation Age of Onset    Hyperlipidemia Mother     Cancer Sister         breast    Hearing loss Sister        Social History     Socioeconomic History    Marital status:      Spouse name: Not on file    Number of children: Not on file    Years of education: Not on file    Highest education level: Not on file   Occupational History    Not on file   Social Needs    Financial resource strain: Not on file    Food insecurity     Worry: Not on file     Inability: Not on file    Transportation needs     Medical: Not on file     Non-medical: Not on file   Tobacco Use    Smoking status: Never Smoker    Smokeless tobacco: Never Used   Substance and Sexual Activity    Alcohol use: Yes     Alcohol/week: 3.3 standard drinks     Types: 4 Standard drinks or equivalent per week     Comment: socially    Drug use: No    Sexual activity: Not on file   Lifestyle    Physical activity     Days per week: Not on file     Minutes per session: Not on file    Stress: Not on file   Relationships    Social connections     Talks on phone: Not on file     Gets together: Not on file     Attends Yazdanism service: Not on file     Active member of club or organization: Not on file     Attends meetings of clubs or organizations: Not on file     Relationship status: Not on file   Other Topics Concern    Not on file   Social History Narrative    Not on file       Current Outpatient Medications   Medication Sig Dispense Refill    atorvastatin (LIPITOR) 80 MG tablet TAKE 1 TABLET BY MOUTH ONCE DAILY (Patient taking differently: Take 80 mg by mouth every morning. ) 90 tablet 3    augmented betamethasone dipropionate (DIPROLENE-AF) 0.05 % ointment APPLY OINTMENT TOPICALLY ONCE DAILY. APPLY TOPICALLY ONCE A DAY AT THE URETHRAL MEATUS PRIOR TO SELF URETHRAL DIALTION      betamethasone dipropionate (DIPROLENE) 0.05 % ointment Apply  topically once daily. Apply topically once a day at the urethral meatus prior to self urethral dialtion 15 g 1    cholecalciferol, vitamin D3, (VITAMIN D3) 2,000 unit Cap Take 1 capsule by mouth every morning.       finasteride (PROSCAR) 5 mg tablet Take 1 tablet (5 mg total) by mouth once daily. (Patient taking differently: Take 5 mg by mouth every morning. ) 30 tablet 11    hydroCHLOROthiazide (HYDRODIURIL) 12.5 MG Tab Take 1 tablet (12.5 mg total) by mouth once daily. (Patient taking differently: Take 12.5 mg by mouth every morning. ) 90 tablet 3    ketoconazole (NIZORAL) 2 % cream Apply topically once daily. Apply to foot nightly. 30 g 1    latanoprost 0.005 % ophthalmic solution Place 1 drop into both eyes every evening. 2.5 mL 11    losartan (COZAAR) 100 MG tablet Take 1 tablet (100 mg total) by mouth once daily. (Patient taking differently: Take 100 mg by mouth every morning. ) 90 tablet 3    meloxicam (MOBIC) 15 MG tablet Take 1 tablet by mouth once daily (Patient taking differently: Take by mouth every morning. ) 30 tablet 4    metFORMIN (GLUCOPHAGE) 500 MG tablet Take 1 tablet (500 mg total) by mouth daily with breakfast. 90 tablet 3    oxybutynin (DITROPAN-XL) 10 MG 24 hr tablet Take 1 tablet (10 mg total) by mouth once daily. (Patient taking differently: Take 10 mg by mouth every morning. ) 30 tablet 11    pantoprazole (PROTONIX) 40 MG tablet Take 40 mg by mouth every morning.       tamsulosin (FLOMAX) 0.4 mg Cap Take 2 capsules (0.8 mg total) by mouth once daily. (Patient taking differently: Take 0.8 mg by mouth every morning. ) 180 capsule 3    terbinafine HCL (LAMISIL AT) 1 % cream Apply topically 2 (two) times daily. 30 g 1    timolol maleate 0.5% (TIMOPTIC) 0.5 % Drop Place 1 drop into both eyes 2 (two) times daily. 10 mL 11    traMADoL (ULTRAM) 50 mg tablet Take 1 tablet (50 mg total) by mouth every 6 (six) hours as needed for Pain. 5 tablet 0     No current facility-administered  medications for this visit.        Review of patient's allergies indicates:  No Known Allergies        Review of Systems   Constitution: Negative for chills, fever and malaise/fatigue.   HENT: Negative for congestion and hearing loss.    Cardiovascular: Negative for chest pain, claudication and leg swelling.   Respiratory: Negative for cough and shortness of breath.    Skin: Positive for dry skin. Negative for itching.   Musculoskeletal: Negative for back pain, joint pain, muscle cramps and muscle weakness.   Gastrointestinal: Negative for nausea and vomiting.   Neurological: Negative for numbness, paresthesias and weakness.   Psychiatric/Behavioral: Negative for altered mental status.           Objective:      Physical Exam  Constitutional:       General: He is not in acute distress.     Appearance: He is well-developed. He is not diaphoretic.   Cardiovascular:      Pulses:           Dorsalis pedis pulses are 2+ on the right side and 2+ on the left side.        Posterior tibial pulses are 2+ on the right side and 2+ on the left side.      Comments: No lower extremity edema bilateral.  Skin temp is warm to foot bilateral.  Reduced digital hair growth bilateral.  Musculoskeletal:      Comments: Mild depression medial longitudinal arch with loading of the foot otherwise rectus appearing foot type bilateral.    No pain with ROM or MMT bilateral lower extremity.     Feet:      Right foot:      Protective Sensation: 10 sites tested. 10 sites sensed.      Skin integrity: Skin breakdown and dry skin present.      Left foot:      Protective Sensation: 10 sites tested. 10 sites sensed.      Skin integrity: No ulcer, blister, skin breakdown, erythema, warmth, callus or dry skin.   Skin:     General: Skin is warm and dry.      Capillary Refill: Capillary refill takes less than 2 seconds.      Coloration: Skin is not pale.      Findings: No ecchymosis or erythema.      Nails: There is no clubbing.        Comments: Small papular  suspect pustular eruption along medial right heel.  No localized erythema or edema.  No localized pain.  Skin is dry along the plantar foot bilateral.    Nails 1-5 bilateral foot are mildly thickened and elongated without significant discoloration.    Skin turgor elasticity appear within normal limits to lower extremity bilateral.   Neurological:      Mental Status: He is alert and oriented to person, place, and time.      Sensory: No sensory deficit.      Comments: Vibratory sensation intact bilateral foot.               Assessment:       Encounter Diagnoses   Name Primary?    Type 2 diabetes mellitus without complication, without long-term current use of insulin Yes    Tinea pedis, right          Plan:       Celso was seen today for diabetic foot exam.    Diagnoses and all orders for this visit:    Type 2 diabetes mellitus without complication, without long-term current use of insulin    Tinea pedis, right      I counseled the patient on his conditions, their implications and medical management.    Shoe inspection. Diabetic Foot Education. Patient reminded of the importance of good nutrition and blood sugar control to help prevent podiatric complications of diabetes. Patient instructed on proper foot hygeine. We discussed wearing proper shoe gear, daily foot inspections, never walking without protective shoe gear, never putting sharp instruments to feet.      We discussed using the ketoconazole to treat recurrence outbreaks of pustular tinea pedis.  We discussed appropriate foot hygiene in detail.  We did discuss the affective using flip-flops and causing dry skin/contact dermatitis to the feet.    So discussed that he may return as needed for procedure brief nail care if required since he is not meet medical criteria for routine foot care.    RTC 1 year p.r.n. as discussed.    A portion of this note was generated by voice recognition software and may contain topical graphical errors.

## 2020-09-24 ENCOUNTER — OFFICE VISIT (OUTPATIENT)
Dept: DERMATOLOGY | Facility: CLINIC | Age: 65
End: 2020-09-24
Payer: MEDICARE

## 2020-09-24 DIAGNOSIS — L82.1 DERMATOSIS PAPULOSA NIGRA: ICD-10-CM

## 2020-09-24 DIAGNOSIS — D48.5 NEOPLASM OF UNCERTAIN BEHAVIOR OF SKIN: Primary | ICD-10-CM

## 2020-09-24 PROCEDURE — 88305 TISSUE EXAM BY PATHOLOGIST: CPT | Performed by: PATHOLOGY

## 2020-09-24 PROCEDURE — 99202 OFFICE O/P NEW SF 15 MIN: CPT | Mod: 25,S$GLB,, | Performed by: DERMATOLOGY

## 2020-09-24 PROCEDURE — 11102 TANGNTL BX SKIN SINGLE LES: CPT | Mod: S$GLB,,, | Performed by: DERMATOLOGY

## 2020-09-24 PROCEDURE — 11102 PR TANGENTIAL BIOPSY, SKIN, SINGLE LESION: ICD-10-PCS | Mod: S$GLB,,, | Performed by: DERMATOLOGY

## 2020-09-24 PROCEDURE — 88305 TISSUE EXAM BY PATHOLOGIST: CPT | Mod: 26,,, | Performed by: PATHOLOGY

## 2020-09-24 PROCEDURE — 88305 TISSUE EXAM BY PATHOLOGIST: ICD-10-PCS | Mod: 26,,, | Performed by: PATHOLOGY

## 2020-09-24 PROCEDURE — 99202 PR OFFICE/OUTPT VISIT, NEW, LEVL II, 15-29 MIN: ICD-10-PCS | Mod: 25,S$GLB,, | Performed by: DERMATOLOGY

## 2020-09-24 PROCEDURE — 99999 PR PBB SHADOW E&M-EST. PATIENT-LVL III: CPT | Mod: PBBFAC,,, | Performed by: DERMATOLOGY

## 2020-09-24 PROCEDURE — 99999 PR PBB SHADOW E&M-EST. PATIENT-LVL III: ICD-10-PCS | Mod: PBBFAC,,, | Performed by: DERMATOLOGY

## 2020-09-24 NOTE — LETTER
September 24, 2020      Inder Quarles MD  [address] [facility] [place]  [place] [address]           [facility] - Dermatology [address]  [address]  [place] [address]  Phone: [phone]  Fax: [phone]          Patient: Celso Hernandez   MR Number: [id number]   YOB: 1955   Date of Visit: 9/24/2020       Dear Dr. Inder Quarles:    Thank you for referring Celso Hernandez to me for evaluation. Attached you will find relevant portions of my assessment and plan of care.    If you have questions, please do not hesitate to call me. I look forward to following Celso Hernandez along with you.    Sincerely,    Lesly Dupree MD    Enclosure  CC:  No Recipients    If you would like to receive this communication electronically, please contact [email or web address] or [phone] to request more information on [facility] [employer] access.    For providers and/or their staff who would like to refer a patient to [facility], please contact us through our one-stop-shop provider referral line, [facility], at [phone].    If you feel you have received this communication in error or would no longer like to receive these types of communications, please e-mail [email or web address]

## 2020-09-24 NOTE — PATIENT INSTRUCTIONS

## 2020-09-24 NOTE — PROGRESS NOTES
Subjective:       Patient ID:  Celso Hernandez is a 65 y.o. male who presents for   Chief Complaint   Patient presents with    Mole     mole on left cheek     History of Present Illness: The patient presents with chief complaint of mole.  Location: left cheek  Duration: months  Signs/Symptoms: dark/growing    Prior treatments: none    Mole        Review of Systems   Skin: Positive for activity-related sunscreen use. Negative for daily sunscreen use and recent sunburn.   Hematologic/Lymphatic: Does not bruise/bleed easily.        Objective:    Physical Exam   Constitutional: He appears well-developed and well-nourished. No distress.   Neurological: He is alert and oriented to person, place, and time. He is not disoriented.   Psychiatric: He has a normal mood and affect.   Skin:   Areas Examined (abnormalities noted in diagram):   Scalp / Hair Palpated and Inspected  Head / Face Inspection Performed              Diagram Legend     Erythematous scaling macule/papule c/w actinic keratosis       Vascular papule c/w angioma      Pigmented verrucoid papule/plaque c/w seborrheic keratosis      Yellow umbilicated papule c/w sebaceous hyperplasia      Irregularly shaped tan macule c/w lentigo     1-2 mm smooth white papules consistent with Milia      Movable subcutaneous cyst with punctum c/w epidermal inclusion cyst      Subcutaneous movable cyst c/w pilar cyst      Firm pink to brown papule c/w dermatofibroma      Pedunculated fleshy papule(s) c/w skin tag(s)      Evenly pigmented macule c/w junctional nevus     Mildly variegated pigmented, slightly irregular-bordered macule c/w mildly atypical nevus      Flesh colored to evenly pigmented papule c/w intradermal nevus       Pink pearly papule/plaque c/w basal cell carcinoma      Erythematous hyperkeratotic cursted plaque c/w SCC      Surgical scar with no sign of skin cancer recurrence      Open and closed comedones      Inflammatory papules and pustules      Verrucoid  papule consistent consistent with wart     Erythematous eczematous patches and plaques     Dystrophic onycholytic nail with subungual debris c/w onychomycosis     Umbilicated papule    Erythematous-base heme-crusted tan verrucoid plaque consistent with inflamed seborrheic keratosis     Erythematous Silvery Scaling Plaque c/w Psoriasis     See annotation                  Assessment / Plan:      Pathology Orders:     Normal Orders This Visit    Specimen to Pathology, Dermatology     Questions:    Procedure Type: Dermatology and skin neoplasms    Number of Specimens: 1    ------------------------: -------------------------    Spec 1 Procedure: Biopsy    Spec 1 Clinical Impression: r/o verrucous keratosis vs. vv    Spec 1 Source: left cheek        Neoplasm of uncertain behavior of skin  -     Specimen to Pathology, Dermatology  Shave biopsy procedure note:    Shave biopsy performed after verbal consent including risk of infection, scar, recurrence, need for additional treatment of site. Area prepped with alcohol, anesthetized with approximately 1.0cc of 1% lidocaine with epinephrine. Lesional tissue shaved with razor blade. Hemostasis achieved with application of aluminum chloride followed by hyfrecation. No complications. Dressing applied. Wound care explained.        Dermatosis papulosa nigra    These are benign inherited growths without a malignant potential. Reassurance given to patient. No treatment is necessary.            No follow-ups on file.

## 2020-09-24 NOTE — PROGRESS NOTES
Subjective:       Patient ID:  Celso Hernandez is a 65 y.o. male who presents for   Chief Complaint   Patient presents with    Mole     mole on left cheek     Mole - Initial  Affected locations: left cheek  Duration: 65 years  Signs / symptoms: tender, growing and itching  Severity: mild  Timing: constant  Aggravated by: nothing  Relieving factors/Treatments tried: nothing        Review of Systems   Skin: Negative for daily sunscreen use, activity-related sunscreen use, recent sunburn and wears hat.   Hematologic/Lymphatic: Does not bruise/bleed easily.        Objective:    Physical Exam   Constitutional: He appears well-developed and well-nourished.   Neurological: He is alert and oriented to person, place, and time.   Psychiatric: He has a normal mood and affect.          Diagram Legend     Erythematous scaling macule/papule c/w actinic keratosis       Vascular papule c/w angioma      Pigmented verrucoid papule/plaque c/w seborrheic keratosis      Yellow umbilicated papule c/w sebaceous hyperplasia      Irregularly shaped tan macule c/w lentigo     1-2 mm smooth white papules consistent with Milia      Movable subcutaneous cyst with punctum c/w epidermal inclusion cyst      Subcutaneous movable cyst c/w pilar cyst      Firm pink to brown papule c/w dermatofibroma      Pedunculated fleshy papule(s) c/w skin tag(s)      Evenly pigmented macule c/w junctional nevus     Mildly variegated pigmented, slightly irregular-bordered macule c/w mildly atypical nevus      Flesh colored to evenly pigmented papule c/w intradermal nevus       Pink pearly papule/plaque c/w basal cell carcinoma      Erythematous hyperkeratotic cursted plaque c/w SCC      Surgical scar with no sign of skin cancer recurrence      Open and closed comedones      Inflammatory papules and pustules      Verrucoid papule consistent consistent with wart     Erythematous eczematous patches and plaques     Dystrophic onycholytic nail with subungual debris  c/w onychomycosis     Umbilicated papule    Erythematous-base heme-crusted tan verrucoid plaque consistent with inflamed seborrheic keratosis     Erythematous Silvery Scaling Plaque c/w Psoriasis     See annotation      Assessment / Plan:        There are no diagnoses linked to this encounter.         No follow-ups on file.

## 2020-10-01 LAB
FINAL PATHOLOGIC DIAGNOSIS: NORMAL
GROSS: NORMAL
MICROSCOPIC EXAM: NORMAL

## 2020-10-22 ENCOUNTER — PATIENT OUTREACH (OUTPATIENT)
Dept: ADMINISTRATIVE | Facility: HOSPITAL | Age: 65
End: 2020-10-22

## 2020-10-22 DIAGNOSIS — E11.9 TYPE 2 DIABETES MELLITUS WITHOUT COMPLICATION, WITHOUT LONG-TERM CURRENT USE OF INSULIN: Primary | ICD-10-CM

## 2020-10-27 ENCOUNTER — PATIENT OUTREACH (OUTPATIENT)
Dept: ADMINISTRATIVE | Facility: OTHER | Age: 65
End: 2020-10-27

## 2020-10-27 NOTE — PROGRESS NOTES
Care Everywhere: updated  Immunization: updated(delay in links)  Health Maintenance: updated  Media Review:   Legacy Review:   Order placed:   Upcoming appts: hemoglobin 11/5

## 2020-10-28 ENCOUNTER — OFFICE VISIT (OUTPATIENT)
Dept: OPTOMETRY | Facility: CLINIC | Age: 65
End: 2020-10-28
Payer: MEDICARE

## 2020-10-28 DIAGNOSIS — H40.1132 PRIMARY OPEN ANGLE GLAUCOMA (POAG) OF BOTH EYES, MODERATE STAGE: Primary | ICD-10-CM

## 2020-10-28 PROCEDURE — 92012 PR EYE EXAM, EST PATIENT,INTERMED: ICD-10-PCS | Mod: S$GLB,,, | Performed by: OPTOMETRIST

## 2020-10-28 PROCEDURE — 99999 PR PBB SHADOW E&M-EST. PATIENT-LVL III: ICD-10-PCS | Mod: PBBFAC,,, | Performed by: OPTOMETRIST

## 2020-10-28 PROCEDURE — 92012 INTRM OPH EXAM EST PATIENT: CPT | Mod: S$GLB,,, | Performed by: OPTOMETRIST

## 2020-10-28 PROCEDURE — 99999 PR PBB SHADOW E&M-EST. PATIENT-LVL III: CPT | Mod: PBBFAC,,, | Performed by: OPTOMETRIST

## 2020-10-28 NOTE — PROGRESS NOTES
HPI     Pt is coming in for IOP check   Latanoprost QHS OU  Timolol BID 7/7 days pt is using the drops    Last edited by Kyara Burrell on 10/28/2020  8:50 AM. (History)            Assessment /Plan     For exam results, see Encounter Report.    Primary open angle glaucoma (POAG) of both eyes, moderate stage      (-) FHx. IOp 14 OD, 13 OS. Last IOP 22 OD, 23 OS. Ptfailed to f/u from 12/17/2019 to 9/16/2020. Pt reports noncompliance w/drops. Pt reports compliance w/eyedrops Prev pt of Dr Huffman. C/d 0.55 OD, 0.65 OS.   12/17/19 HVF OD low reliability w/inf arcuate, OS sup arcuate. OCT OD thin NS, TS, T, TI and G, OS Thin TS, T, TI and G. Educated pt on findings w/understanding.   Need for IOp to be lower.  Cont Timolol BID OU and Latanoprost QHS OU. Importance of drop compliance and f/u discussed with pt. RTC2 mo Routine/Oct/HvF

## 2020-10-30 ENCOUNTER — PATIENT MESSAGE (OUTPATIENT)
Dept: OPTOMETRY | Facility: CLINIC | Age: 65
End: 2020-10-30

## 2020-11-05 ENCOUNTER — LAB VISIT (OUTPATIENT)
Dept: LAB | Facility: HOSPITAL | Age: 65
End: 2020-11-05
Attending: INTERNAL MEDICINE
Payer: MEDICARE

## 2020-11-05 DIAGNOSIS — E11.9 TYPE 2 DIABETES MELLITUS WITHOUT COMPLICATION, WITHOUT LONG-TERM CURRENT USE OF INSULIN: ICD-10-CM

## 2020-11-05 LAB
ESTIMATED AVG GLUCOSE: 126 MG/DL (ref 68–131)
HBA1C MFR BLD HPLC: 6 % (ref 4–5.6)

## 2020-11-05 PROCEDURE — 83036 HEMOGLOBIN GLYCOSYLATED A1C: CPT

## 2020-11-05 PROCEDURE — 36415 COLL VENOUS BLD VENIPUNCTURE: CPT | Mod: PO

## 2020-11-18 ENCOUNTER — TELEPHONE (OUTPATIENT)
Dept: PODIATRY | Facility: CLINIC | Age: 65
End: 2020-11-18

## 2020-11-18 NOTE — TELEPHONE ENCOUNTER
Patient has a question about a bill he received, given information to contact billing dept.   Phone: [phone]  Toll Free: [phone]

## 2020-11-18 NOTE — TELEPHONE ENCOUNTER
----- Message from Akanksha Maldonado sent at 11/18/2020 10:55 AM CST -----  Patient would like to get a call back to discuss a previous appointment     Please call to discuss [phone]

## 2020-11-20 ENCOUNTER — OFFICE VISIT (OUTPATIENT)
Dept: UROLOGY | Facility: CLINIC | Age: 65
End: 2020-11-20
Payer: MEDICARE

## 2020-11-20 DIAGNOSIS — N13.8 BPH WITH OBSTRUCTION/LOWER URINARY TRACT SYMPTOMS: ICD-10-CM

## 2020-11-20 DIAGNOSIS — N40.1 BPH WITH OBSTRUCTION/LOWER URINARY TRACT SYMPTOMS: ICD-10-CM

## 2020-11-20 DIAGNOSIS — Z90.79 S/P TURP: Primary | ICD-10-CM

## 2020-11-20 PROCEDURE — 99442 PR PHYSICIAN TELEPHONE EVALUATION 11-20 MIN: ICD-10-PCS | Mod: 95,,, | Performed by: UROLOGY

## 2020-11-20 PROCEDURE — 99442 PR PHYSICIAN TELEPHONE EVALUATION 11-20 MIN: CPT | Mod: 95,,, | Performed by: UROLOGY

## 2020-11-20 PROCEDURE — 1159F MED LIST DOCD IN RCRD: CPT | Mod: 95,,, | Performed by: UROLOGY

## 2020-11-20 PROCEDURE — 1159F PR MEDICATION LIST DOCUMENTED IN MEDICAL RECORD: ICD-10-PCS | Mod: 95,,, | Performed by: UROLOGY

## 2020-11-20 NOTE — PROGRESS NOTES
Established Patient - Audio Only Telehealth Visit     The patient location is: home  The chief complaint leading to consultation is: s/p TURP, meatal stenosis, hx of UTI  Visit type: Virtual visit with audio only (telephone)  Total time spent with patient: 15 mins       The reason for the audio only service rather than synchronous audio and video virtual visit was related to technical difficulties or patient preference/necessity.     Each patient to whom I provide medical services by telemedicine is:  (1) informed of the relationship between the physician and patient and the respective role of any other health care provider with respect to management of the patient; and (2) notified that they may decline to receive medical services by telemedicine and may withdraw from such care at any time. Patient verbally consented to receive this service via voice-only telephone call.       HPI:   CHIEF COMPLAINT:    Mr. Hernandez is a 65 y.o. male with s/p TURP.  PRESENTING ILLNESS:    Celso Hernandez is a 65 y.o. male with a PMH of HTN, BPH, retention, DM   Had TURP on 5/27/20.   Did well initially and then developed dysuria  Underwent cysto on 8/5 and found the very urethral stricture at the fossa navicularis.  It was dilated up to 26 F in size.  He did well for the first couple of week then he began experiencing spraying of the urine.  Subsequently he was treated with meatal dilation.  Since then, he has done well.  He stopped taking finasteride and oxybutynin.  He is still on flomax.     Procedure(s) Performed: 5/27/20  TURP  Specimen(s): prostate chips  Pathology:   18 G OF PROSTATE CHIPS:  PROSTATIC HYPERPLASIA   Findings:   Trilobar prostatic enlargement with significant intravesical median lobe  Wide open channel at conclusion of procedure    Procedure(s) Performed: 8/5/20  1.  Cystoscopy   2.  Urethral dilation using Glover sounds   Findings:   Fossa navicularis stricture present - dilated from 12 F to 26 Fr using Glover  sounds  Bladder neck open with healing prostatic fossa     He works as a building maintenance.      PATIENT HISTORY:    Past Medical History:   Diagnosis Date    Arthritis     Cataract     Colon polyps     Diabetes mellitus type II     Glaucoma     Hyperlipidemia     Hypertension     Multiple duodenal ulcers 10/08/2019    Unspecified hemorrhoids without mention of complication        Past Surgical History:   Procedure Laterality Date    COLONOSCOPY N/A 10/8/2019    Procedure: COLONOSCOPY suprep;  Surgeon: Landon Guajardo MD;  Location: [facility];  Service: General;  Laterality: N/A;    COLONOSCOPY W/ POLYPECTOMY  10/08/2019    CYSTOSCOPY N/A 8/5/2020    Procedure: CYSTOSCOPY;  Surgeon: Arcadio Vaughn MD;  Location: [facility] OR [address] [place] [address];  Service: Urology;  Laterality: N/A;    DILATION OF URETHRA N/A 8/5/2020    Procedure: DILATION, URETHRA;  Surgeon: Arcadio Vaughn MD;  Location: [facility] OR [address] [place] [address];  Service: Urology;  Laterality: N/A;    ESOPHAGOGASTRODUODENOSCOPY  10/08/2019    w/biopsies    ESOPHAGOGASTRODUODENOSCOPY N/A 10/8/2019    Procedure: EGD (ESOPHAGOGASTRODUODENOSCOPY);  Surgeon: Landon Guajardo MD;  Location: [facility];  Service: General;  Laterality: N/A;    none      TRANSURETHRAL RESECTION OF PROSTATE N/A 5/27/2020    Procedure: TURP (TRANSURETHRAL RESECTION OF PROSTATE) bipolar;  Surgeon: Arcadio Vaughn MD;  Location: [facility] OR [address] [place] [address];  Service: Urology;  Laterality: N/A;  2hr       Family History   Problem Relation Age of Onset    Hyperlipidemia Mother     Cancer Sister         breast    Hearing loss Sister        Social History     Socioeconomic History    Marital status:      Spouse name: Not on file    Number of children: Not on file    Years of education: Not on file    Highest education level: Not on file   Occupational History    Not on file   Social Needs    Financial resource strain: Not on file    Food insecurity     Worry: Not on file     Inability: Not on file     Transportation needs     Medical: Not on file     Non-medical: Not on file   Tobacco Use    Smoking status: Never Smoker    Smokeless tobacco: Never Used   Substance and Sexual Activity    Alcohol use: Yes     Alcohol/week: 3.3 standard drinks     Types: 4 Standard drinks or equivalent per week     Comment: socially    Drug use: No    Sexual activity: Not on file   Lifestyle    Physical activity     Days per week: Not on file     Minutes per session: Not on file    Stress: Not on file   Relationships    Social connections     Talks on phone: Not on file     Gets together: Not on file     Attends Tenriism service: Not on file     Active member of club or organization: Not on file     Attends meetings of clubs or organizations: Not on file     Relationship status: Not on file   Other Topics Concern    Not on file   Social History Narrative    Not on file       Allergies:  Patient has no known allergies.    Medications:    Current Outpatient Medications:     atorvastatin (LIPITOR) 80 MG tablet, TAKE 1 TABLET BY MOUTH ONCE DAILY (Patient taking differently: Take 80 mg by mouth every morning. ), Disp: 90 tablet, Rfl: 3    augmented betamethasone dipropionate (DIPROLENE-AF) 0.05 % ointment, APPLY OINTMENT TOPICALLY ONCE DAILY. APPLY TOPICALLY ONCE A DAY AT THE URETHRAL MEATUS PRIOR TO SELF URETHRAL DIALTION, Disp: , Rfl:     betamethasone dipropionate (DIPROLENE) 0.05 % ointment, Apply topically once daily. Apply topically once a day at the urethral meatus prior to self urethral dialtion, Disp: 15 g, Rfl: 1    cholecalciferol, vitamin D3, (VITAMIN D3) 2,000 unit Cap, Take 1 capsule by mouth every morning. , Disp: , Rfl:     finasteride (PROSCAR) 5 mg tablet, Take 1 tablet (5 mg total) by mouth once daily. (Patient taking differently: Take 5 mg by mouth every morning. ), Disp: 30 tablet, Rfl: 11    hydroCHLOROthiazide (HYDRODIURIL) 12.5 MG Tab, Take 1 tablet (12.5 mg total) by mouth once daily.  (Patient taking differently: Take 12.5 mg by mouth every morning. ), Disp: 90 tablet, Rfl: 3    ketoconazole (NIZORAL) 2 % cream, Apply topically once daily. Apply to foot nightly., Disp: 30 g, Rfl: 1    latanoprost 0.005 % ophthalmic solution, Place 1 drop into both eyes every evening., Disp: 2.5 mL, Rfl: 11    losartan (COZAAR) 100 MG tablet, Take 1 tablet (100 mg total) by mouth once daily. (Patient taking differently: Take 100 mg by mouth every morning. ), Disp: 90 tablet, Rfl: 3    meloxicam (MOBIC) 15 MG tablet, Take 1 tablet by mouth once daily (Patient taking differently: Take by mouth every morning. ), Disp: 30 tablet, Rfl: 4    metFORMIN (GLUCOPHAGE) 500 MG tablet, Take 1 tablet (500 mg total) by mouth daily with breakfast., Disp: 90 tablet, Rfl: 3    oxybutynin (DITROPAN-XL) 10 MG 24 hr tablet, Take 1 tablet (10 mg total) by mouth once daily. (Patient taking differently: Take 10 mg by mouth every morning. ), Disp: 30 tablet, Rfl: 11    pantoprazole (PROTONIX) 40 MG tablet, Take 40 mg by mouth every morning. , Disp: , Rfl:     tamsulosin (FLOMAX) 0.4 mg Cap, Take 2 capsules (0.8 mg total) by mouth once daily. (Patient taking differently: Take 0.8 mg by mouth every morning. ), Disp: 180 capsule, Rfl: 3    terbinafine HCL (LAMISIL AT) 1 % cream, Apply topically 2 (two) times daily., Disp: 30 g, Rfl: 1    timolol maleate 0.5% (TIMOPTIC) 0.5 % Drop, Place 1 drop into both eyes 2 (two) times daily., Disp: 10 mL, Rfl: 11    traMADoL (ULTRAM) 50 mg tablet, Take 1 tablet (50 mg total) by mouth every 6 (six) hours as needed for Pain., Disp: 5 tablet, Rfl: 0    PHYSICAL EXAMINATION:    Constitutional: He appears well-developed and well-nourished.  He is in no apparent distress.    Eyes: No scleral icterus noted bilaterally. No discharge bilaterally.    Cardiovascular: Normal rate.      Pulmonary/Chest: Effort normal. No respiratory distress.     Abdominal:  He exhibits no distension.     Neurological: He  is alert and oriented to person, place, and time.     Skin: Skin is warm and dry.     Psych: Cooperative with normal affect.    Genitourinary: The penis is uncircumcised with evidence of paraphimosis.     Physical Exam      LABS:    Lab Results   Component Value Date    PSA 3.4 09/14/2019    PSA 1.9 05/14/2016    PSA 2.5 09/03/2015    PSA 1.6 03/01/2014    PSA 1.7 10/08/2011    PSA 1.7 11/06/2010    PSA 1.7 08/29/2009    PSA 1.2 12/11/2007    PSA 1.1 05/19/2006    PSA 1.2 02/17/2004    PSADIAG 2.8 11/16/2019    PSADIAG 2.2 11/02/2018    PSADIAG 2.5 11/18/2017     IMPRESSION:  S/P TURP    BPH with obstruction/lower urinary tract symptoms  -     Prostate Specific Antigen, Diagnostic; Future; Expected date: 11/20/2020      PLAN:  He is not doing meatal dilation as instructed but has been urination well.  Come back and see us if there is a change in urination.  OK to stop flomax, finasteride and oxybutynin.  Will see him in 1 year with PSA.  I spent 15 minutes with the patient of which more than half was spent in direct consultation with the patient in regards to our treatment and plan.    Follow up in about 1 year (around 11/20/2021) for PSA.       This service was not originating from a related E/M service provided within the previous 7 days nor will  to an E/M service or procedure within the next 24 hours or my soonest available appointment.  Prevailing standard of care was able to be met in this audio-only visit.

## 2020-12-14 ENCOUNTER — PATIENT OUTREACH (OUTPATIENT)
Dept: ADMINISTRATIVE | Facility: OTHER | Age: 65
End: 2020-12-14

## 2020-12-15 ENCOUNTER — CLINICAL SUPPORT (OUTPATIENT)
Dept: OPHTHALMOLOGY | Facility: CLINIC | Age: 65
End: 2020-12-15
Payer: MEDICARE

## 2020-12-15 ENCOUNTER — OFFICE VISIT (OUTPATIENT)
Dept: OPTOMETRY | Facility: CLINIC | Age: 65
End: 2020-12-15
Payer: MEDICARE

## 2020-12-15 DIAGNOSIS — H35.363 PERIPHERAL DRUSEN OF BOTH EYES: ICD-10-CM

## 2020-12-15 DIAGNOSIS — E11.36 TYPE 2 DIABETES MELLITUS WITH CATARACT: ICD-10-CM

## 2020-12-15 DIAGNOSIS — H40.1132 PRIMARY OPEN ANGLE GLAUCOMA (POAG) OF BOTH EYES, MODERATE STAGE: ICD-10-CM

## 2020-12-15 DIAGNOSIS — H52.203 HYPEROPIA WITH ASTIGMATISM AND PRESBYOPIA, BILATERAL: ICD-10-CM

## 2020-12-15 DIAGNOSIS — H52.4 HYPEROPIA WITH ASTIGMATISM AND PRESBYOPIA, BILATERAL: ICD-10-CM

## 2020-12-15 DIAGNOSIS — E11.9 TYPE 2 DIABETES MELLITUS WITHOUT RETINOPATHY: ICD-10-CM

## 2020-12-15 DIAGNOSIS — H04.123 DRY EYE SYNDROME OF BOTH EYES: ICD-10-CM

## 2020-12-15 DIAGNOSIS — H25.13 SENILE NUCLEAR SCLEROSIS, BILATERAL: ICD-10-CM

## 2020-12-15 DIAGNOSIS — H40.1132 PRIMARY OPEN ANGLE GLAUCOMA (POAG) OF BOTH EYES, MODERATE STAGE: Primary | ICD-10-CM

## 2020-12-15 DIAGNOSIS — H52.03 HYPEROPIA WITH ASTIGMATISM AND PRESBYOPIA, BILATERAL: ICD-10-CM

## 2020-12-15 PROCEDURE — 99999 PR PBB SHADOW E&M-EST. PATIENT-LVL III: CPT | Mod: PBBFAC,,, | Performed by: OPTOMETRIST

## 2020-12-15 PROCEDURE — 1126F AMNT PAIN NOTED NONE PRSNT: CPT | Mod: S$GLB,,, | Performed by: OPTOMETRIST

## 2020-12-15 PROCEDURE — 1101F PR PT FALLS ASSESS DOC 0-1 FALLS W/OUT INJ PAST YR: ICD-10-PCS | Mod: CPTII,S$GLB,, | Performed by: OPTOMETRIST

## 2020-12-15 PROCEDURE — 92014 PR EYE EXAM, EST PATIENT,COMPREHESV: ICD-10-PCS | Mod: S$GLB,,, | Performed by: OPTOMETRIST

## 2020-12-15 PROCEDURE — 3288F FALL RISK ASSESSMENT DOCD: CPT | Mod: CPTII,S$GLB,, | Performed by: OPTOMETRIST

## 2020-12-15 PROCEDURE — 99499 UNLISTED E&M SERVICE: CPT | Mod: S$GLB,,, | Performed by: OPTOMETRIST

## 2020-12-15 PROCEDURE — 3288F PR FALLS RISK ASSESSMENT DOCUMENTED: ICD-10-PCS | Mod: CPTII,S$GLB,, | Performed by: OPTOMETRIST

## 2020-12-15 PROCEDURE — 99499 RISK ADDL DX/OHS AUDIT: ICD-10-PCS | Mod: S$GLB,,, | Performed by: OPTOMETRIST

## 2020-12-15 PROCEDURE — 1126F PR PAIN SEVERITY QUANTIFIED, NO PAIN PRESENT: ICD-10-PCS | Mod: S$GLB,,, | Performed by: OPTOMETRIST

## 2020-12-15 PROCEDURE — 2023F PR DILATED RETINAL EXAM W/O EVID OF RETINOPATHY: ICD-10-PCS | Mod: S$GLB,,, | Performed by: OPTOMETRIST

## 2020-12-15 PROCEDURE — 2023F DILAT RTA XM W/O RTNOPTHY: CPT | Mod: S$GLB,,, | Performed by: OPTOMETRIST

## 2020-12-15 PROCEDURE — 99999 PR PBB SHADOW E&M-EST. PATIENT-LVL III: ICD-10-PCS | Mod: PBBFAC,,, | Performed by: OPTOMETRIST

## 2020-12-15 PROCEDURE — 92014 COMPRE OPH EXAM EST PT 1/>: CPT | Mod: S$GLB,,, | Performed by: OPTOMETRIST

## 2020-12-15 PROCEDURE — 1101F PT FALLS ASSESS-DOCD LE1/YR: CPT | Mod: CPTII,S$GLB,, | Performed by: OPTOMETRIST

## 2020-12-15 RX ORDER — DORZOLAMIDE HYDROCHLORIDE AND TIMOLOL MALEATE 20; 5 MG/ML; MG/ML
1 SOLUTION/ DROPS OPHTHALMIC 2 TIMES DAILY
Qty: 10 ML | Refills: 11 | Status: SHIPPED | OUTPATIENT
Start: 2020-12-15 | End: 2021-12-12

## 2020-12-15 NOTE — PROGRESS NOTES
HPI     KEITH:11/2019  Glasses? Yes   Contacts? no  H/o eye surgery, injections or laser: no  H/o eye injury: no  Known eye conditions? Glaucoma   Family h/o eye conditions? no  Eye gtts?timolol BID OU Latanoprost QHS OU 6-7 days pt is using drops     (-) Flashes (-) Floaters (+) Mucous   (-) Tearing (-) Itching (-) Burning   (-) Headaches (-) Eye Pain/discomfort (-) Irritation   (-) Redness (-) Double vision (+) Blurry vision near [facility] with current   prescription     Diabetic? (+)  A1c?  (Hemoglobin A1C       Date                     Value               Ref Range             Status                11/05/2020               6.0 (H)             4.0 - 5.6 %           Final              Comment:    ADA Screening Guidelines:  5.7-6.4%  Consistent with   prediabetes  >or=6.5%  Consistent with diabetes  High levels of fetal   hemoglobin interfere with the HbA1C  assay. Heterozygous hemoglobin   variants (HbS, HgC, etc)do  not significantly interfere with this assay.     However, presence of multiple variants may affect accuracy.         04/21/2020               6.3 (H)             4.0 - 5.6 %           Final              Comment:    ADA Screening Guidelines:  5.7-6.4%  Consistent with   prediabetes  >or=6.5%  Consistent with diabetes  High levels of fetal   hemoglobin interfere with the HbA1C  assay. Heterozygous hemoglobin   variants (HbS, HgC, etc)do  not significantly interfere with this assay.     However, presence of multiple variants may affect accuracy.         01/29/2020               6.1 (H)             4.0 - 5.6 %           Final              Comment:    ADA Screening Guidelines:  5.7-6.4%  Consistent with   prediabetes  >or=6.5%  Consistent with diabetes  High levels of fetal   hemoglobin interfere with the HbA1C  assay. Heterozygous hemoglobin   variants (HbS, HgC, etc)do  not significantly interfere with this assay.     However, presence of multiple variants may affect accuracy.    ----------)        Last edited by  Kyara Burrell on 12/15/2020 11:29 AM. (History)            Assessment /Plan     For exam results, see Encounter Report.    Primary open angle glaucoma (POAG) of both eyes, moderate stage  -     dorzolamide-timolol 2-0.5% (COSOPT) 22.3-6.8 mg/mL ophthalmic solution; Place 1 drop into both eyes 2 (two) times daily.  Dispense: 10 mL; Refill: 11    Dry eye syndrome of both eyes    Type 2 diabetes mellitus without retinopathy    Hyperopia with astigmatism and presbyopia, bilateral    Peripheral drusen of both eyes    Type 2 diabetes mellitus with cataract    Senile nuclear sclerosis, bilateral      1. (-) FHx. IOp 14 OS, 18 OS. Last IOP 14 OD, 13 OS. Tmax  22 OD, 23 OS. Ptfailed to f/u from 12/17/2019 to 9/16/2020. Pt reports noncompliance w/drops. Pt reports compliance w/eyedrops Prev pt of Dr Huffman. C/d 0.55 OD, 0.65 OS.   12/15/2020 HVF OD inf arcuate and early sup arcuate, OS sup altitudinal. Progression OS  12/15/2020 OCT OD thin NS, TS, T, TI and G (progression TI), OS Thin TS, T, TI and G, borderline NI (progression N, NI)    Educated pt on findings w/understanding.   Need for IOp to be lower. Pt reports compliance but states that his spacing in the drops times may be off.  Cont Latanoprost QHS OU.  D/c Timolol BiD OU  Rx Cosopt BID OU (start 12/15/2020)  Importance of drop compliance and f/u discussed with pt.   RTC 6 weeks IOP check  2. Recommend Systane Ultra or Refresh Optive BID-TID OU to aid with symptoms of dry eyes.  3. BS control. No signs of diabetic retinopathy. Monitor with annual exam.  4. SRx released to patient. Patient educated on lens options. Normal ocular health. RTC 1 year for routine exam.   5. Stable. Monitor.   6-7. Nuclear sclerotic cataract - not visually significant. Observe.

## 2020-12-15 NOTE — PROGRESS NOTES
hvf done/rel/fix/coop. Good pu/chart checked for latex allergy/rx used : 1.50 + .50 x 150/od .75 + .75 x 5/os-smh

## 2020-12-28 ENCOUNTER — OFFICE VISIT (OUTPATIENT)
Dept: INTERNAL MEDICINE | Facility: CLINIC | Age: 65
End: 2020-12-28
Payer: MEDICARE

## 2020-12-28 ENCOUNTER — LAB VISIT (OUTPATIENT)
Dept: LAB | Facility: HOSPITAL | Age: 65
End: 2020-12-28
Attending: INTERNAL MEDICINE
Payer: MEDICARE

## 2020-12-28 VITALS
RESPIRATION RATE: 18 BRPM | OXYGEN SATURATION: 99 % | SYSTOLIC BLOOD PRESSURE: 138 MMHG | BODY MASS INDEX: 40.83 KG/M2 | HEART RATE: 84 BPM | DIASTOLIC BLOOD PRESSURE: 90 MMHG | TEMPERATURE: 98 F | HEIGHT: 68 IN | WEIGHT: 269.38 LBS

## 2020-12-28 DIAGNOSIS — N40.1 BENIGN PROSTATIC HYPERPLASIA WITH URINARY OBSTRUCTION: ICD-10-CM

## 2020-12-28 DIAGNOSIS — I10 ESSENTIAL HYPERTENSION: ICD-10-CM

## 2020-12-28 DIAGNOSIS — N13.8 BENIGN PROSTATIC HYPERPLASIA WITH URINARY OBSTRUCTION: ICD-10-CM

## 2020-12-28 DIAGNOSIS — E11.9 TYPE 2 DIABETES MELLITUS WITHOUT COMPLICATION, WITHOUT LONG-TERM CURRENT USE OF INSULIN: Primary | ICD-10-CM

## 2020-12-28 DIAGNOSIS — E11.9 TYPE 2 DIABETES MELLITUS WITHOUT COMPLICATION, WITHOUT LONG-TERM CURRENT USE OF INSULIN: ICD-10-CM

## 2020-12-28 DIAGNOSIS — E55.9 VITAMIN D DEFICIENCY: ICD-10-CM

## 2020-12-28 LAB
ALBUMIN SERPL BCP-MCNC: 4 G/DL (ref 3.5–5.2)
ALP SERPL-CCNC: 102 U/L (ref 55–135)
ALT SERPL W/O P-5'-P-CCNC: 28 U/L (ref 10–44)
ANION GAP SERPL CALC-SCNC: 12 MMOL/L (ref 8–16)
AST SERPL-CCNC: 36 U/L (ref 10–40)
BASOPHILS # BLD AUTO: 0.1 K/UL (ref 0–0.2)
BASOPHILS NFR BLD: 0.7 % (ref 0–1.9)
BILIRUB SERPL-MCNC: 0.8 MG/DL (ref 0.1–1)
BUN SERPL-MCNC: 23 MG/DL (ref 8–23)
CALCIUM SERPL-MCNC: 10.5 MG/DL (ref 8.7–10.5)
CHLORIDE SERPL-SCNC: 98 MMOL/L (ref 95–110)
CHOLEST SERPL-MCNC: 261 MG/DL (ref 120–199)
CHOLEST/HDLC SERPL: 5.9 {RATIO} (ref 2–5)
CO2 SERPL-SCNC: 24 MMOL/L (ref 23–29)
CREAT SERPL-MCNC: 1.3 MG/DL (ref 0.5–1.4)
DIFFERENTIAL METHOD: ABNORMAL
EOSINOPHIL # BLD AUTO: 0.3 K/UL (ref 0–0.5)
EOSINOPHIL NFR BLD: 2.5 % (ref 0–8)
ERYTHROCYTE [DISTWIDTH] IN BLOOD BY AUTOMATED COUNT: 14.4 % (ref 11.5–14.5)
EST. GFR  (AFRICAN AMERICAN): >60 ML/MIN/1.73 M^2
EST. GFR  (NON AFRICAN AMERICAN): 56.9 ML/MIN/1.73 M^2
GLUCOSE SERPL-MCNC: 76 MG/DL (ref 70–110)
HCT VFR BLD AUTO: 55 % (ref 40–54)
HDLC SERPL-MCNC: 44 MG/DL (ref 40–75)
HDLC SERPL: 16.9 % (ref 20–50)
HGB BLD-MCNC: 17.9 G/DL (ref 14–18)
IMM GRANULOCYTES # BLD AUTO: 0.05 K/UL (ref 0–0.04)
IMM GRANULOCYTES NFR BLD AUTO: 0.4 % (ref 0–0.5)
LDLC SERPL CALC-MCNC: 176 MG/DL (ref 63–159)
LYMPHOCYTES # BLD AUTO: 4.6 K/UL (ref 1–4.8)
LYMPHOCYTES NFR BLD: 33.6 % (ref 18–48)
MCH RBC QN AUTO: 29 PG (ref 27–31)
MCHC RBC AUTO-ENTMCNC: 32.5 G/DL (ref 32–36)
MCV RBC AUTO: 89 FL (ref 82–98)
MONOCYTES # BLD AUTO: 1.6 K/UL (ref 0.3–1)
MONOCYTES NFR BLD: 11.6 % (ref 4–15)
NEUTROPHILS # BLD AUTO: 7 K/UL (ref 1.8–7.7)
NEUTROPHILS NFR BLD: 51.2 % (ref 38–73)
NONHDLC SERPL-MCNC: 217 MG/DL
NRBC BLD-RTO: 0 /100 WBC
PLATELET # BLD AUTO: 194 K/UL (ref 150–350)
PMV BLD AUTO: 11.2 FL (ref 9.2–12.9)
POTASSIUM SERPL-SCNC: 4.4 MMOL/L (ref 3.5–5.1)
PROT SERPL-MCNC: 8.4 G/DL (ref 6–8.4)
RBC # BLD AUTO: 6.18 M/UL (ref 4.6–6.2)
SODIUM SERPL-SCNC: 134 MMOL/L (ref 136–145)
TRIGL SERPL-MCNC: 205 MG/DL (ref 30–150)
WBC # BLD AUTO: 13.73 K/UL (ref 3.9–12.7)

## 2020-12-28 PROCEDURE — 3044F HG A1C LEVEL LT 7.0%: CPT | Mod: CPTII,S$GLB,, | Performed by: INTERNAL MEDICINE

## 2020-12-28 PROCEDURE — 1159F MED LIST DOCD IN RCRD: CPT | Mod: S$GLB,,, | Performed by: INTERNAL MEDICINE

## 2020-12-28 PROCEDURE — 1101F PT FALLS ASSESS-DOCD LE1/YR: CPT | Mod: CPTII,S$GLB,, | Performed by: INTERNAL MEDICINE

## 2020-12-28 PROCEDURE — 3080F PR MOST RECENT DIASTOLIC BLOOD PRESSURE >= 90 MM HG: ICD-10-PCS | Mod: CPTII,S$GLB,, | Performed by: INTERNAL MEDICINE

## 2020-12-28 PROCEDURE — 1101F PR PT FALLS ASSESS DOC 0-1 FALLS W/OUT INJ PAST YR: ICD-10-PCS | Mod: CPTII,S$GLB,, | Performed by: INTERNAL MEDICINE

## 2020-12-28 PROCEDURE — 3288F FALL RISK ASSESSMENT DOCD: CPT | Mod: CPTII,S$GLB,, | Performed by: INTERNAL MEDICINE

## 2020-12-28 PROCEDURE — 3072F PR LOW RISK FOR RETINOPATHY: ICD-10-PCS | Mod: S$GLB,,, | Performed by: INTERNAL MEDICINE

## 2020-12-28 PROCEDURE — 3044F PR MOST RECENT HEMOGLOBIN A1C LEVEL <7.0%: ICD-10-PCS | Mod: CPTII,S$GLB,, | Performed by: INTERNAL MEDICINE

## 2020-12-28 PROCEDURE — 80053 COMPREHEN METABOLIC PANEL: CPT

## 2020-12-28 PROCEDURE — 1159F PR MEDICATION LIST DOCUMENTED IN MEDICAL RECORD: ICD-10-PCS | Mod: S$GLB,,, | Performed by: INTERNAL MEDICINE

## 2020-12-28 PROCEDURE — 3008F BODY MASS INDEX DOCD: CPT | Mod: CPTII,S$GLB,, | Performed by: INTERNAL MEDICINE

## 2020-12-28 PROCEDURE — 80061 LIPID PANEL: CPT

## 2020-12-28 PROCEDURE — 1126F PR PAIN SEVERITY QUANTIFIED, NO PAIN PRESENT: ICD-10-PCS | Mod: S$GLB,,, | Performed by: INTERNAL MEDICINE

## 2020-12-28 PROCEDURE — 99214 OFFICE O/P EST MOD 30 MIN: CPT | Mod: S$GLB,,, | Performed by: INTERNAL MEDICINE

## 2020-12-28 PROCEDURE — 99499 UNLISTED E&M SERVICE: CPT | Mod: S$GLB,,, | Performed by: INTERNAL MEDICINE

## 2020-12-28 PROCEDURE — 99999 PR PBB SHADOW E&M-EST. PATIENT-LVL IV: ICD-10-PCS | Mod: PBBFAC,,, | Performed by: INTERNAL MEDICINE

## 2020-12-28 PROCEDURE — 3072F LOW RISK FOR RETINOPATHY: CPT | Mod: S$GLB,,, | Performed by: INTERNAL MEDICINE

## 2020-12-28 PROCEDURE — 99999 PR PBB SHADOW E&M-EST. PATIENT-LVL IV: CPT | Mod: PBBFAC,,, | Performed by: INTERNAL MEDICINE

## 2020-12-28 PROCEDURE — 3075F SYST BP GE 130 - 139MM HG: CPT | Mod: CPTII,S$GLB,, | Performed by: INTERNAL MEDICINE

## 2020-12-28 PROCEDURE — 3008F PR BODY MASS INDEX (BMI) DOCUMENTED: ICD-10-PCS | Mod: CPTII,S$GLB,, | Performed by: INTERNAL MEDICINE

## 2020-12-28 PROCEDURE — 1126F AMNT PAIN NOTED NONE PRSNT: CPT | Mod: S$GLB,,, | Performed by: INTERNAL MEDICINE

## 2020-12-28 PROCEDURE — 36415 COLL VENOUS BLD VENIPUNCTURE: CPT | Mod: PO

## 2020-12-28 PROCEDURE — 85025 COMPLETE CBC W/AUTO DIFF WBC: CPT

## 2020-12-28 PROCEDURE — 3080F DIAST BP >= 90 MM HG: CPT | Mod: CPTII,S$GLB,, | Performed by: INTERNAL MEDICINE

## 2020-12-28 PROCEDURE — 99499 RISK ADDL DX/OHS AUDIT: ICD-10-PCS | Mod: S$GLB,,, | Performed by: INTERNAL MEDICINE

## 2020-12-28 PROCEDURE — 3075F PR MOST RECENT SYSTOLIC BLOOD PRESS GE 130-139MM HG: ICD-10-PCS | Mod: CPTII,S$GLB,, | Performed by: INTERNAL MEDICINE

## 2020-12-28 PROCEDURE — 3288F PR FALLS RISK ASSESSMENT DOCUMENTED: ICD-10-PCS | Mod: CPTII,S$GLB,, | Performed by: INTERNAL MEDICINE

## 2020-12-28 PROCEDURE — 99214 PR OFFICE/OUTPT VISIT, EST, LEVL IV, 30-39 MIN: ICD-10-PCS | Mod: S$GLB,,, | Performed by: INTERNAL MEDICINE

## 2020-12-28 RX ORDER — ZOSTER VACCINE RECOMBINANT, ADJUVANTED 50 MCG/0.5
0.5 KIT INTRAMUSCULAR ONCE
Qty: 1 EACH | Refills: 1 | Status: SHIPPED | OUTPATIENT
Start: 2020-12-28 | End: 2020-12-28

## 2021-01-25 ENCOUNTER — PATIENT OUTREACH (OUTPATIENT)
Dept: ADMINISTRATIVE | Facility: OTHER | Age: 66
End: 2021-01-25

## 2021-02-02 ENCOUNTER — OFFICE VISIT (OUTPATIENT)
Dept: OPTOMETRY | Facility: CLINIC | Age: 66
End: 2021-02-02
Payer: MEDICARE

## 2021-02-02 DIAGNOSIS — H40.1132 PRIMARY OPEN ANGLE GLAUCOMA (POAG) OF BOTH EYES, MODERATE STAGE: Primary | ICD-10-CM

## 2021-02-02 PROCEDURE — 1101F PR PT FALLS ASSESS DOC 0-1 FALLS W/OUT INJ PAST YR: ICD-10-PCS | Mod: CPTII,S$GLB,, | Performed by: OPTOMETRIST

## 2021-02-02 PROCEDURE — 92012 INTRM OPH EXAM EST PATIENT: CPT | Mod: S$GLB,,, | Performed by: OPTOMETRIST

## 2021-02-02 PROCEDURE — 1126F PR PAIN SEVERITY QUANTIFIED, NO PAIN PRESENT: ICD-10-PCS | Mod: S$GLB,,, | Performed by: OPTOMETRIST

## 2021-02-02 PROCEDURE — 1126F AMNT PAIN NOTED NONE PRSNT: CPT | Mod: S$GLB,,, | Performed by: OPTOMETRIST

## 2021-02-02 PROCEDURE — 99999 PR PBB SHADOW E&M-EST. PATIENT-LVL III: ICD-10-PCS | Mod: PBBFAC,,, | Performed by: OPTOMETRIST

## 2021-02-02 PROCEDURE — 1101F PT FALLS ASSESS-DOCD LE1/YR: CPT | Mod: CPTII,S$GLB,, | Performed by: OPTOMETRIST

## 2021-02-02 PROCEDURE — 99999 PR PBB SHADOW E&M-EST. PATIENT-LVL III: CPT | Mod: PBBFAC,,, | Performed by: OPTOMETRIST

## 2021-02-02 PROCEDURE — 92012 PR EYE EXAM, EST PATIENT,INTERMED: ICD-10-PCS | Mod: S$GLB,,, | Performed by: OPTOMETRIST

## 2021-02-02 PROCEDURE — 3288F FALL RISK ASSESSMENT DOCD: CPT | Mod: CPTII,S$GLB,, | Performed by: OPTOMETRIST

## 2021-02-02 PROCEDURE — 3288F PR FALLS RISK ASSESSMENT DOCUMENTED: ICD-10-PCS | Mod: CPTII,S$GLB,, | Performed by: OPTOMETRIST

## 2021-02-18 ENCOUNTER — IMMUNIZATION (OUTPATIENT)
Dept: INTERNAL MEDICINE | Facility: CLINIC | Age: 66
End: 2021-02-18
Payer: MEDICARE

## 2021-02-18 DIAGNOSIS — Z23 NEED FOR VACCINATION: Primary | ICD-10-CM

## 2021-02-18 PROCEDURE — 91300 COVID-19, MRNA, LNP-S, PF, 30 MCG/0.3 ML DOSE VACCINE: CPT | Mod: PBBFAC | Performed by: FAMILY MEDICINE

## 2021-03-11 ENCOUNTER — IMMUNIZATION (OUTPATIENT)
Dept: INTERNAL MEDICINE | Facility: CLINIC | Age: 66
End: 2021-03-11
Payer: MEDICARE

## 2021-03-11 DIAGNOSIS — Z23 NEED FOR VACCINATION: Primary | ICD-10-CM

## 2021-03-11 PROCEDURE — 0002A COVID-19, MRNA, LNP-S, PF, 30 MCG/0.3 ML DOSE VACCINE: CPT | Mod: PBBFAC | Performed by: FAMILY MEDICINE

## 2021-03-11 PROCEDURE — 91300 COVID-19, MRNA, LNP-S, PF, 30 MCG/0.3 ML DOSE VACCINE: CPT | Mod: PBBFAC | Performed by: FAMILY MEDICINE

## 2021-04-26 ENCOUNTER — OFFICE VISIT (OUTPATIENT)
Dept: INTERNAL MEDICINE | Facility: CLINIC | Age: 66
End: 2021-04-26
Payer: MEDICARE

## 2021-04-26 VITALS
SYSTOLIC BLOOD PRESSURE: 136 MMHG | HEIGHT: 68 IN | BODY MASS INDEX: 39.2 KG/M2 | WEIGHT: 258.63 LBS | RESPIRATION RATE: 20 BRPM | HEART RATE: 72 BPM | TEMPERATURE: 98 F | DIASTOLIC BLOOD PRESSURE: 92 MMHG

## 2021-04-26 DIAGNOSIS — N13.8 BENIGN PROSTATIC HYPERPLASIA WITH URINARY OBSTRUCTION: ICD-10-CM

## 2021-04-26 DIAGNOSIS — N40.1 BENIGN PROSTATIC HYPERPLASIA WITH URINARY OBSTRUCTION: ICD-10-CM

## 2021-04-26 DIAGNOSIS — E78.2 MIXED HYPERLIPIDEMIA: ICD-10-CM

## 2021-04-26 DIAGNOSIS — I10 ESSENTIAL HYPERTENSION: ICD-10-CM

## 2021-04-26 DIAGNOSIS — E11.9 TYPE 2 DIABETES MELLITUS WITHOUT COMPLICATION, WITHOUT LONG-TERM CURRENT USE OF INSULIN: Primary | ICD-10-CM

## 2021-04-26 PROCEDURE — 3072F LOW RISK FOR RETINOPATHY: CPT | Mod: S$GLB,,, | Performed by: INTERNAL MEDICINE

## 2021-04-26 PROCEDURE — 1159F MED LIST DOCD IN RCRD: CPT | Mod: S$GLB,,, | Performed by: INTERNAL MEDICINE

## 2021-04-26 PROCEDURE — 3080F PR MOST RECENT DIASTOLIC BLOOD PRESSURE >= 90 MM HG: ICD-10-PCS | Mod: CPTII,S$GLB,, | Performed by: INTERNAL MEDICINE

## 2021-04-26 PROCEDURE — 3288F PR FALLS RISK ASSESSMENT DOCUMENTED: ICD-10-PCS | Mod: CPTII,S$GLB,, | Performed by: INTERNAL MEDICINE

## 2021-04-26 PROCEDURE — 1126F AMNT PAIN NOTED NONE PRSNT: CPT | Mod: S$GLB,,, | Performed by: INTERNAL MEDICINE

## 2021-04-26 PROCEDURE — 99214 PR OFFICE/OUTPT VISIT, EST, LEVL IV, 30-39 MIN: ICD-10-PCS | Mod: S$GLB,,, | Performed by: INTERNAL MEDICINE

## 2021-04-26 PROCEDURE — 1101F PR PT FALLS ASSESS DOC 0-1 FALLS W/OUT INJ PAST YR: ICD-10-PCS | Mod: CPTII,S$GLB,, | Performed by: INTERNAL MEDICINE

## 2021-04-26 PROCEDURE — 3008F BODY MASS INDEX DOCD: CPT | Mod: CPTII,S$GLB,, | Performed by: INTERNAL MEDICINE

## 2021-04-26 PROCEDURE — 3075F SYST BP GE 130 - 139MM HG: CPT | Mod: CPTII,S$GLB,, | Performed by: INTERNAL MEDICINE

## 2021-04-26 PROCEDURE — 3075F PR MOST RECENT SYSTOLIC BLOOD PRESS GE 130-139MM HG: ICD-10-PCS | Mod: CPTII,S$GLB,, | Performed by: INTERNAL MEDICINE

## 2021-04-26 PROCEDURE — 99499 RISK ADDL DX/OHS AUDIT: ICD-10-PCS | Mod: S$GLB,,, | Performed by: INTERNAL MEDICINE

## 2021-04-26 PROCEDURE — 3008F PR BODY MASS INDEX (BMI) DOCUMENTED: ICD-10-PCS | Mod: CPTII,S$GLB,, | Performed by: INTERNAL MEDICINE

## 2021-04-26 PROCEDURE — 99499 UNLISTED E&M SERVICE: CPT | Mod: S$GLB,,, | Performed by: INTERNAL MEDICINE

## 2021-04-26 PROCEDURE — 99214 OFFICE O/P EST MOD 30 MIN: CPT | Mod: S$GLB,,, | Performed by: INTERNAL MEDICINE

## 2021-04-26 PROCEDURE — 99999 PR PBB SHADOW E&M-EST. PATIENT-LVL IV: ICD-10-PCS | Mod: PBBFAC,,, | Performed by: INTERNAL MEDICINE

## 2021-04-26 PROCEDURE — 3072F PR LOW RISK FOR RETINOPATHY: ICD-10-PCS | Mod: S$GLB,,, | Performed by: INTERNAL MEDICINE

## 2021-04-26 PROCEDURE — 3080F DIAST BP >= 90 MM HG: CPT | Mod: CPTII,S$GLB,, | Performed by: INTERNAL MEDICINE

## 2021-04-26 PROCEDURE — 99999 PR PBB SHADOW E&M-EST. PATIENT-LVL IV: CPT | Mod: PBBFAC,,, | Performed by: INTERNAL MEDICINE

## 2021-04-26 PROCEDURE — 1159F PR MEDICATION LIST DOCUMENTED IN MEDICAL RECORD: ICD-10-PCS | Mod: S$GLB,,, | Performed by: INTERNAL MEDICINE

## 2021-04-26 PROCEDURE — 3288F FALL RISK ASSESSMENT DOCD: CPT | Mod: CPTII,S$GLB,, | Performed by: INTERNAL MEDICINE

## 2021-04-26 PROCEDURE — 1101F PT FALLS ASSESS-DOCD LE1/YR: CPT | Mod: CPTII,S$GLB,, | Performed by: INTERNAL MEDICINE

## 2021-04-26 PROCEDURE — 1126F PR PAIN SEVERITY QUANTIFIED, NO PAIN PRESENT: ICD-10-PCS | Mod: S$GLB,,, | Performed by: INTERNAL MEDICINE

## 2021-04-26 RX ORDER — HYDRALAZINE HYDROCHLORIDE 25 MG/1
25 TABLET, FILM COATED ORAL EVERY 12 HOURS
Qty: 60 TABLET | Refills: 4 | Status: SHIPPED | OUTPATIENT
Start: 2021-04-26 | End: 2021-07-30 | Stop reason: SDUPTHER

## 2021-05-12 DIAGNOSIS — E11.9 TYPE 2 DIABETES MELLITUS WITHOUT COMPLICATION: ICD-10-CM

## 2021-05-13 ENCOUNTER — TELEPHONE (OUTPATIENT)
Dept: INTERNAL MEDICINE | Facility: CLINIC | Age: 66
End: 2021-05-13

## 2021-05-16 ENCOUNTER — HOSPITAL ENCOUNTER (EMERGENCY)
Facility: HOSPITAL | Age: 66
Discharge: HOME | End: 2021-05-16
Attending: EMERGENCY MEDICINE
Payer: MEDICARE

## 2021-05-16 VITALS
TEMPERATURE: 98 F | WEIGHT: 255 LBS | SYSTOLIC BLOOD PRESSURE: 162 MMHG | HEART RATE: 87 BPM | HEIGHT: 68 IN | BODY MASS INDEX: 38.65 KG/M2 | RESPIRATION RATE: 16 BRPM | DIASTOLIC BLOOD PRESSURE: 86 MMHG | OXYGEN SATURATION: 97 %

## 2021-05-16 DIAGNOSIS — S16.1XXA STRAIN OF NECK MUSCLE, INITIAL ENCOUNTER: ICD-10-CM

## 2021-05-16 DIAGNOSIS — M54.9 UPPER BACK PAIN: ICD-10-CM

## 2021-05-16 DIAGNOSIS — R07.89 MUSCULAR CHEST PAIN: ICD-10-CM
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
## 2021-05-16 PROCEDURE — 96372 THER/PROPH/DIAG INJ SC/IM: CPT

## 2021-05-16 PROCEDURE — 63600175 PHARM REV CODE 636 W HCPCS: Performed by: EMERGENCY MEDICINE

## 2021-05-16 PROCEDURE — 99284 EMERGENCY DEPT VISIT MOD MDM: CPT | Mod: 25

## 2021-05-16 RX ORDER — KETOROLAC TROMETHAMINE 30 MG/ML
30 INJECTION, SOLUTION INTRAMUSCULAR; INTRAVENOUS
Status: COMPLETED | OUTPATIENT
Start: 2021-05-16 | End: 2021-05-16

## 2021-05-16 RX ORDER — NAPROXEN 500 MG/1
500 TABLET ORAL 2 TIMES DAILY WITH MEALS
Qty: 10 TABLET | Refills: 0 | Status: SHIPPED | OUTPATIENT
Start: 2021-05-16 | End: 2021-12-12

## 2021-05-16 RX ORDER — METHOCARBAMOL 500 MG/1
1000 TABLET, FILM COATED ORAL 3 TIMES DAILY PRN
Qty: 30 TABLET | Refills: 0 | Status: SHIPPED | OUTPATIENT
Start: 2021-05-16 | End: 2021-05-21

## 2021-05-16 RX ADMIN — KETOROLAC TROMETHAMINE 30 MG: 30 INJECTION, SOLUTION INTRAMUSCULAR; INTRAVENOUS at 02:05

## 2021-05-31 ENCOUNTER — PATIENT OUTREACH (OUTPATIENT)
Dept: ADMINISTRATIVE | Facility: OTHER | Age: 66
End: 2021-05-31

## 2021-06-01 ENCOUNTER — OFFICE VISIT (OUTPATIENT)
Dept: INTERNAL MEDICINE | Facility: CLINIC | Age: 66
End: 2021-06-01
Payer: MEDICARE

## 2021-06-01 VITALS
WEIGHT: 260.13 LBS | HEART RATE: 76 BPM | BODY MASS INDEX: 39.42 KG/M2 | TEMPERATURE: 98 F | SYSTOLIC BLOOD PRESSURE: 132 MMHG | RESPIRATION RATE: 16 BRPM | HEIGHT: 68 IN | DIASTOLIC BLOOD PRESSURE: 80 MMHG

## 2021-06-01 DIAGNOSIS — Z12.5 ENCOUNTER FOR SCREENING FOR MALIGNANT NEOPLASM OF PROSTATE: ICD-10-CM

## 2021-06-01 DIAGNOSIS — E11.36 TYPE 2 DIABETES MELLITUS WITH CATARACT: ICD-10-CM

## 2021-06-01 DIAGNOSIS — N13.8 BENIGN PROSTATIC HYPERPLASIA WITH URINARY OBSTRUCTION: ICD-10-CM

## 2021-06-01 DIAGNOSIS — E55.9 VITAMIN D DEFICIENCY: ICD-10-CM

## 2021-06-01 DIAGNOSIS — N40.1 BENIGN PROSTATIC HYPERPLASIA WITH URINARY OBSTRUCTION: ICD-10-CM

## 2021-06-01 DIAGNOSIS — I10 ESSENTIAL HYPERTENSION: Primary | ICD-10-CM

## 2021-06-01 DIAGNOSIS — E78.2 MIXED HYPERLIPIDEMIA: ICD-10-CM

## 2021-06-01 PROCEDURE — 3072F PR LOW RISK FOR RETINOPATHY: ICD-10-PCS | Mod: S$GLB,,, | Performed by: INTERNAL MEDICINE

## 2021-06-01 PROCEDURE — 99499 UNLISTED E&M SERVICE: CPT | Mod: S$GLB,,, | Performed by: INTERNAL MEDICINE

## 2021-06-01 PROCEDURE — 90732 PPSV23 VACC 2 YRS+ SUBQ/IM: CPT | Mod: S$GLB,,, | Performed by: INTERNAL MEDICINE

## 2021-06-01 PROCEDURE — 99214 OFFICE O/P EST MOD 30 MIN: CPT | Mod: 25,S$GLB,, | Performed by: INTERNAL MEDICINE

## 2021-06-01 PROCEDURE — 1159F MED LIST DOCD IN RCRD: CPT | Mod: S$GLB,,, | Performed by: INTERNAL MEDICINE

## 2021-06-01 PROCEDURE — 3008F PR BODY MASS INDEX (BMI) DOCUMENTED: ICD-10-PCS | Mod: CPTII,S$GLB,, | Performed by: INTERNAL MEDICINE

## 2021-06-01 PROCEDURE — 3072F LOW RISK FOR RETINOPATHY: CPT | Mod: S$GLB,,, | Performed by: INTERNAL MEDICINE

## 2021-06-01 PROCEDURE — G0009 ADMIN PNEUMOCOCCAL VACCINE: HCPCS | Mod: S$GLB,,, | Performed by: INTERNAL MEDICINE

## 2021-06-01 PROCEDURE — 1159F PR MEDICATION LIST DOCUMENTED IN MEDICAL RECORD: ICD-10-PCS | Mod: S$GLB,,, | Performed by: INTERNAL MEDICINE

## 2021-06-01 PROCEDURE — 3008F BODY MASS INDEX DOCD: CPT | Mod: CPTII,S$GLB,, | Performed by: INTERNAL MEDICINE

## 2021-06-01 PROCEDURE — 3044F HG A1C LEVEL LT 7.0%: CPT | Mod: CPTII,S$GLB,, | Performed by: INTERNAL MEDICINE

## 2021-06-01 PROCEDURE — G0009 PNEUMOCOCCAL POLYSACCHARIDE VACCINE 23-VALENT =>2YO SQ IM: ICD-10-PCS | Mod: S$GLB,,, | Performed by: INTERNAL MEDICINE

## 2021-06-01 PROCEDURE — 99214 PR OFFICE/OUTPT VISIT, EST, LEVL IV, 30-39 MIN: ICD-10-PCS | Mod: 25,S$GLB,, | Performed by: INTERNAL MEDICINE

## 2021-06-01 PROCEDURE — 1101F PT FALLS ASSESS-DOCD LE1/YR: CPT | Mod: CPTII,S$GLB,, | Performed by: INTERNAL MEDICINE

## 2021-06-01 PROCEDURE — 99999 PR PBB SHADOW E&M-EST. PATIENT-LVL III: CPT | Mod: PBBFAC,,, | Performed by: INTERNAL MEDICINE

## 2021-06-01 PROCEDURE — 1126F AMNT PAIN NOTED NONE PRSNT: CPT | Mod: S$GLB,,, | Performed by: INTERNAL MEDICINE

## 2021-06-01 PROCEDURE — 1126F PR PAIN SEVERITY QUANTIFIED, NO PAIN PRESENT: ICD-10-PCS | Mod: S$GLB,,, | Performed by: INTERNAL MEDICINE

## 2021-06-01 PROCEDURE — 99999 PR PBB SHADOW E&M-EST. PATIENT-LVL III: ICD-10-PCS | Mod: PBBFAC,,, | Performed by: INTERNAL MEDICINE

## 2021-06-01 PROCEDURE — 99499 RISK ADDL DX/OHS AUDIT: ICD-10-PCS | Mod: S$GLB,,, | Performed by: INTERNAL MEDICINE

## 2021-06-01 PROCEDURE — 1101F PR PT FALLS ASSESS DOC 0-1 FALLS W/OUT INJ PAST YR: ICD-10-PCS | Mod: CPTII,S$GLB,, | Performed by: INTERNAL MEDICINE

## 2021-06-01 PROCEDURE — 90732 PNEUMOCOCCAL POLYSACCHARIDE VACCINE 23-VALENT =>2YO SQ IM: ICD-10-PCS | Mod: S$GLB,,, | Performed by: INTERNAL MEDICINE

## 2021-06-01 PROCEDURE — 3288F PR FALLS RISK ASSESSMENT DOCUMENTED: ICD-10-PCS | Mod: CPTII,S$GLB,, | Performed by: INTERNAL MEDICINE

## 2021-06-01 PROCEDURE — 3288F FALL RISK ASSESSMENT DOCD: CPT | Mod: CPTII,S$GLB,, | Performed by: INTERNAL MEDICINE

## 2021-06-01 PROCEDURE — 3044F PR MOST RECENT HEMOGLOBIN A1C LEVEL <7.0%: ICD-10-PCS | Mod: CPTII,S$GLB,, | Performed by: INTERNAL MEDICINE

## 2021-06-02 ENCOUNTER — OFFICE VISIT (OUTPATIENT)
Dept: OPTOMETRY | Facility: CLINIC | Age: 66
End: 2021-06-02
Payer: MEDICARE

## 2021-06-02 ENCOUNTER — LAB VISIT (OUTPATIENT)
Dept: LAB | Facility: HOSPITAL | Age: 66
End: 2021-06-02
Attending: INTERNAL MEDICINE
Payer: MEDICARE

## 2021-06-02 DIAGNOSIS — N40.1 BENIGN PROSTATIC HYPERPLASIA WITH URINARY OBSTRUCTION: ICD-10-CM

## 2021-06-02 DIAGNOSIS — E78.2 MIXED HYPERLIPIDEMIA: ICD-10-CM

## 2021-06-02 DIAGNOSIS — Z12.5 ENCOUNTER FOR SCREENING FOR MALIGNANT NEOPLASM OF PROSTATE: ICD-10-CM

## 2021-06-02 DIAGNOSIS — N13.8 BENIGN PROSTATIC HYPERPLASIA WITH URINARY OBSTRUCTION: ICD-10-CM

## 2021-06-02 DIAGNOSIS — I10 ESSENTIAL HYPERTENSION: ICD-10-CM

## 2021-06-02 DIAGNOSIS — E11.36 TYPE 2 DIABETES MELLITUS WITH CATARACT: ICD-10-CM

## 2021-06-02 DIAGNOSIS — H40.1132 PRIMARY OPEN ANGLE GLAUCOMA (POAG) OF BOTH EYES, MODERATE STAGE: Primary | ICD-10-CM

## 2021-06-02 LAB
BASOPHILS # BLD AUTO: 0.1 K/UL (ref 0–0.2)
BASOPHILS NFR BLD: 1.1 % (ref 0–1.9)
DIFFERENTIAL METHOD: ABNORMAL
EOSINOPHIL # BLD AUTO: 0.4 K/UL (ref 0–0.5)
EOSINOPHIL NFR BLD: 4.5 % (ref 0–8)
ERYTHROCYTE [DISTWIDTH] IN BLOOD BY AUTOMATED COUNT: 13.5 % (ref 11.5–14.5)
ESTIMATED AVG GLUCOSE: 126 MG/DL (ref 68–131)
HBA1C MFR BLD: 6 % (ref 4–5.6)
HCT VFR BLD AUTO: 52.3 % (ref 40–54)
HGB BLD-MCNC: 17.5 G/DL (ref 14–18)
IMM GRANULOCYTES # BLD AUTO: 0.02 K/UL (ref 0–0.04)
IMM GRANULOCYTES NFR BLD AUTO: 0.2 % (ref 0–0.5)
LYMPHOCYTES # BLD AUTO: 2.6 K/UL (ref 1–4.8)
LYMPHOCYTES NFR BLD: 28.6 % (ref 18–48)
MCH RBC QN AUTO: 30 PG (ref 27–31)
MCHC RBC AUTO-ENTMCNC: 33.5 G/DL (ref 32–36)
MCV RBC AUTO: 90 FL (ref 82–98)
MONOCYTES # BLD AUTO: 1.1 K/UL (ref 0.3–1)
MONOCYTES NFR BLD: 11.6 % (ref 4–15)
NEUTROPHILS # BLD AUTO: 4.9 K/UL (ref 1.8–7.7)
NEUTROPHILS NFR BLD: 54 % (ref 38–73)
NRBC BLD-RTO: 0 /100 WBC
PLATELET # BLD AUTO: 207 K/UL (ref 150–450)
PMV BLD AUTO: 11.6 FL (ref 9.2–12.9)
RBC # BLD AUTO: 5.84 M/UL (ref 4.6–6.2)
WBC # BLD AUTO: 9.05 K/UL (ref 3.9–12.7)

## 2021-06-02 PROCEDURE — 1101F PR PT FALLS ASSESS DOC 0-1 FALLS W/OUT INJ PAST YR: ICD-10-PCS | Mod: CPTII,S$GLB,, | Performed by: OPTOMETRIST

## 2021-06-02 PROCEDURE — 1126F PR PAIN SEVERITY QUANTIFIED, NO PAIN PRESENT: ICD-10-PCS | Mod: S$GLB,,, | Performed by: OPTOMETRIST

## 2021-06-02 PROCEDURE — 92133 POSTERIOR SEGMENT OCT OPTIC NERVE(OCULAR COHERENCE TOMOGRAPHY) - OU - BOTH EYES: ICD-10-PCS | Mod: S$GLB,,, | Performed by: OPTOMETRIST

## 2021-06-02 PROCEDURE — 80053 COMPREHEN METABOLIC PANEL: CPT | Performed by: INTERNAL MEDICINE

## 2021-06-02 PROCEDURE — 92133 CPTRZD OPH DX IMG PST SGM ON: CPT | Mod: S$GLB,,, | Performed by: OPTOMETRIST

## 2021-06-02 PROCEDURE — 36415 COLL VENOUS BLD VENIPUNCTURE: CPT | Mod: PO | Performed by: INTERNAL MEDICINE

## 2021-06-02 PROCEDURE — 99999 PR PBB SHADOW E&M-EST. PATIENT-LVL III: CPT | Mod: PBBFAC,,, | Performed by: OPTOMETRIST

## 2021-06-02 PROCEDURE — 92012 PR EYE EXAM, EST PATIENT,INTERMED: ICD-10-PCS | Mod: S$GLB,,, | Performed by: OPTOMETRIST

## 2021-06-02 PROCEDURE — 92012 INTRM OPH EXAM EST PATIENT: CPT | Mod: S$GLB,,, | Performed by: OPTOMETRIST

## 2021-06-02 PROCEDURE — 1126F AMNT PAIN NOTED NONE PRSNT: CPT | Mod: S$GLB,,, | Performed by: OPTOMETRIST

## 2021-06-02 PROCEDURE — 84153 ASSAY OF PSA TOTAL: CPT | Performed by: INTERNAL MEDICINE

## 2021-06-02 PROCEDURE — 1101F PT FALLS ASSESS-DOCD LE1/YR: CPT | Mod: CPTII,S$GLB,, | Performed by: OPTOMETRIST

## 2021-06-02 PROCEDURE — 3288F PR FALLS RISK ASSESSMENT DOCUMENTED: ICD-10-PCS | Mod: CPTII,S$GLB,, | Performed by: OPTOMETRIST

## 2021-06-02 PROCEDURE — 99999 PR PBB SHADOW E&M-EST. PATIENT-LVL III: ICD-10-PCS | Mod: PBBFAC,,, | Performed by: OPTOMETRIST

## 2021-06-02 PROCEDURE — 3288F FALL RISK ASSESSMENT DOCD: CPT | Mod: CPTII,S$GLB,, | Performed by: OPTOMETRIST

## 2021-06-02 PROCEDURE — 83036 HEMOGLOBIN GLYCOSYLATED A1C: CPT | Performed by: INTERNAL MEDICINE

## 2021-06-02 PROCEDURE — 85025 COMPLETE CBC W/AUTO DIFF WBC: CPT | Performed by: INTERNAL MEDICINE

## 2021-06-03 LAB
ALBUMIN SERPL BCP-MCNC: 3.6 G/DL (ref 3.5–5.2)
ALP SERPL-CCNC: 66 U/L (ref 55–135)
ALT SERPL W/O P-5'-P-CCNC: 27 U/L (ref 10–44)
ANION GAP SERPL CALC-SCNC: 8 MMOL/L (ref 8–16)
AST SERPL-CCNC: 24 U/L (ref 10–40)
BILIRUB SERPL-MCNC: 0.9 MG/DL (ref 0.1–1)
BUN SERPL-MCNC: 16 MG/DL (ref 8–23)
CALCIUM SERPL-MCNC: 10 MG/DL (ref 8.7–10.5)
CHLORIDE SERPL-SCNC: 102 MMOL/L (ref 95–110)
CO2 SERPL-SCNC: 25 MMOL/L (ref 23–29)
COMPLEXED PSA SERPL-MCNC: 0.71 NG/ML (ref 0–4)
CREAT SERPL-MCNC: 1.1 MG/DL (ref 0.5–1.4)
EST. GFR  (AFRICAN AMERICAN): >60 ML/MIN/1.73 M^2
EST. GFR  (NON AFRICAN AMERICAN): >60 ML/MIN/1.73 M^2
GLUCOSE SERPL-MCNC: 96 MG/DL (ref 70–110)
POTASSIUM SERPL-SCNC: 4.3 MMOL/L (ref 3.5–5.1)
PROT SERPL-MCNC: 7.5 G/DL (ref 6–8.4)
SODIUM SERPL-SCNC: 135 MMOL/L (ref 136–145)

## 2021-07-30 RX ORDER — HYDRALAZINE HYDROCHLORIDE 25 MG/1
25 TABLET, FILM COATED ORAL EVERY 12 HOURS
Qty: 60 TABLET | Refills: 4 | Status: SHIPPED | OUTPATIENT
Start: 2021-07-30 | End: 2021-11-24

## 2021-08-23 ENCOUNTER — CLINICAL SUPPORT (OUTPATIENT)
Dept: URGENT CARE | Facility: CLINIC | Age: 66
End: 2021-08-23
Payer: MEDICARE

## 2021-08-23 DIAGNOSIS — Z20.822 ENCOUNTER FOR LABORATORY TESTING FOR COVID-19 VIRUS: Primary | ICD-10-CM

## 2021-08-23 LAB
CTP QC/QA: YES
SARS-COV-2 RDRP RESP QL NAA+PROBE: NEGATIVE

## 2021-08-23 PROCEDURE — U0002 COVID-19 LAB TEST NON-CDC: HCPCS | Mod: QW,S$GLB,, | Performed by: NURSE PRACTITIONER

## 2021-08-23 PROCEDURE — U0002: ICD-10-PCS | Mod: QW,S$GLB,, | Performed by: NURSE PRACTITIONER

## 2021-09-20 ENCOUNTER — TELEPHONE (OUTPATIENT)
Dept: INTERNAL MEDICINE | Facility: CLINIC | Age: 66
End: 2021-09-20

## 2021-09-20 DIAGNOSIS — R89.9 ABNORMAL LABORATORY TEST RESULT: Primary | ICD-10-CM

## 2021-09-20 DIAGNOSIS — E11.9 TYPE 2 DIABETES MELLITUS WITHOUT COMPLICATION, WITHOUT LONG-TERM CURRENT USE OF INSULIN: ICD-10-CM

## 2021-09-20 DIAGNOSIS — E11.36 TYPE 2 DIABETES MELLITUS WITH CATARACT: ICD-10-CM

## 2021-09-21 ENCOUNTER — LAB VISIT (OUTPATIENT)
Dept: LAB | Facility: HOSPITAL | Age: 66
End: 2021-09-21
Attending: INTERNAL MEDICINE
Payer: MEDICARE

## 2021-09-21 DIAGNOSIS — E11.9 TYPE 2 DIABETES MELLITUS WITHOUT COMPLICATION, WITHOUT LONG-TERM CURRENT USE OF INSULIN: ICD-10-CM

## 2021-09-21 DIAGNOSIS — R89.9 ABNORMAL LABORATORY TEST RESULT: ICD-10-CM

## 2021-09-21 LAB
ALBUMIN SERPL BCP-MCNC: 3.4 G/DL (ref 3.5–5.2)
ALP SERPL-CCNC: 68 U/L (ref 55–135)
ALT SERPL W/O P-5'-P-CCNC: 29 U/L (ref 10–44)
ANION GAP SERPL CALC-SCNC: 9 MMOL/L (ref 8–16)
AST SERPL-CCNC: 28 U/L (ref 10–40)
BILIRUB SERPL-MCNC: 0.5 MG/DL (ref 0.1–1)
BUN SERPL-MCNC: 15 MG/DL (ref 8–23)
CALCIUM SERPL-MCNC: 9.3 MG/DL (ref 8.7–10.5)
CHLORIDE SERPL-SCNC: 108 MMOL/L (ref 95–110)
CK SERPL-CCNC: 215 U/L (ref 20–200)
CO2 SERPL-SCNC: 22 MMOL/L (ref 23–29)
CREAT SERPL-MCNC: 1.2 MG/DL (ref 0.5–1.4)
EST. GFR  (AFRICAN AMERICAN): >60 ML/MIN/1.73 M^2
EST. GFR  (NON AFRICAN AMERICAN): >60 ML/MIN/1.73 M^2
ESTIMATED AVG GLUCOSE: 123 MG/DL (ref 68–131)
GLUCOSE SERPL-MCNC: 111 MG/DL (ref 70–110)
HBA1C MFR BLD: 5.9 % (ref 4–5.6)
POTASSIUM SERPL-SCNC: 4.1 MMOL/L (ref 3.5–5.1)
PROT SERPL-MCNC: 7.1 G/DL (ref 6–8.4)
SODIUM SERPL-SCNC: 139 MMOL/L (ref 136–145)

## 2021-09-21 PROCEDURE — 82550 ASSAY OF CK (CPK): CPT | Performed by: INTERNAL MEDICINE

## 2021-09-21 PROCEDURE — 80053 COMPREHEN METABOLIC PANEL: CPT | Performed by: INTERNAL MEDICINE

## 2021-09-21 PROCEDURE — 36415 COLL VENOUS BLD VENIPUNCTURE: CPT | Performed by: INTERNAL MEDICINE

## 2021-09-21 PROCEDURE — 83036 HEMOGLOBIN GLYCOSYLATED A1C: CPT | Performed by: INTERNAL MEDICINE

## 2021-09-24 ENCOUNTER — OFFICE VISIT (OUTPATIENT)
Dept: INTERNAL MEDICINE | Facility: CLINIC | Age: 66
End: 2021-09-24
Payer: MEDICARE

## 2021-09-24 VITALS
RESPIRATION RATE: 12 BRPM | HEIGHT: 68 IN | BODY MASS INDEX: 39.83 KG/M2 | DIASTOLIC BLOOD PRESSURE: 106 MMHG | HEART RATE: 74 BPM | SYSTOLIC BLOOD PRESSURE: 152 MMHG | TEMPERATURE: 98 F | WEIGHT: 262.81 LBS | OXYGEN SATURATION: 95 %

## 2021-09-24 DIAGNOSIS — I10 ESSENTIAL HYPERTENSION: ICD-10-CM

## 2021-09-24 DIAGNOSIS — R07.89 OTHER CHEST PAIN: Primary | ICD-10-CM

## 2021-09-24 DIAGNOSIS — R74.8 INCREASED CPK LEVEL: ICD-10-CM

## 2021-09-24 DIAGNOSIS — Z77.090 ASBESTOS EXPOSURE: ICD-10-CM

## 2021-09-24 DIAGNOSIS — N28.9 ACUTE RENAL INSUFFICIENCY: ICD-10-CM

## 2021-09-24 DIAGNOSIS — E11.36 TYPE 2 DIABETES MELLITUS WITH CATARACT: ICD-10-CM

## 2021-09-24 PROCEDURE — 99214 OFFICE O/P EST MOD 30 MIN: CPT | Mod: S$GLB,,, | Performed by: INTERNAL MEDICINE

## 2021-09-24 PROCEDURE — 3061F NEG MICROALBUMINURIA REV: CPT | Mod: CPTII,S$GLB,, | Performed by: INTERNAL MEDICINE

## 2021-09-24 PROCEDURE — 99999 PR PBB SHADOW E&M-EST. PATIENT-LVL IV: ICD-10-PCS | Mod: PBBFAC,,, | Performed by: INTERNAL MEDICINE

## 2021-09-24 PROCEDURE — 3066F NEPHROPATHY DOC TX: CPT | Mod: CPTII,S$GLB,, | Performed by: INTERNAL MEDICINE

## 2021-09-24 PROCEDURE — 3288F PR FALLS RISK ASSESSMENT DOCUMENTED: ICD-10-PCS | Mod: CPTII,S$GLB,, | Performed by: INTERNAL MEDICINE

## 2021-09-24 PROCEDURE — 3080F PR MOST RECENT DIASTOLIC BLOOD PRESSURE >= 90 MM HG: ICD-10-PCS | Mod: CPTII,S$GLB,, | Performed by: INTERNAL MEDICINE

## 2021-09-24 PROCEDURE — 3080F DIAST BP >= 90 MM HG: CPT | Mod: CPTII,S$GLB,, | Performed by: INTERNAL MEDICINE

## 2021-09-24 PROCEDURE — 3072F PR LOW RISK FOR RETINOPATHY: ICD-10-PCS | Mod: CPTII,S$GLB,, | Performed by: INTERNAL MEDICINE

## 2021-09-24 PROCEDURE — 1126F PR PAIN SEVERITY QUANTIFIED, NO PAIN PRESENT: ICD-10-PCS | Mod: CPTII,S$GLB,, | Performed by: INTERNAL MEDICINE

## 2021-09-24 PROCEDURE — 3288F FALL RISK ASSESSMENT DOCD: CPT | Mod: CPTII,S$GLB,, | Performed by: INTERNAL MEDICINE

## 2021-09-24 PROCEDURE — 1101F PR PT FALLS ASSESS DOC 0-1 FALLS W/OUT INJ PAST YR: ICD-10-PCS | Mod: CPTII,S$GLB,, | Performed by: INTERNAL MEDICINE

## 2021-09-24 PROCEDURE — 1101F PT FALLS ASSESS-DOCD LE1/YR: CPT | Mod: CPTII,S$GLB,, | Performed by: INTERNAL MEDICINE

## 2021-09-24 PROCEDURE — 1126F AMNT PAIN NOTED NONE PRSNT: CPT | Mod: CPTII,S$GLB,, | Performed by: INTERNAL MEDICINE

## 2021-09-24 PROCEDURE — 3061F PR NEG MICROALBUMINURIA RESULT DOCUMENTED/REVIEW: ICD-10-PCS | Mod: CPTII,S$GLB,, | Performed by: INTERNAL MEDICINE

## 2021-09-24 PROCEDURE — 3008F PR BODY MASS INDEX (BMI) DOCUMENTED: ICD-10-PCS | Mod: CPTII,S$GLB,, | Performed by: INTERNAL MEDICINE

## 2021-09-24 PROCEDURE — 3077F PR MOST RECENT SYSTOLIC BLOOD PRESSURE >= 140 MM HG: ICD-10-PCS | Mod: CPTII,S$GLB,, | Performed by: INTERNAL MEDICINE

## 2021-09-24 PROCEDURE — 3066F PR DOCUMENTATION OF TREATMENT FOR NEPHROPATHY: ICD-10-PCS | Mod: CPTII,S$GLB,, | Performed by: INTERNAL MEDICINE

## 2021-09-24 PROCEDURE — 3044F HG A1C LEVEL LT 7.0%: CPT | Mod: CPTII,S$GLB,, | Performed by: INTERNAL MEDICINE

## 2021-09-24 PROCEDURE — 3072F LOW RISK FOR RETINOPATHY: CPT | Mod: CPTII,S$GLB,, | Performed by: INTERNAL MEDICINE

## 2021-09-24 PROCEDURE — 3077F SYST BP >= 140 MM HG: CPT | Mod: CPTII,S$GLB,, | Performed by: INTERNAL MEDICINE

## 2021-09-24 PROCEDURE — 3044F PR MOST RECENT HEMOGLOBIN A1C LEVEL <7.0%: ICD-10-PCS | Mod: CPTII,S$GLB,, | Performed by: INTERNAL MEDICINE

## 2021-09-24 PROCEDURE — 99999 PR PBB SHADOW E&M-EST. PATIENT-LVL IV: CPT | Mod: PBBFAC,,, | Performed by: INTERNAL MEDICINE

## 2021-09-24 PROCEDURE — 99214 PR OFFICE/OUTPT VISIT, EST, LEVL IV, 30-39 MIN: ICD-10-PCS | Mod: S$GLB,,, | Performed by: INTERNAL MEDICINE

## 2021-09-24 PROCEDURE — 3008F BODY MASS INDEX DOCD: CPT | Mod: CPTII,S$GLB,, | Performed by: INTERNAL MEDICINE

## 2021-09-29 ENCOUNTER — TELEPHONE (OUTPATIENT)
Dept: INTERNAL MEDICINE | Facility: CLINIC | Age: 66
End: 2021-09-29

## 2021-09-29 ENCOUNTER — HOSPITAL ENCOUNTER (OUTPATIENT)
Dept: RADIOLOGY | Facility: HOSPITAL | Age: 66
Discharge: HOME | End: 2021-09-29
Attending: INTERNAL MEDICINE
Payer: MEDICARE

## 2021-09-29 ENCOUNTER — TELEPHONE (OUTPATIENT)
Dept: OPTOMETRY | Facility: CLINIC | Age: 66
End: 2021-09-29

## 2021-09-29 DIAGNOSIS — R07.89 OTHER CHEST PAIN: ICD-10-CM

## 2021-09-29 DIAGNOSIS — N39.0 URINARY TRACT INFECTION WITHOUT HEMATURIA, SITE UNSPECIFIED: Primary | ICD-10-CM

## 2021-09-29 DIAGNOSIS — Z77.090 ASBESTOS EXPOSURE: ICD-10-CM

## 2021-09-29 PROCEDURE — 71250 CT THORAX DX C-: CPT | Mod: TC

## 2021-09-29 PROCEDURE — 71250 CT CHEST WITHOUT CONTRAST: ICD-10-PCS | Mod: 26,,, | Performed by: RADIOLOGY

## 2021-09-29 PROCEDURE — 71046 X-RAY EXAM CHEST 2 VIEWS: CPT | Mod: TC,FY

## 2021-09-29 PROCEDURE — 71046 XR CHEST PA AND LATERAL: ICD-10-PCS | Mod: 26,,, | Performed by: RADIOLOGY

## 2021-09-29 PROCEDURE — 71250 CT THORAX DX C-: CPT | Mod: 26,,, | Performed by: RADIOLOGY

## 2021-09-29 PROCEDURE — 71046 X-RAY EXAM CHEST 2 VIEWS: CPT | Mod: 26,,, | Performed by: RADIOLOGY

## 2021-09-29 RX ORDER — SULFAMETHOXAZOLE AND TRIMETHOPRIM 800; 160 MG/1; MG/1
1 TABLET ORAL 2 TIMES DAILY
Qty: 28 TABLET | Refills: 1 | Status: SHIPPED | OUTPATIENT
Start: 2021-09-29 | End: 2021-12-09

## 2021-10-01 ENCOUNTER — PATIENT OUTREACH (OUTPATIENT)
Dept: ADMINISTRATIVE | Facility: OTHER | Age: 66
End: 2021-10-01

## 2021-10-04 ENCOUNTER — TELEPHONE (OUTPATIENT)
Dept: INTERNAL MEDICINE | Facility: CLINIC | Age: 66
End: 2021-10-04

## 2021-10-07 ENCOUNTER — CLINICAL SUPPORT (OUTPATIENT)
Dept: INTERNAL MEDICINE | Facility: CLINIC | Age: 66
End: 2021-10-07
Payer: MEDICARE

## 2021-10-07 ENCOUNTER — TELEPHONE (OUTPATIENT)
Dept: INTERNAL MEDICINE | Facility: CLINIC | Age: 66
End: 2021-10-07
Payer: MEDICARE

## 2021-10-07 VITALS — SYSTOLIC BLOOD PRESSURE: 134 MMHG | DIASTOLIC BLOOD PRESSURE: 90 MMHG

## 2021-10-07 DIAGNOSIS — M54.6 CHRONIC THORACIC BACK PAIN, UNSPECIFIED BACK PAIN LATERALITY: Primary | ICD-10-CM

## 2021-10-07 DIAGNOSIS — I10 ESSENTIAL HYPERTENSION: Primary | ICD-10-CM

## 2021-10-07 DIAGNOSIS — G89.29 CHRONIC THORACIC BACK PAIN, UNSPECIFIED BACK PAIN LATERALITY: Primary | ICD-10-CM

## 2021-10-08 ENCOUNTER — TELEPHONE (OUTPATIENT)
Dept: UROLOGY | Facility: CLINIC | Age: 66
End: 2021-10-08

## 2021-10-08 ENCOUNTER — OFFICE VISIT (OUTPATIENT)
Dept: UROLOGY | Facility: CLINIC | Age: 66
End: 2021-10-08
Payer: MEDICARE

## 2021-10-08 VITALS
DIASTOLIC BLOOD PRESSURE: 92 MMHG | SYSTOLIC BLOOD PRESSURE: 143 MMHG | BODY MASS INDEX: 38.19 KG/M2 | WEIGHT: 252 LBS | HEIGHT: 68 IN | HEART RATE: 85 BPM

## 2021-10-08 DIAGNOSIS — N30.00 ACUTE CYSTITIS WITHOUT HEMATURIA: Primary | ICD-10-CM

## 2021-10-08 DIAGNOSIS — R31.9 URINARY TRACT INFECTION WITH HEMATURIA, SITE UNSPECIFIED: Primary | ICD-10-CM

## 2021-10-08 DIAGNOSIS — Z90.79 S/P TURP: ICD-10-CM

## 2021-10-08 DIAGNOSIS — N32.81 OAB (OVERACTIVE BLADDER): ICD-10-CM

## 2021-10-08 DIAGNOSIS — N39.0 URINARY TRACT INFECTION WITH HEMATURIA, SITE UNSPECIFIED: Primary | ICD-10-CM

## 2021-10-08 DIAGNOSIS — N99.110 POSTPROCEDURAL MALE URETHRAL MEATAL STRICTURE: ICD-10-CM

## 2021-10-08 DIAGNOSIS — E11.36 TYPE 2 DIABETES MELLITUS WITH CATARACT: ICD-10-CM

## 2021-10-08 PROCEDURE — 3066F NEPHROPATHY DOC TX: CPT | Mod: CPTII,S$GLB,, | Performed by: UROLOGY

## 2021-10-08 PROCEDURE — 3066F PR DOCUMENTATION OF TREATMENT FOR NEPHROPATHY: ICD-10-PCS | Mod: CPTII,S$GLB,, | Performed by: UROLOGY

## 2021-10-08 PROCEDURE — 3077F PR MOST RECENT SYSTOLIC BLOOD PRESSURE >= 140 MM HG: ICD-10-PCS | Mod: CPTII,S$GLB,, | Performed by: UROLOGY

## 2021-10-08 PROCEDURE — 1159F MED LIST DOCD IN RCRD: CPT | Mod: CPTII,S$GLB,, | Performed by: UROLOGY

## 2021-10-08 PROCEDURE — 1159F PR MEDICATION LIST DOCUMENTED IN MEDICAL RECORD: ICD-10-PCS | Mod: CPTII,S$GLB,, | Performed by: UROLOGY

## 2021-10-08 PROCEDURE — 1126F PR PAIN SEVERITY QUANTIFIED, NO PAIN PRESENT: ICD-10-PCS | Mod: CPTII,S$GLB,, | Performed by: UROLOGY

## 2021-10-08 PROCEDURE — 1101F PT FALLS ASSESS-DOCD LE1/YR: CPT | Mod: CPTII,S$GLB,, | Performed by: UROLOGY

## 2021-10-08 PROCEDURE — 99999 PR PBB SHADOW E&M-EST. PATIENT-LVL III: ICD-10-PCS | Mod: PBBFAC,,, | Performed by: UROLOGY

## 2021-10-08 PROCEDURE — 3072F PR LOW RISK FOR RETINOPATHY: ICD-10-PCS | Mod: CPTII,S$GLB,, | Performed by: UROLOGY

## 2021-10-08 PROCEDURE — 3288F PR FALLS RISK ASSESSMENT DOCUMENTED: ICD-10-PCS | Mod: CPTII,S$GLB,, | Performed by: UROLOGY

## 2021-10-08 PROCEDURE — 3072F LOW RISK FOR RETINOPATHY: CPT | Mod: CPTII,S$GLB,, | Performed by: UROLOGY

## 2021-10-08 PROCEDURE — 3077F SYST BP >= 140 MM HG: CPT | Mod: CPTII,S$GLB,, | Performed by: UROLOGY

## 2021-10-08 PROCEDURE — 99499 UNLISTED E&M SERVICE: CPT | Mod: S$GLB,,, | Performed by: UROLOGY

## 2021-10-08 PROCEDURE — 99999 PR PBB SHADOW E&M-EST. PATIENT-LVL III: CPT | Mod: PBBFAC,,, | Performed by: UROLOGY

## 2021-10-08 PROCEDURE — 3080F DIAST BP >= 90 MM HG: CPT | Mod: CPTII,S$GLB,, | Performed by: UROLOGY

## 2021-10-08 PROCEDURE — 1126F AMNT PAIN NOTED NONE PRSNT: CPT | Mod: CPTII,S$GLB,, | Performed by: UROLOGY

## 2021-10-08 PROCEDURE — 3044F PR MOST RECENT HEMOGLOBIN A1C LEVEL <7.0%: ICD-10-PCS | Mod: CPTII,S$GLB,, | Performed by: UROLOGY

## 2021-10-08 PROCEDURE — 3008F BODY MASS INDEX DOCD: CPT | Mod: CPTII,S$GLB,, | Performed by: UROLOGY

## 2021-10-08 PROCEDURE — 87086 URINE CULTURE/COLONY COUNT: CPT | Performed by: UROLOGY

## 2021-10-08 PROCEDURE — 99214 OFFICE O/P EST MOD 30 MIN: CPT | Mod: S$GLB,,, | Performed by: UROLOGY

## 2021-10-08 PROCEDURE — 99214 PR OFFICE/OUTPT VISIT, EST, LEVL IV, 30-39 MIN: ICD-10-PCS | Mod: S$GLB,,, | Performed by: UROLOGY

## 2021-10-08 PROCEDURE — 3061F NEG MICROALBUMINURIA REV: CPT | Mod: CPTII,S$GLB,, | Performed by: UROLOGY

## 2021-10-08 PROCEDURE — 1101F PR PT FALLS ASSESS DOC 0-1 FALLS W/OUT INJ PAST YR: ICD-10-PCS | Mod: CPTII,S$GLB,, | Performed by: UROLOGY

## 2021-10-08 PROCEDURE — 3288F FALL RISK ASSESSMENT DOCD: CPT | Mod: CPTII,S$GLB,, | Performed by: UROLOGY

## 2021-10-08 PROCEDURE — 99499 RISK ADDL DX/OHS AUDIT: ICD-10-PCS | Mod: S$GLB,,, | Performed by: UROLOGY

## 2021-10-08 PROCEDURE — 3008F PR BODY MASS INDEX (BMI) DOCUMENTED: ICD-10-PCS | Mod: CPTII,S$GLB,, | Performed by: UROLOGY

## 2021-10-08 PROCEDURE — 3061F PR NEG MICROALBUMINURIA RESULT DOCUMENTED/REVIEW: ICD-10-PCS | Mod: CPTII,S$GLB,, | Performed by: UROLOGY

## 2021-10-08 PROCEDURE — 3044F HG A1C LEVEL LT 7.0%: CPT | Mod: CPTII,S$GLB,, | Performed by: UROLOGY

## 2021-10-08 PROCEDURE — 3080F PR MOST RECENT DIASTOLIC BLOOD PRESSURE >= 90 MM HG: ICD-10-PCS | Mod: CPTII,S$GLB,, | Performed by: UROLOGY

## 2021-10-08 RX ORDER — LOSARTAN POTASSIUM AND HYDROCHLOROTHIAZIDE 12.5; 1 MG/1; MG/1
1 TABLET ORAL DAILY
COMMUNITY
Start: 2021-08-25 | End: 2022-01-03

## 2021-10-09 LAB — BACTERIA UR CULT: NO GROWTH

## 2021-10-29 ENCOUNTER — LAB VISIT (OUTPATIENT)
Dept: LAB | Facility: HOSPITAL | Age: 66
End: 2021-10-29
Attending: UROLOGY
Payer: MEDICARE

## 2021-10-29 DIAGNOSIS — N30.00 ACUTE CYSTITIS WITHOUT HEMATURIA: ICD-10-CM

## 2021-10-29 PROCEDURE — 87086 URINE CULTURE/COLONY COUNT: CPT | Performed by: UROLOGY

## 2021-10-31 LAB — BACTERIA UR CULT: NO GROWTH

## 2021-11-07 ENCOUNTER — LAB VISIT (OUTPATIENT)
Dept: URGENT CARE | Facility: CLINIC | Age: 66
End: 2021-11-07
Payer: MEDICARE

## 2021-11-07 DIAGNOSIS — N99.110 POSTPROCEDURAL MALE URETHRAL MEATAL STRICTURE: ICD-10-CM

## 2021-11-07 DIAGNOSIS — R31.9 URINARY TRACT INFECTION WITH HEMATURIA, SITE UNSPECIFIED: ICD-10-CM

## 2021-11-07 DIAGNOSIS — N39.0 URINARY TRACT INFECTION WITH HEMATURIA, SITE UNSPECIFIED: ICD-10-CM

## 2021-11-07 PROCEDURE — U0005 INFEC AGEN DETEC AMPLI PROBE: HCPCS | Performed by: UROLOGY

## 2021-11-07 PROCEDURE — U0003 INFECTIOUS AGENT DETECTION BY NUCLEIC ACID (DNA OR RNA); SEVERE ACUTE RESPIRATORY SYNDROME CORONAVIRUS 2 (SARS-COV-2) (CORONAVIRUS DISEASE [COVID-19]), AMPLIFIED PROBE TECHNIQUE, MAKING USE OF HIGH THROUGHPUT TECHNOLOGIES AS DESCRIBED BY CMS-2020-01-R: HCPCS | Performed by: UROLOGY

## 2021-11-08 LAB
SARS-COV-2 RNA RESP QL NAA+PROBE: NOT DETECTED
SARS-COV-2- CYCLE NUMBER: NORMAL

## 2021-11-09 ENCOUNTER — TELEPHONE (OUTPATIENT)
Dept: UROLOGY | Facility: CLINIC | Age: 66
End: 2021-11-09
Payer: MEDICARE

## 2021-11-10 ENCOUNTER — TELEPHONE (OUTPATIENT)
Dept: INTERNAL MEDICINE | Facility: CLINIC | Age: 66
End: 2021-11-10
Payer: MEDICARE

## 2021-11-10 ENCOUNTER — HOSPITAL ENCOUNTER (OUTPATIENT)
Facility: HOSPITAL | Age: 66
Discharge: HOME | End: 2021-11-10
Attending: UROLOGY | Admitting: UROLOGY
Payer: MEDICARE

## 2021-11-10 ENCOUNTER — ANESTHESIA EVENT (OUTPATIENT)
Dept: SURGERY | Facility: HOSPITAL | Age: 66
End: 2021-11-10
Payer: MEDICARE

## 2021-11-10 ENCOUNTER — ANESTHESIA (OUTPATIENT)
Dept: SURGERY | Facility: HOSPITAL | Age: 66
End: 2021-11-10
Payer: MEDICARE

## 2021-11-10 VITALS
DIASTOLIC BLOOD PRESSURE: 96 MMHG | SYSTOLIC BLOOD PRESSURE: 179 MMHG | TEMPERATURE: 98 F | RESPIRATION RATE: 16 BRPM | WEIGHT: 252 LBS | BODY MASS INDEX: 38.19 KG/M2 | OXYGEN SATURATION: 100 % | HEART RATE: 75 BPM | HEIGHT: 68 IN

## 2021-11-10 DIAGNOSIS — N35.919 URETHRAL STRICTURE: ICD-10-CM

## 2021-11-10 DIAGNOSIS — N35.919 STRICTURE OF MALE URETHRA, UNSPECIFIED STRICTURE TYPE: Primary | ICD-10-CM

## 2021-11-10 LAB
POCT GLUCOSE: 88 MG/DL (ref 70–110)
POCT GLUCOSE: 96 MG/DL (ref 70–110)

## 2021-11-10 PROCEDURE — D9220A PRA ANESTHESIA: ICD-10-PCS | Mod: CRNA,,, | Performed by: NURSE ANESTHETIST, CERTIFIED REGISTERED

## 2021-11-10 PROCEDURE — 52281 PR CYSTOSCOPY,DIL URETHRAL STRICTURE: ICD-10-PCS | Mod: ,,, | Performed by: UROLOGY

## 2021-11-10 PROCEDURE — 37000009 HC ANESTHESIA EA ADD 15 MINS: Performed by: UROLOGY

## 2021-11-10 PROCEDURE — 63600175 PHARM REV CODE 636 W HCPCS: Performed by: STUDENT IN AN ORGANIZED HEALTH CARE EDUCATION/TRAINING PROGRAM

## 2021-11-10 PROCEDURE — 52281 CYSTOSCOPY AND TREATMENT: CPT | Mod: ,,, | Performed by: UROLOGY

## 2021-11-10 PROCEDURE — 82962 GLUCOSE BLOOD TEST: CPT | Mod: 91 | Performed by: UROLOGY

## 2021-11-10 PROCEDURE — 36000707: Performed by: UROLOGY

## 2021-11-10 PROCEDURE — 63600175 PHARM REV CODE 636 W HCPCS: Performed by: NURSE ANESTHETIST, CERTIFIED REGISTERED

## 2021-11-10 PROCEDURE — 71000015 HC POSTOP RECOV 1ST HR: Performed by: UROLOGY

## 2021-11-10 PROCEDURE — D9220A PRA ANESTHESIA: Mod: CRNA,,, | Performed by: NURSE ANESTHETIST, CERTIFIED REGISTERED

## 2021-11-10 PROCEDURE — 36000706: Performed by: UROLOGY

## 2021-11-10 PROCEDURE — 37000008 HC ANESTHESIA 1ST 15 MINUTES: Performed by: UROLOGY

## 2021-11-10 PROCEDURE — D9220A PRA ANESTHESIA: Mod: ANES,,, | Performed by: ANESTHESIOLOGY

## 2021-11-10 PROCEDURE — 71000044 HC DOSC ROUTINE RECOVERY FIRST HOUR: Performed by: UROLOGY

## 2021-11-10 PROCEDURE — 25000003 PHARM REV CODE 250: Performed by: NURSE ANESTHETIST, CERTIFIED REGISTERED

## 2021-11-10 PROCEDURE — D9220A PRA ANESTHESIA: ICD-10-PCS | Mod: ANES,,, | Performed by: ANESTHESIOLOGY

## 2021-11-10 PROCEDURE — C1769 GUIDE WIRE: HCPCS | Performed by: UROLOGY

## 2021-11-10 RX ORDER — HYDROCODONE BITARTRATE AND ACETAMINOPHEN 5; 325 MG/1; MG/1
1 TABLET ORAL EVERY 6 HOURS PRN
Qty: 5 TABLET | Refills: 0 | Status: SHIPPED | OUTPATIENT
Start: 2021-11-10 | End: 2021-12-12

## 2021-11-10 RX ORDER — ROCURONIUM BROMIDE 10 MG/ML
INJECTION, SOLUTION INTRAVENOUS
Status: DISCONTINUED | OUTPATIENT
Start: 2021-11-10 | End: 2021-11-10

## 2021-11-10 RX ORDER — CEPHALEXIN 500 MG/1
500 CAPSULE ORAL EVERY 12 HOURS
Qty: 6 CAPSULE | Refills: 0 | Status: SHIPPED | OUTPATIENT
Start: 2021-11-10 | End: 2021-11-13

## 2021-11-10 RX ORDER — LIDOCAINE HYDROCHLORIDE 20 MG/ML
INJECTION, SOLUTION EPIDURAL; INFILTRATION; INTRACAUDAL; PERINEURAL
Status: DISCONTINUED | OUTPATIENT
Start: 2021-11-10 | End: 2021-11-10

## 2021-11-10 RX ORDER — ONDANSETRON 2 MG/ML
INJECTION INTRAMUSCULAR; INTRAVENOUS
Status: DISCONTINUED | OUTPATIENT
Start: 2021-11-10 | End: 2021-11-10

## 2021-11-10 RX ORDER — FENTANYL CITRATE 50 UG/ML
25 INJECTION, SOLUTION INTRAMUSCULAR; INTRAVENOUS EVERY 5 MIN PRN
Status: DISCONTINUED | OUTPATIENT
Start: 2021-11-10 | End: 2021-11-10 | Stop reason: HOSPADM

## 2021-11-10 RX ORDER — MIDAZOLAM HYDROCHLORIDE 1 MG/ML
INJECTION, SOLUTION INTRAMUSCULAR; INTRAVENOUS
Status: DISCONTINUED | OUTPATIENT
Start: 2021-11-10 | End: 2021-11-10

## 2021-11-10 RX ORDER — CEFAZOLIN SODIUM 1 G/3ML
2 INJECTION, POWDER, FOR SOLUTION INTRAMUSCULAR; INTRAVENOUS
Status: COMPLETED | OUTPATIENT
Start: 2021-11-10 | End: 2021-11-10

## 2021-11-10 RX ORDER — ONDANSETRON 2 MG/ML
4 INJECTION INTRAMUSCULAR; INTRAVENOUS DAILY PRN
Status: DISCONTINUED | OUTPATIENT
Start: 2021-11-10 | End: 2021-11-10 | Stop reason: HOSPADM

## 2021-11-10 RX ORDER — PROPOFOL 10 MG/ML
VIAL (ML) INTRAVENOUS
Status: DISCONTINUED | OUTPATIENT
Start: 2021-11-10 | End: 2021-11-10

## 2021-11-10 RX ORDER — SODIUM CHLORIDE 0.9 % (FLUSH) 0.9 %
3 SYRINGE (ML) INJECTION EVERY 30 MIN PRN
Status: DISCONTINUED | OUTPATIENT
Start: 2021-11-10 | End: 2021-11-10 | Stop reason: HOSPADM

## 2021-11-10 RX ORDER — FENTANYL CITRATE 50 UG/ML
INJECTION, SOLUTION INTRAMUSCULAR; INTRAVENOUS
Status: DISCONTINUED | OUTPATIENT
Start: 2021-11-10 | End: 2021-11-10

## 2021-11-10 RX ADMIN — ROCURONIUM BROMIDE 50 MG: 10 INJECTION, SOLUTION INTRAVENOUS at 02:11

## 2021-11-10 RX ADMIN — LIDOCAINE HYDROCHLORIDE 75 MG: 20 INJECTION, SOLUTION EPIDURAL; INFILTRATION; INTRACAUDAL at 02:11

## 2021-11-10 RX ADMIN — CEFAZOLIN 2 G: 330 INJECTION, POWDER, FOR SOLUTION INTRAMUSCULAR; INTRAVENOUS at 02:11

## 2021-11-10 RX ADMIN — FENTANYL CITRATE 50 MCG: 50 INJECTION INTRAMUSCULAR; INTRAVENOUS at 02:11

## 2021-11-10 RX ADMIN — MIDAZOLAM HYDROCHLORIDE 2 MG: 1 INJECTION, SOLUTION INTRAMUSCULAR; INTRAVENOUS at 02:11

## 2021-11-10 RX ADMIN — SODIUM CHLORIDE: 0.9 INJECTION, SOLUTION INTRAVENOUS at 02:11

## 2021-11-10 RX ADMIN — SUGAMMADEX 400 MG: 100 INJECTION, SOLUTION INTRAVENOUS at 03:11

## 2021-11-10 RX ADMIN — PROPOFOL 180 MG: 10 INJECTION, EMULSION INTRAVENOUS at 02:11

## 2021-11-10 RX ADMIN — ONDANSETRON 4 MG: 2 INJECTION INTRAMUSCULAR; INTRAVENOUS at 02:11

## 2021-11-12 ENCOUNTER — TELEPHONE (OUTPATIENT)
Dept: INTERNAL MEDICINE | Facility: CLINIC | Age: 66
End: 2021-11-12
Payer: MEDICARE

## 2021-11-15 ENCOUNTER — TELEPHONE (OUTPATIENT)
Dept: UROLOGY | Facility: CLINIC | Age: 66
End: 2021-11-15
Payer: MEDICARE

## 2021-11-16 ENCOUNTER — TELEPHONE (OUTPATIENT)
Dept: INTERNAL MEDICINE | Facility: CLINIC | Age: 66
End: 2021-11-16
Payer: MEDICARE

## 2021-11-29 ENCOUNTER — TELEPHONE (OUTPATIENT)
Dept: PHYSICAL MEDICINE AND REHAB | Facility: CLINIC | Age: 66
End: 2021-11-29
Payer: MEDICARE

## 2021-12-09 ENCOUNTER — OFFICE VISIT (OUTPATIENT)
Dept: UROLOGY | Facility: CLINIC | Age: 66
End: 2021-12-09
Payer: MEDICARE

## 2021-12-09 VITALS
DIASTOLIC BLOOD PRESSURE: 97 MMHG | WEIGHT: 259.06 LBS | SYSTOLIC BLOOD PRESSURE: 155 MMHG | HEART RATE: 91 BPM | BODY MASS INDEX: 39.26 KG/M2 | HEIGHT: 68 IN

## 2021-12-09 DIAGNOSIS — N99.110 POSTPROCEDURAL MALE URETHRAL MEATAL STRICTURE: Primary | ICD-10-CM

## 2021-12-09 PROCEDURE — 3061F PR NEG MICROALBUMINURIA RESULT DOCUMENTED/REVIEW: ICD-10-PCS | Mod: CPTII,S$GLB,, | Performed by: UROLOGY

## 2021-12-09 PROCEDURE — 99213 PR OFFICE/OUTPT VISIT, EST, LEVL III, 20-29 MIN: ICD-10-PCS | Mod: S$GLB,,, | Performed by: UROLOGY

## 2021-12-09 PROCEDURE — 3072F PR LOW RISK FOR RETINOPATHY: ICD-10-PCS | Mod: CPTII,S$GLB,, | Performed by: UROLOGY

## 2021-12-09 PROCEDURE — 3066F PR DOCUMENTATION OF TREATMENT FOR NEPHROPATHY: ICD-10-PCS | Mod: CPTII,S$GLB,, | Performed by: UROLOGY

## 2021-12-09 PROCEDURE — 3066F NEPHROPATHY DOC TX: CPT | Mod: CPTII,S$GLB,, | Performed by: UROLOGY

## 2021-12-09 PROCEDURE — 99213 OFFICE O/P EST LOW 20 MIN: CPT | Mod: S$GLB,,, | Performed by: UROLOGY

## 2021-12-09 PROCEDURE — 3061F NEG MICROALBUMINURIA REV: CPT | Mod: CPTII,S$GLB,, | Performed by: UROLOGY

## 2021-12-09 PROCEDURE — 99999 PR PBB SHADOW E&M-EST. PATIENT-LVL III: ICD-10-PCS | Mod: PBBFAC,,, | Performed by: UROLOGY

## 2021-12-09 PROCEDURE — 3072F LOW RISK FOR RETINOPATHY: CPT | Mod: CPTII,S$GLB,, | Performed by: UROLOGY

## 2021-12-09 PROCEDURE — 99999 PR PBB SHADOW E&M-EST. PATIENT-LVL III: CPT | Mod: PBBFAC,,, | Performed by: UROLOGY

## 2021-12-12 ENCOUNTER — HOSPITAL ENCOUNTER (EMERGENCY)
Facility: HOSPITAL | Age: 66
Discharge: HOME | End: 2021-12-12
Attending: EMERGENCY MEDICINE
Payer: MEDICARE

## 2021-12-12 VITALS
OXYGEN SATURATION: 99 % | SYSTOLIC BLOOD PRESSURE: 109 MMHG | HEART RATE: 86 BPM | TEMPERATURE: 98 F | DIASTOLIC BLOOD PRESSURE: 103 MMHG | BODY MASS INDEX: 39.23 KG/M2 | RESPIRATION RATE: 20 BRPM | WEIGHT: 258 LBS

## 2021-12-12 DIAGNOSIS — M62.838 MUSCLE SPASM: Primary | ICD-10-CM

## 2021-12-12 DIAGNOSIS — I10 UNCONTROLLED HYPERTENSION: ICD-10-CM

## 2021-12-12 PROCEDURE — 63600175 PHARM REV CODE 636 W HCPCS: Performed by: EMERGENCY MEDICINE

## 2021-12-12 PROCEDURE — 25000003 PHARM REV CODE 250: Performed by: EMERGENCY MEDICINE

## 2021-12-12 PROCEDURE — 96372 THER/PROPH/DIAG INJ SC/IM: CPT

## 2021-12-12 PROCEDURE — 99284 EMERGENCY DEPT VISIT MOD MDM: CPT | Mod: 25

## 2021-12-12 RX ORDER — LOSARTAN POTASSIUM 25 MG/1
25 TABLET ORAL ONCE
Status: COMPLETED | OUTPATIENT
Start: 2021-12-12 | End: 2021-12-12

## 2021-12-12 RX ORDER — METHOCARBAMOL 500 MG/1
500 TABLET, FILM COATED ORAL
Status: COMPLETED | OUTPATIENT
Start: 2021-12-12 | End: 2021-12-12

## 2021-12-12 RX ORDER — KETOROLAC TROMETHAMINE 30 MG/ML
30 INJECTION, SOLUTION INTRAMUSCULAR; INTRAVENOUS
Status: COMPLETED | OUTPATIENT
Start: 2021-12-12 | End: 2021-12-12

## 2021-12-12 RX ORDER — METHOCARBAMOL 500 MG/1
500 TABLET, FILM COATED ORAL 3 TIMES DAILY
Qty: 15 TABLET | Refills: 0 | Status: SHIPPED | OUTPATIENT
Start: 2021-12-12 | End: 2021-12-14 | Stop reason: SDUPTHER

## 2021-12-12 RX ORDER — LOSARTAN POTASSIUM 25 MG/1
25 TABLET ORAL DAILY
Status: DISCONTINUED | OUTPATIENT
Start: 2021-12-12 | End: 2021-12-12

## 2021-12-12 RX ADMIN — KETOROLAC TROMETHAMINE 30 MG: 30 INJECTION, SOLUTION INTRAMUSCULAR at 11:12

## 2021-12-12 RX ADMIN — METHOCARBAMOL 500 MG: 500 TABLET ORAL at 11:12

## 2021-12-12 RX ADMIN — LOSARTAN POTASSIUM 25 MG: 25 TABLET, FILM COATED ORAL at 11:12

## 2021-12-13 DIAGNOSIS — M54.6 CHRONIC THORACIC BACK PAIN, UNSPECIFIED BACK PAIN LATERALITY: Primary | ICD-10-CM

## 2021-12-13 DIAGNOSIS — G89.29 CHRONIC THORACIC BACK PAIN, UNSPECIFIED BACK PAIN LATERALITY: Primary | ICD-10-CM

## 2021-12-14 ENCOUNTER — PATIENT OUTREACH (OUTPATIENT)
Dept: ADMINISTRATIVE | Facility: OTHER | Age: 66
End: 2021-12-14
Payer: MEDICARE

## 2021-12-14 ENCOUNTER — TELEPHONE (OUTPATIENT)
Dept: INTERNAL MEDICINE | Facility: CLINIC | Age: 66
End: 2021-12-14
Payer: MEDICARE

## 2021-12-14 DIAGNOSIS — G89.29 CHRONIC THORACIC BACK PAIN, UNSPECIFIED BACK PAIN LATERALITY: Primary | ICD-10-CM

## 2021-12-14 DIAGNOSIS — M54.6 CHRONIC THORACIC BACK PAIN, UNSPECIFIED BACK PAIN LATERALITY: Primary | ICD-10-CM

## 2021-12-14 RX ORDER — TRAMADOL HYDROCHLORIDE 50 MG/1
50 TABLET ORAL EVERY 6 HOURS PRN
Qty: 60 TABLET | Refills: 0 | Status: SHIPPED | OUTPATIENT
Start: 2021-12-14 | End: 2021-12-14 | Stop reason: SDUPTHER

## 2021-12-14 RX ORDER — METHOCARBAMOL 500 MG/1
500 TABLET, FILM COATED ORAL 3 TIMES DAILY
Qty: 60 TABLET | Refills: 0 | Status: SHIPPED | OUTPATIENT
Start: 2021-12-14 | End: 2022-06-21

## 2021-12-14 RX ORDER — METHOCARBAMOL 500 MG/1
500 TABLET, FILM COATED ORAL 3 TIMES DAILY
Qty: 60 TABLET | Refills: 0 | Status: SHIPPED | OUTPATIENT
Start: 2021-12-14 | End: 2021-12-14 | Stop reason: SDUPTHER

## 2021-12-14 RX ORDER — TRAMADOL HYDROCHLORIDE 50 MG/1
50 TABLET ORAL EVERY 6 HOURS PRN
Qty: 60 TABLET | Refills: 0 | Status: SHIPPED | OUTPATIENT
Start: 2021-12-14 | End: 2022-06-21

## 2021-12-15 ENCOUNTER — OFFICE VISIT (OUTPATIENT)
Dept: PAIN MEDICINE | Facility: CLINIC | Age: 66
End: 2021-12-15
Payer: MEDICARE

## 2021-12-15 ENCOUNTER — TELEPHONE (OUTPATIENT)
Dept: PAIN MEDICINE | Facility: CLINIC | Age: 66
End: 2021-12-15
Payer: MEDICARE

## 2021-12-15 VITALS — SYSTOLIC BLOOD PRESSURE: 156 MMHG | HEART RATE: 102 BPM | DIASTOLIC BLOOD PRESSURE: 102 MMHG

## 2021-12-15 DIAGNOSIS — M54.14 THORACIC RADICULOPATHY: ICD-10-CM

## 2021-12-15 DIAGNOSIS — M51.34 DDD (DEGENERATIVE DISC DISEASE), THORACIC: ICD-10-CM

## 2021-12-15 DIAGNOSIS — M54.6 CHRONIC THORACIC BACK PAIN, UNSPECIFIED BACK PAIN LATERALITY: Primary | ICD-10-CM

## 2021-12-15 DIAGNOSIS — M54.14 THORACIC RADICULOPATHY: Primary | ICD-10-CM

## 2021-12-15 DIAGNOSIS — G89.29 CHRONIC THORACIC BACK PAIN, UNSPECIFIED BACK PAIN LATERALITY: Primary | ICD-10-CM

## 2021-12-15 PROCEDURE — 99999 PR PBB SHADOW E&M-EST. PATIENT-LVL III: CPT | Mod: PBBFAC,,, | Performed by: PAIN MEDICINE

## 2021-12-15 PROCEDURE — 3061F NEG MICROALBUMINURIA REV: CPT | Mod: CPTII,S$GLB,, | Performed by: PAIN MEDICINE

## 2021-12-15 PROCEDURE — 99999 PR PBB SHADOW E&M-EST. PATIENT-LVL III: ICD-10-PCS | Mod: PBBFAC,,, | Performed by: PAIN MEDICINE

## 2021-12-15 PROCEDURE — 3061F PR NEG MICROALBUMINURIA RESULT DOCUMENTED/REVIEW: ICD-10-PCS | Mod: CPTII,S$GLB,, | Performed by: PAIN MEDICINE

## 2021-12-15 PROCEDURE — 3066F NEPHROPATHY DOC TX: CPT | Mod: CPTII,S$GLB,, | Performed by: PAIN MEDICINE

## 2021-12-15 PROCEDURE — 99204 PR OFFICE/OUTPT VISIT, NEW, LEVL IV, 45-59 MIN: ICD-10-PCS | Mod: S$GLB,,, | Performed by: PAIN MEDICINE

## 2021-12-15 PROCEDURE — 3066F PR DOCUMENTATION OF TREATMENT FOR NEPHROPATHY: ICD-10-PCS | Mod: CPTII,S$GLB,, | Performed by: PAIN MEDICINE

## 2021-12-15 PROCEDURE — 99204 OFFICE O/P NEW MOD 45 MIN: CPT | Mod: S$GLB,,, | Performed by: PAIN MEDICINE

## 2021-12-15 PROCEDURE — 3072F PR LOW RISK FOR RETINOPATHY: ICD-10-PCS | Mod: CPTII,S$GLB,, | Performed by: PAIN MEDICINE

## 2021-12-15 PROCEDURE — 3072F LOW RISK FOR RETINOPATHY: CPT | Mod: CPTII,S$GLB,, | Performed by: PAIN MEDICINE

## 2021-12-15 NOTE — H&P (VIEW-ONLY)
[facility]    Referred by: Dr. Inder Quarles   Reason for referral: Chronic thoracic back pain, unspecified back pain laterality     CC:   Chief Complaint   Patient presents with    under arm      Right      Subjective:   Celso Hernandez is a 66 y.o. male who has a past medical history of Arthritis, Cataract, Colon polyps, Diabetes mellitus type II, Glaucoma, Hyperlipidemia, Hypertension, Multiple duodenal ulcers, and Unspecified hemorrhoids without mention of complication. He complains of pain as described below.    Location: right under arm, rib cage    Onset: about 6 mos (started approx May 2021)  Radiation: from mid axillary line toward back  Timing: constant  Current Pain Score: 2/10  Weekly Pain Range: 2-10/10  Quality: Grabbing and Spasms (no burning, tingling, numbness)  Worsened by: exercise, extension, lifting, lying down, sitting and walking for more than several minutes  Improved by: medications    Previous Therapies:  PT/OT: Denies  HEP: Yes, stretching at home and walking.  TENS: No  Interventions: Denies  Surgery: Denies localized surgery  Opioids:  Adjuvants:     Current Pain Medications:  1. Mobic  2. Robaxin     Assessment & Plan:  Problem List Items Addressed This Visit     DDD (degenerative disc disease), thoracic    Relevant Orders    MRI Thoracic Spine Without Contrast    Chronic thoracic back pain - Primary    Relevant Orders    MRI Thoracic Spine Without Contrast    Thoracic radiculopathy        12/17/21 - Celso Hernandez is a 66 y.o. male who  has a past medical history of Arthritis, Cataract, Colon polyps, Diabetes mellitus type II, Glaucoma, Hyperlipidemia, Hypertension, Multiple duodenal ulcers (10/08/2019), and Unspecified hemorrhoids without mention of complication.  By history and examination this patient has chronic mid/upper back pain with right T5-6 radiculopathy.  The underlying cause cause is facet arthritis and degenerative disc disease with a large,  obstructive osteophyte on the right side as seen on CT chest.  MRI is appropriate prior to an injections to assess soft tissue and vasculature.  We discussed the underlying diagnoses and multiple treatment options including non-opioid medications, opioid medications, interventional procedures, physical therapy and home exercise.  The risks and benefits of each treatment option were discussed and all questions were answered.      1. MRI thoracic spine ordered to better characterize lesion and soft tissue impact  2. Thoracic GABRIELA @ T5-6  3. Continue current medications for now with consideration of gabapentin/pregabalin  4. Duloxetine is a tertiary option  5. PT may help but physical activity is unlikely to reverse a large, bony osteophyte  6. Surgical referral will be considered if the above conservative options fail    Follow Up: 2-3 weeks after GABRIELA    Disclaimer: This note was partly generated using dictation software which may occasionally result in transcription errors.    Imaging:  CT Chest Without Contrast  Impression:  No worrisome finding on the chest CT.  Mild atherosclerotic plaque of the aorta and mild coronary artery calcification  Severe foraminal narrowing on the right at T5-6 due to significant facet joint osseous hypertrophy.  Electronically signed by: Halley Lundberg MD  Date:                                            09/29/2021  Time:                                           08:56    X-Ray Lumbar Spine Ap And Lateral 11/21/2019  FINDINGS:  Two views AP lateral.  There is mild DJD and DISH.  Alignment is normal.  No fracture dislocation bone destruction.  No trauma seen.  Impression:  No acute process seen.  DJD and DISH.      Review of Systems:  Review of Systems   Constitutional: Negative for chills and fever.   HENT: Negative for nosebleeds.    Eyes: Negative for blurred vision and pain.   Respiratory: Negative for hemoptysis.    Cardiovascular: Negative for chest pain and palpitations.    Gastrointestinal: Negative for heartburn, nausea and vomiting.   Genitourinary: Negative for dysuria and hematuria.   Musculoskeletal: Positive for joint pain. Negative for myalgias.   Skin: Negative for rash.   Neurological: Negative for seizures and loss of consciousness.   Endo/Heme/Allergies: Does not bruise/bleed easily.   Psychiatric/Behavioral: Negative for hallucinations.       Physical Exam:  There were no vitals filed for this visit.  General    Nursing note and vitals reviewed.  Constitutional: He is oriented to person, place, and time. He appears well-developed and well-nourished. No distress.   HENT:   Head: Normocephalic and atraumatic.   Nose: Nose normal.   Eyes: Conjunctivae and EOM are normal. Pupils are equal, round, and reactive to light. Right eye exhibits no discharge. Left eye exhibits no discharge. No scleral icterus.   Neck: No JVD present.   Cardiovascular: Intact distal pulses.    Pulmonary/Chest: Effort normal. No respiratory distress.   Abdominal: He exhibits no distension.   Neurological: He is alert and oriented to person, place, and time. Coordination normal.   Psychiatric: He has a normal mood and affect. His behavior is normal. Judgment and thought content normal.     General Musculoskeletal Exam   Gait: normal     Back (L-Spine & T-Spine) / Neck (C-Spine) Exam     Tenderness Right paramedian tenderness of the Upper T-Spine.     Back (L-Spine & T-Spine) Range of Motion   Lateral bend right: abnormal Back lateral bend right: leaning to the right exacerbates pain.   Lateral bend left: normal   Rotation right: normal   Rotation left: normal     Comments:  There are no topical lesions in the affected area such a those seen with shingles.

## 2021-12-17 PROBLEM — G89.29 CHRONIC THORACIC BACK PAIN: Status: ACTIVE | Noted: 2021-12-17

## 2021-12-17 PROBLEM — M54.6 CHRONIC THORACIC BACK PAIN: Status: ACTIVE | Noted: 2021-12-17

## 2021-12-17 PROBLEM — M54.14 THORACIC RADICULOPATHY: Status: ACTIVE | Noted: 2021-12-17

## 2021-12-17 PROBLEM — M51.34 DDD (DEGENERATIVE DISC DISEASE), THORACIC: Status: ACTIVE | Noted: 2021-12-17

## 2021-12-20 ENCOUNTER — TELEPHONE (OUTPATIENT)
Dept: PHYSICAL MEDICINE AND REHAB | Facility: CLINIC | Age: 66
End: 2021-12-20
Payer: MEDICARE

## 2021-12-21 ENCOUNTER — IMMUNIZATION (OUTPATIENT)
Dept: PRIMARY CARE CLINIC | Facility: CLINIC | Age: 66
End: 2021-12-21
Payer: MEDICARE

## 2021-12-21 DIAGNOSIS — Z23 NEED FOR VACCINATION: Primary | ICD-10-CM

## 2021-12-21 PROCEDURE — 0004A COVID-19, MRNA, LNP-S, PF, 30 MCG/0.3 ML DOSE VACCINE: CPT | Mod: CV19,PBBFAC | Performed by: INTERNAL MEDICINE

## 2021-12-30 NOTE — DISCHARGE INSTRUCTIONS
Home Care Instructions Pain Management:    1.  DIET:    You may resume your normal diet today.    2.  BATHING:    You may shower with luke warm water.    3.  DRESSING:    You may remove your bandage today.    4.  ACTIVITY LEVEL:      You may resume your normal activities 24 hours after your procedure.    5.  MEDICATIONS:    You may resume your normal medications today.    6.  SPECIAL INSTRUCTIONS:    No heat to the injection site for 24 hours including bath or shower, heating pad, moist heat or hot tubs.    Use an ice pack to the injection site for any pain or discomfort.  Apply ice packs for 20 minute intervals as needed.    If you have received any sedatives by mouth today, you can not drive for 12 hours.    If you have received sedation through an IV, you can not drive for 24 hours.    PLEASE CALL YOUR DOCTOR FOR THE FOLLOWIN.  Redness or swelling around the injection site.  2.  Fever of 101 degrees.  3.  Drainage (pus) from the injection site.  4.  For any continuous bleeding (some dried blood over the incision is normal.)    FOR EMERGENCIES:    If any unusual problems or difficulties occur during clinic hours, call [phone] or dial [phone].    Follow up with with your physician in 2-3 weeks.

## 2022-01-03 ENCOUNTER — HOSPITAL ENCOUNTER (OUTPATIENT)
Dept: RADIOLOGY | Facility: HOSPITAL | Age: 67
Discharge: HOME | End: 2022-01-03
Attending: PAIN MEDICINE
Payer: MEDICARE

## 2022-01-03 ENCOUNTER — TELEPHONE (OUTPATIENT)
Dept: PAIN MEDICINE | Facility: CLINIC | Age: 67
End: 2022-01-03
Payer: MEDICARE

## 2022-01-03 DIAGNOSIS — G89.29 CHRONIC THORACIC BACK PAIN, UNSPECIFIED BACK PAIN LATERALITY: ICD-10-CM

## 2022-01-03 DIAGNOSIS — M51.34 DDD (DEGENERATIVE DISC DISEASE), THORACIC: ICD-10-CM

## 2022-01-03 DIAGNOSIS — M54.6 CHRONIC THORACIC BACK PAIN, UNSPECIFIED BACK PAIN LATERALITY: ICD-10-CM

## 2022-01-03 PROCEDURE — 72146 MRI THORACIC SPINE WITHOUT CONTRAST: ICD-10-PCS | Mod: 26,,, | Performed by: RADIOLOGY

## 2022-01-03 PROCEDURE — 72146 MRI CHEST SPINE W/O DYE: CPT | Mod: TC

## 2022-01-03 PROCEDURE — 72146 MRI CHEST SPINE W/O DYE: CPT | Mod: 26,,, | Performed by: RADIOLOGY

## 2022-01-04 ENCOUNTER — HOSPITAL ENCOUNTER (OUTPATIENT)
Facility: HOSPITAL | Age: 67
Discharge: HOME | End: 2022-01-04
Attending: PAIN MEDICINE | Admitting: PAIN MEDICINE
Payer: MEDICARE

## 2022-01-04 VITALS
WEIGHT: 250 LBS | SYSTOLIC BLOOD PRESSURE: 187 MMHG | BODY MASS INDEX: 37.89 KG/M2 | TEMPERATURE: 98 F | HEIGHT: 68 IN | RESPIRATION RATE: 16 BRPM | OXYGEN SATURATION: 100 % | HEART RATE: 84 BPM | DIASTOLIC BLOOD PRESSURE: 104 MMHG

## 2022-01-04 DIAGNOSIS — M54.14 THORACIC RADICULOPATHY: Primary | ICD-10-CM

## 2022-01-04 DIAGNOSIS — G89.29 CHRONIC PAIN: ICD-10-CM

## 2022-01-04 LAB — POCT GLUCOSE: 92 MG/DL (ref 70–110)

## 2022-01-04 PROCEDURE — 62321 NJX INTERLAMINAR CRV/THRC: CPT | Performed by: PAIN MEDICINE

## 2022-01-04 PROCEDURE — 62321 PR INJ CERV/THORAC, W/GUIDANCE: ICD-10-PCS | Mod: ,,, | Performed by: PAIN MEDICINE

## 2022-01-04 PROCEDURE — 62321 NJX INTERLAMINAR CRV/THRC: CPT | Mod: ,,, | Performed by: PAIN MEDICINE

## 2022-01-04 PROCEDURE — 25000003 PHARM REV CODE 250: Performed by: PAIN MEDICINE

## 2022-01-04 PROCEDURE — 63600175 PHARM REV CODE 636 W HCPCS: Performed by: PAIN MEDICINE

## 2022-01-04 PROCEDURE — 25500020 PHARM REV CODE 255: Performed by: PAIN MEDICINE

## 2022-01-04 RX ORDER — SODIUM BICARBONATE 1 MEQ/ML
SYRINGE (ML) INTRAVENOUS
Status: DISCONTINUED | OUTPATIENT
Start: 2022-01-04 | End: 2022-01-04 | Stop reason: HOSPADM

## 2022-01-04 RX ORDER — METHYLPREDNISOLONE ACETATE 40 MG/ML
INJECTION, SUSPENSION INTRA-ARTICULAR; INTRALESIONAL; INTRAMUSCULAR; SOFT TISSUE
Status: DISCONTINUED | OUTPATIENT
Start: 2022-01-04 | End: 2022-01-04 | Stop reason: HOSPADM

## 2022-01-04 RX ORDER — LIDOCAINE HYDROCHLORIDE 10 MG/ML
INJECTION, SOLUTION EPIDURAL; INFILTRATION; INTRACAUDAL; PERINEURAL
Status: DISCONTINUED | OUTPATIENT
Start: 2022-01-04 | End: 2022-01-04 | Stop reason: HOSPADM

## 2022-01-04 RX ORDER — LIDOCAINE HYDROCHLORIDE 10 MG/ML
INJECTION INFILTRATION; PERINEURAL
Status: DISCONTINUED | OUTPATIENT
Start: 2022-01-04 | End: 2022-01-04 | Stop reason: HOSPADM

## 2022-01-04 NOTE — DISCHARGE SUMMARY
[facility]  Discharge Note  Short Stay     Admit Date: 1/4/2022    Discharge Date: 1/4/2022     Attending Physician: Connie Mejia Jr, MD    Diagnoses:  There are no hospital problems to display for this patient.    Discharged Condition: Good     Hospital Course: Patient was admitted for an outpatient interventional pain management procedure and tolerated the procedure well with no complications.     Final Diagnoses: Same as principal problem.     Disposition: Home or Self Care     Follow up/Patient Instructions:     Follow-up Information     Connie Mejia Jr, MD. Go in 2 weeks.    Specialty: Pain Medicine  Why: Post-procedural Follow Up As Scheduled  Contact information:  [address]  [address]  [place] [address]  [phone]                         Reconciled Medications:     Medication List      CHANGE how you take these medications    metFORMIN 500 MG tablet  Commonly known as: GLUCOPHAGE  Take 1 tablet by mouth once daily with breakfast  What changed:   · how much to take  · how to take this  · when to take this  · additional instructions        CONTINUE taking these medications    atorvastatin 80 MG tablet  Commonly known as: LIPITOR  Take 1 tablet by mouth once daily     cholecalciferol (vitamin D3) 50 mcg (2,000 unit) Cap  Commonly known as: VITAMIN D3  Take 1 capsule by mouth every morning.     latanoprost 0.005 % ophthalmic solution  INSTILL 1 DROP INTO EACH EYE IN THE EVENING     losartan-hydrochlorothiazide 100-12.5 mg 100-12.5 mg Tab  Commonly known as: HYZAAR  TAKE 1 TABLET BY MOUTH ONCE DAILY FOR HIGH BLOOD PRESSURE     meloxicam 15 MG tablet  Commonly known as: MOBIC  Take 1 tablet by mouth once daily     methocarbamoL 500 MG Tab  Commonly known as: ROBAXIN  Take 1 tablet (500 mg total) by mouth 3 (three) times daily.     traMADoL 50 mg tablet  Commonly known as: ULTRAM  Take 1 tablet (50 mg total) by mouth every 6 (six) hours as needed for Pain.     vitamin E 100 UNIT  capsule  Take 100 Units by mouth once daily.           Discharge Procedure Orders (must include Diet, Follow-up, Activity)   Diet Adult Regular     No driving until:   Order Comments: If you received sedation, no driving for 12 hrs     Remove dressing in 24 hours     Notify your health care provider if you experience any of the following:  temperature >100.4     Notify your health care provider if you experience any of the following:  severe uncontrolled pain     Notify your health care provider if you experience any of the following:  redness, tenderness, or signs of infection (pain, swelling, redness, odor or green/yellow discharge around incision site)     Notify your health care provider if you experience any of the following:  difficulty breathing or increased cough     Notify your health care provider if you experience any of the following:  severe persistent headache     Notify your health care provider if you experience any of the following:  increased confusion or weakness     Activity as tolerated       Connie Mejia Jr, MD  Interventional Pain Medicine / Anesthesiology

## 2022-01-04 NOTE — PLAN OF CARE
Safety precautions maintained. Bed locked and in lowest position. Instructed pt to call for assistance. Pt verbalizes understanding.

## 2022-01-04 NOTE — INTERVAL H&P NOTE
No significant changes were noted from the H&P or last clinic note.    The risks and benefits of this intervention, and alternative therapies were discussed with the patient.  The discussion of risks included infection, bleeding, need for additional procedures or surgery, nerve damage, paralysis, adverse medication reaction(s), stroke, and/or death.  Questions regarding the procedure, risks, expected outcome, and possible side effects were solicited and answered to the patient's satisfaction.  Celso Hernandez wishes to proceed with the injection or procedure.  Written consent was obtained.    Connie Mejia Jr, MD  Interventional Pain Medicine / Anesthesiology

## 2022-01-04 NOTE — PLAN OF CARE
Pt denies pain or discomfort @ this time. Pt states he is ready to be discharged home @ this time.  Dressing placed. Discharge instructions reviewed with patient, with time allotted for questions. Pt verbalizes understanding of instructions and states they have no further questions @ this time. Procedure site is clean, dry and intact. Band-aids in place. Vital signs are stable. No acute distress noted. Staff is at bedside to assist patient. Wheeled to discharge area. Home with family in private vehicle.

## 2022-01-04 NOTE — OP NOTE
"Procedure Note    Pre-operative Diagnosis: Thoracic Radiculopathy  Post-operative Diagnosis: Thoracic  Radiculopathy  Procedure Date: 01/04/2022  Procedure:  (1) Thoracic Epidural Steroid Injection at T5-6    (2) Intraoperative fluoroscopy          Anesthesia: Local    Indications: To alleviate pain and suffering, and reduce functional impairment.    Procedure in Detail:   The patients history and physical exam were reviewed. The risks, benefits and alternatives to the procedure were discussed, and all questions were answered to the patients satisfaction. The patient agreed to proceed, and written informed consent was verified.    The patient was brought into the procedure room and placed in the prone position on the fluoroscopy table. The area of the lumbar spine was prepped with Chloraprep and draped in a sterile manner. The targeted interspace was identified and marked under AP fluoroscopy. The skin and subcutaneous tissues overlying the targeted interspace were anesthetized with 3-5 mL of 1% lidocaine using a 25G, 1.5" needle. A 20G, 3.5" Tuohy epidural needle was directed toward the interspace under fluoroscopic guidance with a right paramedian approach until the ligamentum flavum was engaged. From this point, a loss of resistance technique with a glass syringe and saline was used to identify entrance of the needle into the epidural space. Once loss of resistance was observed, up to 1 mL of contrast solution was injected. An appropriate epidurogram was noted in AP, lateral, and contralateral oblique views.    A 5 mL mixture consisting of PF Lidocaine 1% (4 mL) and Depomedrol 80 mg (1 mL) was injected slowly and without resistance.  The needle was removed and a bandage applied to puncture site.    Blood Loss: nil    Disposition: The patient tolerated the procedure well, and there were no apparent complications. Vital signs remained stable throughout the procedure. The patient was taken to the recovery area " where written discharge instructions for the procedure were given.     Follow-up: RTC as scheduled      Connie Mejia Jr, MD  Interventional Pain Medicine / Anesthesiology

## 2022-01-18 ENCOUNTER — PATIENT OUTREACH (OUTPATIENT)
Dept: ADMINISTRATIVE | Facility: OTHER | Age: 67
End: 2022-01-18
Payer: MEDICARE

## 2022-01-18 ENCOUNTER — LAB VISIT (OUTPATIENT)
Dept: LAB | Facility: HOSPITAL | Age: 67
End: 2022-01-18
Attending: INTERNAL MEDICINE
Payer: MEDICARE

## 2022-01-18 DIAGNOSIS — R74.8 INCREASED CPK LEVEL: ICD-10-CM

## 2022-01-18 DIAGNOSIS — N28.9 ACUTE RENAL INSUFFICIENCY: ICD-10-CM

## 2022-01-18 DIAGNOSIS — I10 ESSENTIAL HYPERTENSION: ICD-10-CM

## 2022-01-18 DIAGNOSIS — E11.36 TYPE 2 DIABETES MELLITUS WITH CATARACT: ICD-10-CM

## 2022-01-18 LAB
ALBUMIN SERPL BCP-MCNC: 3.6 G/DL (ref 3.5–5.2)
ALP SERPL-CCNC: 82 U/L (ref 55–135)
ALT SERPL W/O P-5'-P-CCNC: 56 U/L (ref 10–44)
ANION GAP SERPL CALC-SCNC: 12 MMOL/L (ref 8–16)
AST SERPL-CCNC: 37 U/L (ref 10–40)
BASOPHILS # BLD AUTO: 0.06 K/UL (ref 0–0.2)
BASOPHILS NFR BLD: 0.5 % (ref 0–1.9)
BILIRUB SERPL-MCNC: 0.7 MG/DL (ref 0.1–1)
BUN SERPL-MCNC: 21 MG/DL (ref 8–23)
CALCIUM SERPL-MCNC: 10.1 MG/DL (ref 8.7–10.5)
CHLORIDE SERPL-SCNC: 100 MMOL/L (ref 95–110)
CHOLEST SERPL-MCNC: 179 MG/DL (ref 120–199)
CHOLEST/HDLC SERPL: 5 {RATIO} (ref 2–5)
CO2 SERPL-SCNC: 23 MMOL/L (ref 23–29)
CREAT SERPL-MCNC: 1.3 MG/DL (ref 0.5–1.4)
DIFFERENTIAL METHOD: ABNORMAL
EOSINOPHIL # BLD AUTO: 0.4 K/UL (ref 0–0.5)
EOSINOPHIL NFR BLD: 3.2 % (ref 0–8)
ERYTHROCYTE [DISTWIDTH] IN BLOOD BY AUTOMATED COUNT: 13.5 % (ref 11.5–14.5)
EST. GFR  (AFRICAN AMERICAN): >60 ML/MIN/1.73 M^2
EST. GFR  (NON AFRICAN AMERICAN): 56 ML/MIN/1.73 M^2
ESTIMATED AVG GLUCOSE: 123 MG/DL (ref 68–131)
GLUCOSE SERPL-MCNC: 95 MG/DL (ref 70–110)
HBA1C MFR BLD: 5.9 % (ref 4–5.6)
HCT VFR BLD AUTO: 50.8 % (ref 40–54)
HDLC SERPL-MCNC: 36 MG/DL (ref 40–75)
HDLC SERPL: 20.1 % (ref 20–50)
HGB BLD-MCNC: 16.8 G/DL (ref 14–18)
IMM GRANULOCYTES # BLD AUTO: 0.03 K/UL (ref 0–0.04)
IMM GRANULOCYTES NFR BLD AUTO: 0.3 % (ref 0–0.5)
LDLC SERPL CALC-MCNC: 105.4 MG/DL (ref 63–159)
LYMPHOCYTES # BLD AUTO: 4.6 K/UL (ref 1–4.8)
LYMPHOCYTES NFR BLD: 38.8 % (ref 18–48)
MCH RBC QN AUTO: 29 PG (ref 27–31)
MCHC RBC AUTO-ENTMCNC: 33.1 G/DL (ref 32–36)
MCV RBC AUTO: 88 FL (ref 82–98)
MONOCYTES # BLD AUTO: 1.2 K/UL (ref 0.3–1)
MONOCYTES NFR BLD: 9.8 % (ref 4–15)
NEUTROPHILS # BLD AUTO: 5.6 K/UL (ref 1.8–7.7)
NEUTROPHILS NFR BLD: 47.4 % (ref 38–73)
NONHDLC SERPL-MCNC: 143 MG/DL
NRBC BLD-RTO: 0 /100 WBC
PLATELET # BLD AUTO: 203 K/UL (ref 150–450)
PMV BLD AUTO: 10 FL (ref 9.2–12.9)
POTASSIUM SERPL-SCNC: 3.9 MMOL/L (ref 3.5–5.1)
PROT SERPL-MCNC: 7.8 G/DL (ref 6–8.4)
RBC # BLD AUTO: 5.79 M/UL (ref 4.6–6.2)
SODIUM SERPL-SCNC: 135 MMOL/L (ref 136–145)
TRIGL SERPL-MCNC: 188 MG/DL (ref 30–150)
WBC # BLD AUTO: 11.79 K/UL (ref 3.9–12.7)

## 2022-01-18 PROCEDURE — 85025 COMPLETE CBC W/AUTO DIFF WBC: CPT | Performed by: INTERNAL MEDICINE

## 2022-01-18 PROCEDURE — 83036 HEMOGLOBIN GLYCOSYLATED A1C: CPT | Performed by: INTERNAL MEDICINE

## 2022-01-18 PROCEDURE — 80053 COMPREHEN METABOLIC PANEL: CPT | Performed by: INTERNAL MEDICINE

## 2022-01-18 PROCEDURE — 80061 LIPID PANEL: CPT | Performed by: INTERNAL MEDICINE

## 2022-01-18 PROCEDURE — 36415 COLL VENOUS BLD VENIPUNCTURE: CPT | Performed by: INTERNAL MEDICINE

## 2022-01-18 NOTE — PROGRESS NOTES
Health Maintenance Due   Topic Date Due    Shingles Vaccine (1 of 2) Never done    Influenza Vaccine (1) 09/01/2021     Updates were requested from care everywhere.  Chart was reviewed for overdue Proactive [facility] Encounters ([facility]ER[facility]) topics (CRS, Breast Cancer Screening, Eye exam)  Health Maintenance has been updated.  LINKS immunization registry triggered.  Immunizations were reconciled.

## 2022-01-19 ENCOUNTER — OFFICE VISIT (OUTPATIENT)
Dept: PAIN MEDICINE | Facility: CLINIC | Age: 67
End: 2022-01-19
Payer: MEDICARE

## 2022-01-19 ENCOUNTER — OFFICE VISIT (OUTPATIENT)
Dept: URGENT CARE | Facility: CLINIC | Age: 67
End: 2022-01-19
Payer: MEDICARE

## 2022-01-19 VITALS
WEIGHT: 250 LBS | HEART RATE: 69 BPM | DIASTOLIC BLOOD PRESSURE: 88 MMHG | BODY MASS INDEX: 38.01 KG/M2 | SYSTOLIC BLOOD PRESSURE: 145 MMHG

## 2022-01-19 DIAGNOSIS — M54.14 THORACIC RADICULOPATHY: ICD-10-CM

## 2022-01-19 DIAGNOSIS — M51.34 DDD (DEGENERATIVE DISC DISEASE), THORACIC: ICD-10-CM

## 2022-01-19 DIAGNOSIS — M54.6 CHRONIC THORACIC BACK PAIN, UNSPECIFIED BACK PAIN LATERALITY: ICD-10-CM

## 2022-01-19 DIAGNOSIS — D36.10 SCHWANNOMA: Primary | ICD-10-CM

## 2022-01-19 DIAGNOSIS — G89.4 CHRONIC PAIN SYNDROME: ICD-10-CM

## 2022-01-19 DIAGNOSIS — G89.29 CHRONIC THORACIC BACK PAIN, UNSPECIFIED BACK PAIN LATERALITY: ICD-10-CM

## 2022-01-19 PROCEDURE — 1125F PR PAIN SEVERITY QUANTIFIED, PAIN PRESENT: ICD-10-PCS | Mod: CPTII,S$GLB,, | Performed by: NURSE PRACTITIONER

## 2022-01-19 PROCEDURE — 1160F PR REVIEW ALL MEDS BY PRESCRIBER/CLIN PHARMACIST DOCUMENTED: ICD-10-PCS | Mod: CPTII,S$GLB,, | Performed by: NURSE PRACTITIONER

## 2022-01-19 PROCEDURE — 3077F PR MOST RECENT SYSTOLIC BLOOD PRESSURE >= 140 MM HG: ICD-10-PCS | Mod: CPTII,S$GLB,, | Performed by: NURSE PRACTITIONER

## 2022-01-19 PROCEDURE — 3008F BODY MASS INDEX DOCD: CPT | Mod: CPTII,S$GLB,, | Performed by: NURSE PRACTITIONER

## 2022-01-19 PROCEDURE — 99214 OFFICE O/P EST MOD 30 MIN: CPT | Mod: S$GLB,,, | Performed by: NURSE PRACTITIONER

## 2022-01-19 PROCEDURE — 1125F AMNT PAIN NOTED PAIN PRSNT: CPT | Mod: CPTII,S$GLB,, | Performed by: NURSE PRACTITIONER

## 2022-01-19 PROCEDURE — 1160F RVW MEDS BY RX/DR IN RCRD: CPT | Mod: CPTII,S$GLB,, | Performed by: NURSE PRACTITIONER

## 2022-01-19 PROCEDURE — 99214 PR OFFICE/OUTPT VISIT, EST, LEVL IV, 30-39 MIN: ICD-10-PCS | Mod: S$GLB,,, | Performed by: NURSE PRACTITIONER

## 2022-01-19 PROCEDURE — 3079F PR MOST RECENT DIASTOLIC BLOOD PRESSURE 80-89 MM HG: ICD-10-PCS | Mod: CPTII,S$GLB,, | Performed by: NURSE PRACTITIONER

## 2022-01-19 PROCEDURE — 3008F PR BODY MASS INDEX (BMI) DOCUMENTED: ICD-10-PCS | Mod: CPTII,S$GLB,, | Performed by: NURSE PRACTITIONER

## 2022-01-19 PROCEDURE — 99999 PR PBB SHADOW E&M-EST. PATIENT-LVL III: CPT | Mod: PBBFAC,,, | Performed by: NURSE PRACTITIONER

## 2022-01-19 PROCEDURE — 3044F HG A1C LEVEL LT 7.0%: CPT | Mod: CPTII,S$GLB,, | Performed by: NURSE PRACTITIONER

## 2022-01-19 PROCEDURE — 99999 PR PBB SHADOW E&M-EST. PATIENT-LVL III: ICD-10-PCS | Mod: PBBFAC,,, | Performed by: NURSE PRACTITIONER

## 2022-01-19 PROCEDURE — 1159F PR MEDICATION LIST DOCUMENTED IN MEDICAL RECORD: ICD-10-PCS | Mod: CPTII,S$GLB,, | Performed by: NURSE PRACTITIONER

## 2022-01-19 PROCEDURE — 3079F DIAST BP 80-89 MM HG: CPT | Mod: CPTII,S$GLB,, | Performed by: NURSE PRACTITIONER

## 2022-01-19 PROCEDURE — 3077F SYST BP >= 140 MM HG: CPT | Mod: CPTII,S$GLB,, | Performed by: NURSE PRACTITIONER

## 2022-01-19 PROCEDURE — 1159F MED LIST DOCD IN RCRD: CPT | Mod: CPTII,S$GLB,, | Performed by: NURSE PRACTITIONER

## 2022-01-19 PROCEDURE — 3044F PR MOST RECENT HEMOGLOBIN A1C LEVEL <7.0%: ICD-10-PCS | Mod: CPTII,S$GLB,, | Performed by: NURSE PRACTITIONER

## 2022-01-19 NOTE — PROGRESS NOTES
"[facility]    Referred by: No ref. provider found   Reason for referral: * No diagnoses found *     CC:   Chief Complaint   Patient presents with    Low-back Pain       Interval Updates:   1/19/2022 Ariadna Hernandez returns to clinic s/p Thoracic Epidural Steroid Injection at T5-6 on 1/4/22 with 60% relief.  He reports a pain intensity 1/10 today with a weekly range of 1-2/10.   He is reporting mild soarness in his thoracic area, he is reporting being " free of pain after the injection" his pain returned after working outside in his garage yesterday. Overall much better following his injection. He is reporting certain movements increase his pain but other than that he  is better.     Subjective:   Celso Hernandez is a 66 y.o. male who has a past medical history of Arthritis, Cataract, Colon polyps, Diabetes mellitus type II, Glaucoma, Hyperlipidemia, Hypertension, Multiple duodenal ulcers, and Unspecified hemorrhoids without mention of complication. He complains of pain as described below.    Location: right under arm, rib cage    Onset: about 6 mos (started approx May 2021)  Radiation: from mid axillary line toward back  Timing: constant  Current Pain Score: 2/10  Weekly Pain Range: 2-10/10  Quality: Grabbing and Spasms (no burning, tingling, numbness)  Worsened by: exercise, extension, lifting, lying down, sitting and walking for more than several minutes  Improved by: medications    Previous Therapies:  PT/OT: Denies  HEP: Yes, stretching at home and walking.  TENS: No  Interventions:  01/04/2022 Thoracic Epidural Steroid Injection at T5-6  Surgery: Denies localized surgery  Opioids:  Adjuvants:     Current Pain Medications:  1. Mobic  2. Robaxin     Assessment & Plan:  Problem List Items Addressed This Visit        Neuro    DDD (degenerative disc disease), thoracic    Thoracic radiculopathy       Orthopedic    Chronic thoracic back pain      Other Visit Diagnoses     Schwannoma    -  " Primary    Relevant Orders    MRI Thoracic Spine With Contrast    Ambulatory referral/consult to Oncology    Chronic pain syndrome            12/17/21 - Celso Hernandez is a 66 y.o. male who  has a past medical history of Arthritis, Cataract, Colon polyps, Diabetes mellitus type II, Glaucoma, Hyperlipidemia, Hypertension, Multiple duodenal ulcers (10/08/2019), and Unspecified hemorrhoids without mention of complication.  By history and examination this patient has chronic mid/upper back pain with right T5-6 radiculopathy.  The underlying cause cause is facet arthritis and degenerative disc disease with a large, obstructive osteophyte on the right side as seen on CT chest.  MRI is appropriate prior to an injections to assess soft tissue and vasculature.  We discussed the underlying diagnoses and multiple treatment options including non-opioid medications, opioid medications, interventional procedures, physical therapy and home exercise.  The risks and benefits of each treatment option were discussed and all questions were answered.        1/19/2022- Celso Hernandez is a 66 y.o. male who  has a past medical history of Arthritis, Cataract, Colon polyps, Diabetes mellitus type II, Glaucoma, Hyperlipidemia, Hypertension, Multiple duodenal ulcers (10/08/2019), and Unspecified hemorrhoids without mention of complication.  By history and examination this patient has chronic mid/upper back pain with a right T5-6   Radiculopathy that responded well following a thoracic GABRIELA targeting T5-6.  Colonic causes appear to be facet arthritis and degenerative disc disease with a large obstructive osteophyte on the right side.  Upon review of his thoracic MRI ordered per Dr. Mejia results did show a extradural degenerative changes resulting in right T6 neural foraminal stenosis.  Secondly intradural extramedullary T2 signal abnormality at the level of the T4-5.  The radiologist include a differential diagnosis that included a nerve  sheet to were such as a  schwannoma or a neurofibroma as well as other neoplasms.  Radiologist recommended further evaluation of this patient's thoracic area, discussed with the patient today that I will order a thoracic MRI with contrast to further evaluate. Long discussion with patient regarding  Results of his MRI, I will also refer to Oncology for further evaluation but I would like this patient to see a [facility] following a hip obtaining new images.    1. MRI thoracic spine with contrast per reccommended of radiology    2.   Referral to Oncology to rule out differential diagnoses that include  schwannoma or a neurofibroma as well as other neoplasms.  3. No procedures recommended at this time.   4. Continue current medications for now with consideration of gabapentin/pregabalin  5. Duloxetine is a tertiary option  6. PT may help but physical activity is unlikely to reverse a large, bony osteophyte,  0 provide patient with home exercises for now to help with mid to upper back pain  7. Surgical referral will be considered if the above conservative options fail    Follow Up:  After due thoracic MRI with contrast    Disclaimer: This note was partly generated using dictation software which may occasionally result in transcription errors.    Imaging:  CT Chest Without Contrast  Impression:  No worrisome finding on the chest CT.  Mild atherosclerotic plaque of the aorta and mild coronary artery calcification  Severe foraminal narrowing on the right at T5-6 due to significant facet joint osseous hypertrophy.  Electronically signed by: Halley Lundberg MD  Date:                                            09/29/2021  Time:                                           08:56    X-Ray Lumbar Spine Ap And Lateral 11/21/2019  FINDINGS:  Two views AP lateral.  There is mild DJD and DISH.  Alignment is normal.  No fracture dislocation bone destruction.  No trauma seen.  Impression:  No acute process seen.  DJD and  DISH.      Review of Systems:  Review of Systems   Constitutional: Negative for chills and fever.   HENT: Negative for nosebleeds.    Eyes: Negative for blurred vision and pain.   Respiratory: Negative for hemoptysis.    Cardiovascular: Negative for chest pain and palpitations.   Gastrointestinal: Negative for heartburn, nausea and vomiting.   Genitourinary: Negative for dysuria and hematuria.   Musculoskeletal: Positive for joint pain. Negative for myalgias.   Skin: Negative for rash.   Neurological: Negative for seizures and loss of consciousness.   Endo/Heme/Allergies: Does not bruise/bleed easily.   Psychiatric/Behavioral: Negative for hallucinations.       Physical Exam:  Vitals:    01/19/22 1019   Weight: 113.4 kg (250 lb)   PainSc:   1     General    Nursing note and vitals reviewed.  Constitutional: He is oriented to person, place, and time. He appears well-developed and well-nourished. No distress.   HENT:   Head: Normocephalic and atraumatic.   Nose: Nose normal.   Eyes: Conjunctivae and EOM are normal. Pupils are equal, round, and reactive to light. Right eye exhibits no discharge. Left eye exhibits no discharge. No scleral icterus.   Neck: No JVD present.   Cardiovascular: Intact distal pulses.    Pulmonary/Chest: Effort normal. No respiratory distress.   Abdominal: He exhibits no distension.   Neurological: He is alert and oriented to person, place, and time. Coordination normal.   Psychiatric: He has a normal mood and affect. His behavior is normal. Judgment and thought content normal.     General Musculoskeletal Exam   Gait: normal     Back (L-Spine & T-Spine) / Neck (C-Spine) Exam     Tenderness Right paramedian tenderness of the Upper T-Spine.     Back (L-Spine & T-Spine) Range of Motion   Lateral bend right: abnormal Back lateral bend right: leaning to the right exacerbates pain.   Lateral bend left: normal   Rotation right: normal   Rotation left: normal     Comments:  There are no topical lesions  in the affected area such a those seen with shingles.

## 2022-01-24 ENCOUNTER — HOSPITAL ENCOUNTER (OUTPATIENT)
Dept: RADIOLOGY | Facility: HOSPITAL | Age: 67
Discharge: HOME | End: 2022-01-24
Attending: NURSE PRACTITIONER
Payer: MEDICARE

## 2022-01-24 ENCOUNTER — OFFICE VISIT (OUTPATIENT)
Dept: INTERNAL MEDICINE | Facility: CLINIC | Age: 67
End: 2022-01-24
Payer: MEDICARE

## 2022-01-24 ENCOUNTER — OFFICE VISIT (OUTPATIENT)
Dept: UROLOGY | Facility: CLINIC | Age: 67
End: 2022-01-24
Payer: MEDICARE

## 2022-01-24 VITALS
SYSTOLIC BLOOD PRESSURE: 142 MMHG | HEART RATE: 97 BPM | BODY MASS INDEX: 38.35 KG/M2 | WEIGHT: 253.06 LBS | HEIGHT: 68 IN | RESPIRATION RATE: 20 BRPM | DIASTOLIC BLOOD PRESSURE: 88 MMHG | OXYGEN SATURATION: 98 % | TEMPERATURE: 98 F

## 2022-01-24 VITALS — RESPIRATION RATE: 18 BRPM | BODY MASS INDEX: 38.09 KG/M2 | WEIGHT: 251.31 LBS | HEIGHT: 68 IN

## 2022-01-24 DIAGNOSIS — E66.01 MORBID (SEVERE) OBESITY DUE TO EXCESS CALORIES: ICD-10-CM

## 2022-01-24 DIAGNOSIS — N13.8 BPH WITH OBSTRUCTION/LOWER URINARY TRACT SYMPTOMS: ICD-10-CM

## 2022-01-24 DIAGNOSIS — E11.36 TYPE 2 DIABETES MELLITUS WITH DIABETIC CATARACT, WITHOUT LONG-TERM CURRENT USE OF INSULIN: ICD-10-CM

## 2022-01-24 DIAGNOSIS — I10 ESSENTIAL HYPERTENSION: ICD-10-CM

## 2022-01-24 DIAGNOSIS — A63.0 WARTS, GENITAL: ICD-10-CM

## 2022-01-24 DIAGNOSIS — N40.1 BPH WITH OBSTRUCTION/LOWER URINARY TRACT SYMPTOMS: ICD-10-CM

## 2022-01-24 DIAGNOSIS — E11.9 TYPE 2 DIABETES MELLITUS WITHOUT COMPLICATION, WITHOUT LONG-TERM CURRENT USE OF INSULIN: Primary | ICD-10-CM

## 2022-01-24 DIAGNOSIS — Z90.79 S/P TURP: ICD-10-CM

## 2022-01-24 DIAGNOSIS — D36.10 SCHWANNOMA: ICD-10-CM

## 2022-01-24 DIAGNOSIS — E78.2 MIXED HYPERLIPIDEMIA: ICD-10-CM

## 2022-01-24 DIAGNOSIS — I70.0 ATHEROSCLEROSIS OF AORTA: ICD-10-CM

## 2022-01-24 DIAGNOSIS — N99.115 POSTPROCEDURAL MALE FOSSA NAVICULARIS URETHRAL STRICTURE: Primary | ICD-10-CM

## 2022-01-24 PROCEDURE — 99214 OFFICE O/P EST MOD 30 MIN: CPT | Mod: S$GLB,,, | Performed by: UROLOGY

## 2022-01-24 PROCEDURE — 3079F PR MOST RECENT DIASTOLIC BLOOD PRESSURE 80-89 MM HG: ICD-10-PCS | Mod: CPTII,S$GLB,, | Performed by: INTERNAL MEDICINE

## 2022-01-24 PROCEDURE — 3008F PR BODY MASS INDEX (BMI) DOCUMENTED: ICD-10-PCS | Mod: CPTII,S$GLB,, | Performed by: INTERNAL MEDICINE

## 2022-01-24 PROCEDURE — G0008 FLU VACCINE - QUADRIVALENT - ADJUVANTED: ICD-10-PCS | Mod: S$GLB,,, | Performed by: INTERNAL MEDICINE

## 2022-01-24 PROCEDURE — 99999 PR PBB SHADOW E&M-EST. PATIENT-LVL IV: CPT | Mod: PBBFAC,,, | Performed by: INTERNAL MEDICINE

## 2022-01-24 PROCEDURE — 1101F PR PT FALLS ASSESS DOC 0-1 FALLS W/OUT INJ PAST YR: ICD-10-PCS | Mod: CPTII,S$GLB,, | Performed by: INTERNAL MEDICINE

## 2022-01-24 PROCEDURE — 72157 MRI CHEST SPINE W/O & W/DYE: CPT | Mod: 26,,, | Performed by: RADIOLOGY

## 2022-01-24 PROCEDURE — 99999 PR PBB SHADOW E&M-EST. PATIENT-LVL III: CPT | Mod: PBBFAC,,, | Performed by: UROLOGY

## 2022-01-24 PROCEDURE — 72157 MRI CHEST SPINE W/O & W/DYE: CPT | Mod: TC

## 2022-01-24 PROCEDURE — 90694 VACC AIIV4 NO PRSRV 0.5ML IM: CPT | Mod: S$GLB,,, | Performed by: INTERNAL MEDICINE

## 2022-01-24 PROCEDURE — 1101F PT FALLS ASSESS-DOCD LE1/YR: CPT | Mod: CPTII,S$GLB,, | Performed by: UROLOGY

## 2022-01-24 PROCEDURE — G0008 ADMIN INFLUENZA VIRUS VAC: HCPCS | Mod: S$GLB,,, | Performed by: INTERNAL MEDICINE

## 2022-01-24 PROCEDURE — 1126F AMNT PAIN NOTED NONE PRSNT: CPT | Mod: CPTII,S$GLB,, | Performed by: UROLOGY

## 2022-01-24 PROCEDURE — 99214 OFFICE O/P EST MOD 30 MIN: CPT | Mod: S$GLB,,, | Performed by: INTERNAL MEDICINE

## 2022-01-24 PROCEDURE — 99999 PR PBB SHADOW E&M-EST. PATIENT-LVL III: ICD-10-PCS | Mod: PBBFAC,,, | Performed by: UROLOGY

## 2022-01-24 PROCEDURE — 1126F AMNT PAIN NOTED NONE PRSNT: CPT | Mod: CPTII,S$GLB,, | Performed by: INTERNAL MEDICINE

## 2022-01-24 PROCEDURE — 3044F PR MOST RECENT HEMOGLOBIN A1C LEVEL <7.0%: ICD-10-PCS | Mod: CPTII,S$GLB,, | Performed by: UROLOGY

## 2022-01-24 PROCEDURE — 1101F PT FALLS ASSESS-DOCD LE1/YR: CPT | Mod: CPTII,S$GLB,, | Performed by: INTERNAL MEDICINE

## 2022-01-24 PROCEDURE — 90694 FLU VACCINE - QUADRIVALENT - ADJUVANTED: ICD-10-PCS | Mod: S$GLB,,, | Performed by: INTERNAL MEDICINE

## 2022-01-24 PROCEDURE — 99499 RISK ADDL DX/OHS AUDIT: ICD-10-PCS | Mod: S$GLB,,, | Performed by: INTERNAL MEDICINE

## 2022-01-24 PROCEDURE — 3077F PR MOST RECENT SYSTOLIC BLOOD PRESSURE >= 140 MM HG: ICD-10-PCS | Mod: CPTII,S$GLB,, | Performed by: INTERNAL MEDICINE

## 2022-01-24 PROCEDURE — 3008F BODY MASS INDEX DOCD: CPT | Mod: CPTII,S$GLB,, | Performed by: INTERNAL MEDICINE

## 2022-01-24 PROCEDURE — 25500020 PHARM REV CODE 255: Performed by: NURSE PRACTITIONER

## 2022-01-24 PROCEDURE — 99214 PR OFFICE/OUTPT VISIT, EST, LEVL IV, 30-39 MIN: ICD-10-PCS | Mod: S$GLB,,, | Performed by: UROLOGY

## 2022-01-24 PROCEDURE — 1159F PR MEDICATION LIST DOCUMENTED IN MEDICAL RECORD: ICD-10-PCS | Mod: CPTII,S$GLB,, | Performed by: UROLOGY

## 2022-01-24 PROCEDURE — 3288F FALL RISK ASSESSMENT DOCD: CPT | Mod: CPTII,S$GLB,, | Performed by: UROLOGY

## 2022-01-24 PROCEDURE — 3079F DIAST BP 80-89 MM HG: CPT | Mod: CPTII,S$GLB,, | Performed by: INTERNAL MEDICINE

## 2022-01-24 PROCEDURE — 99499 UNLISTED E&M SERVICE: CPT | Mod: S$GLB,,, | Performed by: INTERNAL MEDICINE

## 2022-01-24 PROCEDURE — 3044F HG A1C LEVEL LT 7.0%: CPT | Mod: CPTII,S$GLB,, | Performed by: UROLOGY

## 2022-01-24 PROCEDURE — 1159F MED LIST DOCD IN RCRD: CPT | Mod: CPTII,S$GLB,, | Performed by: UROLOGY

## 2022-01-24 PROCEDURE — 72157 MRI THORACIC SPINE W WO CONTRAST: ICD-10-PCS | Mod: 26,,, | Performed by: RADIOLOGY

## 2022-01-24 PROCEDURE — 3008F PR BODY MASS INDEX (BMI) DOCUMENTED: ICD-10-PCS | Mod: CPTII,S$GLB,, | Performed by: UROLOGY

## 2022-01-24 PROCEDURE — 3077F SYST BP >= 140 MM HG: CPT | Mod: CPTII,S$GLB,, | Performed by: INTERNAL MEDICINE

## 2022-01-24 PROCEDURE — 1101F PR PT FALLS ASSESS DOC 0-1 FALLS W/OUT INJ PAST YR: ICD-10-PCS | Mod: CPTII,S$GLB,, | Performed by: UROLOGY

## 2022-01-24 PROCEDURE — 3288F PR FALLS RISK ASSESSMENT DOCUMENTED: ICD-10-PCS | Mod: CPTII,S$GLB,, | Performed by: UROLOGY

## 2022-01-24 PROCEDURE — 1126F PR PAIN SEVERITY QUANTIFIED, NO PAIN PRESENT: ICD-10-PCS | Mod: CPTII,S$GLB,, | Performed by: UROLOGY

## 2022-01-24 PROCEDURE — 3288F PR FALLS RISK ASSESSMENT DOCUMENTED: ICD-10-PCS | Mod: CPTII,S$GLB,, | Performed by: INTERNAL MEDICINE

## 2022-01-24 PROCEDURE — 3008F BODY MASS INDEX DOCD: CPT | Mod: CPTII,S$GLB,, | Performed by: UROLOGY

## 2022-01-24 PROCEDURE — 3288F FALL RISK ASSESSMENT DOCD: CPT | Mod: CPTII,S$GLB,, | Performed by: INTERNAL MEDICINE

## 2022-01-24 PROCEDURE — 3044F PR MOST RECENT HEMOGLOBIN A1C LEVEL <7.0%: ICD-10-PCS | Mod: CPTII,S$GLB,, | Performed by: INTERNAL MEDICINE

## 2022-01-24 PROCEDURE — 3044F HG A1C LEVEL LT 7.0%: CPT | Mod: CPTII,S$GLB,, | Performed by: INTERNAL MEDICINE

## 2022-01-24 PROCEDURE — 1126F PR PAIN SEVERITY QUANTIFIED, NO PAIN PRESENT: ICD-10-PCS | Mod: CPTII,S$GLB,, | Performed by: INTERNAL MEDICINE

## 2022-01-24 PROCEDURE — A9585 GADOBUTROL INJECTION: HCPCS | Performed by: NURSE PRACTITIONER

## 2022-01-24 PROCEDURE — 99214 PR OFFICE/OUTPT VISIT, EST, LEVL IV, 30-39 MIN: ICD-10-PCS | Mod: S$GLB,,, | Performed by: INTERNAL MEDICINE

## 2022-01-24 PROCEDURE — 99999 PR PBB SHADOW E&M-EST. PATIENT-LVL IV: ICD-10-PCS | Mod: PBBFAC,,, | Performed by: INTERNAL MEDICINE

## 2022-01-24 RX ORDER — HYDRALAZINE HYDROCHLORIDE 25 MG/1
TABLET, FILM COATED ORAL
Qty: 180 TABLET | Refills: 3
Start: 2022-01-24 | End: 2022-01-24

## 2022-01-24 RX ORDER — DOXYCYCLINE HYCLATE 100 MG
100 TABLET ORAL ONCE
Status: CANCELLED | OUTPATIENT
Start: 2022-01-24 | End: 2022-01-24

## 2022-01-24 RX ORDER — GADOBUTROL 604.72 MG/ML
10 INJECTION INTRAVENOUS
Status: COMPLETED | OUTPATIENT
Start: 2022-01-24 | End: 2022-01-24

## 2022-01-24 RX ORDER — LIDOCAINE HYDROCHLORIDE 20 MG/ML
JELLY TOPICAL ONCE
Status: CANCELLED | OUTPATIENT
Start: 2022-01-24 | End: 2022-01-24

## 2022-01-24 RX ORDER — MV-MIN/FOLIC/K1/LYCOPEN/LUTEIN 300-60 MCG
1 TABLET ORAL DAILY
Qty: 100 TABLET | Refills: 3
Start: 2022-01-24

## 2022-01-24 RX ORDER — HYDRALAZINE HYDROCHLORIDE 50 MG/1
TABLET, FILM COATED ORAL
Qty: 180 TABLET | Refills: 3 | Status: SHIPPED | OUTPATIENT
Start: 2022-01-24 | End: 2023-02-25

## 2022-01-24 RX ADMIN — GADOBUTROL 10 ML: 604.72 INJECTION INTRAVENOUS at 04:01

## 2022-01-24 NOTE — PROGRESS NOTES
HPI:   CHIEF COMPLAINT:    Mr. Hernandez is a 66 y.o. male  with history of urethral stricture and recurrent UTI. He has a history  fossa navicularis.    PRESENTING ILLNESS:    Celso Hernandez is a 66 y.o. male with a PMH of HTN, BPH, retention, DM   He is doing better after taking Bactrim for his recent UTI.  Evacuated to [place] during [place] and experienced weakness and dehydration.  He was diagnosed with UTI and has been on Bactrim.  Overall his urinary symptoms are almost back to normal.    Had TURP on 5/27/20.   Did well initially and then developed dysuria  Underwent cysto on 8/5 and found the very urethral stricture at the fossa navicularis.  It was dilated up to 26 F in size.  He did well for the first couple of week then he began experiencing spraying of the urine.  Subsequently he was treated with meatal dilation.  Since then, he has done well.  He stopped taking flomax, finasteride and oxybutynin.       Procedure(s) Performed: 11/10/21  1. Cystoscopy   2. Urethral dilation  Findings:  - Pinpoint fossa navicularis stricture unable to pass scope, could tolerate passage of a wire  - Dilated with urethral sounds from 12 Fr to 24 Fr  - Scope passed easily showing a short segment stricture at the fossa navicularis only. No other strictures, normal bladder findings.     He works as a building maintenance.      PATIENT HISTORY:    Past Medical History:   Diagnosis Date    Arthritis     Cataract     Colon polyps     Diabetes mellitus type II     Glaucoma     Hyperlipidemia     Hypertension     Multiple duodenal ulcers 10/08/2019    Unspecified hemorrhoids without mention of complication        Past Surgical History:   Procedure Laterality Date    COLONOSCOPY N/A 10/8/2019    Procedure: COLONOSCOPY suprep;  Surgeon: Landon Guajardo MD;  [facility];  Service: General;  Laterality: N/A;    COLONOSCOPY W/ POLYPECTOMY  10/08/2019    CYSTOSCOPY N/A 8/5/2020    Procedure: CYSTOSCOPY;   Surgeon: Arcadio Vaughn MD;  [facility];  Service: Urology;  Laterality: N/A;    CYSTOSCOPY WITH URETHRAL DILATION N/A 11/10/2021    Procedure: CYSTOSCOPY, WITH URETHRAL DILATION;  Surgeon: Arcadio Vaughn MD;  [facility];  Service: Urology;  Laterality: N/A;  30 MINUTES     DILATION OF URETHRA N/A 8/5/2020    Procedure: DILATION, URETHRA;  Surgeon: Arcadio Vaughn MD;  [facility];  Service: Urology;  Laterality: N/A;    EPIDURAL STEROID INJECTION INTO THORACIC SPINE N/A 1/4/2022    Procedure: Thoracic Epidural Steroid Injection T5-6 (No Sedation) ;  Surgeon: Connie Mejia Jr., MD;  Location: [facility] PAIN MGT;  Service: Pain Management;  Laterality: N/A;    ESOPHAGOGASTRODUODENOSCOPY  10/08/2019    w/biopsies    ESOPHAGOGASTRODUODENOSCOPY N/A 10/8/2019    Procedure: EGD (ESOPHAGOGASTRODUODENOSCOPY);  Surgeon: Landon Guajardo MD;  Location: [facility] ENDO;  Service: General;  Laterality: N/A;    none      TRANSURETHRAL RESECTION OF PROSTATE N/A 5/27/2020    Procedure: TURP (TRANSURETHRAL RESECTION OF PROSTATE) bipolar;  Surgeon: Arcadio Vaughn MD;  Location: [address];  Service: Urology;  Laterality: N/A;  2hr       Family History   Problem Relation Age of Onset    Hyperlipidemia Mother     Glaucoma Mother     Cancer Sister         breast    Hearing loss Sister     Amblyopia Neg Hx     Blindness Neg Hx     Cataracts Neg Hx     Macular degeneration Neg Hx     Retinal detachment Neg Hx     Strabismus Neg Hx        Social History     Socioeconomic History    Marital status:    Tobacco Use    Smoking status: Never Smoker    Smokeless tobacco: Never Used   Substance and Sexual Activity    Alcohol use: Yes     Alcohol/week: 3.3 standard drinks     Types: 4 Standard drinks or equivalent per week     Comment: socially    Drug use: No       Allergies:  Keflex [cephalexin]    Medications:    Current Outpatient Medications:     atorvastatin (LIPITOR) 80  MG tablet, Take 1 tablet by mouth once daily, Disp: 90 tablet, Rfl: 3    hydrALAZINE (APRESOLINE) 50 MG tablet, TAKE 1 TABLET BY MOUTH EVERY 12 HOURS FOR  BLOOD  PRESSURE  CONTROL, Disp: 180 tablet, Rfl: 3    latanoprost 0.005 % ophthalmic solution, INSTILL 1 DROP INTO EACH EYE IN THE EVENING, Disp: 7 mL, Rfl: 4    losartan-hydrochlorothiazide 100-12.5 mg (HYZAAR) 100-12.5 mg Tab, TAKE 1 TABLET BY MOUTH ONCE DAILY FOR HIGH BLOOD PRESSURE, Disp: 90 tablet, Rfl: 2    meloxicam (MOBIC) 15 MG tablet, Take 1 tablet by mouth once daily, Disp: 30 tablet, Rfl: 3    metFORMIN (GLUCOPHAGE) 500 MG tablet, Take 1 tablet by mouth once daily with breakfast (Patient taking differently: PATIENT TAKES AT NOON), Disp: 90 tablet, Rfl: 3    methocarbamoL (ROBAXIN) 500 MG Tab, Take 1 tablet (500 mg total) by mouth 3 (three) times daily. (Patient not taking: Reported on 1/24/2022), Disp: 60 tablet, Rfl: 0    multivit-min-FA-lycopen-lutein (CENTRUM SILVER MEN) 300-600-300 mcg Tab, Take 1 tablet by mouth once daily., Disp: 100 tablet, Rfl: 3    timoloL (BETIMOL) 0.5 % ophthalmic solution, 1 drop 2 (two) times daily., Disp: , Rfl:     traMADoL (ULTRAM) 50 mg tablet, Take 1 tablet (50 mg total) by mouth every 6 (six) hours as needed for Pain., Disp: 60 tablet, Rfl: 0    PHYSICAL EXAMINATION:    Constitutional: He appears well-developed and well-nourished.  He is in no apparent distress.    Eyes: No scleral icterus noted bilaterally. No discharge bilaterally.    Cardiovascular: Normal rate.      Pulmonary/Chest: Effort normal. No respiratory distress.     Abdominal:  He exhibits no distension.     Neurological: He is alert and oriented to person, place, and time.     Skin: Skin is warm and dry. Positive lesion in the left groin area, highly suspicious for genital warts x 2.  Each measures 2 x 2 cm in size, relatively flat in appearance.    Psych: Cooperative with normal affect.    Genitourinary: The penis is uncircumcised with  evidence of paraphimosis. I demonstrate how to do meatal dilation using meatal dilator to him.  Positive lesion in the left groin area, highly suspicious for genital warts x 2.  Each measures 2 x 2 cm in size, relatively flat in appearance.    Physical Exam      LABS:    Lab Results   Component Value Date    PSA 0.71 06/02/2021    PSA 3.4 09/14/2019    PSA 1.9 05/14/2016    PSA 2.5 09/03/2015    PSA 1.6 03/01/2014    PSA 1.7 10/08/2011    PSA 1.7 11/06/2010    PSA 1.7 08/29/2009    PSA 1.2 12/11/2007    PSA 1.1 05/19/2006    PSADIAG 2.8 11/16/2019    PSADIAG 2.2 11/02/2018    PSADIAG 2.5 11/18/2017     Radiology  US 1/31/20  Chronic renal medical disease, decreased renal vascular flow left kidney.    IMPRESSION:  Postprocedural male fossa navicularis urethral stricture  -     Cystoscopy; Future    Warts, genital  -     Fulguration; Future    S/P TURP      PLAN:  Continue to dilate his meatus daily using Blue meatal dilator.  Will follow up with cysto in 1 month to r/o recurrent stricture.    For his left groin lesions, I suspect genital warts and will plan fulguration under local anesthesia at the same time of cysto.    I spent 25 minutes with the patient of which more than half was spent in direct consultation with the patient in regards to our treatment and plan.      Follow up cysto, fulguration of genital warts on the left groin x 2.

## 2022-01-24 NOTE — PROGRESS NOTES
Subjective:       Patient ID: Celso Hernandez is a 66 y.o. male.    Chief Complaint: Follow-up (4 mo)    HPI   The patient presents for follow-up of medical conditions which include type 2 diabetes mellitus, hypertension, hyperlipidemia, chronic thoracic pain.  The patient has noted improvement in his right-sided lateral chest pain since receiving an epidural injection at T5-T6 level on 01/04/2022.  He noted improvement in his level of pain since then.  He does note increased pain with recurrent lifting or bending.  The patient reports an abnormality was noted at the T4-T5 level on the MRI.  Additional imaging with contrast has been scheduled for today by the pain management specialist.  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    Markedly elevated blood pressure readings were noted however around the time of the procedure.  He did resume taking hydralazine 25 mg twice daily.  He has kept a blood pressure record.  His most recent blood pressure reading was 153/94.  The patient denies any headache or dizziness.    He is status post COVID-19 infection.  He had a positive test on 01/11/2022.  The patient had presented with sore throat, rhinorrhea, and diarrhea).  The patient was exposed to has grand baby was 8 months ago and was noted to have a positive COVID test.  Three other family members tested COVID -19 positive.  The patient states he has recovered.    The patient has been diagnosed with acute cataracts and glaucoma.  He is using topical eyedrops for control of eye pressure.    The patient reports that he has significantly decreased alcohol intake.  He does not smoke tobacco although he has used marijuana intermittently.  The patient states he is no longer taking supplemental vitamin-D or vitamin E. He is using a Centrum Silver Men's multivitamin supplement.  He only occasionally uses tramadol for acute pain.    Review of Systems   Constitutional: Negative for activity change, appetite  change, fatigue and unexpected weight change.   Eyes: Negative for visual disturbance.   Respiratory: Negative for cough, chest tightness, shortness of breath and wheezing.    Cardiovascular: Positive for chest pain. Negative for palpitations and leg swelling.   Gastrointestinal: Negative for abdominal pain, blood in stool, nausea and vomiting.   Genitourinary: Negative for dysuria, hematuria and urgency.   Musculoskeletal: Positive for back pain. Negative for arthralgias, gait problem, joint swelling and myalgias.   Integumentary:  Negative for color change and rash.   Neurological: Negative for dizziness, syncope, weakness, light-headedness, numbness and headaches.   Psychiatric/Behavioral: Negative for sleep disturbance.            Physical Exam  Vitals and nursing note reviewed.   Constitutional:       General: He is not in acute distress.     Appearance: He is well-developed and well-nourished.   HENT:      Head: Normocephalic and atraumatic.   Eyes:      General: No scleral icterus.     Extraocular Movements: EOM normal.      Conjunctiva/sclera: Conjunctivae normal.      Pupils: Pupils are equal, round, and reactive to light.   Neck:      Thyroid: No thyromegaly.      Vascular: No JVD.   Cardiovascular:      Rate and Rhythm: Normal rate and regular rhythm.      Pulses: Intact distal pulses.      Heart sounds: Normal heart sounds. No murmur heard.  No gallop.    Pulmonary:      Effort: Pulmonary effort is normal. No respiratory distress.      Breath sounds: Normal breath sounds. No wheezing or rales.   Abdominal:      General: Bowel sounds are normal.      Palpations: Abdomen is soft. There is no mass.      Tenderness: There is no abdominal tenderness.   Musculoskeletal:         General: No tenderness or edema. Normal range of motion.      Cervical back: Normal range of motion and neck supple.   Lymphadenopathy:      Cervical: No cervical adenopathy.   Skin:     General: Skin is warm and dry.      Findings: No  rash.      Comments: No foot lesions are present.   Neurological:      Mental Status: He is alert and oriented to person, place, and time.      Cranial Nerves: No cranial nerve deficit.      Comments: Sensory examination is intact in both feet on monofilament testing.   Psychiatric:         Mood and Affect: Mood and affect and mood normal.         Behavior: Behavior normal.       Protective Sensation (w/ 10 gram monofilament):  Right: Intact  Left: Intact    Visual Inspection:  Normal -  Bilateral    Pedal Pulses:   Right: Present  Left: Present    Posterior tibialis:   Right:Present  Left: Present        Lab Visit on 01/18/2022   Component Date Value Ref Range Status    Sodium 01/18/2022 135* 136 - 145 mmol/L Final    Potassium 01/18/2022 3.9  3.5 - 5.1 mmol/L Final    Chloride 01/18/2022 100  95 - 110 mmol/L Final    CO2 01/18/2022 23  23 - 29 mmol/L Final    Glucose 01/18/2022 95  70 - 110 mg/dL Final    BUN 01/18/2022 21  8 - 23 mg/dL Final    Creatinine 01/18/2022 1.3  0.5 - 1.4 mg/dL Final    Calcium 01/18/2022 10.1  8.7 - 10.5 mg/dL Final    Total Protein 01/18/2022 7.8  6.0 - 8.4 g/dL Final    Albumin 01/18/2022 3.6  3.5 - 5.2 g/dL Final    Total Bilirubin 01/18/2022 0.7  0.1 - 1.0 mg/dL Final    Comment: For infants and newborns, interpretation of results should be based  on gestational age, weight and in agreement with clinical  observations.    Premature Infant recommended reference ranges:  Up to 24 hours.............<8.0 mg/dL  Up to 48 hours............<12.0 mg/dL  3-5 days..................<15.0 mg/dL  6-29 days.................<15.0 mg/dL      Alkaline Phosphatase 01/18/2022 82  55 - 135 U/L Final    AST 01/18/2022 37  10 - 40 U/L Final    Specimen slightly hemolyzed    ALT 01/18/2022 56* 10 - 44 U/L Final    Anion Gap 01/18/2022 12  8 - 16 mmol/L Final    eGFR if African American 01/18/2022 >60  >60 mL/min/1.73 m^2 Final    eGFR if non  01/18/2022 56* >60 mL/min/1.73  m^2 Final    Comment: Calculation used to obtain the estimated glomerular filtration  rate (eGFR) is the CKD-EPI equation.       Cholesterol 01/18/2022 179  120 - 199 mg/dL Final    Comment: The National Cholesterol Education Program (NCEP) has set the  following guidelines (reference ranges) for Cholesterol:  Optimal.....................<200 mg/dL  Borderline High.............200-239 mg/dL  High........................> or = 240 mg/dL      Triglycerides 01/18/2022 188* 30 - 150 mg/dL Final    Comment: The National Cholesterol Education Program (NCEP) has set the  following guidelines (reference values) for triglycerides:  Normal......................<150 mg/dL  Borderline High.............150-199 mg/dL  High........................200-499 mg/dL      HDL 01/18/2022 36* 40 - 75 mg/dL Final    Comment: The National Cholesterol Education Program (NCEP) has set the  following guidelines (reference values) for HDL Cholesterol:  Low...............<40 mg/dL  Optimal...........>60 mg/dL      LDL Cholesterol 01/18/2022 105.4  63.0 - 159.0 mg/dL Final    Comment: The National Cholesterol Education Program (NCEP) has set the  following guidelines (reference values) for LDL Cholesterol:  Optimal.......................<130 mg/dL  Borderline High...............130-159 mg/dL  High..........................160-189 mg/dL  Very High.....................>190 mg/dL      HDL/Cholesterol Ratio 01/18/2022 20.1  20.0 - 50.0 % Final    Total Cholesterol/HDL Ratio 01/18/2022 5.0  2.0 - 5.0 Final    Non-HDL Cholesterol 01/18/2022 143  mg/dL Final    Comment: Risk category and Non-HDL cholesterol goals:  Coronary heart disease (CHD)or equivalent (10-year risk of CHD >20%):  Non-HDL cholesterol goal     <130 mg/dL  Two or more CHD risk factors and 10-year risk of CHD <= 20%:  Non-HDL cholesterol goal     <160 mg/dL  0 to 1 CHD risk factor:  Non-HDL cholesterol goal     <190 mg/dL      WBC 01/18/2022 11.79  3.90 - 12.70 K/uL Final     RBC 01/18/2022 5.79  4.60 - 6.20 M/uL Final    Hemoglobin 01/18/2022 16.8  14.0 - 18.0 g/dL Final    Hematocrit 01/18/2022 50.8  40.0 - 54.0 % Final    MCV 01/18/2022 88  82 - 98 fL Final    MCH 01/18/2022 29.0  27.0 - 31.0 pg Final    MCHC 01/18/2022 33.1  32.0 - 36.0 g/dL Final    RDW 01/18/2022 13.5  11.5 - 14.5 % Final    Platelets 01/18/2022 203  150 - 450 K/uL Final    MPV 01/18/2022 10.0  9.2 - 12.9 fL Final    Immature Granulocytes 01/18/2022 0.3  0.0 - 0.5 % Final    Gran # (ANC) 01/18/2022 5.6  1.8 - 7.7 K/uL Final    Immature Grans (Abs) 01/18/2022 0.03  0.00 - 0.04 K/uL Final    Comment: Mild elevation in immature granulocytes is non specific and   can be seen in a variety of conditions including stress response,   acute inflammation, trauma and pregnancy. Correlation with other   laboratory and clinical findings is essential.      Lymph # 01/18/2022 4.6  1.0 - 4.8 K/uL Final    Mono # 01/18/2022 1.2* 0.3 - 1.0 K/uL Final    Eos # 01/18/2022 0.4  0.0 - 0.5 K/uL Final    Baso # 01/18/2022 0.06  0.00 - 0.20 K/uL Final    nRBC 01/18/2022 0  0 /100 WBC Final    Gran % 01/18/2022 47.4  38.0 - 73.0 % Final    Lymph % 01/18/2022 38.8  18.0 - 48.0 % Final    Mono % 01/18/2022 9.8  4.0 - 15.0 % Final    Eosinophil % 01/18/2022 3.2  0.0 - 8.0 % Final    Basophil % 01/18/2022 0.5  0.0 - 1.9 % Final    Differential Method 01/18/2022 Automated   Final    Hemoglobin A1C 01/18/2022 5.9* 4.0 - 5.6 % Final    Comment: ADA Screening Guidelines:  5.7-6.4%  Consistent with prediabetes  >or=6.5%  Consistent with diabetes    High levels of fetal hemoglobin interfere with the HbA1C  assay. Heterozygous hemoglobin variants (HbS, HgC, etc)do  not significantly interfere with this assay.   However, presence of multiple variants may affect accuracy.      Estimated Avg Glucose 01/18/2022 123  68 - 131 mg/dL Final   Admission on 01/04/2022, Discharged on 01/04/2022   Component Date Value Ref Range Status     POCT Glucose 01/04/2022 92  70 - 110 mg/dL Final   Admission on 11/10/2021, Discharged on 11/10/2021   Component Date Value Ref Range Status    POCT Glucose 11/10/2021 88  70 - 110 mg/dL Final    POCT Glucose 11/10/2021 96  70 - 110 mg/dL Final   Lab Visit on 11/07/2021   Component Date Value Ref Range Status    SARS-CoV2 (COVID-19) Qualitative P* 11/07/2021 Not Detected  Not Detected Final    Comment: This test utilizes a real-time reverse transcription  polymerase chain reaction procedure to amplify and   detect the SARS-CoV-2 RdRp and N genes.    The analytical sensitivity (limit of detection) of   this assay is 100 copies/mL.     A Detected result is considered positive for COVID-19.  This patient is considered infected with the   SARS-CoV-2 virus and is presumed to be contagious.    A Not Detected result means that SARS-CoV-2 RNA is not  present above the limit of detection. It does not rule  out the possibility of COVID-19 and should not be the  sole basis for treatment decisions.  If COVID-19 is   strongly suspected based on clinical and exposure   history,re-testing should be considered.      This test is only for use under Food and Drug   Administration s Emergency Use Authorization (EUA).   Commercial reagents are provided by [employer].  Performance characteristics of the EUA have been   independently verified by [facility]   Department of Pathology a                           nd Laboratory Medicine.        SARS-COV-2- Cycle Number 11/07/2021 N/A   Final    Comment: CT (Cycle Threshold) values are surrogate markers of   nucleic acid concentration in a sample. They are non-standard  measurements and should only be interpreted by those familiar   with both PCR technology and the patient's clinical presentation.     Lab Visit on 10/29/2021   Component Date Value Ref Range Status    Urine Culture, Routine 10/29/2021 No growth   Final       Assessment & Plan:      Celso was seen today  for follow-up.  The patient will follow-up with pain management as scheduled.  Anticipates having MRI of the thoracic spine with contrast.  An appointment has already been set up for Hematology-Oncology consultation.    Influenza vaccine will be administered today.    The dose of hydralazine will be increased to 50 mg twice today.    A blood pressure check with nursing staff in 1 month is recommended.    Diagnoses and all orders for this visit:    Type 2 diabetes mellitus without complication, without long-term current use of insulin    Essential hypertension    Mixed hyperlipidemia    BPH with obstruction/lower urinary tract symptoms    Atherosclerosis of aorta    Morbid (severe) obesity due to excess calories    Type 2 diabetes mellitus with diabetic cataract, without long-term current use of insulin    Other orders  -     Discontinue: hydrALAZINE (APRESOLINE) 25 MG tablet; TAKE 1 TABLET BY MOUTH EVERY 12 HOURS FOR  BLOOD  PRESSURE  CONTROL  -     hydrALAZINE (APRESOLINE) 50 MG tablet; TAKE 1 TABLET BY MOUTH EVERY 12 HOURS FOR  BLOOD  PRESSURE  CONTROL  -     multivit-min-FA-lycopen-lutein (CENTRUM SILVER MEN) 300-600-300 mcg Tab; Take 1 tablet by mouth once daily.         No follow-ups on file.     Inder Quarles MD

## 2022-01-27 ENCOUNTER — PES CALL (OUTPATIENT)
Dept: ADMINISTRATIVE | Facility: CLINIC | Age: 67
End: 2022-01-27
Payer: MEDICARE

## 2022-02-10 ENCOUNTER — OFFICE VISIT (OUTPATIENT)
Dept: PAIN MEDICINE | Facility: CLINIC | Age: 67
End: 2022-02-10
Payer: MEDICARE

## 2022-02-10 VITALS
WEIGHT: 251.31 LBS | BODY MASS INDEX: 38.21 KG/M2 | DIASTOLIC BLOOD PRESSURE: 115 MMHG | HEART RATE: 91 BPM | SYSTOLIC BLOOD PRESSURE: 183 MMHG

## 2022-02-10 DIAGNOSIS — M54.6 CHRONIC THORACIC BACK PAIN, UNSPECIFIED BACK PAIN LATERALITY: ICD-10-CM

## 2022-02-10 DIAGNOSIS — G89.29 CHRONIC THORACIC BACK PAIN, UNSPECIFIED BACK PAIN LATERALITY: ICD-10-CM

## 2022-02-10 DIAGNOSIS — M51.34 DDD (DEGENERATIVE DISC DISEASE), THORACIC: ICD-10-CM

## 2022-02-10 DIAGNOSIS — M54.14 THORACIC RADICULOPATHY: Primary | ICD-10-CM

## 2022-02-10 DIAGNOSIS — M47.819 ARTHROPATHY OF FACET JOINTS AT MULTIPLE LEVELS: ICD-10-CM

## 2022-02-10 DIAGNOSIS — G89.4 CHRONIC PAIN SYNDROME: ICD-10-CM

## 2022-02-10 PROCEDURE — 3080F PR MOST RECENT DIASTOLIC BLOOD PRESSURE >= 90 MM HG: ICD-10-PCS | Mod: CPTII,S$GLB,, | Performed by: NURSE PRACTITIONER

## 2022-02-10 PROCEDURE — 3008F BODY MASS INDEX DOCD: CPT | Mod: CPTII,S$GLB,, | Performed by: NURSE PRACTITIONER

## 2022-02-10 PROCEDURE — 99999 PR PBB SHADOW E&M-EST. PATIENT-LVL III: ICD-10-PCS | Mod: PBBFAC,,, | Performed by: NURSE PRACTITIONER

## 2022-02-10 PROCEDURE — 1126F AMNT PAIN NOTED NONE PRSNT: CPT | Mod: CPTII,S$GLB,, | Performed by: NURSE PRACTITIONER

## 2022-02-10 PROCEDURE — 99213 PR OFFICE/OUTPT VISIT, EST, LEVL III, 20-29 MIN: ICD-10-PCS | Mod: S$GLB,,, | Performed by: NURSE PRACTITIONER

## 2022-02-10 PROCEDURE — 3077F SYST BP >= 140 MM HG: CPT | Mod: CPTII,S$GLB,, | Performed by: NURSE PRACTITIONER

## 2022-02-10 PROCEDURE — 1160F RVW MEDS BY RX/DR IN RCRD: CPT | Mod: CPTII,S$GLB,, | Performed by: NURSE PRACTITIONER

## 2022-02-10 PROCEDURE — 1160F PR REVIEW ALL MEDS BY PRESCRIBER/CLIN PHARMACIST DOCUMENTED: ICD-10-PCS | Mod: CPTII,S$GLB,, | Performed by: NURSE PRACTITIONER

## 2022-02-10 PROCEDURE — 1159F PR MEDICATION LIST DOCUMENTED IN MEDICAL RECORD: ICD-10-PCS | Mod: CPTII,S$GLB,, | Performed by: NURSE PRACTITIONER

## 2022-02-10 PROCEDURE — 3008F PR BODY MASS INDEX (BMI) DOCUMENTED: ICD-10-PCS | Mod: CPTII,S$GLB,, | Performed by: NURSE PRACTITIONER

## 2022-02-10 PROCEDURE — 3044F HG A1C LEVEL LT 7.0%: CPT | Mod: CPTII,S$GLB,, | Performed by: NURSE PRACTITIONER

## 2022-02-10 PROCEDURE — 3080F DIAST BP >= 90 MM HG: CPT | Mod: CPTII,S$GLB,, | Performed by: NURSE PRACTITIONER

## 2022-02-10 PROCEDURE — 1159F MED LIST DOCD IN RCRD: CPT | Mod: CPTII,S$GLB,, | Performed by: NURSE PRACTITIONER

## 2022-02-10 PROCEDURE — 99999 PR PBB SHADOW E&M-EST. PATIENT-LVL III: CPT | Mod: PBBFAC,,, | Performed by: NURSE PRACTITIONER

## 2022-02-10 PROCEDURE — 99213 OFFICE O/P EST LOW 20 MIN: CPT | Mod: S$GLB,,, | Performed by: NURSE PRACTITIONER

## 2022-02-10 PROCEDURE — 3077F PR MOST RECENT SYSTOLIC BLOOD PRESSURE >= 140 MM HG: ICD-10-PCS | Mod: CPTII,S$GLB,, | Performed by: NURSE PRACTITIONER

## 2022-02-10 PROCEDURE — 3044F PR MOST RECENT HEMOGLOBIN A1C LEVEL <7.0%: ICD-10-PCS | Mod: CPTII,S$GLB,, | Performed by: NURSE PRACTITIONER

## 2022-02-10 PROCEDURE — 1126F PR PAIN SEVERITY QUANTIFIED, NO PAIN PRESENT: ICD-10-PCS | Mod: CPTII,S$GLB,, | Performed by: NURSE PRACTITIONER

## 2022-02-10 NOTE — PROGRESS NOTES
"[facility] Visit    Referred by: No ref. provider found   Reason for referral: * No diagnoses found *     CC:   Chief Complaint   Patient presents with    Low-back Pain    Mid-back Pain       Interval Updates:  2/10/2022 - Mr. Hernandez is following up for Low-back Pain and Mid-back Pain  Pain is currently rate 0/10 with a weekly range 2-5/10.  It is described as Aching, Grabbing and Tight.  He  Is reporting mild to no symptoms today, he states he has no pain when performing normal ADLs, he reports pain is exacerbated when he  "tries to do to much" He did also report midback /thoroacic pain with mild twisting but pain is stable at this point.      -  Mary returns to clinic s/p Thoracic Epidural Steroid Injection at T5-6 on 1/4/22 with 60% relief.  He reports a pain intensity 1/10 today with a weekly range of 1-2/10.   He is reporting mild soarness in his thoracic area, he is reporting being " free of pain after the injection" his pain returned after working outside in his garage yesterday. Overall much better following his injection. He is reporting certain movements increase his pain but other than that he  is better.     Subjective:   Celso Hernandez is a 66 y.o. male who has a past medical history of Arthritis, Cataract, Colon polyps, Diabetes mellitus type II, Glaucoma, Hyperlipidemia, Hypertension, Multiple duodenal ulcers, and Unspecified hemorrhoids without mention of complication. He complains of pain as described below.    Location: right under arm, rib cage    Onset: about 6 mos (started approx May 2021)  Radiation: from mid axillary line toward back  Timing: constant  Current Pain Score: 2/10  Weekly Pain Range: 2-10/10  Quality: Grabbing and Spasms (no burning, tingling, numbness)  Worsened by: exercise, extension, lifting, lying down, sitting and walking for more than several minutes  Improved by: medications    Previous Therapies:  PT/OT: Denies  HEP: " Yes, stretching at home and walking.  TENS: No  Interventions:  01/04/2022 Thoracic Epidural Steroid Injection at T5-6  Surgery: Denies localized surgery  Opioids:  Adjuvants:     Current Pain Medications:  1. Mobic  2. Robaxin     Assessment & Plan:  Problem List Items Addressed This Visit    None       12/17/21 - Celso Hernandez is a 66 y.o. male who  has a past medical history of Arthritis, Cataract, Colon polyps, Diabetes mellitus type II, Glaucoma, Hyperlipidemia, Hypertension, Multiple duodenal ulcers (10/08/2019), and Unspecified hemorrhoids without mention of complication.  By history and examination this patient has chronic mid/upper back pain with right T5-6 radiculopathy.  The underlying cause cause is facet arthritis and degenerative disc disease with a large, obstructive osteophyte on the right side as seen on CT chest.  MRI is appropriate prior to an injections to assess soft tissue and vasculature.  We discussed the underlying diagnoses and multiple treatment options including non-opioid medications, opioid medications, interventional procedures, physical therapy and home exercise.  The risks and benefits of each treatment option were discussed and all questions were answered.        1/19/2022- Celso Hernandez is a 66 y.o. male who  has a past medical history of Arthritis, Cataract, Colon polyps, Diabetes mellitus type II, Glaucoma, Hyperlipidemia, Hypertension, Multiple duodenal ulcers (10/08/2019), and Unspecified hemorrhoids without mention of complication.  By history and examination this patient has chronic mid/upper back pain with a right T5-6   Radiculopathy that responded well following a thoracic GABRIELA targeting T5-6.  Colonic causes appear to be facet arthritis and degenerative disc disease with a large obstructive osteophyte on the right side.  Upon review of his thoracic MRI ordered per Dr. Mejia results did show a extradural degenerative changes resulting in right T6 neural foraminal  stenosis.  Secondly intradural extramedullary T2 signal abnormality at the level of the T4-5.  The radiologist include a differential diagnosis that included a nerve sheet to were such as a  schwannoma or a neurofibroma as well as other neoplasms.  Radiologist recommended further evaluation of this patient's thoracic area, discussed with the patient today that I will order a thoracic MRI with contrast to further evaluate. Long discussion with patient regarding  Results of his MRI, I will also refer to Oncology for further evaluation but I would like this patient to see a [facility] following a hip obtaining new images.    [id number]-dcyz-old male with history of chronic mid to low back pain, he presents today to review an updated thoracic MRI.  There was concerned per radiology on previous thoracic MRI with differential diagnosis that includes a nerve sheet to wear such as a schwannoma or a neurofibroma as well as other neoplasms.  I contacted Oncology and did not appear to be a malignancy.  Was a non malignant entity.  He contacted patient and made him aware of this.  Today his pain is 0/10, he does experience mild midthoracic low back pain when performing duties outside of his normal ADLs.  I recommended physical therapy patient is amenable to this plan.    1. MRI thoracic spine  reviewed and discussed  2.  Consult to physical therapy for mid/thoracic back pain  3. No procedures recommended at this time.   4. Continue current medications for now with consideration of gabapentin/pregabalin  5. Duloxetine is a tertiary option      Follow Up:  After physical therapy    Disclaimer: This note was partly generated using dictation software which may occasionally result in transcription errors.    Imagin22 MRI Thoracic Spine W WO Contrast     Narrative & Impression  EXAMINATION:  MRI THORACIC SPINE W WO CONTRAST     CLINICAL HISTORY:  Attention to T4-5 level.  Evaluate for underlying mass.     TECHNIQUE:  Pre  and postcontrast MR imaging of the thoracic spine was performed utilizing 10 mL Gadavist intravenous contrast.     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     No advanced marrow edema or osseous destructive process is identified.  Multilevel mild disc bulging flattens the thecal sac without cord impingement.  Marked degenerative foraminal stenosis on the left at T 4-5 and to the right at T5-6 is again noted.     The spinal cord maintains normal signal intensity.     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     No enhancing lesion.  Degenerative foraminal stenosis on the left at T4-5 and on the right at T5-6.        Electronically signed by: Trent Graves  Date:                                            01/24/2022  Time:                                           17:11        CT Chest Without Contrast  Impression:  No worrisome finding on the chest CT.  Mild atherosclerotic plaque of the aorta and mild coronary artery calcification  Severe foraminal narrowing on the right at T5-6 due to significant facet joint osseous hypertrophy.  Electronically signed by: Halley Lundberg MD  Date:                                            09/29/2021  Time:                                           08:56    X-Ray Lumbar Spine Ap And Lateral 11/21/2019  FINDINGS:  Two views AP lateral.  There is mild DJD and DISH.  Alignment is normal.  No fracture dislocation bone destruction.  No trauma seen.  Impression:  No acute process seen.  DJD and DISH.      Review of Systems:  Review of Systems   Constitutional: Negative for chills and fever.   HENT: Negative for nosebleeds.    Eyes: Negative for blurred vision and pain.   Respiratory: Negative  for hemoptysis.    Cardiovascular: Negative for chest pain and palpitations.   Gastrointestinal: Negative for heartburn, nausea and vomiting.   Genitourinary: Negative for dysuria and hematuria.   Musculoskeletal: Positive for joint pain. Negative for myalgias.   Skin: Negative for rash.   Neurological: Negative for seizures and loss of consciousness.   Endo/Heme/Allergies: Does not bruise/bleed easily.   Psychiatric/Behavioral: Negative for hallucinations.       Physical Exam:  There were no vitals filed for this visit.  General    Nursing note and vitals reviewed.  Constitutional: He is oriented to person, place, and time. He appears well-developed and well-nourished. No distress.   HENT:   Head: Normocephalic and atraumatic.   Nose: Nose normal.   Eyes: Conjunctivae and EOM are normal. Pupils are equal, round, and reactive to light. Right eye exhibits no discharge. Left eye exhibits no discharge. No scleral icterus.   Neck: No JVD present.   Cardiovascular: Intact distal pulses.    Pulmonary/Chest: Effort normal. No respiratory distress.   Abdominal: He exhibits no distension.   Neurological: He is alert and oriented to person, place, and time. Coordination normal.   Psychiatric: He has a normal mood and affect. His behavior is normal. Judgment and thought content normal.     General Musculoskeletal Exam   Gait: normal     Back (L-Spine & T-Spine) / Neck (C-Spine) Exam     Tenderness Right paramedian tenderness of the Upper T-Spine.     Back (L-Spine & T-Spine) Range of Motion   Lateral bend right: abnormal Back lateral bend right: leaning to the right exacerbates pain.   Lateral bend left: normal   Rotation right: normal   Rotation left: normal     Comments:  There are no topical lesions in the affected area such a those seen with shingles.

## 2022-02-16 ENCOUNTER — TELEPHONE (OUTPATIENT)
Dept: OPHTHALMOLOGY | Facility: CLINIC | Age: 67
End: 2022-02-16
Payer: MEDICARE

## 2022-02-16 DIAGNOSIS — H40.1132 PRIMARY OPEN ANGLE GLAUCOMA (POAG) OF BOTH EYES, MODERATE STAGE: ICD-10-CM

## 2022-02-16 RX ORDER — DORZOLAMIDE HYDROCHLORIDE AND TIMOLOL MALEATE 20; 5 MG/ML; MG/ML
1 SOLUTION/ DROPS OPHTHALMIC 2 TIMES DAILY
Qty: 30 ML | Refills: 3 | Status: SHIPPED | OUTPATIENT
Start: 2022-02-16 | End: 2023-03-13

## 2022-02-16 RX ORDER — MELOXICAM 15 MG/1
TABLET ORAL
Qty: 90 TABLET | Refills: 0 | Status: SHIPPED | OUTPATIENT
Start: 2022-02-16 | End: 2022-03-16 | Stop reason: SDUPTHER

## 2022-02-16 RX ORDER — LATANOPROST 50 UG/ML
1 SOLUTION/ DROPS OPHTHALMIC NIGHTLY
Qty: 7.5 ML | Refills: 3 | Status: SHIPPED | OUTPATIENT
Start: 2022-02-16 | End: 2023-03-13

## 2022-02-28 ENCOUNTER — CLINICAL SUPPORT (OUTPATIENT)
Dept: INTERNAL MEDICINE | Facility: CLINIC | Age: 67
End: 2022-02-28
Payer: MEDICARE

## 2022-02-28 ENCOUNTER — TELEPHONE (OUTPATIENT)
Dept: INTERNAL MEDICINE | Facility: CLINIC | Age: 67
End: 2022-02-28

## 2022-02-28 VITALS — HEART RATE: 68 BPM | SYSTOLIC BLOOD PRESSURE: 154 MMHG | DIASTOLIC BLOOD PRESSURE: 86 MMHG

## 2022-02-28 DIAGNOSIS — I10 ESSENTIAL HYPERTENSION: Primary | ICD-10-CM

## 2022-02-28 RX ORDER — NEBIVOLOL 5 MG/1
5 TABLET ORAL DAILY
Qty: 30 TABLET | Refills: 6 | Status: SHIPPED | OUTPATIENT
Start: 2022-02-28 | End: 2022-05-30

## 2022-02-28 NOTE — TELEPHONE ENCOUNTER
Nurse visit today.     bp 154/86  Here in clinic.    He is checking bp at home.  Home cuff gets 140-150/ 90's.    Is on hydralzine 50mg twice daily and losartan/hctz 100/12.5 once a day.    Please advise if we need to make any changes?   Next appt to see you is may 30th.    Thanks hussein

## 2022-02-28 NOTE — TELEPHONE ENCOUNTER
I left voicemail and sent email.  I also sent a remind me msg to follow up in a  Month and be sure I received home bp readings.

## 2022-02-28 NOTE — PROGRESS NOTES
Home readings 140-150/ 90's.    Sending msg to dr jacob.    Is on hydralzine 50mg bid with losartan htcz daily.

## 2022-02-28 NOTE — TELEPHONE ENCOUNTER
I recommended adding Bystolic 5 mg once a day to help with blood pressure control.  The patient should continue his current therapy.

## 2022-03-03 ENCOUNTER — PROCEDURE VISIT (OUTPATIENT)
Dept: UROLOGY | Facility: CLINIC | Age: 67
End: 2022-03-03
Payer: MEDICARE

## 2022-03-03 VITALS
TEMPERATURE: 98 F | WEIGHT: 249.38 LBS | HEIGHT: 68 IN | BODY MASS INDEX: 37.8 KG/M2 | SYSTOLIC BLOOD PRESSURE: 155 MMHG | HEART RATE: 86 BPM | RESPIRATION RATE: 16 BRPM | DIASTOLIC BLOOD PRESSURE: 89 MMHG

## 2022-03-03 DIAGNOSIS — N99.115 POSTPROCEDURAL MALE FOSSA NAVICULARIS URETHRAL STRICTURE: ICD-10-CM

## 2022-03-03 DIAGNOSIS — A63.0 WARTS, GENITAL: ICD-10-CM

## 2022-03-03 PROCEDURE — 52281 CYSTOSCOPY AND TREATMENT: CPT | Mod: S$GLB,,, | Performed by: UROLOGY

## 2022-03-03 PROCEDURE — 52281 PR CYSTOSCOPY,DIL URETHRAL STRICTURE: ICD-10-PCS | Mod: S$GLB,,, | Performed by: UROLOGY

## 2022-03-03 PROCEDURE — 54055 DESTRUCTION PENIS LESION(S): CPT | Mod: 51,S$GLB,, | Performed by: UROLOGY

## 2022-03-03 PROCEDURE — 54055 PR DESTR PENIS LESN,SIMPL,ELEC-DESSIC: ICD-10-PCS | Mod: 51,S$GLB,, | Performed by: UROLOGY

## 2022-03-03 RX ORDER — LIDOCAINE HYDROCHLORIDE 10 MG/ML
20 INJECTION INFILTRATION; PERINEURAL
Status: COMPLETED | OUTPATIENT
Start: 2022-03-03 | End: 2022-03-03

## 2022-03-03 RX ORDER — LIDOCAINE HYDROCHLORIDE 20 MG/ML
JELLY TOPICAL ONCE
Status: COMPLETED | OUTPATIENT
Start: 2022-03-03 | End: 2022-03-03

## 2022-03-03 RX ORDER — DOXYCYCLINE HYCLATE 100 MG
100 TABLET ORAL ONCE
Status: COMPLETED | OUTPATIENT
Start: 2022-03-03 | End: 2022-03-03

## 2022-03-03 RX ADMIN — LIDOCAINE HYDROCHLORIDE: 20 JELLY TOPICAL at 11:03

## 2022-03-03 RX ADMIN — LIDOCAINE HYDROCHLORIDE 20 ML: 10 INJECTION INFILTRATION; PERINEURAL at 11:03

## 2022-03-03 RX ADMIN — Medication 100 MG: at 11:03

## 2022-03-03 NOTE — PROCEDURES
Fulguration    Date/Time: 3/3/2022 11:00 AM  Performed by: Arcadio Vaughn MD  Authorized by: Arcadio Vaughn MD   Preparation: Patient was prepped and draped in the usual sterile fashion.  Local anesthesia used: yes    Anesthesia:  Local anesthesia used: yes  Local Anesthetic: lidocaine 1% without epinephrine  Anesthetic total: 15 mL  Patient tolerance: patient tolerated the procedure well with no immediate complications  Comments: The genital warts were located in the right groin area. One measures 2 x 2 cm, second one 1 x 1.5 cm, third one 1 x 1 and the 4 th one 0.5 x 0.5 cm in size.  All lesions were fairly flat, dark in color.  All these lesions were fulgurated using electrical cautery and removed completely.  Topical antibiotic ointments applied to the wounds.

## 2022-03-03 NOTE — PROCEDURES
Cystoscopy    Date/Time: 3/3/2022 10:45 AM  Performed by: Arcadio Vaughn MD  Authorized by: Arcadio Vaughn MD        Date of Procedure: 03/03/2022    Procedure:  1. Male Diagnostic Cystourethroscopy  2.  Urethral dilation.    Pre-op diagnosis: Urethral stricture  Post-op diagnosis: same  Anesthesia: Local  Surgeon:  Arcadio Vaughn MD    Findings:  Urethra: fossa navicularis urethral stricture: unable to pass the flexible scope.  Stricture area was dilated with [place] from  12 to 22 F in size.  The rest of urethra: normal  Sphincter: competent.  Prostate: s/p TURP with open bladder neck and prostate fossa  Bladder neck: patent with no stricture  Bladder:  Normal bladder.   Normal ureteral orifices bilaterally.   Moderate trabeculation.     Description of Procedure:                                                         Informed Consent:                                                            - Risks, benefits and alternatives of procedure discussed with               patient and informed consent obtained.       Patient Position:   - Supine. --- Bladder ---   Prep and Drape:   - Patient prepped and draped in usual sterile fashion using povidone     iodine (Betadine).   Instruments:   - 16 Fr flexible cystoscope with 0 degree lens.   Procedure Details:   - Cystoscope passed under vision into bladder.   - Bladder and urethra examined in their entirety with findings as     above.     Conclusion:  1. Recurrent urethral stricture at the fossa navicularis  Pt will continue self urethral dilation using the meatal dilation given at least once a week    Plan:  Patient was discharged home in a stable condition.  Medications: doxy  Follow up:  6 months

## 2022-03-03 NOTE — PATIENT INSTRUCTIONS
What to Expect After a Cystoscopy  For the next 24-48 hours, you may feel a mild burning when you urinate. This burning is normal and expected. Drink plenty of water to dilute the urine to help relieve the burning sensation. You may also see a small amount of blood in your urine after the procedure.    Unless you are already taking antibiotics, you may be given an antibiotic after the test to prevent infection.    Signs and Symptoms to Report  Call the [facility] at [phone] if you develop any of the following:  Fever of 101 degrees or higher  Chills or persistent bleeding  Inability to urinate      For fulguration of lesion:  - Keep dry  - Apply ointment twice daily  - Keep gauze on area of lesion

## 2022-03-11 ENCOUNTER — OFFICE VISIT (OUTPATIENT)
Dept: OPTOMETRY | Facility: CLINIC | Age: 67
End: 2022-03-11
Payer: MEDICARE

## 2022-03-11 DIAGNOSIS — E11.36 TYPE 2 DIABETES MELLITUS WITH CATARACT: ICD-10-CM

## 2022-03-11 DIAGNOSIS — H25.13 SENILE NUCLEAR SCLEROSIS, BILATERAL: ICD-10-CM

## 2022-03-11 DIAGNOSIS — H52.4 HYPEROPIA WITH ASTIGMATISM AND PRESBYOPIA, BILATERAL: ICD-10-CM

## 2022-03-11 DIAGNOSIS — H52.203 HYPEROPIA WITH ASTIGMATISM AND PRESBYOPIA, BILATERAL: ICD-10-CM

## 2022-03-11 DIAGNOSIS — H40.1132 PRIMARY OPEN ANGLE GLAUCOMA (POAG) OF BOTH EYES, MODERATE STAGE: Primary | ICD-10-CM

## 2022-03-11 DIAGNOSIS — E11.9 TYPE 2 DIABETES MELLITUS WITHOUT RETINOPATHY: ICD-10-CM

## 2022-03-11 DIAGNOSIS — H52.03 HYPEROPIA WITH ASTIGMATISM AND PRESBYOPIA, BILATERAL: ICD-10-CM

## 2022-03-11 PROCEDURE — 99999 PR PBB SHADOW E&M-EST. PATIENT-LVL III: CPT | Mod: PBBFAC,,, | Performed by: OPTOMETRIST

## 2022-03-11 PROCEDURE — 1159F PR MEDICATION LIST DOCUMENTED IN MEDICAL RECORD: ICD-10-PCS | Mod: CPTII,S$GLB,, | Performed by: OPTOMETRIST

## 2022-03-11 PROCEDURE — 3288F PR FALLS RISK ASSESSMENT DOCUMENTED: ICD-10-PCS | Mod: CPTII,S$GLB,, | Performed by: OPTOMETRIST

## 2022-03-11 PROCEDURE — 92015 DETERMINE REFRACTIVE STATE: CPT | Mod: S$GLB,,, | Performed by: OPTOMETRIST

## 2022-03-11 PROCEDURE — 3044F PR MOST RECENT HEMOGLOBIN A1C LEVEL <7.0%: ICD-10-PCS | Mod: CPTII,S$GLB,, | Performed by: OPTOMETRIST

## 2022-03-11 PROCEDURE — 92014 COMPRE OPH EXAM EST PT 1/>: CPT | Mod: S$GLB,,, | Performed by: OPTOMETRIST

## 2022-03-11 PROCEDURE — 1160F RVW MEDS BY RX/DR IN RCRD: CPT | Mod: CPTII,S$GLB,, | Performed by: OPTOMETRIST

## 2022-03-11 PROCEDURE — 3044F HG A1C LEVEL LT 7.0%: CPT | Mod: CPTII,S$GLB,, | Performed by: OPTOMETRIST

## 2022-03-11 PROCEDURE — 3288F FALL RISK ASSESSMENT DOCD: CPT | Mod: CPTII,S$GLB,, | Performed by: OPTOMETRIST

## 2022-03-11 PROCEDURE — 1159F MED LIST DOCD IN RCRD: CPT | Mod: CPTII,S$GLB,, | Performed by: OPTOMETRIST

## 2022-03-11 PROCEDURE — 92250 FUNDUS PHOTOGRAPHY W/I&R: CPT | Mod: S$GLB,,, | Performed by: OPTOMETRIST

## 2022-03-11 PROCEDURE — 2023F PR DILATED RETINAL EXAM W/O EVID OF RETINOPATHY: ICD-10-PCS | Mod: CPTII,S$GLB,, | Performed by: OPTOMETRIST

## 2022-03-11 PROCEDURE — 99999 PR PBB SHADOW E&M-EST. PATIENT-LVL III: ICD-10-PCS | Mod: PBBFAC,,, | Performed by: OPTOMETRIST

## 2022-03-11 PROCEDURE — 1160F PR REVIEW ALL MEDS BY PRESCRIBER/CLIN PHARMACIST DOCUMENTED: ICD-10-PCS | Mod: CPTII,S$GLB,, | Performed by: OPTOMETRIST

## 2022-03-11 PROCEDURE — 2023F DILAT RTA XM W/O RTNOPTHY: CPT | Mod: CPTII,S$GLB,, | Performed by: OPTOMETRIST

## 2022-03-11 PROCEDURE — 92014 PR EYE EXAM, EST PATIENT,COMPREHESV: ICD-10-PCS | Mod: S$GLB,,, | Performed by: OPTOMETRIST

## 2022-03-11 PROCEDURE — 1126F PR PAIN SEVERITY QUANTIFIED, NO PAIN PRESENT: ICD-10-PCS | Mod: CPTII,S$GLB,, | Performed by: OPTOMETRIST

## 2022-03-11 PROCEDURE — 92015 PR REFRACTION: ICD-10-PCS | Mod: S$GLB,,, | Performed by: OPTOMETRIST

## 2022-03-11 PROCEDURE — 1126F AMNT PAIN NOTED NONE PRSNT: CPT | Mod: CPTII,S$GLB,, | Performed by: OPTOMETRIST

## 2022-03-11 PROCEDURE — 92250 COLOR FUNDUS PHOTOGRAPHY - OU - BOTH EYES: ICD-10-PCS | Mod: S$GLB,,, | Performed by: OPTOMETRIST

## 2022-03-11 PROCEDURE — 1101F PR PT FALLS ASSESS DOC 0-1 FALLS W/OUT INJ PAST YR: ICD-10-PCS | Mod: CPTII,S$GLB,, | Performed by: OPTOMETRIST

## 2022-03-11 PROCEDURE — 1101F PT FALLS ASSESS-DOCD LE1/YR: CPT | Mod: CPTII,S$GLB,, | Performed by: OPTOMETRIST

## 2022-03-11 NOTE — PROGRESS NOTES
HPI     KEITH: 06/21  Chief complaint (CC):  Patient was due back 10/21 for an IOP check but was   rescheduled due to damage at the [place] location. Glasses about 1 yr.   Old and patient has noticed more trouble with reading. Patient states   Cosopt is burning his eyes and AT's help some.  Glasses? + 1 yr. old  Contacts? -  H/o eye surgery, injections or laser: -  H/o eye injury: -  Known eye conditions? See above  Family h/o eye conditions? +mother with glaucoma  Eye gtts? Using Latanoprost OU Q HS and Cosopt OU BID and Systane prn      (-) Flashes (-)  Floaters (-) Mucous   (-)  Tearing (-) Itching (+) Burning   (-) Headaches (-) Eye Pain/discomfort (+) Irritation   (-)  Redness (-) Double vision (-) Blurry vision    Diabetic? + BS 91 yesterday  A1c? Hemoglobin A1C       Date                     Value               Ref Range             Status                01/18/2022               5.9 (H)             4.0 - 5.6 %           Final                  09/21/2021               5.9 (H)             4.0 - 5.6 %           Final                 06/02/2021               6.0 (H)             4.0 - 5.6 %           Final                      Last edited by Esetlla Kebede on 3/11/2022 10:37 AM. (History)            Assessment /Plan     For exam results, see Encounter Report.    Primary open angle glaucoma (POAG) of both eyes, moderate stage  -     Posterior Segment OCT Optic Nerve- Both eyes; Future  -     Blake Visual Field - OU - Extended - Both Eyes; Future    Type 2 diabetes mellitus without retinopathy    Type 2 diabetes mellitus with cataract    Senile nuclear sclerosis, bilateral    Hyperopia with astigmatism and presbyopia, bilateral      1. (-) FHx. IOp 20 OD, OS. Last 16 OD, 20 OS. Tmax  22 OD, 23 OS.T min on drops 12 OD, 13 OS.  Ptfailed to f/u from 12/17/2019 to 9/16/2020 and 6/2021 to 3/11/2022. Pt reports noncompliance w/drops. Pt reports compliance w/eyedrops Prev pt of Dr Huffman. C/d 0.55 OD, 0.65  OS.   12/15/2020 HVF OD inf arcuate and early sup arcuate, OS sup altitudinal. Progression OS  12/15/2020 OCT OD thin NS, TS, T, TI and G (progression TI), OS Thin TS, T, TI and G, borderline NI (progression N, NI)    6/3/2021 OCT OD NS, TS, T, TI and G, OS thin TS, T, TI, G, borderline NI (stable OU)  3/11/2022 Photos  Educated pt on findings w/understanding.   Need for IOp to be lower. Pt reports compliance but states that his spacing in the drops times may be off.  Cont Latanoprost QHS OU.  D/c Timolol BiD OU on 12/15/2020  Cont Cosopt BID OU (start 12/15/2020)  Importance of drop compliance and f/u discussed with pt.   RTC 6 weeks IOP/OCT/HVF  2. BS control. No signs of diabetic retinopathy. Monitor with annual exam.  3-4. Nuclear sclerotic cataract - mildly visually significant. Observe.  5. SRx released to patient. Patient educated on lens options. Normal ocular health. RTC 1 year for routine exam.

## 2022-03-16 RX ORDER — MELOXICAM 15 MG/1
15 TABLET ORAL DAILY
Qty: 90 TABLET | Refills: 0 | Status: SHIPPED | OUTPATIENT
Start: 2022-03-16 | End: 2022-07-18

## 2022-03-16 NOTE — TELEPHONE ENCOUNTER
----- Message from Amanda Robb sent at 3/16/2022  3:31 PM CDT -----  Contact: Pt [phone]  Requesting an RX refill or new RX.  Is this a refill or new RX: Refill   RX name and strength (copy/paste from chart):  meloxicam (MOBIC) 15 MG tablet  Is this a 30 day or 90 day RX: 90  Pharmacy name and phone # (copy/paste from chart):    [id number]  [place]  [address]  [place] [address]  Phone: [phone] Fax: [phone]

## 2022-03-16 NOTE — TELEPHONE ENCOUNTER
----- Message from Hilton Olvin sent at 3/16/2022 10:27 AM CDT -----  Contact: [phone]  Requesting an RX refill or new RX.  Is this a refill or new RX: refill    RX name and strength (copy/paste from chart):  meloxicam (MOBIC) 15 MG tablet    Is this a 30 day or 90 day RX: 30    Pharmacy name and phone # (copy/paste from chart):      [id number]  [place] - [address]  [place] [address]  Phone: [phone] Fax: [phone]      The doctors have asked that we provide their patients with the following 2 reminders -- prescription refills can take up to 72 hours, and a friendly reminder that in the future you can use your [facility] account to request refills:

## 2022-03-18 ENCOUNTER — TELEPHONE (OUTPATIENT)
Dept: UROLOGY | Facility: CLINIC | Age: 67
End: 2022-03-18
Payer: MEDICARE

## 2022-03-18 DIAGNOSIS — L29.1 SCROTAL ITCHING: Primary | ICD-10-CM

## 2022-03-18 RX ORDER — CLOTRIMAZOLE 1 %
CREAM (GRAM) TOPICAL 2 TIMES DAILY
Qty: 28 G | Refills: 1 | Status: SHIPPED | OUTPATIENT
Start: 2022-03-18 | End: 2023-03-22

## 2022-03-18 NOTE — TELEPHONE ENCOUNTER
Scrotal itching  -     clotrimazole (LOTRIMIN) 1 % cream; Apply topically 2 (two) times daily. Apply to the affected area twice a day  Dispense: 28 g; Refill: 1    stop neosporin ointment to the wound   Instead start Lotrimin cream BID.

## 2022-03-18 NOTE — TELEPHONE ENCOUNTER
----- Message from Isa Ramos LPN sent at 3/18/2022  2:22 PM CDT -----  Regarding: FW: call back  Contact: Pt  Pt states he had warts removed from his groin on 3/3, he has been applying neosporin , but the area still looks like a fresh wound and itches.   ----- Message -----  From: Gi Vasquez, Patient Care Assistant  Sent: 3/18/2022  12:49 PM CDT  To: [facility] Staff  Subject: call back                                        Pt is requesting a call back in regards to procedure he had with physician.        Pt @ [phone]

## 2022-03-22 ENCOUNTER — CLINICAL SUPPORT (OUTPATIENT)
Dept: REHABILITATION | Facility: HOSPITAL | Age: 67
End: 2022-03-22
Payer: MEDICARE

## 2022-03-22 DIAGNOSIS — M47.819 ARTHROPATHY OF FACET JOINTS AT MULTIPLE LEVELS: ICD-10-CM

## 2022-03-22 DIAGNOSIS — G89.29 CHRONIC THORACIC BACK PAIN, UNSPECIFIED BACK PAIN LATERALITY: ICD-10-CM

## 2022-03-22 DIAGNOSIS — M54.6 CHRONIC THORACIC BACK PAIN, UNSPECIFIED BACK PAIN LATERALITY: ICD-10-CM

## 2022-03-22 DIAGNOSIS — G89.4 CHRONIC PAIN SYNDROME: ICD-10-CM

## 2022-03-22 PROCEDURE — 97162 PT EVAL MOD COMPLEX 30 MIN: CPT | Mod: PN

## 2022-03-22 NOTE — PLAN OF CARE
[facility]  Physical Therapy Initial Evaluation    Date: 3/22/2022   Name: Celso Hernandez  Clinic Number: [id number]    Therapy Diagnosis:   Encounter Diagnoses   Name Primary?    Arthropathy of facet joints at multiple levels     Chronic thoracic back pain, unspecified back pain laterality     Chronic pain syndrome      Physician: Coleman Quintana FNP    Physician Orders: PT Eval and Treat   Medical Diagnosis from Referral:   M47.819 (ICD-10-CM) - Arthropathy of facet joints at multiple levels   M54.6,G89.29 (ICD-10-CM) - Chronic thoracic back pain, unspecified back pain laterality   G89.4 (ICD-10-CM) - Chronic pain syndrome   Evaluation Date: 3/22/2022  Authorization Period Expiration: 5/22/2022  Plan of Care Expiration: 5/22/2022  Visit # / Visits authorized: 1/12    Time In: 9:05 am  Time Out: 9:45 am  Total Appointment Time (timed & untimed codes): 40 minutes (1 MCE)    Precautions: Standard and Diabetes    Subjective   Date of onset: 6-7 months ago   History of current condition - Celso reports: about 6 to 7 months ago he started to have right axillary pain that would wrap around to his chest at times. He eventually received an MRI and an epidural injection into his thoracic spine, which alleviated his pain. Pain onset insidiously. Aggravating factors include reaching overhead or working overhead, prolonged positions, and turning certain directions at times. He reports that his neck also feels tight most of the time. Easing factors are minimal aside from resting.         Medical History:   Past Medical History:   Diagnosis Date    Arthritis     Cataract     Colon polyps     Diabetes mellitus type II     Glaucoma     Hyperlipidemia     Hypertension     Multiple duodenal ulcers 10/08/2019    Unspecified hemorrhoids without mention of complication        Surgical History:   Celso Hernandez  has a past surgical history that includes none; Esophagogastroduodenoscopy (10/08/2019);  Colonoscopy w/ polypectomy (10/08/2019); Esophagogastroduodenoscopy (N/A, 10/8/2019); Colonoscopy (N/A, 10/8/2019); Transurethral resection of prostate (N/A, 5/27/2020); Cystoscopy (N/A, 8/5/2020); Dilation of urethra (N/A, 8/5/2020); Cystoscopy with urethral dilation (N/A, 11/10/2021); and Epidural steroid injection into thoracic spine (N/A, 1/4/2022).    Medications:   Celso has a current medication list which includes the following prescription(s): atorvastatin, clotrimazole, dorzolamide-timolol 2-0.5%, hydralazine, latanoprost, losartan-hydrochlorothiazide 100-12.5 mg, meloxicam, metformin, methocarbamol, centrum silver men, nebivolol, and tramadol.    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  No enhancing lesion.  Degenerative foraminal stenosis on the left at T4-5 and on the right at T5-6.    Prior Therapy: yes, for his shoulder once   Social History: lives with wife; 4 steps to get into house  Occupation: retired   Prior Level of Function: 100% about 6-7 months ago   Current Level of Function: difficulty with overhead tasks, low aching pain     Pain:  Current: 1/10; Worst: 7/10; Best: 0/10   Location: right sided thoracic spine   Description: cramp at times, piercing, denies numbness/tingling   Aggravating Factors: reaching overhead or working overhead, prolonged positions, and turning certain directions at times.  Easing Factors: resting     Pt's goals:   1. Patient would like to increase his upper extremity strength and function and learn how to manage thoracic pain.     Objective   Posture:     Increased lumbar lordosis   Increased thoracic kyphosis    DTR:   Right Left   Patellar (L3-4) 2+ 1+   Achilles (S1) 1+ 1+     Cervical Range of Motion: AROM   Degrees  Subjective report   Flexion  WNL  no pain     Extension  WNL  no pain     Right Rotation  55 degrees  neck pain     Left Rotation  50 degrees  neck pain     Right Side Bending  5 degrees  stiff    Left Side Bending  5 degrees  stiff         Shoulder Range of Motion: AROM   Shoulder Left Right   Flexion  170 degrees  170 degrees       Upper Extremity Strength  (R) UE  (L) UE    Shoulder flexion: 5/5 Shoulder flexion: 5/5   Shoulder Abduction: 5/5 Shoulder abduction: 5/5   Shoulder ER 4+/5 Shoulder ER 4+/5   Shoulder IR 5/5 Shoulder IR 5/5   Lower Trap 4-/5 Lower Trap 4-/5   Middle Trap 4/5 Middle Trap 4/5       Sensation: WNL    Flexibility: NT        Limitation/Restriction for FOTO Lumbar Survey    Therapist reviewed FOTO scores for Celso Hernandez on 3/22/2022.   FOTO documents entered into EPIC - see Media section.    Limitation Score: 45%  Predicted Limitation Score: 33%         TREATMENT   Treatment Time In: N/A  Treatment Time Out: N/A  Total Treatment time (time-based codes) separate from Evaluation: 0 minutes    NO TREATMENT - OUT OF TIME    Home Exercises and Patient Education Provided    Education provided:   - HEP  - Prognosis/POC  - Pain science    Written Home Exercises Provided: not today.  Exercises were reviewed and Celso was able to demonstrate them prior to the end of the session.  Celso demonstrated good  understanding of the education provided.     See EMR under Patient Instructions for exercises provided next visit.    Assessment   Celso is a 66 y.o. male referred to outpatient Physical Therapy with a medical diagnosis of chronic thoracic pain and facet arthropathy of multiple joints. Patient reported right axillary and thoracic pain with insidious onset about 7 months ago. Patient eventually received a thoracic GABRIELA, which completely alleviated symptoms. Patient does present with poor postural awareness, forward head posture, thoracic kyphosis, and periscapular strength deficits. Patient would benefit  from skilled PT to ensure thoracic pain does not return, learn management strategies, and improve overhead mobility/strength.     Pt to be seen 2x/week for 8 weeks     Pt prognosis is Good.   Pt will benefit from skilled outpatient Physical Therapy to address the deficits stated above and in the chart below, provide pt/family education, and to maximize pt's level of independence.     Plan of care discussed with patient: Yes  Pt's spiritual, cultural and educational needs considered and patient is agreeable to the plan of care and goals as stated below:     Anticipated Barriers for therapy: chronicity of pain    Medical Necessity is demonstrated by the following  History  Co-morbidities and personal factors that may impact the plan of care Co-morbidities:   advanced age, diabetes, high BMI and HTN    Personal Factors:   age  lifestyle     high   Examination  Body Structures and Functions, activity limitations and participation restrictions that may impact the plan of care Body Regions:   neck  back  upper extremities    Body Systems:    gross symmetry  ROM  strength  gross coordinated movement  balance  gait  transfers  transitions  motor control  motor learning    Participation Restrictions:   Overhead tasks     Activity limitations:   Learning and applying knowledge  no deficits    General Tasks and Commands  no deficits    Communication  no deficits    Mobility  lifting and carrying objects    Self care  no deficits    Domestic Life  shopping  doing house work (cleaning house, washing dishes, laundry)  assisting others    Interactions/Relationships  no deficits    Life Areas  no deficits    Community and Social Life  no deficits         high   Clinical Presentation evolving clinical presentation with changing clinical characteristics moderate   Decision Making/ Complexity Score: moderate     Goals:  Short Term Goals: 4 weeks  1. Patient will be independent with HEP in order to supplement pain free lumbar ROM -  PROGRESSING, NOT MET  2. Pt will improve thoracic mobility to WNL to promote functional mobility - PROGRESSING, NOT MET  3. Patient will improve neck rotation and sidebending range of motion 10 degrees to promote cervical range of motion when driving. - PROGRESSING, NOT MET      Long Term Goals: 8 weeks   1. Pt will improve lumbar FOTO survey to </=33% limited in order to return to ADLs without limitation - PROGRESSING, NOT MET  2. Patient will improve low trap and mid trap strength strength to a 5/5 bilaterally for improved scapular support - PROGRESSING, NOT MET  3. Pt will report no pain during shoulder and thoracic AROM in order to promote functional mobility - PROGRESSING, NOT MET    Plan   Plan of care Certification: 3/22/2022 to 5/22/2022    Outpatient Physical Therapy 2 times weekly for 8 weeks to include the following interventions: Gait Training, Manual Therapy, Moist Heat/ Ice, Neuromuscular Re-ed, Patient Education, Self Care, Therapeutic Activities and Therapeutic Exercise.     Carlos Manuel Barksdale, PT

## 2022-03-28 PROBLEM — M47.819 ARTHROPATHY OF FACET JOINTS AT MULTIPLE LEVELS: Status: ACTIVE | Noted: 2022-03-28

## 2022-03-28 PROBLEM — G89.4 CHRONIC PAIN SYNDROME: Status: ACTIVE | Noted: 2022-03-28

## 2022-03-29 ENCOUNTER — CLINICAL SUPPORT (OUTPATIENT)
Dept: REHABILITATION | Facility: HOSPITAL | Age: 67
End: 2022-03-29
Payer: MEDICARE

## 2022-03-29 DIAGNOSIS — G89.4 CHRONIC PAIN SYNDROME: ICD-10-CM

## 2022-03-29 DIAGNOSIS — M47.819 ARTHROPATHY OF FACET JOINTS AT MULTIPLE LEVELS: Primary | ICD-10-CM

## 2022-03-29 PROCEDURE — 97110 THERAPEUTIC EXERCISES: CPT | Mod: PN

## 2022-03-29 NOTE — PROGRESS NOTES
[facility]   Physical Therapy Treatment Note     Name: Celso Murdock [facility]  Clinic Number: [id number]    Therapy Diagnosis:   Encounter Diagnoses   Name Primary?    Arthropathy of facet joints at multiple levels Yes    Chronic pain syndrome      Physician: Coleman Quintana, DOMINIQUE    Visit Date: 3/29/2022    Physician Orders: PT Eval and Treat   Medical Diagnosis from Referral:   M47.819 (ICD-10-CM) - Arthropathy of facet joints at multiple levels   M54.6,G89.29 (ICD-10-CM) - Chronic thoracic back pain, unspecified back pain laterality   G89.4 (ICD-10-CM) - Chronic pain syndrome   Evaluation Date: 3/22/2022  Authorization Period Expiration: 5/22/2022  Plan of Care Expiration: 5/22/2022  Visit # / Visits authorized: 2/12     Time In: 9:05 am  Time Out: 9:45 am  Total Appointment Time (timed & untimed codes): 40 minutes (3 TE)     Precautions: Standard and Diabetes  SUBJECTIVE     Pt reports: he is doing well today with no pain. He did have an instance of right sided pain around his shoulder blade/rib area. He reports difficulty with describing his pain  He was compliant with home exercise program.  Response to previous treatment: no change   Functional change: no change     Pain: 0/10  Location: right sided thoracic spine     OBJECTIVE   (3/29/2022):  - No trigger points noted in periscapular area     Treatment     Celso received the treatments listed below:      therapeutic exercises to develop strength, endurance, ROM, flexibility and posture for 40 minutes including:  Seated thoracic extension - 1/2 bolster   15 x   Seated thoracic extension      15 x   Theraband rows - Blue theraband    3 x 10  Chest press plus - purple sports cord   2 x 10  Shoulder external rotation - red theraband   2 x 10 - bilateral    Wall push ups      1 x 10      Patient Education and Home Exercises     Home Exercises Provided and Patient Education Provided     Education provided:   - home exercise program  -  POC/Prognosis     Written Home Exercises Provided: not today, planning to monitor response . Exercises were reviewed and Celso was able to demonstrate them prior to the end of the session.  Celso demonstrated good  understanding of the education provided. See EMR under Patient Instructions for exercises provided during therapy sessions    ASSESSMENT   Celso is a 66 y.o. male referred to outpatient Physical Therapy with a medical diagnosis of chronic thoracic pain and facet arthropathy of multiple joints. Patient reported right axillary and thoracic pain with insidious onset about 7 months ago. Patient eventually received a thoracic GABRIELA, which completely alleviated symptoms. Patient does present with poor postural awareness, forward head posture, thoracic kyphosis, and periscapular strength deficits. He does have difficulty describing his pain. Treatment session consisted of thoracic mobility, serratus anterior/pec major strengthening, and rotator cuff endurance. Planning to monitor response to treatment prior to progressing.  Celso Is progressing well towards his goals.   Pt prognosis is Good.     Pt will continue to benefit from skilled outpatient physical therapy to address the deficits listed in the problem list box on initial evaluation, provide pt/family education and to maximize pt's level of independence in the home and community environment.     Pt's spiritual, cultural and educational needs considered and pt agreeable to plan of care and goals.     Anticipated barriers to physical therapy: chronicity of pain    Goals:   Short Term Goals: 4 weeks  1. Patient will be independent with HEP in order to supplement pain free lumbar ROM - PROGRESSING, NOT MET  2. Pt will improve thoracic mobility to WNL to promote functional mobility - PROGRESSING, NOT MET  3. Patient will improve neck rotation and sidebending range of motion 10 degrees to promote cervical range of motion when driving. - PROGRESSING, NOT MET      Long Term  Goals: 8 weeks   1. Pt will improve lumbar FOTO survey to </=33% limited in order to return to ADLs without limitation - PROGRESSING, NOT MET  2. Patient will improve low trap and mid trap strength strength to a 5/5 bilaterally for improved scapular support - PROGRESSING, NOT MET  3. Pt will report no pain during shoulder and thoracic AROM in order to promote functional mobility - PROGRESSING, NOT MET    PLAN   Thoracic mobility  Serratus anterior and pec major strengthening   Rotator cuff strengthening   Periscapular activation     Carlos Manuel Barksdale, PT

## 2022-04-01 ENCOUNTER — CLINICAL SUPPORT (OUTPATIENT)
Dept: REHABILITATION | Facility: HOSPITAL | Age: 67
End: 2022-04-01
Payer: MEDICARE

## 2022-04-01 DIAGNOSIS — M47.819 ARTHROPATHY OF FACET JOINTS AT MULTIPLE LEVELS: Primary | ICD-10-CM

## 2022-04-01 DIAGNOSIS — G89.4 CHRONIC PAIN SYNDROME: ICD-10-CM

## 2022-04-01 PROCEDURE — 97110 THERAPEUTIC EXERCISES: CPT | Mod: PN,CQ

## 2022-04-01 NOTE — PROGRESS NOTES
[facility]   Physical Therapy Treatment Note     Name: Celso Murdock [facility] Number: [id number]    Therapy Diagnosis:   Encounter Diagnoses   Name Primary?    Arthropathy of facet joints at multiple levels Yes    Chronic pain syndrome      Physician: Coleman Quintana, RAMONAP    Visit Date: 4/1/2022    Physician Orders: PT Eval and Treat   Medical Diagnosis from Referral:   M47.819 (ICD-10-CM) - Arthropathy of facet joints at multiple levels   M54.6,G89.29 (ICD-10-CM) - Chronic thoracic back pain, unspecified back pain laterality   G89.4 (ICD-10-CM) - Chronic pain syndrome   Evaluation Date: 3/22/2022  Authorization Period Expiration: 5/22/2022  Plan of Care Expiration: 5/22/2022  Visit # / Visits authorized: 3/12     Time In: 8:00 am  Time Out: 8:50  am  Total Appointment Time (timed & untimed codes): 50 minutes (3 TE)     Precautions: Standard and Diabetes  SUBJECTIVE     Pt reports: he is doing well today. Presents pain free. Reports only fatigue following exercises  He was compliant with home exercise program.  Response to previous treatment:  sore  Functional change: Ongoing    Pain: 0/10  Location: right sided thoracic spine     OBJECTIVE   (3/29/2022):  - No trigger points noted in periscapular area     Treatment     Celso received the treatments listed below:      therapeutic exercises to develop strength, endurance, ROM, flexibility and posture for 50 minutes including:  Seated thoracic extension - 1/2 bolster   15 x   Seated thoracic extension      15 x   Theraband rows - Blue theraband    3 x 10  Straight arm pull downs                                                       2x15 w/GTB   W's                                                                                          2x15 w/GTB   Chest press plus - purple sports cord   2 x 10  Shoulder external rotation - red theraband   2 x 10 - bilateral    Wall push ups      1 x 10  Hook lying thoracic extension on blue foam roller        "   1 minute  Wall obed                                                                           1 minute   Chin tucks                                                                               15x5" hold      Patient Education and Home Exercises     Home Exercises Provided and Patient Education Provided     Education provided:   - home exercise program  - POC/Prognosis     Written Home Exercises Provided: not today, planning to monitor response . Exercises were reviewed and Celso was able to demonstrate them prior to the end of the session.  Celso demonstrated good  understanding of the education provided. See EMR under Patient Instructions for exercises provided during therapy sessions    ASSESSMENT   Celso is a 66 y.o. male referred to outpatient Physical Therapy with a medical diagnosis of chronic thoracic pain and facet arthropathy of multiple joints. Patient reported right axillary and thoracic pain with insidious onset about 7 months ago.  Patient does present with poor postural awareness, forward head posture, thoracic kyphosis, and periscapular strength deficits. Treatment session consisted of thoracic mobility, serratus anterior/pec major strengthening, and rotator cuff endurance.  Updated home exercise program and provided a printed copy. Also provided a blue and green TBs for home use. Tolerated today's treatment interventions pain free.   Celso Is progressing well towards his goals.   Pt prognosis is Good.     Pt will continue to benefit from skilled outpatient physical therapy to address the deficits listed in the problem list box on initial evaluation, provide pt/family education and to maximize pt's level of independence in the home and community environment.     Pt's spiritual, cultural and educational needs considered and pt agreeable to plan of care and goals.     Anticipated barriers to physical therapy: chronicity of pain    Goals:   Short Term Goals: 4 weeks  1. Patient will be independent with " HEP in order to supplement pain free lumbar ROM - PROGRESSING, NOT MET  2. Pt will improve thoracic mobility to WNL to promote functional mobility - PROGRESSING, NOT MET  3. Patient will improve neck rotation and sidebending range of motion 10 degrees to promote cervical range of motion when driving. - PROGRESSING, NOT MET      Long Term Goals: 8 weeks   1. Pt will improve lumbar FOTO survey to </=33% limited in order to return to ADLs without limitation - PROGRESSING, NOT MET  2. Patient will improve low trap and mid trap strength strength to a 5/5 bilaterally for improved scapular support - PROGRESSING, NOT MET  3. Pt will report no pain during shoulder and thoracic AROM in order to promote functional mobility - PROGRESSING, NOT MET    PLAN   Thoracic mobility  Serratus anterior and pec major strengthening   Rotator cuff strengthening   Periscapular activation     Nikolai Seaman, PTA

## 2022-04-06 ENCOUNTER — CLINICAL SUPPORT (OUTPATIENT)
Dept: REHABILITATION | Facility: HOSPITAL | Age: 67
End: 2022-04-06
Payer: MEDICARE

## 2022-04-06 DIAGNOSIS — G89.4 CHRONIC PAIN SYNDROME: ICD-10-CM

## 2022-04-06 DIAGNOSIS — M47.819 ARTHROPATHY OF FACET JOINTS AT MULTIPLE LEVELS: Primary | ICD-10-CM

## 2022-04-06 PROCEDURE — 97110 THERAPEUTIC EXERCISES: CPT | Mod: PN

## 2022-04-07 ENCOUNTER — PES CALL (OUTPATIENT)
Dept: ADMINISTRATIVE | Facility: CLINIC | Age: 67
End: 2022-04-07
Payer: MEDICARE

## 2022-04-08 ENCOUNTER — CLINICAL SUPPORT (OUTPATIENT)
Dept: REHABILITATION | Facility: HOSPITAL | Age: 67
End: 2022-04-08
Payer: MEDICARE

## 2022-04-08 DIAGNOSIS — G89.4 CHRONIC PAIN SYNDROME: ICD-10-CM

## 2022-04-08 DIAGNOSIS — M47.819 ARTHROPATHY OF FACET JOINTS AT MULTIPLE LEVELS: Primary | ICD-10-CM

## 2022-04-08 PROCEDURE — 97110 THERAPEUTIC EXERCISES: CPT | Mod: PN,CQ

## 2022-04-08 NOTE — PROGRESS NOTES
[facility]   Physical Therapy Treatment Note     Name: Celso Murdock [facility]  Clinic Number: [id number]    Therapy Diagnosis:   Encounter Diagnoses   Name Primary?    Arthropathy of facet joints at multiple levels Yes    Chronic pain syndrome    Physician: Coleman Quintana, DOMINIQUE    Visit Date: 4/8/2022    Physician Orders: PT Eval and Treat   Medical Diagnosis from Referral:   M47.819 (ICD-10-CM) - Arthropathy of facet joints at multiple levels   M54.6,G89.29 (ICD-10-CM) - Chronic thoracic back pain, unspecified back pain laterality   G89.4 (ICD-10-CM) - Chronic pain syndrome   Evaluation Date: 3/22/2022  Authorization Period Expiration: 5/22/2022  Plan of Care Expiration: 5/22/2022  Visit # / Visits authorized: 4/12     Time In: 11:00 m  Time Out: 11:43 am  Total Appointment Time (timed & untimed codes): 43 minutes (3TE) -      Precautions: Standard and Diabetes  SUBJECTIVE     Pt reports: he is doing well today without any pain today. Stated he will like to make next week his last week for PT. Feels he can manage at home with his home exercise program.   He was compliant with home exercise program.  Response to previous treatment:  sore  Functional change: Decreased pain, increased range of motion and posture.     Pain: 0/10  Location: right sided thoracic spine     OBJECTIVE   (3/29/2022):  - No trigger points noted in periscapular area     Treatment     Celso received the treatments listed below:      therapeutic exercises to develop strength, endurance, ROM, flexibility and posture for 43 minutes including:  Hook lying  thoracic extension - foam bolster   1 minute static stretch and dynamic self massage    Wall push ups                  2 x 10;   Rows                                                                                       2x10 with Blue TB  Straight arm pull downs                                                       2x10 with Blue TB   Horizontal abd                                                                        2x15 with Blue TB    Shoulder external rotation - green theraband              3 x 10 - bilateral    Shoulder 90-90 internal rotation - Blue theraband              3 x 10  Cybex single arm chest press to serratus press  3 x 10 bilateral w/13#  Seated cybex lat pull downs - 20#    2 x 15   Standing  cybex rows - 17#     2 x 15      Patient Education and Home Exercises     Home Exercises Provided and Patient Education Provided     Education provided:   - home exercise program  - POC/Prognosis     Written Home Exercises Provided: not today, planning to monitor response . Exercises were reviewed and Celso was able to demonstrate them prior to the end of the session.  Celso demonstrated good  understanding of the education provided. See EMR under Patient Instructions for exercises provided during therapy sessions    ASSESSMENT   Celso is a 66 y.o. male referred to outpatient Physical Therapy with a medical diagnosis of chronic thoracic pain and facet arthropathy of multiple joints. Patient reported right axillary and thoracic pain with insidious onset about 7 months ago.  Patient does present with poor postural awareness, forward head posture, thoracic kyphosis, and periscapular strength deficits. Treatment session consisted of thoracic mobility, serratus anterior/pec major strengthening, and rotator cuff endurance. Progressed patient further with cybex upper extremity strengthening and planning to monitor response and soreness. He requests DC at the end of next week and will continue his HEP.     Celso Is progressing well towards his goals.   Pt prognosis is Good.     Pt will continue to benefit from skilled outpatient physical therapy to address the deficits listed in the problem list box on initial evaluation, provide pt/family education and to maximize pt's level of independence in the home and community environment.     Pt's spiritual, cultural and educational needs considered and pt  agreeable to plan of care and goals.     Anticipated barriers to physical therapy: chronicity of pain    Goals:   Short Term Goals: 4 weeks  1. Patient will be independent with HEP in order to supplement pain free lumbar ROM - MET (4/6/2022)  2. Pt will improve thoracic mobility to WNL to promote functional mobility - PROGRESSING, NOT MET  3. Patient will improve neck rotation and sidebending range of motion 10 degrees to promote cervical range of motion when driving. - PROGRESSING, NOT MET      Long Term Goals: 8 weeks   1. Pt will improve lumbar FOTO survey to </=33% limited in order to return to ADLs without limitation - PROGRESSING, NOT MET  2. Patient will improve low trap and mid trap strength strength to a 5/5 bilaterally for improved scapular support - PROGRESSING, NOT MET  3. Pt will report no pain during shoulder and thoracic AROM in order to promote functional mobility - PROGRESSING, NOT MET    PLAN   Thoracic mobility  Serratus anterior and pec major strengthening   Rotator cuff strengthening   Periscapular activation     Nikolai Seaman, PTA

## 2022-04-12 ENCOUNTER — CLINICAL SUPPORT (OUTPATIENT)
Dept: REHABILITATION | Facility: HOSPITAL | Age: 67
End: 2022-04-12
Payer: MEDICARE

## 2022-04-12 DIAGNOSIS — G89.4 CHRONIC PAIN SYNDROME: ICD-10-CM

## 2022-04-12 DIAGNOSIS — M47.819 ARTHROPATHY OF FACET JOINTS AT MULTIPLE LEVELS: Primary | ICD-10-CM

## 2022-04-12 PROCEDURE — 97110 THERAPEUTIC EXERCISES: CPT | Mod: PN,CQ

## 2022-04-12 NOTE — PROGRESS NOTES
"[facility] OUTPATIENT THERAPY AND WELLNESS   Physical Therapy Treatment Note     Name: Celso Murdock [facility]  Clinic Number: [id number]    Therapy Diagnosis:   Encounter Diagnoses   Name Primary?    Arthropathy of facet joints at multiple levels Yes    Chronic pain syndrome    Physician: Coleman Quintana, DOMINIQUE    Visit Date: 4/12/2022    Physician Orders: PT Eval and Treat   Medical Diagnosis from Referral:   M47.819 (ICD-10-CM) - Arthropathy of facet joints at multiple levels   M54.6,G89.29 (ICD-10-CM) - Chronic thoracic back pain, unspecified back pain laterality   G89.4 (ICD-10-CM) - Chronic pain syndrome   Evaluation Date: 3/22/2022  Authorization Period Expiration: 5/22/2022  Plan of Care Expiration: 5/22/2022  Visit # / Visits authorized: 6/12     Time In: 11:00 m  Time Out: 11:53 am  Total Appointment Time (timed & untimed codes): 53 minutes (4TE) -      Precautions: Standard and Diabetes  SUBJECTIVE     Pt reports: "tested it out" yesterday 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 Reports no issues during the work yesterday and feels fine today.  He was compliant with home exercise program.  Response to previous treatment:  sore  Functional change: Decreased pain, increased range of motion and posture.     Pain: 0/10  Location: right sided thoracic spine     OBJECTIVE   (3/29/2022):  - No trigger points noted in periscapular area     Treatment     Celso received the treatments listed below:      therapeutic exercises to develop strength, endurance, ROM, flexibility and posture for 53 minutes including:  Hook lying  thoracic extension - foam bolster   1 minute static stretch and dynamic self massage    Wall push ups                  2 x 10;   Standing open books      x10 B  Wall slides with thoracic extension   x15  Seated thoracic extension with wand flexion  2x10 4# -- 1/2 foam  Rows                                                                                       2x10 with Blue " "TB  Straight arm pull downs                                                       2x10 with Blue TB   Horizontal abd                                                                       2x15 with Blue TB    Shoulder external rotation - Blue theraband              3 x 10 - bilateral    Shoulder 90-90 internal rotation - Blue theraband              3 x 10  Cybex single arm chest press to serratus press  3 x 10 bilateral w/13#  Seated cybex lat pull downs - 20#    2 x 15   Standing  cybex rows - 17#     2 x 15      Patient Education and Home Exercises     Home Exercises Provided and Patient Education Provided     Education provided:   - home exercise program  - POC/Prognosis     Written Home Exercises Provided: not today, planning to monitor response . Exercises were reviewed and Celso was able to demonstrate them prior to the end of the session.  Celso demonstrated good  understanding of the education provided. See EMR under Patient Instructions for exercises provided during therapy sessions    ASSESSMENT   Celso is a 66 y.o. male referred to outpatient Physical Therapy with a medical diagnosis of chronic thoracic pain and facet arthropathy of multiple joints. Patient reported right axillary and thoracic pain with insidious onset about 7 months ago.  Patient presents without complaints of pain this date and states he has been pain free for a couple of sessions now. 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 Reports no pain during work and states he feels fine today. Treatment session continue to focus on thoracic mobility, serratus anterior/pec major strengthening, and rotator cuff endurance with overall good tolerance to progressions and new therex noted in bold. Moderately fatigue with cable cross machine requiring decreased resistance for rows this date. Continue to progress as appropriate.     Celso Is progressing well towards his goals.   Pt prognosis is " Good.     Pt will continue to benefit from skilled outpatient physical therapy to address the deficits listed in the problem list box on initial evaluation, provide pt/family education and to maximize pt's level of independence in the home and community environment.     Pt's spiritual, cultural and educational needs considered and pt agreeable to plan of care and goals.     Anticipated barriers to physical therapy: chronicity of pain    Goals:   Short Term Goals: 4 weeks  1. Patient will be independent with HEP in order to supplement pain free lumbar ROM - MET (4/6/2022)  2. Pt will improve thoracic mobility to WNL to promote functional mobility - PROGRESSING, NOT MET  3. Patient will improve neck rotation and sidebending range of motion 10 degrees to promote cervical range of motion when driving. - PROGRESSING, NOT MET      Long Term Goals: 8 weeks   1. Pt will improve lumbar FOTO survey to </=33% limited in order to return to ADLs without limitation - PROGRESSING, NOT MET  2. Patient will improve low trap and mid trap strength strength to a 5/5 bilaterally for improved scapular support - PROGRESSING, NOT MET  3. Pt will report no pain during shoulder and thoracic AROM in order to promote functional mobility - PROGRESSING, NOT MET    PLAN   Thoracic mobility  Serratus anterior and pec major strengthening   Rotator cuff strengthening   Periscapular activation     Lucy Gaming, PTA

## 2022-04-14 ENCOUNTER — CLINICAL SUPPORT (OUTPATIENT)
Dept: REHABILITATION | Facility: HOSPITAL | Age: 67
End: 2022-04-14
Payer: MEDICARE

## 2022-04-14 DIAGNOSIS — M47.819 ARTHROPATHY OF FACET JOINTS AT MULTIPLE LEVELS: Primary | ICD-10-CM

## 2022-04-14 DIAGNOSIS — G89.4 CHRONIC PAIN SYNDROME: ICD-10-CM

## 2022-04-14 PROCEDURE — 97530 THERAPEUTIC ACTIVITIES: CPT | Mod: PN

## 2022-04-14 NOTE — PROGRESS NOTES
[facility]   Physical Therapy Treatment Note / Discharge Note     Name: Celso Hernandez  Clinic Number: [id number]    Therapy Diagnosis:   Encounter Diagnoses   Name Primary?    Arthropathy of facet joints at multiple levels Yes    Chronic pain syndrome    Physician: Coleman Quintana FNP    Visit Date: 4/14/2022    Physician Orders: PT Eval and Treat   Medical Diagnosis from Referral:   M47.819 (ICD-10-CM) - Arthropathy of facet joints at multiple levels   M54.6,G89.29 (ICD-10-CM) - Chronic thoracic back pain, unspecified back pain laterality   G89.4 (ICD-10-CM) - Chronic pain syndrome   Evaluation Date: 3/22/2022  Authorization Period Expiration: 5/22/2022  Plan of Care Expiration: 5/22/2022  Visit # / Visits authorized: 7/12     Time In: 11:00 m  Time Out: 11:25 am  Total Appointment Time (timed & untimed codes): 25 minutes (2 TA)      Precautions: Standard and Diabetes  SUBJECTIVE     Pt reports: his right shoulder/thoracic pain has not been an issue lately. He notices increased strength and ability to perform higher level exercises and activities now. Patient reports that he feels ready for discharge   He was compliant with home exercise program.  Response to previous treatment:  sore  Functional change: Decreased pain, increased range of motion and posture.     Pain: 0/10  Location: right sided thoracic spine     OBJECTIVE   Cervical Range of Motion: AROM    Degrees Subjective report   Flexion  WNL  no pain      Extension  WNL  no pain      Right Rotation  55/65 degrees  tightness      Left Rotation  50/55 degrees  tightness      Right Side Bending  5 degrees  stiff    Left Side Bending  5 degrees  stiff          Shoulder Range of Motion: AROM   Shoulder Left Right   Flexion  170 degrees  170 degrees         Upper Extremity Strength  (R) UE   (L) UE     Shoulder flexion: 5/5 Shoulder flexion: 5/5   Shoulder Abduction: 5/5 Shoulder abduction: 5/5   Shoulder ER 5/5 Shoulder ER 5/5    Shoulder IR 5/5 Shoulder IR 5/5   Lower Trap 4/5 Lower Trap 4/5   Middle Trap 4+/5 Middle Trap 4+/5          Treatment     Celso received the treatments listed below:      Therapeutic activities to improve functional performance for 30 minutes, including:  FOTO  Objective tests and measures  Outcome measures  Home exercise program overview   Questions and answers   Discharge         Patient Education and Home Exercises     Home Exercises Provided and Patient Education Provided     Education provided:   - home exercise program  - POC/Prognosis     Written Home Exercises Provided: not today, planning to monitor response . Exercises were reviewed and Celso was able to demonstrate them prior to the end of the session.  Celso demonstrated good  understanding of the education provided. See EMR under Patient Instructions for exercises provided during therapy sessions    ASSESSMENT   Celso is a 66 y.o. male referred to outpatient Physical Therapy with a medical diagnosis of chronic thoracic pain and facet arthropathy of multiple joints. Patient reported right axillary and thoracic pain with insidious onset about 7 months ago. Patient has been making good progress in physical therapy with subjective pain report, increasing functional activities, and upper extremity strength gains. He presented today with reports that he felt ready for discharge with a home exercise program and he has been discharged from PT.     Celso Is progressing well towards his goals.   Pt prognosis is Good.     Pt will continue to benefit from skilled outpatient physical therapy to address the deficits listed in the problem list box on initial evaluation, provide pt/family education and to maximize pt's level of independence in the home and community environment.     Pt's spiritual, cultural and educational needs considered and pt agreeable to plan of care and goals.     Anticipated barriers to physical therapy: chronicity of pain    Goals:   Short Term Goals:  4 weeks  1. Patient will be independent with HEP in order to supplement pain free lumbar ROM - MET (4/6/2022)  2. Pt will improve thoracic mobility to WNL to promote functional mobility - MET  3. Patient will improve neck rotation and sidebending range of motion 10 degrees to promote cervical range of motion when driving. -  NOT MET      Long Term Goals: 8 weeks   1. Pt will improve lumbar FOTO survey to </=33% limited in order to return to ADLs without limitation - NOT MET  2. Patient will improve low trap and mid trap strength strength to a 5/5 bilaterally for improved scapular support - PROGRESSING, NOT MET  3. Pt will report no pain during shoulder and thoracic AROM in order to promote functional mobility - MET    PLAN   Discharged     Carlos Manuel Barksdale, PT

## 2022-04-19 ENCOUNTER — TELEPHONE (OUTPATIENT)
Dept: INTERNAL MEDICINE | Facility: CLINIC | Age: 67
End: 2022-04-19
Payer: MEDICARE

## 2022-04-19 ENCOUNTER — PATIENT MESSAGE (OUTPATIENT)
Dept: ADMINISTRATIVE | Facility: HOSPITAL | Age: 67
End: 2022-04-19
Payer: MEDICARE

## 2022-04-19 ENCOUNTER — PATIENT OUTREACH (OUTPATIENT)
Dept: ADMINISTRATIVE | Facility: HOSPITAL | Age: 67
End: 2022-04-19
Payer: MEDICARE

## 2022-04-19 NOTE — TELEPHONE ENCOUNTER
Left msg on [phone].    I Said I was following up on blood pressure since added bystolic in February.  I said  to either call with home bp readings or message me on portal .  Hope to hear from him soon.

## 2022-04-19 NOTE — TELEPHONE ENCOUNTER
----- Message from Iram Zhao MA sent at 2/28/2022 10:28 AM CST -----  Regarding: blood pressure follow up.  Dr jacob added bystolic 2/28    He will ck bp 3 times a week and I asked him to message me.    Follow up in 1 month to be sure I have heard from him.    (He came in 2/28 and sounded like machine was accurate at home).    .

## 2022-04-25 ENCOUNTER — PATIENT OUTREACH (OUTPATIENT)
Dept: ADMINISTRATIVE | Facility: OTHER | Age: 67
End: 2022-04-25
Payer: MEDICARE

## 2022-04-25 PROBLEM — M47.819 ARTHROPATHY OF FACET JOINTS AT MULTIPLE LEVELS: Status: RESOLVED | Noted: 2022-03-28 | Resolved: 2022-04-25

## 2022-04-25 PROBLEM — M54.6 CHRONIC THORACIC BACK PAIN: Status: RESOLVED | Noted: 2021-12-17 | Resolved: 2022-04-25

## 2022-04-25 PROBLEM — G89.29 CHRONIC THORACIC BACK PAIN: Status: RESOLVED | Noted: 2021-12-17 | Resolved: 2022-04-25

## 2022-04-25 PROBLEM — G89.4 CHRONIC PAIN SYNDROME: Status: RESOLVED | Noted: 2022-03-28 | Resolved: 2022-04-25

## 2022-04-25 NOTE — PROGRESS NOTES
Requested updates within Care Everywhere.  Patient's chart was reviewed for overdue JEROD topics.  Health maintenance:updated  Immunizations:reconciled   Legacy:   Media:  Orders placed:  Tasked appts:  Labs Linked:  Upcoming appt:Hemoglobin A1c 5/23/22

## 2022-05-23 ENCOUNTER — LAB VISIT (OUTPATIENT)
Dept: LAB | Facility: HOSPITAL | Age: 67
End: 2022-05-23
Attending: INTERNAL MEDICINE
Payer: MEDICARE

## 2022-05-23 DIAGNOSIS — E11.9 TYPE 2 DIABETES MELLITUS WITHOUT COMPLICATION, WITHOUT LONG-TERM CURRENT USE OF INSULIN: ICD-10-CM

## 2022-05-23 DIAGNOSIS — I10 ESSENTIAL HYPERTENSION: ICD-10-CM

## 2022-05-23 DIAGNOSIS — E78.2 MIXED HYPERLIPIDEMIA: ICD-10-CM

## 2022-05-23 LAB
ALBUMIN SERPL BCP-MCNC: 3.7 G/DL (ref 3.5–5.2)
ALP SERPL-CCNC: 76 U/L (ref 55–135)
ALT SERPL W/O P-5'-P-CCNC: 55 U/L (ref 10–44)
ANION GAP SERPL CALC-SCNC: 8 MMOL/L (ref 8–16)
AST SERPL-CCNC: 40 U/L (ref 10–40)
BILIRUB SERPL-MCNC: 0.6 MG/DL (ref 0.1–1)
BUN SERPL-MCNC: 19 MG/DL (ref 8–23)
CALCIUM SERPL-MCNC: 9.8 MG/DL (ref 8.7–10.5)
CHLORIDE SERPL-SCNC: 107 MMOL/L (ref 95–110)
CHOLEST SERPL-MCNC: 178 MG/DL (ref 120–199)
CHOLEST/HDLC SERPL: 4.9 {RATIO} (ref 2–5)
CO2 SERPL-SCNC: 23 MMOL/L (ref 23–29)
CREAT SERPL-MCNC: 1.1 MG/DL (ref 0.5–1.4)
EST. GFR  (AFRICAN AMERICAN): >60 ML/MIN/1.73 M^2
EST. GFR  (NON AFRICAN AMERICAN): >60 ML/MIN/1.73 M^2
ESTIMATED AVG GLUCOSE: 123 MG/DL (ref 68–131)
GLUCOSE SERPL-MCNC: 93 MG/DL (ref 70–110)
HBA1C MFR BLD: 5.9 % (ref 4–5.6)
HDLC SERPL-MCNC: 36 MG/DL (ref 40–75)
HDLC SERPL: 20.2 % (ref 20–50)
LDLC SERPL CALC-MCNC: 119 MG/DL (ref 63–159)
NONHDLC SERPL-MCNC: 142 MG/DL
POTASSIUM SERPL-SCNC: 4.1 MMOL/L (ref 3.5–5.1)
PROT SERPL-MCNC: 7.1 G/DL (ref 6–8.4)
SODIUM SERPL-SCNC: 138 MMOL/L (ref 136–145)
TRIGL SERPL-MCNC: 115 MG/DL (ref 30–150)

## 2022-05-23 PROCEDURE — 83036 HEMOGLOBIN GLYCOSYLATED A1C: CPT | Performed by: INTERNAL MEDICINE

## 2022-05-23 PROCEDURE — 80061 LIPID PANEL: CPT | Performed by: INTERNAL MEDICINE

## 2022-05-23 PROCEDURE — 36415 COLL VENOUS BLD VENIPUNCTURE: CPT | Performed by: INTERNAL MEDICINE

## 2022-05-23 PROCEDURE — 80053 COMPREHEN METABOLIC PANEL: CPT | Performed by: INTERNAL MEDICINE

## 2022-05-30 ENCOUNTER — OFFICE VISIT (OUTPATIENT)
Dept: INTERNAL MEDICINE | Facility: CLINIC | Age: 67
End: 2022-05-30
Payer: MEDICARE

## 2022-05-30 ENCOUNTER — PATIENT MESSAGE (OUTPATIENT)
Dept: INTERNAL MEDICINE | Facility: CLINIC | Age: 67
End: 2022-05-30

## 2022-05-30 VITALS
DIASTOLIC BLOOD PRESSURE: 84 MMHG | RESPIRATION RATE: 16 BRPM | BODY MASS INDEX: 38.09 KG/M2 | HEIGHT: 68 IN | TEMPERATURE: 98 F | HEART RATE: 68 BPM | WEIGHT: 251.31 LBS | SYSTOLIC BLOOD PRESSURE: 130 MMHG

## 2022-05-30 DIAGNOSIS — E78.2 MIXED HYPERLIPIDEMIA: ICD-10-CM

## 2022-05-30 DIAGNOSIS — E11.9 TYPE 2 DIABETES MELLITUS WITHOUT COMPLICATION, WITHOUT LONG-TERM CURRENT USE OF INSULIN: Primary | ICD-10-CM

## 2022-05-30 DIAGNOSIS — I10 ESSENTIAL HYPERTENSION: ICD-10-CM

## 2022-05-30 DIAGNOSIS — Z12.5 ENCOUNTER FOR SCREENING FOR MALIGNANT NEOPLASM OF PROSTATE: ICD-10-CM

## 2022-05-30 DIAGNOSIS — R79.89 LFT ELEVATION: ICD-10-CM

## 2022-05-30 DIAGNOSIS — R21 RASH: ICD-10-CM

## 2022-05-30 DIAGNOSIS — I70.0 ATHEROSCLEROSIS OF AORTA: ICD-10-CM

## 2022-05-30 PROCEDURE — 99999 PR PBB SHADOW E&M-EST. PATIENT-LVL IV: CPT | Mod: PBBFAC,,, | Performed by: INTERNAL MEDICINE

## 2022-05-30 PROCEDURE — 1160F PR REVIEW ALL MEDS BY PRESCRIBER/CLIN PHARMACIST DOCUMENTED: ICD-10-PCS | Mod: CPTII,S$GLB,, | Performed by: INTERNAL MEDICINE

## 2022-05-30 PROCEDURE — 1101F PR PT FALLS ASSESS DOC 0-1 FALLS W/OUT INJ PAST YR: ICD-10-PCS | Mod: CPTII,S$GLB,, | Performed by: INTERNAL MEDICINE

## 2022-05-30 PROCEDURE — 3288F FALL RISK ASSESSMENT DOCD: CPT | Mod: CPTII,S$GLB,, | Performed by: INTERNAL MEDICINE

## 2022-05-30 PROCEDURE — 3044F HG A1C LEVEL LT 7.0%: CPT | Mod: CPTII,S$GLB,, | Performed by: INTERNAL MEDICINE

## 2022-05-30 PROCEDURE — 3079F PR MOST RECENT DIASTOLIC BLOOD PRESSURE 80-89 MM HG: ICD-10-PCS | Mod: CPTII,S$GLB,, | Performed by: INTERNAL MEDICINE

## 2022-05-30 PROCEDURE — 99999 PR PBB SHADOW E&M-EST. PATIENT-LVL IV: ICD-10-PCS | Mod: PBBFAC,,, | Performed by: INTERNAL MEDICINE

## 2022-05-30 PROCEDURE — 3044F PR MOST RECENT HEMOGLOBIN A1C LEVEL <7.0%: ICD-10-PCS | Mod: CPTII,S$GLB,, | Performed by: INTERNAL MEDICINE

## 2022-05-30 PROCEDURE — 1126F PR PAIN SEVERITY QUANTIFIED, NO PAIN PRESENT: ICD-10-PCS | Mod: CPTII,S$GLB,, | Performed by: INTERNAL MEDICINE

## 2022-05-30 PROCEDURE — 99214 PR OFFICE/OUTPT VISIT, EST, LEVL IV, 30-39 MIN: ICD-10-PCS | Mod: S$GLB,,, | Performed by: INTERNAL MEDICINE

## 2022-05-30 PROCEDURE — 3008F BODY MASS INDEX DOCD: CPT | Mod: CPTII,S$GLB,, | Performed by: INTERNAL MEDICINE

## 2022-05-30 PROCEDURE — 3079F DIAST BP 80-89 MM HG: CPT | Mod: CPTII,S$GLB,, | Performed by: INTERNAL MEDICINE

## 2022-05-30 PROCEDURE — 99214 OFFICE O/P EST MOD 30 MIN: CPT | Mod: S$GLB,,, | Performed by: INTERNAL MEDICINE

## 2022-05-30 PROCEDURE — 99499 RISK ADDL DX/OHS AUDIT: ICD-10-PCS | Mod: S$GLB,,, | Performed by: INTERNAL MEDICINE

## 2022-05-30 PROCEDURE — 3075F SYST BP GE 130 - 139MM HG: CPT | Mod: CPTII,S$GLB,, | Performed by: INTERNAL MEDICINE

## 2022-05-30 PROCEDURE — 1101F PT FALLS ASSESS-DOCD LE1/YR: CPT | Mod: CPTII,S$GLB,, | Performed by: INTERNAL MEDICINE

## 2022-05-30 PROCEDURE — 1159F MED LIST DOCD IN RCRD: CPT | Mod: CPTII,S$GLB,, | Performed by: INTERNAL MEDICINE

## 2022-05-30 PROCEDURE — 1126F AMNT PAIN NOTED NONE PRSNT: CPT | Mod: CPTII,S$GLB,, | Performed by: INTERNAL MEDICINE

## 2022-05-30 PROCEDURE — 3288F PR FALLS RISK ASSESSMENT DOCUMENTED: ICD-10-PCS | Mod: CPTII,S$GLB,, | Performed by: INTERNAL MEDICINE

## 2022-05-30 PROCEDURE — 1159F PR MEDICATION LIST DOCUMENTED IN MEDICAL RECORD: ICD-10-PCS | Mod: CPTII,S$GLB,, | Performed by: INTERNAL MEDICINE

## 2022-05-30 PROCEDURE — 1160F RVW MEDS BY RX/DR IN RCRD: CPT | Mod: CPTII,S$GLB,, | Performed by: INTERNAL MEDICINE

## 2022-05-30 PROCEDURE — 3008F PR BODY MASS INDEX (BMI) DOCUMENTED: ICD-10-PCS | Mod: CPTII,S$GLB,, | Performed by: INTERNAL MEDICINE

## 2022-05-30 PROCEDURE — 99499 UNLISTED E&M SERVICE: CPT | Mod: S$GLB,,, | Performed by: INTERNAL MEDICINE

## 2022-05-30 PROCEDURE — 3075F PR MOST RECENT SYSTOLIC BLOOD PRESS GE 130-139MM HG: ICD-10-PCS | Mod: CPTII,S$GLB,, | Performed by: INTERNAL MEDICINE

## 2022-05-30 RX ORDER — LABETALOL 200 MG/1
200 TABLET, FILM COATED ORAL 2 TIMES DAILY
Qty: 60 TABLET | Refills: 11 | Status: SHIPPED | OUTPATIENT
Start: 2022-05-30 | End: 2023-10-03 | Stop reason: SINTOL

## 2022-05-30 NOTE — PROGRESS NOTES
Subjective:       Patient ID: Celso Hernandez is a 66 y.o. male.    Chief Complaint: Diabetes (4 mos fol up w/l abs.  0 pain.)    HPI   The patient presents for follow-up of medical conditions which include type 2 diabetes mellitus, hypertension, hyperlipidemia, and chronic rhinitis symptoms.  He has noted a flare of rhinitis with increased postnasal drainage, congestion, and rhinorrhea.  He is not currently using any medication for treatment of his symptoms.    Blood pressures have ranged from 122-145 systolic.  A monitor his blood pressures at home.  Home blood sugar readings have ranged from  while fasting.  No hypoglycemic symptoms have been noted.  The patient states he is doing well overall.  Dietary compliance is fair.  He drinks 2-4 alcoholic beverages at a time twice a week.  He does not smoke.    Review of Systems   Constitutional: Negative for activity change, appetite change, fatigue and unexpected weight change.   Eyes: Negative for visual disturbance.        Decreased visual acuity is reported.  The patient states that he avoids driving at night as a result of the change in his vision.  He is followed by his eye doctor for glaucoma and cataracts.   Respiratory: Negative for cough, chest tightness, shortness of breath and wheezing.    Cardiovascular: Negative for chest pain, palpitations and leg swelling.   Gastrointestinal: Negative for abdominal pain, blood in stool, nausea and vomiting.   Genitourinary: Negative for dysuria, hematuria and urgency.   Musculoskeletal: Negative for arthralgias, back pain, gait problem, joint swelling and myalgias.   Integumentary:  Positive for rash ( a mildly pruritic leg rash is noted bilaterally.). Negative for color change.        Occasional blisters are noted on the right foot.   Neurological: Negative for dizziness, syncope, weakness, light-headedness, numbness and headaches.   Psychiatric/Behavioral: Negative for sleep disturbance.            Physical  Exam  Vitals and nursing note reviewed.   Constitutional:       General: He is not in acute distress.     Appearance: He is well-developed.   HENT:      Head: Normocephalic and atraumatic.   Eyes:      General: No scleral icterus.     Conjunctiva/sclera: Conjunctivae normal.      Pupils: Pupils are equal, round, and reactive to light.   Neck:      Thyroid: No thyromegaly.      Vascular: No JVD.   Cardiovascular:      Rate and Rhythm: Normal rate and regular rhythm.      Heart sounds: Normal heart sounds. No murmur heard.    No gallop.   Pulmonary:      Effort: Pulmonary effort is normal. No respiratory distress.      Breath sounds: Normal breath sounds. No wheezing or rales.   Abdominal:      General: Bowel sounds are normal.      Palpations: Abdomen is soft. There is no mass.      Tenderness: There is no abdominal tenderness.   Musculoskeletal:         General: No tenderness. Normal range of motion.      Cervical back: Normal range of motion and neck supple.   Lymphadenopathy:      Cervical: No cervical adenopathy.   Skin:     General: Skin is warm and dry.      Findings: Rash present.      Comments: No foot ulcerations are present.  A small blister is noted on the medial aspect of the right foot.  Postinflammatory skin changes are present on the 2nd toe of the right foot.  The area is smooth.  (the patient relates this was a site of a previous blister).    A fine papular eruption is noted along the posterolateral aspects of both legs.  Superficial skin excoriations are present.   Neurological:      Mental Status: He is alert and oriented to person, place, and time.      Cranial Nerves: No cranial nerve deficit.      Comments: Sensory examination is intact in both feet on monofilament testing.   Psychiatric:         Mood and Affect: Mood normal.         Behavior: Behavior normal.         Thought Content: Thought content normal.         Protective Sensation (w/ 10 gram monofilament):  Right: Intact  Left:  Intact    Visual Inspection:  Blister -  Right     Pedal Pulses:   Right: Present  Left: Present    Posterior tibialis:   Right:Present  Left: Present         Lab Visit on 05/23/2022   Component Date Value Ref Range Status    Sodium 05/23/2022 138  136 - 145 mmol/L Final    Potassium 05/23/2022 4.1  3.5 - 5.1 mmol/L Final    Chloride 05/23/2022 107  95 - 110 mmol/L Final    CO2 05/23/2022 23  23 - 29 mmol/L Final    Glucose 05/23/2022 93  70 - 110 mg/dL Final    BUN 05/23/2022 19  8 - 23 mg/dL Final    Creatinine 05/23/2022 1.1  0.5 - 1.4 mg/dL Final    Calcium 05/23/2022 9.8  8.7 - 10.5 mg/dL Final    Total Protein 05/23/2022 7.1  6.0 - 8.4 g/dL Final    Albumin 05/23/2022 3.7  3.5 - 5.2 g/dL Final    Total Bilirubin 05/23/2022 0.6  0.1 - 1.0 mg/dL Final    Comment: For infants and newborns, interpretation of results should be based  on gestational age, weight and in agreement with clinical  observations.    Premature Infant recommended reference ranges:  Up to 24 hours.............<8.0 mg/dL  Up to 48 hours............<12.0 mg/dL  3-5 days..................<15.0 mg/dL  6-29 days.................<15.0 mg/dL      Alkaline Phosphatase 05/23/2022 76  55 - 135 U/L Final    AST 05/23/2022 40  10 - 40 U/L Final    ALT 05/23/2022 55 (A) 10 - 44 U/L Final    Anion Gap 05/23/2022 8  8 - 16 mmol/L Final    eGFR if African American 05/23/2022 >60  >60 mL/min/1.73 m^2 Final    eGFR if non African American 05/23/2022 >60  >60 mL/min/1.73 m^2 Final    Comment: Calculation used to obtain the estimated glomerular filtration  rate (eGFR) is the CKD-EPI equation.       Cholesterol 05/23/2022 178  120 - 199 mg/dL Final    Comment: The National Cholesterol Education Program (NCEP) has set the  following guidelines (reference ranges) for Cholesterol:  Optimal.....................<200 mg/dL  Borderline High.............200-239 mg/dL  High........................> or = 240 mg/dL      Triglycerides 05/23/2022 115  30 -  150 mg/dL Final    Comment: The National Cholesterol Education Program (NCEP) has set the  following guidelines (reference values) for triglycerides:  Normal......................<150 mg/dL  Borderline High.............150-199 mg/dL  High........................200-499 mg/dL      HDL 05/23/2022 36 (A) 40 - 75 mg/dL Final    Comment: The National Cholesterol Education Program (NCEP) has set the  following guidelines (reference values) for HDL Cholesterol:  Low...............<40 mg/dL  Optimal...........>60 mg/dL      LDL Cholesterol 05/23/2022 119.0  63.0 - 159.0 mg/dL Final    Comment: The National Cholesterol Education Program (NCEP) has set the  following guidelines (reference values) for LDL Cholesterol:  Optimal.......................<130 mg/dL  Borderline High...............130-159 mg/dL  High..........................160-189 mg/dL  Very High.....................>190 mg/dL      HDL/Cholesterol Ratio 05/23/2022 20.2  20.0 - 50.0 % Final    Total Cholesterol/HDL Ratio 05/23/2022 4.9  2.0 - 5.0 Final    Non-HDL Cholesterol 05/23/2022 142  mg/dL Final    Comment: Risk category and Non-HDL cholesterol goals:  Coronary heart disease (CHD)or equivalent (10-year risk of CHD >20%):  Non-HDL cholesterol goal     <130 mg/dL  Two or more CHD risk factors and 10-year risk of CHD <= 20%:  Non-HDL cholesterol goal     <160 mg/dL  0 to 1 CHD risk factor:  Non-HDL cholesterol goal     <190 mg/dL      Hemoglobin A1C 05/23/2022 5.9 (A) 4.0 - 5.6 % Final    Comment: ADA Screening Guidelines:  5.7-6.4%  Consistent with prediabetes  >or=6.5%  Consistent with diabetes    High levels of fetal hemoglobin interfere with the HbA1C  assay. Heterozygous hemoglobin variants (HbS, HgC, etc)do  not significantly interfere with this assay.   However, presence of multiple variants may affect accuracy.      Estimated Avg Glucose 05/23/2022 123  68 - 131 mg/dL Final       Assessment & Plan:      Celso was seen today for diabetes.  Current  diabetes therapy will be continued.    Bystolic will be discontinued due to the cost of the medication.  Labetalol will be ordered as a substitute.  Hopefully this is a less expensive treatment option.  The patient previously was intolerant of carvedilol.    Aveeno anti-eczema lotion was recommended for the skin rashes.    Flonase nasal spray and Allegra 180 mg have been recommended for the patient's rhinitis symptoms.    We discussed obtaining the shingles vaccine and the 2nd COVID -19 booster vaccine through the patient's local pharmacy.    Fasting blood tests will be obtained in 4 months.    Diagnoses and all orders for this visit:    Type 2 diabetes mellitus without complication, without long-term current use of insulin    Essential hypertension    Mixed hyperlipidemia    Atherosclerosis of aorta    Rash    LFT elevation    Other orders  -     labetaloL (NORMODYNE) 200 MG tablet; Take 1 tablet (200 mg total) by mouth 2 (two) times daily. For blood pressure control.         Follow up in about 4 months (around 9/30/2022).     Inder Quarles MD

## 2022-05-31 ENCOUNTER — PES CALL (OUTPATIENT)
Dept: ADMINISTRATIVE | Facility: CLINIC | Age: 67
End: 2022-05-31
Payer: MEDICARE

## 2022-06-01 ENCOUNTER — PES CALL (OUTPATIENT)
Dept: ADMINISTRATIVE | Facility: CLINIC | Age: 67
End: 2022-06-01
Payer: MEDICARE

## 2022-06-07 ENCOUNTER — TELEPHONE (OUTPATIENT)
Dept: OPTOMETRY | Facility: CLINIC | Age: 67
End: 2022-06-07
Payer: MEDICARE

## 2022-06-16 PROBLEM — E78.5 TYPE 2 DIABETES MELLITUS WITH HYPERLIPIDEMIA: Status: ACTIVE | Noted: 2022-06-16

## 2022-06-16 PROBLEM — I15.2 HYPERTENSION ASSOCIATED WITH TYPE 2 DIABETES MELLITUS: Status: ACTIVE | Noted: 2022-06-16

## 2022-06-16 PROBLEM — E11.59 HYPERTENSION ASSOCIATED WITH TYPE 2 DIABETES MELLITUS: Status: ACTIVE | Noted: 2022-06-16

## 2022-06-16 PROBLEM — E11.69 TYPE 2 DIABETES MELLITUS WITH HYPERLIPIDEMIA: Status: ACTIVE | Noted: 2022-06-16

## 2022-06-16 NOTE — PROGRESS NOTES
"  Celso Hernandez presented for a  Medicare AWV and comprehensive Health Risk Assessment today. The following components were reviewed and updated:    · Medical history  · Family History  · Social history  · Allergies and Current Medications  · Health Risk Assessment  · Health Maintenance  · Care Team         ** See Completed Assessments for Annual Wellness Visit within the encounter summary.**         The following assessments were completed:  · Living Situation  · CAGE  · Depression Screening  · Timed Get Up and Go  · Whisper Test  · Cognitive Function Screening  · Nutrition Screening  · ADL Screening  · PAQ Screening        Vitals:    06/21/22 0806 06/21/22 0818   BP: (!) 146/88 122/82   BP Location: Right arm Right arm   Patient Position: Sitting Sitting   BP Method: Large (Manual) Large (Manual)   Pulse: 78    SpO2: 96%    Weight: 114.8 kg (253 lb 1.4 oz)    Height: 5' 8" (1.727 m)      Body mass index is 38.48 kg/m².  Physical Exam  Vitals and nursing note reviewed.   Constitutional:       General: He is not in acute distress.     Appearance: Normal appearance. He is obese. He is not ill-appearing.   HENT:      Head: Normocephalic and atraumatic.   Eyes:      General: No scleral icterus.        Right eye: No discharge.         Left eye: No discharge.      Extraocular Movements: Extraocular movements intact.      Conjunctiva/sclera: Conjunctivae normal.   Cardiovascular:      Rate and Rhythm: Normal rate and regular rhythm.      Heart sounds: Normal heart sounds. No murmur heard.  Pulmonary:      Effort: Pulmonary effort is normal. No respiratory distress.      Breath sounds: Normal breath sounds. No wheezing, rhonchi or rales.   Musculoskeletal:      Cervical back: Normal range of motion.      Right lower leg: Edema (ankle +2) present.      Left lower leg: Edema (ankle + 2) present.   Skin:     General: Skin is warm and dry.      Findings: No rash.   Neurological:      Mental Status: He is alert and oriented to " person, place, and time.   Psychiatric:         Mood and Affect: Mood normal.         Behavior: Behavior normal. Behavior is cooperative.         Cognition and Memory: Cognition and memory normal.               Diagnoses and health risks identified today and associated recommendations/orders:    1. Encounter for preventive health examination  - Chart reviewed. Problem list updated. Discussed current medical diagnosis, current medications, medical/surgical/family/social history; updated provider list; documented vital signs; identified any cognitive impairment; and updated risk factor list. Addressed any outstanding health maintenance. Provided patient with personalized health advice. Continue to follow up with PCP and any specialists.     2. Atherosclerosis of aorta  Chronic; stable on current treatment plan; follow up with PCP  - on statin     3. Severe obesity (BMI 35.0-39.9) with comorbidity  - Recommendation for healthy diet and increasing exercise as tolerated with goal of 150min/week . Recommend weight loss    4. Type 2 diabetes mellitus with cataract  Chronic; stable on current treatment plan; follow up with PCP  - taking metformin     5. Hypertension associated with type 2 diabetes mellitus  Chronic; stable on current treatment plan; follow up with PCP  - not taking labetalol, notified primary MD  - BP controlled today on recheck    6. Type 2 diabetes mellitus with hyperlipidemia  Chronic; stable on current treatment plan; follow up with PCP  - taking statin     7. BPH with obstruction/lower urinary tract symptoms  Chronic; stable on current treatment plan; follow up with PCP    8. DDD (degenerative disc disease), thoracic  Chronic; stable on current treatment plan; follow up with PCP    9. Essential hypertension  Chronic; stable on current treatment plan; follow up with PCP  - recommend low sodium diet     10. Vitamin D deficiency  Chronic; stable on current treatment plan; follow up with PCP  - recommend  vit d supplement       Provided Celso with a 5-10 year written screening schedule and personal prevention plan. Recommendations were developed using the USPSTF age appropriate recommendations. Education, counseling, and referrals were provided as needed. After Visit Summary printed and given to patient which includes a list of additional screenings\tests needed.    Follow up in about 1 year (around 6/21/2023) for your next annual wellness visit.    Stefania Sosa, FNP-C   Advance Care Planning       I offered to discuss advanced care planning, including how to pick a person who would make decisions for you if you were unable to make them for yourself, called a health care power of , and what kind of decisions you might make such as use of life sustaining treatments such as ventilators and tube feeding when faced with a life limiting illness recorded on a living will that they will need to know. (How you want to be cared for as you near the end of your natural life)     X Patient is interested in learning more about how to make advanced directives.  I provided them paperwork and offered to discuss this with them.

## 2022-06-21 ENCOUNTER — OFFICE VISIT (OUTPATIENT)
Dept: FAMILY MEDICINE | Facility: CLINIC | Age: 67
End: 2022-06-21
Payer: MEDICARE

## 2022-06-21 VITALS
DIASTOLIC BLOOD PRESSURE: 82 MMHG | OXYGEN SATURATION: 96 % | WEIGHT: 253.06 LBS | HEART RATE: 78 BPM | BODY MASS INDEX: 38.35 KG/M2 | HEIGHT: 68 IN | SYSTOLIC BLOOD PRESSURE: 122 MMHG

## 2022-06-21 DIAGNOSIS — I70.0 ATHEROSCLEROSIS OF AORTA: ICD-10-CM

## 2022-06-21 DIAGNOSIS — E78.5 TYPE 2 DIABETES MELLITUS WITH HYPERLIPIDEMIA: ICD-10-CM

## 2022-06-21 DIAGNOSIS — E55.9 VITAMIN D DEFICIENCY: Chronic | ICD-10-CM

## 2022-06-21 DIAGNOSIS — E11.36 TYPE 2 DIABETES MELLITUS WITH CATARACT: ICD-10-CM

## 2022-06-21 DIAGNOSIS — I15.2 HYPERTENSION ASSOCIATED WITH TYPE 2 DIABETES MELLITUS: ICD-10-CM

## 2022-06-21 DIAGNOSIS — E11.59 HYPERTENSION ASSOCIATED WITH TYPE 2 DIABETES MELLITUS: ICD-10-CM

## 2022-06-21 DIAGNOSIS — M51.34 DDD (DEGENERATIVE DISC DISEASE), THORACIC: ICD-10-CM

## 2022-06-21 DIAGNOSIS — E11.69 TYPE 2 DIABETES MELLITUS WITH HYPERLIPIDEMIA: ICD-10-CM

## 2022-06-21 DIAGNOSIS — N40.1 BPH WITH OBSTRUCTION/LOWER URINARY TRACT SYMPTOMS: ICD-10-CM

## 2022-06-21 DIAGNOSIS — N13.8 BPH WITH OBSTRUCTION/LOWER URINARY TRACT SYMPTOMS: ICD-10-CM

## 2022-06-21 DIAGNOSIS — E66.01 SEVERE OBESITY (BMI 35.0-39.9) WITH COMORBIDITY: Chronic | ICD-10-CM

## 2022-06-21 DIAGNOSIS — I10 ESSENTIAL HYPERTENSION: Chronic | ICD-10-CM

## 2022-06-21 DIAGNOSIS — Z00.00 ENCOUNTER FOR PREVENTIVE HEALTH EXAMINATION: Primary | ICD-10-CM

## 2022-06-21 PROCEDURE — 1126F PR PAIN SEVERITY QUANTIFIED, NO PAIN PRESENT: ICD-10-PCS | Mod: CPTII,S$GLB,, | Performed by: NURSE PRACTITIONER

## 2022-06-21 PROCEDURE — 99999 PR PBB SHADOW E&M-EST. PATIENT-LVL IV: ICD-10-PCS | Mod: PBBFAC,,, | Performed by: NURSE PRACTITIONER

## 2022-06-21 PROCEDURE — 1101F PT FALLS ASSESS-DOCD LE1/YR: CPT | Mod: CPTII,S$GLB,, | Performed by: NURSE PRACTITIONER

## 2022-06-21 PROCEDURE — 1101F PR PT FALLS ASSESS DOC 0-1 FALLS W/OUT INJ PAST YR: ICD-10-PCS | Mod: CPTII,S$GLB,, | Performed by: NURSE PRACTITIONER

## 2022-06-21 PROCEDURE — 99999 PR PBB SHADOW E&M-EST. PATIENT-LVL IV: CPT | Mod: PBBFAC,,, | Performed by: NURSE PRACTITIONER

## 2022-06-21 PROCEDURE — 1159F PR MEDICATION LIST DOCUMENTED IN MEDICAL RECORD: ICD-10-PCS | Mod: CPTII,S$GLB,, | Performed by: NURSE PRACTITIONER

## 2022-06-21 PROCEDURE — 3044F HG A1C LEVEL LT 7.0%: CPT | Mod: CPTII,S$GLB,, | Performed by: NURSE PRACTITIONER

## 2022-06-21 PROCEDURE — 1170F PR FUNCTIONAL STATUS ASSESSED: ICD-10-PCS | Mod: CPTII,S$GLB,, | Performed by: NURSE PRACTITIONER

## 2022-06-21 PROCEDURE — 3074F PR MOST RECENT SYSTOLIC BLOOD PRESSURE < 130 MM HG: ICD-10-PCS | Mod: CPTII,S$GLB,, | Performed by: NURSE PRACTITIONER

## 2022-06-21 PROCEDURE — 99499 RISK ADDL DX/OHS AUDIT: ICD-10-PCS | Mod: S$GLB,,, | Performed by: NURSE PRACTITIONER

## 2022-06-21 PROCEDURE — G0439 PR MEDICARE ANNUAL WELLNESS SUBSEQUENT VISIT: ICD-10-PCS | Mod: S$GLB,,, | Performed by: NURSE PRACTITIONER

## 2022-06-21 PROCEDURE — G0439 PPPS, SUBSEQ VISIT: HCPCS | Mod: S$GLB,,, | Performed by: NURSE PRACTITIONER

## 2022-06-21 PROCEDURE — 3288F PR FALLS RISK ASSESSMENT DOCUMENTED: ICD-10-PCS | Mod: CPTII,S$GLB,, | Performed by: NURSE PRACTITIONER

## 2022-06-21 PROCEDURE — 1170F FXNL STATUS ASSESSED: CPT | Mod: CPTII,S$GLB,, | Performed by: NURSE PRACTITIONER

## 2022-06-21 PROCEDURE — 99499 UNLISTED E&M SERVICE: CPT | Mod: S$GLB,,, | Performed by: NURSE PRACTITIONER

## 2022-06-21 PROCEDURE — 3008F BODY MASS INDEX DOCD: CPT | Mod: CPTII,S$GLB,, | Performed by: NURSE PRACTITIONER

## 2022-06-21 PROCEDURE — 3008F PR BODY MASS INDEX (BMI) DOCUMENTED: ICD-10-PCS | Mod: CPTII,S$GLB,, | Performed by: NURSE PRACTITIONER

## 2022-06-21 PROCEDURE — 3079F DIAST BP 80-89 MM HG: CPT | Mod: CPTII,S$GLB,, | Performed by: NURSE PRACTITIONER

## 2022-06-21 PROCEDURE — 1160F RVW MEDS BY RX/DR IN RCRD: CPT | Mod: CPTII,S$GLB,, | Performed by: NURSE PRACTITIONER

## 2022-06-21 PROCEDURE — 1126F AMNT PAIN NOTED NONE PRSNT: CPT | Mod: CPTII,S$GLB,, | Performed by: NURSE PRACTITIONER

## 2022-06-21 PROCEDURE — 1160F PR REVIEW ALL MEDS BY PRESCRIBER/CLIN PHARMACIST DOCUMENTED: ICD-10-PCS | Mod: CPTII,S$GLB,, | Performed by: NURSE PRACTITIONER

## 2022-06-21 PROCEDURE — 3079F PR MOST RECENT DIASTOLIC BLOOD PRESSURE 80-89 MM HG: ICD-10-PCS | Mod: CPTII,S$GLB,, | Performed by: NURSE PRACTITIONER

## 2022-06-21 PROCEDURE — 3288F FALL RISK ASSESSMENT DOCD: CPT | Mod: CPTII,S$GLB,, | Performed by: NURSE PRACTITIONER

## 2022-06-21 PROCEDURE — 1159F MED LIST DOCD IN RCRD: CPT | Mod: CPTII,S$GLB,, | Performed by: NURSE PRACTITIONER

## 2022-06-21 PROCEDURE — 3074F SYST BP LT 130 MM HG: CPT | Mod: CPTII,S$GLB,, | Performed by: NURSE PRACTITIONER

## 2022-06-21 PROCEDURE — 3044F PR MOST RECENT HEMOGLOBIN A1C LEVEL <7.0%: ICD-10-PCS | Mod: CPTII,S$GLB,, | Performed by: NURSE PRACTITIONER

## 2022-06-21 NOTE — PATIENT INSTRUCTIONS
Eligibility criteria for a second booster dose include:    Individuals 50 years of age and older at least four months after receipt of a first booster dose of any approved COVID-19 vaccine  Immunocompromised individuals 12 years of age and older at least four months after receipt of a first booster dose of any approved COVID-19 vaccine.  These individuals are eligible for a second booster dose regardless of which COVID-19 vaccine they received for their initial series. While all of the available COVID-19 vaccines were approved for the first booster dose, only Pfizer and Moderna have been approved as options for the second booster dose.    Pfizer is authorized for individuals 12 years of age and older  Moderna is authorized for individuals 18 years of age and older  Second booster shot appointments can be scheduled via [facility] or by calling [phone]. Walk-ins are also accepted.    Counseling and Referral of Other Preventative  (Italic type indicates deductible and co-insurance are waived)    Patient Name: Celso Hernandez  Today's Date: 6/21/2022    Health Maintenance       Date Due Completion Date    PROSTATE-SPECIFIC ANTIGEN 06/02/2022 6/2/2021    Diabetes Urine Screening 06/02/2022 6/2/2021    COVID-19 Vaccine (4 - Booster for Pfizer series) 06/29/2022 (Originally 4/21/2022) 12/21/2021    Shingles Vaccine (1 of 2) 06/21/2023 (Originally 7/16/2004) ---    Colorectal Cancer Screening 10/08/2022 10/8/2019    Hemoglobin A1c 11/23/2022 5/23/2022    Eye Exam 03/11/2023 3/11/2022    Lipid Panel 05/23/2023 5/23/2022    High Dose Statin 05/30/2023 5/30/2022    Foot Exam 05/30/2023 5/30/2022    TETANUS VACCINE 06/20/2026 6/20/2016        No orders of the defined types were placed in this encounter.    The following information is provided to all patients.  This information is to help you find resources for any of the problems found today that may be affecting your health:                Living healthy guide:  [email or web address]      Understanding Diabetes: www.diabetes.org      Eating healthy: www.cdc.gov/healthyweight      CDC home safety checklist: www.cdc.gov/steadi/patient.html      Agency on Aging: www.goea.[place].[facility]      Alcoholics anonymous (AA): www.aa.org      Physical Activity: www.dougie.nih.gov/gr0aqte      Tobacco use: www.quitwithusla.org

## 2022-06-21 NOTE — Clinical Note
I am seeing your patient today for medicare wellness visit. He is telling me that his home glucose machine is giving him errors. He has bought new test strips and still getting error when he puts test strips in. He is requesting Rx for new glucose machine, test strips. I also wanted to tell you he is not taking labetalol due to dry mouth complaint. BP is controlled today. He is wondering if you want to switch him to something different. He did say his nebivolol was $30/month and would like something cheaper if possible. Can you please have your office staff reach out to him in regards to above. Thank you so much. Have a great day .  Pharmacy - [facility] in [facility] in [place]

## 2022-07-01 ENCOUNTER — OFFICE VISIT (OUTPATIENT)
Dept: URGENT CARE | Facility: CLINIC | Age: 67
End: 2022-07-01
Payer: MEDICARE

## 2022-07-01 VITALS
HEIGHT: 68 IN | HEART RATE: 110 BPM | RESPIRATION RATE: 18 BRPM | DIASTOLIC BLOOD PRESSURE: 78 MMHG | WEIGHT: 253 LBS | OXYGEN SATURATION: 95 % | SYSTOLIC BLOOD PRESSURE: 119 MMHG | TEMPERATURE: 102 F | BODY MASS INDEX: 38.34 KG/M2

## 2022-07-01 DIAGNOSIS — J06.9 VIRAL URI WITH COUGH: ICD-10-CM

## 2022-07-01 DIAGNOSIS — N39.0 COMPLICATED UTI (URINARY TRACT INFECTION): Primary | ICD-10-CM

## 2022-07-01 LAB
BILIRUB UR QL STRIP: NEGATIVE
CTP QC/QA: YES
GLUCOSE UR QL STRIP: NEGATIVE
KETONES UR QL STRIP: NEGATIVE
LEUKOCYTE ESTERASE UR QL STRIP: POSITIVE
PH, POC UA: 6.5 (ref 5–8)
POC BLOOD, URINE: POSITIVE
POC NITRATES, URINE: POSITIVE
PROT UR QL STRIP: NEGATIVE
SARS-COV-2 RDRP RESP QL NAA+PROBE: NEGATIVE
SP GR UR STRIP: 1.01 (ref 1–1.03)
UROBILINOGEN UR STRIP-ACNC: ABNORMAL (ref 0.3–2.2)

## 2022-07-01 PROCEDURE — 3074F PR MOST RECENT SYSTOLIC BLOOD PRESSURE < 130 MM HG: ICD-10-PCS | Mod: CPTII,S$GLB,, | Performed by: FAMILY MEDICINE

## 2022-07-01 PROCEDURE — 3074F SYST BP LT 130 MM HG: CPT | Mod: CPTII,S$GLB,, | Performed by: FAMILY MEDICINE

## 2022-07-01 PROCEDURE — 87086 URINE CULTURE/COLONY COUNT: CPT | Performed by: FAMILY MEDICINE

## 2022-07-01 PROCEDURE — 3078F PR MOST RECENT DIASTOLIC BLOOD PRESSURE < 80 MM HG: ICD-10-PCS | Mod: CPTII,S$GLB,, | Performed by: FAMILY MEDICINE

## 2022-07-01 PROCEDURE — 99213 PR OFFICE/OUTPT VISIT, EST, LEVL III, 20-29 MIN: ICD-10-PCS | Mod: S$GLB,,, | Performed by: FAMILY MEDICINE

## 2022-07-01 PROCEDURE — U0002: ICD-10-PCS | Mod: QW,S$GLB,, | Performed by: FAMILY MEDICINE

## 2022-07-01 PROCEDURE — U0002 COVID-19 LAB TEST NON-CDC: HCPCS | Mod: QW,S$GLB,, | Performed by: FAMILY MEDICINE

## 2022-07-01 PROCEDURE — 3008F BODY MASS INDEX DOCD: CPT | Mod: CPTII,S$GLB,, | Performed by: FAMILY MEDICINE

## 2022-07-01 PROCEDURE — 81003 POCT URINALYSIS, DIPSTICK, AUTOMATED, W/O SCOPE: ICD-10-PCS | Mod: QW,S$GLB,, | Performed by: FAMILY MEDICINE

## 2022-07-01 PROCEDURE — 1125F PR PAIN SEVERITY QUANTIFIED, PAIN PRESENT: ICD-10-PCS | Mod: CPTII,S$GLB,, | Performed by: FAMILY MEDICINE

## 2022-07-01 PROCEDURE — 1159F PR MEDICATION LIST DOCUMENTED IN MEDICAL RECORD: ICD-10-PCS | Mod: CPTII,S$GLB,, | Performed by: FAMILY MEDICINE

## 2022-07-01 PROCEDURE — 87186 SC STD MICRODIL/AGAR DIL: CPT | Performed by: FAMILY MEDICINE

## 2022-07-01 PROCEDURE — 3044F PR MOST RECENT HEMOGLOBIN A1C LEVEL <7.0%: ICD-10-PCS | Mod: CPTII,S$GLB,, | Performed by: FAMILY MEDICINE

## 2022-07-01 PROCEDURE — 99213 OFFICE O/P EST LOW 20 MIN: CPT | Mod: S$GLB,,, | Performed by: FAMILY MEDICINE

## 2022-07-01 PROCEDURE — 81003 URINALYSIS AUTO W/O SCOPE: CPT | Mod: QW,S$GLB,, | Performed by: FAMILY MEDICINE

## 2022-07-01 PROCEDURE — 1125F AMNT PAIN NOTED PAIN PRSNT: CPT | Mod: CPTII,S$GLB,, | Performed by: FAMILY MEDICINE

## 2022-07-01 PROCEDURE — 3078F DIAST BP <80 MM HG: CPT | Mod: CPTII,S$GLB,, | Performed by: FAMILY MEDICINE

## 2022-07-01 PROCEDURE — 1160F PR REVIEW ALL MEDS BY PRESCRIBER/CLIN PHARMACIST DOCUMENTED: ICD-10-PCS | Mod: CPTII,S$GLB,, | Performed by: FAMILY MEDICINE

## 2022-07-01 PROCEDURE — 1159F MED LIST DOCD IN RCRD: CPT | Mod: CPTII,S$GLB,, | Performed by: FAMILY MEDICINE

## 2022-07-01 PROCEDURE — 3044F HG A1C LEVEL LT 7.0%: CPT | Mod: CPTII,S$GLB,, | Performed by: FAMILY MEDICINE

## 2022-07-01 PROCEDURE — 1160F RVW MEDS BY RX/DR IN RCRD: CPT | Mod: CPTII,S$GLB,, | Performed by: FAMILY MEDICINE

## 2022-07-01 PROCEDURE — 87077 CULTURE AEROBIC IDENTIFY: CPT | Performed by: FAMILY MEDICINE

## 2022-07-01 PROCEDURE — 87088 URINE BACTERIA CULTURE: CPT | Performed by: FAMILY MEDICINE

## 2022-07-01 PROCEDURE — 3008F PR BODY MASS INDEX (BMI) DOCUMENTED: ICD-10-PCS | Mod: CPTII,S$GLB,, | Performed by: FAMILY MEDICINE

## 2022-07-01 RX ORDER — ACETAMINOPHEN 325 MG/1
650 TABLET ORAL
Status: COMPLETED | OUTPATIENT
Start: 2022-07-01 | End: 2022-07-01

## 2022-07-01 RX ORDER — SULFAMETHOXAZOLE AND TRIMETHOPRIM 800; 160 MG/1; MG/1
1 TABLET ORAL 2 TIMES DAILY
Qty: 14 TABLET | Refills: 0 | Status: SHIPPED | OUTPATIENT
Start: 2022-07-01 | End: 2022-10-03 | Stop reason: ALTCHOICE

## 2022-07-01 RX ADMIN — ACETAMINOPHEN 650 MG: 325 TABLET ORAL at 01:07

## 2022-07-01 NOTE — PATIENT INSTRUCTIONS
PLEASE READ YOUR DISCHARGE INSTRUCTIONS ENTIRELY AS IT CONTAINS IMPORTANT INFORMATION.      Take the antibiotics to completion with food    Drink plenty of fluids, wipe front to back, take showers not baths, no scented soaps, wear breathable cotton underwear, urinate after sexual intercourse.     A urine culture was sent. You will be contacted once it results and appropriate action will be taken if needed.       Please go to the ER for worsening symptoms including flank pain, vomiting, passing out etc.           Upper respiratory:  Use over the counter flonase: one spray each nostril twice daily OR two sprays each nostril once daily.   If you find this dries your nose out or your nose bleeds, try using over the counter nasal saline a few minutes prior to using the flonase to moisten the lining of your nose.     Can continue mucinex    Please return or see your primary care doctor if you develop new or worsening symptoms.     Please arrange follow up with your primary medical clinic as soon as possible. You must understand that you've received an Urgent Care treatment only and that you may be released before all of your medical problems are known or treated. You, the patient, will arrange for follow up as instructed. If your symptoms worsen or fail to improve you should go to the Emergency Room.  WE CANNOT RULE OUT ALL POSSIBLE CAUSES OF YOUR SYMPTOMS IN THE URGENT CARE SETTING PLEASE GO TO THE ER IF YOU FEELS YOUR CONDITION IS WORSENING OR YOU WOULD LIKE EMERGENT EVALUATION.    Urinary Tract Infection Discharge Instructions, Adult   About this topic   A urinary tract infection is a UTI. It is caused by germs getting into the urinary tract. The urinary tract is made up of the kidneys, ureters, bladder, and urethra. The urethra is a tube at the bottom of the bladder. Urine flows out of this tube. The germs enter the urethra and then spread in the bladder. The ureters are small tubes that join the bladder and the  kidneys. Most UTIs are infections in either your bladder or your kidneys. Bladder infections are more common and may also be called cystitis. Kidney infections are more serious and may also be called pyelonephritis.         What care is needed at home?   Ask your doctor what you need to do when you go home. Make sure you ask questions if you do not understand what the doctor says. This way you will know what you need to do.  Take your drugs as ordered by your doctor.  Unless your doctor has told you otherwise, drink at least 8 to 10 glasses of water or water-based drinks each day. Do not include drinks with caffeine, like coffee or tea.  Do not hold back your urine. Go to the bathroom every 2 to 3 hours.  What follow-up care is needed?   Your doctor may ask you to make visits to the office to check on your progress. Be sure to keep these visits.  What drugs may be needed?   The doctor may order drugs to:  Fight an infection  Help with pain  Numb your bladder  Help you pass your urine more easily  Be sure to talk to your doctor about all of your drugs if you are pregnant.  Will physical activity be limited?   Physical activities will not be limited. You may have to pass urine more often.  What changes to diet are needed?   Do not drink beer, wine, and mixed drinks (alcohol) or caffeine. These can bother the bladder.  Talk to your doctor about drinking cranberry juice.  What can be done to prevent this health problem?   Gently cleanse your genital area each day. Wipe from front to back to keep germs from going in your body.  If your penis is uncircumcised, retract the foreskin and gently clean around the head of the penis each day.  Consider other methods of birth control instead of a spermicide.  Empty your bladder after having sex.  Empty your bladder before going to sleep.  When do I need to call the doctor?   Signs of infection. These include a fever of 100.4°F (38°C) or higher, chills, back pain, nausea, throwing  up, or bloody urine.  Signs come back after treatment ends  You notice more blood in your urine.  Your signs get worse or do not improve within 24 hours of starting treatment.  You are not able to urinate for more than 8 hours.  Your signs come back after treatment has stopped.  Teach Back: Helping You Understand   The Teach Back Method helps you understand the information we are giving you. After you talk with the staff, tell them in your own words what you learned. This helps to make sure the staff has described each thing clearly. It also helps to explain things that may have been confusing. Before going home, make sure you can do these things:  I can tell you about my condition.  I can tell you how to prevent this problem from coming back.  I can tell you what I will do if my signs do not get better after 24 hours of treatment or come back after I have finished treatment.  Where can I learn more?   American Academy of Family Physicians  https://familydoctor.org/condition/urinary-tract-infections/   NHS Choices  https://www.nhs.uk/conditions/urinary-tract-infections-utis/   Last Reviewed Date   2021-06-03  Consumer Information Use and Disclaimer   This information is not specific medical advice and does not replace information you receive from your health care provider. This is only a brief summary of general information. It does NOT include all information about conditions, illnesses, injuries, tests, procedures, treatments, therapies, discharge instructions or life-style choices that may apply to you. You must talk with your health care provider for complete information about your health and treatment options. This information should not be used to decide whether or not to accept your health care providers advice, instructions or recommendations. Only your health care provider has the knowledge and training to provide advice that is right for you.  Copyright   Copyright © 2021 [employer] and its affiliates  and/or licensors. All rights reserved.

## 2022-07-01 NOTE — PROGRESS NOTES
"Subjective:       Patient ID: Celso Hernandez is a 66 y.o. male.    Vitals:  height is 5' 8" (1.727 m) and weight is 114.8 kg (253 lb). His temperature is 101.8 °F (38.8 °C) (abnormal). His blood pressure is 119/78 and his pulse is 110. His respiration is 18 and oxygen saturation is 95%.     Chief Complaint: Fever    Pt has been sick x 4 days now . Pt has only been taking tylenol . Pts pharmacy has been updated . tmax 100.8. cough nasal congestion n/d dysuria. Back pain normal for him. No covid exposure. No cp, sob.    Fever   This is a new problem. The current episode started in the past 7 days. The problem occurs constantly. The problem has been gradually worsening. The maximum temperature noted was 101 to 101.9 F. The temperature was taken using a tympanic thermometer. Associated symptoms include congestion, coughing, diarrhea, nausea, a sore throat and urinary pain. Pertinent negatives include no abdominal pain, chest pain, ear pain, headaches, muscle aches, rash, sleepiness, vomiting or wheezing. He has tried acetaminophen for the symptoms. The treatment provided no relief.       Constitution: Positive for fever.   HENT: Positive for congestion and sore throat. Negative for ear pain.    Cardiovascular: Negative for chest pain.   Respiratory: Positive for cough. Negative for wheezing.    Gastrointestinal: Positive for nausea and diarrhea. Negative for abdominal pain and vomiting.   Genitourinary: Positive for dysuria.   Skin: Negative for rash.   Neurological: Negative for headaches.       Objective:      Physical Exam   Constitutional: He is oriented to person, place, and time. He appears well-developed. He is cooperative.  Non-toxic appearance. He does not appear ill. No distress.   HENT:   Head: Normocephalic and atraumatic.   Ears:   Right Ear: Hearing, tympanic membrane, external ear and ear canal normal. Tympanic membrane is not erythematous. No middle ear effusion.   Left Ear: Hearing, tympanic membrane, " external ear and ear canal normal. Tympanic membrane is not erythematous.  No middle ear effusion.   Nose: Mucosal edema present. No rhinorrhea or nasal deformity. No epistaxis. Right sinus exhibits no maxillary sinus tenderness and no frontal sinus tenderness. Left sinus exhibits no maxillary sinus tenderness and no frontal sinus tenderness.   Mouth/Throat: Uvula is midline, oropharynx is clear and moist and mucous membranes are normal. No trismus in the jaw. Normal dentition. No uvula swelling. No oropharyngeal exudate, posterior oropharyngeal edema, posterior oropharyngeal erythema, tonsillar abscesses or cobblestoning.   Eyes: Conjunctivae and lids are normal. No scleral icterus.   Neck: Trachea normal and phonation normal. Neck supple. No edema present. No erythema present. No neck rigidity present.   Cardiovascular: Normal rate, regular rhythm, normal heart sounds and normal pulses.   Pulmonary/Chest: Effort normal and breath sounds normal. No accessory muscle usage. No tachypnea. No respiratory distress. He has no decreased breath sounds. He has no wheezes. He has no rhonchi. He has no rales.   NAD able to speak in clear complete sentences without difficulty           Comments: NAD able to speak in clear complete sentences without difficulty      Abdominal: Normal appearance and bowel sounds are normal. Soft. There is no abdominal tenderness. There is no rebound, no guarding, no left CVA tenderness and no right CVA tenderness.   Musculoskeletal: Normal range of motion.         General: No deformity. Normal range of motion.   Neurological: He is alert and oriented to person, place, and time. He exhibits normal muscle tone. Coordination normal.   Skin: Skin is warm, dry, intact, not diaphoretic and not pale.   Psychiatric: His speech is normal and behavior is normal. Judgment and thought content normal.   Nursing note and vitals reviewed.        Results for orders placed or performed in visit on 07/01/22   POCT  COVID-19 Rapid Screening   Result Value Ref Range    POC Rapid COVID Negative Negative     Acceptable Yes    POCT Urinalysis, Dipstick, Automated, W/O Scope   Result Value Ref Range    POC Blood, Urine Positive (A) Negative    POC Bilirubin, Urine Negative Negative    POC Urobilinogen, Urine norm 0.3 - 2.2    POC Ketones, Urine Negative Negative    POC Protein, Urine Negative Negative    POC Nitrates, Urine Positive (A) Negative    POC Glucose, Urine Negative Negative    pH, UA 6.5 5 - 8    POC Specific Gravity, Urine 1.015 1.003 - 1.029    POC Leukocytes, Urine Positive (A) Negative       Assessment:       1. Complicated UTI (urinary tract infection)    2. Viral URI with cough          Plan:         Complicated UTI (urinary tract infection)  -     POCT Urinalysis, Dipstick, Automated, W/O Scope  -     acetaminophen tablet 650 mg  -     sulfamethoxazole-trimethoprim 800-160mg (BACTRIM DS) 800-160 mg Tab; Take 1 tablet by mouth 2 (two) times daily.  Dispense: 14 tablet; Refill: 0  -     Urine culture    Viral URI with cough  -     POCT COVID-19 Rapid Screening    considered rocephin but allergy to cephalosporins    Patient Instructions     PLEASE READ YOUR DISCHARGE INSTRUCTIONS ENTIRELY AS IT CONTAINS IMPORTANT INFORMATION.      Take the antibiotics to completion with food    Drink plenty of fluids, wipe front to back, take showers not baths, no scented soaps, wear breathable cotton underwear, urinate after sexual intercourse.     A urine culture was sent. You will be contacted once it results and appropriate action will be taken if needed.       Please go to the ER for worsening symptoms including flank pain, vomiting, passing out etc.           Upper respiratory:  Use over the counter flonase: one spray each nostril twice daily OR two sprays each nostril once daily.   If you find this dries your nose out or your nose bleeds, try using over the counter nasal saline a few minutes prior to using the  flonase to moisten the lining of your nose.     Can continue mucinex    Please return or see your primary care doctor if you develop new or worsening symptoms.     Please arrange follow up with your primary medical clinic as soon as possible. You must understand that you've received an Urgent Care treatment only and that you may be released before all of your medical problems are known or treated. You, the patient, will arrange for follow up as instructed. If your symptoms worsen or fail to improve you should go to the Emergency Room.  WE CANNOT RULE OUT ALL POSSIBLE CAUSES OF YOUR SYMPTOMS IN THE URGENT CARE SETTING PLEASE GO TO THE ER IF YOU FEELS YOUR CONDITION IS WORSENING OR YOU WOULD LIKE EMERGENT EVALUATION.    Urinary Tract Infection Discharge Instructions, Adult   About this topic   A urinary tract infection is a UTI. It is caused by germs getting into the urinary tract. The urinary tract is made up of the kidneys, ureters, bladder, and urethra. The urethra is a tube at the bottom of the bladder. Urine flows out of this tube. The germs enter the urethra and then spread in the bladder. The ureters are small tubes that join the bladder and the kidneys. Most UTIs are infections in either your bladder or your kidneys. Bladder infections are more common and may also be called cystitis. Kidney infections are more serious and may also be called pyelonephritis.         What care is needed at home?   · Ask your doctor what you need to do when you go home. Make sure you ask questions if you do not understand what the doctor says. This way you will know what you need to do.  · Take your drugs as ordered by your doctor.  · Unless your doctor has told you otherwise, drink at least 8 to 10 glasses of water or water-based drinks each day. Do not include drinks with caffeine, like coffee or tea.  · Do not hold back your urine. Go to the bathroom every 2 to 3 hours.  What follow-up care is needed?   Your doctor may ask you  to make visits to the office to check on your progress. Be sure to keep these visits.  What drugs may be needed?   The doctor may order drugs to:  · Fight an infection  · Help with pain  · Numb your bladder  · Help you pass your urine more easily  Be sure to talk to your doctor about all of your drugs if you are pregnant.  Will physical activity be limited?   Physical activities will not be limited. You may have to pass urine more often.  What changes to diet are needed?   · Do not drink beer, wine, and mixed drinks (alcohol) or caffeine. These can bother the bladder.  · Talk to your doctor about drinking cranberry juice.  What can be done to prevent this health problem?   · Gently cleanse your genital area each day. Wipe from front to back to keep germs from going in your body.  · If your penis is uncircumcised, retract the foreskin and gently clean around the head of the penis each day.  · Consider other methods of birth control instead of a spermicide.  · Empty your bladder after having sex.  · Empty your bladder before going to sleep.  When do I need to call the doctor?   · Signs of infection. These include a fever of 100.4°F (38°C) or higher, chills, back pain, nausea, throwing up, or bloody urine.  · Signs come back after treatment ends  · You notice more blood in your urine.  · Your signs get worse or do not improve within 24 hours of starting treatment.  · You are not able to urinate for more than 8 hours.  · Your signs come back after treatment has stopped.  Teach Back: Helping You Understand   The Teach Back Method helps you understand the information we are giving you. After you talk with the staff, tell them in your own words what you learned. This helps to make sure the staff has described each thing clearly. It also helps to explain things that may have been confusing. Before going home, make sure you can do these things:  · I can tell you about my condition.  · I can tell you how to prevent this  problem from coming back.  · I can tell you what I will do if my signs do not get better after 24 hours of treatment or come back after I have finished treatment.  Where can I learn more?   American Academy [employer]  https://familydoctor.org/condition/urinary-tract-infections/   NHS Choices  https://www.nhs.uk/conditions/urinary-tract-infections-utis/   Last Reviewed Date   2021-06-03  Consumer Information Use and Disclaimer   This information is not specific medical advice and does not replace information you receive from your health care provider. This is only a brief summary of general information. It does NOT include all information about conditions, illnesses, injuries, tests, procedures, treatments, therapies, discharge instructions or life-style choices that may apply to you. You must talk with your health care provider for complete information about your health and treatment options. This information should not be used to decide whether or not to accept your health care providers advice, instructions or recommendations. Only your health care provider has the knowledge and training to provide advice that is right for you.  Copyright   Copyright © 2021 [employer] and its affiliates and/or licensors. All rights reserved.

## 2022-07-03 ENCOUNTER — TELEPHONE (OUTPATIENT)
Dept: URGENT CARE | Facility: CLINIC | Age: 67
End: 2022-07-03
Payer: MEDICARE

## 2022-07-03 LAB — BACTERIA UR CULT: ABNORMAL

## 2022-07-04 ENCOUNTER — TELEPHONE (OUTPATIENT)
Dept: URGENT CARE | Facility: CLINIC | Age: 67
End: 2022-07-04
Payer: MEDICARE

## 2022-07-04 NOTE — TELEPHONE ENCOUNTER
Called patient with urine culture results.  +E.coli.  Treated appropriately with bactrim.  He reports symptoms are about the same.  Recommend finish antibiotic course and if symptoms do not resolve, follow up with PCP or return to UC for evaluation.

## 2022-07-16 NOTE — Clinical Note
A follow-up visit in 3 weeks is needed for re-evaluation of this patient before returning to work.
18

## 2022-07-19 ENCOUNTER — PATIENT MESSAGE (OUTPATIENT)
Dept: RESEARCH | Facility: CLINIC | Age: 67
End: 2022-07-19
Payer: MEDICARE

## 2022-08-29 ENCOUNTER — PATIENT OUTREACH (OUTPATIENT)
Dept: ADMINISTRATIVE | Facility: HOSPITAL | Age: 67
End: 2022-08-29
Payer: MEDICARE

## 2022-08-30 ENCOUNTER — TELEPHONE (OUTPATIENT)
Dept: OPTOMETRY | Facility: CLINIC | Age: 67
End: 2022-08-30

## 2022-08-30 ENCOUNTER — OFFICE VISIT (OUTPATIENT)
Dept: OPTOMETRY | Facility: CLINIC | Age: 67
End: 2022-08-30
Payer: MEDICARE

## 2022-08-30 DIAGNOSIS — H40.1132 PRIMARY OPEN ANGLE GLAUCOMA (POAG) OF BOTH EYES, MODERATE STAGE: Primary | ICD-10-CM

## 2022-08-30 PROCEDURE — 1160F PR REVIEW ALL MEDS BY PRESCRIBER/CLIN PHARMACIST DOCUMENTED: ICD-10-PCS | Mod: CPTII,S$GLB,, | Performed by: OPTOMETRIST

## 2022-08-30 PROCEDURE — 1160F RVW MEDS BY RX/DR IN RCRD: CPT | Mod: CPTII,S$GLB,, | Performed by: OPTOMETRIST

## 2022-08-30 PROCEDURE — 3288F PR FALLS RISK ASSESSMENT DOCUMENTED: ICD-10-PCS | Mod: CPTII,S$GLB,, | Performed by: OPTOMETRIST

## 2022-08-30 PROCEDURE — 3288F FALL RISK ASSESSMENT DOCD: CPT | Mod: CPTII,S$GLB,, | Performed by: OPTOMETRIST

## 2022-08-30 PROCEDURE — 3044F PR MOST RECENT HEMOGLOBIN A1C LEVEL <7.0%: ICD-10-PCS | Mod: CPTII,S$GLB,, | Performed by: OPTOMETRIST

## 2022-08-30 PROCEDURE — 1101F PR PT FALLS ASSESS DOC 0-1 FALLS W/OUT INJ PAST YR: ICD-10-PCS | Mod: CPTII,S$GLB,, | Performed by: OPTOMETRIST

## 2022-08-30 PROCEDURE — 92012 PR EYE EXAM, EST PATIENT,INTERMED: ICD-10-PCS | Mod: S$GLB,,, | Performed by: OPTOMETRIST

## 2022-08-30 PROCEDURE — 1159F PR MEDICATION LIST DOCUMENTED IN MEDICAL RECORD: ICD-10-PCS | Mod: CPTII,S$GLB,, | Performed by: OPTOMETRIST

## 2022-08-30 PROCEDURE — 92012 INTRM OPH EXAM EST PATIENT: CPT | Mod: S$GLB,,, | Performed by: OPTOMETRIST

## 2022-08-30 PROCEDURE — 3044F HG A1C LEVEL LT 7.0%: CPT | Mod: CPTII,S$GLB,, | Performed by: OPTOMETRIST

## 2022-08-30 PROCEDURE — 99999 PR PBB SHADOW E&M-EST. PATIENT-LVL III: CPT | Mod: PBBFAC,,, | Performed by: OPTOMETRIST

## 2022-08-30 PROCEDURE — 99999 PR PBB SHADOW E&M-EST. PATIENT-LVL III: ICD-10-PCS | Mod: PBBFAC,,, | Performed by: OPTOMETRIST

## 2022-08-30 PROCEDURE — 1159F MED LIST DOCD IN RCRD: CPT | Mod: CPTII,S$GLB,, | Performed by: OPTOMETRIST

## 2022-08-30 PROCEDURE — 1126F AMNT PAIN NOTED NONE PRSNT: CPT | Mod: CPTII,S$GLB,, | Performed by: OPTOMETRIST

## 2022-08-30 PROCEDURE — 1126F PR PAIN SEVERITY QUANTIFIED, NO PAIN PRESENT: ICD-10-PCS | Mod: CPTII,S$GLB,, | Performed by: OPTOMETRIST

## 2022-08-30 PROCEDURE — 1101F PT FALLS ASSESS-DOCD LE1/YR: CPT | Mod: CPTII,S$GLB,, | Performed by: OPTOMETRIST

## 2022-08-30 NOTE — PROGRESS NOTES
TAYLOR    KEITH: 03/22  Chief complaint (CC): Patient was due back in 6 weeks for an HVF,OCT and   IOP check but patient made his own appointment and testing wasn't   scheduled today.   Patient feels like his reading vision is worse but   distance seems fine.  Glasses? 8 months old  Contacts? -  H/o eye surgery, injections or laser: -  H/o eye injury: -  Known eye conditions? See above  Family h/o eye conditions? ?mother with glaucoma  Eye gtts? Cosopt BID OU (spaced 6 hours apart), last used last night and   Latanoprost OU Q HS, last used this morning and AT's prn      (-) Flashes (-)  Floaters (-) Mucous   (-)  Tearing (-) Itching (-) Burning   (-) Headaches (-) Eye Pain/discomfort (-) Irritation   (-)  Redness (-) Double vision (-) Blurry vision    Diabetic? +BS  A1c? Hemoglobin A1C       Date                     Value               Ref Range             Status                05/23/2022               5.9 (H)             4.0 - 5.6 %           Final                 01/18/2022               5.9 (H)             4.0 - 5.6 %           Final                 09/21/2021               5.9 (H)             4.0 - 5.6 %           Final                    Last edited by Andrea Bradley, OD on 8/30/2022  9:50 AM.            Assessment /Plan     For exam results, see Encounter Report.    Primary open angle glaucoma (POAG) of both eyes, moderate stage      (-) FHx. IOp 15 OD, OS. Last 20 OD, OS. Pt is taking Latanoprost QAM and Cosopt bid OU but only 6 hours apart. Tmax  22 OD, 23 OS.T min on drops 12 OD, 13 OS.  Ptfailed to f/u from 12/17/2019 to 9/16/2020 and 6/2021 to 3/11/2022. Pt reports noncompliance w/drops. Pt reports compliance w/eyedrops Prev pt of Dr Huffman. C/d 0.55 OD, 0.65 OS.   12/15/2020 HVF OD inf arcuate and early sup arcuate, OS sup altitudinal. Progression OS  12/15/2020 OCT OD thin NS, TS, T, TI and G (progression TI), OS Thin TS, T, TI and G, borderline NI (progression N, NI)    6/3/2021 OCT OD NS, TS, T, TI  and G, OS thin TS, T, TI, G, borderline NI (stable OU)  3/11/2022 Photos  Educated pt on findings w/understanding.   Need for IOp to be lower. Pt reports compliance but states that his spacing in the drops times may be off.  Cont Latanoprost QHS OU.  D/c Timolol BiD OU on 12/15/2020  Cont Cosopt BID OU (start 12/15/2020)  Importance of drop compliance and f/u discussed with pt.   RTC 4 mo IOP/bmo ppole OCT/24-2 [facility] HVF

## 2022-08-30 NOTE — TELEPHONE ENCOUNTER
----- Message from Anne Geronimo sent at 8/30/2022 10:00 AM CDT -----  Regarding: iop 4 mth/BMO ppole oct/24-2 [facility] hvf  Patient Requesting Sooner Appointment.     Reason for sooner appt.:iop 4 mth/BMO ppole oct/24-2 [facility] hvf  When is the first available appointment?No dates before January  Communication Preference:[phone]  Additional Information:

## 2022-09-02 ENCOUNTER — PATIENT MESSAGE (OUTPATIENT)
Dept: ADMINISTRATIVE | Facility: HOSPITAL | Age: 67
End: 2022-09-02
Payer: MEDICARE

## 2022-09-02 ENCOUNTER — PATIENT OUTREACH (OUTPATIENT)
Dept: ADMINISTRATIVE | Facility: HOSPITAL | Age: 67
End: 2022-09-02
Payer: MEDICARE

## 2022-09-26 ENCOUNTER — LAB VISIT (OUTPATIENT)
Dept: LAB | Facility: HOSPITAL | Age: 67
End: 2022-09-26
Attending: INTERNAL MEDICINE
Payer: MEDICARE

## 2022-09-26 DIAGNOSIS — I10 ESSENTIAL HYPERTENSION: ICD-10-CM

## 2022-09-26 DIAGNOSIS — E11.9 TYPE 2 DIABETES MELLITUS WITHOUT COMPLICATION, WITHOUT LONG-TERM CURRENT USE OF INSULIN: ICD-10-CM

## 2022-09-26 DIAGNOSIS — I70.0 ATHEROSCLEROSIS OF AORTA: ICD-10-CM

## 2022-09-26 DIAGNOSIS — Z12.5 ENCOUNTER FOR SCREENING FOR MALIGNANT NEOPLASM OF PROSTATE: ICD-10-CM

## 2022-09-26 DIAGNOSIS — E78.2 MIXED HYPERLIPIDEMIA: ICD-10-CM

## 2022-09-26 LAB
ALBUMIN SERPL BCP-MCNC: 3.6 G/DL (ref 3.5–5.2)
ALBUMIN/CREAT UR: 14.7 UG/MG (ref 0–30)
ALP SERPL-CCNC: 95 U/L (ref 55–135)
ALT SERPL W/O P-5'-P-CCNC: 41 U/L (ref 10–44)
ANION GAP SERPL CALC-SCNC: 10 MMOL/L (ref 8–16)
AST SERPL-CCNC: 38 U/L (ref 10–40)
BILIRUB SERPL-MCNC: 0.5 MG/DL (ref 0.1–1)
BILIRUB UR QL STRIP: NEGATIVE
BUN SERPL-MCNC: 21 MG/DL (ref 8–23)
CALCIUM SERPL-MCNC: 9.8 MG/DL (ref 8.7–10.5)
CHLORIDE SERPL-SCNC: 104 MMOL/L (ref 95–110)
CHOLEST SERPL-MCNC: 257 MG/DL (ref 120–199)
CHOLEST/HDLC SERPL: 7.3 {RATIO} (ref 2–5)
CLARITY UR: CLEAR
CO2 SERPL-SCNC: 24 MMOL/L (ref 23–29)
COLOR UR: YELLOW
COMPLEXED PSA SERPL-MCNC: 0.98 NG/ML (ref 0–4)
CREAT SERPL-MCNC: 1.2 MG/DL (ref 0.5–1.4)
CREAT UR-MCNC: 129 MG/DL (ref 23–375)
EST. GFR  (NO RACE VARIABLE): >60 ML/MIN/1.73 M^2
ESTIMATED AVG GLUCOSE: 108 MG/DL (ref 68–131)
GLUCOSE SERPL-MCNC: 103 MG/DL (ref 70–110)
GLUCOSE UR QL STRIP: NEGATIVE
HBA1C MFR BLD: 5.4 % (ref 4–5.6)
HDLC SERPL-MCNC: 35 MG/DL (ref 40–75)
HDLC SERPL: 13.6 % (ref 20–50)
HGB UR QL STRIP: NEGATIVE
HYALINE CASTS #/AREA URNS LPF: 1 /LPF
KETONES UR QL STRIP: NEGATIVE
LDLC SERPL CALC-MCNC: 182.2 MG/DL (ref 63–159)
LEUKOCYTE ESTERASE UR QL STRIP: ABNORMAL
MICROALBUMIN UR DL<=1MG/L-MCNC: 19 UG/ML
MICROSCOPIC COMMENT: ABNORMAL
NITRITE UR QL STRIP: NEGATIVE
NONHDLC SERPL-MCNC: 222 MG/DL
PH UR STRIP: 5 [PH] (ref 5–8)
POTASSIUM SERPL-SCNC: 4.3 MMOL/L (ref 3.5–5.1)
PROT SERPL-MCNC: 7.7 G/DL (ref 6–8.4)
PROT UR QL STRIP: NEGATIVE
RBC #/AREA URNS HPF: 1 /HPF (ref 0–4)
SODIUM SERPL-SCNC: 138 MMOL/L (ref 136–145)
SP GR UR STRIP: 1.02 (ref 1–1.03)
SQUAMOUS #/AREA URNS HPF: 0 /HPF
TRIGL SERPL-MCNC: 199 MG/DL (ref 30–150)
URN SPEC COLLECT METH UR: ABNORMAL
UROBILINOGEN UR STRIP-ACNC: NEGATIVE EU/DL
WBC #/AREA URNS HPF: 9 /HPF (ref 0–5)

## 2022-09-26 PROCEDURE — 83036 HEMOGLOBIN GLYCOSYLATED A1C: CPT | Performed by: INTERNAL MEDICINE

## 2022-09-26 PROCEDURE — 36415 COLL VENOUS BLD VENIPUNCTURE: CPT | Performed by: INTERNAL MEDICINE

## 2022-09-26 PROCEDURE — 84153 ASSAY OF PSA TOTAL: CPT | Performed by: INTERNAL MEDICINE

## 2022-09-26 PROCEDURE — 82570 ASSAY OF URINE CREATININE: CPT | Performed by: INTERNAL MEDICINE

## 2022-09-26 PROCEDURE — 80053 COMPREHEN METABOLIC PANEL: CPT | Performed by: INTERNAL MEDICINE

## 2022-09-26 PROCEDURE — 80061 LIPID PANEL: CPT | Performed by: INTERNAL MEDICINE

## 2022-09-26 PROCEDURE — 81000 URINALYSIS NONAUTO W/SCOPE: CPT | Performed by: INTERNAL MEDICINE

## 2022-09-26 PROCEDURE — 82043 UR ALBUMIN QUANTITATIVE: CPT | Performed by: INTERNAL MEDICINE

## 2022-10-03 ENCOUNTER — OFFICE VISIT (OUTPATIENT)
Dept: INTERNAL MEDICINE | Facility: CLINIC | Age: 67
End: 2022-10-03
Payer: MEDICARE

## 2022-10-03 VITALS
BODY MASS INDEX: 38.42 KG/M2 | RESPIRATION RATE: 16 BRPM | DIASTOLIC BLOOD PRESSURE: 84 MMHG | HEIGHT: 68 IN | HEART RATE: 68 BPM | TEMPERATURE: 97 F | SYSTOLIC BLOOD PRESSURE: 138 MMHG | WEIGHT: 253.5 LBS

## 2022-10-03 DIAGNOSIS — E78.2 MIXED HYPERLIPIDEMIA: ICD-10-CM

## 2022-10-03 DIAGNOSIS — N40.1 BPH WITH OBSTRUCTION/LOWER URINARY TRACT SYMPTOMS: ICD-10-CM

## 2022-10-03 DIAGNOSIS — N13.8 BPH WITH OBSTRUCTION/LOWER URINARY TRACT SYMPTOMS: ICD-10-CM

## 2022-10-03 DIAGNOSIS — Z12.11 COLON CANCER SCREENING: ICD-10-CM

## 2022-10-03 DIAGNOSIS — I10 ESSENTIAL HYPERTENSION: ICD-10-CM

## 2022-10-03 DIAGNOSIS — E11.9 TYPE 2 DIABETES MELLITUS WITHOUT COMPLICATION, WITHOUT LONG-TERM CURRENT USE OF INSULIN: Primary | ICD-10-CM

## 2022-10-03 DIAGNOSIS — I70.0 ATHEROSCLEROSIS OF AORTA: ICD-10-CM

## 2022-10-03 DIAGNOSIS — D12.6 ADENOMA OF COLON: ICD-10-CM

## 2022-10-03 PROCEDURE — 99999 PR PBB SHADOW E&M-EST. PATIENT-LVL IV: CPT | Mod: PBBFAC,,, | Performed by: INTERNAL MEDICINE

## 2022-10-03 PROCEDURE — G0008 FLU VACCINE - QUADRIVALENT - ADJUVANTED: ICD-10-PCS | Mod: S$GLB,,, | Performed by: INTERNAL MEDICINE

## 2022-10-03 PROCEDURE — 3075F SYST BP GE 130 - 139MM HG: CPT | Mod: CPTII,S$GLB,, | Performed by: INTERNAL MEDICINE

## 2022-10-03 PROCEDURE — 3288F FALL RISK ASSESSMENT DOCD: CPT | Mod: CPTII,S$GLB,, | Performed by: INTERNAL MEDICINE

## 2022-10-03 PROCEDURE — 3008F BODY MASS INDEX DOCD: CPT | Mod: CPTII,S$GLB,, | Performed by: INTERNAL MEDICINE

## 2022-10-03 PROCEDURE — 1101F PR PT FALLS ASSESS DOC 0-1 FALLS W/OUT INJ PAST YR: ICD-10-PCS | Mod: CPTII,S$GLB,, | Performed by: INTERNAL MEDICINE

## 2022-10-03 PROCEDURE — 3044F HG A1C LEVEL LT 7.0%: CPT | Mod: CPTII,S$GLB,, | Performed by: INTERNAL MEDICINE

## 2022-10-03 PROCEDURE — 99499 RISK ADDL DX/OHS AUDIT: ICD-10-PCS | Mod: S$GLB,,, | Performed by: INTERNAL MEDICINE

## 2022-10-03 PROCEDURE — 90694 VACC AIIV4 NO PRSRV 0.5ML IM: CPT | Mod: S$GLB,,, | Performed by: INTERNAL MEDICINE

## 2022-10-03 PROCEDURE — 1159F PR MEDICATION LIST DOCUMENTED IN MEDICAL RECORD: ICD-10-PCS | Mod: CPTII,S$GLB,, | Performed by: INTERNAL MEDICINE

## 2022-10-03 PROCEDURE — 99999 PR PBB SHADOW E&M-EST. PATIENT-LVL IV: ICD-10-PCS | Mod: PBBFAC,,, | Performed by: INTERNAL MEDICINE

## 2022-10-03 PROCEDURE — 3079F DIAST BP 80-89 MM HG: CPT | Mod: CPTII,S$GLB,, | Performed by: INTERNAL MEDICINE

## 2022-10-03 PROCEDURE — 3061F PR NEG MICROALBUMINURIA RESULT DOCUMENTED/REVIEW: ICD-10-PCS | Mod: CPTII,S$GLB,, | Performed by: INTERNAL MEDICINE

## 2022-10-03 PROCEDURE — 99499 UNLISTED E&M SERVICE: CPT | Mod: S$GLB,,, | Performed by: INTERNAL MEDICINE

## 2022-10-03 PROCEDURE — 1160F PR REVIEW ALL MEDS BY PRESCRIBER/CLIN PHARMACIST DOCUMENTED: ICD-10-PCS | Mod: CPTII,S$GLB,, | Performed by: INTERNAL MEDICINE

## 2022-10-03 PROCEDURE — 3079F PR MOST RECENT DIASTOLIC BLOOD PRESSURE 80-89 MM HG: ICD-10-PCS | Mod: CPTII,S$GLB,, | Performed by: INTERNAL MEDICINE

## 2022-10-03 PROCEDURE — 3066F NEPHROPATHY DOC TX: CPT | Mod: CPTII,S$GLB,, | Performed by: INTERNAL MEDICINE

## 2022-10-03 PROCEDURE — 99214 PR OFFICE/OUTPT VISIT, EST, LEVL IV, 30-39 MIN: ICD-10-PCS | Mod: 25,S$GLB,, | Performed by: INTERNAL MEDICINE

## 2022-10-03 PROCEDURE — 1159F MED LIST DOCD IN RCRD: CPT | Mod: CPTII,S$GLB,, | Performed by: INTERNAL MEDICINE

## 2022-10-03 PROCEDURE — 1160F RVW MEDS BY RX/DR IN RCRD: CPT | Mod: CPTII,S$GLB,, | Performed by: INTERNAL MEDICINE

## 2022-10-03 PROCEDURE — 99214 OFFICE O/P EST MOD 30 MIN: CPT | Mod: 25,S$GLB,, | Performed by: INTERNAL MEDICINE

## 2022-10-03 PROCEDURE — 3061F NEG MICROALBUMINURIA REV: CPT | Mod: CPTII,S$GLB,, | Performed by: INTERNAL MEDICINE

## 2022-10-03 PROCEDURE — G0008 ADMIN INFLUENZA VIRUS VAC: HCPCS | Mod: S$GLB,,, | Performed by: INTERNAL MEDICINE

## 2022-10-03 PROCEDURE — 3044F PR MOST RECENT HEMOGLOBIN A1C LEVEL <7.0%: ICD-10-PCS | Mod: CPTII,S$GLB,, | Performed by: INTERNAL MEDICINE

## 2022-10-03 PROCEDURE — 1126F PR PAIN SEVERITY QUANTIFIED, NO PAIN PRESENT: ICD-10-PCS | Mod: CPTII,S$GLB,, | Performed by: INTERNAL MEDICINE

## 2022-10-03 PROCEDURE — 1126F AMNT PAIN NOTED NONE PRSNT: CPT | Mod: CPTII,S$GLB,, | Performed by: INTERNAL MEDICINE

## 2022-10-03 PROCEDURE — 3008F PR BODY MASS INDEX (BMI) DOCUMENTED: ICD-10-PCS | Mod: CPTII,S$GLB,, | Performed by: INTERNAL MEDICINE

## 2022-10-03 PROCEDURE — 3075F PR MOST RECENT SYSTOLIC BLOOD PRESS GE 130-139MM HG: ICD-10-PCS | Mod: CPTII,S$GLB,, | Performed by: INTERNAL MEDICINE

## 2022-10-03 PROCEDURE — 3288F PR FALLS RISK ASSESSMENT DOCUMENTED: ICD-10-PCS | Mod: CPTII,S$GLB,, | Performed by: INTERNAL MEDICINE

## 2022-10-03 PROCEDURE — 1101F PT FALLS ASSESS-DOCD LE1/YR: CPT | Mod: CPTII,S$GLB,, | Performed by: INTERNAL MEDICINE

## 2022-10-03 PROCEDURE — 90694 FLU VACCINE - QUADRIVALENT - ADJUVANTED: ICD-10-PCS | Mod: S$GLB,,, | Performed by: INTERNAL MEDICINE

## 2022-10-03 PROCEDURE — 3066F PR DOCUMENTATION OF TREATMENT FOR NEPHROPATHY: ICD-10-PCS | Mod: CPTII,S$GLB,, | Performed by: INTERNAL MEDICINE

## 2022-10-03 NOTE — PROGRESS NOTES
Subjective:       Patient ID: Celso Hernandez is a 67 y.o. male.    Chief Complaint: Diabetes (4 ms w/ labs)    HPI  The patient presents for follow-up of medical conditions which include type 2 diabetes mellitus, hypertension, hyperlipidemia, and BPH.  The patient states he is feeling well.  He remains physically active around home.  States he is not sedentary but he is not as active as he used to be since he retired.  He does note some decrease in stamina for doing physical activities but he denies having shortness of breath.  No PND, orthopnea, or edema noted.  Blood sugar levels have ranged from .  No hypoglycemia has been noted.  He does not monitor blood pressures.    Review of Systems   Constitutional:  Negative for activity change, appetite change, fatigue and unexpected weight change.   Eyes:  Negative for visual disturbance.   Respiratory:  Negative for cough, chest tightness, shortness of breath and wheezing.    Cardiovascular:  Negative for chest pain, palpitations and leg swelling.   Gastrointestinal:  Negative for abdominal pain, blood in stool, nausea and vomiting.   Genitourinary:  Negative for dysuria, hematuria and urgency.   Musculoskeletal:  Negative for arthralgias, back pain, gait problem, joint swelling and myalgias.   Integumentary:  Negative for color change and rash.   Neurological:  Negative for dizziness, syncope, weakness, light-headedness, numbness and headaches.   Psychiatric/Behavioral:  Negative for sleep disturbance.           Physical Exam  Vitals and nursing note reviewed.   Constitutional:       General: He is not in acute distress.     Appearance: Normal appearance. He is well-developed.   HENT:      Head: Normocephalic and atraumatic.   Eyes:      General: No scleral icterus.     Extraocular Movements: Extraocular movements intact.      Conjunctiva/sclera: Conjunctivae normal.   Neck:      Thyroid: No thyromegaly.      Vascular: No JVD.   Cardiovascular:      Rate and  Rhythm: Normal rate and regular rhythm.      Heart sounds: Normal heart sounds. No murmur heard.    No gallop.   Pulmonary:      Effort: Pulmonary effort is normal. No respiratory distress.      Breath sounds: Normal breath sounds. No wheezing or rales.   Abdominal:      General: Bowel sounds are normal.      Palpations: Abdomen is soft. There is no mass.      Tenderness: There is no abdominal tenderness.   Musculoskeletal:         General: No tenderness. Normal range of motion.      Cervical back: Normal range of motion and neck supple.   Lymphadenopathy:      Cervical: No cervical adenopathy.   Skin:     General: Skin is warm and dry.      Findings: No rash.      Comments: No foot lesions are present.   Neurological:      Mental Status: He is alert and oriented to person, place, and time.      Cranial Nerves: No cranial nerve deficit.      Comments: Sensory examination is intact in both feet on monofilament testing.   Psychiatric:         Mood and Affect: Mood normal.         Behavior: Behavior normal.       Protective Sensation (w/ 10 gram monofilament):  Right: Intact  Left: Intact    Visual Inspection:  Normal -  Bilateral    Pedal Pulses:   Right: Present  Left: Present    Posterior tibialis:   Right:Present  Left: Present    Lab Visit on 09/26/2022   Component Date Value Ref Range Status    Sodium 09/26/2022 138  136 - 145 mmol/L Final    Potassium 09/26/2022 4.3  3.5 - 5.1 mmol/L Final    Chloride 09/26/2022 104  95 - 110 mmol/L Final    CO2 09/26/2022 24  23 - 29 mmol/L Final    Glucose 09/26/2022 103  70 - 110 mg/dL Final    BUN 09/26/2022 21  8 - 23 mg/dL Final    Creatinine 09/26/2022 1.2  0.5 - 1.4 mg/dL Final    Calcium 09/26/2022 9.8  8.7 - 10.5 mg/dL Final    Total Protein 09/26/2022 7.7  6.0 - 8.4 g/dL Final    Albumin 09/26/2022 3.6  3.5 - 5.2 g/dL Final    Total Bilirubin 09/26/2022 0.5  0.1 - 1.0 mg/dL Final    Comment: For infants and newborns, interpretation of results should be based  on  gestational age, weight and in agreement with clinical  observations.    Premature Infant recommended reference ranges:  Up to 24 hours.............<8.0 mg/dL  Up to 48 hours............<12.0 mg/dL  3-5 days..................<15.0 mg/dL  6-29 days.................<15.0 mg/dL      Alkaline Phosphatase 09/26/2022 95  55 - 135 U/L Final    AST 09/26/2022 38  10 - 40 U/L Final    Specimen slightly hemolyzed    ALT 09/26/2022 41  10 - 44 U/L Final    Anion Gap 09/26/2022 10  8 - 16 mmol/L Final    eGFR 09/26/2022 >60  >60 mL/min/1.73 m^2 Final    Cholesterol 09/26/2022 257 (H)  120 - 199 mg/dL Final    Comment: The National Cholesterol Education Program (NCEP) has set the  following guidelines (reference ranges) for Cholesterol:  Optimal.....................<200 mg/dL  Borderline High.............200-239 mg/dL  High........................> or = 240 mg/dL      Triglycerides 09/26/2022 199 (H)  30 - 150 mg/dL Final    Comment: The National Cholesterol Education Program (NCEP) has set the  following guidelines (reference values) for triglycerides:  Normal......................<150 mg/dL  Borderline High.............150-199 mg/dL  High........................200-499 mg/dL      HDL 09/26/2022 35 (L)  40 - 75 mg/dL Final    Comment: The National Cholesterol Education Program (NCEP) has set the  following guidelines (reference values) for HDL Cholesterol:  Low...............<40 mg/dL  Optimal...........>60 mg/dL      LDL Cholesterol 09/26/2022 182.2 (H)  63.0 - 159.0 mg/dL Final    Comment: The National Cholesterol Education Program (NCEP) has set the  following guidelines (reference values) for LDL Cholesterol:  Optimal.......................<130 mg/dL  Borderline High...............130-159 mg/dL  High..........................160-189 mg/dL  Very High.....................>190 mg/dL      HDL/Cholesterol Ratio 09/26/2022 13.6 (L)  20.0 - 50.0 % Final    Total Cholesterol/HDL Ratio 09/26/2022 7.3 (H)  2.0 - 5.0 Final    Non-HDL  Cholesterol 09/26/2022 222  mg/dL Final    Comment: Risk category and Non-HDL cholesterol goals:  Coronary heart disease (CHD)or equivalent (10-year risk of CHD >20%):  Non-HDL cholesterol goal     <130 mg/dL  Two or more CHD risk factors and 10-year risk of CHD <= 20%:  Non-HDL cholesterol goal     <160 mg/dL  0 to 1 CHD risk factor:  Non-HDL cholesterol goal     <190 mg/dL      PSA, Screen 09/26/2022 0.98  0.00 - 4.00 ng/mL Final    Comment: The testing method is a chemiluminescent microparticle immunoassay   manufactured by [employer] and performed on the    or   Webs[employer] system. Values obtained with different assay manufacturers   for   methods may be different and cannot be used interchangeably.  PSA Expected levels:  Hormonal Therapy: <0.05 ng/ml  Prostatectomy: <0.01 ng/ml  Radiation Therapy: <1.00 ng/ml      Hemoglobin A1C 09/26/2022 5.4  4.0 - 5.6 % Final    Comment: ADA Screening Guidelines:  5.7-6.4%  Consistent with prediabetes  >or=6.5%  Consistent with diabetes    High levels of fetal hemoglobin interfere with the HbA1C  assay. Heterozygous hemoglobin variants (HbS, HgC, etc)do  not significantly interfere with this assay.   However, presence of multiple variants may affect accuracy.      Estimated Avg Glucose 09/26/2022 108  68 - 131 mg/dL Final    Specimen UA 09/26/2022 Urine, Clean Catch   Final    Color, UA 09/26/2022 Yellow  Yellow, Straw, [place] Final    Appearance, UA 09/26/2022 Clear  Clear Final    pH, UA 09/26/2022 5.0  5.0 - 8.0 Final    Specific Gravity, UA 09/26/2022 1.020  1.005 - 1.030 Final    Protein, UA 09/26/2022 Negative  Negative Final    Comment: Recommend a 24 hour urine protein or a urine   protein/creatinine ratio if globulin induced proteinuria is  clinically suspected.      Glucose, UA 09/26/2022 Negative  Negative Final    Ketones, UA 09/26/2022 Negative  Negative Final    Bilirubin (UA) 09/26/2022 Negative  Negative Final    Occult Blood UA 09/26/2022  Negative  Negative Final    Nitrite, UA 09/26/2022 Negative  Negative Final    Urobilinogen, UA 09/26/2022 Negative  <2.0 EU/dL Final    Leukocytes, UA 09/26/2022 1+ (A)  Negative Final    Microalbumin, Urine 09/26/2022 19.0  ug/mL Final    Creatinine, Urine 09/26/2022 129.0  23.0 - 375.0 mg/dL Final    Microalb/Creat Ratio 09/26/2022 14.7  0.0 - 30.0 ug/mg Final    RBC, UA 09/26/2022 1  0 - 4 /hpf Final    WBC, UA 09/26/2022 9 (H)  0 - 5 /hpf Final    Squam Epithel, UA 09/26/2022 0  /hpf Final    Hyaline Casts, UA 09/26/2022 1  0-1/lpf /lpf Final    Microscopic Comment 09/26/2022 SEE COMMENT   Final    Comment: Other formed elements not mentioned in the report are not   present in the microscopic examination.          Assessment & Plan:      Celso was seen today for diabetes.  The patient was reminded that atorvastatin should be taken daily.  He denies any interruption in therapy.  We discussed addition of Zetia to current therapy or switching to Crestor in an effort to better lower the LDL cholesterol level.  He has been encouraged to increase his exercise level and to lose weight.  Current therapy will be continued.  Fasting blood tests will be repeated in 6 months.    Diagnoses and all orders for this visit:    Type 2 diabetes mellitus without complication, without long-term current use of insulin    Essential hypertension    Mixed hyperlipidemia    Atherosclerosis of aorta    BPH with obstruction/lower urinary tract symptoms    Adenoma of colon  -     Ambulatory referral/consult to Endo Procedure ; Future    Colon cancer screening  -     Ambulatory referral/consult to Endo Procedure ; Future       Follow up in about 6 months (around 4/3/2023).     Inder Quarles MD

## 2022-12-28 ENCOUNTER — CLINICAL SUPPORT (OUTPATIENT)
Dept: OPHTHALMOLOGY | Facility: CLINIC | Age: 67
End: 2022-12-28
Payer: MEDICARE

## 2022-12-28 ENCOUNTER — OFFICE VISIT (OUTPATIENT)
Dept: OPTOMETRY | Facility: CLINIC | Age: 67
End: 2022-12-28
Payer: MEDICARE

## 2022-12-28 DIAGNOSIS — H25.13 SENILE NUCLEAR SCLEROSIS, BILATERAL: ICD-10-CM

## 2022-12-28 DIAGNOSIS — H40.1132 PRIMARY OPEN ANGLE GLAUCOMA (POAG) OF BOTH EYES, MODERATE STAGE: Primary | ICD-10-CM

## 2022-12-28 PROCEDURE — 92133 CPTRZD OPH DX IMG PST SGM ON: CPT | Mod: S$GLB,,, | Performed by: OPTOMETRIST

## 2022-12-28 PROCEDURE — 99999 PR PBB SHADOW E&M-EST. PATIENT-LVL III: ICD-10-PCS | Mod: PBBFAC,,, | Performed by: OPTOMETRIST

## 2022-12-28 PROCEDURE — 92012 INTRM OPH EXAM EST PATIENT: CPT | Mod: S$GLB,,, | Performed by: OPTOMETRIST

## 2022-12-28 PROCEDURE — 92083 EXTENDED VISUAL FIELD XM: CPT | Mod: S$GLB,,, | Performed by: OPTOMETRIST

## 2022-12-28 PROCEDURE — 3061F PR NEG MICROALBUMINURIA RESULT DOCUMENTED/REVIEW: ICD-10-PCS | Mod: CPTII,S$GLB,, | Performed by: OPTOMETRIST

## 2022-12-28 PROCEDURE — 92133 POSTERIOR SEGMENT OCT OPTIC NERVE(OCULAR COHERENCE TOMOGRAPHY) - OU - BOTH EYES: ICD-10-PCS | Mod: S$GLB,,, | Performed by: OPTOMETRIST

## 2022-12-28 PROCEDURE — 1160F RVW MEDS BY RX/DR IN RCRD: CPT | Mod: CPTII,S$GLB,, | Performed by: OPTOMETRIST

## 2022-12-28 PROCEDURE — 3066F PR DOCUMENTATION OF TREATMENT FOR NEPHROPATHY: ICD-10-PCS | Mod: CPTII,S$GLB,, | Performed by: OPTOMETRIST

## 2022-12-28 PROCEDURE — 1159F MED LIST DOCD IN RCRD: CPT | Mod: CPTII,S$GLB,, | Performed by: OPTOMETRIST

## 2022-12-28 PROCEDURE — 1126F PR PAIN SEVERITY QUANTIFIED, NO PAIN PRESENT: ICD-10-PCS | Mod: CPTII,S$GLB,, | Performed by: OPTOMETRIST

## 2022-12-28 PROCEDURE — 3044F PR MOST RECENT HEMOGLOBIN A1C LEVEL <7.0%: ICD-10-PCS | Mod: CPTII,S$GLB,, | Performed by: OPTOMETRIST

## 2022-12-28 PROCEDURE — 3288F PR FALLS RISK ASSESSMENT DOCUMENTED: ICD-10-PCS | Mod: CPTII,S$GLB,, | Performed by: OPTOMETRIST

## 2022-12-28 PROCEDURE — 3066F NEPHROPATHY DOC TX: CPT | Mod: CPTII,S$GLB,, | Performed by: OPTOMETRIST

## 2022-12-28 PROCEDURE — 99999 PR PBB SHADOW E&M-EST. PATIENT-LVL III: CPT | Mod: PBBFAC,,, | Performed by: OPTOMETRIST

## 2022-12-28 PROCEDURE — 1160F PR REVIEW ALL MEDS BY PRESCRIBER/CLIN PHARMACIST DOCUMENTED: ICD-10-PCS | Mod: CPTII,S$GLB,, | Performed by: OPTOMETRIST

## 2022-12-28 PROCEDURE — 92083 HUMPHREY VISUAL FIELD - OU - BOTH EYES: ICD-10-PCS | Mod: S$GLB,,, | Performed by: OPTOMETRIST

## 2022-12-28 PROCEDURE — 1101F PR PT FALLS ASSESS DOC 0-1 FALLS W/OUT INJ PAST YR: ICD-10-PCS | Mod: CPTII,S$GLB,, | Performed by: OPTOMETRIST

## 2022-12-28 PROCEDURE — 1101F PT FALLS ASSESS-DOCD LE1/YR: CPT | Mod: CPTII,S$GLB,, | Performed by: OPTOMETRIST

## 2022-12-28 PROCEDURE — 1126F AMNT PAIN NOTED NONE PRSNT: CPT | Mod: CPTII,S$GLB,, | Performed by: OPTOMETRIST

## 2022-12-28 PROCEDURE — 92012 PR EYE EXAM, EST PATIENT,INTERMED: ICD-10-PCS | Mod: S$GLB,,, | Performed by: OPTOMETRIST

## 2022-12-28 PROCEDURE — 3061F NEG MICROALBUMINURIA REV: CPT | Mod: CPTII,S$GLB,, | Performed by: OPTOMETRIST

## 2022-12-28 PROCEDURE — 3288F FALL RISK ASSESSMENT DOCD: CPT | Mod: CPTII,S$GLB,, | Performed by: OPTOMETRIST

## 2022-12-28 PROCEDURE — 3044F HG A1C LEVEL LT 7.0%: CPT | Mod: CPTII,S$GLB,, | Performed by: OPTOMETRIST

## 2022-12-28 PROCEDURE — 1159F PR MEDICATION LIST DOCUMENTED IN MEDICAL RECORD: ICD-10-PCS | Mod: CPTII,S$GLB,, | Performed by: OPTOMETRIST

## 2022-12-28 RX ORDER — SULFAMETHOXAZOLE AND TRIMETHOPRIM 800; 160 MG/1; MG/1
TABLET ORAL
COMMUNITY
End: 2023-03-22

## 2022-12-28 RX ORDER — HYDROCHLOROTHIAZIDE 12.5 MG/1
TABLET ORAL
COMMUNITY
End: 2023-03-22

## 2022-12-28 RX ORDER — TRAMADOL HYDROCHLORIDE 50 MG/1
TABLET ORAL
COMMUNITY
End: 2023-03-22

## 2022-12-28 RX ORDER — CEPHALEXIN 500 MG/1
CAPSULE ORAL
COMMUNITY
End: 2023-03-22

## 2022-12-28 RX ORDER — HYDROCODONE BITARTRATE AND ACETAMINOPHEN 5; 325 MG/1; MG/1
TABLET ORAL
COMMUNITY
End: 2023-03-22

## 2022-12-28 RX ORDER — AZITHROMYCIN 250 MG/1
250 TABLET, FILM COATED ORAL
COMMUNITY
Start: 2022-11-17 | End: 2023-03-22

## 2022-12-28 RX ORDER — IBUPROFEN 800 MG/1
TABLET ORAL
COMMUNITY
Start: 2022-11-17 | End: 2023-03-22

## 2022-12-28 RX ORDER — METHYLPREDNISOLONE 4 MG/1
TABLET ORAL
COMMUNITY
Start: 2022-11-17 | End: 2023-10-03

## 2022-12-28 RX ORDER — NEBIVOLOL 5 MG/1
TABLET ORAL
COMMUNITY
End: 2023-03-22

## 2022-12-28 NOTE — PROGRESS NOTES
Hvf done ou. Rel/fix/coop. Good ou./chart checked for latex allergy. -1.00 + .75 x 172/od -1.00 + 1.00 x 180/os-h

## 2022-12-28 NOTE — PROGRESS NOTES
TAYLOR PARKER: 08/22  Chief complaint (CC): Patient is here for 4 month follow up with HVF,OCT   and IOP check today.  Using Latanoprost OU Q HS, last used  last   night.Patient notices it's harder to see things without his glasses and is   having more trouble with contrast with colors that are close together.    Glasses are helping.   Last edited by Andrea Bradley, OD on 12/28/2022 10:24 AM.            Assessment /Plan     For exam results, see Encounter Report.    Primary open angle glaucoma (POAG) of both eyes, moderate stage  -     Blake Visual Field - OU - Extended - Both Eyes  -     Posterior Segment OCT Optic Nerve- Both eyes    Senile nuclear sclerosis, bilateral      (-) FHx. IOp 15 OD, OS. Last 20 OD, OS. Pt is taking Latanoprost QAM and Cosopt bid OU but only 6 hours apart. Tmax  22 OD, 23 OS.T min on drops 12 OD, 13 OS.  Ptfailed to f/u from 12/17/2019 to 9/16/2020 and 6/2021 to 3/11/2022. Pt reports noncompliance w/drops. Pt reports compliance w/eyedrops Prev pt of Dr Huffman. C/d 0.55 OD, 0.65 OS.   12/28/2022  HVF OD inf arcuate, OS sup arcuate  12/28/2022 OCT OD, thin NS, TS, T, TI, General (Borderline N). OS thin TS, TI, T, General (Borderline N and NI).   6/3/2021 OCT OD NS, TS, T, TI and G, OS thin TS, T, TI, G, borderline NI (stable OU)  3/11/2022 Photos  Educated pt on findings w/understanding.   Need for IOp to be lower. Pt reports compliance but states that his spacing in the drops times may be off.  Cont Latanoprost QHS OU.  D/c Timolol BiD OU on 12/15/2020  Cont Cosopt BID OU (start 12/15/2020)  Importance of drop compliance and f/u discussed with pt.   RTC 4 mo Routine    Differences in contrast likely due to cataract. Pt also complains of night driving issues. Pt would like to defer referral.

## 2023-01-23 ENCOUNTER — PES CALL (OUTPATIENT)
Dept: ADMINISTRATIVE | Facility: CLINIC | Age: 68
End: 2023-01-23
Payer: MEDICARE

## 2023-02-16 ENCOUNTER — TELEPHONE (OUTPATIENT)
Dept: FAMILY MEDICINE | Facility: CLINIC | Age: 68
End: 2023-02-16
Payer: MEDICARE

## 2023-03-17 PROBLEM — N39.0 SEPSIS DUE TO URINARY TRACT INFECTION: Status: RESOLVED | Noted: 2020-01-29 | Resolved: 2023-03-17

## 2023-03-17 PROBLEM — A41.9 SEPSIS DUE TO URINARY TRACT INFECTION: Status: RESOLVED | Noted: 2020-01-29 | Resolved: 2023-03-17

## 2023-03-17 RX ORDER — AZELASTINE 1 MG/ML
SPRAY, METERED NASAL
COMMUNITY
Start: 2023-02-09 | End: 2023-10-03

## 2023-03-17 NOTE — PROGRESS NOTES
Celso Hernandez presented for a  Medicare AWV and comprehensive Health Risk Assessment today. The following components were reviewed and updated:    Medical history  Family History  Social history  Allergies and Current Medications  Health Risk Assessment  Health Maintenance  Care Team         ** See Completed Assessments for Annual Wellness Visit within the encounter summary.**         The following assessments were completed:  Living Situation  CAGE  Depression Screening  Timed Get Up and Go  Whisper Test  Cognitive Function Screening          Nutrition Screening  ADL Screening  PAQ Screening      Review for Opioid Screening: Pt does not have Rx for Opioids      Review for Substance Use Disorders: Patient does not use substance        Current Outpatient Medications:     atorvastatin (LIPITOR) 80 MG tablet, Take 1 tablet by mouth once daily, Disp: 90 tablet, Rfl: 2    azelastine (ASTELIN) 137 mcg (0.1 %) nasal spray, SMARTSIG:Both Nares, Disp: , Rfl:     dorzolamide-timolol 2-0.5% (COSOPT) 22.3-6.8 mg/mL ophthalmic solution, INSTILL 1 DROP INTO EACH EYE TWICE DAILY, Disp: 30 mL, Rfl: 3    hydrALAZINE (APRESOLINE) 50 MG tablet, TAKE 1 TABLET BY MOUTH EVERY 12 HOURS FOR BLOOD PRESSURE CONTROL, Disp: 180 tablet, Rfl: 0    labetaloL (NORMODYNE) 200 MG tablet, Take 1 tablet (200 mg total) by mouth 2 (two) times daily. For blood pressure control., Disp: 60 tablet, Rfl: 11    latanoprost 0.005 % ophthalmic solution, INSTILL 1 DROP INTO EACH EYE IN THE EVENING, Disp: 7.5 mL, Rfl: 3    losartan-hydrochlorothiazide 100-12.5 mg (HYZAAR) 100-12.5 mg Tab, TAKE 1 TABLET BY MOUTH ONCE DAILY FOR HIGH BLOOD PRESSURE, Disp: 90 tablet, Rfl: 3    meloxicam (MOBIC) 15 MG tablet, Take 1 tablet by mouth once daily, Disp: 90 tablet, Rfl: 0    metFORMIN (GLUCOPHAGE) 500 MG tablet, Take 1 tablet (500 mg total) by mouth daily with breakfast., Disp: 90 tablet, Rfl: 3    methylPREDNISolone (MEDROL DOSEPACK) 4 mg tablet, Take by mouth., Disp: ,  "Rfl:     multivit-min-FA-lycopen-lutein (CENTRUM SILVER MEN) 300-600-300 mcg Tab, Take 1 tablet by mouth once daily., Disp: 100 tablet, Rfl: 3       Vitals:    03/22/23 0904   BP: (!) 166/84   Pulse: 78   SpO2: 96%   Weight: 118.8 kg (262 lb)   Height: 5' 8" (1.727 m)   PainSc: 0-No pain      Physical Exam  Vitals and nursing note reviewed.   Constitutional:       General: He is not in acute distress.     Appearance: Normal appearance. He is obese. He is not ill-appearing.   HENT:      Head: Normocephalic and atraumatic.   Eyes:      General: No scleral icterus.        Right eye: No discharge.         Left eye: No discharge.      Extraocular Movements: Extraocular movements intact.      Conjunctiva/sclera: Conjunctivae normal.      Comments: +glasses   Cardiovascular:      Rate and Rhythm: Normal rate and regular rhythm.      Heart sounds: Normal heart sounds. No murmur heard.  Pulmonary:      Effort: Pulmonary effort is normal. No respiratory distress.      Breath sounds: Normal breath sounds. No wheezing, rhonchi or rales.   Musculoskeletal:      Cervical back: Normal range of motion.      Right lower leg: No edema.      Left lower leg: No edema.   Skin:     General: Skin is warm and dry.      Findings: No rash.   Neurological:      Mental Status: He is alert and oriented to person, place, and time.   Psychiatric:         Mood and Affect: Mood normal.         Behavior: Behavior normal. Behavior is cooperative.         Cognition and Memory: Cognition and memory normal.             Diagnoses and health risks identified today and associated recommendations/orders:    1. Encounter for preventive health examination  - Chart reviewed. Problem list updated. Discussed current medical diagnosis, current medications, medical/surgical/family/social history; updated provider list; documented vital signs; identified any cognitive impairment; and updated risk factor list. Addressed any outstanding health maintenance. Provided " patient with personalized health advice. Continue to follow up with PCP and any specialists.       2. Hypertension associated with type 2 diabetes mellitus  Chronic; stable on current treatment plan; follow up with PCP  - recommend low sodium diet   - BP elevated today, asymptomatic ; patient did NOT take any BP medications today, he usually takes them later in day  - discussed taking BP medications in AM upon awakening  - recommend checking home Bps, most recent BP on file were within normal range  - has follow up appt with PCP in 2 weeks     3. Type 2 diabetes mellitus with cataract  Chronic; stable on current treatment plan; follow up with PCP  - follows with ophthalmology     4. Severe obesity (BMI 35.0-39.9) with comorbidity  - Recommendation for healthy diet and increasing exercise as tolerated with goal of 150min/week . Recommend weight loss    5. Atherosclerosis of aorta  Chronic; stable on current treatment plan; follow up with PCP  - taking statin     6. Type 2 diabetes mellitus with hyperlipidemia  Chronic; stable on current treatment plan; follow up with PCP  - taking statin     7. Vitamin D deficiency  Chronic; stable on current treatment plan; follow up with PCP  - taking multivitamin    8. Chronic prostatitis  Chronic; stable on current treatment plan; follow up with PCP  - follows with urology    9. BPH with obstruction/lower urinary tract symptoms  Chronic; stable on current treatment plan; follow up with PCP  - follows with urology     10. DDD (degenerative disc disease), thoracic  Chronic; stable on current treatment plan; follow up with PCP  - follows with pain management     11. Primary osteoarthritis of left knee  Chronic; stable on current treatment plan; follow up with PCP  - follows with pain management    12. Screening for malignant neoplasm of colon  - due for colonoscopy, ordered today   - Case Request Endoscopy: COLONOSCOPY      Provided Celso with a 5-10 year written screening schedule  and personal prevention plan. Recommendations were developed using the USPSTF age appropriate recommendations. Education, counseling, and referrals were provided as needed. After Visit Summary printed and given to patient which includes a list of additional screenings\tests needed.    Follow up in about 1 year (around 3/22/2024) for your next annual wellness visit.    Stefania Negrete, FNP-C    Advance Care Planning     I offered to discuss advanced care planning, including how to pick a person who would make decisions for you if you were unable to make them for yourself, called a health care power of , and what kind of decisions you might make such as use of life sustaining treatments such as ventilators and tube feeding when faced with a life limiting illness recorded on a living will that they will need to know. (How you want to be cared for as you near the end of your natural life)     X Patient is interested in learning more about how to make advanced directives.  I provided them paperwork and offered to discuss this with them.

## 2023-03-22 ENCOUNTER — OFFICE VISIT (OUTPATIENT)
Dept: FAMILY MEDICINE | Facility: CLINIC | Age: 68
End: 2023-03-22
Payer: MEDICARE

## 2023-03-22 VITALS
BODY MASS INDEX: 39.71 KG/M2 | WEIGHT: 262 LBS | SYSTOLIC BLOOD PRESSURE: 166 MMHG | HEIGHT: 68 IN | OXYGEN SATURATION: 96 % | DIASTOLIC BLOOD PRESSURE: 84 MMHG | HEART RATE: 78 BPM

## 2023-03-22 DIAGNOSIS — E11.69 TYPE 2 DIABETES MELLITUS WITH HYPERLIPIDEMIA: ICD-10-CM

## 2023-03-22 DIAGNOSIS — M51.34 DDD (DEGENERATIVE DISC DISEASE), THORACIC: ICD-10-CM

## 2023-03-22 DIAGNOSIS — E11.59 HYPERTENSION ASSOCIATED WITH TYPE 2 DIABETES MELLITUS: ICD-10-CM

## 2023-03-22 DIAGNOSIS — M17.12 PRIMARY OSTEOARTHRITIS OF LEFT KNEE: Chronic | ICD-10-CM

## 2023-03-22 DIAGNOSIS — N40.1 BPH WITH OBSTRUCTION/LOWER URINARY TRACT SYMPTOMS: ICD-10-CM

## 2023-03-22 DIAGNOSIS — E11.36 TYPE 2 DIABETES MELLITUS WITH CATARACT: ICD-10-CM

## 2023-03-22 DIAGNOSIS — I70.0 ATHEROSCLEROSIS OF AORTA: ICD-10-CM

## 2023-03-22 DIAGNOSIS — E66.01 SEVERE OBESITY (BMI 35.0-39.9) WITH COMORBIDITY: Chronic | ICD-10-CM

## 2023-03-22 DIAGNOSIS — E55.9 VITAMIN D DEFICIENCY: Chronic | ICD-10-CM

## 2023-03-22 DIAGNOSIS — N41.1 CHRONIC PROSTATITIS: Chronic | ICD-10-CM

## 2023-03-22 DIAGNOSIS — I15.2 HYPERTENSION ASSOCIATED WITH TYPE 2 DIABETES MELLITUS: ICD-10-CM

## 2023-03-22 DIAGNOSIS — N13.8 BPH WITH OBSTRUCTION/LOWER URINARY TRACT SYMPTOMS: ICD-10-CM

## 2023-03-22 DIAGNOSIS — Z12.11 SCREENING FOR MALIGNANT NEOPLASM OF COLON: ICD-10-CM

## 2023-03-22 DIAGNOSIS — E78.5 TYPE 2 DIABETES MELLITUS WITH HYPERLIPIDEMIA: ICD-10-CM

## 2023-03-22 DIAGNOSIS — Z00.00 ENCOUNTER FOR PREVENTIVE HEALTH EXAMINATION: Primary | ICD-10-CM

## 2023-03-22 PROCEDURE — G0439 PR MEDICARE ANNUAL WELLNESS SUBSEQUENT VISIT: ICD-10-PCS | Mod: S$GLB,,, | Performed by: NURSE PRACTITIONER

## 2023-03-22 PROCEDURE — 3077F SYST BP >= 140 MM HG: CPT | Mod: CPTII,S$GLB,, | Performed by: NURSE PRACTITIONER

## 2023-03-22 PROCEDURE — 1126F AMNT PAIN NOTED NONE PRSNT: CPT | Mod: CPTII,S$GLB,, | Performed by: NURSE PRACTITIONER

## 2023-03-22 PROCEDURE — 99999 PR PBB SHADOW E&M-EST. PATIENT-LVL IV: ICD-10-PCS | Mod: PBBFAC,,, | Performed by: NURSE PRACTITIONER

## 2023-03-22 PROCEDURE — 1160F PR REVIEW ALL MEDS BY PRESCRIBER/CLIN PHARMACIST DOCUMENTED: ICD-10-PCS | Mod: CPTII,S$GLB,, | Performed by: NURSE PRACTITIONER

## 2023-03-22 PROCEDURE — 3077F PR MOST RECENT SYSTOLIC BLOOD PRESSURE >= 140 MM HG: ICD-10-PCS | Mod: CPTII,S$GLB,, | Performed by: NURSE PRACTITIONER

## 2023-03-22 PROCEDURE — 1160F RVW MEDS BY RX/DR IN RCRD: CPT | Mod: CPTII,S$GLB,, | Performed by: NURSE PRACTITIONER

## 2023-03-22 PROCEDURE — 1126F PR PAIN SEVERITY QUANTIFIED, NO PAIN PRESENT: ICD-10-PCS | Mod: CPTII,S$GLB,, | Performed by: NURSE PRACTITIONER

## 2023-03-22 PROCEDURE — G0439 PPPS, SUBSEQ VISIT: HCPCS | Mod: S$GLB,,, | Performed by: NURSE PRACTITIONER

## 2023-03-22 PROCEDURE — 1170F PR FUNCTIONAL STATUS ASSESSED: ICD-10-PCS | Mod: CPTII,S$GLB,, | Performed by: NURSE PRACTITIONER

## 2023-03-22 PROCEDURE — 3008F BODY MASS INDEX DOCD: CPT | Mod: CPTII,S$GLB,, | Performed by: NURSE PRACTITIONER

## 2023-03-22 PROCEDURE — 3079F DIAST BP 80-89 MM HG: CPT | Mod: CPTII,S$GLB,, | Performed by: NURSE PRACTITIONER

## 2023-03-22 PROCEDURE — 1101F PR PT FALLS ASSESS DOC 0-1 FALLS W/OUT INJ PAST YR: ICD-10-PCS | Mod: CPTII,S$GLB,, | Performed by: NURSE PRACTITIONER

## 2023-03-22 PROCEDURE — 1170F FXNL STATUS ASSESSED: CPT | Mod: CPTII,S$GLB,, | Performed by: NURSE PRACTITIONER

## 2023-03-22 PROCEDURE — 1159F MED LIST DOCD IN RCRD: CPT | Mod: CPTII,S$GLB,, | Performed by: NURSE PRACTITIONER

## 2023-03-22 PROCEDURE — 1159F PR MEDICATION LIST DOCUMENTED IN MEDICAL RECORD: ICD-10-PCS | Mod: CPTII,S$GLB,, | Performed by: NURSE PRACTITIONER

## 2023-03-22 PROCEDURE — 3072F PR LOW RISK FOR RETINOPATHY: ICD-10-PCS | Mod: CPTII,S$GLB,, | Performed by: NURSE PRACTITIONER

## 2023-03-22 PROCEDURE — 3288F FALL RISK ASSESSMENT DOCD: CPT | Mod: CPTII,S$GLB,, | Performed by: NURSE PRACTITIONER

## 2023-03-22 PROCEDURE — 3288F PR FALLS RISK ASSESSMENT DOCUMENTED: ICD-10-PCS | Mod: CPTII,S$GLB,, | Performed by: NURSE PRACTITIONER

## 2023-03-22 PROCEDURE — 3072F LOW RISK FOR RETINOPATHY: CPT | Mod: CPTII,S$GLB,, | Performed by: NURSE PRACTITIONER

## 2023-03-22 PROCEDURE — 3079F PR MOST RECENT DIASTOLIC BLOOD PRESSURE 80-89 MM HG: ICD-10-PCS | Mod: CPTII,S$GLB,, | Performed by: NURSE PRACTITIONER

## 2023-03-22 PROCEDURE — 99999 PR PBB SHADOW E&M-EST. PATIENT-LVL IV: CPT | Mod: PBBFAC,,, | Performed by: NURSE PRACTITIONER

## 2023-03-22 PROCEDURE — 3008F PR BODY MASS INDEX (BMI) DOCUMENTED: ICD-10-PCS | Mod: CPTII,S$GLB,, | Performed by: NURSE PRACTITIONER

## 2023-03-22 PROCEDURE — 1101F PT FALLS ASSESS-DOCD LE1/YR: CPT | Mod: CPTII,S$GLB,, | Performed by: NURSE PRACTITIONER

## 2023-03-22 NOTE — TELEPHONE ENCOUNTER
----- Message from Marce Healy sent at 1/22/2019 12:10 PM CST -----  Contact: Pt Mobile/Home [phone]   Patient is calling in regards to him saying that he spoke with you about him suppose to be scheduled for a colonoscopy. He would like a call back to see what is the status on it please.   Called patient, spoke to daughter and provided results as reported by provider. No questions or concerns at this time. Patient will follow up as scheduled 06/2023.

## 2023-03-22 NOTE — Clinical Note
Good morning, just fyi. I saw your patient today for medicare wellness visit. His BP was elevated 166/84. He did not take any BP medications today. Stated he usually takes med around noon. I advised taking medicaiton in AM upon awakening. He is asymptomatic. He has follow up appt with you in 2 weeks. thanks

## 2023-03-22 NOTE — PATIENT INSTRUCTIONS
Colonoscopy Phone Numbers  Please call [phone] to schedule your colonoscopy at [facility] location.   If you prefer [facility] on [facility], please call [phone] to schedule.   [facility] location can be reached at [phone] if this location is preferred.  Please Call  [phone] to schedule this procedure at [facility] .              Counseling and Referral of Other Preventative  (Italic type indicates deductible and co-insurance are waived)    Patient Name: Celso Hernandez  Today's Date: 3/22/2023    Health Maintenance         Date Due Completion Date    Colorectal Cancer Screening 10/08/2022 10/8/2019    Hemoglobin A1c 03/26/2023 9/26/2022    Shingles Vaccine (1 of 2) 06/21/2023 (Originally 7/16/2004) ---    COVID-19 Vaccine (4 - Booster for Pfizer series) 03/17/2024 (Originally 2/15/2022) 12/21/2021    PROSTATE-SPECIFIC ANTIGEN 09/26/2023 9/26/2022    Diabetes Urine Screening 09/26/2023 9/26/2022    Lipid Panel 09/26/2023 9/26/2022    Foot Exam 10/03/2023 10/3/2022    Eye Exam 12/28/2023 12/28/2022    High Dose Statin 03/22/2024 3/22/2023    TETANUS VACCINE 06/20/2026 6/20/2016          No orders of the defined types were placed in this encounter.      The following information is provided to all patients.  This information is to help you find resources for any of the problems found today that may be affecting your health:                Living healthy guide: www.[facility].gov      Understanding Diabetes: www.diabetes.org      Eating healthy: www.cdc.gov/healthyweight      CDC home safety checklist: www.cdc.gov/steadi/patient.html      Agency on Aging: www.goea.[place].gov      Alcoholics anonymous (AA): www.aa.org      Physical Activity: www.dougie.nih.gov/ox3ojcd      Tobacco use: www.quitwithusla.org

## 2023-03-27 ENCOUNTER — LAB VISIT (OUTPATIENT)
Dept: LAB | Facility: HOSPITAL | Age: 68
End: 2023-03-27
Payer: MEDICARE

## 2023-03-27 DIAGNOSIS — I70.0 ATHEROSCLEROSIS OF AORTA: ICD-10-CM

## 2023-03-27 DIAGNOSIS — E11.9 TYPE 2 DIABETES MELLITUS WITHOUT COMPLICATION, WITHOUT LONG-TERM CURRENT USE OF INSULIN: ICD-10-CM

## 2023-03-27 DIAGNOSIS — I10 ESSENTIAL HYPERTENSION: ICD-10-CM

## 2023-03-27 DIAGNOSIS — E78.2 MIXED HYPERLIPIDEMIA: ICD-10-CM

## 2023-03-27 LAB
ALBUMIN SERPL BCP-MCNC: 3.6 G/DL (ref 3.5–5.2)
ALP SERPL-CCNC: 68 U/L (ref 55–135)
ALT SERPL W/O P-5'-P-CCNC: 47 U/L (ref 10–44)
ANION GAP SERPL CALC-SCNC: 11 MMOL/L (ref 8–16)
AST SERPL-CCNC: 28 U/L (ref 10–40)
BASOPHILS # BLD AUTO: 0.06 K/UL (ref 0–0.2)
BASOPHILS NFR BLD: 0.4 % (ref 0–1.9)
BILIRUB SERPL-MCNC: 0.6 MG/DL (ref 0.1–1)
BUN SERPL-MCNC: 26 MG/DL (ref 8–23)
CALCIUM SERPL-MCNC: 9.7 MG/DL (ref 8.7–10.5)
CHLORIDE SERPL-SCNC: 102 MMOL/L (ref 95–110)
CHOLEST SERPL-MCNC: 225 MG/DL (ref 120–199)
CHOLEST/HDLC SERPL: 5 {RATIO} (ref 2–5)
CO2 SERPL-SCNC: 23 MMOL/L (ref 23–29)
CREAT SERPL-MCNC: 1.2 MG/DL (ref 0.5–1.4)
DIFFERENTIAL METHOD: ABNORMAL
EOSINOPHIL # BLD AUTO: 0.2 K/UL (ref 0–0.5)
EOSINOPHIL NFR BLD: 0.9 % (ref 0–8)
ERYTHROCYTE [DISTWIDTH] IN BLOOD BY AUTOMATED COUNT: 13.8 % (ref 11.5–14.5)
EST. GFR  (NO RACE VARIABLE): >60 ML/MIN/1.73 M^2
ESTIMATED AVG GLUCOSE: 111 MG/DL (ref 68–131)
GLUCOSE SERPL-MCNC: 136 MG/DL (ref 70–110)
HBA1C MFR BLD: 5.5 % (ref 4–5.6)
HCT VFR BLD AUTO: 50.3 % (ref 40–54)
HDLC SERPL-MCNC: 45 MG/DL (ref 40–75)
HDLC SERPL: 20 % (ref 20–50)
HGB BLD-MCNC: 17 G/DL (ref 14–18)
IMM GRANULOCYTES # BLD AUTO: 0.16 K/UL (ref 0–0.04)
IMM GRANULOCYTES NFR BLD AUTO: 1 % (ref 0–0.5)
LDLC SERPL CALC-MCNC: 158.8 MG/DL (ref 63–159)
LYMPHOCYTES # BLD AUTO: 2.6 K/UL (ref 1–4.8)
LYMPHOCYTES NFR BLD: 15.9 % (ref 18–48)
MCH RBC QN AUTO: 29.7 PG (ref 27–31)
MCHC RBC AUTO-ENTMCNC: 33.8 G/DL (ref 32–36)
MCV RBC AUTO: 88 FL (ref 82–98)
MONOCYTES # BLD AUTO: 1.2 K/UL (ref 0.3–1)
MONOCYTES NFR BLD: 7.4 % (ref 4–15)
NEUTROPHILS # BLD AUTO: 12.2 K/UL (ref 1.8–7.7)
NEUTROPHILS NFR BLD: 74.4 % (ref 38–73)
NONHDLC SERPL-MCNC: 180 MG/DL
NRBC BLD-RTO: 0 /100 WBC
PLATELET # BLD AUTO: 263 K/UL (ref 150–450)
PMV BLD AUTO: 10 FL (ref 9.2–12.9)
POTASSIUM SERPL-SCNC: 4.5 MMOL/L (ref 3.5–5.1)
PROT SERPL-MCNC: 7.6 G/DL (ref 6–8.4)
RBC # BLD AUTO: 5.72 M/UL (ref 4.6–6.2)
SODIUM SERPL-SCNC: 136 MMOL/L (ref 136–145)
TRIGL SERPL-MCNC: 106 MG/DL (ref 30–150)
WBC # BLD AUTO: 16.45 K/UL (ref 3.9–12.7)

## 2023-03-27 PROCEDURE — 80053 COMPREHEN METABOLIC PANEL: CPT | Performed by: INTERNAL MEDICINE

## 2023-03-27 PROCEDURE — 85025 COMPLETE CBC W/AUTO DIFF WBC: CPT | Performed by: INTERNAL MEDICINE

## 2023-03-27 PROCEDURE — 80061 LIPID PANEL: CPT | Performed by: INTERNAL MEDICINE

## 2023-03-27 PROCEDURE — 83036 HEMOGLOBIN GLYCOSYLATED A1C: CPT | Performed by: INTERNAL MEDICINE

## 2023-03-27 PROCEDURE — 36415 COLL VENOUS BLD VENIPUNCTURE: CPT | Performed by: INTERNAL MEDICINE

## 2023-04-03 ENCOUNTER — OFFICE VISIT (OUTPATIENT)
Dept: INTERNAL MEDICINE | Facility: CLINIC | Age: 68
End: 2023-04-03
Payer: MEDICARE

## 2023-04-03 VITALS
HEART RATE: 84 BPM | DIASTOLIC BLOOD PRESSURE: 80 MMHG | OXYGEN SATURATION: 96 % | TEMPERATURE: 98 F | WEIGHT: 257.69 LBS | HEIGHT: 69 IN | SYSTOLIC BLOOD PRESSURE: 130 MMHG | BODY MASS INDEX: 38.17 KG/M2

## 2023-04-03 DIAGNOSIS — Z12.11 COLON CANCER SCREENING: ICD-10-CM

## 2023-04-03 DIAGNOSIS — E78.2 MIXED HYPERLIPIDEMIA: ICD-10-CM

## 2023-04-03 DIAGNOSIS — N28.9 ACUTE RENAL INSUFFICIENCY: ICD-10-CM

## 2023-04-03 DIAGNOSIS — E11.9 TYPE 2 DIABETES MELLITUS WITHOUT COMPLICATION, WITHOUT LONG-TERM CURRENT USE OF INSULIN: Primary | ICD-10-CM

## 2023-04-03 DIAGNOSIS — E11.69 TYPE 2 DIABETES MELLITUS WITH HYPERLIPIDEMIA: ICD-10-CM

## 2023-04-03 DIAGNOSIS — Z12.5 PROSTATE CANCER SCREENING: ICD-10-CM

## 2023-04-03 DIAGNOSIS — E78.5 TYPE 2 DIABETES MELLITUS WITH HYPERLIPIDEMIA: ICD-10-CM

## 2023-04-03 DIAGNOSIS — D12.6 ADENOMA OF COLON: ICD-10-CM

## 2023-04-03 DIAGNOSIS — E66.01 SEVERE OBESITY (BMI 35.0-39.9) WITH COMORBIDITY: Chronic | ICD-10-CM

## 2023-04-03 DIAGNOSIS — I70.0 ATHEROSCLEROSIS OF AORTA: ICD-10-CM

## 2023-04-03 DIAGNOSIS — I10 ESSENTIAL HYPERTENSION: ICD-10-CM

## 2023-04-03 DIAGNOSIS — I10 ESSENTIAL HYPERTENSION: Chronic | ICD-10-CM

## 2023-04-03 DIAGNOSIS — E78.2 MIXED HYPERLIPIDEMIA: Chronic | ICD-10-CM

## 2023-04-03 PROCEDURE — 1101F PT FALLS ASSESS-DOCD LE1/YR: CPT | Mod: CPTII,S$GLB,, | Performed by: INTERNAL MEDICINE

## 2023-04-03 PROCEDURE — 3072F PR LOW RISK FOR RETINOPATHY: ICD-10-PCS | Mod: CPTII,S$GLB,, | Performed by: INTERNAL MEDICINE

## 2023-04-03 PROCEDURE — 3079F PR MOST RECENT DIASTOLIC BLOOD PRESSURE 80-89 MM HG: ICD-10-PCS | Mod: CPTII,S$GLB,, | Performed by: INTERNAL MEDICINE

## 2023-04-03 PROCEDURE — 3072F LOW RISK FOR RETINOPATHY: CPT | Mod: CPTII,S$GLB,, | Performed by: INTERNAL MEDICINE

## 2023-04-03 PROCEDURE — 1159F MED LIST DOCD IN RCRD: CPT | Mod: CPTII,S$GLB,, | Performed by: INTERNAL MEDICINE

## 2023-04-03 PROCEDURE — 1126F AMNT PAIN NOTED NONE PRSNT: CPT | Mod: CPTII,S$GLB,, | Performed by: INTERNAL MEDICINE

## 2023-04-03 PROCEDURE — 1160F PR REVIEW ALL MEDS BY PRESCRIBER/CLIN PHARMACIST DOCUMENTED: ICD-10-PCS | Mod: CPTII,S$GLB,, | Performed by: INTERNAL MEDICINE

## 2023-04-03 PROCEDURE — 1159F PR MEDICATION LIST DOCUMENTED IN MEDICAL RECORD: ICD-10-PCS | Mod: CPTII,S$GLB,, | Performed by: INTERNAL MEDICINE

## 2023-04-03 PROCEDURE — 3288F FALL RISK ASSESSMENT DOCD: CPT | Mod: CPTII,S$GLB,, | Performed by: INTERNAL MEDICINE

## 2023-04-03 PROCEDURE — 3008F PR BODY MASS INDEX (BMI) DOCUMENTED: ICD-10-PCS | Mod: CPTII,S$GLB,, | Performed by: INTERNAL MEDICINE

## 2023-04-03 PROCEDURE — 99214 OFFICE O/P EST MOD 30 MIN: CPT | Mod: S$GLB,,, | Performed by: INTERNAL MEDICINE

## 2023-04-03 PROCEDURE — 3044F PR MOST RECENT HEMOGLOBIN A1C LEVEL <7.0%: ICD-10-PCS | Mod: CPTII,S$GLB,, | Performed by: INTERNAL MEDICINE

## 2023-04-03 PROCEDURE — 1126F PR PAIN SEVERITY QUANTIFIED, NO PAIN PRESENT: ICD-10-PCS | Mod: CPTII,S$GLB,, | Performed by: INTERNAL MEDICINE

## 2023-04-03 PROCEDURE — 3079F DIAST BP 80-89 MM HG: CPT | Mod: CPTII,S$GLB,, | Performed by: INTERNAL MEDICINE

## 2023-04-03 PROCEDURE — 1101F PR PT FALLS ASSESS DOC 0-1 FALLS W/OUT INJ PAST YR: ICD-10-PCS | Mod: CPTII,S$GLB,, | Performed by: INTERNAL MEDICINE

## 2023-04-03 PROCEDURE — 99999 PR PBB SHADOW E&M-EST. PATIENT-LVL IV: CPT | Mod: PBBFAC,,, | Performed by: INTERNAL MEDICINE

## 2023-04-03 PROCEDURE — 3075F PR MOST RECENT SYSTOLIC BLOOD PRESS GE 130-139MM HG: ICD-10-PCS | Mod: CPTII,S$GLB,, | Performed by: INTERNAL MEDICINE

## 2023-04-03 PROCEDURE — 3288F PR FALLS RISK ASSESSMENT DOCUMENTED: ICD-10-PCS | Mod: CPTII,S$GLB,, | Performed by: INTERNAL MEDICINE

## 2023-04-03 PROCEDURE — 99214 PR OFFICE/OUTPT VISIT, EST, LEVL IV, 30-39 MIN: ICD-10-PCS | Mod: S$GLB,,, | Performed by: INTERNAL MEDICINE

## 2023-04-03 PROCEDURE — 3044F HG A1C LEVEL LT 7.0%: CPT | Mod: CPTII,S$GLB,, | Performed by: INTERNAL MEDICINE

## 2023-04-03 PROCEDURE — 99999 PR PBB SHADOW E&M-EST. PATIENT-LVL IV: ICD-10-PCS | Mod: PBBFAC,,, | Performed by: INTERNAL MEDICINE

## 2023-04-03 PROCEDURE — 3075F SYST BP GE 130 - 139MM HG: CPT | Mod: CPTII,S$GLB,, | Performed by: INTERNAL MEDICINE

## 2023-04-03 PROCEDURE — 3008F BODY MASS INDEX DOCD: CPT | Mod: CPTII,S$GLB,, | Performed by: INTERNAL MEDICINE

## 2023-04-03 PROCEDURE — 1160F RVW MEDS BY RX/DR IN RCRD: CPT | Mod: CPTII,S$GLB,, | Performed by: INTERNAL MEDICINE

## 2023-04-03 RX ORDER — METHYLPREDNISOLONE 4 MG/1
TABLET ORAL
COMMUNITY
End: 2023-10-03

## 2023-04-03 RX ORDER — MONTELUKAST SODIUM 10 MG/1
TABLET ORAL
COMMUNITY
End: 2023-10-03 | Stop reason: SDUPTHER

## 2023-04-03 RX ORDER — MONTELUKAST SODIUM 10 MG/1
10 TABLET ORAL
COMMUNITY
Start: 2023-03-22 | End: 2023-10-03

## 2023-04-03 RX ORDER — AZELASTINE 1 MG/ML
SPRAY, METERED NASAL
COMMUNITY
End: 2023-10-03

## 2023-04-03 RX ORDER — ROSUVASTATIN CALCIUM 40 MG/1
40 TABLET, COATED ORAL DAILY
Qty: 90 TABLET | Refills: 3 | Status: SHIPPED | OUTPATIENT
Start: 2023-04-03 | End: 2024-04-02

## 2023-04-04 ENCOUNTER — TELEPHONE (OUTPATIENT)
Dept: OPTOMETRY | Facility: CLINIC | Age: 68
End: 2023-04-04
Payer: MEDICARE

## 2023-04-29 NOTE — TELEPHONE ENCOUNTER
No care due was identified.  [facility] Embedded Care Due Messages. Reference number: [id number].   4/29/2023 3:42:09 PM CDT

## 2023-04-30 RX ORDER — METFORMIN HYDROCHLORIDE 500 MG/1
TABLET ORAL
Qty: 90 TABLET | Refills: 1 | Status: SHIPPED | OUTPATIENT
Start: 2023-04-30 | End: 2023-12-14

## 2023-04-30 NOTE — TELEPHONE ENCOUNTER
Refill Routing Note   Medication(s) are not appropriate for processing by [facility] for the following reason(s):      Medication outside of protocol    [facility] action(s):  Approve  Route              Appointments  past 12m or future 3m with PCP    Date Provider   Last Visit   4/3/2023 Inder Quarles MD   Next Visit   10/3/2023 Inder Quarles MD   ED visits in past 90 days: 0        Note composed:4:29 PM 04/30/2023

## 2023-05-01 NOTE — PROGRESS NOTES
Subjective:       Patient ID: Celso Hernandez is a 67 y.o. male.    Chief Complaint: Follow-up    HPI  The patient presents for follow-up of medical conditions which include type 2 diabetes mellitus, hypertension, hyperlipidemia, and chronic rhinitis.  The patient is status post balloon sinuplasty performed on 11/19/2022 by Dr. Cipriano Good his ENT specialist.  The patient states he did experience some bowel congestion recently and has been using montelukast and Astelin nasal spray.  He started is using this medication after completing a course of a Medrol Dosepak prescribed by Dr. Good.    The patient recently has been checking his blood sugar every 3-4 days.  Blood sugar levels ranged from  fasting.  The patient states his vision is worse.  He has glaucoma and cataracts.    He is not experienced any recent symptoms of lower back pain or knee pain.  He had not been exercising regularly although he has started walking 3 days a week.  No exertional dyspnea or chest pain has been noted.    Review of Systems   Constitutional:  Positive for activity change. Negative for appetite change, fatigue and unexpected weight change.   HENT:  Positive for nasal congestion.    Eyes:  Positive for visual disturbance.   Respiratory:  Negative for cough, chest tightness, shortness of breath and wheezing.    Cardiovascular:  Negative for chest pain, palpitations and leg swelling.   Gastrointestinal:  Negative for abdominal pain, blood in stool, nausea and vomiting.   Genitourinary:  Negative for dysuria, hematuria and urgency.   Musculoskeletal:  Negative for arthralgias, back pain, gait problem, joint swelling and myalgias.   Integumentary:  Negative for color change and rash.   Neurological:  Negative for dizziness, syncope, weakness, light-headedness, numbness and headaches.   Psychiatric/Behavioral:  Negative for sleep disturbance.           Physical Exam  Vitals and nursing note reviewed.   Constitutional:       General: He  is not in acute distress.     Appearance: Normal appearance. He is well-developed.      Comments: The patient has gained 4 lb since 10/03/2022.   HENT:      Head: Normocephalic and atraumatic.   Eyes:      General: No scleral icterus.     Extraocular Movements: Extraocular movements intact.      Conjunctiva/sclera: Conjunctivae normal.   Neck:      Thyroid: No thyromegaly.      Vascular: No JVD.   Cardiovascular:      Rate and Rhythm: Normal rate and regular rhythm.      Heart sounds: Normal heart sounds. No murmur heard.    No gallop.   Pulmonary:      Effort: Pulmonary effort is normal. No respiratory distress.      Breath sounds: Normal breath sounds. No wheezing or rales.   Abdominal:      General: Bowel sounds are normal.      Palpations: Abdomen is soft. There is no mass.      Tenderness: There is no abdominal tenderness.   Musculoskeletal:         General: No tenderness. Normal range of motion.      Cervical back: Normal range of motion and neck supple.   Lymphadenopathy:      Cervical: No cervical adenopathy.   Skin:     General: Skin is warm and dry.      Findings: No rash.   Neurological:      Mental Status: He is alert and oriented to person, place, and time.      Cranial Nerves: No cranial nerve deficit.   Psychiatric:         Mood and Affect: Mood normal.         Behavior: Behavior normal.       Protective Sensation (w/ 10 gram monofilament):  Right: Intact  Left: Intact    Visual Inspection:  Normal -  Bilateral    Pedal Pulses:   Right: Present  Left: Present    Posterior Tibialis Pulses:   Right:Present  Left: Present    Lab Visit on 03/27/2023   Component Date Value Ref Range Status    Sodium 03/27/2023 136  136 - 145 mmol/L Final    Potassium 03/27/2023 4.5  3.5 - 5.1 mmol/L Final    Chloride 03/27/2023 102  95 - 110 mmol/L Final    CO2 03/27/2023 23  23 - 29 mmol/L Final    Glucose 03/27/2023 136 (H)  70 - 110 mg/dL Final    BUN 03/27/2023 26 (H)  8 - 23 mg/dL Final    Creatinine 03/27/2023 1.2   0.5 - 1.4 mg/dL Final    Calcium 03/27/2023 9.7  8.7 - 10.5 mg/dL Final    Total Protein 03/27/2023 7.6  6.0 - 8.4 g/dL Final    Albumin 03/27/2023 3.6  3.5 - 5.2 g/dL Final    Total Bilirubin 03/27/2023 0.6  0.1 - 1.0 mg/dL Final    Comment: For infants and newborns, interpretation of results should be based  on gestational age, weight and in agreement with clinical  observations.    Premature Infant recommended reference ranges:  Up to 24 hours.............<8.0 mg/dL  Up to 48 hours............<12.0 mg/dL  3-5 days..................<15.0 mg/dL  6-29 days.................<15.0 mg/dL      Alkaline Phosphatase 03/27/2023 68  55 - 135 U/L Final    AST 03/27/2023 28  10 - 40 U/L Final    ALT 03/27/2023 47 (H)  10 - 44 U/L Final    Anion Gap 03/27/2023 11  8 - 16 mmol/L Final    eGFR 03/27/2023 >60  >60 mL/min/1.73 m^2 Final    Cholesterol 03/27/2023 225 (H)  120 - 199 mg/dL Final    Comment: The National Cholesterol Education Program (NCEP) has set the  following guidelines (reference ranges) for Cholesterol:  Optimal.....................<200 mg/dL  Borderline High.............200-239 mg/dL  High........................> or = 240 mg/dL      Triglycerides 03/27/2023 106  30 - 150 mg/dL Final    Comment: The National Cholesterol Education Program (NCEP) has set the  following guidelines (reference values) for triglycerides:  Normal......................<150 mg/dL  Borderline High.............150-199 mg/dL  High........................200-499 mg/dL      HDL 03/27/2023 45  40 - 75 mg/dL Final    Comment: The National Cholesterol Education Program (NCEP) has set the  following guidelines (reference values) for HDL Cholesterol:  Low...............<40 mg/dL  Optimal...........>60 mg/dL      LDL Cholesterol 03/27/2023 158.8  63.0 - 159.0 mg/dL Final    Comment: The National Cholesterol Education Program (NCEP) has set the  following guidelines (reference values) for LDL Cholesterol:  Optimal.......................<130  mg/dL  Borderline High...............130-159 mg/dL  High..........................160-189 mg/dL  Very High.....................>190 mg/dL      HDL/Cholesterol Ratio 03/27/2023 20.0  20.0 - 50.0 % Final    Total Cholesterol/HDL Ratio 03/27/2023 5.0  2.0 - 5.0 Final    Non-HDL Cholesterol 03/27/2023 180  mg/dL Final    Comment: Risk category and Non-HDL cholesterol goals:  Coronary heart disease (CHD)or equivalent (10-year risk of CHD >20%):  Non-HDL cholesterol goal     <130 mg/dL  Two or more CHD risk factors and 10-year risk of CHD <= 20%:  Non-HDL cholesterol goal     <160 mg/dL  0 to 1 CHD risk factor:  Non-HDL cholesterol goal     <190 mg/dL      WBC 03/27/2023 16.45 (H)  3.90 - 12.70 K/uL Final    RBC 03/27/2023 5.72  4.60 - 6.20 M/uL Final    Hemoglobin 03/27/2023 17.0  14.0 - 18.0 g/dL Final    Hematocrit 03/27/2023 50.3  40.0 - 54.0 % Final    MCV 03/27/2023 88  82 - 98 fL Final    MCH 03/27/2023 29.7  27.0 - 31.0 pg Final    MCHC 03/27/2023 33.8  32.0 - 36.0 g/dL Final    RDW 03/27/2023 13.8  11.5 - 14.5 % Final    Platelets 03/27/2023 263  150 - 450 K/uL Final    MPV 03/27/2023 10.0  9.2 - 12.9 fL Final    Immature Granulocytes 03/27/2023 1.0 (H)  0.0 - 0.5 % Final    Gran # (ANC) 03/27/2023 12.2 (H)  1.8 - 7.7 K/uL Final    Immature Grans (Abs) 03/27/2023 0.16 (H)  0.00 - 0.04 K/uL Final    Comment: Mild elevation in immature granulocytes is non specific and   can be seen in a variety of conditions including stress response,   acute inflammation, trauma and pregnancy. Correlation with other   laboratory and clinical findings is essential.      Lymph # 03/27/2023 2.6  1.0 - 4.8 K/uL Final    Mono # 03/27/2023 1.2 (H)  0.3 - 1.0 K/uL Final    Eos # 03/27/2023 0.2  0.0 - 0.5 K/uL Final    Baso # 03/27/2023 0.06  0.00 - 0.20 K/uL Final    nRBC 03/27/2023 0  0 /100 WBC Final    Gran % 03/27/2023 74.4 (H)  38.0 - 73.0 % Final    Lymph % 03/27/2023 15.9 (L)  18.0 - 48.0 % Final    Mono % 03/27/2023 7.4  4.0 -  15.0 % Final    Eosinophil % 03/27/2023 0.9  0.0 - 8.0 % Final    Basophil % 03/27/2023 0.4  0.0 - 1.9 % Final    Differential Method 03/27/2023 Automated   Final    Hemoglobin A1C 03/27/2023 5.5  4.0 - 5.6 % Final    Comment: ADA Screening Guidelines:  5.7-6.4%  Consistent with prediabetes  >or=6.5%  Consistent with diabetes    High levels of fetal hemoglobin interfere with the HbA1C  assay. Heterozygous hemoglobin variants (HbS, HgC, etc)do  not significantly interfere with this assay.   However, presence of multiple variants may affect accuracy.      Estimated Avg Glucose 03/27/2023 111  68 - 131 mg/dL Final       Assessment & Plan:      Celso was seen today for follow-up.  Screening colonoscopy order has been re-entered.  Current medications will be continued except for atorvastatin which will be discontinued since it was ineffective.  Crestor 40 mg daily will be ordered for treatment of hyperlipidemia.  Lipid and CMP profiles will be obtained in 3 months.    The patient has been encouraged to increase his walking activity for exercise, increase his sleep hours at night, and to lose weight.  Routine labs will be obtained in 6 months.    Diagnoses and all orders for this visit:    Type 2 diabetes mellitus without complication, without long-term current use of insulin  -     Comprehensive Metabolic Panel; Future  -     Lipid Panel; Future    Essential hypertension  -     Comprehensive Metabolic Panel; Future  -     Lipid Panel; Future    Mixed hyperlipidemia  -     Comprehensive Metabolic Panel; Future  -     Lipid Panel; Future    Severe obesity (BMI 35.0-39.9) with comorbidity  -     Comprehensive Metabolic Panel; Future  -     Lipid Panel; Future    Atherosclerosis of aorta    Type 2 diabetes mellitus with hyperlipidemia    Colon cancer screening  -     Ambulatory referral/consult to Endo Procedure ; Future    Adenoma of colon  -     Ambulatory referral/consult to Endo Procedure ;  Future    Other orders  -     rosuvastatin (CRESTOR) 40 MG Tab; Take 1 tablet (40 mg total) by mouth once daily.         Follow up in about 6 months (around 10/3/2023).     Inder Quarles MD

## 2023-05-02 RX ORDER — MELOXICAM 15 MG/1
TABLET ORAL
Qty: 90 TABLET | Refills: 0 | Status: SHIPPED | OUTPATIENT
Start: 2023-05-02 | End: 2023-07-31

## 2023-05-08 ENCOUNTER — OFFICE VISIT (OUTPATIENT)
Dept: PAIN MEDICINE | Facility: CLINIC | Age: 68
End: 2023-05-08
Payer: MEDICARE

## 2023-05-08 ENCOUNTER — TELEPHONE (OUTPATIENT)
Dept: INTERNAL MEDICINE | Facility: CLINIC | Age: 68
End: 2023-05-08
Payer: MEDICARE

## 2023-05-08 ENCOUNTER — HOSPITAL ENCOUNTER (OUTPATIENT)
Dept: RADIOLOGY | Facility: HOSPITAL | Age: 68
Discharge: HOME | End: 2023-05-08
Attending: NURSE PRACTITIONER
Payer: MEDICARE

## 2023-05-08 VITALS
HEART RATE: 102 BPM | SYSTOLIC BLOOD PRESSURE: 122 MMHG | WEIGHT: 257.69 LBS | DIASTOLIC BLOOD PRESSURE: 84 MMHG | BODY MASS INDEX: 38.06 KG/M2

## 2023-05-08 DIAGNOSIS — M54.2 NECK PAIN: Primary | ICD-10-CM

## 2023-05-08 DIAGNOSIS — M47.812 FACET ARTHROPATHY, CERVICAL: ICD-10-CM

## 2023-05-08 DIAGNOSIS — M47.812 FACET ARTHROPATHY, CERVICAL: Primary | ICD-10-CM

## 2023-05-08 DIAGNOSIS — M54.2 NECK PAIN WITHOUT INJURY: ICD-10-CM

## 2023-05-08 PROCEDURE — 1125F AMNT PAIN NOTED PAIN PRSNT: CPT | Mod: CPTII,S$GLB,, | Performed by: NURSE PRACTITIONER

## 2023-05-08 PROCEDURE — 99999 PR PBB SHADOW E&M-EST. PATIENT-LVL IV: CPT | Mod: PBBFAC,,, | Performed by: NURSE PRACTITIONER

## 2023-05-08 PROCEDURE — 72040 X-RAY EXAM NECK SPINE 2-3 VW: CPT | Mod: 26,,, | Performed by: RADIOLOGY

## 2023-05-08 PROCEDURE — 3079F PR MOST RECENT DIASTOLIC BLOOD PRESSURE 80-89 MM HG: ICD-10-PCS | Mod: CPTII,S$GLB,, | Performed by: NURSE PRACTITIONER

## 2023-05-08 PROCEDURE — 99999 PR PBB SHADOW E&M-EST. PATIENT-LVL IV: ICD-10-PCS | Mod: PBBFAC,,, | Performed by: NURSE PRACTITIONER

## 2023-05-08 PROCEDURE — 99214 OFFICE O/P EST MOD 30 MIN: CPT | Mod: S$GLB,,, | Performed by: NURSE PRACTITIONER

## 2023-05-08 PROCEDURE — 3072F PR LOW RISK FOR RETINOPATHY: ICD-10-PCS | Mod: CPTII,S$GLB,, | Performed by: NURSE PRACTITIONER

## 2023-05-08 PROCEDURE — 3074F SYST BP LT 130 MM HG: CPT | Mod: CPTII,S$GLB,, | Performed by: NURSE PRACTITIONER

## 2023-05-08 PROCEDURE — 3074F PR MOST RECENT SYSTOLIC BLOOD PRESSURE < 130 MM HG: ICD-10-PCS | Mod: CPTII,S$GLB,, | Performed by: NURSE PRACTITIONER

## 2023-05-08 PROCEDURE — 72040 X-RAY EXAM NECK SPINE 2-3 VW: CPT | Mod: TC,FY

## 2023-05-08 PROCEDURE — 72040 XR CERVICAL SPINE AP LATERAL: ICD-10-PCS | Mod: 26,,, | Performed by: RADIOLOGY

## 2023-05-08 PROCEDURE — 99214 PR OFFICE/OUTPT VISIT, EST, LEVL IV, 30-39 MIN: ICD-10-PCS | Mod: S$GLB,,, | Performed by: NURSE PRACTITIONER

## 2023-05-08 PROCEDURE — 3044F HG A1C LEVEL LT 7.0%: CPT | Mod: CPTII,S$GLB,, | Performed by: NURSE PRACTITIONER

## 2023-05-08 PROCEDURE — 3008F PR BODY MASS INDEX (BMI) DOCUMENTED: ICD-10-PCS | Mod: CPTII,S$GLB,, | Performed by: NURSE PRACTITIONER

## 2023-05-08 PROCEDURE — 3079F DIAST BP 80-89 MM HG: CPT | Mod: CPTII,S$GLB,, | Performed by: NURSE PRACTITIONER

## 2023-05-08 PROCEDURE — 3008F BODY MASS INDEX DOCD: CPT | Mod: CPTII,S$GLB,, | Performed by: NURSE PRACTITIONER

## 2023-05-08 PROCEDURE — 1160F PR REVIEW ALL MEDS BY PRESCRIBER/CLIN PHARMACIST DOCUMENTED: ICD-10-PCS | Mod: CPTII,S$GLB,, | Performed by: NURSE PRACTITIONER

## 2023-05-08 PROCEDURE — 1159F PR MEDICATION LIST DOCUMENTED IN MEDICAL RECORD: ICD-10-PCS | Mod: CPTII,S$GLB,, | Performed by: NURSE PRACTITIONER

## 2023-05-08 PROCEDURE — 1159F MED LIST DOCD IN RCRD: CPT | Mod: CPTII,S$GLB,, | Performed by: NURSE PRACTITIONER

## 2023-05-08 PROCEDURE — 1125F PR PAIN SEVERITY QUANTIFIED, PAIN PRESENT: ICD-10-PCS | Mod: CPTII,S$GLB,, | Performed by: NURSE PRACTITIONER

## 2023-05-08 PROCEDURE — 3072F LOW RISK FOR RETINOPATHY: CPT | Mod: CPTII,S$GLB,, | Performed by: NURSE PRACTITIONER

## 2023-05-08 PROCEDURE — 3044F PR MOST RECENT HEMOGLOBIN A1C LEVEL <7.0%: ICD-10-PCS | Mod: CPTII,S$GLB,, | Performed by: NURSE PRACTITIONER

## 2023-05-08 PROCEDURE — 1160F RVW MEDS BY RX/DR IN RCRD: CPT | Mod: CPTII,S$GLB,, | Performed by: NURSE PRACTITIONER

## 2023-05-08 NOTE — PROGRESS NOTES
"[facility] Established Clinic Visit    Referred by: No ref. provider found   Reason for referral: Facet arthropathy, cervical     CC:   Chief Complaint   Patient presents with    Neck Pain       Interval Updates  05/08/[id number]-ezfh-old male presents today with neck and upper back pain.  Onset 5-6 days pain is specifically located at the base of his neck and radiates into his head.  Denies any headaches.  Pain is constant, pain is described as a strain and pulling he denies any radicular symptoms in either arm , denies any profound weakness, denies any paresthesia like symptoms.  Pain is aggravated with certain movements and laying down.  Pain is mitigated with ibuprofen 800 mg t.i.d..  He denies any recent incident or trauma he does state that he felt like he slept wrong and woke up the next morning feeling pain in the neck and into his head.    Interval Updates:   2/10/2022 - Mr. Hernandez is following up for  Low-back Pain and Mid-back Pain is currently rate 0/10 with a weekly range 2-5/10.  It is described as Aching, Grabbing and Tight.  He  Is reporting mild to no symptoms today, he states he has no pain when performing normal ADLs, he reports pain is exacerbated when he  "tries to do to much" He did also report midback /thoroacic pain with mild twisting but pain is stable at this point.     1/19/2022  -  Mary returns to clinic s/p Thoracic Epidural Steroid Injection at T5-6 on 1/4/22 with 60% relief.  He reports a pain intensity 1/10 today with a weekly range of 1-2/10.   He is reporting mild soarness in his thoracic area, he is reporting being " free of pain after the injection" his pain returned after working outside in his garage yesterday. Overall much better following his injection. He is reporting certain movements increase his pain but other than that he  is better.     Subjective:   Celso Hernandez is a 67 y.o. male who has a past medical history of Arthritis, BPH (benign " prostatic hyperplasia), Cataract, Colon polyps, Diabetes mellitus type II, Glaucoma, Hyperlipidemia, Hypertension, Multiple duodenal ulcers, and Unspecified hemorrhoids without mention of complication. He complains of pain as described below.    Location:  Neck  Onset:  5-6 days  Radiation:  None  Timing: constant  Current Pain Score: 7/10  Weekly Pain Range: 2-10/10  Quality:  Pulling and strain type pain  Worsened by: exercise, extension, lifting, lying down, sitting and walking for more than several minutes  Improved by: medications ibuprofen 800 mg    Previous Therapies:  PT/OT: Denies for neck pain  HEP: Yes, stretching at home and walking.  TENS: No  Interventions:  01/04/2022 Thoracic Epidural Steroid Injection at T5-6  Surgery: Denies localized surgery  Opioids:  Adjuvants:     Current Pain Medications:  Ibuprofen 800 mg t.i.d.     Assessment & Plan:  Problem List Items Addressed This Visit    None  Visit Diagnoses       Facet arthropathy, cervical    -  Primary    Relevant Orders    X-Ray Cervical Spine AP And Lateral    Ambulatory referral/consult to Physical/Occupational Therapy    Neck pain without injury        Relevant Orders    X-Ray Cervical Spine AP And Lateral    Ambulatory referral/consult to Physical/Occupational Therapy          12/17/21 - Celso Hernandez is a 67 y.o. male who  has a past medical history of Arthritis, BPH (benign prostatic hyperplasia), Cataract, Colon polyps, Diabetes mellitus type II, Glaucoma, Hyperlipidemia, Hypertension, Multiple duodenal ulcers (10/08/2019), and Unspecified hemorrhoids without mention of complication.  By history and examination this patient has chronic mid/upper back pain with right T5-6 radiculopathy.  The underlying cause cause is facet arthritis and degenerative disc disease with a large, obstructive osteophyte on the right side as seen on CT chest.  MRI is appropriate prior to an injections to assess soft tissue and vasculature.  We discussed the  underlying diagnoses and multiple treatment options including non-opioid medications, opioid medications, interventional procedures, physical therapy and home exercise.  The risks and benefits of each treatment option were discussed and all questions were answered.        1/19/2022- Celso Hernandez is a 67 y.o. male who  has a past medical history of Arthritis, BPH (benign prostatic hyperplasia), Cataract, Colon polyps, Diabetes mellitus type II, Glaucoma, Hyperlipidemia, Hypertension, Multiple duodenal ulcers (10/08/2019), and Unspecified hemorrhoids without mention of complication.  By history and examination this patient has chronic mid/upper back pain with a right T5-6   Radiculopathy that responded well following a thoracic GABRIELA targeting T5-6.  Colonic causes appear to be facet arthritis and degenerative disc disease with a large obstructive osteophyte on the right side.  Upon review of his thoracic MRI ordered per Dr. Mejia results did show a extradural degenerative changes resulting in right T6 neural foraminal stenosis.  Secondly intradural extramedullary T2 signal abnormality at the level of the T4-5.  The radiologist include a differential diagnosis that included a nerve sheet to were such as a  schwannoma or a neurofibroma as well as other neoplasms.  Radiologist recommended further evaluation of this patient's thoracic area, discussed with the patient today that I will order a thoracic MRI with contrast to further evaluate. Long discussion with patient regarding  Results of his MRI, I will also refer to Oncology for further evaluation but I would like this patient to see a college E following a hip obtaining new images.    02/10/8727-62-qgko-old male with history of chronic mid to low back pain, he presents today to review an updated thoracic MRI.  There was concerned per radiology on previous thoracic MRI with differential diagnosis that includes a nerve sheet to wear such as a schwannoma or a  neurofibroma as well as other neoplasms.  I contacted Oncology and did not appear to be a malignancy.  Was a non malignant entity.  He contacted patient and made him aware of this.  Today his pain is 0/10, he does experience mild midthoracic low back pain when performing duties outside of his normal ADLs.  I recommended physical therapy patient is amenable to this plan.    [id number]-old male presents today acute/subacute neck pain beginning at the patient's his neck radiating his head.  Etiology is unclear at this point.  Based on history the patient may be suffering facet arthritis of the cervical, differential diagnosis be myofascial pain.  Cause of his symptoms are unclear he did state that he slept wrong on a pillow and woke up the next morning with this pain.  He denies any radiating symptoms do not suspect any degenerative disc disease or central and/or neural foraminal stenosis.  Ibuprofen has helped his pain prior to come into clinic today but it only lasts 4-5 hours.  He and I discussed plan below and was agreed upon during our clinic visit today.    Cervical x-rays AP and lateral today   Consult to physical therapy for neck and shoulder pain  No procedures recommended at this time.  Consider cervical diagnostic MBBs if physical therapy is  Continue current medications for now with consideration of gabapentin/pregabalin  RTC discussed I will call patient with results of x-rays and discuss plan of care at that point.      Follow Up:  Will call with results of cervical x-rays and discussed plan of care.    Disclaimer: This note was partly generated using dictation software which may occasionally result in transcription errors.    Imagin22 MRI Thoracic Spine W WO Contrast     Narrative & Impression  EXAMINATION:  MRI THORACIC SPINE W WO CONTRAST     CLINICAL HISTORY:  Attention to T4-5 level.  Evaluate for underlying mass.     TECHNIQUE:  Pre and postcontrast MR imaging of the thoracic spine  was performed utilizing 10 mL Gadavist intravenous contrast.     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     No advanced marrow edema or osseous destructive process is identified.  Multilevel mild disc bulging flattens the thecal sac without cord impingement.  Marked degenerative foraminal stenosis on the left at T 4-5 and to the right at T5-6 is again noted.     The spinal cord maintains normal signal intensity.     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     No enhancing lesion.  Degenerative foraminal stenosis on the left at T4-5 and on the right at T5-6.        Electronically signed by: Trent Graves  Date:                                            01/24/2022  Time:                                           17:11        CT Chest Without Contrast  Impression:  No worrisome finding on the chest CT.  Mild atherosclerotic plaque of the aorta and mild coronary artery calcification  Severe foraminal narrowing on the right at T5-6 due to significant facet joint osseous hypertrophy.  Electronically signed by: Halley Lundberg MD  Date:                                            09/29/2021  Time:                                           08:56    X-Ray Lumbar Spine Ap And Lateral 11/21/2019  FINDINGS:  Two views AP lateral.  There is mild DJD and DISH.  Alignment is normal.  No fracture dislocation bone destruction.  No trauma seen.  Impression:  No acute process seen.  DJD and DISH.      Review of Systems:  Review of Systems   Constitutional:  Negative for chills and fever.   HENT:  Negative for nosebleeds.    Eyes:  Negative for blurred vision and pain.   Respiratory:  Negative for hemoptysis.    Cardiovascular:  Negative for  chest pain and palpitations.   Gastrointestinal:  Negative for heartburn, nausea and vomiting.   Genitourinary:  Negative for dysuria and hematuria.   Musculoskeletal:  Positive for joint pain. Negative for myalgias.   Skin:  Negative for rash.   Neurological:  Negative for seizures and loss of consciousness.   Endo/Heme/Allergies:  Does not bruise/bleed easily.   Psychiatric/Behavioral:  Negative for hallucinations.      Physical Exam:  Vitals:    05/08/23 1513   BP: 122/84   Pulse: 102   Weight: 116.9 kg (257 lb 11.5 oz)   PainSc:   4     General    Nursing note and vitals reviewed.  Constitutional: He is oriented to person, place, and time. He appears well-developed and well-nourished. No distress.   HENT:   Head: Normocephalic and atraumatic.   Nose: Nose normal.   Eyes: Conjunctivae and EOM are normal. Pupils are equal, round, and reactive to light. Right eye exhibits no discharge. Left eye exhibits no discharge. No scleral icterus.   Neck: No JVD present.   Cardiovascular:  Intact distal pulses.            Pulmonary/Chest: Effort normal. No respiratory distress.   Abdominal: He exhibits no distension.   Neurological: He is alert and oriented to person, place, and time. Coordination normal.   Psychiatric: He has a normal mood and affect. His behavior is normal. Judgment and thought content normal.     General Musculoskeletal Exam   Gait: normal     Back (L-Spine & T-Spine) / Neck (C-Spine) Exam     Tenderness Right paramedian tenderness of the Upper T-Spine.     Back (L-Spine & T-Spine) Range of Motion   Lateral bend right:  abnormal Back lateral bend right: leaning to the right exacerbates pain.  Lateral bend left:  normal   Rotation right:  normal   Rotation left:  normal     Comments:  There are no topical lesions in the affected area such a those seen with shingles.

## 2023-05-08 NOTE — TELEPHONE ENCOUNTER
----- Message from Maggie Gilmore sent at 5/8/2023  8:27 AM CDT -----  Contact: self [phone]  Pt requesting a call for head and neck pain for same day appt only want to see pcp.    Please call and advise

## 2023-05-08 NOTE — TELEPHONE ENCOUNTER
Explained dr jacob has no opening.  He says he had same problem in 2022 and had to see dr buitrago  And had to have shot.    3 days had pain in neck and couldn't turn neck and been on pain pills  Since. Doing ibuprofen 800mg tid.     Requesting referral to dr arguelles or any dr in pain management..    Ok he continue meds till see's them?  Says has enough ibuprofen.    Please advise.  Thanks hussein

## 2023-05-09 ENCOUNTER — TELEPHONE (OUTPATIENT)
Dept: PAIN MEDICINE | Facility: CLINIC | Age: 68
End: 2023-05-09
Payer: MEDICARE

## 2023-05-09 DIAGNOSIS — M47.812 FACET ARTHROPATHY, CERVICAL: ICD-10-CM

## 2023-05-09 DIAGNOSIS — M54.2 NECK PAIN WITHOUT INJURY: ICD-10-CM

## 2023-05-09 DIAGNOSIS — M79.18 CERVICAL MYOFASCIAL PAIN SYNDROME: Primary | ICD-10-CM

## 2023-05-09 RX ORDER — DICLOFENAC SODIUM 10 MG/G
2 GEL TOPICAL DAILY
Qty: 1 EACH | Refills: 2 | Status: SHIPPED | OUTPATIENT
Start: 2023-05-09

## 2023-05-09 NOTE — TELEPHONE ENCOUNTER
Discussed recent cervical images with patient explain results in layman's terms.  Recommendation was to start physical therapy and then follow up with our office following patient verbalized understanding and agreed.      Coleman Quintana, NP-C  [facility]

## 2023-05-09 NOTE — TELEPHONE ENCOUNTER
"Dr jacob said "    Pain Med referral  Received: Yesterday  MD Iram Petit MA  Caller: Unspecified (Yesterday,  8:47 AM)  A Pain Medicine order has been entered.  I am not sure if Dr. Mejia is still on staff at [facility].         Patient got appt yesterday.  I called to fol up.    Pr    "

## 2023-05-09 NOTE — TELEPHONE ENCOUNTER
----- Message from DOMINIQUE Granger sent at 5/9/2023  1:20 PM CDT -----  Regarding: Follow-up in 3 months  Could you please get this patient to follow up with me in 3 months    Thank you    c

## 2023-05-15 ENCOUNTER — CLINICAL SUPPORT (OUTPATIENT)
Dept: REHABILITATION | Facility: HOSPITAL | Age: 68
End: 2023-05-15
Payer: MEDICARE

## 2023-05-15 DIAGNOSIS — M47.812 FACET ARTHROPATHY, CERVICAL: ICD-10-CM

## 2023-05-15 DIAGNOSIS — R29.898 DECREASED RANGE OF MOTION OF NECK: ICD-10-CM

## 2023-05-15 DIAGNOSIS — M54.2 NECK PAIN WITHOUT INJURY: ICD-10-CM

## 2023-05-15 PROCEDURE — 97110 THERAPEUTIC EXERCISES: CPT | Mod: PN

## 2023-05-15 PROCEDURE — 97162 PT EVAL MOD COMPLEX 30 MIN: CPT | Mod: PN

## 2023-05-15 NOTE — PLAN OF CARE
[facility]  Physical Therapy Initial Evaluation    Date: 5/15/2023   Name: Celso Hernandez  Clinic Number: [id number]    Therapy Diagnosis:   Encounter Diagnoses   Name Primary?    Neck pain without injury     Facet arthropathy, cervical     Decreased range of motion of neck      Physician: Coleman Quintana FNP    Physician Orders: PT Eval and Treat   Medical Diagnosis from Referral:   M54.2 (ICD-10-CM) - Neck pain without injury   M47.812 (ICD-10-CM) - Facet arthropathy, cervical   Evaluation Date: 5/15/2023  Authorization Period Expiration: 12/31/2023  Plan of Care Expiration: 6/23/2023  Visit # / Visits authorized: 1/20    Time In: 1:10 pm  Time Out: 2:00 pm  Total Appointment Time (timed & untimed codes): 50 minutes (1 MCE) (1 TE)    Precautions: Standard    Subjective   Date of onset: ~ 2 weeks ago (5/3/2023)  History of current condition - Celso reports: he woke up on 5/3/2023 with some neck pain and difficulty turning his head. He had neck pain for about 3 days following when he went in to see Dr. Quintana to receive x-rays. He reports over the past 2 weeks, his neck pain has improved each day and he reports improvement of about 70% since onset of pain. Aggravating factors include rotating his head, flexion/extending his spine, and lifting. Easing factors include anti-inflammatories and topicals. Patient denies any radicular pain and/or numbness/tingling.      Medical History:   Past Medical History:   Diagnosis Date    Arthritis     BPH (benign prostatic hyperplasia)     Cataract     Colon polyps     Diabetes mellitus type II     Glaucoma     Hyperlipidemia     Hypertension     Multiple duodenal ulcers 10/08/2019    Unspecified hemorrhoids without mention of complication        Surgical History:   Celso Hernandez  has a past surgical history that includes none; Esophagogastroduodenoscopy (10/08/2019); Colonoscopy w/ polypectomy (10/08/2019); Esophagogastroduodenoscopy (N/A, 10/08/2019);  Colonoscopy (N/A, 10/08/2019); Transurethral resection of prostate (N/A, 05/27/2020); Cystoscopy (N/A, 08/05/2020); Dilation of urethra (N/A, 08/05/2020); Cystoscopy with urethral dilation (N/A, 11/10/2021); Epidural steroid injection into thoracic spine (N/A, 01/04/2022); and Nasal sinus surgery (11/2022).    Medications:   Celso has a current medication list which includes the following prescription(s): azelastine, azelastine, diclofenac sodium, dorzolamide-timolol 2-0.5%, hydralazine, labetalol, latanoprost, losartan-hydrochlorothiazide 100-12.5 mg, meloxicam, metformin, methylprednisolone, methylprednisolone, methylprednisolone, montelukast, montelukast, centrum silver men, and rosuvastatin.    Allergies:   Review of patient's allergies indicates:   Allergen Reactions    Keflex [cephalexin] Rash     Scrotal and groin itching         Imaging: see EMR    Prior Therapy: yes, for his upper back/ribcage   Social History: lives with family   Occupation: retired   Prior Level of Function: 100%  Current Level of Function: completely eliminated physical activities since neck pain onset    Pain:  Current 2/10, worst 9/10, best 1/10   Location: neck  Description: stretching pain; dull/aching, sharp in his right shoulder blade  Aggravating Factors: see above  Easing Factors: see above    Pt's goals:   Patient wants to address his neck pain and learn how to manage future neck pain.     Objective   Observation/Posture:   Protracted shoulder girdle and rounded shoulders  Upper thoracic-spine kyphosis     Cervical Range of Motion: AROM   Degrees Subjective report   Flexion  45 degrees  Neck tightness   Extension  10 degrees  Neck tightness   Right Rotation  40 degrees  Neck tightness   Left Rotation  35 degrees  Neck tightness    Right Side Bending  10 degrees  Tightness/pain   Left Side Bending  5 degrees  Tightness/pain         Shoulder Range of Motion: AROM WNL     Upper Extremity Strength  (R) UE  (L) UE    Shoulder flexion:  "5/5 Shoulder flexion: 5/5   Shoulder Abduction: 5/5 Shoulder abduction: 5/5   Shoulder ER 5/5 Shoulder ER 5/5   Shoulder IR 5/5 Shoulder IR 5/5   Lower Trap 3+/5 Lower Trap 3+/5   Middle Trap 4-/5 Middle Trap 4-/5     Joint Mobility: patient guarding; hypomobility with cervical down glides bilaterally     Palpation: right upper trap hypertonicity; right UT tender to palpation+     Sensation: WNL    Flexibility:    Pec major/minor tightness   Latissimus dorsi tightness       Limitation/Restriction for FOTO Cervical Survey    Therapist reviewed FOTO scores for Celso Hernandez on 5/15/2023.   FOTO documents entered into [employer] - see Media section.    Limitation Score: 43%  Predicted Limitation Score: 26%         TREATMENT   Treatment Time In: 1:40 pm  Treatment Time Out: 1:55 pm  Total Treatment time (time-based codes) separate from Evaluation: 15 minutes    Celso received therapeutic exercises to develop strength, endurance, ROM, flexibility, and posture for 15 minutes including:  Supine chin tucks: 5"x10   Seated cervical extension SNAGs: 15x  Seated cervical rotation SNAGs: 10x        Home Exercises and Patient Education Provided    Education provided:   - Home Exercise Program  - Prognosis/POC  - Pain science    Written Home Exercises Provided: yes.  Exercises were reviewed and Celso was able to demonstrate them prior to the end of the session.  Celso demonstrated good  understanding of the education provided.     See EMR under Patient Instructions for exercises provided 5/15/2023.    Assessment   Celso is a 67 y.o. male referred to outpatient Physical Therapy with a medical diagnosis of neck pain and cervical facet arthropathy. Patient is presenting with signs and symptoms of acute neck pain due to facet arthropathy of the cervical spine. Guarding noted during manual therapy and cervical spine assessment. Primary deficits include decreased neck range of motion and hypertonicity of the upper traps. Patient would benefit from " skilled PT focusing on regaining cervical joint mobility, improving thoracic spine mobility, and periscapular strengthening.     Pt prognosis is Good.   Pt will benefit from skilled outpatient Physical Therapy to address the deficits stated above and in the chart below, provide pt/family education, and to maximize pt's level of independence.     Plan of care discussed with patient: Yes  Pt's spiritual, cultural and educational needs considered and patient is agreeable to the plan of care and goals as stated below:     Anticipated Barriers for therapy: none    Medical Necessity is demonstrated by the following  History  Co-morbidities and personal factors that may impact the plan of care Co-morbidities:   diabetes, high BMI, and HTN    Personal Factors:   lifestyle     high   Examination  Body Structures and Functions, activity limitations and participation restrictions that may impact the plan of care Body Regions:   head  neck  upper extremities    Body Systems:    gross symmetry  ROM  strength  gross coordinated movement  balance  gait  transfers  transitions  motor control  motor learning    Participation Restrictions:   Chores   ADLs  Exercising     Activity limitations:   Learning and applying knowledge  no deficits    General Tasks and Commands  no deficits    Communication  no deficits    Mobility  lifting and carrying objects    Self care  no deficits    Domestic Life  cooking  doing house work (cleaning house, washing dishes, laundry)  assisting others    Interactions/Relationships  no deficits    Life Areas  no deficits    Community and Social Life  no deficits         high   Clinical Presentation evolving clinical presentation with changing clinical characteristics moderate   Decision Making/ Complexity Score: moderate     Goals:  Short Term Goals (4 Weeks):   1. Pt will be independent with HEP to supplement PT in improving pain free cervical mobility  2. Pt will improve cervical AROM 10 deg with rotation  to improve cervical mobility for driving  3. Pt will improve low trap, mid trap, and rotator cuff MMTs by 1/2 grade in all planes to improve strength for lifting and carrying tasks.  4. Pt will demonstrate improved sitting posture to decrease pain experienced in head and neck.  Long Term Goals (8 Weeks):   1. Pt will improve FOTO to </=26% limitation to improve perceived limitation with changing and maintaining mobility.  2. Pt will improve cervical AROM to WNL in all planes to improve cervical mobility for driving   3. Pt will improve low trap, mid trap, and rotator cuff MMTs 1 grade in all planes to improve strength for lifting and carrying tasks.  4. Pt will report no pain with lifting 10 lbs to promote physical activity.   5. Pt will report no pain with cervical AROM in all planes to promote QOL.    Plan   Plan of care Certification: 5/15/2023 to 6/23/2023.    Outpatient Physical Therapy 2 times weekly for 8 weeks to include the following interventions: Cervical/Lumbar Traction, Manual Therapy, Neuromuscular Re-ed, Patient Education, Self Care, Therapeutic Activities, and Therapeutic Exercise.     Carlos Manuel Barksdale, PT

## 2023-05-19 ENCOUNTER — CLINICAL SUPPORT (OUTPATIENT)
Dept: REHABILITATION | Facility: HOSPITAL | Age: 68
End: 2023-05-19
Payer: MEDICARE

## 2023-05-19 DIAGNOSIS — M54.2 NECK PAIN WITHOUT INJURY: Primary | ICD-10-CM

## 2023-05-19 DIAGNOSIS — R29.898 DECREASED RANGE OF MOTION OF NECK: ICD-10-CM

## 2023-05-19 DIAGNOSIS — M47.812 FACET ARTHROPATHY, CERVICAL: ICD-10-CM

## 2023-05-19 PROCEDURE — 97110 THERAPEUTIC EXERCISES: CPT | Mod: PN

## 2023-05-19 NOTE — PROGRESS NOTES
[facility]   Physical Therapy Treatment Note      Name: Celso Murdock [facility]  Clinic Number: [id number]    Therapy Diagnosis:   Encounter Diagnoses   Name Primary?    Neck pain without injury Yes    Facet arthropathy, cervical     Decreased range of motion of neck      Physician: Coleman Quintana FNP    Visit Date: 5/19/2023    Physician Orders: PT Eval and Treat   Medical Diagnosis from Referral:   M54.2 (ICD-10-CM) - Neck pain without injury   M47.812 (ICD-10-CM) - Facet arthropathy, cervical   Evaluation Date: 5/15/2023  Authorization Period Expiration: 12/31/2023  Plan of Care Expiration: 6/23/2023  Visit # / Visits authorized: 2/20     Time In: 10:00 am  Time Out: 10:55 am  Total Appointment Time (timed & untimed codes): 55 minutes (2 TE)     Precautions: Standard    Subjective     Pt reports: he's doing pretty well today without any pain. He believes the exercises are helping.   He was compliant with home exercise program.  Response to previous treatment: no change   Functional change: no change     Pain: 0/10  Location: neck    Objective    (5/15/2023):  Cervical right rotation: 40 degrees  Cervical left rotation: 35 degrees    (5/19/2023):  Cervical right rotation: 60 degrees  Cervical left rotation: 65 degrees    Treatment     Celso received the treatments listed below:      therapeutic exercises to develop strength, endurance, ROM, flexibility, and posture for 45 minutes including:  Upper body ergometer: 3/3 - L3  Sidelying open books: 15x   Supine shoulder flexion AAROM: 2# - 20x  Seated cervical extension SNAGs: 10x lower cervical; 10x mid cervical   Seated cervical rotation SNAGs: 15x   Seated thoracic extension: 1/2 foam roll - 15x   Shoulder horizontal abduction: 1/2 foam roll behind back - 2x10 - green theraband     manual therapy techniques: Joint mobilizations and Soft tissue Mobilization were applied to the: cervical spine for 10 minutes, including:  Bilateral cervical down  glides and up glides with cervical rotation     Patient Education and Home Exercises       Education provided:   - home exercise program   - POC/Prognosis     Written Home Exercises Provided: yes. Exercises were reviewed and Celso was able to demonstrate them prior to the end of the session.  Celso demonstrated good  understanding of the education provided. See EMR under Patient Instructions for exercises provided during therapy sessions    Assessment   Celso is a 67 y.o. male referred to outpatient Physical Therapy with a medical diagnosis of neck pain and cervical facet arthropathy. Patient is presenting with signs and symptoms of acute neck pain due to facet arthropathy of the cervical spine. Guarding noted during manual therapy. First visit following initial evaluation and patient is displaying significant improvement in cervical rotation range of motion.     Celso Is progressing well towards his goals.   Pt prognosis is Good.     Pt will continue to benefit from skilled outpatient physical therapy to address the deficits listed in the problem list box on initial evaluation, provide pt/family education and to maximize pt's level of independence in the home and community environment.     Pt's spiritual, cultural and educational needs considered and pt agreeable to plan of care and goals.     Anticipated barriers to physical therapy: none    Goals:   Short Term Goals (4 Weeks):   1. Pt will be independent with HEP to supplement PT in improving pain free cervical mobility  2. Pt will improve cervical AROM 10 deg with rotation to improve cervical mobility for driving  3. Pt will improve low trap, mid trap, and rotator cuff MMTs by 1/2 grade in all planes to improve strength for lifting and carrying tasks.  4. Pt will demonstrate improved sitting posture to decrease pain experienced in head and neck.  Long Term Goals (8 Weeks):   1. Pt will improve FOTO to </=26% limitation to improve perceived limitation with changing and  maintaining mobility.  2. Pt will improve cervical AROM to WNL in all planes to improve cervical mobility for driving   3. Pt will improve low trap, mid trap, and rotator cuff MMTs 1 grade in all planes to improve strength for lifting and carrying tasks.  4. Pt will report no pain with lifting 10 lbs to promote physical activity.   5. Pt will report no pain with cervical AROM in all planes to promote QOL.    Plan   Cervical joint mobility   Thoracic mobility   Periscapular strengthening     Carlos Manuel Barksadle, PT

## 2023-05-23 ENCOUNTER — CLINICAL SUPPORT (OUTPATIENT)
Dept: REHABILITATION | Facility: HOSPITAL | Age: 68
End: 2023-05-23
Payer: MEDICARE

## 2023-05-23 DIAGNOSIS — M54.2 NECK PAIN WITHOUT INJURY: Primary | ICD-10-CM

## 2023-05-23 DIAGNOSIS — R29.898 DECREASED RANGE OF MOTION OF NECK: ICD-10-CM

## 2023-05-23 DIAGNOSIS — M47.812 FACET ARTHROPATHY, CERVICAL: ICD-10-CM

## 2023-05-23 PROCEDURE — 97140 MANUAL THERAPY 1/> REGIONS: CPT | Mod: PN

## 2023-05-23 PROCEDURE — 97110 THERAPEUTIC EXERCISES: CPT | Mod: PN

## 2023-05-23 NOTE — PROGRESS NOTES
[facility]   Physical Therapy Treatment Note      Name: Celso Mudrock [facility]  Clinic Number: [id number]    Therapy Diagnosis:   Encounter Diagnoses   Name Primary?    Neck pain without injury Yes    Facet arthropathy, cervical     Decreased range of motion of neck      Physician: Coleman Quintana, FNP    Visit Date: 5/23/2023    Physician Orders: PT Eval and Treat   Medical Diagnosis from Referral:   M54.2 (ICD-10-CM) - Neck pain without injury   M47.812 (ICD-10-CM) - Facet arthropathy, cervical   Evaluation Date: 5/15/2023  Authorization Period Expiration: 12/31/2023  Plan of Care Expiration: 6/23/2023  Visit # / Visits authorized: 3/20     Time In: 10:00 am  Time Out: 11:00 am  Total Appointment Time (timed & untimed codes): 60 minutes (1 TE) (1 MT) - 1:1 x 30 minutes      Precautions: Standard    Subjective     Pt reports: he's no longer having any neck pain. Noticing difficulty with turning his head at stop signs at times.   He was compliant with home exercise program.  Response to previous treatment: no change   Functional change: no change     Pain: 0/10  Location: neck    Objective    (5/15/2023):  Cervical right rotation: 40 degrees  Cervical left rotation: 35 degrees    (5/19/2023):  Cervical right rotation: 60 degrees  Cervical left rotation: 65 degrees    (5/23/2023): following manual  Cervical right rotation: 70 degrees  Cervical left rotation: 65 degrees    Treatment     Celso received the treatments listed below:      therapeutic exercises to develop strength, endurance, ROM, flexibility, and posture for 50 minutes including:  Upper body ergometer: 3/3 - L3  Sidelying open books: 15x   Supine shoulder flexion AAROM: 2# - 20x  Seated thoracic extension: 1/2 foam roll - 15x   Shoulder horizontal abduction: 3x10 - green theraband   Theraband external rotation: red theraband - 2x10   Cervical extension SNAGs: 15x  Cervical rotation SNAGs: 15x  Seated freemotion rows: 17# - 3x10   Seated  freemotion lat pulldowns: 20# - 3x10     manual therapy techniques: Joint mobilizations and Soft tissue Mobilization were applied to the: cervical spine for 10 minutes, including:  Bilateral cervical down glides and up glides with cervical rotation   Cervical rotation and sidebending passive range of motion    Patient Education and Home Exercises       Education provided:   - home exercise program   - POC/Prognosis     Written Home Exercises Provided: yes. Exercises were reviewed and Celso was able to demonstrate them prior to the end of the session.  Celso demonstrated good  understanding of the education provided. See EMR under Patient Instructions for exercises provided during therapy sessions    Assessment   Celso is a 67 y.o. male referred to outpatient Physical Therapy with a medical diagnosis of neck pain and cervical facet arthropathy. Patient is presenting with signs and symptoms of acute neck pain due to facet arthropathy of the cervical spine. Guarding noted during manual therapy. Patient displaying stiffness and left cervical rotation range of motion deficits upon entry, which improved following manual therapy. Patient has gained > 20 degrees of cervical rotation bilaterally since initial evaluation.     Celso Is progressing well towards his goals.   Pt prognosis is Good.     Pt will continue to benefit from skilled outpatient physical therapy to address the deficits listed in the problem list box on initial evaluation, provide pt/family education and to maximize pt's level of independence in the home and community environment.     Pt's spiritual, cultural and educational needs considered and pt agreeable to plan of care and goals.     Anticipated barriers to physical therapy: none    Goals:   Short Term Goals (4 Weeks):   1. Pt will be independent with HEP to supplement PT in improving pain free cervical mobility  2. Pt will improve cervical AROM 10 deg with rotation to improve cervical mobility for driving  3.  Pt will improve low trap, mid trap, and rotator cuff MMTs by 1/2 grade in all planes to improve strength for lifting and carrying tasks.  4. Pt will demonstrate improved sitting posture to decrease pain experienced in head and neck.  Long Term Goals (8 Weeks):   1. Pt will improve FOTO to </=26% limitation to improve perceived limitation with changing and maintaining mobility.  2. Pt will improve cervical AROM to WNL in all planes to improve cervical mobility for driving   3. Pt will improve low trap, mid trap, and rotator cuff MMTs 1 grade in all planes to improve strength for lifting and carrying tasks.  4. Pt will report no pain with lifting 10 lbs to promote physical activity.   5. Pt will report no pain with cervical AROM in all planes to promote QOL.    Plan   Cervical joint mobility   Thoracic mobility   Periscapular strengthening     Carlos Manuel Barksdale, PT

## 2023-05-30 ENCOUNTER — CLINICAL SUPPORT (OUTPATIENT)
Dept: REHABILITATION | Facility: HOSPITAL | Age: 68
End: 2023-05-30
Payer: MEDICARE

## 2023-05-30 DIAGNOSIS — R29.898 DECREASED RANGE OF MOTION OF NECK: ICD-10-CM

## 2023-05-30 DIAGNOSIS — M54.2 NECK PAIN WITHOUT INJURY: Primary | ICD-10-CM

## 2023-05-30 DIAGNOSIS — M47.812 FACET ARTHROPATHY, CERVICAL: ICD-10-CM

## 2023-05-30 PROCEDURE — 97140 MANUAL THERAPY 1/> REGIONS: CPT | Mod: PN

## 2023-05-30 PROCEDURE — 97110 THERAPEUTIC EXERCISES: CPT | Mod: PN

## 2023-05-31 NOTE — PROGRESS NOTES
[facility]   Physical Therapy Treatment Note      Name: Celso Murdock [facility]  Clinic Number: [id number]    Therapy Diagnosis:   Encounter Diagnoses   Name Primary?    Neck pain without injury Yes    Facet arthropathy, cervical     Decreased range of motion of neck      Physician: Coleman Quintana FNP    Visit Date: 5/30/2023    Physician Orders: PT Eval and Treat   Medical Diagnosis from Referral:   M54.2 (ICD-10-CM) - Neck pain without injury   M47.812 (ICD-10-CM) - Facet arthropathy, cervical   Evaluation Date: 5/15/2023  Authorization Period Expiration: 12/31/2023  Plan of Care Expiration: 6/23/2023  Visit # / Visits authorized: 4/20     Time In: 9:05 am  Time Out: 10:00 am  Total Appointment Time (timed & untimed codes): 55 minutes (3 TE) (1 MT)     Precautions: Standard    Subjective     Pt reports: neck is feeling good overall. 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 Neck feeling a little stiff today though, some soreness.   He was compliant with home exercise program.  Response to previous treatment: no change   Functional change: no change     Pain: 0/10  Location: neck    Objective    (5/15/2023):  Cervical right rotation: 40 degrees  Cervical left rotation: 35 degrees    (5/19/2023):  Cervical right rotation: 60 degrees  Cervical left rotation: 65 degrees    (5/23/2023): following manual  Cervical right rotation: 70 degrees  Cervical left rotation: 65 degrees    Treatment     Celso received the treatments listed below:      therapeutic exercises to develop strength, endurance, ROM, flexibility, and posture for 40 minutes including:  Upper body ergometer: 3/3 - L3  Sidelying open books: 15x   Supine shoulder flexion AAROM: 2# - 20x  Seated thoracic extension: 1/2 foam roll - 15x   Shoulder horizontal abduction: 3x10 - green theraband   Theraband  external rotation: red theraband - 2x10   Cervical extension SNAGs: 15x  Cervical rotation SNAGs: 15x  Seated freemotion rows: 17# - 3x10   Seated freemotion lat pulldowns: 20# - 3x10     Standing shoulder abduction: 2x12, green theraband  Wall slides: 20x    manual therapy techniques: Joint mobilizations and Soft tissue Mobilization were applied to the: cervical spine for 15 minutes, including:  Upper and lower cervical manual traction  Bilateral cervical down glides and up glides with cervical rotation   Cervical rotation and sidebending passive range of motion    Patient Education and Home Exercises       Education provided:   - home exercise program   - POC/Prognosis     Written Home Exercises Provided: yes. Exercises were reviewed and Celso was able to demonstrate them prior to the end of the session.  Celso demonstrated good  understanding of the education provided. See EMR under Patient Instructions for exercises provided during therapy sessions    Assessment   Celso is a 67 y.o. male referred to outpatient Physical Therapy with a medical diagnosis of neck pain and cervical facet arthropathy. Patient is presenting with signs and symptoms of acute neck pain due to facet arthropathy of the cervical spine. Left greater than right facet closing restrictions. Moderate stiffness and mild guarding with manual; decreased pain with traction. Global segmental stiffness left worse than right for cervical spine. History of Bilateral shoulder pain and issues form history as an .     Celso Is progressing well towards his goals.   Pt prognosis is Good.     Pt will continue to benefit from skilled outpatient physical therapy to address the deficits listed in the problem list box on initial evaluation, provide pt/family education and to maximize pt's level of independence in the home and community environment.     Pt's spiritual, cultural and educational needs considered and pt agreeable to plan of care and goals.      Anticipated barriers to physical therapy: none    Goals:   Short Term Goals (4 Weeks):   1. Pt will be independent with HEP to supplement PT in improving pain free cervical mobility  2. Pt will improve cervical AROM 10 deg with rotation to improve cervical mobility for driving  3. Pt will improve low trap, mid trap, and rotator cuff MMTs by 1/2 grade in all planes to improve strength for lifting and carrying tasks.  4. Pt will demonstrate improved sitting posture to decrease pain experienced in head and neck.  Long Term Goals (8 Weeks):   1. Pt will improve FOTO to </=26% limitation to improve perceived limitation with changing and maintaining mobility.  2. Pt will improve cervical AROM to WNL in all planes to improve cervical mobility for driving   3. Pt will improve low trap, mid trap, and rotator cuff MMTs 1 grade in all planes to improve strength for lifting and carrying tasks.  4. Pt will report no pain with lifting 10 lbs to promote physical activity.   5. Pt will report no pain with cervical AROM in all planes to promote QOL.    Plan   Cervical joint mobility   Thoracic mobility   Periscapular strengthening     Burno Brady, PT

## 2023-06-02 ENCOUNTER — CLINICAL SUPPORT (OUTPATIENT)
Dept: REHABILITATION | Facility: HOSPITAL | Age: 68
End: 2023-06-02
Payer: MEDICARE

## 2023-06-02 DIAGNOSIS — M54.2 NECK PAIN WITHOUT INJURY: Primary | ICD-10-CM

## 2023-06-02 DIAGNOSIS — M47.812 FACET ARTHROPATHY, CERVICAL: ICD-10-CM

## 2023-06-02 DIAGNOSIS — R29.898 DECREASED RANGE OF MOTION OF NECK: ICD-10-CM

## 2023-06-02 PROCEDURE — 97110 THERAPEUTIC EXERCISES: CPT | Mod: PN

## 2023-06-02 PROCEDURE — 97140 MANUAL THERAPY 1/> REGIONS: CPT | Mod: PN

## 2023-06-02 NOTE — PROGRESS NOTES
"[facility] OUTPATIENT THERAPY AND WELLNESS   Physical Therapy Treatment Note      Name: Celso Hernandez  Clinic Number: [id number]    Therapy Diagnosis:   No diagnosis found.    Physician: Coleman Quintana FNP    Visit Date: 6/2/2023    Physician Orders: PT Eval and Treat   Medical Diagnosis from Referral:   M54.2 (ICD-10-CM) - Neck pain without injury   M47.812 (ICD-10-CM) - Facet arthropathy, cervical   Evaluation Date: 5/15/2023  Authorization Period Expiration: 12/31/2023  Plan of Care Expiration: 6/23/2023  Visit # / Visits authorized: 4/20     Time In: 9:00 am  Time Out: 10:00 am  Total Appointment Time (timed & untimed codes): 55 minutes (3 TE) (1 MT)     Precautions: Standard    Subjective     Pt reports: neck is feeling good overall.   He was compliant with home exercise program.  Response to previous treatment: no change   Functional change: flexibility, less pain and soreness     Pain: 0/10  Location: neck    Objective    (5/15/2023):  Cervical right rotation: 40 degrees  Cervical left rotation: 35 degrees    (5/19/2023):  Cervical right rotation: 60 degrees  Cervical left rotation: 65 degrees    (5/23/2023): following manual  Cervical right rotation: 70 degrees  Cervical left rotation: 65 degrees    Treatment     Celso received the treatments listed below:      therapeutic exercises to develop strength, endurance, ROM, flexibility, and posture for 40 minutes including:  Upper body ergometer: 3/3 - L3  Seated scapulae retractions 20x5" holds  Seated cervical retraction 15x5" holds   Sidelying open books: 15x   Supine shoulder flexion AAROM: 2# - 20x  Seated thoracic extension: 1/2 foam roll - 15x   Shoulder horizontal abduction: 3x10 - green theraband   Theraband external rotation: red theraband - 2x10   Cervical extension SNAGs: 15x  Cervical rotation SNAGs: 15x  Seated freemotion rows: 17# - 3x10   Seated freemotion lat pulldowns: 20# - 3x10     Standing shoulder abduction: 2x12, green theraband  Wall slides: " 20x    manual therapy techniques: Joint mobilizations and Soft tissue Mobilization were applied to the: cervical spine for 15 minutes, including:  Upper and lower cervical manual traction  Bilateral cervical down glides and up glides with cervical rotation NOT PERFORMED  Cervical rotation and sidebending passive range of motion    Patient Education and Home Exercises       Education provided:   - home exercise program   - POC/Prognosis     Written Home Exercises Provided: yes. Exercises were reviewed and Celso was able to demonstrate them prior to the end of the session.  Celso demonstrated good  understanding of the education provided. See EMR under Patient Instructions for exercises provided during therapy sessions    Assessment   Celso is a 67 y.o. male referred to outpatient Physical Therapy with a medical diagnosis of neck pain and cervical facet arthropathy. Patient is presenting with signs and symptoms of acute neck pain due to facet arthropathy of the cervical spine. Left greater than right facet closing restrictions. Moderate stiffness and mild guarding with manual; decreased pain with traction. Global segmental stiffness left worse than right for cervical spine. History of Bilateral shoulder pain and issues form history as an .     Celso Is progressing well towards his goals.   Pt prognosis is Good.     Pt will continue to benefit from skilled outpatient physical therapy to address the deficits listed in the problem list box on initial evaluation, provide pt/family education and to maximize pt's level of independence in the home and community environment.     Pt's spiritual, cultural and educational needs considered and pt agreeable to plan of care and goals.     Anticipated barriers to physical therapy: none    Goals:   Short Term Goals (4 Weeks):   1. Pt will be independent with HEP to supplement PT in improving pain free cervical mobility  2. Pt will improve cervical AROM 10 deg with rotation to  improve cervical mobility for driving  3. Pt will improve low trap, mid trap, and rotator cuff MMTs by 1/2 grade in all planes to improve strength for lifting and carrying tasks.  4. Pt will demonstrate improved sitting posture to decrease pain experienced in head and neck.  Long Term Goals (8 Weeks):   1. Pt will improve FOTO to </=26% limitation to improve perceived limitation with changing and maintaining mobility.  2. Pt will improve cervical AROM to WNL in all planes to improve cervical mobility for driving   3. Pt will improve low trap, mid trap, and rotator cuff MMTs 1 grade in all planes to improve strength for lifting and carrying tasks.  4. Pt will report no pain with lifting 10 lbs to promote physical activity.   5. Pt will report no pain with cervical AROM in all planes to promote QOL.    Plan   Cervical joint mobility   Thoracic mobility   Periscapular strengthening     Nikolai Seaman, PTA

## 2023-06-02 NOTE — PROGRESS NOTES
"[facility]   Physical Therapy Treatment Note      Name: Celso Hernandez  Clinic Number: [id number]    Therapy Diagnosis:   Encounter Diagnoses   Name Primary?    Neck pain without injury Yes    Facet arthropathy, cervical     Decreased range of motion of neck      Physician: Coleman Quintana, FNP    Visit Date: 6/2/2023    Physician Orders: PT Eval and Treat   Medical Diagnosis from Referral:   M54.2 (ICD-10-CM) - Neck pain without injury   M47.812 (ICD-10-CM) - Facet arthropathy, cervical   Evaluation Date: 5/15/2023  Authorization Period Expiration: 12/31/2023  Plan of Care Expiration: 6/23/2023  Visit # / Visits authorized: 6/20     Time In: 0900  Time Out: 1000  Total Appointment Time (timed & untimed codes): 60 minutes      Precautions: Standard    Subjective     Pt reports: he feels like therapy is helping with pain. Stiffness from last week's trip improving.  He was compliant with home exercise program.  Response to previous treatment: decreased pain  Functional change: increased rotation; driving somewhat easier    Pain: 2/10   Location: neck    Objective      Cervical active range of motion next!    Treatment     Celso received the treatments listed below:      therapeutic exercises to develop strength, endurance, ROM, flexibility, and posture for 44 minutes including:    Upper body ergometer: 3/3 - L3    Sidelying open books: 15x bilateral   Supine shoulder flexion AAROM: 2# - 20x    Seated:  Thoracic extension: 1/2 foam roll - 15x   Shoulder horizontal abduction: 3x10 - green theraband   Theraband bilateral shoulder external rotation: red theraband - 2x10   Upper trap stretch: 3x10" bilateral   Cervical extension SNAGs: 15x bilateral   Cervical rotation SNAGs: 15x bilateral     Standing:  Freemotion rows: 13# - 2x15  Freemotion lat pulldowns: 13# - 2x15  Shoulder abduction: Out of time  Wall slides: Out of time    manual therapy techniques: Joint mobilizations and Soft tissue " Mobilization were applied to the: cervical spine for 16 minutes, including:  Upper and lower cervical manual traction - brief  Bilateral cervical down glides and up glides with cervical rotation   Bilateral cervical side glides and up glides with cervical rotation     Patient Education and Home Exercises       Education provided:   - home exercise program, especially side bend stretching and rotation snags within pain free tolerance    Written Home Exercises Provided: Continue prior home exercise program. Exercises were reviewed and Celso was able to demonstrate them prior to the end of the session.  Celso demonstrated good  understanding of the education provided. See EMR under Patient Instructions for exercises provided during therapy sessions    Assessment     Celso is a 67 y.o. male referred to outpatient Physical Therapy with a medical diagnosis of neck pain and cervical facet arthropathy. Stiffness persists, particularly with side bending. Segmental mobility similar bilaterally today.     Celso Is progressing well towards his goals.   Pt prognosis is Good.     Pt will continue to benefit from skilled outpatient physical therapy to address the deficits listed in the problem list box on initial evaluation, provide pt/family education and to maximize pt's level of independence in the home and community environment.     Pt's spiritual, cultural and educational needs considered and pt agreeable to plan of care and goals.     Anticipated barriers to physical therapy: none    Goals:   Short Term Goals (4 Weeks):   1. Pt will be independent with HEP to supplement PT in improving pain free cervical mobility  2. Pt will improve cervical AROM 10 deg with rotation to improve cervical mobility for driving  3. Pt will improve low trap, mid trap, and rotator cuff MMTs by 1/2 grade in all planes to improve strength for lifting and carrying tasks.  4. Pt will demonstrate improved sitting posture to decrease pain experienced in head  and neck.  Long Term Goals (8 Weeks):   1. Pt will improve FOTO to </=26% limitation to improve perceived limitation with changing and maintaining mobility.  2. Pt will improve cervical AROM to WNL in all planes to improve cervical mobility for driving   3. Pt will improve low trap, mid trap, and rotator cuff MMTs 1 grade in all planes to improve strength for lifting and carrying tasks.  4. Pt will report no pain with lifting 10 lbs to promote physical activity.   5. Pt will report no pain with cervical AROM in all planes to promote QOL.    Plan     Cervical joint mobility   Thoracic mobility   Periscapular strengthening     Kiana Duque, PT, DPT, OCS

## 2023-06-05 RX ORDER — HYDRALAZINE HYDROCHLORIDE 50 MG/1
TABLET, FILM COATED ORAL
Qty: 180 TABLET | Refills: 0 | Status: SHIPPED | OUTPATIENT
Start: 2023-06-05 | End: 2023-09-06

## 2023-06-05 NOTE — PROGRESS NOTES
"[facility]   Physical Therapy Treatment Note      Name: Celso Hernandez  Clinic Number: [id number]    Therapy Diagnosis:   Encounter Diagnoses   Name Primary?    Neck pain without injury Yes    Facet arthropathy, cervical     Decreased range of motion of neck          Physician: Coleman Quintana FNP    Visit Date: 6/6/2023    Physician Orders: PT Eval and Treat   Medical Diagnosis from Referral:   M54.2 (ICD-10-CM) - Neck pain without injury   M47.812 (ICD-10-CM) - Facet arthropathy, cervical   Evaluation Date: 5/15/2023  Authorization Period Expiration: 12/31/2023  Plan of Care Expiration: 6/23/2023  Visit # / Visits authorized: 7/20     Time In: 11:00 AM  Time Out: 111:55 AM  Total Appointment Time (timed & untimed codes): 30 minutes  (1TE,1MT)     Precautions: Standard    Subjective     Pt reports: he feels like therapy is helping with pain. Less pain today  He was compliant with home exercise program.  Response to previous treatment: decreased pain  Functional change: increased rotation; driving somewhat easier    Pain: 2/10   Location: neck    Objective      Cervical active range of motion next    Treatment     Celso received the treatments listed below:      therapeutic exercises to develop strength, endurance, ROM, flexibility, and posture for 15 minutes including: additional time supervised     Upper body ergometer: 3/3 - L3    Sidelying open books: 15x bilateral   Supine shoulder flexion AAROM: 2# - 20x    Seated:  Scapular retractions 15x5" holds  Cervical retraction 15x5" holds   Thoracic extension: 1/2 foam roll - 15x   Shoulder horizontal abduction: 3x10 - green theraband   Theraband bilateral shoulder external rotation: red theraband - 2x10   Upper trap stretch: 3x10" bilateral   Cervical extension SNAGs: 15x bilateral   Cervical rotation SNAGs: 15x bilateral     Standing:  Freemotion rows: 13# - 2x15  Freemotion lat pulldowns: 13# - 2x15  Shoulder abduction: Out of time  Wall " slides: x10    manual therapy techniques: Joint mobilizations and Soft tissue Mobilization were applied to the: cervical spine for 15 minutes, including:  Upper and lower cervical manual traction -  Upper trap static pressure and follow to release  Bilateral cervical down glides and up glides with cervical rotation  NP  Bilateral cervical side glides and up glides with cervical rotation  NP    Patient Education and Home Exercises       Education provided:   - home exercise program, especially side bend stretching and rotation snags within pain free tolerance    Written Home Exercises Provided: Continue prior home exercise program. Exercises were reviewed and Celso was able to demonstrate them prior to the end of the session.  Celso demonstrated good  understanding of the education provided. See EMR under Patient Instructions for exercises provided during therapy sessions    Assessment     Celso is a 67 y.o. male referred to outpatient Physical Therapy with a medical diagnosis of neck pain and cervical facet arthropathy. Presents with less pain today. Good subjective reports of increased flexibility and decreased pain. Continued periscapular strengthening cervical and thoracic mobility today. Tolerated well.     Celso Is progressing well towards his goals.   Pt prognosis is Good.     Pt will continue to benefit from skilled outpatient physical therapy to address the deficits listed in the problem list box on initial evaluation, provide pt/family education and to maximize pt's level of independence in the home and community environment.     Pt's spiritual, cultural and educational needs considered and pt agreeable to plan of care and goals.     Anticipated barriers to physical therapy: none    Goals:   Short Term Goals (4 Weeks):   1. Pt will be independent with HEP to supplement PT in improving pain free cervical mobility  2. Pt will improve cervical AROM 10 deg with rotation to improve cervical mobility for driving  3. Pt  will improve low trap, mid trap, and rotator cuff MMTs by 1/2 grade in all planes to improve strength for lifting and carrying tasks.  4. Pt will demonstrate improved sitting posture to decrease pain experienced in head and neck.  Long Term Goals (8 Weeks):   1. Pt will improve FOTO to </=26% limitation to improve perceived limitation with changing and maintaining mobility.  2. Pt will improve cervical AROM to WNL in all planes to improve cervical mobility for driving   3. Pt will improve low trap, mid trap, and rotator cuff MMTs 1 grade in all planes to improve strength for lifting and carrying tasks.  4. Pt will report no pain with lifting 10 lbs to promote physical activity.   5. Pt will report no pain with cervical AROM in all planes to promote QOL.    Plan     Cervical joint mobility   Thoracic mobility   Periscapular strengthening     Nikolai Seaman, ELIE

## 2023-06-06 ENCOUNTER — CLINICAL SUPPORT (OUTPATIENT)
Dept: REHABILITATION | Facility: HOSPITAL | Age: 68
End: 2023-06-06
Payer: MEDICARE

## 2023-06-06 DIAGNOSIS — R29.898 DECREASED RANGE OF MOTION OF NECK: ICD-10-CM

## 2023-06-06 DIAGNOSIS — M54.2 NECK PAIN WITHOUT INJURY: Primary | ICD-10-CM

## 2023-06-06 DIAGNOSIS — M47.812 FACET ARTHROPATHY, CERVICAL: ICD-10-CM

## 2023-06-06 PROCEDURE — 97110 THERAPEUTIC EXERCISES: CPT | Mod: PN,CQ

## 2023-06-06 PROCEDURE — 97140 MANUAL THERAPY 1/> REGIONS: CPT | Mod: PN,CQ

## 2023-06-13 ENCOUNTER — CLINICAL SUPPORT (OUTPATIENT)
Dept: REHABILITATION | Facility: HOSPITAL | Age: 68
End: 2023-06-13
Payer: MEDICARE

## 2023-06-13 DIAGNOSIS — R29.898 DECREASED RANGE OF MOTION OF NECK: ICD-10-CM

## 2023-06-13 DIAGNOSIS — M54.2 NECK PAIN WITHOUT INJURY: Primary | ICD-10-CM

## 2023-06-13 DIAGNOSIS — M47.812 FACET ARTHROPATHY, CERVICAL: ICD-10-CM

## 2023-06-13 PROCEDURE — 97110 THERAPEUTIC EXERCISES: CPT | Mod: PN

## 2023-06-13 PROCEDURE — 97140 MANUAL THERAPY 1/> REGIONS: CPT | Mod: PN

## 2023-06-13 NOTE — PROGRESS NOTES
"[facility]   Physical Therapy Treatment Note      Name: Celso Murdock [facility]  Clinic Number: [id number]    Therapy Diagnosis:   Encounter Diagnoses   Name Primary?    Neck pain without injury Yes    Facet arthropathy, cervical     Decreased range of motion of neck      Physician: Coleman Quintana, FNP    Visit Date: 6/13/2023    Physician Orders: PT Eval and Treat   Medical Diagnosis from Referral:   M54.2 (ICD-10-CM) - Neck pain without injury   M47.812 (ICD-10-CM) - Facet arthropathy, cervical   Evaluation Date: 5/15/2023  Authorization Period Expiration: 12/31/2023  Plan of Care Expiration: 6/23/2023  Visit # / Visits authorized: 8/20     Time In: 0903  Time Out: 1001  Total Appointment Time (timed & untimed codes): 58 minutes       Precautions: Standard    Subjective     Pt reports: no pain currently. Motion fluctuates; some mornings his neck is more stiff than others. Overall improvement in motion  He was compliant with home exercise program.  Response to previous treatment: decreased pain  Functional change: okay with rotating neck at a 4-way stop. More difficulty [place]    Pain: 0/10   Location: neck    Objective      6/13/2023:    Cervical Active range of motion  Pain/dysfunction with movement   Flexion 30; 35 35 after manual therapy   Extension 36; 45 45 after manual therapy   Right side bending 17; 20 20 after manual therapy   Left side bending 12; 14 14 after manual therapy   Right rotation 44; 52 52 after manual therapy   Left rotation 41; 49 49 after manual therapy     Treatment     Celso received the treatments listed below:      therapeutic exercises to develop strength, endurance, ROM, flexibility, and posture for 42 minutes including:     Supine  Chin tuck: 5"x15  Chin tuck and lift: 10"x8    Seated:  Thoracic extension backwards stool scoots (elbows on mat): 5"x5  Cervical extension SNAGs: 5"x10 bilateral   Cervical rotation SNAGs: 5"x10 " bilateral     Standing:  Freemotion rows: 10# x20  Wall open books: x10 bilateral   Wall angels: x10    manual therapy techniques: were applied to the: cervical spine for 16 minutes, including:  Bilateral cervical down glides and up glides with passive cervical rotation  Bilateral cervical side glides with passive side bend    Patient Education and Home Exercises       Education provided:   - home exercise program, focus on snags and wall angels and open books    Written Home Exercises Provided: Continue prior home exercise program. Exercises were reviewed and Celso was able to demonstrate them prior to the end of the session.  Celso demonstrated good  understanding of the education provided. See EMR under Patient Instructions for exercises provided during therapy sessions    Assessment     Celso is a 67 y.o. male referred to outpatient Physical Therapy with a medical diagnosis of neck pain and cervical facet arthropathy with primary complaints of limited neck motion. Pain is minimal. Cervical rotation and extension with 8 to 10 degree intra-session improvement. Side bending with little improvement; greatest stiffness noted during bilateral side glides.     Celso Is progressing well towards his goals.   Pt prognosis is Good.   Pt will continue to benefit from skilled outpatient physical therapy to address the deficits listed in the problem list box on initial evaluation, provide pt/family education and to maximize pt's level of independence in the home and community environment.     Pt's spiritual, cultural and educational needs considered and pt agreeable to plan of care and goals.  Anticipated barriers to physical therapy: none    Short Term Goals (4 Weeks):   1. Pt will be independent with HEP to supplement PT in improving pain free cervical mobility. Progressing, not met   2. Pt will improve cervical AROM 10 deg with rotation to improve cervical mobility for driving. Progressing, not met   3. Pt will improve low trap,  mid trap, and rotator cuff MMTs by 1/2 grade in all planes to improve strength for lifting and carrying tasks. Progressing, not met   4. Pt will demonstrate improved sitting posture to decrease pain experienced in head and neck. Progressing, not met   Long Term Goals (8 Weeks):   1. Pt will improve FOTO to </=26% limitation to improve perceived limitation with changing and maintaining mobility. Progressing, not met   2. Pt will improve cervical AROM to WNL in all planes to improve cervical mobility for driving. Progressing, not met   3. Pt will improve low trap, mid trap, and rotator cuff MMTs 1 grade in all planes to improve strength for lifting and carrying tasks. Progressing, not met   4. Pt will report no pain with lifting 10 lbs to promote physical activity. Progressing, not met   5. Pt will report no pain with cervical AROM in all planes to promote QOL. Progressing, not met     Plan     Cervical and thoracic mobility   Periscapular strengthening     Kiana Duque, PT, DPT, OCS

## 2023-06-19 ENCOUNTER — OFFICE VISIT (OUTPATIENT)
Dept: OPTOMETRY | Facility: CLINIC | Age: 68
End: 2023-06-19
Payer: MEDICARE

## 2023-06-19 DIAGNOSIS — H52.4 HYPEROPIA WITH ASTIGMATISM AND PRESBYOPIA, BILATERAL: ICD-10-CM

## 2023-06-19 DIAGNOSIS — H25.13 SENILE NUCLEAR SCLEROSIS, BILATERAL: ICD-10-CM

## 2023-06-19 DIAGNOSIS — H40.1132 PRIMARY OPEN ANGLE GLAUCOMA (POAG) OF BOTH EYES, MODERATE STAGE: Primary | ICD-10-CM

## 2023-06-19 DIAGNOSIS — H52.203 HYPEROPIA WITH ASTIGMATISM AND PRESBYOPIA, BILATERAL: ICD-10-CM

## 2023-06-19 DIAGNOSIS — H52.03 HYPEROPIA WITH ASTIGMATISM AND PRESBYOPIA, BILATERAL: ICD-10-CM

## 2023-06-19 DIAGNOSIS — E11.36 TYPE 2 DIABETES MELLITUS WITH CATARACT: ICD-10-CM

## 2023-06-19 DIAGNOSIS — E11.9 TYPE 2 DIABETES MELLITUS WITHOUT RETINOPATHY: ICD-10-CM

## 2023-06-19 PROCEDURE — 1101F PT FALLS ASSESS-DOCD LE1/YR: CPT | Mod: CPTII,S$GLB,, | Performed by: OPTOMETRIST

## 2023-06-19 PROCEDURE — 3044F HG A1C LEVEL LT 7.0%: CPT | Mod: CPTII,S$GLB,, | Performed by: OPTOMETRIST

## 2023-06-19 PROCEDURE — 1126F AMNT PAIN NOTED NONE PRSNT: CPT | Mod: CPTII,S$GLB,, | Performed by: OPTOMETRIST

## 2023-06-19 PROCEDURE — 1160F RVW MEDS BY RX/DR IN RCRD: CPT | Mod: CPTII,S$GLB,, | Performed by: OPTOMETRIST

## 2023-06-19 PROCEDURE — 1159F MED LIST DOCD IN RCRD: CPT | Mod: CPTII,S$GLB,, | Performed by: OPTOMETRIST

## 2023-06-19 PROCEDURE — 3288F PR FALLS RISK ASSESSMENT DOCUMENTED: ICD-10-PCS | Mod: CPTII,S$GLB,, | Performed by: OPTOMETRIST

## 2023-06-19 PROCEDURE — 1126F PR PAIN SEVERITY QUANTIFIED, NO PAIN PRESENT: ICD-10-PCS | Mod: CPTII,S$GLB,, | Performed by: OPTOMETRIST

## 2023-06-19 PROCEDURE — 1159F PR MEDICATION LIST DOCUMENTED IN MEDICAL RECORD: ICD-10-PCS | Mod: CPTII,S$GLB,, | Performed by: OPTOMETRIST

## 2023-06-19 PROCEDURE — 2023F DILAT RTA XM W/O RTNOPTHY: CPT | Mod: CPTII,S$GLB,, | Performed by: OPTOMETRIST

## 2023-06-19 PROCEDURE — 92015 DETERMINE REFRACTIVE STATE: CPT | Mod: S$GLB,,, | Performed by: OPTOMETRIST

## 2023-06-19 PROCEDURE — 1160F PR REVIEW ALL MEDS BY PRESCRIBER/CLIN PHARMACIST DOCUMENTED: ICD-10-PCS | Mod: CPTII,S$GLB,, | Performed by: OPTOMETRIST

## 2023-06-19 PROCEDURE — 1101F PR PT FALLS ASSESS DOC 0-1 FALLS W/OUT INJ PAST YR: ICD-10-PCS | Mod: CPTII,S$GLB,, | Performed by: OPTOMETRIST

## 2023-06-19 PROCEDURE — 99999 PR PBB SHADOW E&M-EST. PATIENT-LVL III: CPT | Mod: PBBFAC,,, | Performed by: OPTOMETRIST

## 2023-06-19 PROCEDURE — 92014 PR EYE EXAM, EST PATIENT,COMPREHESV: ICD-10-PCS | Mod: S$GLB,,, | Performed by: OPTOMETRIST

## 2023-06-19 PROCEDURE — 92014 COMPRE OPH EXAM EST PT 1/>: CPT | Mod: S$GLB,,, | Performed by: OPTOMETRIST

## 2023-06-19 PROCEDURE — 99999 PR PBB SHADOW E&M-EST. PATIENT-LVL III: ICD-10-PCS | Mod: PBBFAC,,, | Performed by: OPTOMETRIST

## 2023-06-19 PROCEDURE — 92015 PR REFRACTION: ICD-10-PCS | Mod: S$GLB,,, | Performed by: OPTOMETRIST

## 2023-06-19 PROCEDURE — 3288F FALL RISK ASSESSMENT DOCD: CPT | Mod: CPTII,S$GLB,, | Performed by: OPTOMETRIST

## 2023-06-19 PROCEDURE — 3044F PR MOST RECENT HEMOGLOBIN A1C LEVEL <7.0%: ICD-10-PCS | Mod: CPTII,S$GLB,, | Performed by: OPTOMETRIST

## 2023-06-19 PROCEDURE — 2023F PR DILATED RETINAL EXAM W/O EVID OF RETINOPATHY: ICD-10-PCS | Mod: CPTII,S$GLB,, | Performed by: OPTOMETRIST

## 2023-06-19 NOTE — PROGRESS NOTES
TAYLOR    KEITH: [birth year]  Chief complaint (CC): Patient is here for annual eye exam today.  Patient   has noticed that near is not quite as clear with the glasses as it used to   be. Distance seems fine.  Glasses? + 2 yrs. old  Contacts? -  H/o eye surgery, injections or laser: -  H/o eye injury: -  Known eye conditions? See above  Family h/o eye conditions? Mother with glaucoma  Eye gtts? Using Latanoprost OU Q HS and Cosopt BID OU, last used last   night      (-) Flashes (-)  Floaters (-) Mucous   (-)  Tearing (-) Itching (-) Burning   (-) Headaches (-) Eye Pain/discomfort (-) Irritation   (-)  Redness (-) Double vision (-) Blurry vision    Diabetic? + yesterday  A1c? Hemoglobin A1C       Date                     Value               Ref Range             Status                03/27/2023               5.5                 4.0 - 5.6 %           Final                 09/26/2022               5.4                 4.0 - 5.6 %           Final                 05/23/2022               5.9 (H)             4.0 - 5.6 %           Final                    Last edited by Estella Kebede on 6/19/2023  9:29 AM.            Assessment /Plan     For exam results, see Encounter Report.      Primary open angle glaucoma (POAG) of both eyes, moderate stage  (-) FHx. IOp 17 OD, OS. Last 17 OD, 18 OS. Tmax  22 OD, 23 OS.T min on drops 12 OD, 13 OS.  Ptfailed to f/u from 12/17/2019 to 9/16/2020 and 6/2021 to 3/11/2022. Pt reports noncompliance w/drops. Pt reports compliance w/eyedrops Prev pt of Dr Huffman. C/d 0.55 OD, 0.65 OS.   12/28/2022  HVF OD inf arcuate, OS sup arcuate  12/28/2022 OCT OD, thin NS, TS, T, TI, General (Borderline N). OS thin TS, TI, T, General (Borderline N and NI).   6/3/2021 OCT OD NS, TS, T, TI and G, OS thin TS, T, TI, G, borderline NI (stable OU)  3/11/2022 Photos  6/19/2023  Educated pt on findings w/understanding.   Need for IOp to be lower. Pt reports compliance but states that his spacing in the drops times  may be off.  Cont Latanoprost QHS OU.  D/c Timolol BiD OU on 12/15/2020  Cont Cosopt BID OU (start 12/15/2020)  Importance of drop compliance and f/u discussed with pt.   RTC 4 mo IOP    Senile nuclear sclerosis, bilateral  Type 2 diabetes mellitus with cataract  Nuclear sclerotic cataract - not visually significant. Observe.    Type 2 diabetes mellitus without retinopathy  BS control. No signs of diabetic retinopathy. Monitor with annual exam.    Hyperopia with astigmatism and presbyopia, bilateral  SRx released to patient. Patient educated on lens options. Normal ocular health. RTC 1 year for routine exam.

## 2023-06-20 ENCOUNTER — CLINICAL SUPPORT (OUTPATIENT)
Dept: REHABILITATION | Facility: HOSPITAL | Age: 68
End: 2023-06-20
Payer: MEDICARE

## 2023-06-20 DIAGNOSIS — M47.812 FACET ARTHROPATHY, CERVICAL: ICD-10-CM

## 2023-06-20 DIAGNOSIS — R29.898 DECREASED RANGE OF MOTION OF NECK: ICD-10-CM

## 2023-06-20 DIAGNOSIS — M54.2 NECK PAIN WITHOUT INJURY: Primary | ICD-10-CM

## 2023-06-20 PROCEDURE — 97110 THERAPEUTIC EXERCISES: CPT | Mod: PN

## 2023-06-20 NOTE — PROGRESS NOTES
"[facility]   Physical Therapy Treatment Note      Name: Celso Hernandez  Clinic Number: [id number]    Therapy Diagnosis:   Encounter Diagnoses   Name Primary?    Neck pain without injury Yes    Facet arthropathy, cervical     Decreased range of motion of neck      Physician: Coleman Quintana, FNP    Visit Date: 6/20/2023    Physician Orders: PT Eval and Treat   Medical Diagnosis from Referral:   M54.2 (ICD-10-CM) - Neck pain without injury   M47.812 (ICD-10-CM) - Facet arthropathy, cervical   Evaluation Date: 5/15/2023  Authorization Period Expiration: 12/31/2023  Plan of Care Expiration: 6/23/2023  Visit # / Visits authorized: 8/20     Time In: 10:05 am  Time Out: 11:00 am  Total Appointment Time (timed & untimed codes): 55 minutes (2 TE)     Precautions: Standard    Subjective     Pt reports: no pain at the moment. He would like to continue physical therapy and update plan of care for more visits as he has not achieved the motion and decreased neck strain he had hoped for. Primary complaint is neck straining/burning with cervical motions.   He was compliant with home exercise program.  Response to previous treatment: decreased pain  Functional change: okay with rotating neck at a 4-way stop. More difficulty [place]    Pain: 0/10   Location: neck    Objective      6/13/2023:    Cervical Active range of motion  Pain/dysfunction with movement   Flexion 30; 35 35 after manual therapy   Extension 36; 45 45 after manual therapy   Right side bending 17; 20 20 after manual therapy   Left side bending 12; 14 14 after manual therapy   Right rotation 44; 52 52 after manual therapy   Left rotation 41; 49 49 after manual therapy     Treatment     Celso received the treatments listed below:      therapeutic exercises to develop strength, endurance, ROM, flexibility, and posture for 42 minutes including:   Supine  Chin tuck: 5"x15  Chin tuck and lift: " "10"x8    Seated:  Thoracic extension: 1/2 foam roll - 15x5"  Cervical extension SNAGs: 5"x15 bilateral   Cervical rotation SNAGs: 5"x15 bilateral     Standing:  Seated freemotion rows: 10# x20  Wall open books: x10 bilateral   Wall angels: x10  SAPD with cervical rotation: Blue theraband - 2x10  Theraband external rotation: red theraband - 3x10      manual therapy techniques: were applied to the: cervical spine for 00 minutes, including:  Bilateral cervical down glides and up glides with passive cervical rotation  Bilateral cervical side glides with passive side bend    Patient Education and Home Exercises       Education provided:   - home exercise program, focus on snags and wall angels and open books    Written Home Exercises Provided: Continue prior home exercise program. Exercises were reviewed and Celso was able to demonstrate them prior to the end of the session.  Celso demonstrated good  understanding of the education provided. See EMR under Patient Instructions for exercises provided during therapy sessions    Assessment     Celso is a 67 y.o. male referred to outpatient Physical Therapy with a medical diagnosis of neck pain and cervical facet arthropathy with primary complaints of limited neck motion. Pain is minimal. Planning to retake measurements, discuss goals, and likely update plan of care next visit.    Celso Is progressing well towards his goals.   Pt prognosis is Good.   Pt will continue to benefit from skilled outpatient physical therapy to address the deficits listed in the problem list box on initial evaluation, provide pt/family education and to maximize pt's level of independence in the home and community environment.     Pt's spiritual, cultural and educational needs considered and pt agreeable to plan of care and goals.  Anticipated barriers to physical therapy: none    Short Term Goals (4 Weeks):   1. Pt will be independent with HEP to supplement PT in improving pain free cervical mobility. " Progressing, not met   2. Pt will improve cervical AROM 10 deg with rotation to improve cervical mobility for driving. Progressing, not met   3. Pt will improve low trap, mid trap, and rotator cuff MMTs by 1/2 grade in all planes to improve strength for lifting and carrying tasks. Progressing, not met   4. Pt will demonstrate improved sitting posture to decrease pain experienced in head and neck. Progressing, not met   Long Term Goals (8 Weeks):   1. Pt will improve FOTO to </=26% limitation to improve perceived limitation with changing and maintaining mobility. Progressing, not met   2. Pt will improve cervical AROM to WNL in all planes to improve cervical mobility for driving. Progressing, not met   3. Pt will improve low trap, mid trap, and rotator cuff MMTs 1 grade in all planes to improve strength for lifting and carrying tasks. Progressing, not met   4. Pt will report no pain with lifting 10 lbs to promote physical activity. Progressing, not met   5. Pt will report no pain with cervical AROM in all planes to promote QOL. Progressing, not met     Plan     Cervical and thoracic mobility   Periscapular strengthening     Carlos Manuel Barksdale, PT, DPT

## 2023-06-23 ENCOUNTER — CLINICAL SUPPORT (OUTPATIENT)
Dept: REHABILITATION | Facility: HOSPITAL | Age: 68
End: 2023-06-23
Payer: MEDICARE

## 2023-06-23 DIAGNOSIS — M47.812 FACET ARTHROPATHY, CERVICAL: ICD-10-CM

## 2023-06-23 DIAGNOSIS — R29.898 DECREASED RANGE OF MOTION OF NECK: ICD-10-CM

## 2023-06-23 DIAGNOSIS — M54.2 NECK PAIN WITHOUT INJURY: Primary | ICD-10-CM

## 2023-06-23 PROCEDURE — 97110 THERAPEUTIC EXERCISES: CPT | Mod: PN

## 2023-06-23 PROCEDURE — 97530 THERAPEUTIC ACTIVITIES: CPT | Mod: PN

## 2023-06-23 NOTE — PROGRESS NOTES
[facility]   Physical Therapy Treatment Note      Name: Celso Hernandez  Clinic Number: [id number]    Therapy Diagnosis:   Encounter Diagnoses   Name Primary?    Neck pain without injury Yes    Facet arthropathy, cervical     Decreased range of motion of neck      Physician: Coleman Quintana, FNP    Visit Date: 6/23/2023    Physician Orders: PT Eval and Treat   Medical Diagnosis from Referral:   M54.2 (ICD-10-CM) - Neck pain without injury   M47.812 (ICD-10-CM) - Facet arthropathy, cervical   Evaluation Date: 5/15/2023  Authorization Period Expiration: 12/31/2023  Plan of Care Expiration: 6/23/2023; 8/4/2023  Visit # / Visits authorized: 9/20     Time In: 10:05 am  Time Out: 11:00 am  Total Appointment Time (timed & untimed codes): 55 minutes (1 TA) (1 TE)- 1:1 x 30 minutes      Precautions: Standard    Subjective     Pt reports: no pain this morning. He would like to continue with physical therapy to further improve his cervical range of motion to help with driving and functional tasks.   He was compliant with home exercise program.  Response to previous treatment: decreased pain  Functional change: okay with rotating neck at a 4-way stop. More difficulty [place]    Pain: 0/10   Location: neck    Objective    (6/23/2023):  Cervical Range of Motion: AROM    Degrees Subjective report   Flexion  45 --> 50 degrees  Neck tightness   Extension  10 --> 25 degrees  Neck tightness   Right Rotation  40 --> 58 degrees  Neck tightness   Left Rotation  35 --> 60 degrees  Neck tightness    Right Side Bending  10 --> 15 degrees  Tightness/pain   Left Side Bending  5 --> 10 degrees  Tightness/pain          Shoulder Range of Motion: AROM WNL      Upper Extremity Strength  (R) UE   (L) UE     Shoulder flexion: 5/5 Shoulder flexion: 5/5   Shoulder Abduction: 5/5 Shoulder abduction: 5/5   Shoulder ER 5/5 Shoulder ER 5/5   Shoulder IR 5/5 Shoulder IR 5/5     Cervical  "joint mobility: hypomobility of the cervical and thoracic spine; hypomobility with side glides      Treatment     Celso received the treatments listed below:    Therapeutic activities to improve functional performance for 25 minutes, including:  FOTO  Objective tests and measures  POC update     therapeutic exercises to develop strength, endurance, ROM, flexibility, and posture for 30 minutes including:   Seated UT stretch: 5x10" - bilateral    Seated:  Thoracic extension: 1/2 foam roll - 15x5"  Cervical extension SNAGs: 5"x15 bilateral   Cervical rotation SNAGs: 5"x15 bilateral     Standing:  Seated freemotion rows: 10# x20  Wall open books: x10 bilateral   Wall angels: x10  SAPD with cervical rotation: Blue theraband - 2x10  Theraband external rotation: red theraband - 3x10    Not today:  Supine  Chin tuck: 5"x15  Chin tuck and lift: 10"x8    manual therapy techniques: were applied to the: cervical spine for 00 minutes, including:  Bilateral cervical down glides and up glides with passive cervical rotation  Bilateral cervical side glides with passive side bend    Patient Education and Home Exercises       Education provided:   - home exercise program, focus on snags and wall angels and open books    Written Home Exercises Provided: Continue prior home exercise program. Exercises were reviewed and Celso was able to demonstrate them prior to the end of the session.  Celso demonstrated good  understanding of the education provided. See EMR under Patient Instructions for exercises provided during therapy sessions    Assessment     Celso is a 67 y.o. male referred to outpatient Physical Therapy with a medical diagnosis of neck pain and cervical facet arthropathy with primary complaints of limited neck motion. Pain is minimal. With objective tests and measures, patient is displaying improvements with cervical range of motion; although, still lacking cervical sidebending, extension, and rotation range of motion. Patient would " benefit from further physical therapy progressing cervical joint mobility, UT flexibility, and rhomboid/rotator cuff strengthening.     Celso Is progressing well towards his goals.   Pt prognosis is Good.   Pt will continue to benefit from skilled outpatient physical therapy to address the deficits listed in the problem list box on initial evaluation, provide pt/family education and to maximize pt's level of independence in the home and community environment.     Pt's spiritual, cultural and educational needs considered and pt agreeable to plan of care and goals.  Anticipated barriers to physical therapy: none    Short Term Goals (4 Weeks):   1. Pt will be independent with HEP to supplement PT in improving pain free cervical mobility. met   2. Pt will improve cervical AROM 10 deg with rotation to improve cervical mobility for driving. met   3. Pt will improve low trap, mid trap, and rotator cuff MMTs by 1/2 grade in all planes to improve strength for lifting and carrying tasks. met   4. Pt will demonstrate improved sitting posture to decrease pain experienced in head and neck. met   Long Term Goals (8 Weeks):   1. Pt will improve FOTO to </=26% limitation to improve perceived limitation with changing and maintaining mobility. met   2. Pt will improve cervical AROM to WNL in all planes to improve cervical mobility for driving. Progressing, not met   3. Pt will improve low trap, mid trap, and rotator cuff MMTs 1 grade in all planes to improve strength for lifting and carrying tasks. Progressing, not met   4. Pt will report no pain with lifting 10 lbs to promote physical activity. Progressing, not met   5. Pt will report no pain with cervical AROM in all planes to promote QOL. Progressing, not met     Plan     Cervical and thoracic mobility   Periscapular strengthening     Carlos Manuel Barksdale, PT, DPT

## 2023-06-30 ENCOUNTER — CLINICAL SUPPORT (OUTPATIENT)
Dept: REHABILITATION | Facility: HOSPITAL | Age: 68
End: 2023-06-30
Payer: MEDICARE

## 2023-06-30 DIAGNOSIS — R29.898 DECREASED RANGE OF MOTION OF NECK: ICD-10-CM

## 2023-06-30 DIAGNOSIS — M54.2 NECK PAIN WITHOUT INJURY: Primary | ICD-10-CM

## 2023-06-30 DIAGNOSIS — M47.812 FACET ARTHROPATHY, CERVICAL: ICD-10-CM

## 2023-06-30 PROCEDURE — 97530 THERAPEUTIC ACTIVITIES: CPT | Mod: PN,CQ

## 2023-06-30 PROCEDURE — 97110 THERAPEUTIC EXERCISES: CPT | Mod: PN,CQ

## 2023-06-30 NOTE — PROGRESS NOTES
[facility]   Physical Therapy Treatment Note      Name: Celso Hernandez  Clinic Number: [id number]    Therapy Diagnosis:   Encounter Diagnoses   Name Primary?    Neck pain without injury Yes    Facet arthropathy, cervical     Decreased range of motion of neck          Physician: Coleman Quintana FNP    Visit Date: 6/30/2023    Physician Orders: PT Eval and Treat   Medical Diagnosis from Referral:   M54.2 (ICD-10-CM) - Neck pain without injury   M47.812 (ICD-10-CM) - Facet arthropathy, cervical   Evaluation Date: 5/15/2023  Authorization Period Expiration: 12/31/2023  Plan of Care Expiration: 6/23/2023; 8/4/2023  Visit # / Visits authorized: 9/20     Time In: 09:00 am  Time Out: 09:55  am  Total Appointment Time (timed & untimed codes): 55 minutes (2 TA) (2 TE)- 1:1 x 55 minutes      Precautions: Standard    Subjective     Pt reports: no pain this morning. He struggles with fatigue as he has to hold his position with head turned when looking for traffic when driving.   He was compliant with home exercise program.  Response to previous treatment: decreased pain  Functional change: okay with rotating neck at a 4-way stop. More difficulty rotating neck [place]    Pain: 0/10   Location: neck    Objective    (6/23/2023):  Cervical Range of Motion: AROM    Degrees Subjective report   Flexion  45 --> 50 degrees  Neck tightness   Extension  10 --> 25 degrees  Neck tightness   Right Rotation  40 --> 58 degrees  Neck tightness   Left Rotation  35 --> 60 degrees  Neck tightness    Right Side Bending  10 --> 15 degrees  Tightness/pain   Left Side Bending  5 --> 10 degrees  Tightness/pain          Shoulder Range of Motion: AROM WNL      Upper Extremity Strength  (R) UE   (L) UE     Shoulder flexion: 5/5 Shoulder flexion: 5/5   Shoulder Abduction: 5/5 Shoulder abduction: 5/5   Shoulder ER 5/5 Shoulder ER 5/5   Shoulder IR 5/5 Shoulder IR 5/5     Cervical joint mobility:  "hypomobility of the cervical and thoracic spine; hypomobility with side glides      Treatment     Celso received the treatments listed below:    Therapeutic activities to improve functional performance for 25 minutes, including:    Seated:  Scapular retraction/depression 10x5"   Cervical retraction 15x5" holds  Thoracic extension: 1/2 foam roll - 15x5"  Cervical extension SNAGs: 5"x15 bilateral   Cervical rotation SNAGs: 5"x15 bilateral   Seated UT stretch: 5x10" - bilateral  Standing sink stretch 5x5" holds      therapeutic exercises to develop strength, endurance, ROM, flexibility, and posture for 30 minutes including:     Standing:  Seated freemotion rows: 10#  3x10 reps   Wall open books: x15 bilateral   Wall angels: x10  SAPD with cervical rotation: Blue theraband - 2x10  Theraband external rotation: green theraband - 3x10  Horizontal Abduction with green theraband 2x10    Not today:  Supine  Chin tuck: 5"x15  Chin tuck and lift: 10"x8    manual therapy techniques: were applied to the: cervical spine for 00 minutes, including:  Bilateral cervical down glides and up glides with passive cervical rotation  Bilateral cervical side glides with passive side bend    Patient Education and Home Exercises       Education provided:   - home exercise program, focus on snags and wall angels and open books    Written Home Exercises Provided: Continue prior home exercise program. Exercises were reviewed and Celso was able to demonstrate them prior to the end of the session.  Celso demonstrated good  understanding of the education provided. See EMR under Patient Instructions for exercises provided during therapy sessions    Assessment     Celso is a 67 y.o. male referred to outpatient Physical Therapy with a medical diagnosis of neck pain and cervical facet arthropathy with primary complaints of limited neck motion. Pain is minimal.  Patient would benefit from further physical therapy progressing cervical joint mobility, UT " flexibility, and rhomboid/rotator cuff strengthening.     Celso Is progressing well towards his goals.   Pt prognosis is Good.   Pt will continue to benefit from skilled outpatient physical therapy to address the deficits listed in the problem list box on initial evaluation, provide pt/family education and to maximize pt's level of independence in the home and community environment.     Pt's spiritual, cultural and educational needs considered and pt agreeable to plan of care and goals.  Anticipated barriers to physical therapy: none    Short Term Goals (4 Weeks):   1. Pt will be independent with HEP to supplement PT in improving pain free cervical mobility. met   2. Pt will improve cervical AROM 10 deg with rotation to improve cervical mobility for driving. met   3. Pt will improve low trap, mid trap, and rotator cuff MMTs by 1/2 grade in all planes to improve strength for lifting and carrying tasks. met   4. Pt will demonstrate improved sitting posture to decrease pain experienced in head and neck. met   Long Term Goals (8 Weeks):   1. Pt will improve FOTO to </=26% limitation to improve perceived limitation with changing and maintaining mobility. met   2. Pt will improve cervical AROM to WNL in all planes to improve cervical mobility for driving. Progressing, not met   3. Pt will improve low trap, mid trap, and rotator cuff MMTs 1 grade in all planes to improve strength for lifting and carrying tasks. Progressing, not met   4. Pt will report no pain with lifting 10 lbs to promote physical activity. Progressing, not met   5. Pt will report no pain with cervical AROM in all planes to promote QOL. Progressing, not met     Plan     Cervical and thoracic mobility   Periscapular strengthening     Nikolai Seaman, ELIE

## 2023-07-03 ENCOUNTER — TELEPHONE (OUTPATIENT)
Dept: OPTOMETRY | Facility: CLINIC | Age: 68
End: 2023-07-03
Payer: MEDICARE

## 2023-07-03 NOTE — TELEPHONE ENCOUNTER
----- Message from Dasha Dugan sent at 7/3/2023  2:52 PM CDT -----  Regarding: Rx Fax Request  Pt called about having his RX for eye glasses faxed to [facility]    Pts call back- [phone]

## 2023-07-07 ENCOUNTER — CLINICAL SUPPORT (OUTPATIENT)
Dept: REHABILITATION | Facility: HOSPITAL | Age: 68
End: 2023-07-07
Payer: MEDICARE

## 2023-07-07 DIAGNOSIS — M47.812 FACET ARTHROPATHY, CERVICAL: ICD-10-CM

## 2023-07-07 DIAGNOSIS — R29.898 DECREASED RANGE OF MOTION OF NECK: ICD-10-CM

## 2023-07-07 DIAGNOSIS — M54.2 NECK PAIN WITHOUT INJURY: Primary | ICD-10-CM

## 2023-07-07 PROCEDURE — 97110 THERAPEUTIC EXERCISES: CPT | Mod: PN,CQ

## 2023-07-07 PROCEDURE — 97530 THERAPEUTIC ACTIVITIES: CPT | Mod: PN,CQ

## 2023-07-07 NOTE — PROGRESS NOTES
[facility]   Physical Therapy Treatment Note      Name: Celso Hernandez  Clinic Number: [id number]    Therapy Diagnosis:   Encounter Diagnoses   Name Primary?    Neck pain without injury Yes    Facet arthropathy, cervical     Decreased range of motion of neck              Physician: Coleman Quintana, FNP    Visit Date: 7/7/2023    Physician Orders: PT Eval and Treat   Medical Diagnosis from Referral:   M54.2 (ICD-10-CM) - Neck pain without injury   M47.812 (ICD-10-CM) - Facet arthropathy, cervical   Evaluation Date: 5/15/2023  Authorization Period Expiration: 12/31/2023  Plan of Care Expiration: 6/23/2023; 8/4/2023  Visit # / Visits authorized: 10/20     Time In: 10:00 am  Time Out: 10:55  am  Total Appointment Time (timed & untimed codes): 30 minutes (1 TA) (1 TE)- 1:1 x 30 minutes      Precautions: Standard    Subjective     Pt reports: no pain this morning. Hasn't been as fatigued holding his position with head turned when looking for traffic when driving.   He was compliant with home exercise program.  Response to previous treatment: decreased pain  Functional change: okay with rotating neck at a 4-way stop. Improved [place]    Pain: 0/10   Location: neck    Objective    (6/23/2023):  Cervical Range of Motion: AROM    Degrees Subjective report   Flexion  45 --> 50 degrees  Neck tightness   Extension  10 --> 25 degrees  Neck tightness   Right Rotation  40 --> 58 degrees  Neck tightness   Left Rotation  35 --> 60 degrees  Neck tightness    Right Side Bending  10 --> 15 degrees  Tightness/pain   Left Side Bending  5 --> 10 degrees  Tightness/pain          Shoulder Range of Motion: AROM WNL      Upper Extremity Strength  (R) UE   (L) UE     Shoulder flexion: 5/5 Shoulder flexion: 5/5   Shoulder Abduction: 5/5 Shoulder abduction: 5/5   Shoulder ER 5/5 Shoulder ER 5/5   Shoulder IR 5/5 Shoulder IR 5/5     Cervical joint mobility: hypomobility of the  "cervical and thoracic spine; hypomobility with side glides      Treatment     Celso received the treatments listed below:    Therapeutic activities to improve functional performance for 25 minutes, including:    Seated:  Scapular retraction/depression 10x5"   Cervical retraction 15x5" holds  Thoracic extension: 1/2 foam roll - 15x5"  Cervical extension SNAGs: 5"x15 bilateral   Cervical rotation SNAGs: 5"x15 bilateral   Seated UT stretch: 5x10" - bilateral  Standing sink stretch 5x5" holds  Supine on (2) rolled towels positioned cephalocaudally with upper extremity in horizontal abd for pec postural stretch 2 minutes       therapeutic exercises to develop strength, endurance, ROM, flexibility, and posture for 30 minutes including:      Standing:  Seated freemotion rows: Black theraband  3x10 reps (Free motion unavailable)  Wall open books: x15 bilateral   Wall angels: 2x10  SAPD with cervical rotation: Blue theraband - 2x10  Theraband external rotation: green theraband - 3x10  Horizontal Abduction with green theraband 3x10    Not today:  Supine  Chin tuck: 5"x15  Chin tuck and lift: 10"x8    manual therapy techniques: were applied to the: cervical spine for 00 minutes, including:  Bilateral cervical down glides and up glides with passive cervical rotation  Bilateral cervical side glides with passive side bend    Patient Education and Home Exercises       Education provided:   - home exercise program, focus on snags and wall angels and open books    Written Home Exercises Provided: Continue prior home exercise program. Exercises were reviewed and Celso was able to demonstrate them prior to the end of the session.  Celso demonstrated good  understanding of the education provided. See EMR under Patient Instructions for exercises provided during therapy sessions    Assessment     Celso is a 67 y.o. male referred to outpatient Physical Therapy with a medical diagnosis of neck pain and cervical facet arthropathy with primary " complaints of limited neck motion. Pain is minimal.  Patient would benefit from further physical therapy progressing cervical joint mobility, UT flexibility, and rhomboid/rotator cuff strengthening.     Celso Is progressing well towards his goals.   Pt prognosis is Good.   Pt will continue to benefit from skilled outpatient physical therapy to address the deficits listed in the problem list box on initial evaluation, provide pt/family education and to maximize pt's level of independence in the home and community environment.     Pt's spiritual, cultural and educational needs considered and pt agreeable to plan of care and goals.  Anticipated barriers to physical therapy: none    Short Term Goals (4 Weeks):   1. Pt will be independent with HEP to supplement PT in improving pain free cervical mobility. met   2. Pt will improve cervical AROM 10 deg with rotation to improve cervical mobility for driving. met   3. Pt will improve low trap, mid trap, and rotator cuff MMTs by 1/2 grade in all planes to improve strength for lifting and carrying tasks. met   4. Pt will demonstrate improved sitting posture to decrease pain experienced in head and neck. met   Long Term Goals (8 Weeks):   1. Pt will improve FOTO to </=26% limitation to improve perceived limitation with changing and maintaining mobility. met   2. Pt will improve cervical AROM to WNL in all planes to improve cervical mobility for driving. Progressing, not met   3. Pt will improve low trap, mid trap, and rotator cuff MMTs 1 grade in all planes to improve strength for lifting and carrying tasks. Progressing, not met   4. Pt will report no pain with lifting 10 lbs to promote physical activity. Progressing, not met   5. Pt will report no pain with cervical AROM in all planes to promote QOL. Progressing, not met     Plan     Cervical and thoracic mobility   Periscapular strengthening     Nikolai Seaman, ELIE

## 2023-07-14 ENCOUNTER — CLINICAL SUPPORT (OUTPATIENT)
Dept: REHABILITATION | Facility: HOSPITAL | Age: 68
End: 2023-07-14
Payer: MEDICARE

## 2023-07-14 DIAGNOSIS — M54.2 NECK PAIN WITHOUT INJURY: Primary | ICD-10-CM

## 2023-07-14 DIAGNOSIS — R29.898 DECREASED RANGE OF MOTION OF NECK: ICD-10-CM

## 2023-07-14 DIAGNOSIS — M47.812 FACET ARTHROPATHY, CERVICAL: ICD-10-CM

## 2023-07-14 PROCEDURE — 97110 THERAPEUTIC EXERCISES: CPT | Mod: PN

## 2023-07-14 PROCEDURE — 97530 THERAPEUTIC ACTIVITIES: CPT | Mod: PN

## 2023-07-14 NOTE — PROGRESS NOTES
"[facility] OUTPATIENT THERAPY AND WELLNESS   Physical Therapy Treatment Note      Name: Celso Hernandez  Clinic Number: [id number]    Therapy Diagnosis:   Encounter Diagnoses   Name Primary?    Neck pain without injury Yes    Facet arthropathy, cervical     Decreased range of motion of neck          Physician: Coleman Quintana, FNP    Visit Date: 7/14/2023    Physician Orders: PT Eval and Treat   Medical Diagnosis from Referral:   M54.2 (ICD-10-CM) - Neck pain without injury   M47.812 (ICD-10-CM) - Facet arthropathy, cervical   Evaluation Date: 5/15/2023  Authorization Period Expiration: 12/31/2023  Plan of Care Expiration: 6/23/2023; 8/4/2023  Visit # / Visits authorized: 10/20     Time In: 8:02 am  Time Out: 8:59  am  Total Appointment Time (timed & untimed codes): 57 minutes (2 TA) (2 TE)     Precautions: Standard    Subjective     Pt reports: no pain this morning. Maintaining improvements in range of motion and functional mobility  He was compliant with home exercise program.  Response to previous treatment: decreased pain  Functional change: okay with rotating neck at a 4-way stop. Improved [place]    Pain: 0/10   Location: neck    Objective      Objective measures updated at progress note unless otherwise indicated, last progress note 6/23    Treatment     Celso received the treatments listed below:    Therapeutic activities to improve functional performance for 30 minutes, including:  Seated:  Thoracic extension: 1/2 foam roll - 15x5"  Cervical extension SNAGs: 5"x15 bilateral   Cervical rotation SNAGs: 5"x15 bilateral   Seated UT stretch: 5x10" - bilateral  Doorway pec stretch: 3x30"  Seated bicep curl to into overhead press: 2 x 10 - 6# bilateral    therapeutic exercises to develop strength, endurance, ROM, flexibility, and posture for 25 minutes including:   Seated freemotion rows: 3x10 - 17#   Seated freemotion lat pulldowns: 3x10 - 17#   Wall open books: x15 bilateral " "  Standing SAPD with cervical rotation: blue theraband - 2x10 - NT  Standing external rotation: blue theraband - 3x10  Standing horizontal Abduction with red theraband 3x10    Seated:  Cervical retraction 15x5" holds - NT  Thoracic extension: 1/2 foam roll - 15x5"  Cervical extension SNAGs: 5"x15 bilateral   Cervical rotation SNAGs: 5"x15 bilateral   Seated UT stretch: 5x10" - bilateral  Supine on (2) rolled towels positioned cephalocaudally with upper extremity in horizontal abd for pec postural stretch 2 minutes - NT        manual therapy techniques: were applied to the: cervical spine for 00 minutes, including:  Bilateral cervical down glides and up glides with passive cervical rotation  Bilateral cervical side glides with passive side bend    Patient Education and Home Exercises       Education provided:   - home exercise program, focus on snags and wall angels and open books    Written Home Exercises Provided: Continue prior home exercise program. Exercises were reviewed and Celso was able to demonstrate them prior to the end of the session.  Celso demonstrated good  understanding of the education provided. See EMR under Patient Instructions for exercises provided during therapy sessions    Assessment     Celso is a 67 y.o. male referred to outpatient Physical Therapy with a medical diagnosis of neck pain and cervical facet arthropathy with primary complaints of limited neck motion. No pain today. Continued progressing upper back/neck/shoulder strengthening program while maintaining cervical mobility program. Added bicep curl into overhead press for postural strengthening during functional activity. No pain at end of therapy, some fatigue.    Celso Is progressing well towards his goals.   Pt prognosis is Good.   Pt will continue to benefit from skilled outpatient physical therapy to address the deficits listed in the problem list box on initial evaluation, provide pt/family education and to maximize pt's level of " independence in the home and community environment.     Pt's spiritual, cultural and educational needs considered and pt agreeable to plan of care and goals.  Anticipated barriers to physical therapy: none    Short Term Goals (4 Weeks):   1. Pt will be independent with HEP to supplement PT in improving pain free cervical mobility. met   2. Pt will improve cervical AROM 10 deg with rotation to improve cervical mobility for driving. met   3. Pt will improve low trap, mid trap, and rotator cuff MMTs by 1/2 grade in all planes to improve strength for lifting and carrying tasks. met   4. Pt will demonstrate improved sitting posture to decrease pain experienced in head and neck. met   Long Term Goals (8 Weeks):   1. Pt will improve FOTO to </=26% limitation to improve perceived limitation with changing and maintaining mobility. met   2. Pt will improve cervical AROM to WNL in all planes to improve cervical mobility for driving. Progressing, not met   3. Pt will improve low trap, mid trap, and rotator cuff MMTs 1 grade in all planes to improve strength for lifting and carrying tasks. Progressing, not met   4. Pt will report no pain with lifting 10 lbs to promote physical activity. Progressing, not met   5. Pt will report no pain with cervical AROM in all planes to promote QOL. Progressing, not met     Plan     Cervical and thoracic mobility   Periscapular strengthening     Juan Harry, PT

## 2023-07-24 ENCOUNTER — TELEPHONE (OUTPATIENT)
Dept: INTERNAL MEDICINE | Facility: CLINIC | Age: 68
End: 2023-07-24
Payer: MEDICARE

## 2023-07-24 NOTE — TELEPHONE ENCOUNTER
He is already booked for labs and visit  sept/October.  Dr jacob didn't request lipids be checked before that.    He will keep appts as is.    He didn't need anything about eye dr that is mentioned.

## 2023-07-24 NOTE — TELEPHONE ENCOUNTER
----- Message from Iram Zhao MA sent at 7/24/2023  2:27 PM CDT -----  Contact: [phone] @ patient    ----- Message -----  From: Lazara Lion  Sent: 7/24/2023   2:26 PM CDT  To: Willam GRIFFITHS Staff    Good afternoon patient would like a call back getting some lab work and also patient says the prescription that the doc was supposed to send to his eye doc the doc didn't. Please give patient a call back [phone]

## 2023-07-27 NOTE — PROGRESS NOTES
"[facility]   Physical Therapy Treatment Note      Name: Celso Murdock Mary  Clinic Number: [id number]    Therapy Diagnosis:   Encounter Diagnoses   Name Primary?    Neck pain without injury Yes    Facet arthropathy, cervical     Decreased range of motion of neck              Physician: Coleman Quintana, FNP    Visit Date: 7/28/2023    Physician Orders: PT Eval and Treat   Medical Diagnosis from Referral:   M54.2 (ICD-10-CM) - Neck pain without injury   M47.812 (ICD-10-CM) - Facet arthropathy, cervical   Evaluation Date: 5/15/2023  Authorization Period Expiration: 12/31/2023  Plan of Care Expiration: 6/23/2023; 8/4/2023  Visit # / Visits authorized: 11/20     Time In: 9:00 am  Time Out: 9:55  am  Total Appointment Time (timed & untimed codes): 55 minutes (2 TA) (2 TE)     Precautions: Standard    Subjective     Pt reports: no pain this morning. He did feel some tension when he was screwing in lightbulbs overhead yesterday but it's gone now.   He was compliant with home exercise program.  Response to previous treatment: decreased pain  Functional change: okay with rotating neck at a 4-way stop. Improved [place] neck [place]    Pain: 0/10   Location: neck    Objective      Objective measures updated at progress note unless otherwise indicated, last progress note 6/23    Treatment     Celso received the treatments listed below:    Therapeutic activities to improve functional performance for 30 minutes, including:  Seated:  Thoracic extension: 1/2 foam roll - 15x5"  Cervical extension SNAGs: 5"x15 bilateral   Cervical rotation SNAGs: 5"x15 bilateral   Seated UT stretch: 5x10" - bilateral  Doorway pec stretch: 3x30"  Seated bicep curl to into overhead press: 2 x 10 - 6# bilateral    therapeutic exercises to develop strength, endurance, ROM, flexibility, and posture for 25 minutes including:   Seated freemotion rows: 3x10 - 17#   Seated freemotion lat pulldowns: 3x10 - 17# "   Wall open books: x15 bilateral   Standing SAPD with cervical rotation: blue theraband - 2x10 - NT  Standing external rotation: blue theraband - 3x10  Standing horizontal Abduction with Green theraband 3x10  Standing Bruger's 3x10 green theraband       manual therapy techniques: were applied to the: cervical spine for 00 minutes, including:  Bilateral cervical down glides and up glides with passive cervical rotation  Bilateral cervical side glides with passive side bend    Patient Education and Home Exercises       Education provided:   - home exercise program, focus on snags and wall angels and open books    Written Home Exercises Provided: Continue prior home exercise program. Exercises were reviewed and Celso was able to demonstrate them prior to the end of the session.  Celso demonstrated good  understanding of the education provided. See EMR under Patient Instructions for exercises provided during therapy sessions    Assessment     Celso is a 67 y.o. male referred to outpatient Physical Therapy with a medical diagnosis of neck pain and cervical facet arthropathy with primary complaints of limited neck motion. Presents with no  pain today. Continued progressing upper back/neck/shoulder strengthening program while maintaining cervical mobility program.Continued bicep curl into overhead press for postural strengthening during functional activity. Tolerated well. Pain free throughout interventions.     Celso Is progressing well towards his goals.   Pt prognosis is Good.   Pt will continue to benefit from skilled outpatient physical therapy to address the deficits listed in the problem list box on initial evaluation, provide pt/family education and to maximize pt's level of independence in the home and community environment.     Pt's spiritual, cultural and educational needs considered and pt agreeable to plan of care and goals.  Anticipated barriers to physical therapy: none    Short Term Goals (4 Weeks):   1. Pt will be  independent with HEP to supplement PT in improving pain free cervical mobility. met   2. Pt will improve cervical AROM 10 deg with rotation to improve cervical mobility for driving. met   3. Pt will improve low trap, mid trap, and rotator cuff MMTs by 1/2 grade in all planes to improve strength for lifting and carrying tasks. met   4. Pt will demonstrate improved sitting posture to decrease pain experienced in head and neck. met   Long Term Goals (8 Weeks):   1. Pt will improve FOTO to </=26% limitation to improve perceived limitation with changing and maintaining mobility. met   2. Pt will improve cervical AROM to WNL in all planes to improve cervical mobility for driving. Progressing, not met   3. Pt will improve low trap, mid trap, and rotator cuff MMTs 1 grade in all planes to improve strength for lifting and carrying tasks. Progressing, not met   4. Pt will report no pain with lifting 10 lbs to promote physical activity. Progressing, not met   5. Pt will report no pain with cervical AROM in all planes to promote QOL. Progressing, not met     Plan     Cervical and thoracic mobility   Periscapular strengthening     Nikolai Seaman, PTA

## 2023-07-28 ENCOUNTER — CLINICAL SUPPORT (OUTPATIENT)
Dept: REHABILITATION | Facility: HOSPITAL | Age: 68
End: 2023-07-28
Payer: MEDICARE

## 2023-07-28 DIAGNOSIS — R29.898 DECREASED RANGE OF MOTION OF NECK: ICD-10-CM

## 2023-07-28 DIAGNOSIS — M47.812 FACET ARTHROPATHY, CERVICAL: ICD-10-CM

## 2023-07-28 DIAGNOSIS — M54.2 NECK PAIN WITHOUT INJURY: Primary | ICD-10-CM

## 2023-07-28 PROCEDURE — 97110 THERAPEUTIC EXERCISES: CPT | Mod: PN,CQ

## 2023-07-28 PROCEDURE — 97530 THERAPEUTIC ACTIVITIES: CPT | Mod: PN,CQ

## 2023-07-31 RX ORDER — MELOXICAM 15 MG/1
TABLET ORAL
Qty: 90 TABLET | Refills: 0 | Status: SHIPPED | OUTPATIENT
Start: 2023-07-31 | End: 2023-10-27

## 2023-08-09 ENCOUNTER — TELEPHONE (OUTPATIENT)
Dept: PODIATRY | Facility: CLINIC | Age: 68
End: 2023-08-09
Payer: MEDICARE

## 2023-08-09 ENCOUNTER — OFFICE VISIT (OUTPATIENT)
Dept: PAIN MEDICINE | Facility: CLINIC | Age: 68
End: 2023-08-09
Payer: MEDICARE

## 2023-08-09 VITALS
BODY MASS INDEX: 38.06 KG/M2 | WEIGHT: 257.69 LBS | DIASTOLIC BLOOD PRESSURE: 99 MMHG | HEART RATE: 80 BPM | SYSTOLIC BLOOD PRESSURE: 170 MMHG

## 2023-08-09 DIAGNOSIS — M79.18 CERVICAL MYOFASCIAL PAIN SYNDROME: ICD-10-CM

## 2023-08-09 DIAGNOSIS — M54.2 NECK PAIN: Primary | ICD-10-CM

## 2023-08-09 DIAGNOSIS — M54.2 NECK PAIN WITHOUT INJURY: ICD-10-CM

## 2023-08-09 PROCEDURE — 3072F PR LOW RISK FOR RETINOPATHY: ICD-10-PCS | Mod: CPTII,S$GLB,, | Performed by: NURSE PRACTITIONER

## 2023-08-09 PROCEDURE — 1160F RVW MEDS BY RX/DR IN RCRD: CPT | Mod: CPTII,S$GLB,, | Performed by: NURSE PRACTITIONER

## 2023-08-09 PROCEDURE — 1126F PR PAIN SEVERITY QUANTIFIED, NO PAIN PRESENT: ICD-10-PCS | Mod: CPTII,S$GLB,, | Performed by: NURSE PRACTITIONER

## 2023-08-09 PROCEDURE — 3072F LOW RISK FOR RETINOPATHY: CPT | Mod: CPTII,S$GLB,, | Performed by: NURSE PRACTITIONER

## 2023-08-09 PROCEDURE — 3008F BODY MASS INDEX DOCD: CPT | Mod: CPTII,S$GLB,, | Performed by: NURSE PRACTITIONER

## 2023-08-09 PROCEDURE — 3008F PR BODY MASS INDEX (BMI) DOCUMENTED: ICD-10-PCS | Mod: CPTII,S$GLB,, | Performed by: NURSE PRACTITIONER

## 2023-08-09 PROCEDURE — 1159F MED LIST DOCD IN RCRD: CPT | Mod: CPTII,S$GLB,, | Performed by: NURSE PRACTITIONER

## 2023-08-09 PROCEDURE — 99214 PR OFFICE/OUTPT VISIT, EST, LEVL IV, 30-39 MIN: ICD-10-PCS | Mod: S$GLB,,, | Performed by: NURSE PRACTITIONER

## 2023-08-09 PROCEDURE — 3080F DIAST BP >= 90 MM HG: CPT | Mod: CPTII,S$GLB,, | Performed by: NURSE PRACTITIONER

## 2023-08-09 PROCEDURE — 99214 OFFICE O/P EST MOD 30 MIN: CPT | Mod: S$GLB,,, | Performed by: NURSE PRACTITIONER

## 2023-08-09 PROCEDURE — 99999 PR PBB SHADOW E&M-EST. PATIENT-LVL IV: ICD-10-PCS | Mod: PBBFAC,,, | Performed by: NURSE PRACTITIONER

## 2023-08-09 PROCEDURE — 1160F PR REVIEW ALL MEDS BY PRESCRIBER/CLIN PHARMACIST DOCUMENTED: ICD-10-PCS | Mod: CPTII,S$GLB,, | Performed by: NURSE PRACTITIONER

## 2023-08-09 PROCEDURE — 1126F AMNT PAIN NOTED NONE PRSNT: CPT | Mod: CPTII,S$GLB,, | Performed by: NURSE PRACTITIONER

## 2023-08-09 PROCEDURE — 3044F PR MOST RECENT HEMOGLOBIN A1C LEVEL <7.0%: ICD-10-PCS | Mod: CPTII,S$GLB,, | Performed by: NURSE PRACTITIONER

## 2023-08-09 PROCEDURE — 3077F SYST BP >= 140 MM HG: CPT | Mod: CPTII,S$GLB,, | Performed by: NURSE PRACTITIONER

## 2023-08-09 PROCEDURE — 99999 PR PBB SHADOW E&M-EST. PATIENT-LVL IV: CPT | Mod: PBBFAC,,, | Performed by: NURSE PRACTITIONER

## 2023-08-09 PROCEDURE — 3044F HG A1C LEVEL LT 7.0%: CPT | Mod: CPTII,S$GLB,, | Performed by: NURSE PRACTITIONER

## 2023-08-09 PROCEDURE — 3080F PR MOST RECENT DIASTOLIC BLOOD PRESSURE >= 90 MM HG: ICD-10-PCS | Mod: CPTII,S$GLB,, | Performed by: NURSE PRACTITIONER

## 2023-08-09 PROCEDURE — 1159F PR MEDICATION LIST DOCUMENTED IN MEDICAL RECORD: ICD-10-PCS | Mod: CPTII,S$GLB,, | Performed by: NURSE PRACTITIONER

## 2023-08-09 PROCEDURE — 3077F PR MOST RECENT SYSTOLIC BLOOD PRESSURE >= 140 MM HG: ICD-10-PCS | Mod: CPTII,S$GLB,, | Performed by: NURSE PRACTITIONER

## 2023-08-09 NOTE — PROGRESS NOTES
"[facility] Visit    Referred by: Dr. Inder Quarles   Reason for referral: Neck pain     CC:   Chief Complaint   Patient presents with    Neck Pain     Interval History (08/09/2023):  Celso Hernandez returns today for follow up for neck and upper back pain.  He reports completing physical therapy and has now establish a home exercise plan at home.  He states that his neck pain is stable at the moment and is doing fine.    Currently, the neck pain is stable.        Current Pain Scales:  Current: 0/10              Typical Range: 2-4/10     Interval Updates  05/08/[id number]-fxon-old male presents today with neck and upper back pain.  Onset 5-6 days pain is specifically located at the base of his neck and radiates into his head.  Denies any headaches.  Pain is constant, pain is described as a strain and pulling he denies any radicular symptoms in either arm , denies any profound weakness, denies any paresthesia like symptoms.  Pain is aggravated with certain movements and laying down.  Pain is mitigated with ibuprofen 800 mg t.i.d..  He denies any recent incident or trauma he does state that he felt like he slept wrong and woke up the next morning feeling pain in the neck and into his head.    Interval Updates:   2/10/2022 - Mr. Hernandez is following up for  Low-back Pain and Mid-back Pain is currently rate 0/10 with a weekly range 2-5/10.  It is described as Aching, Grabbing and Tight.  He  Is reporting mild to no symptoms today, he states he has no pain when performing normal ADLs, he reports pain is exacerbated when he  "tries to do to much" He did also report midback /thoroacic pain with mild twisting but pain is stable at this point.     1/19/2022  -  Mary returns to clinic s/p Thoracic Epidural Steroid Injection at T5-6 on 1/4/22 with 60% relief.  He reports a pain intensity 1/10 today with a weekly range of 1-2/10.   He is reporting mild soarness in his thoracic " "area, he is reporting being " free of pain after the injection" his pain returned after working outside in his garage yesterday. Overall much better following his injection. He is reporting certain movements increase his pain but other than that he  is better.     Subjective:   Celso Hernandez is a 68 y.o. male who has a past medical history of Arthritis, BPH (benign prostatic hyperplasia), Cataract, Colon polyps, Diabetes mellitus type II, Glaucoma, Hyperlipidemia, Hypertension, Multiple duodenal ulcers, and Unspecified hemorrhoids without mention of complication. He complains of pain as described below.    Location:  Neck  Onset:  5-6 days  Radiation:  None  Timing: constant  Current Pain Score: 7/10  Weekly Pain Range: 2-10/10  Quality:  Pulling and strain type pain  Worsened by: exercise, extension, lifting, lying down, sitting and walking for more than several minutes  Improved by: medications ibuprofen 800 mg    Previous Therapies:  PT/OT: Denies for neck pain  HEP: Yes, stretching at home and walking.  TENS: No  Interventions:  01/04/2022 Thoracic Epidural Steroid Injection at T5-6  Surgery: Denies localized surgery  Opioids:  Adjuvants:     Current Pain Medications:  Ibuprofen 800 mg t.i.d.     Assessment & Plan:  Problem List Items Addressed This Visit          Orthopedic    Neck pain without injury     Other Visit Diagnoses       Neck pain    -  Primary    Cervical myofascial pain syndrome              12/17/21 - Celso Hernandez is a 68 y.o. male who  has a past medical history of Arthritis, BPH (benign prostatic hyperplasia), Cataract, Colon polyps, Diabetes mellitus type II, Glaucoma, Hyperlipidemia, Hypertension, Multiple duodenal ulcers (10/08/2019), and Unspecified hemorrhoids without mention of complication.  By history and examination this patient has chronic mid/upper back pain with right T5-6 radiculopathy.  The underlying cause cause is facet arthritis and degenerative disc disease with a large, " obstructive osteophyte on the right side as seen on CT chest.  MRI is appropriate prior to an injections to assess soft tissue and vasculature.  We discussed the underlying diagnoses and multiple treatment options including non-opioid medications, opioid medications, interventional procedures, physical therapy and home exercise.  The risks and benefits of each treatment option were discussed and all questions were answered.        1/19/2022- Celso Hernandez is a 68 y.o. male who  has a past medical history of Arthritis, BPH (benign prostatic hyperplasia), Cataract, Colon polyps, Diabetes mellitus type II, Glaucoma, Hyperlipidemia, Hypertension, Multiple duodenal ulcers (10/08/2019), and Unspecified hemorrhoids without mention of complication.  By history and examination this patient has chronic mid/upper back pain with a right T5-6   Radiculopathy that responded well following a thoracic GABRIELA targeting T5-6.  Colonic causes appear to be facet arthritis and degenerative disc disease with a large obstructive osteophyte on the right side.  Upon review of his thoracic MRI ordered per Dr. Mejia results did show a extradural degenerative changes resulting in right T6 neural foraminal stenosis.  Secondly intradural extramedullary T2 signal abnormality at the level of the T4-5.  The radiologist include a differential diagnosis that included a nerve sheet to were such as a  schwannoma or a neurofibroma as well as other neoplasms.  Radiologist recommended further evaluation of this patient's thoracic area, discussed with the patient today that I will order a thoracic MRI with contrast to further evaluate. Long discussion with patient regarding  Results of his MRI, I will also refer to Oncology for further evaluation but I would like this patient to see a college E following a hip obtaining new images.    [birth year]-4-nltp-old male with history of chronic mid to low back pain, he presents today to review an updated  thoracic MRI.  There was concerned per radiology on previous thoracic MRI with differential diagnosis that includes a nerve sheet to wear such as a schwannoma or a neurofibroma as well as other neoplasms.  I contacted Oncology and did not appear to be a malignancy.  Was a non malignant entity.  He contacted patient and made him aware of this.  Today his pain is 0/10, he does experience mild midthoracic low back pain when performing duties outside of his normal ADLs.  I recommended physical therapy patient is amenable to this plan.    05/08/[id number]-fdxm-old male presents today acute/subacute neck pain beginning at the patient's his neck radiating his head.  Etiology is unclear at this point.  Based on history the patient may be suffering facet arthritis of the cervical, differential diagnosis be myofascial pain.  Cause of his symptoms are unclear he did state that he slept wrong on a pillow and woke up the next morning with this pain.  He denies any radiating symptoms do not suspect any degenerative disc disease or central and/or neural foraminal stenosis.  Ibuprofen has helped his pain prior to come into clinic today but it only lasts 4-5 hours.  He and I discussed plan below and was agreed upon during our clinic visit today.    68-year-old pleasant male neck pain upper back pain.  He reports having attended physical therapy and completing.  Based on my history and exam his pain generators were from facet arthritis and he also had a cervical myofascial component to his pain which both have improved through physical therapy.  I recommended he establish a home exercise plan patient verbalized understanding and agreed.    Previous imaging reviewed   Completed physical therapy with benefit, establish home exercise plan utilizing exercises learned in PT.  No procedures recommended at this time.    Continue current medications for now, educated patient on chronic use of Mobic  reviewed and medications are appropriately  dosed for current hepatorenal function.  RTC PRN per patient's request       Follow Up:  PRN     Disclaimer: This note was partly generated using dictation software which may occasionally result in transcription errors.    [employer]agin[employer] MRI Thoracic Spine W WO Contrast     Narrative & Impression  EXAMINATION:  MRI THORACIC SPINE W WO CONTRAST     CLINICAL HISTORY:  Attention to T4-5 level.  Evaluate for underlying mass.     TECHNIQUE:  Pre and postcontrast MR imaging of the thoracic spine was performed utilizing 10 mL Gadavist intravenous contrast.     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     No advanced marrow edema or osseous destructive process is identified.  Multilevel mild disc bulging flattens the thecal sac without cord impingement.  Marked degenerative foraminal stenosis on the left at T 4-5 and to the right at T5-6 is again noted.     The spinal cord maintains normal signal intensity.     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     No enhancing lesion.  Degenerative foraminal stenosis on the left at T4-5 and on the right at T5-6.        Electronically signed by: Trent Graves  Date:                                            2022  Time:                                           17:11        CT Chest Without Contrast  Impression:  No worrisome finding on the chest CT.  Mild atherosclerotic plaque of the aorta and mild coronary artery calcification  Severe foraminal narrowing on the right at T5-6 due to significant facet joint osseous hypertrophy.  Electronically signed by: Halley Lundberg MD  Date:                                            2021  Time:                                            08:56    X-Ray Lumbar Spine Ap And Lateral 11/21/2019  FINDINGS:  Two views AP lateral.  There is mild DJD and DISH.  Alignment is normal.  No fracture dislocation bone destruction.  No trauma seen.  Impression:  No acute process seen.  DJD and DISH.      Review of Systems:  Review of Systems   Constitutional:  Negative for chills and fever.   HENT:  Negative for nosebleeds.    Eyes:  Negative for blurred vision and pain.   Respiratory:  Negative for hemoptysis.    Cardiovascular:  Negative for chest pain and palpitations.   Gastrointestinal:  Negative for heartburn, nausea and vomiting.   Genitourinary:  Negative for dysuria and hematuria.   Musculoskeletal:  Positive for joint pain. Negative for myalgias.   Skin:  Negative for rash.   Neurological:  Negative for seizures and loss of consciousness.   Endo/Heme/Allergies:  Does not bruise/bleed easily.   Psychiatric/Behavioral:  Negative for hallucinations.        Physical Exam:  Vitals:    08/09/23 0938   BP: (!) 170/99   Pulse: 80   Weight: 116.9 kg (257 lb 11.5 oz)   PainSc: 0-No pain     General    Nursing note and vitals reviewed.  Constitutional: He is oriented to person, place, and time. He appears well-developed and well-nourished. No distress.   HENT:   Head: Normocephalic and atraumatic.   Nose: Nose normal.   Eyes: Conjunctivae and EOM are normal. Pupils are equal, round, and reactive to light. Right eye exhibits no discharge. Left eye exhibits no discharge. No scleral icterus.   Neck: No JVD present.   Cardiovascular:  Intact distal pulses.            Pulmonary/Chest: Effort normal. No respiratory distress.   Abdominal: He exhibits no distension.   Neurological: He is alert and oriented to person, place, and time. Coordination normal.   Psychiatric: He has a normal mood and affect. His behavior is normal. Judgment and thought content normal.     General Musculoskeletal Exam   Gait: normal     Back (L-Spine & T-Spine) / Neck (C-Spine) Exam     Tenderness  Right paramedian tenderness of the Upper T-Spine.     Back (L-Spine & T-Spine) Range of Motion   Lateral bend right:  abnormal Back lateral bend right: leaning to the right exacerbates pain.  Lateral bend left:  normal   Rotation right:  normal   Rotation left:  normal     Comments:  There are no topical lesions in the affected area such a those seen with shingles.

## 2023-08-09 NOTE — TELEPHONE ENCOUNTER
Spoke with Mr Mary who reports concern for a wound to his foot. Denies s/s of infection. Accepted appt date and time of 8/14/23 at 2:30 pm. No other needs voiced. Encouraged call back if needed.

## 2023-08-14 ENCOUNTER — OFFICE VISIT (OUTPATIENT)
Dept: PODIATRY | Facility: CLINIC | Age: 68
End: 2023-08-14
Payer: MEDICARE

## 2023-08-14 VITALS
SYSTOLIC BLOOD PRESSURE: 169 MMHG | HEIGHT: 69 IN | WEIGHT: 257 LBS | DIASTOLIC BLOOD PRESSURE: 91 MMHG | HEART RATE: 89 BPM | BODY MASS INDEX: 38.06 KG/M2

## 2023-08-14 DIAGNOSIS — E11.9 TYPE 2 DIABETES MELLITUS WITHOUT COMPLICATION, WITHOUT LONG-TERM CURRENT USE OF INSULIN: ICD-10-CM

## 2023-08-14 DIAGNOSIS — B35.3 TINEA PEDIS OF RIGHT FOOT: Primary | ICD-10-CM

## 2023-08-14 PROCEDURE — 3288F FALL RISK ASSESSMENT DOCD: CPT | Mod: CPTII,S$GLB,, | Performed by: PODIATRIST

## 2023-08-14 PROCEDURE — 1101F PR PT FALLS ASSESS DOC 0-1 FALLS W/OUT INJ PAST YR: ICD-10-PCS | Mod: CPTII,S$GLB,, | Performed by: PODIATRIST

## 2023-08-14 PROCEDURE — 3044F PR MOST RECENT HEMOGLOBIN A1C LEVEL <7.0%: ICD-10-PCS | Mod: CPTII,S$GLB,, | Performed by: PODIATRIST

## 2023-08-14 PROCEDURE — 3077F PR MOST RECENT SYSTOLIC BLOOD PRESSURE >= 140 MM HG: ICD-10-PCS | Mod: CPTII,S$GLB,, | Performed by: PODIATRIST

## 2023-08-14 PROCEDURE — 1101F PT FALLS ASSESS-DOCD LE1/YR: CPT | Mod: CPTII,S$GLB,, | Performed by: PODIATRIST

## 2023-08-14 PROCEDURE — 99213 PR OFFICE/OUTPT VISIT, EST, LEVL III, 20-29 MIN: ICD-10-PCS | Mod: S$GLB,,, | Performed by: PODIATRIST

## 2023-08-14 PROCEDURE — 3077F SYST BP >= 140 MM HG: CPT | Mod: CPTII,S$GLB,, | Performed by: PODIATRIST

## 2023-08-14 PROCEDURE — 1160F PR REVIEW ALL MEDS BY PRESCRIBER/CLIN PHARMACIST DOCUMENTED: ICD-10-PCS | Mod: CPTII,S$GLB,, | Performed by: PODIATRIST

## 2023-08-14 PROCEDURE — 3008F PR BODY MASS INDEX (BMI) DOCUMENTED: ICD-10-PCS | Mod: CPTII,S$GLB,, | Performed by: PODIATRIST

## 2023-08-14 PROCEDURE — 1159F PR MEDICATION LIST DOCUMENTED IN MEDICAL RECORD: ICD-10-PCS | Mod: CPTII,S$GLB,, | Performed by: PODIATRIST

## 2023-08-14 PROCEDURE — 3008F BODY MASS INDEX DOCD: CPT | Mod: CPTII,S$GLB,, | Performed by: PODIATRIST

## 2023-08-14 PROCEDURE — 99213 OFFICE O/P EST LOW 20 MIN: CPT | Mod: S$GLB,,, | Performed by: PODIATRIST

## 2023-08-14 PROCEDURE — 1126F PR PAIN SEVERITY QUANTIFIED, NO PAIN PRESENT: ICD-10-PCS | Mod: CPTII,S$GLB,, | Performed by: PODIATRIST

## 2023-08-14 PROCEDURE — 3288F PR FALLS RISK ASSESSMENT DOCUMENTED: ICD-10-PCS | Mod: CPTII,S$GLB,, | Performed by: PODIATRIST

## 2023-08-14 PROCEDURE — 99999 PR PBB SHADOW E&M-EST. PATIENT-LVL IV: ICD-10-PCS | Mod: PBBFAC,,, | Performed by: PODIATRIST

## 2023-08-14 PROCEDURE — 99999 PR PBB SHADOW E&M-EST. PATIENT-LVL IV: CPT | Mod: PBBFAC,,, | Performed by: PODIATRIST

## 2023-08-14 PROCEDURE — 3080F DIAST BP >= 90 MM HG: CPT | Mod: CPTII,S$GLB,, | Performed by: PODIATRIST

## 2023-08-14 PROCEDURE — 1159F MED LIST DOCD IN RCRD: CPT | Mod: CPTII,S$GLB,, | Performed by: PODIATRIST

## 2023-08-14 PROCEDURE — 1126F AMNT PAIN NOTED NONE PRSNT: CPT | Mod: CPTII,S$GLB,, | Performed by: PODIATRIST

## 2023-08-14 PROCEDURE — 1160F RVW MEDS BY RX/DR IN RCRD: CPT | Mod: CPTII,S$GLB,, | Performed by: PODIATRIST

## 2023-08-14 PROCEDURE — 3080F PR MOST RECENT DIASTOLIC BLOOD PRESSURE >= 90 MM HG: ICD-10-PCS | Mod: CPTII,S$GLB,, | Performed by: PODIATRIST

## 2023-08-14 PROCEDURE — 3044F HG A1C LEVEL LT 7.0%: CPT | Mod: CPTII,S$GLB,, | Performed by: PODIATRIST

## 2023-08-14 RX ORDER — KETOCONAZOLE 20 MG/G
CREAM TOPICAL 2 TIMES DAILY
Qty: 60 G | Refills: 2 | Status: SHIPPED | OUTPATIENT
Start: 2023-08-14

## 2023-08-14 NOTE — PROGRESS NOTES
"Subjective:     Patient ID: Celso Hernandez is a 68 y.o. male.    Chief Complaint: Rash (Right foot.  He complains of burning, redness, itching and drainage along the top of his right foot.  He went to see a Dermatologist and was giving cream for itching. )    A worsening rash the top of the right foot also involving the interdigital areas and lateral border of the right foot for the past couple months.  States the rash got worse over the past 4 weeks.  He was initially prescribed a cream for itching 2 months ago by his dermatologist which he used intermittently.  He is unsure if it helped improve the appearance of the rash but did help with the itching.  History of well-controlled type 2 diabetes with last hemoglobin A1c of 5.5% remains on oral metformin.    Vitals:    08/14/23 1438   BP: (!) 169/91   Pulse: 89   Weight: 116.6 kg (257 lb)   Height: 5' 9" (1.753 m)   PainSc: 0-No pain      Past Medical History:   Diagnosis Date    Arthritis     BPH (benign prostatic hyperplasia)     Cataract     Colon polyps     Diabetes mellitus type II     Glaucoma     Hyperlipidemia     Hypertension     Multiple duodenal ulcers 10/08/2019    Unspecified hemorrhoids without mention of complication        Past Surgical History:   Procedure Laterality Date    COLONOSCOPY N/A 10/08/2019    Procedure: COLONOSCOPY suprep;  Surgeon: Landon Guajardo MD;  Location: [facility];  Service: General;  Laterality: N/A;    COLONOSCOPY W/ POLYPECTOMY  10/08/2019    CYSTOSCOPY N/A 08/05/2020    Procedure: CYSTOSCOPY;  Surgeon: Arcadio Vaughn MD;  Location: [address];  Service: Urology;  Laterality: N/A;    CYSTOSCOPY WITH URETHRAL DILATION N/A 11/10/2021    Procedure: CYSTOSCOPY, WITH URETHRAL DILATION;  Surgeon: Arcadio Vaughn MD;  Location: [facility] OR [address] [place] [address];  Service: Urology;  Laterality: N/A;  30 MINUTES     DILATION OF URETHRA N/A 08/05/2020    Procedure: DILATION, URETHRA;  Surgeon: Arcadio Vaughn MD;  Location: [facility] OR [address] [place] [address];  " Service: Urology;  Laterality: N/A;    EPIDURAL STEROID INJECTION INTO THORACIC SPINE N/A 01/04/2022    Procedure: Thoracic Epidural Steroid Injection T5-6 (No Sedation) ;  Surgeon: Connie Mejia Jr., MD;  Location: [facility] PAIN MGT;  Service: Pain Management;  Laterality: N/A;    ESOPHAGOGASTRODUODENOSCOPY  10/08/2019    w/biopsies    ESOPHAGOGASTRODUODENOSCOPY N/A 10/08/2019    Procedure: EGD (ESOPHAGOGASTRODUODENOSCOPY);  Surgeon: Landon Guajardo MD;  Location: [facility] ENDO;  Service: General;  Laterality: N/A;    NASAL SINUS SURGERY  11/2022    with outside MD    none      TRANSURETHRAL RESECTION OF PROSTATE N/A 05/27/2020    Procedure: TURP (TRANSURETHRAL RESECTION OF PROSTATE) bipolar;  Surgeon: Arcadio Vaughn MD;  Location: [facility] OR [address] [place] [address];  Service: Urology;  Laterality: N/A;  2hr       Family History   Problem Relation Age of Onset    Hypertension Mother     Stroke Mother     Hyperlipidemia Mother     Glaucoma Mother     Diabetes Father     Cancer Sister         breast    Hypertension Sister     Heart disease Sister     Hearing loss Sister     No Known Problems Daughter     No Known Problems Son     Amblyopia Neg Hx     Blindness Neg Hx     Cataracts Neg Hx     Macular degeneration Neg Hx     Retinal detachment Neg Hx     Strabismus Neg Hx        Social History     Socioeconomic History    Marital status:    Tobacco Use    Smoking status: Never     Passive exposure: Never    Smokeless tobacco: Never   Substance and Sexual Activity    Alcohol use: Yes     Alcohol/week: 4.0 standard drinks of alcohol     Types: 4 Standard drinks or equivalent per week     Comment: 2-3x week, 1-2 drinks at a time    Drug use: No    Sexual activity: Yes     Partners: Female     Social Determinants of Health     Financial Resource Strain: Low Risk  (3/22/2023)    Overall Financial Resource Strain (CARDIA)     Difficulty of Paying Living Expenses: Not hard at all   Food Insecurity: No Food Insecurity (3/22/2023)    Hunger  Vital Sign     Worried About Running Out of Food in the Last Year: Never true     Ran Out of Food in the Last Year: Never true   Transportation Needs: No Transportation Needs (3/22/2023)    PRAPARE - Transportation     Lack of Transportation (Medical): No     Lack of Transportation (Non-Medical): No   Physical Activity: Insufficiently Active (3/22/2023)    Exercise Vital Sign     Days of Exercise per Week: 2 days     Minutes of Exercise per Session: 30 min   Stress: No Stress Concern Present (3/22/2023)    [employer] [employer] [place] [employer] - Occupational Stress Questionnaire     Feeling of Stress : Not at all   Social Connections: Moderately Integrated (3/22/2023)    Social Connection and Isolation Panel [NHANES]     Frequency of Communication with Friends and Family: More than three times a week     Frequency of Social Gatherings with Friends and Family: More than three times a week     Attends [employer] Services: More than 4 times per year     Active Member of Clubs or Organizations: No     Attends Club or Organization Meetings: Never     Marital Status:    Housing Stability: Low Risk  (3/22/2023)    Housing Stability Vital Sign     Unable to Pay for Housing in the Last Year: No     Number of Places Lived in the Last Year: 1     Unstable Housing in the Last Year: No       Current Outpatient Medications   Medication Sig Dispense Refill    azelastine (ASTELIN) 137 mcg (0.1 %) nasal spray SMARTSIG:Both Nares      azelastine (ASTELIN) 137 mcg (0.1 %) nasal spray azelastine 137 mcg (0.1 %) nasal spray aerosol   Spray 2 sprays twice a day by intranasal route.      diclofenac sodium (VOLTAREN) 1 % Gel Apply 2 g topically once daily. 1 each 2    dorzolamide-timolol 2-0.5% (COSOPT) 22.3-6.8 mg/mL ophthalmic solution INSTILL 1 DROP INTO EACH EYE TWICE DAILY 30 mL 3    hydrALAZINE (APRESOLINE) 50 MG tablet TAKE 1 TABLET BY MOUTH EVERY 12 HOURS FOR BLOOD PRESSURE CONTROL 180 tablet 0    latanoprost 0.005 %  ophthalmic solution INSTILL 1 DROP INTO EACH EYE IN THE EVENING 7.5 mL 3    losartan-hydrochlorothiazide 100-12.5 mg (HYZAAR) 100-12.5 mg Tab TAKE 1 TABLET BY MOUTH ONCE DAILY FOR HIGH BLOOD PRESSURE 90 tablet 3    meloxicam (MOBIC) 15 MG tablet Take 1 tablet by mouth once daily 90 tablet 0    metFORMIN (GLUCOPHAGE) 500 MG tablet Take 1 tablet by mouth once daily with breakfast 90 tablet 1    methylPREDNISolone (MEDROL DOSEPACK) 4 mg tablet Take by mouth.      methylPREDNISolone (MEDROL DOSEPACK) 4 mg tablet Medrol (Joe) 4 mg tablets in a dose pack   Take 1 dose pk by oral route.      methylPREDNISolone (MEDROL DOSEPACK) 4 mg tablet methylprednisolone 4 mg tablets in a dose pack   TAKE AS DIRECTED      montelukast (SINGULAIR) 10 mg tablet montelukast 10 mg tablet   TAKE 1 TABLET BY MOUTH ONCE DAILY      montelukast (SINGULAIR) 10 mg tablet Take 10 mg by mouth.      multivit-min-FA-lycopen-lutein (CENTRUM SILVER MEN) 300-600-300 mcg Tab Take 1 tablet by mouth once daily. 100 tablet 3    rosuvastatin (CRESTOR) 40 MG Tab Take 1 tablet (40 mg total) by mouth once daily. 90 tablet 3    ketoconazole (NIZORAL) 2 % cream Apply topically 2 (two) times daily. 60 g 2    labetaloL (NORMODYNE) 200 MG tablet Take 1 tablet (200 mg total) by mouth 2 (two) times daily. For blood pressure control. 60 tablet 11     No current facility-administered medications for this visit.       Review of patient's allergies indicates:   Allergen Reactions    Keflex [cephalexin] Rash     Scrotal and groin itching          Review of Systems   Constitutional: Negative for chills, fever and malaise/fatigue.   HENT:  Negative for congestion and hearing loss.    Cardiovascular:  Negative for chest pain, claudication and leg swelling.   Respiratory:  Negative for cough and shortness of breath.    Skin:  Positive for itching and rash.   Musculoskeletal:  Negative for back pain, joint pain, muscle cramps and muscle weakness.   Gastrointestinal:  Negative  for nausea and vomiting.   Neurological:  Negative for numbness, paresthesias and weakness.   Psychiatric/Behavioral:  Negative for altered mental status.         Objective:     Physical Exam  Constitutional:       General: He is not in acute distress.     Appearance: Normal appearance. He is obese. He is not ill-appearing.   Cardiovascular:      Pulses:           Dorsalis pedis pulses are 2+ on the right side and 2+ on the left side.        Posterior tibial pulses are 1+ on the right side and 1+ on the left side.      Comments: Mild nonpitting edema to lower extremity bilateral.  Skin temp is warm to foot bilateral.  No rubor on dependency bilateral foot.  No hair growth follow extremity.  Musculoskeletal:      Comments: No pain with ROM or MMT bilateral lower extremity.     Feet:      Right foot:      Protective Sensation: 10 sites tested.  10 sites sensed.      Left foot:      Protective Sensation: 10 sites tested.  10 sites sensed.   Skin:     General: Skin is warm.      Capillary Refill: Capillary refill takes less than 2 seconds.      Findings: No ecchymosis.      Nails: There is no clubbing.      Comments: Erythematous patch with papular eruption to the dorsal right foot.  There is partial skin thickness erosion with scaling involving the plantar distal lateral right 4th toe and 4th webspace with some papular eruption along the lateral border of the right midfoot.   Neurological:      Mental Status: He is alert.             Assessment:      Encounter Diagnoses   Name Primary?    Tinea pedis of right foot Yes    Type 2 diabetes mellitus without complication, without long-term current use of insulin      Plan:     Celso was seen today for rash.    Diagnoses and all orders for this visit:    Tinea pedis of right foot    Type 2 diabetes mellitus without complication, without long-term current use of insulin    Other orders  -     ketoconazole (NIZORAL) 2 % cream; Apply topically 2 (two) times daily.      I  counseled the patient on his conditions, their implications and medical management.    From a clinical perspective he may have tinea pedis versus a talked thick dermatitis versus lichen planus however it is not on the plantar surface of the foot.  We discussed initiating ketoconazole cream twice daily to the right foot and avoiding flip-flops use.  He is to use socks and tennis shoes.  If the rash is not improved consider a biopsy.    RTC within 6-8 weeks or p.r.n. as discussed.    A portion of this note was generated by voice recognition software and may contain spelling and grammar errors.  .

## 2023-09-01 ENCOUNTER — TELEPHONE (OUTPATIENT)
Dept: ENDOSCOPY | Facility: HOSPITAL | Age: 68
End: 2023-09-01
Payer: MEDICARE

## 2023-09-01 ENCOUNTER — TELEPHONE (OUTPATIENT)
Dept: INTERNAL MEDICINE | Facility: CLINIC | Age: 68
End: 2023-09-01
Payer: MEDICARE

## 2023-09-01 VITALS — BODY MASS INDEX: 37.77 KG/M2 | WEIGHT: 255 LBS | HEIGHT: 69 IN

## 2023-09-01 DIAGNOSIS — Z12.11 COLON CANCER SCREENING: Primary | ICD-10-CM

## 2023-09-01 DIAGNOSIS — K63.5 POLYP OF COLON, UNSPECIFIED PART OF COLON, UNSPECIFIED TYPE: ICD-10-CM

## 2023-09-01 NOTE — TELEPHONE ENCOUNTER
----- Message from Gini Harris sent at 9/1/2023 11:38 AM CDT -----  Needs advice from nurse:      Who Called:pt  Regarding:patient needs to schedule colonoscopy  Would the patient rather a call back or [facility]IA [facility]?  Best Call Back number:[phone]  Additional Info:

## 2023-09-01 NOTE — TELEPHONE ENCOUNTER
Colonoscopy orders have been entered.  The patient will be contacted by the endoscopy Department in coming weeks for further instructions.

## 2023-09-01 NOTE — TELEPHONE ENCOUNTER
Contacted the patient to schedule an endoscopy procedure(s):  colonoscopy. The patient would prefer to schedule this procedure at the [place] location- contact number provided.  Case request for [facility] is already entered.

## 2023-09-03 NOTE — TELEPHONE ENCOUNTER
Care Due:                  Date            Visit Type   Department     Provider  --------------------------------------------------------------------------------                                EP -                              PRIMARY      MET INTERNAL  Last Visit: 04-      CARE ([facility])   MEDICINE       Inderharry Quarles                              EP -                              PRIMARY      [facility] INTERNAL  Next Visit: 10-      CARE ([facility])   MEDICINE       Inderej Quarles                                                            Last  Test          Frequency    Reason                     Performed    Due Date  --------------------------------------------------------------------------------    HBA1C.......  6 months...  metFORMIN................  03- 09-    Health [facility] Embedded Care Due Messages. Reference number: [id number].   9/03/2023 12:44:30 PM CDT

## 2023-09-06 RX ORDER — HYDRALAZINE HYDROCHLORIDE 50 MG/1
TABLET, FILM COATED ORAL
Qty: 180 TABLET | Refills: 0 | Status: SHIPPED | OUTPATIENT
Start: 2023-09-06 | End: 2024-03-12

## 2023-09-07 ENCOUNTER — DOCUMENTATION ONLY (OUTPATIENT)
Dept: REHABILITATION | Facility: HOSPITAL | Age: 68
End: 2023-09-07
Payer: MEDICARE

## 2023-09-25 ENCOUNTER — OFFICE VISIT (OUTPATIENT)
Dept: PODIATRY | Facility: CLINIC | Age: 68
End: 2023-09-25
Payer: MEDICARE

## 2023-09-25 VITALS
WEIGHT: 255 LBS | HEIGHT: 69 IN | HEART RATE: 79 BPM | BODY MASS INDEX: 37.77 KG/M2 | SYSTOLIC BLOOD PRESSURE: 144 MMHG | DIASTOLIC BLOOD PRESSURE: 93 MMHG

## 2023-09-25 DIAGNOSIS — E11.9 TYPE 2 DIABETES MELLITUS WITHOUT COMPLICATION, WITHOUT LONG-TERM CURRENT USE OF INSULIN: ICD-10-CM

## 2023-09-25 DIAGNOSIS — B35.3 TINEA PEDIS OF RIGHT FOOT: Primary | ICD-10-CM

## 2023-09-25 PROCEDURE — 3080F PR MOST RECENT DIASTOLIC BLOOD PRESSURE >= 90 MM HG: ICD-10-PCS | Mod: CPTII,S$GLB,, | Performed by: PODIATRIST

## 2023-09-25 PROCEDURE — 3080F DIAST BP >= 90 MM HG: CPT | Mod: CPTII,S$GLB,, | Performed by: PODIATRIST

## 2023-09-25 PROCEDURE — 3288F PR FALLS RISK ASSESSMENT DOCUMENTED: ICD-10-PCS | Mod: CPTII,S$GLB,, | Performed by: PODIATRIST

## 2023-09-25 PROCEDURE — 1101F PR PT FALLS ASSESS DOC 0-1 FALLS W/OUT INJ PAST YR: ICD-10-PCS | Mod: CPTII,S$GLB,, | Performed by: PODIATRIST

## 2023-09-25 PROCEDURE — 3044F HG A1C LEVEL LT 7.0%: CPT | Mod: CPTII,S$GLB,, | Performed by: PODIATRIST

## 2023-09-25 PROCEDURE — 3008F PR BODY MASS INDEX (BMI) DOCUMENTED: ICD-10-PCS | Mod: CPTII,S$GLB,, | Performed by: PODIATRIST

## 2023-09-25 PROCEDURE — 99999 PR PBB SHADOW E&M-EST. PATIENT-LVL IV: ICD-10-PCS | Mod: PBBFAC,,, | Performed by: PODIATRIST

## 2023-09-25 PROCEDURE — 3288F FALL RISK ASSESSMENT DOCD: CPT | Mod: CPTII,S$GLB,, | Performed by: PODIATRIST

## 2023-09-25 PROCEDURE — 1160F RVW MEDS BY RX/DR IN RCRD: CPT | Mod: CPTII,S$GLB,, | Performed by: PODIATRIST

## 2023-09-25 PROCEDURE — 3077F SYST BP >= 140 MM HG: CPT | Mod: CPTII,S$GLB,, | Performed by: PODIATRIST

## 2023-09-25 PROCEDURE — 1159F PR MEDICATION LIST DOCUMENTED IN MEDICAL RECORD: ICD-10-PCS | Mod: CPTII,S$GLB,, | Performed by: PODIATRIST

## 2023-09-25 PROCEDURE — 99213 OFFICE O/P EST LOW 20 MIN: CPT | Mod: S$GLB,,, | Performed by: PODIATRIST

## 2023-09-25 PROCEDURE — 1126F AMNT PAIN NOTED NONE PRSNT: CPT | Mod: CPTII,S$GLB,, | Performed by: PODIATRIST

## 2023-09-25 PROCEDURE — 3008F BODY MASS INDEX DOCD: CPT | Mod: CPTII,S$GLB,, | Performed by: PODIATRIST

## 2023-09-25 PROCEDURE — 1159F MED LIST DOCD IN RCRD: CPT | Mod: CPTII,S$GLB,, | Performed by: PODIATRIST

## 2023-09-25 PROCEDURE — 99213 PR OFFICE/OUTPT VISIT, EST, LEVL III, 20-29 MIN: ICD-10-PCS | Mod: S$GLB,,, | Performed by: PODIATRIST

## 2023-09-25 PROCEDURE — 99999 PR PBB SHADOW E&M-EST. PATIENT-LVL IV: CPT | Mod: PBBFAC,,, | Performed by: PODIATRIST

## 2023-09-25 PROCEDURE — 1160F PR REVIEW ALL MEDS BY PRESCRIBER/CLIN PHARMACIST DOCUMENTED: ICD-10-PCS | Mod: CPTII,S$GLB,, | Performed by: PODIATRIST

## 2023-09-25 PROCEDURE — 3044F PR MOST RECENT HEMOGLOBIN A1C LEVEL <7.0%: ICD-10-PCS | Mod: CPTII,S$GLB,, | Performed by: PODIATRIST

## 2023-09-25 PROCEDURE — 3077F PR MOST RECENT SYSTOLIC BLOOD PRESSURE >= 140 MM HG: ICD-10-PCS | Mod: CPTII,S$GLB,, | Performed by: PODIATRIST

## 2023-09-25 PROCEDURE — 1126F PR PAIN SEVERITY QUANTIFIED, NO PAIN PRESENT: ICD-10-PCS | Mod: CPTII,S$GLB,, | Performed by: PODIATRIST

## 2023-09-25 PROCEDURE — 1101F PT FALLS ASSESS-DOCD LE1/YR: CPT | Mod: CPTII,S$GLB,, | Performed by: PODIATRIST

## 2023-09-25 NOTE — PROGRESS NOTES
"Subjective:     Patient ID: Celso Hernandez is a 68 y.o. male.    Chief Complaint: Rash (Right foot)    A worsening rash the top of the right foot also involving the interdigital areas and lateral border of the right foot for the past couple months.  States the rash got worse over the past 4 weeks.  He was initially prescribed a cream for itching 2 months ago by his dermatologist which he used intermittently.  He is unsure if it helped improve the appearance of the rash but did help with the itching.  History of well-controlled type 2 diabetes with last hemoglobin A1c of 5.5% remains on oral metformin.    09/25/2023: Follow-up for rash along the top of the right foot.  He has been applying topical ketoconazole twice a day reports that the itching has improved but is still present.  Also notes that the previous red bumps have resolved and the skin appears to be dark to the area.    Vitals:    09/25/23 1508   BP: (!) 144/93   Pulse: 79   Weight: 115.7 kg (255 lb)   Height: 5' 9" (1.753 m)   PainSc: 0-No pain      Past Medical History:   Diagnosis Date    Arthritis     BPH (benign prostatic hyperplasia)     Cataract     Colon polyps     Diabetes mellitus type II     Glaucoma     Hyperlipidemia     Hypertension     Multiple duodenal ulcers 10/08/2019    Unspecified hemorrhoids without mention of complication        Past Surgical History:   Procedure Laterality Date    COLONOSCOPY N/A 10/08/2019    Procedure: COLONOSCOPY suprep;  Surgeon: Landon Guajardo MD;  Location: [facility];  Service: General;  Laterality: N/A;    COLONOSCOPY W/ POLYPECTOMY  10/08/2019    CYSTOSCOPY N/A 08/05/2020    Procedure: CYSTOSCOPY;  Surgeon: Arcadio Vaughn MD;  Location: [address];  Service: Urology;  Laterality: N/A;    CYSTOSCOPY WITH URETHRAL DILATION N/A 11/10/2021    Procedure: CYSTOSCOPY, WITH URETHRAL DILATION;  Surgeon: Arcadio Vaughn MD;  Location: [facility] OR [address] [place] [address];  Service: Urology;  Laterality: N/A;  30 MINUTES     DILATION " OF URETHRA N/A 08/05/2020    Procedure: DILATION, URETHRA;  Surgeon: Arcadio Vaughn MD;  Location: [facility] OR 2ND FLR;  Service: Urology;  Laterality: N/A;    EPIDURAL STEROID INJECTION INTO THORACIC SPINE N/A 01/04/2022    Procedure: Thoracic Epidural Steroid Injection T5-6 (No Sedation) ;  Surgeon: Connie Mejia Jr., MD;  Location: [facility] PAIN MGT;  Service: Pain Management;  Laterality: N/A;    ESOPHAGOGASTRODUODENOSCOPY  10/08/2019    w/biopsies    ESOPHAGOGASTRODUODENOSCOPY N/A 10/08/2019    Procedure: EGD (ESOPHAGOGASTRODUODENOSCOPY);  Surgeon: Landon Guajardo MD;  Location: [facility] ENDO;  Service: General;  Laterality: N/A;    NASAL SINUS SURGERY  11/2022    with outside MD    none      TRANSURETHRAL RESECTION OF PROSTATE N/A 05/27/2020    Procedure: TURP (TRANSURETHRAL RESECTION OF PROSTATE) bipolar;  Surgeon: Arcadio Vaughn MD;  Location: [facility] OR 1ST FLR;  Service: Urology;  Laterality: N/A;  2hr       Family History   Problem Relation Age of Onset    Hypertension Mother     Stroke Mother     Hyperlipidemia Mother     Glaucoma Mother     Diabetes Father     Cancer Sister         breast    Hypertension Sister     Heart disease Sister     Hearing loss Sister     No Known Problems Daughter     No Known Problems Son     Amblyopia Neg Hx     Blindness Neg Hx     Cataracts Neg Hx     Macular degeneration Neg Hx     Retinal detachment Neg Hx     Strabismus Neg Hx        Social History     Socioeconomic History    Marital status:    Tobacco Use    Smoking status: Never     Passive exposure: Never    Smokeless tobacco: Never   Substance and Sexual Activity    Alcohol use: Yes     Alcohol/week: 4.0 standard drinks of alcohol     Types: 4 Standard drinks or equivalent per week     Comment: 2-3x week, 1-2 drinks at a time    Drug use: No    Sexual activity: Yes     Partners: Female     Social Determinants of Health     Financial Resource Strain: Low Risk  (3/22/2023)    Overall Financial Resource Strain (CARDIA)      Difficulty of Paying Living Expenses: Not hard at all   Food Insecurity: No Food Insecurity (3/22/2023)    Hunger Vital Sign     Worried About Running Out of Food in the Last Year: Never true     Ran Out of Food in the Last Year: Never true   Transportation Needs: No Transportation Needs (3/22/2023)    PRAPARE - Transportation     Lack of Transportation (Medical): No     Lack of Transportation (Non-Medical): No   Physical Activity: Insufficiently Active (3/22/2023)    Exercise Vital Sign     Days of Exercise per Week: 2 days     Minutes of Exercise per Session: 30 min   Stress: No Stress Concern Present (3/22/2023)    [employer] [place] of [employer]ational Health - Occupational Stress Questionnaire     Feeling of Stress : Not at all   Social Connections: Moderately Integrated (3/22/2023)    Social Connection and Isolation Panel [NHANES]     Frequency of Communication with Friends and Family: More than three times a week     Frequency of Social Gatherings with Friends and Family: More than three times a week     Attends [employer] Services: More than 4 times per year     Active Member of Clubs or Organizations: No     Attends Club or Organization Meetings: Never     Marital Status:    Housing Stability: Low Risk  (3/22/2023)    Housing Stability Vital Sign     Unable to Pay for Housing in the Last Year: No     Number of Places Lived in the Last Year: 1     Unstable Housing in the Last Year: No       Current Outpatient Medications   Medication Sig Dispense Refill    azelastine (ASTELIN) 137 mcg (0.1 %) nasal spray SMARTSIG:Both Nares      azelastine (ASTELIN) 137 mcg (0.1 %) nasal spray azelastine 137 mcg (0.1 %) nasal spray aerosol   Spray 2 sprays twice a day by intranasal route.      diclofenac sodium (VOLTAREN) 1 % Gel Apply 2 g topically once daily. 1 each 2    dorzolamide-timolol 2-0.5% (COSOPT) 22.3-6.8 mg/mL ophthalmic solution INSTILL 1 DROP INTO EACH EYE TWICE DAILY 30 mL 3    hydrALAZINE (APRESOLINE) 50  MG tablet TAKE 1 TABLET BY MOUTH EVERY 12 HOURS FOR BLOOD PRESSURE CONTROL 180 tablet 0    ketoconazole (NIZORAL) 2 % cream Apply topically 2 (two) times daily. 60 g 2    latanoprost 0.005 % ophthalmic solution INSTILL 1 DROP INTO EACH EYE IN THE EVENING 7.5 mL 3    losartan-hydrochlorothiazide 100-12.5 mg (HYZAAR) 100-12.5 mg Tab TAKE 1 TABLET BY MOUTH ONCE DAILY FOR HIGH BLOOD PRESSURE 90 tablet 3    meloxicam (MOBIC) 15 MG tablet Take 1 tablet by mouth once daily 90 tablet 0    metFORMIN (GLUCOPHAGE) 500 MG tablet Take 1 tablet by mouth once daily with breakfast 90 tablet 1    methylPREDNISolone (MEDROL DOSEPACK) 4 mg tablet Take by mouth.      methylPREDNISolone (MEDROL DOSEPACK) 4 mg tablet Medrol (Joe) 4 mg tablets in a dose pack   Take 1 dose pk by oral route.      methylPREDNISolone (MEDROL DOSEPACK) 4 mg tablet methylprednisolone 4 mg tablets in a dose pack   TAKE AS DIRECTED      montelukast (SINGULAIR) 10 mg tablet montelukast 10 mg tablet   TAKE 1 TABLET BY MOUTH ONCE DAILY      montelukast (SINGULAIR) 10 mg tablet Take 10 mg by mouth.      multivit-min-FA-lycopen-lutein (CENTRUM SILVER MEN) 300-600-300 mcg Tab Take 1 tablet by mouth once daily. 100 tablet 3    rosuvastatin (CRESTOR) 40 MG Tab Take 1 tablet (40 mg total) by mouth once daily. 90 tablet 3    labetaloL (NORMODYNE) 200 MG tablet Take 1 tablet (200 mg total) by mouth 2 (two) times daily. For blood pressure control. 60 tablet 11     No current facility-administered medications for this visit.       Review of patient's allergies indicates:   Allergen Reactions    Keflex [cephalexin] Rash     Scrotal and groin itching          Review of Systems   Constitutional: Negative for chills, fever and malaise/fatigue.   HENT:  Negative for congestion and hearing loss.    Cardiovascular:  Negative for chest pain, claudication and leg swelling.   Respiratory:  Negative for cough and shortness of breath.    Skin:  Positive for itching and rash.    Musculoskeletal:  Negative for back pain, joint pain, muscle cramps and muscle weakness.   Gastrointestinal:  Negative for nausea and vomiting.   Neurological:  Negative for numbness, paresthesias and weakness.   Psychiatric/Behavioral:  Negative for altered mental status.         Objective:     Physical Exam  Constitutional:       General: He is not in acute distress.     Appearance: Normal appearance. He is obese. He is not ill-appearing.   Cardiovascular:      Pulses:           Dorsalis pedis pulses are 2+ on the right side and 2+ on the left side.        Posterior tibial pulses are 1+ on the right side and 1+ on the left side.      Comments: Mild nonpitting edema to lower extremity bilateral.  Skin temp is warm to foot bilateral.  No rubor on dependency bilateral foot.  No hair growth follow extremity.  Musculoskeletal:      Comments: No pain with ROM or MMT bilateral lower extremity.     Feet:      Right foot:      Protective Sensation: 10 sites tested.  10 sites sensed.      Left foot:      Protective Sensation: 10 sites tested.  10 sites sensed.   Skin:     General: Skin is warm.      Capillary Refill: Capillary refill takes less than 2 seconds.      Findings: No ecchymosis.      Nails: There is no clubbing.      Comments: Previous erythematous patch dorsal right foot is resolved with some hyperpigmentation of the skin that is flat.  There were prior papules noted and nose are resolved.   Neurological:      Mental Status: He is alert.           08/14/2023 09/25/2023  Assessment:      Encounter Diagnoses   Name Primary?    Tinea pedis of right foot Yes    Type 2 diabetes mellitus without complication, without long-term current use of insulin      Plan:     Celso was seen today for rash.    Diagnoses and all orders for this visit:    Tinea pedis of right foot    Type 2 diabetes mellitus without complication, without long-term current use of insulin      I counseled the patient on his conditions, their  implications and medical management.    Note overall significant improvement to the skin dorsal right foot follow there is some hyperpigmentation and remaining pruritus.  At this time extensive chart check completed noting that he has an elevated ALT on prior CMP completed March of 2023 in addition to elevated BUN.  We will avoid oral antifungal medication and continue topical ketoconazole twice daily for an additional 4-6 weeks.    If does not continue to improve or flares up and would recommend a skin biopsy.      RTC p.r.n. as discussed    A portion of this note was generated by voice recognition software and may contain spelling and grammar errors.  .

## 2023-09-26 ENCOUNTER — PATIENT OUTREACH (OUTPATIENT)
Dept: ADMINISTRATIVE | Facility: HOSPITAL | Age: 68
End: 2023-09-26
Payer: MEDICARE

## 2023-09-26 ENCOUNTER — LAB VISIT (OUTPATIENT)
Dept: LAB | Facility: HOSPITAL | Age: 68
End: 2023-09-26
Attending: INTERNAL MEDICINE
Payer: MEDICARE

## 2023-09-26 DIAGNOSIS — N28.9 ACUTE RENAL INSUFFICIENCY: ICD-10-CM

## 2023-09-26 DIAGNOSIS — E78.2 MIXED HYPERLIPIDEMIA: ICD-10-CM

## 2023-09-26 DIAGNOSIS — E11.9 TYPE 2 DIABETES MELLITUS WITHOUT COMPLICATION, WITHOUT LONG-TERM CURRENT USE OF INSULIN: ICD-10-CM

## 2023-09-26 DIAGNOSIS — I10 ESSENTIAL HYPERTENSION: ICD-10-CM

## 2023-09-26 DIAGNOSIS — Z12.5 PROSTATE CANCER SCREENING: ICD-10-CM

## 2023-09-26 LAB
ALBUMIN SERPL BCP-MCNC: 3.5 G/DL (ref 3.5–5.2)
ALBUMIN/CREAT UR: 11 UG/MG (ref 0–30)
ALP SERPL-CCNC: 70 U/L (ref 55–135)
ALT SERPL W/O P-5'-P-CCNC: 34 U/L (ref 10–44)
ANION GAP SERPL CALC-SCNC: 11 MMOL/L (ref 8–16)
AST SERPL-CCNC: 31 U/L (ref 10–40)
BACTERIA #/AREA URNS HPF: ABNORMAL /HPF
BASOPHILS # BLD AUTO: 0.1 K/UL (ref 0–0.2)
BASOPHILS NFR BLD: 0.9 % (ref 0–1.9)
BILIRUB SERPL-MCNC: 0.6 MG/DL (ref 0.1–1)
BILIRUB UR QL STRIP: NEGATIVE
BUN SERPL-MCNC: 25 MG/DL (ref 8–23)
CALCIUM SERPL-MCNC: 9.8 MG/DL (ref 8.7–10.5)
CHLORIDE SERPL-SCNC: 103 MMOL/L (ref 95–110)
CHOLEST SERPL-MCNC: 199 MG/DL (ref 120–199)
CHOLEST/HDLC SERPL: 4.9 {RATIO} (ref 2–5)
CLARITY UR: CLEAR
CO2 SERPL-SCNC: 21 MMOL/L (ref 23–29)
COLOR UR: YELLOW
COMPLEXED PSA SERPL-MCNC: 0.73 NG/ML (ref 0–4)
CREAT SERPL-MCNC: 1.2 MG/DL (ref 0.5–1.4)
CREAT UR-MCNC: 91 MG/DL (ref 23–375)
DIFFERENTIAL METHOD: ABNORMAL
EOSINOPHIL # BLD AUTO: 0.3 K/UL (ref 0–0.5)
EOSINOPHIL NFR BLD: 2.8 % (ref 0–8)
ERYTHROCYTE [DISTWIDTH] IN BLOOD BY AUTOMATED COUNT: 14 % (ref 11.5–14.5)
EST. GFR  (NO RACE VARIABLE): >60 ML/MIN/1.73 M^2
ESTIMATED AVG GLUCOSE: 117 MG/DL (ref 68–131)
GLUCOSE SERPL-MCNC: 95 MG/DL (ref 70–110)
GLUCOSE UR QL STRIP: NEGATIVE
HBA1C MFR BLD: 5.7 % (ref 4–5.6)
HCT VFR BLD AUTO: 55.4 % (ref 40–54)
HDLC SERPL-MCNC: 41 MG/DL (ref 40–75)
HDLC SERPL: 20.6 % (ref 20–50)
HGB BLD-MCNC: 18.7 G/DL (ref 14–18)
HGB UR QL STRIP: NEGATIVE
IMM GRANULOCYTES # BLD AUTO: 0.03 K/UL (ref 0–0.04)
IMM GRANULOCYTES NFR BLD AUTO: 0.3 % (ref 0–0.5)
KETONES UR QL STRIP: NEGATIVE
LDLC SERPL CALC-MCNC: 108.6 MG/DL (ref 63–159)
LEUKOCYTE ESTERASE UR QL STRIP: ABNORMAL
LYMPHOCYTES # BLD AUTO: 3.8 K/UL (ref 1–4.8)
LYMPHOCYTES NFR BLD: 32.6 % (ref 18–48)
MCH RBC QN AUTO: 29.7 PG (ref 27–31)
MCHC RBC AUTO-ENTMCNC: 33.8 G/DL (ref 32–36)
MCV RBC AUTO: 88 FL (ref 82–98)
MICROALBUMIN UR DL<=1MG/L-MCNC: 10 UG/ML
MICROSCOPIC COMMENT: ABNORMAL
MONOCYTES # BLD AUTO: 1.4 K/UL (ref 0.3–1)
MONOCYTES NFR BLD: 12.2 % (ref 4–15)
NEUTROPHILS # BLD AUTO: 5.9 K/UL (ref 1.8–7.7)
NEUTROPHILS NFR BLD: 51.2 % (ref 38–73)
NITRITE UR QL STRIP: NEGATIVE
NONHDLC SERPL-MCNC: 158 MG/DL
NRBC BLD-RTO: 0 /100 WBC
PH UR STRIP: 5 [PH] (ref 5–8)
PLATELET # BLD AUTO: 210 K/UL (ref 150–450)
PMV BLD AUTO: 10.1 FL (ref 9.2–12.9)
POTASSIUM SERPL-SCNC: 4 MMOL/L (ref 3.5–5.1)
PROT SERPL-MCNC: 7.6 G/DL (ref 6–8.4)
PROT UR QL STRIP: NEGATIVE
RBC # BLD AUTO: 6.3 M/UL (ref 4.6–6.2)
RBC #/AREA URNS HPF: 3 /HPF (ref 0–4)
SODIUM SERPL-SCNC: 135 MMOL/L (ref 136–145)
SP GR UR STRIP: 1.01 (ref 1–1.03)
SQUAMOUS #/AREA URNS HPF: 1 /HPF
TRIGL SERPL-MCNC: 247 MG/DL (ref 30–150)
TSH SERPL DL<=0.005 MIU/L-ACNC: 1.99 UIU/ML (ref 0.4–4)
URN SPEC COLLECT METH UR: ABNORMAL
UROBILINOGEN UR STRIP-ACNC: NEGATIVE EU/DL
WBC # BLD AUTO: 11.51 K/UL (ref 3.9–12.7)
WBC #/AREA URNS HPF: 10 /HPF (ref 0–5)

## 2023-09-26 PROCEDURE — 81000 URINALYSIS NONAUTO W/SCOPE: CPT | Performed by: INTERNAL MEDICINE

## 2023-09-26 PROCEDURE — 82570 ASSAY OF URINE CREATININE: CPT | Performed by: INTERNAL MEDICINE

## 2023-09-26 PROCEDURE — 84443 ASSAY THYROID STIM HORMONE: CPT | Performed by: INTERNAL MEDICINE

## 2023-09-26 PROCEDURE — 80053 COMPREHEN METABOLIC PANEL: CPT | Performed by: INTERNAL MEDICINE

## 2023-09-26 PROCEDURE — 85025 COMPLETE CBC W/AUTO DIFF WBC: CPT | Performed by: INTERNAL MEDICINE

## 2023-09-26 PROCEDURE — 83036 HEMOGLOBIN GLYCOSYLATED A1C: CPT | Performed by: INTERNAL MEDICINE

## 2023-09-26 PROCEDURE — 84153 ASSAY OF PSA TOTAL: CPT | Performed by: INTERNAL MEDICINE

## 2023-09-26 PROCEDURE — 36415 COLL VENOUS BLD VENIPUNCTURE: CPT | Performed by: INTERNAL MEDICINE

## 2023-09-26 PROCEDURE — 80061 LIPID PANEL: CPT | Performed by: INTERNAL MEDICINE

## 2023-09-26 NOTE — PROGRESS NOTES
Updates were requested from care everywhere.  Health Maintenance has been updated.  LINKS immunization registry triggered.  Immunizations were reconciled.  Pt declined flu vaccine 09/25/2023

## 2023-09-29 ENCOUNTER — CLINICAL SUPPORT (OUTPATIENT)
Dept: ENDOSCOPY | Facility: HOSPITAL | Age: 68
End: 2023-09-29
Attending: INTERNAL MEDICINE
Payer: MEDICARE

## 2023-09-29 ENCOUNTER — TELEPHONE (OUTPATIENT)
Dept: ENDOSCOPY | Facility: HOSPITAL | Age: 68
End: 2023-09-29

## 2023-09-29 ENCOUNTER — TELEPHONE (OUTPATIENT)
Dept: GASTROENTEROLOGY | Facility: CLINIC | Age: 68
End: 2023-09-29
Payer: MEDICARE

## 2023-09-29 DIAGNOSIS — K63.5 POLYP OF COLON, UNSPECIFIED PART OF COLON, UNSPECIFIED TYPE: ICD-10-CM

## 2023-09-29 DIAGNOSIS — Z12.11 COLON CANCER SCREENING: ICD-10-CM

## 2023-09-29 NOTE — TELEPHONE ENCOUNTER
Contacted patient to schedule colonoscopy. Patient wants to do procedure at [facility]. # [phone] given to call. Patient verbalized understanding. Was transferred  to [facility].

## 2023-09-29 NOTE — TELEPHONE ENCOUNTER
Endoscopy Scheduling Questionnaire:         Are you taking any blood thinners? no               If Yes  Have you been on them for longer than one year? N/a    2. Have you been diagnosed with Diverticulitis in past three months?  no    3. Are you on dialysis or have Kidney Disease? no    4. Previous Colonoscopy?  yes         If yes Do you have a history of colon polyps?  yes    5. Family History of Colon Cancer: no         Relation: n/a       Age at Diagnosis: n/a      6. Are you a diabetic?  yes    7. Do you have a history of constipation?  no    8. Are you taking a GLP-1 Agonist/Adipex (weight loss drugs)? no  Dulaglutide (Trulicity) (weekly)  Exenatide extended release (Bydureon bcise) (weekly)  Exenatide (Byetta) (twice daily)  Semaglutide (Ozempic) (weekly)  Liraglutide (Victoza, Saxenda) (daily)  Lixisenatide (Adlyxin) (daily)  Semaglutide (Rybelsus) (taken by mouth once daily)    Hold GLP-1 agonists on the day of the procedure/surgery for patients who take the medication daily.    Hold GLP-1 agonists a week prior to the procedure/surgery for patients who take the medication weekly.    Procedure scheduled with Dr. Dr. Cristal Marin   on  11/2/2023    The prep being used is Suprep     The patient's prep instructions were sent via patient portal.      SUPREP Instructions    [facility] [address]  Clinic Office [phone]  Endoscopy Lab [phone]    You are scheduled for a Colonoscopy with Dr. Dr. Cristal Marin   on 11/2/2023 at [facility] in [place].    Check in at the Hospital -1st floor, Information desk.   Call [phone] to reschedule.    An adult friend/family member must come with you to drive you home.  You cannot drive, take a taxi, Uber/Lyft or bus to leave the Endoscopy Center alone.  If you do not have someone to drive you home, your test will be cancelled.     Please follow the directions of your doctor if you take any pills that thin your blood.  If you take these meds: Aggrenox, Brilinta, Effient, Eliquis, Lovenox, Plavix, Pletal, Pradaxa, Ticilid, Xarelto or Coumadin, let the doctor's office know.    Please hold any GLP-1 medications prior to the procedure: Dulaglutide Trulicity(hold week prior), Exenatide Byetta (hold the morning of procedure), Semaglutide Ozempic (hold week prior), Liraglutide Victoza, Saxenda(hold week prior), Lixisenatide Adlyxin (hold the morning of procedure), Semaglutide Rybelsus (hold the morning of procedure), Tirzepatide Mounjaro (hold week prior)     DON'T: On the morning of the test do not take insulin or pills for diabetes.     DO: On the morning of the test, do take any pills for blood pressure, heart, anti-rejection and or seizures with a small sip of water. Bring any inhalers with you.    To have a good prep, you must follow these instructions - please do not use the directions from the pharmacy.    The doctor will send a prescription for the SUPREP.      The Day Before the test:    You can only drink CLEAR LIQUIDS the whole day before your test.  You can't eat any food for the whole day.    You CAN have:  Water, Coffee or decaf coffee (no milk or cream)  Tea  Soft drinks - regular and sugar free  Jello (green or yellow)  Apple Juice, white grape juice, white cranberry juice  Gatorade, Power Aid, Crystal Light, Felton Aid  Lemonade and Limeade  Bouillon, clear soup  Snowball, popsicles  YOU CAN'T DRINK ANYTHING RED, PURPLE ORANGE OR BLUE   YOU CAN'T DRINK ALCOHOL  ONLY DRINK WHAT IS ON THE LIST      At 5 pm the night before your test:    Pour the 1st bottle of SUPREP into the cup provided in the box. Add water to the line on the cup and mix well.  Drink the whole cup and then drink 2 more full cups of water over 1 hour.  This can be easier to drink if it is cold. You can mix it 20 minutes ahead of time and place in the refrigerator before you drink it.  You must drink it within 30-45 minutes of mixing it.  Do  NOT pour the drink over ice.  You can drink it with a straw.    The Day of the test - We will call you 2 days before your test to tell you what time to get there.    5 hours before you come to the hospital (this may be in the middle of the night)  Pour the 2nd bottle of SUPREP into the cup provided in the box. Add water to the line on the cup and mix well.  Drink the whole cup and then drink 2 more full cups of water over 1 hour.  It might be easier to drink if it is cold. You can mix it 20 minutes ahead of time and place in the refrigerator before you drink it.  You must drink it within 30-45 minutes of mixing it.  Do NOT pour the drink over ice.  You can drink it with a straw.    YOU CAN'T EAT OR DRINK ANYTHING ELSE ONCE YOU FINISH THE PREP    Leave all valuables and jewelry at home. You will be at the hospital for 2-4 hours.    Call the Endoscopy department at [phone] with any questions about your procedure.

## 2023-09-29 NOTE — PLAN OF CARE
Contacted patient to schedule colonoscopy. Patient wants to do procedure at [facility]. # [phone] given to call. Patient verbalized understanding. Was transferred  to [facility].

## 2023-10-03 ENCOUNTER — OFFICE VISIT (OUTPATIENT)
Dept: INTERNAL MEDICINE | Facility: CLINIC | Age: 68
End: 2023-10-03
Payer: MEDICARE

## 2023-10-03 VITALS
TEMPERATURE: 98 F | HEIGHT: 69 IN | SYSTOLIC BLOOD PRESSURE: 146 MMHG | RESPIRATION RATE: 16 BRPM | DIASTOLIC BLOOD PRESSURE: 86 MMHG | BODY MASS INDEX: 37.88 KG/M2 | HEART RATE: 72 BPM | WEIGHT: 255.75 LBS

## 2023-10-03 DIAGNOSIS — E78.5 TYPE 2 DIABETES MELLITUS WITH HYPERLIPIDEMIA: Primary | ICD-10-CM

## 2023-10-03 DIAGNOSIS — E78.2 MIXED HYPERLIPIDEMIA: ICD-10-CM

## 2023-10-03 DIAGNOSIS — E66.01 SEVERE OBESITY (BMI 35.0-39.9) WITH COMORBIDITY: ICD-10-CM

## 2023-10-03 DIAGNOSIS — E11.69 TYPE 2 DIABETES MELLITUS WITH HYPERLIPIDEMIA: Primary | ICD-10-CM

## 2023-10-03 DIAGNOSIS — I10 ESSENTIAL HYPERTENSION: ICD-10-CM

## 2023-10-03 DIAGNOSIS — M79.18 GLUTEAL PAIN: ICD-10-CM

## 2023-10-03 PROCEDURE — 99214 OFFICE O/P EST MOD 30 MIN: CPT | Mod: 25,S$GLB,, | Performed by: INTERNAL MEDICINE

## 2023-10-03 PROCEDURE — 3079F DIAST BP 80-89 MM HG: CPT | Mod: CPTII,S$GLB,, | Performed by: INTERNAL MEDICINE

## 2023-10-03 PROCEDURE — 3044F HG A1C LEVEL LT 7.0%: CPT | Mod: CPTII,S$GLB,, | Performed by: INTERNAL MEDICINE

## 2023-10-03 PROCEDURE — 90694 FLU VACCINE - QUADRIVALENT - ADJUVANTED: ICD-10-PCS | Mod: S$GLB,,, | Performed by: INTERNAL MEDICINE

## 2023-10-03 PROCEDURE — 3066F NEPHROPATHY DOC TX: CPT | Mod: CPTII,S$GLB,, | Performed by: INTERNAL MEDICINE

## 2023-10-03 PROCEDURE — 1126F PR PAIN SEVERITY QUANTIFIED, NO PAIN PRESENT: ICD-10-PCS | Mod: CPTII,S$GLB,, | Performed by: INTERNAL MEDICINE

## 2023-10-03 PROCEDURE — 3077F PR MOST RECENT SYSTOLIC BLOOD PRESSURE >= 140 MM HG: ICD-10-PCS | Mod: CPTII,S$GLB,, | Performed by: INTERNAL MEDICINE

## 2023-10-03 PROCEDURE — 1101F PT FALLS ASSESS-DOCD LE1/YR: CPT | Mod: CPTII,S$GLB,, | Performed by: INTERNAL MEDICINE

## 2023-10-03 PROCEDURE — 3044F PR MOST RECENT HEMOGLOBIN A1C LEVEL <7.0%: ICD-10-PCS | Mod: CPTII,S$GLB,, | Performed by: INTERNAL MEDICINE

## 2023-10-03 PROCEDURE — 3061F PR NEG MICROALBUMINURIA RESULT DOCUMENTED/REVIEW: ICD-10-PCS | Mod: CPTII,S$GLB,, | Performed by: INTERNAL MEDICINE

## 2023-10-03 PROCEDURE — 99999 PR PBB SHADOW E&M-EST. PATIENT-LVL V: CPT | Mod: PBBFAC,,, | Performed by: INTERNAL MEDICINE

## 2023-10-03 PROCEDURE — 3066F PR DOCUMENTATION OF TREATMENT FOR NEPHROPATHY: ICD-10-PCS | Mod: CPTII,S$GLB,, | Performed by: INTERNAL MEDICINE

## 2023-10-03 PROCEDURE — 3008F PR BODY MASS INDEX (BMI) DOCUMENTED: ICD-10-PCS | Mod: CPTII,S$GLB,, | Performed by: INTERNAL MEDICINE

## 2023-10-03 PROCEDURE — 3061F NEG MICROALBUMINURIA REV: CPT | Mod: CPTII,S$GLB,, | Performed by: INTERNAL MEDICINE

## 2023-10-03 PROCEDURE — G0008 ADMIN INFLUENZA VIRUS VAC: HCPCS | Mod: S$GLB,,, | Performed by: INTERNAL MEDICINE

## 2023-10-03 PROCEDURE — 99214 PR OFFICE/OUTPT VISIT, EST, LEVL IV, 30-39 MIN: ICD-10-PCS | Mod: 25,S$GLB,, | Performed by: INTERNAL MEDICINE

## 2023-10-03 PROCEDURE — 3079F PR MOST RECENT DIASTOLIC BLOOD PRESSURE 80-89 MM HG: ICD-10-PCS | Mod: CPTII,S$GLB,, | Performed by: INTERNAL MEDICINE

## 2023-10-03 PROCEDURE — 1126F AMNT PAIN NOTED NONE PRSNT: CPT | Mod: CPTII,S$GLB,, | Performed by: INTERNAL MEDICINE

## 2023-10-03 PROCEDURE — 3288F PR FALLS RISK ASSESSMENT DOCUMENTED: ICD-10-PCS | Mod: CPTII,S$GLB,, | Performed by: INTERNAL MEDICINE

## 2023-10-03 PROCEDURE — 1101F PR PT FALLS ASSESS DOC 0-1 FALLS W/OUT INJ PAST YR: ICD-10-PCS | Mod: CPTII,S$GLB,, | Performed by: INTERNAL MEDICINE

## 2023-10-03 PROCEDURE — 3008F BODY MASS INDEX DOCD: CPT | Mod: CPTII,S$GLB,, | Performed by: INTERNAL MEDICINE

## 2023-10-03 PROCEDURE — 99999 PR PBB SHADOW E&M-EST. PATIENT-LVL V: ICD-10-PCS | Mod: PBBFAC,,, | Performed by: INTERNAL MEDICINE

## 2023-10-03 PROCEDURE — 90694 VACC AIIV4 NO PRSRV 0.5ML IM: CPT | Mod: S$GLB,,, | Performed by: INTERNAL MEDICINE

## 2023-10-03 PROCEDURE — 3077F SYST BP >= 140 MM HG: CPT | Mod: CPTII,S$GLB,, | Performed by: INTERNAL MEDICINE

## 2023-10-03 PROCEDURE — G0008 FLU VACCINE - QUADRIVALENT - ADJUVANTED: ICD-10-PCS | Mod: S$GLB,,, | Performed by: INTERNAL MEDICINE

## 2023-10-03 PROCEDURE — 3288F FALL RISK ASSESSMENT DOCD: CPT | Mod: CPTII,S$GLB,, | Performed by: INTERNAL MEDICINE

## 2023-10-05 ENCOUNTER — PATIENT MESSAGE (OUTPATIENT)
Dept: ADMINISTRATIVE | Facility: HOSPITAL | Age: 68
End: 2023-10-05
Payer: MEDICARE

## 2023-10-05 ENCOUNTER — PATIENT OUTREACH (OUTPATIENT)
Dept: ADMINISTRATIVE | Facility: HOSPITAL | Age: 68
End: 2023-10-05
Payer: MEDICARE

## 2023-10-05 RX ORDER — SODIUM, POTASSIUM,MAG SULFATES 17.5-3.13G
1 SOLUTION, RECONSTITUTED, ORAL ORAL DAILY
Qty: 1 KIT | Refills: 0 | Status: SHIPPED | OUTPATIENT
Start: 2023-10-05 | End: 2023-10-07

## 2023-10-10 ENCOUNTER — OFFICE VISIT (OUTPATIENT)
Dept: PAIN MEDICINE | Facility: CLINIC | Age: 68
End: 2023-10-10
Payer: MEDICARE

## 2023-10-10 VITALS
BODY MASS INDEX: 37.77 KG/M2 | SYSTOLIC BLOOD PRESSURE: 152 MMHG | WEIGHT: 255.75 LBS | DIASTOLIC BLOOD PRESSURE: 86 MMHG | HEART RATE: 80 BPM

## 2023-10-10 DIAGNOSIS — M79.18 GLUTEAL PAIN: ICD-10-CM

## 2023-10-10 DIAGNOSIS — M79.18 CHRONIC GLUTEAL PAIN: Primary | ICD-10-CM

## 2023-10-10 DIAGNOSIS — G89.29 CHRONIC GLUTEAL PAIN: Primary | ICD-10-CM

## 2023-10-10 PROCEDURE — 3061F NEG MICROALBUMINURIA REV: CPT | Mod: CPTII,S$GLB,, | Performed by: NURSE PRACTITIONER

## 2023-10-10 PROCEDURE — 3008F BODY MASS INDEX DOCD: CPT | Mod: CPTII,S$GLB,, | Performed by: NURSE PRACTITIONER

## 2023-10-10 PROCEDURE — 3044F HG A1C LEVEL LT 7.0%: CPT | Mod: CPTII,S$GLB,, | Performed by: NURSE PRACTITIONER

## 2023-10-10 PROCEDURE — 3066F NEPHROPATHY DOC TX: CPT | Mod: CPTII,S$GLB,, | Performed by: NURSE PRACTITIONER

## 2023-10-10 PROCEDURE — 3079F DIAST BP 80-89 MM HG: CPT | Mod: CPTII,S$GLB,, | Performed by: NURSE PRACTITIONER

## 2023-10-10 PROCEDURE — 3008F PR BODY MASS INDEX (BMI) DOCUMENTED: ICD-10-PCS | Mod: CPTII,S$GLB,, | Performed by: NURSE PRACTITIONER

## 2023-10-10 PROCEDURE — 99999 PR PBB SHADOW E&M-EST. PATIENT-LVL III: CPT | Mod: PBBFAC,,, | Performed by: NURSE PRACTITIONER

## 2023-10-10 PROCEDURE — 99213 PR OFFICE/OUTPT VISIT, EST, LEVL III, 20-29 MIN: ICD-10-PCS | Mod: S$GLB,,, | Performed by: NURSE PRACTITIONER

## 2023-10-10 PROCEDURE — 99999 PR PBB SHADOW E&M-EST. PATIENT-LVL III: ICD-10-PCS | Mod: PBBFAC,,, | Performed by: NURSE PRACTITIONER

## 2023-10-10 PROCEDURE — 1126F AMNT PAIN NOTED NONE PRSNT: CPT | Mod: CPTII,S$GLB,, | Performed by: NURSE PRACTITIONER

## 2023-10-10 PROCEDURE — 3066F PR DOCUMENTATION OF TREATMENT FOR NEPHROPATHY: ICD-10-PCS | Mod: CPTII,S$GLB,, | Performed by: NURSE PRACTITIONER

## 2023-10-10 PROCEDURE — 3044F PR MOST RECENT HEMOGLOBIN A1C LEVEL <7.0%: ICD-10-PCS | Mod: CPTII,S$GLB,, | Performed by: NURSE PRACTITIONER

## 2023-10-10 PROCEDURE — 1159F PR MEDICATION LIST DOCUMENTED IN MEDICAL RECORD: ICD-10-PCS | Mod: CPTII,S$GLB,, | Performed by: NURSE PRACTITIONER

## 2023-10-10 PROCEDURE — 1160F RVW MEDS BY RX/DR IN RCRD: CPT | Mod: CPTII,S$GLB,, | Performed by: NURSE PRACTITIONER

## 2023-10-10 PROCEDURE — 99213 OFFICE O/P EST LOW 20 MIN: CPT | Mod: S$GLB,,, | Performed by: NURSE PRACTITIONER

## 2023-10-10 PROCEDURE — 3079F PR MOST RECENT DIASTOLIC BLOOD PRESSURE 80-89 MM HG: ICD-10-PCS | Mod: CPTII,S$GLB,, | Performed by: NURSE PRACTITIONER

## 2023-10-10 PROCEDURE — 1159F MED LIST DOCD IN RCRD: CPT | Mod: CPTII,S$GLB,, | Performed by: NURSE PRACTITIONER

## 2023-10-10 PROCEDURE — 1126F PR PAIN SEVERITY QUANTIFIED, NO PAIN PRESENT: ICD-10-PCS | Mod: CPTII,S$GLB,, | Performed by: NURSE PRACTITIONER

## 2023-10-10 PROCEDURE — 3077F SYST BP >= 140 MM HG: CPT | Mod: CPTII,S$GLB,, | Performed by: NURSE PRACTITIONER

## 2023-10-10 PROCEDURE — 3061F PR NEG MICROALBUMINURIA RESULT DOCUMENTED/REVIEW: ICD-10-PCS | Mod: CPTII,S$GLB,, | Performed by: NURSE PRACTITIONER

## 2023-10-10 PROCEDURE — 3077F PR MOST RECENT SYSTOLIC BLOOD PRESSURE >= 140 MM HG: ICD-10-PCS | Mod: CPTII,S$GLB,, | Performed by: NURSE PRACTITIONER

## 2023-10-10 PROCEDURE — 1160F PR REVIEW ALL MEDS BY PRESCRIBER/CLIN PHARMACIST DOCUMENTED: ICD-10-PCS | Mod: CPTII,S$GLB,, | Performed by: NURSE PRACTITIONER

## 2023-10-10 NOTE — PROGRESS NOTES
[facility] Established Clinic Visit    Referred by: Dr. Inder Quarles   Reason for referral: Gluteal pain     CC:   Chief Complaint   Patient presents with    gluteal pain     Interval History (10/10/2023):  Celso Hernandez returns today for follow up for right gluteal/buttocks pain that has been ongoing for the last 3-4 months.  He is intermittent pain described as soreness pain is worse when sitting pain is mitigated when shifting weight off of his right gluteal area.  Denies any radiating symptoms down either leg the worst of his pain is 3/4/10.  He denies any recent incident or trauma denies any bowel bladder dysfunction,.  Patient states that he does a lot of sitting as he is a  he also watches television during the day for 2-3 hours every day.  He does take occasionally meloxicam 7.5 which she states does help.      Current Pain Scales:  Current: 0/10              Typical Range: 0-4/10     Interval History(08/09/2023):  Celso Hernandez returns today for follow up for neck and upper back pain.  He reports completing physical therapy and has now establish a home exercise plan at home.  He states that his neck pain is stable at the moment and is doing fine.    Currently, the neck pain is stable.        Current Pain Scales:  Current: 0/10              Typical Range: 2-4/10     Interval Updates  [birth year]/08[birth year][id number]-kdkd-old male presents today with neck and upper back pain.  Onset 5-6 days pain is specifically located at the base of his neck and radiates into his head.  Denies any headaches.  Pain is constant, pain is described as a strain and pulling he denies any radicular symptoms in either arm , denies any profound weakness, denies any paresthesia like symptoms.  Pain is aggravated with certain movements and laying down.  Pain is mitigated with ibuprofen 800 mg t.i.d..  He denies any recent incident or trauma he does state that he felt like he slept wrong and woke up the  "next morning feeling pain in the neck and into his head.    Interval Updates:   2/10/2022 - Mr. Hernandez is following up for  Low-back Pain and Mid-back Pain is currently rate 0/10 with a weekly range 2-5/10.  It is described as Aching, Grabbing and Tight.  He  Is reporting mild to no symptoms today, he states he has no pain when performing normal ADLs, he reports pain is exacerbated when he  "tries to do to much" He did also report midback /thoroacic pain with mild twisting but pain is stable at this point.     1/19/2022  -  Mary returns to clinic s/p Thoracic Epidural Steroid Injection at T5-6 on 1/4/22 with 60% relief.  He reports a pain intensity 1/10 today with a weekly range of 1-2/10.   He is reporting mild soarness in his thoracic area, he is reporting being " free of pain after the injection" his pain returned after working outside in his garage yesterday. Overall much better following his injection. He is reporting certain movements increase his pain but other than that he  is better.     Subjective:   Celso Hernandez is a 68 y.o. male who has a past medical history of Arthritis, BPH (benign prostatic hyperplasia), Cataract, Colon polyps, Diabetes mellitus type II, Glaucoma, Hyperlipidemia, Hypertension, Multiple duodenal ulcers, and Unspecified hemorrhoids without mention of complication. He complains of pain as described below.    Location:  Right gluteal pain  Onset:  3-4 months  Radiation:  None  Timing: constant  Current Pain Score: 7/10  Weekly Pain Range: 2-10/10  Quality:  Pulling and strain type pain  Worsened by: exercise, extension, lifting, lying down, sitting and walking for more than several minutes  Improved by: medications ibuprofen 800 mg    Previous Therapies:  PT/OT: Denies for neck pain  HEP: Yes, stretching at home and walking.  TENS: No  Interventions:  01/04/2022 Thoracic Epidural Steroid Injection at T5-6  Surgery: Denies localized surgery  Opioids:  Adjuvants:     Current Pain " Medications:  Ibuprofen 800 mg t.i.d.     Assessment & Plan:  Problem List Items Addressed This Visit    None  Visit Diagnoses       Chronic gluteal pain    -  Primary    Gluteal pain              12/17/21 - Celso Hernandez is a 68 y.o. male who  has a past medical history of Arthritis, BPH (benign prostatic hyperplasia), Cataract, Colon polyps, Diabetes mellitus type II, Glaucoma, Hyperlipidemia, Hypertension, Multiple duodenal ulcers (10/08/2019), and Unspecified hemorrhoids without mention of complication.  By history and examination this patient has chronic mid/upper back pain with right T5-6 radiculopathy.  The underlying cause cause is facet arthritis and degenerative disc disease with a large, obstructive osteophyte on the right side as seen on CT chest.  MRI is appropriate prior to an injections to assess soft tissue and vasculature.  We discussed the underlying diagnoses and multiple treatment options including non-opioid medications, opioid medications, interventional procedures, physical therapy and home exercise.  The risks and benefits of each treatment option were discussed and all questions were answered.        1/19/2022- Celso Hernandez is a 68 y.o. male who  has a past medical history of Arthritis, BPH (benign prostatic hyperplasia), Cataract, Colon polyps, Diabetes mellitus type II, Glaucoma, Hyperlipidemia, Hypertension, Multiple duodenal ulcers (10/08/2019), and Unspecified hemorrhoids without mention of complication.  By history and examination this patient has chronic mid/upper back pain with a right T5-6   Radiculopathy that responded well following a thoracic GABRIELA targeting T5-6.  Colonic causes appear to be facet arthritis and degenerative disc disease with a large obstructive osteophyte on the right side.  Upon review of his thoracic MRI ordered per Dr. Mejia results did show a extradural degenerative changes resulting in right T6 neural foraminal stenosis.  Secondly intradural  extramedullary T2 signal abnormality at the level of the T4-5.  The radiologist include a differential diagnosis that included a nerve sheet to were such as a  schwannoma or a neurofibroma as well as other neoplasms.  Radiologist recommended further evaluation of this patient's thoracic area, discussed with the patient today that I will order a thoracic MRI with contrast to further evaluate. Long discussion with patient regarding  Results of his MRI, I will also refer to Oncology for further evaluation but I would like this patient to see a [facility] following a hip obtaining new images.    [birth year]-68-rlsn-old male with history of chronic mid to low back pain, he presents today to review an updated thoracic MRI.  There was concerned per radiology on previous thoracic MRI with differential diagnosis that includes a nerve sheet to wear such as a schwannoma or a neurofibroma as well as other neoplasms.  I contacted Oncology and did not appear to be a malignancy.  Was a non malignant entity.  He contacted patient and made him aware of this.  Today his pain is 0/10, he does experience mild midthoracic low back pain when performing duties outside of his normal ADLs.  I recommended physical therapy patient is amenable to this plan.    05/08/[id number]-pekr-old male presents today acute/subacute neck pain beginning at the patient's his neck radiating his head.  Etiology is unclear at this point.  Based on history the patient may be suffering facet arthritis of the cervical, differential diagnosis be myofascial pain.  Cause of his symptoms are unclear he did state that he slept wrong on a pillow and woke up the next morning with this pain.  He denies any radiating symptoms do not suspect any degenerative disc disease or central and/or neural foraminal stenosis.  Ibuprofen has helped his pain prior to come into clinic today but it only lasts 4-5 hours.  He and I discussed plan below and was agreed upon during our clinic visit  today.    2023 68-year-old pleasant male neck pain upper back pain.  He reports having attended physical therapy and completing.  Based on my history and exam his pain generators were from facet arthritis and he also had a cervical myofascial component to his pain which both have improved through physical therapy.  I recommended he establish a home exercise plan patient verbalized understanding and agreed.    10/10/2023- 68-year-old male that is known to our clinic presents with right gluteal/buttocks pain that has been ongoing for the last 3-4 months.  Based on my history and exam the patient is experiencing gluteal pain my differential diagnosis will include sacroiliitis right-sided on.  He has no radicular symptoms he denies any paresthesia like symptoms.  I do believe the patient does a lot of sitting during the day I think would benefit him to establish home exercise plan that focuses on gluteal stretches which I will provide him with today.        Previous imaging reviewed   Establish home exercise plan will provide a home exercise sheet with specific gluteal stretches  No procedures recommended at this time.    Continue current medications for now, educated patient on chronic use of Mobic  reviewed and medications are appropriately dosed for current hepatorenal function.  RTC 3-4 months or sooner if needed      Follow Up:  3-4 months or sooner if needed.    Disclaimer: This note was partly generated using dictation software which may occasionally result in transcription errors.    Imagin22 MRI Thoracic Spine W WO Contrast     Narrative & Impression  EXAMINATION:  MRI THORACIC SPINE W WO CONTRAST     CLINICAL HISTORY:  Attention to T4-5 level.  Evaluate for underlying mass.     TECHNIQUE:  Pre and postcontrast MR imaging of the thoracic spine was performed utilizing 10 mL Gadavist intravenous contrast.     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     No advanced marrow edema or osseous destructive process is identified.  Multilevel mild disc bulging flattens the thecal sac without cord impingement.  Marked degenerative foraminal stenosis on the left at T 4-5 and to the right at T5-6 is again noted.     The spinal cord maintains normal signal intensity.     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     No enhancing lesion.  Degenerative foraminal stenosis on the left at T4-5 and on the right at T5-6.        Electronically signed by: Trent Graves  Date:                                            01/24/2022  Time:                                           17:11        CT Chest Without Contrast  Impression:  No worrisome finding on the chest CT.  Mild atherosclerotic plaque of the aorta and mild coronary artery calcification  Severe foraminal narrowing on the right at T5-6 due to significant facet joint osseous hypertrophy.  Electronically signed by: Halley Lundberg MD  Date:                                            09/29/2021  Time:                                           08:56    X-Ray Lumbar Spine Ap And Lateral 11/21/2019  FINDINGS:  Two views AP lateral.  There is mild DJD and DISH.  Alignment is normal.  No fracture dislocation bone destruction.  No trauma seen.  Impression:  No acute process seen.  DJD and DISH.      Review of Systems:  Review of Systems   Constitutional:  Negative for chills and fever.   HENT:  Negative for nosebleeds.    Eyes:  Negative for blurred vision and pain.   Respiratory:  Negative for hemoptysis.    Cardiovascular:  Negative for chest pain and palpitations.   Gastrointestinal:  Negative for heartburn, nausea and vomiting.   Genitourinary:  Negative for dysuria and hematuria.    Musculoskeletal:  Positive for joint pain. Negative for myalgias.   Skin:  Negative for rash.   Neurological:  Negative for seizures and loss of consciousness.   Endo/Heme/Allergies:  Does not bruise/bleed easily.   Psychiatric/Behavioral:  Negative for hallucinations.        Physical Exam:  Vitals:    10/10/23 0917   BP: (!) 152/86   Pulse: 80   Weight: 116 kg (255 lb 11.7 oz)   PainSc: 0-No pain     General    Nursing note and vitals reviewed.  Constitutional: He is oriented to person, place, and time. He appears well-developed and well-nourished. No distress.   HENT:   Head: Normocephalic and atraumatic.   Nose: Nose normal.   Eyes: Conjunctivae and EOM are normal. Pupils are equal, round, and reactive to light. Right eye exhibits no discharge. Left eye exhibits no discharge. No scleral icterus.   Neck: No JVD present.   Cardiovascular:  Intact distal pulses.            Pulmonary/Chest: Effort normal. No respiratory distress.   Abdominal: He exhibits no distension.   Neurological: He is alert and oriented to person, place, and time. Coordination normal.   Psychiatric: He has a normal mood and affect. His behavior is normal. Judgment and thought content normal.     General Musculoskeletal Exam   Gait: normal     Back (L-Spine & T-Spine) / Neck (C-Spine) Exam     Tenderness Posterior midline palpation reveals tenderness of the Lower L-Spine and Sacrum. Right paramedian tenderness of the Sacrum.     Back (L-Spine & T-Spine) Range of Motion   Extension:  normal   Flexion:  normal   Lateral bend right:  normal Back lateral bend right: leaning to the right exacerbates pain.  Lateral bend left:  normal   Rotation right:  normal   Rotation left:  normal     Comments:  + Pain PSIS right  Right tenderness to right gluteus

## 2023-10-19 ENCOUNTER — OFFICE VISIT (OUTPATIENT)
Dept: OPTOMETRY | Facility: CLINIC | Age: 68
End: 2023-10-19
Payer: MEDICARE

## 2023-10-19 DIAGNOSIS — H40.1132 PRIMARY OPEN ANGLE GLAUCOMA (POAG) OF BOTH EYES, MODERATE STAGE: Primary | ICD-10-CM

## 2023-10-19 DIAGNOSIS — H25.13 SENILE NUCLEAR SCLEROSIS, BILATERAL: ICD-10-CM

## 2023-10-19 PROCEDURE — 1101F PT FALLS ASSESS-DOCD LE1/YR: CPT | Mod: CPTII,S$GLB,, | Performed by: OPTOMETRIST

## 2023-10-19 PROCEDURE — 1160F RVW MEDS BY RX/DR IN RCRD: CPT | Mod: CPTII,S$GLB,, | Performed by: OPTOMETRIST

## 2023-10-19 PROCEDURE — 1101F PR PT FALLS ASSESS DOC 0-1 FALLS W/OUT INJ PAST YR: ICD-10-PCS | Mod: CPTII,S$GLB,, | Performed by: OPTOMETRIST

## 2023-10-19 PROCEDURE — 99999 PR PBB SHADOW E&M-EST. PATIENT-LVL II: CPT | Mod: PBBFAC,,, | Performed by: OPTOMETRIST

## 2023-10-19 PROCEDURE — 3288F FALL RISK ASSESSMENT DOCD: CPT | Mod: CPTII,S$GLB,, | Performed by: OPTOMETRIST

## 2023-10-19 PROCEDURE — 99214 PR OFFICE/OUTPT VISIT, EST, LEVL IV, 30-39 MIN: ICD-10-PCS | Mod: S$GLB,,, | Performed by: OPTOMETRIST

## 2023-10-19 PROCEDURE — 3044F HG A1C LEVEL LT 7.0%: CPT | Mod: CPTII,S$GLB,, | Performed by: OPTOMETRIST

## 2023-10-19 PROCEDURE — 99999 PR PBB SHADOW E&M-EST. PATIENT-LVL II: ICD-10-PCS | Mod: PBBFAC,,, | Performed by: OPTOMETRIST

## 2023-10-19 PROCEDURE — 1160F PR REVIEW ALL MEDS BY PRESCRIBER/CLIN PHARMACIST DOCUMENTED: ICD-10-PCS | Mod: CPTII,S$GLB,, | Performed by: OPTOMETRIST

## 2023-10-19 PROCEDURE — 3288F PR FALLS RISK ASSESSMENT DOCUMENTED: ICD-10-PCS | Mod: CPTII,S$GLB,, | Performed by: OPTOMETRIST

## 2023-10-19 PROCEDURE — 3061F NEG MICROALBUMINURIA REV: CPT | Mod: CPTII,S$GLB,, | Performed by: OPTOMETRIST

## 2023-10-19 PROCEDURE — 3066F PR DOCUMENTATION OF TREATMENT FOR NEPHROPATHY: ICD-10-PCS | Mod: CPTII,S$GLB,, | Performed by: OPTOMETRIST

## 2023-10-19 PROCEDURE — 1159F MED LIST DOCD IN RCRD: CPT | Mod: CPTII,S$GLB,, | Performed by: OPTOMETRIST

## 2023-10-19 PROCEDURE — 3044F PR MOST RECENT HEMOGLOBIN A1C LEVEL <7.0%: ICD-10-PCS | Mod: CPTII,S$GLB,, | Performed by: OPTOMETRIST

## 2023-10-19 PROCEDURE — 3061F PR NEG MICROALBUMINURIA RESULT DOCUMENTED/REVIEW: ICD-10-PCS | Mod: CPTII,S$GLB,, | Performed by: OPTOMETRIST

## 2023-10-19 PROCEDURE — 99214 OFFICE O/P EST MOD 30 MIN: CPT | Mod: S$GLB,,, | Performed by: OPTOMETRIST

## 2023-10-19 PROCEDURE — 1159F PR MEDICATION LIST DOCUMENTED IN MEDICAL RECORD: ICD-10-PCS | Mod: CPTII,S$GLB,, | Performed by: OPTOMETRIST

## 2023-10-19 PROCEDURE — 3066F NEPHROPATHY DOC TX: CPT | Mod: CPTII,S$GLB,, | Performed by: OPTOMETRIST

## 2023-10-19 NOTE — PROGRESS NOTES
TAYLOR    KEITH: 06/23  Chief complaint (CC): Patient is here for a follow up with IOP check   today.  Patient hasn't noticed any vision changes since last exam.  Glasses? +  Contacts? -  H/o eye surgery, injections or laser: -  H/o eye injury: -  Known eye conditions? See above  Family h/o eye conditions? Mother with glaucoma  Eye gtts? Using Latanoprost OU Q HS and Cosopt BID OU, last used as   diresctd      (-) Flashes (-)  Floaters (-) Mucous   (-)  Tearing (-) Itching (-) Burning   (-) Headaches (-) Eye Pain/discomfort (-) Irritation   (-)  Redness (-) Double vision (-) Blurry vision    Diabetic? +  a couple of days ago  A1c? Hemoglobin A1C       Date                     Value               Ref Range             Status                09/26/2023               5.7 (H)             4.0 - 5.6 %           Final                 03/27/2023               5.5                 4.0 - 5.6 %           Final                 09/26/2022               5.4                 4.0 - 5.6 %           Final                    Last edited by Estella Kebede on 10/19/2023  8:49 AM.            Assessment /Plan     For exam results, see Encounter Report.    Primary open angle glaucoma (POAG) of both eyes, moderate stage  -     Posterior Segment OCT Optic Nerve- Both eyes; Future  -     Blake Visual Field - OU - Extended - Both Eyes; Future    Senile nuclear sclerosis, bilateral      (-) FHx. IOp 15 OD, 16 OS. Last 17 OD, OS. Tmax  22 OD, 23 OS.T min on drops 12 OD, 13 OS.  Ptfailed to f/u from 12/17/2019 to 9/16/2020 and 6/2021 to 3/11/2022. Pt reports noncompliance w/drops. Pt reports compliance w/eyedrops Prev pt of Dr Huffman. C/d 0.55 OD, 0.65 OS.   12/28/2022  HVF OD inf arcuate, OS sup arcuate  12/28/2022 OCT OD, thin NS, TS, T, TI, General (Borderline N). OS thin TS, TI, T, General (Borderline N and NI).   6/3/2021 OCT OD NS, TS, T, TI and G, OS thin TS, T, TI, G, borderline NI (stable OU)  3/11/2022 Photos  6/19/2023  Educated  pt on findings w/understanding.   Need for IOp to be lower. Pt reports compliance but states that his spacing in the drops times may be off.  Cont Latanoprost QHS OU.  D/c Timolol BiD OU on 12/15/2020  Cont Cosopt BID OU (start 12/15/2020)  Importance of drop compliance and f/u discussed with pt.   RTC 4 mo IOP/OCt/24-2 Agnes faster

## 2023-10-24 ENCOUNTER — TELEPHONE (OUTPATIENT)
Dept: GASTROENTEROLOGY | Facility: CLINIC | Age: 68
End: 2023-10-24
Payer: MEDICARE

## 2023-10-24 NOTE — TELEPHONE ENCOUNTER
Pt notified Dr. Dang is not available for scheduled colonoscopy on 11/2/23.  Pt to be rescheduled with next available provider.  Pt acknowledged understanding.

## 2023-10-27 RX ORDER — MELOXICAM 15 MG/1
TABLET ORAL
Qty: 90 TABLET | Refills: 0 | Status: SHIPPED | OUTPATIENT
Start: 2023-10-27 | End: 2024-03-12

## 2023-10-27 NOTE — TELEPHONE ENCOUNTER
No care due was identified.  [facility] Embedded Care Due Messages. Reference number: [id number].   10/27/2023 3:29:10 PM CDT

## 2023-11-06 NOTE — PROGRESS NOTES
Subjective:       Patient ID: Celso Hernandez is a 68 y.o. male.    Chief Complaint: Diabetes (6 mos w/ labs to revu.  0 pain. )    HPI  The patient presents for follow-up of medical conditions which include type 2 diabetes mellitus, hypertension, mixed hyperlipidemia.  The patient also has been noting recurrent right gluteal pain for the past 3 months.  There is no history of injury to the area.  He denies having any pain in the back or in the right lower extremity.    The patient reports he discontinued labetalol due to dizziness.  He has not been monitoring his blood pressures at home however.  He is intermittently exercising at home.  He will like to joint and gym for more intensive exercising.    Completed physical therapy for his neck.  However he has been noting intermittent pain involving the right gluteal area.  The pain is intermittent the generally lasts less than 24 hours at a time.  There is no lower back pain or right lower extremity weakness.  No bowel or bladder sphincter dysfunction is noted.    The patient reports his blood sugar levels have ranged from 110 to 130.  No hypoglycemia has been noted.  His last eye examination was 2 months ago.        Review of Systems   Constitutional:  Negative for activity change, appetite change, fatigue and unexpected weight change.   Eyes:  Negative for visual disturbance.   Respiratory:  Negative for cough, chest tightness, shortness of breath and wheezing.    Cardiovascular:  Negative for chest pain, palpitations and leg swelling.   Gastrointestinal:  Negative for abdominal pain, blood in stool, nausea and vomiting.   Genitourinary:  Negative for dysuria, hematuria and urgency.   Musculoskeletal:  Negative for arthralgias, back pain, gait problem, joint swelling and myalgias.        Recurrent right gluteal pain is noted x 3 months   Integumentary:  Negative for color change and rash.   Neurological:  Negative for dizziness, syncope, weakness, light-headedness,  numbness and headaches.   Psychiatric/Behavioral:  Negative for sleep disturbance.             Physical Exam  Vitals and nursing note reviewed.   Constitutional:       General: He is not in acute distress.     Appearance: Normal appearance. He is well-developed.      Comments: The patient has lost 2 lb since 04/03/2023.   HENT:      Head: Normocephalic and atraumatic.   Eyes:      General: No scleral icterus.     Extraocular Movements: Extraocular movements intact.      Conjunctiva/sclera: Conjunctivae normal.   Neck:      Thyroid: No thyromegaly.      Vascular: No JVD.   Cardiovascular:      Rate and Rhythm: Normal rate and regular rhythm.      Heart sounds: Normal heart sounds. No murmur heard.     No friction rub. No gallop.   Pulmonary:      Effort: Pulmonary effort is normal. No respiratory distress.      Breath sounds: Normal breath sounds. No wheezing or rales.   Abdominal:      General: Bowel sounds are normal.      Palpations: Abdomen is soft. There is no mass.      Tenderness: There is no abdominal tenderness.   Musculoskeletal:         General: No tenderness. Normal range of motion.      Cervical back: Normal range of motion and neck supple.      Right lower leg: No edema.      Left lower leg: No edema.      Comments: Right buttock:  No sciatic notch tenderness is present.  No localized swelling or masses are palpable.  No skin discoloration is noted.  No skin rashes are present.    Bag:  Negative straight leg raising test bilaterally.   Lymphadenopathy:      Cervical: No cervical adenopathy.   Skin:     General: Skin is warm and dry.      Findings: No rash.   Neurological:      Mental Status: He is alert and oriented to person, place, and time.      Comments: Gait is normal.   Psychiatric:         Mood and Affect: Mood normal.         Behavior: Behavior normal.         Protective Sensation (w/ 10 gram monofilament):  Right: Intact  Left: Intact    Visual Inspection:  Normal -  Bilateral    Pedal Pulses:    Right: Present  Left: Present    Posterior Tibialis Pulses:   Right:Present  Left: Present    Lab Visit on 09/26/2023   Component Date Value Ref Range Status    Sodium 09/26/2023 135 (L)  136 - 145 mmol/L Final    Potassium 09/26/2023 4.0  3.5 - 5.1 mmol/L Final    Chloride 09/26/2023 103  95 - 110 mmol/L Final    CO2 09/26/2023 21 (L)  23 - 29 mmol/L Final    Glucose 09/26/2023 95  70 - 110 mg/dL Final    BUN 09/26/2023 25 (H)  8 - 23 mg/dL Final    Creatinine 09/26/2023 1.2  0.5 - 1.4 mg/dL Final    Calcium 09/26/2023 9.8  8.7 - 10.5 mg/dL Final    Total Protein 09/26/2023 7.6  6.0 - 8.4 g/dL Final    Albumin 09/26/2023 3.5  3.5 - 5.2 g/dL Final    Total Bilirubin 09/26/2023 0.6  0.1 - 1.0 mg/dL Final    Comment: For infants and newborns, interpretation of results should be based  on gestational age, weight and in agreement with clinical  observations.    Premature Infant recommended reference ranges:  Up to 24 hours.............<8.0 mg/dL  Up to 48 hours............<12.0 mg/dL  3-5 days..................<15.0 mg/dL  6-29 days.................<15.0 mg/dL      Alkaline Phosphatase 09/26/2023 70  55 - 135 U/L Final    AST 09/26/2023 31  10 - 40 U/L Final    ALT 09/26/2023 34  10 - 44 U/L Final    eGFR 09/26/2023 >60  >60 mL/min/1.73 m^2 Final    Anion Gap 09/26/2023 11  8 - 16 mmol/L Final    Cholesterol 09/26/2023 199  120 - 199 mg/dL Final    Comment: The National Cholesterol Education Program (NCEP) has set the  following guidelines (reference ranges) for Cholesterol:  Optimal.....................<200 mg/dL  Borderline High.............200-239 mg/dL  High........................> or = 240 mg/dL      Triglycerides 09/26/2023 247 (H)  30 - 150 mg/dL Final    Comment: The National Cholesterol Education Program (NCEP) has set the  following guidelines (reference values) for triglycerides:  Normal......................<150 mg/dL  Borderline High.............150-199 mg/dL  High........................200-499 mg/dL       HDL 09/26/2023 41  40 - 75 mg/dL Final    Comment: The National Cholesterol Education Program (NCEP) has set the  following guidelines (reference values) for HDL Cholesterol:  Low...............<40 mg/dL  Optimal...........>60 mg/dL      LDL Cholesterol 09/26/2023 108.6  63.0 - 159.0 mg/dL Final    Comment: The National Cholesterol Education Program (NCEP) has set the  following guidelines (reference values) for LDL Cholesterol:  Optimal.......................<130 mg/dL  Borderline High...............130-159 mg/dL  High..........................160-189 mg/dL  Very High.....................>190 mg/dL      HDL/Cholesterol Ratio 09/26/2023 20.6  20.0 - 50.0 % Final    Total Cholesterol/HDL Ratio 09/26/2023 4.9  2.0 - 5.0 Final    Non-HDL Cholesterol 09/26/2023 158  mg/dL Final    Comment: Risk category and Non-HDL cholesterol goals:  Coronary heart disease (CHD)or equivalent (10-year risk of CHD >20%):  Non-HDL cholesterol goal     <130 mg/dL  Two or more CHD risk factors and 10-year risk of CHD <= 20%:  Non-HDL cholesterol goal     <160 mg/dL  0 to 1 CHD risk factor:  Non-HDL cholesterol goal     <190 mg/dL      WBC 09/26/2023 11.51  3.90 - 12.70 K/uL Final    RBC 09/26/2023 6.30 (H)  4.60 - 6.20 M/uL Final    Hemoglobin 09/26/2023 18.7 (H)  14.0 - 18.0 g/dL Final    Hematocrit 09/26/2023 55.4 (H)  40.0 - 54.0 % Final    MCV 09/26/2023 88  82 - 98 fL Final    MCH 09/26/2023 29.7  27.0 - 31.0 pg Final    MCHC 09/26/2023 33.8  32.0 - 36.0 g/dL Final    RDW 09/26/2023 14.0  11.5 - 14.5 % Final    Platelets 09/26/2023 210  150 - 450 K/uL Final    MPV 09/26/2023 10.1  9.2 - 12.9 fL Final    Immature Granulocytes 09/26/2023 0.3  0.0 - 0.5 % Final    Gran # (ANC) 09/26/2023 5.9  1.8 - 7.7 K/uL Final    Immature Grans (Abs) 09/26/2023 0.03  0.00 - 0.04 K/uL Final    Comment: Mild elevation in immature granulocytes is non specific and   can be seen in a variety of conditions including stress response,   acute inflammation,  trauma and pregnancy. Correlation with other   laboratory and clinical findings is essential.      Lymph # 09/26/2023 3.8  1.0 - 4.8 K/uL Final    Mono # 09/26/2023 1.4 (H)  0.3 - 1.0 K/uL Final    Eos # 09/26/2023 0.3  0.0 - 0.5 K/uL Final    Baso # 09/26/2023 0.10  0.00 - 0.20 K/uL Final    nRBC 09/26/2023 0  0 /100 WBC Final    Gran % 09/26/2023 51.2  38.0 - 73.0 % Final    Lymph % 09/26/2023 32.6  18.0 - 48.0 % Final    Mono % 09/26/2023 12.2  4.0 - 15.0 % Final    Eosinophil % 09/26/2023 2.8  0.0 - 8.0 % Final    Basophil % 09/26/2023 0.9  0.0 - 1.9 % Final    Differential Method 09/26/2023 Automated   Final    TSH 09/26/2023 1.987  0.400 - 4.000 uIU/mL Final    PSA, Screen 09/26/2023 0.73  0.00 - 4.00 ng/mL Final    Comment: The testing method is a chemiluminescent microparticle immunoassay   manufactured by [employer] and performed on the [employer]   or   [employer] system. Values obtained with different assay manufacturers   for   methods may be different and cannot be used interchangeably.  PSA Expected levels:  Hormonal Therapy: <0.05 ng/ml  Prostatectomy: <0.01 ng/ml  Radiation Therapy: <1.00 ng/ml      Hemoglobin A1C 09/26/2023 5.7 (H)  4.0 - 5.6 % Final    Comment: ADA Screening Guidelines:  5.7-6.4%  Consistent with prediabetes  >or=6.5%  Consistent with diabetes    High levels of fetal hemoglobin interfere with the HbA1C  assay. Heterozygous hemoglobin variants (HbS, HgC, etc)do  not significantly interfere with this assay.   However, presence of multiple variants may affect accuracy.      Estimated Avg Glucose 09/26/2023 117  68 - 131 mg/dL Final    Specimen UA 09/26/2023 Urine, Clean Catch   Final    Color, UA 09/26/2023 Yellow  Yellow, Straw, [place] Final    Appearance, UA 09/26/2023 Clear  Clear Final    pH, UA 09/26/2023 5.0  5.0 - 8.0 Final    Specific Gravity, UA 09/26/2023 1.015  1.005 - 1.030 Final    Protein, UA 09/26/2023 Negative  Negative Final    Comment: Recommend a 24 hour  urine protein or a urine   protein/creatinine ratio if globulin induced proteinuria is  clinically suspected.      Glucose, UA 09/26/2023 Negative  Negative Final    Ketones, UA 09/26/2023 Negative  Negative Final    Bilirubin (UA) 09/26/2023 Negative  Negative Final    Occult Blood UA 09/26/2023 Negative  Negative Final    Nitrite, UA 09/26/2023 Negative  Negative Final    Urobilinogen, UA 09/26/2023 Negative  <2.0 EU/dL Final    Leukocytes, UA 09/26/2023 2+ (A)  Negative Final    Microalbumin, Urine 09/26/2023 10.0  ug/mL Final    Creatinine, Urine 09/26/2023 91.0  23.0 - 375.0 mg/dL Final    Microalb/Creat Ratio 09/26/2023 11.0  0.0 - 30.0 ug/mg Final    RBC, UA 09/26/2023 3  0 - 4 /hpf Final    WBC, UA 09/26/2023 10 (H)  0 - 5 /hpf Final    Bacteria 09/26/2023 Rare  None-Occ /hpf Final    Squam Epithel, UA 09/26/2023 1  /hpf Final    Microscopic Comment 09/26/2023 SEE COMMENT   Final    Comment: Other formed elements not mentioned in the report are not   present in the microscopic examination.          Assessment & Plan:      Celso was seen today for diabetes.  Influenza vaccine will be obtained today.  The patient was encouraged to be more compliant with use of hydralazine.  He admits that he often forgets to take more than 1 dose a day.  Home blood pressure monitoring is recommended.  A blood pressure recheck with staff in 1 month is recommended.        Diagnoses and all orders for this visit:    Type 2 diabetes mellitus with hyperlipidemia    Essential hypertension    Mixed hyperlipidemia    Severe obesity (BMI 35.0-39.9) with comorbidity    Gluteal pain  -     Cancel: Ambulatory referral/consult to Pain Clinic; Future  -     Ambulatory referral/consult to Pain Clinic; Future    Other orders  -     Influenza - Quadrivalent (Adjuvanted)         Follow up in about 6 months (around 4/3/2024).     Inder Quarles MD

## 2023-11-20 NOTE — TELEPHONE ENCOUNTER
----- Message from Jenny Crenshaw sent at 10/1/2019  1:53 PM CDT -----  Contact: Adrash/ [facility] [email or web address]]ial Short term disability  [employer] [phone] ref# [id number]. They faxed a form to you with questions that need to be answered.      3 = A little assistance

## 2023-12-12 DIAGNOSIS — H40.1132 PRIMARY OPEN ANGLE GLAUCOMA (POAG) OF BOTH EYES, MODERATE STAGE: ICD-10-CM

## 2023-12-12 RX ORDER — LATANOPROST 50 UG/ML
SOLUTION/ DROPS OPHTHALMIC
Qty: 7.5 ML | Refills: 3 | Status: SHIPPED | OUTPATIENT
Start: 2023-12-12

## 2023-12-12 NOTE — TELEPHONE ENCOUNTER
No care due was identified.  [facility] Embedded Care Due Messages. Reference number: [id number].   12/12/2023 2:50:30 PM CST

## 2023-12-13 RX ORDER — LOSARTAN POTASSIUM AND HYDROCHLOROTHIAZIDE 12.5; 1 MG/1; MG/1
TABLET ORAL
Qty: 90 TABLET | Refills: 0 | Status: SHIPPED | OUTPATIENT
Start: 2023-12-13 | End: 2024-03-06

## 2023-12-13 NOTE — TELEPHONE ENCOUNTER
Refill Routing Note   Medication(s) are not appropriate for processing by [facility] for the following reason(s):        Required vitals abnormal    [facility] action(s):  Defer               Appointments  past 12m or future 3m with PCP    Date Provider   Last Visit   10/3/2023 Inder Quarles MD   Next Visit   4/11/2024 Inder Quarles MD   ED visits in past 90 days: 0        Note composed:7:08 PM 12/12/2023

## 2023-12-14 RX ORDER — METFORMIN HYDROCHLORIDE 500 MG/1
TABLET ORAL
Qty: 90 TABLET | Refills: 1 | Status: SHIPPED | OUTPATIENT
Start: 2023-12-14

## 2023-12-14 NOTE — TELEPHONE ENCOUNTER
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

## 2023-12-14 NOTE — TELEPHONE ENCOUNTER
Refill Decision Note   Celso Hernandez  is requesting a refill authorization.  Brief Assessment and Rationale for Refill:  Approve     Medication Therapy Plan:         Pharmacist review requested: Yes   Extended chart review required: Yes   Comments:     Note composed:5:27 PM 12/14/2023

## 2024-01-30 ENCOUNTER — TELEPHONE (OUTPATIENT)
Dept: GASTROENTEROLOGY | Facility: CLINIC | Age: 69
End: 2024-01-30
Payer: MEDICARE

## 2024-01-30 ENCOUNTER — TELEPHONE (OUTPATIENT)
Dept: ENDOSCOPY | Facility: HOSPITAL | Age: 69
End: 2024-01-30
Payer: MEDICARE

## 2024-01-30 NOTE — TELEPHONE ENCOUNTER
Pt called to r/s procedure. Pt requesting [place] location. Pt given contact info to call [facility]

## 2024-01-30 NOTE — TELEPHONE ENCOUNTER
Clinic appt scheduled on Monday, February 19, 2024 at 245pm.  Clinic address given and repeated correctly.

## 2024-01-30 NOTE — TELEPHONE ENCOUNTER
----- Message from Niya Marlow sent at 1/30/2024  3:56 PM CST -----  Type:  Needs Medical Advice    Who Called:  Pt   Would the patient rather a call back or a response via [facility]? Callback   Best Call Back Number: [phone]  Additional Information: Pt is requesting a callback from this provider office in regards to scheduling colonoscopy

## 2024-02-06 NOTE — PLAN OF CARE
Post Acute Skilled Nursing Home Subsequent Visit Note    Date of Service: 2/6/2024  Location seen at: [facility]  Subacute / Skilled Need: Rehabilitation  PCP: Wen Tejada CNP   Patient Care Team:  Wen Tejada CNP as PCP - General (Nurse Practitioner - Family)  Daren Ceballos MD as Post Acute Facility Provider: Physician (Family Practice)  Maite Moreno CNP as Post Acute Facility Provider: APC (Nurse Practitioner - Family)    History of Present Illness:   Hospital course (1/22-1/25/24) limited hospital records to review: 77-year-old male with significant past medical history of diabetes mellitus, hypertension, BPH, atrial fib on Eliquis, nonobstructive CAD, CKD 3, HLD, chronic HFrEF, and osteoarthritis of knees presented to the hospital with complaints of bilateral knee pain, left worse than right, status post falling onto his knees after slipping on running board while getting into an SUV on 1/3.  X-ray of right knee showed no acute fracture or dislocation with moderate tricompartmental degenerative changes, mainly involving patellofemoral compartment.  X-ray of left knee showed no acute fracture or dislocation with severe degenerative changes with narrowing of joint space and marginal spur formation.  Was also found to have small effusion.  There was also concern of possible hemarthrosis.  Ortho surgeon Dr. DHARA Moreno was consulted.  There was concern of septic arthritis which was ruled out s/p joint aspiration which showed no organism on culture but consistent with gout.  Ortho cleared patient to resume Eliquis.  Patient was discharged on prednisone x 4 days.  Patient did have leukocytosis and an episode of fever.  Chest x-ray unremarkable and UA did not show evidence of infection.  Patient was found to have acute urinary retention with history of BPH.  Home dose of Flomax 0.4 mg was doubled to 0.8 mg daily and discharged with indwelling Poe  [facility]  Physical Therapy Plan of Care Note     Name: Celso Hernandez  Clinic Number: [id number]    Therapy Diagnosis:   Encounter Diagnoses   Name Primary?    Neck pain without injury Yes    Facet arthropathy, cervical     Decreased range of motion of neck      Physician: Coleman Quintana FNP    Visit Date: 6/23/2023    Physician Orders: PT Eval and Treat   Medical Diagnosis from Referral:   M54.2 (ICD-10-CM) - Neck pain without injury   M47.812 (ICD-10-CM) - Facet arthropathy, cervical   Evaluation Date: 5/15/2023  Authorization Period Expiration: 12/31/2023  Plan of Care Expiration: 6/23/2023  Visit # / Visits authorized: 8/20    Functional Level Prior to Evaluation:  see initial evaluation    SUBJECTIVE     Update:   Pt reports: no pain this morning. He would like to continue with physical therapy to further improve his cervical range of motion to help with driving and functional tasks.   He was compliant with home exercise program.  Response to previous treatment: decreased pain  Functional change: okay with rotating neck at a 4-way stop. More difficulty rotating neck [place]    Pain: 0/10   Location: neck    OBJECTIVE     Update:   Cervical Range of Motion: AROM    Degrees Subjective report   Flexion  45 --> 50 degrees  Neck tightness   Extension  10 --> 25 degrees  Neck tightness   Right Rotation  40 --> 58 degrees  Neck tightness   Left Rotation  35 --> 60 degrees  Neck tightness    Right Side Bending  10 --> 15 degrees  Tightness/pain   Left Side Bending  5 --> 10 degrees  Tightness/pain       Shoulder Range of Motion: AROM WNL      Upper Extremity Strength  (R) UE   (L) UE     Shoulder flexion: 5/5 Shoulder flexion: 5/5   Shoulder Abduction: 5/5 Shoulder abduction: 5/5   Shoulder ER 5/5 Shoulder ER 5/5   Shoulder IR 5/5 Shoulder IR 5/5     Cervical joint mobility: hypomobility of the cervical and thoracic spine; hypomobility with side glides       ASSESSMENT     Update:   Celso is a 67 y.o. male referred to outpatient Physical Therapy with a medical diagnosis of neck pain and cervical facet arthropathy with primary complaints of limited neck motion. Pain is minimal. With objective tests and measures, patient is displaying improvements with cervical range of motion; although, still lacking cervical sidebending, extension, and rotation range of motion. Patient would benefit from further physical therapy progressing cervical joint mobility, UT flexibility, and rhomboid/rotator cuff strengthening.     Previous Short Term Goals Status:  met  New Short Term Goals Status:  d/c   Long Term Goal Status: continue per initial plan of care.  Reasons for Recertification of Therapy: POC update     PLAN     Updated Certification Period: 6/23/2023 to 8/4/2023   Recommended Treatment Plan: 1 times per week for 6 weeks:  Cervical/Lumbar Traction, Manual Therapy, Neuromuscular Re-ed, Patient Education, Self Care, Therapeutic Activities, and Therapeutic Exercise    Carlos Manuel Barksdale, PT      catheter.  Patient received IV Venofer and was started on ferrous sulfate every other day for iron deficiency anemia.  He was also found to have vitamin B12 deficiency and received cyanocobalamin injections, transition to oral at discharge.  Patient was advised to follow-up with GI outpatient for further evaluation of iron deficiency.  Once patient was stabilized, he was admitted to [facility] for subacute rehab.    [facility] visit 2/5/246  Patient was seen and examined this morning.  He was up in the wheelchair, watching TV and just finished breakfast; consumed his entire meal and tells me he has a good appetite.  Denies pain.  He tells me he overall is feeling well.  No issues with movement.  Poe in place with clear yellow urine; awaiting bladder scanner to start VT at facility.  Patient was started on Bumex 1 mg daily yesterday for lower extremity edema; tells me today that he is to be on Bumex 1 mg daily as prescribed by cardiologist therefore we will continue without stop date at this time.  He will complete IV Vanco and Zosyn for leukocytosis, although WBC remains elevated.  I did discuss with attending physician Dr. Ceballos who does not recommend extending abx as there is no source; monitor cbc and VS.  We will continue to engage patient in therapy and work with  on safe discharge planning.    ADVANCE CARE PLANNING:  Full resuscitation     ALLERGIES:  Allergies as of 02/06/2024    (No Known Allergies)     CURRENT MEDICATIONS:   Current Outpatient Medications   Medication Sig Dispense Refill    bumetanide (BUMEX) 1 MG tablet Take 1 mg by mouth daily. Indications: Edema      sodium chloride 0.9 % Solution 250 mL with vancomycin 1 g Recon Soln 1,000 mg Inject 1,000 mg into the vein daily.      piperacillin-tazobactam (Zosyn) 3.375 GM/50ML in dextrose 5% premix IVPB Inject 3.375 g into the vein every 8 hours.      amLODIPine (NORVASC) 10 MG tablet Take 10 mg by mouth daily. Indications: High  Blood Pressure Disorder      atorvastatin (LIPITOR) 40 MG tablet Take 40 mg by mouth at bedtime. Indications: High Amount of Fats in the Blood      vitamin B-12 (CYANOCOBALAMIN) 1000 MCG tablet Take 1,000 mcg by mouth daily. Indications: Inadequate Vitamin B12      apixaBAN (Eliquis) 5 MG Tab Take 5 mg by mouth every 12 hours. Indications: atrial fib      Nutritional Supplements (Glucose Management) Tab Take 1 tablet by mouth as needed (hypoglycemia). Indications: diabetes mellitus 4 gram tab for BG < 70      hydrALAZINE (APRESOLINE) 100 MG tablet Take 100 mg by mouth in the morning and 100 mg at noon and 100 mg in the evening. Indications: High Blood Pressure Disorder.      isosorbide dinitrate (ISORDIL) 10 MG tablet Take 10 mg by mouth in the morning and 10 mg at noon and 10 mg in the evening. Indications: HF; non obstructive CAD.      insulin glargine (Lantus SoloStar) 100 UNIT/ML pen-injector Inject 20 Units into the skin in the morning and 20 Units in the evening. Indications: Type 2 Diabetes. Prime 2 units before each dose.      metformin (GLUCOPHAGE) 1000 MG tablet Take 1,000 mg by mouth in the morning and 1,000 mg in the evening. Take with meals. Indications: Type 2 Diabetes.      Multiple Vitamins-Minerals (Multivitamin-Minerals) Tab Take 1 tablet by mouth daily.      omeprazole (PrilOSEC) 20 MG capsule Take 20 mg by mouth daily. Indications: GI ppx at [facility]      docusate sodium-sennosides (SENOKOT S) 50-8.6 MG per tablet Take 1 tablet by mouth nightly. Indications: Constipation      tamsulosin (FLOMAX) 0.4 MG Cap Take 0.8 mg by mouth daily after a meal. Indications: Benign Enlargement of Prostate      acetaminophen (TYLENOL) 325 MG tablet Take 650 mg by mouth every 6 hours as needed for Pain.      ferrous sulfate 325 (65 FE) MG tablet Take 325 mg by mouth every other day. Indications: Anemia From Inadequate Iron in the Body       No current facility-administered medications for this visit.   Medications  reviewed / reconciled: Yes    BASELINE FUNCTIONAL STATUS:  Resides in a ranch home w/2 steps to enter  Son lives w/patient  Independent with recent use of cane/walker since fall 1/3    CURRENT FUNCTIONAL STATUS:  Mod assist for bed mobility, transfers and gait.  Ambulates 30 to 50 feet with walker  ADLs upper body mod and lower body max assist  Toileting total dependent    DIET:  Consistency: General   Type: DM diet, cardiac diet, low K    REVIEW OF SYSTEMS:  Review of Systems   Constitutional:  Positive for activity change.   Respiratory: Negative.     Cardiovascular:  Positive for leg swelling.   Gastrointestinal: Negative.    Genitourinary: Negative.    Musculoskeletal:  Positive for gait problem.   Skin: Negative.    Psychiatric/Behavioral: Negative.       VITALS:  Visit Vitals  BP (!) 151/51   Pulse 65   Temp 97.8 °F (36.6 °C)   Resp 18   Wt 106.1 kg (234 lb)   SpO2 93%   BMI 31.74 kg/m²   PAIN SCORE:  Vitals:    02/06/24 0800   PainSc:  0     PHYSICAL ASSESSMENT:  Physical Exam  Vitals reviewed.   Constitutional:       General: He is awake. He is not in acute distress.     Appearance: He is obese. He is not ill-appearing.   Cardiovascular:      Rate and Rhythm: Normal rate and regular rhythm.   Pulmonary:      Effort: Pulmonary effort is normal. No respiratory distress.      Breath sounds: Normal breath sounds. No wheezing.   Abdominal:      General: Bowel sounds are normal. There is no distension.      Palpations: Abdomen is soft.      Tenderness: There is no abdominal tenderness.   Genitourinary:     Comments: Indwelling Poe catheter with clear yellow urine  Musculoskeletal:      Right lower leg: Pitting Edema present.      Left lower leg: Pitting Edema present.   Skin:     General: Skin is warm and dry.      Comments: Chronic hyperpigmented skin to lower extremities   Neurological:      Mental Status: He is alert and oriented to person, place, and time.   Psychiatric:         Mood and Affect: Mood normal.          Behavior: Behavior normal.     LABS:  2/6/2024: WBC 12.90, H/H 9.6/29.4, platelet 375, sodium 137, potassium 4.8, BUN 24/CR 1.23, glucose 74, calcium 9.1  2/5/2024: WBC 12.7, H/H 9.4/29, platelet 373, mg 1.9, phos 4, Na 140, k 5, bun 25/cr 1.24, glucose 68, total protein 5.7, albumin 3.5, ca 8.8, alt 31, ast 17, alk phos 64    ASSESSMENT AND PLAN  Edema of bilateral lower extremities  Doppler of bilateral lower extremities  (2/5) negative for DVT   Compression and elevation  Started Bumex 1 mg daily 2/5 and will continue without stop date as patient tells me he should be on it daily as prescribed by cardiologist    Leukocytosis, unclear etiology  Infection workup negative at the hospital  Monitor vital signs and labs  CXR (1/27) reviewed by attending physician Dr Dia  Urinalysis negative and did not indicate need for urine culture  Blood cx x2 (-)  IV vanco and zosyn (dosed by pharmacy) x7 days (1/30-2/6)   Maintain midline and remove after IV abx completed  Possible 2/2 inflammation from gout per attending physician Dr Ceballos  Wbc unchanged; discussed with attending physician Dr Ceballos who with this to monitor off antibiotics and repeat CBC as workup has been negative for infection    Bilateral knee pain, left worse than right, status post mechanical fall  S/p joint aspiration consistent with gout; continue prednisone through 1/29  Tylenol as needed for pain, ice and Ace wrap for comfort  Follow-up with Ortho Dr. DHARA Moreno  Pain overall improved    Left wrist pain likely from gout, resolved  Elevated uric acid level  S/p Colchicine 1.2 mg x 1 dose followed by colchicine 0.6 mg x 1 dose 1 hour after on 1/30    Hyperkalemia, resolved  S/p Kayexalate and Lokelma  Lisinopril discontinued and started on low K diet  Monitor labs    Acute urinary retention with history of BPH  Maintain Poe catheter; care per protocol  VT stopped as bladder scanner not working therefore unable to do PVRs  Flomax 0.8 mg  daily  Refer to urology if patient fails voiding trial    Iron deficiency anemia  Vit B12 deficiency  S/p IV Venofer and B12 injections at the hospital   Ferrous sulfate every other day and b12 daily  Monitor labs and transfuse if hemoglobin is less than 7  F/U with GI as instructed for outpatient work up on anemia  Hgb stable    Constipation, resolved  Cont bowel regimen    Elevated LFTs, resolved  Monitor    Generalized weakness and gait instability  PT/OT; fall precautions  Caregivers advised for safe discharge    Hypertension  Nonobstructive CAD  HLD  Amlodipine, statin, Eliquis, hydralazine, isosorbide dinitrate  Monitor vital signs and labs  Lisinopril d/c'd 1/29 d/t hyperkalemia    DM type II  Glycohemoglobin 5.9  Diabetic diet and bedtime snack  Lantus twice daily and metformin twice daily  Blood glucose today morning 96; yesterday p.m. 103/a.m. 97    CKD 3  Monitor labs and avoid nephrotoxins as able  Push po fluids  Renal function stable    HFrEF  Vital signs, labs and weights  Not on outpatient diuretic--confirmed w/pharmacy that pt has no rx for diuretic  Upon reviewing [facility] EMR, cardiology note indicates patient on bumex 1 mg qd therefore resumed on 2/5; f/u with cardiologist Dr Campa 2/13 @ 10AM (nursing aware)    Atrial fibrillation  Eliquis  Not on rate control or rhythm control medications; follow-up with cardiology  Rate controlled    Arthritis of multiple joints  Tylenol as needed    Obesity  Lifestyle diet modifications when able    GI prophylaxis  PPI at [facility]    DVT prophylaxis  Eliquis    FOLLOW UP APPOINTMENTS:  PCP Wen Tejada within 1 week after SNF discharge  Cardiologist Dr Campa 2/13 @ 10AM  Ortho Dr DHARA Moreno  Urology Dr RICKEY Faulkner  GI Dr PB Rendon    DISCHARGE PLANNING:   Home with [facility] as high risk discharge  Caregivers advised for safe discharge    Prognosis: fair    Total time spent is 30 minutes, with more than 50% of the time spent in coordination of care, counseling, review of  records and discussion of plan of care with the patient, , Dr Ceballos and nursing.    Disclaimer: Charting performed using Dragon Dictation and thus may result in grammatical, spelling & documentation inaccuracies.    Disclaimer: Note to patient - The 21st Century Cures Act requires that medical notes like this be available to patients in the interest of transparency. However, be advised this is a medical document. It is intended as peer to peer communication. It may appear blunt or direct. Medical documents are intended to carry relevant information, facts as evident, and the clinical opinion of the practitioner.    Maite Moreno, CNP

## 2024-02-19 ENCOUNTER — OFFICE VISIT (OUTPATIENT)
Dept: GASTROENTEROLOGY | Facility: CLINIC | Age: 69
End: 2024-02-19
Payer: MEDICARE

## 2024-02-19 VITALS
DIASTOLIC BLOOD PRESSURE: 88 MMHG | HEART RATE: 103 BPM | SYSTOLIC BLOOD PRESSURE: 142 MMHG | WEIGHT: 257.19 LBS | BODY MASS INDEX: 38.98 KG/M2 | HEIGHT: 68 IN

## 2024-02-19 DIAGNOSIS — Z86.010 HISTORY OF COLON POLYPS: Primary | ICD-10-CM

## 2024-02-19 DIAGNOSIS — Z08 ENCOUNTER FOR FOLLOW-UP SURVEILLANCE OF COLON CANCER: ICD-10-CM

## 2024-02-19 DIAGNOSIS — Z85.038 ENCOUNTER FOR FOLLOW-UP SURVEILLANCE OF COLON CANCER: ICD-10-CM

## 2024-02-19 PROCEDURE — 99202 OFFICE O/P NEW SF 15 MIN: CPT | Mod: S$GLB,,, | Performed by: INTERNAL MEDICINE

## 2024-02-19 PROCEDURE — 99999 PR PBB SHADOW E&M-EST. PATIENT-LVL III: CPT | Mod: PBBFAC,,, | Performed by: INTERNAL MEDICINE

## 2024-02-19 RX ORDER — SODIUM, POTASSIUM,MAG SULFATES 17.5-3.13G
1 SOLUTION, RECONSTITUTED, ORAL ORAL DAILY
Qty: 1 KIT | Refills: 0 | Status: SHIPPED | OUTPATIENT
Start: 2024-02-19 | End: 2024-02-21

## 2024-02-19 NOTE — PATIENT INSTRUCTIONS
SUPREP Instructions    [facility] [address]  Clinic Office [phone]  Endoscopy Lab [phone]    You are scheduled for a Colonoscopy with Dr. Lorenzo  on Thursday, February 29, 2024 at [facility] in [place].    Check in at the Hospital -1st floor, Information desk.   Call [phone] to reschedule.    An adult friend/family member must come with you to drive you home.  You cannot drive, take a taxi, Uber/Lyft or bus to leave the Endoscopy Center alone.  If you do not have someone to drive you home, your test will be cancelled.     Please follow the directions of your doctor if you take any pills that thin your blood. If you take these meds: Aggrenox, Brilinta, Effient, Eliquis, Lovenox, Plavix, Pletal, Pradaxa, Ticilid, Xarelto or Coumadin, let the doctor's office know.    Please hold any GLP-1 medications prior to the procedure: Dulaglutide Trulicity(hold week prior), Exenatide Byetta (hold the morning of procedure), Semaglutide Ozempic (hold week prior), Liraglutide Victoza, Saxenda(hold week prior), Lixisenatide Adlyxin (hold the morning of procedure), Semaglutide Rybelsus (hold the morning of procedure), Tirzepatide Mounjaro (hold week prior)     DON'T: On the morning of the test do not take insulin or pills for diabetes.     DO: On the morning of the test, do take any pills for blood pressure, heart, anti-rejection and or seizures with a small sip of water. Bring any inhalers with you.    To have a good prep, you must follow these instructions - please do not use the directions from the pharmacy.    The doctor will send a prescription for the SUPREP.      The Day Before the test:    You can only drink CLEAR LIQUIDS the whole day before your test.  You can't eat any food for the whole day.    You CAN have:  Water, Coffee or decaf coffee (no milk or cream)  Tea  Soft drinks - regular and sugar free  Jello (green or yellow)  Apple Juice, white grape juice,  white cranberry juice  Gatorade, Power Aid, Crystal Light, Felton Aid  Lemonade and Limeade  Bouillon, clear soup  Snowball, popsicles  YOU CAN'T DRINK ANYTHING RED, PURPLE ORANGE OR BLUE   YOU CAN'T DRINK ALCOHOL  ONLY DRINK WHAT IS ON THE LIST      At 5 pm the night before your test:    Pour the 1st bottle of SUPREP into the cup provided in the box. Add water to the line on the cup and mix well.  Drink the whole cup and then drink 2 more full cups of water over 1 hour.  This can be easier to drink if it is cold. You can mix it 20 minutes ahead of time and place in the refrigerator before you drink it.  You must drink it within 30-45 minutes of mixing it.  Do NOT pour the drink over ice.  You can drink it with a straw.    The Day of the test - We will call you 2 days before your test to tell you what time to get there.    5 hours before you come to the hospital (this may be in the middle of the night)  Pour the 2nd bottle of SUPREP into the cup provided in the box. Add water to the line on the cup and mix well.  Drink the whole cup and then drink 2 more full cups of water over 1 hour.  It might be easier to drink if it is cold. You can mix it 20 minutes ahead of time and place in the refrigerator before you drink it.  You must drink it within 30-45 minutes of mixing it.  Do NOT pour the drink over ice.  You can drink it with a straw.    YOU CAN'T EAT OR DRINK ANYTHING ELSE ONCE YOU FINISH THE PREP    Leave all valuables and jewelry at home. You will be at the hospital for 2-4 hours.    Call the Endoscopy department at [phone] with any questions about your procedure.

## 2024-02-19 NOTE — PROGRESS NOTES
"[facility]    CC: History of colon polyps.     HPI 68 y.o. male with a history of PUD due to H. Pylori s/p treatment, colon polyps two TAs in 2014 and  six 2-4mm polyps removed in 2019. Here to scheduled colonoscopy as repeat was recommended in 2024. Denies any current GI symptoms. No FH history of colon cancer.      Past Medical History  Past Medical History:   Diagnosis Date    Arthritis     BPH (benign prostatic hyperplasia)     Cataract     Colon polyps     Diabetes mellitus type II     Glaucoma     Hyperlipidemia     Hypertension     Multiple duodenal ulcers 10/08/2019    Unspecified hemorrhoids without mention of complication      Physical Examination  BP (!) 142/88 (BP Location: Left arm, Patient Position: Sitting, BP Method: Large (Automatic))   Pulse 103   Ht 5' 8" (1.727 m)   Wt 116.7 kg (257 lb 2.7 oz)   BMI 39.10 kg/m²   General appearance: alert, cooperative, no distress  Lungs: clear to auscultation bilaterally, no dullness to percussion bilaterally  Heart: regular rate and rhythm without rub; no displacement of the PMI   Abdomen: soft, non-tender; bowel sounds normoactive; no organomegaly    Assessment:   68 year old male with a history of H. Pylori s/p treatment and colon polyps in 2014 and 2019 with repeat colonoscopy recommended this year. No FH of colon cancer.     Plan:  - colonoscopy   - suprep   - reinterview next visit     Miguel Lorenzo MD   [address] [place] [address], [address]  [place] [address] [phone]    "

## 2024-02-20 ENCOUNTER — CLINICAL SUPPORT (OUTPATIENT)
Dept: OPHTHALMOLOGY | Facility: CLINIC | Age: 69
End: 2024-02-20
Payer: MEDICARE

## 2024-02-20 ENCOUNTER — OFFICE VISIT (OUTPATIENT)
Dept: OPTOMETRY | Facility: CLINIC | Age: 69
End: 2024-02-20
Payer: MEDICARE

## 2024-02-20 DIAGNOSIS — H40.1132 PRIMARY OPEN ANGLE GLAUCOMA (POAG) OF BOTH EYES, MODERATE STAGE: Primary | ICD-10-CM

## 2024-02-20 DIAGNOSIS — H40.1132 PRIMARY OPEN ANGLE GLAUCOMA (POAG) OF BOTH EYES, MODERATE STAGE: ICD-10-CM

## 2024-02-20 DIAGNOSIS — H25.13 SENILE NUCLEAR SCLEROSIS, BILATERAL: ICD-10-CM

## 2024-02-20 PROCEDURE — 99999 PR PBB SHADOW E&M-EST. PATIENT-LVL III: CPT | Mod: PBBFAC,,, | Performed by: OPTOMETRIST

## 2024-02-20 PROCEDURE — 99214 OFFICE O/P EST MOD 30 MIN: CPT | Mod: S$GLB,,, | Performed by: OPTOMETRIST

## 2024-02-20 RX ORDER — BRIMONIDINE TARTRATE 2 MG/ML
1 SOLUTION/ DROPS OPHTHALMIC 2 TIMES DAILY
Qty: 10 ML | Refills: 365 | Status: SHIPPED | OUTPATIENT
Start: 2024-02-20 | End: 2024-02-20

## 2024-02-20 RX ORDER — BRIMONIDINE TARTRATE 2 MG/ML
1 SOLUTION/ DROPS OPHTHALMIC 2 TIMES DAILY
Qty: 10 ML | Refills: 11 | Status: SHIPPED | OUTPATIENT
Start: 2024-02-20 | End: 2025-02-19

## 2024-02-20 NOTE — PROGRESS NOTES
HVF/OCT done ou./ rel/fix/coop. Good ou./ chart checked for latex allergy./ .75 + 1.00 x 150/od .75 + .50 x 45/os-[facility]

## 2024-02-21 NOTE — PROGRESS NOTES
TAYLOR    KEITH: 10/19/2023 Dr. Bradley  Chief complaint (CC): 4 months IOP/OCT/HVF 24-2 whitney fast  Glasses? Yes, reading glasses   Contacts? No  H/o eye surgery, injections or laser: No  H/o eye injury: No  Known eye conditions? Glaucoma and Cataracts  Family h/o eye conditions? Mother Glaucoma  Eye gtts? Latanoprost qhs OU and Cosopt BID OU       (-) Flashes (-)  Floaters (-) Mucous   (-)  Tearing (+) Itching occasionally right eye more then left eye (-)   Burning   (-) Headaches (-) Eye Pain/discomfort (-) Irritation within the last few   weeks eyes have been gritty, but cleared up 2 weeks ago.  (+)  Redness (-) Double vision (+) Blurry vision within the last 2 months   at distance w/o correction. With glasses vision is better.    Diabetic? Yes  A1c? Last glucose reading was 132    Hemoglobin A1C       Date                     Value               Ref Range             Status                09/26/2023               5.7 (H)             4.0 - 5.6 %           Final              Comment:    ADA Screening Guidelines:  5.7-6.4%  Consistent with   prediabetes  >or=6.5%  Consistent with diabetes    High levels of fetal   hemoglobin interfere with the HbA1C  assay. Heterozygous hemoglobin   variants (HbS, HgC, etc)do  not significantly interfere with this assay.     However, presence of multiple variants may affect accuracy.         03/27/2023               5.5                 4.0 - 5.6 %           Final              Comment:    ADA Screening Guidelines:  5.7-6.4%  Consistent with   prediabetes  >or=6.5%  Consistent with diabetes    High levels of fetal   hemoglobin interfere with the HbA1C  assay. Heterozygous hemoglobin   variants (HbS, HgC, etc)do  not significantly interfere with this assay.     However, presence of multiple variants may affect accuracy.         09/26/2022               5.4                 4.0 - 5.6 %           Final              Comment:    ADA Screening Guidelines:  5.7-6.4%  Consistent with    prediabetes  >or=6.5%  Consistent with diabetes    High levels of fetal   hemoglobin interfere with the HbA1C  assay. Heterozygous hemoglobin   variants (HbS, HgC, etc)do  not significantly interfere with this assay.     However, presence of multiple variants may affect accuracy.    ----------      Last edited by Star Smith OA on 2/20/2024 10:27 AM.            Assessment /Plan     For exam results, see Encounter Report.    Primary open angle glaucoma (POAG) of both eyes, moderate stage  -     Discontinue: brimonidine 0.2% (ALPHAGAN) 0.2 % Drop; Place 1 drop into both eyes 2 (two) times a day.  Dispense: 10 mL; Refill: 365  -     Ambulatory referral/consult to Ophthalmology; Future; Expected date: 02/27/2024  -     Discontinue: brimonidine 0.2% (ALPHAGAN) 0.2 % Drop; Place 1 drop into both eyes 2 (two) times a day.  Dispense: 10 mL; Refill: 365  -     brimonidine 0.2% (ALPHAGAN) 0.2 % Drop; Place 1 drop into both eyes 2 (two) times a day.  Dispense: 10 mL; Refill: 11    Senile nuclear sclerosis, bilateral      (-) FHx. IOp 24 OD, OS. Last 15 OD, 16 OS. Tmax  22 OD, 23 OS. Tmin on drops 12 OD, 13 OS.  Pt failed to f/u from 12/17/2019 to 9/16/2020 and 6/2021 to 3/11/2022.   Pt reports compliance w/eyedrops. Prev pt of Dr Huffman. C/d 0.55 OD, 0.65 OS.   12/28/2022  HVF OD inf arcuate, OS sup arcuate  2/20/2024 HVF OD inf arc and early sup arcuate, OS sup arc and early inf arc  12/28/2022 OCT OD, thin NS, TS, T, TI, General (Borderline N). OS thin TS, TI, T, General (Borderline N and NI).   2/20/2024 OCT OD NS, TS, T, and G, borderline NI and N, OS thin TS, TI, T, and G, borderline N and NI  3/11/2022 Photos  Cont Latanoprost QHS OU.  D/c Timolol BiD OU on 12/15/2020  Cont Cosopt BID OU (start 12/15/2020)  Add Brimonidine BID OU  Importance of drop compliance and f/u discussed with pt.   Educated pt on findings w/understanding.  Referral to Dr Barrientos. Consider benefits of SLT.   Cont w/annual exams.

## 2024-02-27 ENCOUNTER — TELEPHONE (OUTPATIENT)
Dept: ENDOSCOPY | Facility: HOSPITAL | Age: 69
End: 2024-02-27
Payer: MEDICARE

## 2024-02-27 NOTE — TELEPHONE ENCOUNTER
Spoke with patient about arrival time @ 0915.   Colon/Suprep    Prep instructions reviewed: the day before the procedure, follow a clear liquid diet all day, then start the first 1/2 of prep at 5pm and take 2nd 1/2 of prep @ 0400.  Pt must be completely NPO when prep completed @ 0600.              Medications: Do not take Insulin or oral diabetic medications the day of the procedure.  Take as prescribed: heart, seizure and blood pressure medication in the morning with a sip of water (less than an ounce).  Take any breathing medications and bring inhalers to hospital with you Leave all valuables and jewelry at home.     Wear comfortable clothes to procedure to change into hospital gown You cannot drive for 24 hours after your procedure because you will receive sedation for your procedure to make you comfortable.  A ride must be provided at discharge.

## 2024-02-29 ENCOUNTER — HOSPITAL ENCOUNTER (OUTPATIENT)
Facility: HOSPITAL | Age: 69
Discharge: HOME | End: 2024-02-29
Attending: INTERNAL MEDICINE | Admitting: INTERNAL MEDICINE
Payer: MEDICARE

## 2024-02-29 ENCOUNTER — ANESTHESIA EVENT (OUTPATIENT)
Dept: ENDOSCOPY | Facility: HOSPITAL | Age: 69
End: 2024-02-29
Payer: MEDICARE

## 2024-02-29 ENCOUNTER — ANESTHESIA (OUTPATIENT)
Dept: ENDOSCOPY | Facility: HOSPITAL | Age: 69
End: 2024-02-29
Payer: MEDICARE

## 2024-02-29 VITALS
SYSTOLIC BLOOD PRESSURE: 111 MMHG | OXYGEN SATURATION: 97 % | DIASTOLIC BLOOD PRESSURE: 72 MMHG | BODY MASS INDEX: 38.65 KG/M2 | HEIGHT: 68 IN | RESPIRATION RATE: 12 BRPM | WEIGHT: 255 LBS | TEMPERATURE: 98 F | HEART RATE: 84 BPM

## 2024-02-29 DIAGNOSIS — Z86.010 HISTORY OF COLON POLYPS: Primary | ICD-10-CM

## 2024-02-29 DIAGNOSIS — Z12.11 COLON CANCER SCREENING: ICD-10-CM

## 2024-02-29 LAB
GLUCOSE SERPL-MCNC: 79 MG/DL (ref 70–110)
POCT GLUCOSE: 79 MG/DL (ref 70–110)

## 2024-02-29 PROCEDURE — 37000009 HC ANESTHESIA EA ADD 15 MINS: Performed by: INTERNAL MEDICINE

## 2024-02-29 PROCEDURE — 37000008 HC ANESTHESIA 1ST 15 MINUTES: Performed by: INTERNAL MEDICINE

## 2024-02-29 PROCEDURE — 63600175 PHARM REV CODE 636 W HCPCS: Performed by: NURSE ANESTHETIST, CERTIFIED REGISTERED

## 2024-02-29 PROCEDURE — G0105 COLORECTAL SCRN; HI RISK IND: HCPCS | Performed by: INTERNAL MEDICINE

## 2024-02-29 PROCEDURE — 25000003 PHARM REV CODE 250: Performed by: INTERNAL MEDICINE

## 2024-02-29 PROCEDURE — D9220A PRA ANESTHESIA: Mod: ,,, | Performed by: ANESTHESIOLOGY

## 2024-02-29 PROCEDURE — 25000003 PHARM REV CODE 250: Performed by: NURSE ANESTHETIST, CERTIFIED REGISTERED

## 2024-02-29 RX ORDER — SODIUM CHLORIDE 9 MG/ML
INJECTION, SOLUTION INTRAVENOUS CONTINUOUS
Status: DISCONTINUED | OUTPATIENT
Start: 2024-02-29 | End: 2024-02-29 | Stop reason: HOSPADM

## 2024-02-29 RX ORDER — LIDOCAINE HYDROCHLORIDE 20 MG/ML
INJECTION INTRAVENOUS
Status: DISCONTINUED | OUTPATIENT
Start: 2024-02-29 | End: 2024-02-29

## 2024-02-29 RX ORDER — PROPOFOL 10 MG/ML
VIAL (ML) INTRAVENOUS
Status: DISCONTINUED | OUTPATIENT
Start: 2024-02-29 | End: 2024-02-29

## 2024-02-29 RX ORDER — DEXTROMETHORPHAN/PSEUDOEPHED 2.5-7.5/.8
DROPS ORAL
Status: COMPLETED | OUTPATIENT
Start: 2024-02-29 | End: 2024-02-29

## 2024-02-29 RX ORDER — PROPOFOL 10 MG/ML
VIAL (ML) INTRAVENOUS CONTINUOUS PRN
Status: DISCONTINUED | OUTPATIENT
Start: 2024-02-29 | End: 2024-02-29

## 2024-02-29 RX ORDER — SODIUM CHLORIDE 0.9 % (FLUSH) 0.9 %
10 SYRINGE (ML) INJECTION
Status: DISCONTINUED | OUTPATIENT
Start: 2024-02-29 | End: 2024-02-29 | Stop reason: HOSPADM

## 2024-02-29 RX ADMIN — LIDOCAINE HYDROCHLORIDE 100 MG: 20 INJECTION, SOLUTION INTRAVENOUS at 01:02

## 2024-02-29 RX ADMIN — PROPOFOL 165 MCG/KG/MIN: 10 INJECTION, EMULSION INTRAVENOUS at 01:02

## 2024-02-29 RX ADMIN — PROPOFOL 100 MG: 10 INJECTION, EMULSION INTRAVENOUS at 01:02

## 2024-02-29 RX ADMIN — SODIUM CHLORIDE: 9 INJECTION, SOLUTION INTRAVENOUS at 10:02

## 2024-02-29 NOTE — ANESTHESIA POSTPROCEDURE EVALUATION
Anesthesia Post Evaluation    Patient: Celso Hernandez    Procedure(s) Performed: Procedure(s) (LRB):  COLONOSCOPY (N/A)    Final Anesthesia Type: general      Patient location during evaluation: PACU  Patient participation: Yes- Able to Participate  Level of consciousness: awake and alert  Post-procedure vital signs: reviewed and stable  Pain management: adequate  Airway patency: patent    PONV status at discharge: No PONV  Anesthetic complications: no      Cardiovascular status: blood pressure returned to baseline  Respiratory status: unassisted  Hydration status: euvolemic                Vitals Value Taken Time   /72 02/29/24 1442   Temp 36.7 °C (98.1 °F) 02/29/24 1412   Pulse 84 02/29/24 1442   Resp 12 02/29/24 1442   SpO2 97 % 02/29/24 1442         Event Time   Out of Recovery 14:42:00         Pain/Keely Score: Keely Score: 10 (2/29/2024  2:42 PM)

## 2024-02-29 NOTE — ANESTHESIA PREPROCEDURE EVALUATION
02/29/2024  Celso Hernandez is a 68 y.o., male for COLONOSCOPY (N/A)    Past Medical History:   Diagnosis Date    Arthritis     BPH (benign prostatic hyperplasia)     Cataract     Colon polyps     Diabetes mellitus type II     Glaucoma     Hyperlipidemia     Hypertension     Multiple duodenal ulcers 10/08/2019    Unspecified hemorrhoids without mention of complication      Past Surgical History:   Procedure Laterality Date    COLONOSCOPY N/A 10/08/2019    Procedure: COLONOSCOPY suprep;  Surgeon: Landon Guajardo MD;  Location: [facility] ENDO;  Service: General;  Laterality: N/A;    COLONOSCOPY W/ POLYPECTOMY  10/08/2019    CYSTOSCOPY N/A 08/05/2020    Procedure: CYSTOSCOPY;  Surgeon: Arcadio Vaughn MD;  [facility];  Service: Urology;  Laterality: N/A;    CYSTOSCOPY WITH URETHRAL DILATION N/A 11/10/2021    Procedure: CYSTOSCOPY, WITH URETHRAL DILATION;  Surgeon: Arcadio Vaughn MD;  [facility];  Service: Urology;  Laterality: N/A;  30 MINUTES     DILATION OF URETHRA N/A 08/05/2020    Procedure: DILATION, URETHRA;  Surgeon: Arcadio Vaughn MD;  [facility];  Service: Urology;  Laterality: N/A;    EPIDURAL STEROID INJECTION INTO THORACIC SPINE N/A 01/04/2022    Procedure: Thoracic Epidural Steroid Injection T5-6 (No Sedation) ;  Surgeon: Connie Mejia Jr., MD;  Location: [facility] PAIN MGT;  Service: Pain Management;  Laterality: N/A;    ESOPHAGOGASTRODUODENOSCOPY  10/08/2019    w/biopsies    ESOPHAGOGASTRODUODENOSCOPY N/A 10/08/2019    Procedure: EGD (ESOPHAGOGASTRODUODENOSCOPY);  Surgeon: Landon Guajardo MD;  Location: [facility] ENDO;  Service: General;  Laterality: N/A;    NASAL SINUS SURGERY  11/2022    with outside MD    none      TRANSURETHRAL RESECTION OF PROSTATE N/A 05/27/2020    Procedure: TURP (TRANSURETHRAL RESECTION OF PROSTATE) bipolar;  Surgeon: Arcadio Vaughn MD;   Location: [facility] OR 1ST FLR;  Service: Urology;  Laterality: N/A;  2hr     Pre-op Assessment       I have reviewed the Medications.     Review of Systems  Anesthesia Hx:  No problems with previous Anesthesia             Denies Family Hx of Anesthesia complications.     Social:  Former Smoker       Cardiovascular:     Hypertension                                  Hypertension         Hepatic/GI:   PUD,        Peptic Ulcer Disease          Musculoskeletal:  Arthritis        Arthritis          Neurological:    Neuromuscular Disease,           Arthritis                         Neuromuscular Disease   Endocrine:  Diabetes    Diabetes                          Physical Exam  General: Well nourished    Airway:  Mallampati: II   TM Distance: Normal  Neck ROM: Normal ROM    Dental:  Intact    Chest/Lungs:  Clear to auscultation    Heart:  Rate: Normal  Rhythm: Regular Rhythm        Anesthesia Plan  Type of Anesthesia, risks & benefits discussed:    Anesthesia Type: Gen Natural Airway  Intra-op Monitoring Plan: Standard ASA Monitors  Post Op Pain Control Plan: multimodal analgesia  Informed Consent: Informed consent signed with the Patient and all parties understand the risks and agree with anesthesia plan.  All questions answered.   ASA Score: 3    Ready For Surgery From Anesthesia Perspective.     .

## 2024-02-29 NOTE — ANESTHESIA POSTPROCEDURE EVALUATION
Anesthesia Post Evaluation    Patient: Celso Hernandez    Procedure(s) Performed: Procedure(s) (LRB):  COLONOSCOPY (N/A)    [facility] Anesthesia Post Op Evaluation      Vitals Value Taken Time   /72 02/29/24 1442   Temp 36.7 °C (98.1 °F) 02/29/24 1412   Pulse 84 02/29/24 1442   Resp 12 02/29/24 1442   SpO2 97 % 02/29/24 1442         Event Time   Out of Recovery 14:42:00         Pain/Keely Score: Keely Score: 10 (2/29/2024  2:42 PM)

## 2024-02-29 NOTE — H&P
[facility]    CC: History of colon polyps.     HPI 68 y.o. male with a history of PUD due to H. Pylori s/p treatment, colon polyps two TAs in 2014 and  six 2-4mm polyps removed in 2019. Here to scheduled colonoscopy as repeat was recommended in 2024. Denies any current GI symptoms. No FH history of colon cancer.      Past Medical History  Past Medical History:   Diagnosis Date    Arthritis     BPH (benign prostatic hyperplasia)     Cataract     Colon polyps     Diabetes mellitus type II     Glaucoma     Hyperlipidemia     Hypertension     Multiple duodenal ulcers 10/08/2019    Unspecified hemorrhoids without mention of complication      Physical Examination  General appearance: alert, cooperative, no distress  Abdomen: soft, non-tender; bowel sounds normoactive; no organomegaly    Assessment:   68 year old male with a history of H. Pylori s/p treatment and colon polyps in 2014 and 2019 with repeat colonoscopy recommended this year. No FH of colon cancer.     Plan:  - colonoscopy       Miguel Lorenzo MD   [address] [place] [address], [address]  [place] [address] [phone]

## 2024-02-29 NOTE — [employer] PATIENT INSTRUCTIONS
Discharge Summary/Instructions after an Endoscopic Procedure  Patient Name: Celso Hernandez  Patient MRN: [id number]  Patient YOB: 1955 Thursday, February 29, 2024  Miguel Lorenzo MD  Dear patient,  As a result of recent federal legislation (The Federal Cures Act), you may   receive lab or pathology results from your procedure in your [facility]   account before your physician is able to contact you. Your physician or   their representative will relay the results to you with their   recommendations at their soonest availability.  Thank you,  Your health is very important to us during the Covid Crisis. Following your   procedure today, you will receive a daily text for 2 weeks asking about   signs or symptoms of Covid 19.  Please respond to this text when you   receive it so we can follow up and keep you as safe as possible.   RESTRICTIONS:  During your procedure today, you received medications for sedation.  These   medications may affect your judgment, balance and coordination.  Therefore,   for 24 hours, you have the following restrictions:   - DO NOT drive a car, operate machinery, make legal/financial decisions,   sign important papers or drink alcohol.    ACTIVITY:  Today: no heavy lifting, straining or running due to procedural   sedation/anesthesia.  The following day: return to full activity including work.  DIET:  Eat and drink normally unless instructed otherwise.     TREATMENT FOR COMMON SIDE EFFECTS:  - Mild abdominal pain, nausea, belching, bloating or excessive gas:  rest,   eat lightly and use a heating pad.  - Sore Throat: treat with throat lozenges and/or gargle with warm salt   water.  - Because air was used during the procedure, expelling large amounts of air   from your rectum or belching is normal.  - If a bowel prep was taken, you may not have a bowel movement for 1-3 days.    This is normal.  SYMPTOMS TO WATCH FOR AND REPORT TO YOUR PHYSICIAN:  1. Abdominal pain or bloating, other than  gas cramps.  2. Chest pain.  3. Back pain.  4. Signs of infection such as: chills or fever occurring within 24 hours   after the procedure.  5. Rectal bleeding, which would show as bright red, maroon, or black stools.   (A tablespoon of blood from the rectum is not serious, especially if   hemorrhoids are present.)  6. Vomiting.  7. Weakness or dizziness.  GO DIRECTLY TO THE NEAREST EMERGENCY ROOM IF YOU HAVE ANY OF THE FOLLOWING:      Difficulty breathing              Chills and/or fever over 101 F   Persistent vomiting and/or vomiting blood   Severe abdominal pain   Severe chest pain   Black, tarry stools   Bleeding- more than one tablespoon   Any other symptom or condition that you feel may need urgent attention  Your doctor recommends these additional instructions:  If any biopsies were taken, your doctors clinic will contact you in 1 to 2   weeks with any results.  - Discharge patient to home.   - Resume previous diet.   - Continue present medications.   - Repeat colonoscopy in 10 years for surveillance.  For questions, problems or results please call your physician - Miguel Lorenzo MD.  EMERGENCY PHONE NUMBER: [phone],  LAB RESULTS: [phone]  IF A COMPLICATION OR EMERGENCY SITUATION ARISES AND YOU ARE UNABLE TO REACH   YOUR PHYSICIAN - GO DIRECTLY TO THE EMERGENCY ROOM.  MD Miguel Barger MD  2/29/2024 2:09:57 PM  This report has been verified and signed electronically.  Dear patient,  As a result of recent federal legislation (The Federal Cures Act), you may   receive lab or pathology results from your procedure in your [facility]   account before your physician is able to contact you. Your physician or   their representative will relay the results to you with their   recommendations at their soonest availability.  Thank you,  [employer]ATION

## 2024-02-29 NOTE — TRANSFER OF CARE
"Anesthesia Transfer of Care Note    Patient: Celso Hernandez    Procedure(s) Performed: Procedure(s) (LRB):  COLONOSCOPY (N/A)    Patient location: GI    Anesthesia Type: general    Transport from OR: Transported from OR on room air with adequate spontaneous ventilation    Post pain: adequate analgesia    Post assessment: no apparent anesthetic complications and tolerated procedure well    Post vital signs: stable    Level of consciousness: awake and alert    Nausea/Vomiting: no nausea/vomiting    Complications: none    Transfer of care protocol was followed      Last vitals: Visit Vitals  /62 (BP Location: Left arm, Patient Position: Lying)   Pulse 89   Temp 36.7 °C (98.1 °F) (Temporal)   Resp 18   Ht 5' 8" (1.727 m)   Wt 115.7 kg (255 lb)   SpO2 95%   BMI 38.77 kg/m²     "

## 2024-03-04 PROBLEM — Z86.010 HISTORY OF COLON POLYPS: Status: ACTIVE | Noted: 2024-03-04

## 2024-03-04 PROBLEM — Z86.0100 HISTORY OF COLON POLYPS: Status: ACTIVE | Noted: 2024-03-04

## 2024-03-12 RX ORDER — MELOXICAM 15 MG/1
TABLET ORAL
Qty: 90 TABLET | Refills: 0 | Status: SHIPPED | OUTPATIENT
Start: 2024-03-12 | End: 2024-05-06

## 2024-03-12 RX ORDER — HYDRALAZINE HYDROCHLORIDE 50 MG/1
TABLET, FILM COATED ORAL
Qty: 180 TABLET | Refills: 0 | Status: SHIPPED | OUTPATIENT
Start: 2024-03-12 | End: 2024-04-11

## 2024-03-12 NOTE — TELEPHONE ENCOUNTER
No care due was identified.  [facility] Embedded Care Due Messages. Reference number: [id number].   3/12/2024 2:07:28 PM CDT

## 2024-03-26 DIAGNOSIS — Z00.00 ENCOUNTER FOR MEDICARE ANNUAL WELLNESS EXAM: ICD-10-CM

## 2024-04-04 ENCOUNTER — LAB VISIT (OUTPATIENT)
Dept: LAB | Facility: HOSPITAL | Age: 69
End: 2024-04-04
Attending: INTERNAL MEDICINE
Payer: MEDICARE

## 2024-04-04 DIAGNOSIS — E66.01 SEVERE OBESITY (BMI 35.0-39.9) WITH COMORBIDITY: ICD-10-CM

## 2024-04-04 DIAGNOSIS — E11.69 TYPE 2 DIABETES MELLITUS WITH HYPERLIPIDEMIA: ICD-10-CM

## 2024-04-04 DIAGNOSIS — E78.5 TYPE 2 DIABETES MELLITUS WITH HYPERLIPIDEMIA: ICD-10-CM

## 2024-04-04 DIAGNOSIS — I10 ESSENTIAL HYPERTENSION: ICD-10-CM

## 2024-04-04 DIAGNOSIS — E78.2 MIXED HYPERLIPIDEMIA: ICD-10-CM

## 2024-04-04 LAB
ALBUMIN SERPL BCP-MCNC: 3.5 G/DL (ref 3.5–5.2)
ALP SERPL-CCNC: 62 U/L (ref 55–135)
ALT SERPL W/O P-5'-P-CCNC: 28 U/L (ref 10–44)
ANION GAP SERPL CALC-SCNC: 12 MMOL/L (ref 8–16)
AST SERPL-CCNC: 25 U/L (ref 10–40)
BASOPHILS # BLD AUTO: 0.1 K/UL (ref 0–0.2)
BASOPHILS NFR BLD: 0.8 % (ref 0–1.9)
BILIRUB SERPL-MCNC: 0.8 MG/DL (ref 0.1–1)
BUN SERPL-MCNC: 18 MG/DL (ref 8–23)
CALCIUM SERPL-MCNC: 10 MG/DL (ref 8.7–10.5)
CHLORIDE SERPL-SCNC: 102 MMOL/L (ref 95–110)
CHOLEST SERPL-MCNC: 204 MG/DL (ref 120–199)
CHOLEST/HDLC SERPL: 5.2 {RATIO} (ref 2–5)
CO2 SERPL-SCNC: 21 MMOL/L (ref 23–29)
CREAT SERPL-MCNC: 1.2 MG/DL (ref 0.5–1.4)
DIFFERENTIAL METHOD BLD: ABNORMAL
EOSINOPHIL # BLD AUTO: 0.4 K/UL (ref 0–0.5)
EOSINOPHIL NFR BLD: 3.6 % (ref 0–8)
ERYTHROCYTE [DISTWIDTH] IN BLOOD BY AUTOMATED COUNT: 13.9 % (ref 11.5–14.5)
EST. GFR  (NO RACE VARIABLE): >60 ML/MIN/1.73 M^2
ESTIMATED AVG GLUCOSE: 120 MG/DL (ref 68–131)
GLUCOSE SERPL-MCNC: 90 MG/DL (ref 70–110)
HBA1C MFR BLD: 5.8 % (ref 4–5.6)
HCT VFR BLD AUTO: 48.9 % (ref 40–54)
HDLC SERPL-MCNC: 39 MG/DL (ref 40–75)
HDLC SERPL: 19.1 % (ref 20–50)
HGB BLD-MCNC: 17 G/DL (ref 14–18)
IMM GRANULOCYTES # BLD AUTO: 0.04 K/UL (ref 0–0.04)
IMM GRANULOCYTES NFR BLD AUTO: 0.3 % (ref 0–0.5)
LDLC SERPL CALC-MCNC: 135.2 MG/DL (ref 63–159)
LYMPHOCYTES # BLD AUTO: 3.7 K/UL (ref 1–4.8)
LYMPHOCYTES NFR BLD: 30.3 % (ref 18–48)
MCH RBC QN AUTO: 29.8 PG (ref 27–31)
MCHC RBC AUTO-ENTMCNC: 34.8 G/DL (ref 32–36)
MCV RBC AUTO: 86 FL (ref 82–98)
MONOCYTES # BLD AUTO: 1.3 K/UL (ref 0.3–1)
MONOCYTES NFR BLD: 10.4 % (ref 4–15)
NEUTROPHILS # BLD AUTO: 6.6 K/UL (ref 1.8–7.7)
NEUTROPHILS NFR BLD: 54.6 % (ref 38–73)
NONHDLC SERPL-MCNC: 165 MG/DL
NRBC BLD-RTO: 0 /100 WBC
PLATELET # BLD AUTO: 242 K/UL (ref 150–450)
PMV BLD AUTO: 10.8 FL (ref 9.2–12.9)
POTASSIUM SERPL-SCNC: 3.7 MMOL/L (ref 3.5–5.1)
PROT SERPL-MCNC: 7.9 G/DL (ref 6–8.4)
RBC # BLD AUTO: 5.7 M/UL (ref 4.6–6.2)
SODIUM SERPL-SCNC: 135 MMOL/L (ref 136–145)
TRIGL SERPL-MCNC: 149 MG/DL (ref 30–150)
WBC # BLD AUTO: 12.09 K/UL (ref 3.9–12.7)

## 2024-04-04 PROCEDURE — 80053 COMPREHEN METABOLIC PANEL: CPT | Performed by: INTERNAL MEDICINE

## 2024-04-04 PROCEDURE — 80061 LIPID PANEL: CPT | Performed by: INTERNAL MEDICINE

## 2024-04-04 PROCEDURE — 36415 COLL VENOUS BLD VENIPUNCTURE: CPT | Performed by: INTERNAL MEDICINE

## 2024-04-04 PROCEDURE — 85025 COMPLETE CBC W/AUTO DIFF WBC: CPT | Performed by: INTERNAL MEDICINE

## 2024-04-04 PROCEDURE — 83036 HEMOGLOBIN GLYCOSYLATED A1C: CPT | Performed by: INTERNAL MEDICINE
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
## 2024-04-11 ENCOUNTER — OFFICE VISIT (OUTPATIENT)
Dept: INTERNAL MEDICINE | Facility: CLINIC | Age: 69
End: 2024-04-11
Payer: MEDICARE

## 2024-04-11 VITALS
BODY MASS INDEX: 39.56 KG/M2 | OXYGEN SATURATION: 98 % | TEMPERATURE: 98 F | SYSTOLIC BLOOD PRESSURE: 180 MMHG | HEIGHT: 68 IN | WEIGHT: 261 LBS | RESPIRATION RATE: 16 BRPM | HEART RATE: 95 BPM | DIASTOLIC BLOOD PRESSURE: 92 MMHG

## 2024-04-11 DIAGNOSIS — E66.01 SEVERE OBESITY (BMI 35.0-39.9) WITH COMORBIDITY: ICD-10-CM

## 2024-04-11 DIAGNOSIS — I10 ESSENTIAL HYPERTENSION: ICD-10-CM

## 2024-04-11 DIAGNOSIS — I70.0 ATHEROSCLEROSIS OF AORTA: ICD-10-CM

## 2024-04-11 DIAGNOSIS — E78.2 MIXED HYPERLIPIDEMIA: ICD-10-CM

## 2024-04-11 DIAGNOSIS — E11.69 TYPE 2 DIABETES MELLITUS WITH HYPERLIPIDEMIA: Primary | ICD-10-CM

## 2024-04-11 DIAGNOSIS — E78.5 TYPE 2 DIABETES MELLITUS WITH HYPERLIPIDEMIA: Primary | ICD-10-CM

## 2024-04-11 PROCEDURE — 99999 PR PBB SHADOW E&M-EST. PATIENT-LVL III: CPT | Mod: PBBFAC,,, | Performed by: INTERNAL MEDICINE

## 2024-04-11 PROCEDURE — 99214 OFFICE O/P EST MOD 30 MIN: CPT | Mod: S$GLB,,, | Performed by: INTERNAL MEDICINE

## 2024-04-11 PROCEDURE — 3044F HG A1C LEVEL LT 7.0%: CPT | Mod: CPTII,S$GLB,, | Performed by: INTERNAL MEDICINE

## 2024-04-11 PROCEDURE — 1101F PT FALLS ASSESS-DOCD LE1/YR: CPT | Mod: CPTII,S$GLB,, | Performed by: INTERNAL MEDICINE

## 2024-04-11 PROCEDURE — 3080F DIAST BP >= 90 MM HG: CPT | Mod: CPTII,S$GLB,, | Performed by: INTERNAL MEDICINE

## 2024-04-11 PROCEDURE — 3072F LOW RISK FOR RETINOPATHY: CPT | Mod: CPTII,S$GLB,, | Performed by: INTERNAL MEDICINE

## 2024-04-11 PROCEDURE — 1126F AMNT PAIN NOTED NONE PRSNT: CPT | Mod: CPTII,S$GLB,, | Performed by: INTERNAL MEDICINE

## 2024-04-11 PROCEDURE — 3288F FALL RISK ASSESSMENT DOCD: CPT | Mod: CPTII,S$GLB,, | Performed by: INTERNAL MEDICINE

## 2024-04-11 PROCEDURE — 3077F SYST BP >= 140 MM HG: CPT | Mod: CPTII,S$GLB,, | Performed by: INTERNAL MEDICINE

## 2024-04-11 PROCEDURE — 3008F BODY MASS INDEX DOCD: CPT | Mod: CPTII,S$GLB,, | Performed by: INTERNAL MEDICINE

## 2024-04-11 RX ORDER — HYDRALAZINE HYDROCHLORIDE 50 MG/1
50 TABLET, FILM COATED ORAL EVERY 8 HOURS
Qty: 270 TABLET | Refills: 3 | Status: SHIPPED | OUTPATIENT
Start: 2024-04-11

## 2024-04-11 RX ORDER — ROSUVASTATIN CALCIUM 40 MG/1
40 TABLET, COATED ORAL DAILY
Qty: 90 TABLET | Refills: 3 | Status: SHIPPED | OUTPATIENT
Start: 2024-04-11 | End: 2024-06-04

## 2024-04-23 NOTE — PROGRESS NOTES
Assessment /Plan     For exam results, see Encounter Report.    Primary open angle glaucoma (POAG) of both eyes, moderate stage    Nuclear sclerotic cataract of both eyes      From dr Hitchcock - last saw her 2/20/2024  // glaucoma // followed since 11/2019     Primary open angle glaucoma (POAG) of both eyes, moderate stage           Hitchcock note form 2/20/2024   (-) FHx. IOp 24 OD, OS. Last 15 OD, 16 OS. Tmax  22 OD, 23 OS. Tmin on drops 12 OD, 13 OS.  Pt failed to f/u from 12/17/2019 to 9/16/2020 and 6/2021 to 3/11/2022.   Pt reports compliance w/eyedrops. Prev pt of Dr Huffman. C/d 0.55 OD, 0.65 OS.   12/28/2022  HVF OD inf arcuate, OS sup arcuate  2/20/2024 HVF OD inf arc and early sup arcuate, OS sup arc and early inf arc  12/28/2022 OCT OD, thin NS, TS, T, TI, General (Borderline N). OS thin TS, TI, T, General (Borderline N and NI).   2/20/2024 OCT OD NS, TS, T, and G, borderline NI and N, OS thin TS, TI, T, and G, borderline N and NI  3/11/2022 Photos  Cont Latanoprost QHS OU.  D/c Timolol BiD OU on 12/15/2020  Cont Cosopt BID OU (start 12/15/2020)  Add Brimonidine BID OU  Importance of drop compliance and f/u discussed with pt.   Educated pt on findings w/understanding.  Referral to Dr Barrientos. Consider benefits of SLT.   Cont w/annual exams.     4/30/2023 - first visit w/ [facility] / glaucoma clinic   Dx 2018 w/ Dr. Huffman - stated on lumigan - but stopped on his own   First visit at [facility] 11/2/2019 - Dr hitchcock // followed w/ Vinh 11/2019 to 2/2024   IOP today 23/21 (on gtts)   + FmHx - mother   Gonio +3 open ou      OLDER TEST - include   4 VF's // 5 OCT's / 1 photos   VF progression   OCT progression   Tmax 24/24 - on gtts     PLAN  Cont all gtts   Rec SLT OS then OD (( Met patient)   If IOP still too high can try rocklatan/rhopressa   If unable to control IOP - consider incisional surgery

## 2024-04-24 ENCOUNTER — TELEPHONE (OUTPATIENT)
Dept: INTERNAL MEDICINE | Facility: CLINIC | Age: 69
End: 2024-04-24
Payer: MEDICARE

## 2024-04-24 NOTE — TELEPHONE ENCOUNTER
Left msg I was following up on blood pressure readings.  Call me back  or send me readings in my chart msg.

## 2024-04-24 NOTE — TELEPHONE ENCOUNTER
----- Message from Iram Zhao MA sent at 4/11/2024  9:07 AM CDT -----  Regarding: call for bp readings, 2 wk fol up.  Was elevated at 4/11/24 appt.   Gave bp log and told to ck midday 2 times.  Takes bp meds bid.    If still elevated, schedule nurse visit.   See's dr jacob in 6 mos.

## 2024-04-25 ENCOUNTER — TELEPHONE (OUTPATIENT)
Dept: INTERNAL MEDICINE | Facility: CLINIC | Age: 69
End: 2024-04-25
Payer: MEDICARE

## 2024-04-25 NOTE — TELEPHONE ENCOUNTER
----- Message from Doni Mckenzie sent at 4/25/2024 12:00 PM CDT -----  Contact: [phone]  Patient is returning a phone call.  Who left a message for the patient: Iram  Does patient know what this is regarding:  documents  Would you like a call back, or a response through your [facility] portal?:   call  Comments: Patient is out of town and will get paperwork to you tomorrow evening

## 2024-04-29 ENCOUNTER — LAB VISIT (OUTPATIENT)
Dept: LAB | Facility: HOSPITAL | Age: 69
End: 2024-04-29
Attending: INTERNAL MEDICINE
Payer: MEDICARE

## 2024-04-29 ENCOUNTER — TELEPHONE (OUTPATIENT)
Dept: INTERNAL MEDICINE | Facility: CLINIC | Age: 69
End: 2024-04-29
Payer: MEDICARE

## 2024-04-29 DIAGNOSIS — N39.0 URINARY TRACT INFECTION WITHOUT HEMATURIA, SITE UNSPECIFIED: ICD-10-CM

## 2024-04-29 DIAGNOSIS — N39.0 URINARY TRACT INFECTION WITHOUT HEMATURIA, SITE UNSPECIFIED: Primary | ICD-10-CM

## 2024-04-29 LAB
BACTERIA #/AREA URNS HPF: ABNORMAL /HPF
BILIRUB UR QL STRIP: NEGATIVE
CLARITY UR: ABNORMAL
COLOR UR: YELLOW
GLUCOSE UR QL STRIP: NEGATIVE
HGB UR QL STRIP: ABNORMAL
HYALINE CASTS #/AREA URNS LPF: 8 /LPF
KETONES UR QL STRIP: NEGATIVE
LEUKOCYTE ESTERASE UR QL STRIP: ABNORMAL
MICROSCOPIC COMMENT: ABNORMAL
NITRITE UR QL STRIP: NEGATIVE
NON-SQ EPI CELLS #/AREA URNS HPF: 4 /HPF
PH UR STRIP: 6 [PH] (ref 5–8)
PROT UR QL STRIP: ABNORMAL
RBC #/AREA URNS HPF: 51 /HPF (ref 0–4)
SP GR UR STRIP: 1.02 (ref 1–1.03)
SQUAMOUS #/AREA URNS HPF: 1 /HPF
URN SPEC COLLECT METH UR: ABNORMAL
UROBILINOGEN UR STRIP-ACNC: NEGATIVE EU/DL
WBC #/AREA URNS HPF: >100 /HPF (ref 0–5)
WBC CLUMPS URNS QL MICRO: ABNORMAL

## 2024-04-29 PROCEDURE — 87088 URINE BACTERIA CULTURE: CPT | Performed by: INTERNAL MEDICINE

## 2024-04-29 PROCEDURE — 81000 URINALYSIS NONAUTO W/SCOPE: CPT | Performed by: INTERNAL MEDICINE

## 2024-04-29 PROCEDURE — 87186 SC STD MICRODIL/AGAR DIL: CPT | Performed by: INTERNAL MEDICINE

## 2024-04-29 PROCEDURE — 87086 URINE CULTURE/COLONY COUNT: CPT | Performed by: INTERNAL MEDICINE

## 2024-04-29 PROCEDURE — 87077 CULTURE AEROBIC IDENTIFY: CPT | Performed by: INTERNAL MEDICINE

## 2024-04-29 RX ORDER — CIPROFLOXACIN 500 MG/1
500 TABLET ORAL EVERY 12 HOURS
Qty: 20 TABLET | Refills: 0 | Status: SHIPPED | OUTPATIENT
Start: 2024-04-29 | End: 2024-05-09

## 2024-04-29 NOTE — TELEPHONE ENCOUNTER
I told what was ordered and prelim u/a results.  Told him to start rx and I will call Thursday when  Culture back.

## 2024-04-29 NOTE — TELEPHONE ENCOUNTER
Lov 4/11/24    Spoke to patient.  Symptoms: feels weak, bladder feels full, urine has bad odor.  Feels urgency and when urinates is painful.     To drop off urine at [facility]. Orders put in for u/a and culture. Urine appt made.  Told him to avoid coffee's or soft drinks. Do mostly water.  If weakness increases, get to urgent care.     Rx ok ?  [facility].    Please advise.  Thanks hussein

## 2024-04-29 NOTE — TELEPHONE ENCOUNTER
----- Message from Lucille Horne sent at 4/29/2024  8:44 AM CDT -----  Contact: [phone]  [email or web address]     Patient is calling for Medical Advice regarding:UTI    How long has patient had these symptoms: Saturday     Pharmacy name and phone#:  [id number]  [place] - [address]  [place] [address]  Phone: [phone] Fax: [phone]       Would like response via [facility]:  no     Comments:  Pt is asking for a urine order to check for the UTI please advise pt states his urine has a very bad smell to it

## 2024-04-30 ENCOUNTER — OFFICE VISIT (OUTPATIENT)
Dept: OPHTHALMOLOGY | Facility: CLINIC | Age: 69
End: 2024-04-30
Payer: MEDICARE

## 2024-04-30 ENCOUNTER — TELEPHONE (OUTPATIENT)
Dept: INTERNAL MEDICINE | Facility: CLINIC | Age: 69
End: 2024-04-30
Payer: MEDICARE

## 2024-04-30 DIAGNOSIS — H25.13 NUCLEAR SCLEROTIC CATARACT OF BOTH EYES: ICD-10-CM

## 2024-04-30 DIAGNOSIS — H40.1132 PRIMARY OPEN ANGLE GLAUCOMA (POAG) OF BOTH EYES, MODERATE STAGE: Primary | ICD-10-CM

## 2024-04-30 PROCEDURE — 99204 OFFICE O/P NEW MOD 45 MIN: CPT | Mod: S$GLB,,, | Performed by: OPHTHALMOLOGY

## 2024-04-30 PROCEDURE — 99999 PR PBB SHADOW E&M-EST. PATIENT-LVL III: CPT | Mod: PBBFAC,,, | Performed by: OPHTHALMOLOGY

## 2024-04-30 PROCEDURE — 3044F HG A1C LEVEL LT 7.0%: CPT | Mod: CPTII,S$GLB,, | Performed by: OPHTHALMOLOGY

## 2024-04-30 PROCEDURE — 3288F FALL RISK ASSESSMENT DOCD: CPT | Mod: CPTII,S$GLB,, | Performed by: OPHTHALMOLOGY

## 2024-04-30 PROCEDURE — 1126F AMNT PAIN NOTED NONE PRSNT: CPT | Mod: CPTII,S$GLB,, | Performed by: OPHTHALMOLOGY

## 2024-04-30 PROCEDURE — 1159F MED LIST DOCD IN RCRD: CPT | Mod: CPTII,S$GLB,, | Performed by: OPHTHALMOLOGY

## 2024-04-30 PROCEDURE — 1101F PT FALLS ASSESS-DOCD LE1/YR: CPT | Mod: CPTII,S$GLB,, | Performed by: OPHTHALMOLOGY

## 2024-04-30 PROCEDURE — 1160F RVW MEDS BY RX/DR IN RCRD: CPT | Mod: CPTII,S$GLB,, | Performed by: OPHTHALMOLOGY

## 2024-04-30 NOTE — TELEPHONE ENCOUNTER
----- Message from Lucinda George sent at 4/30/2024 11:12 AM CDT -----  Contact: Barbara @[facility] [phone]  Would like to receive medical advice.    Pharmacy name/number (copy/paste from chart):      [facility] [place] [address] [place] [address]  Phone: [phone] Fax: [phone]     Would they like a call back or a response via [facility]:  call back    Additional information:  Calling to request a medication change in place of ciprofloxacin HCl (CIPRO) 500 MG tablet. Pharm states medication is on back order.

## 2024-04-30 NOTE — TELEPHONE ENCOUNTER
I called patient.  He is agreeable to get at [phone]  I told him I will see if they have in stock.     Pharmacist confirmed they have in stock.  I called to them cipro 500mg bid #20.  Patient advised [facility] will text him when rx ready.    I called [facility] and left msg cancelling cipro at there store.  [phone] .

## 2024-05-02 LAB — BACTERIA UR CULT: ABNORMAL

## 2024-05-02 NOTE — TELEPHONE ENCOUNTER
The urine culture was positive for E coli.  It is sensitive to Cipro.  The patient should continue and complete current antibiotic therapy.

## 2024-05-03 NOTE — TELEPHONE ENCOUNTER
I reached him and told him 's comments.  He is starting to improve.  Urged him to be sure complete the entire rx to  Avoid recurrence of infection.

## 2024-05-06 RX ORDER — MELOXICAM 15 MG/1
TABLET ORAL
Qty: 90 TABLET | Refills: 0 | Status: SHIPPED | OUTPATIENT
Start: 2024-05-06

## 2024-05-06 NOTE — TELEPHONE ENCOUNTER
No care due was identified.  [facility] Embedded Care Due Messages. Reference number: [id number].   5/06/2024 12:24:18 PM CDT

## 2024-05-06 NOTE — TELEPHONE ENCOUNTER
Refill Routing Note   Medication(s) are not appropriate for processing by [facility] for the following reason(s):        Outside of protocol    [facility] action(s):  Route             Appointments  past 12m or future 3m with PCP    Date Provider   Last Visit   4/11/2024 Inder Quarles MD   Next Visit   10/17/2024 Inder Quarles MD   ED visits in past 90 days: 0        Note composed:12:46 PM 05/06/2024

## 2024-05-07 ENCOUNTER — PATIENT MESSAGE (OUTPATIENT)
Dept: OPHTHALMOLOGY | Facility: CLINIC | Age: 69
End: 2024-05-07
Payer: MEDICARE

## 2024-05-25 NOTE — PROGRESS NOTES
Chief Complaint: Annual exam    Chief Complaint   Patient presents with    Contraception     LMP: 2023  Pt c/o nausea with Microgestin and Loestrin       HPI:   18 y.o. WF  presents for an annual gyn exam.  Pt is currently taking Microgestin/Loestrin 1.5/30 for contraception. Pt c/o nausea with either OCP.     FmHx: +ovarian cancer in MGM, negative for breast, uterine, and colon cancers.     LMP: 2023  Frequency: cyclic   Cycle Length: 5-6 days   Flow: heavy days 1-2, moderate 3-6  Intermenstrual Bleeding: No  Postcoital Bleeding: No  Dysmenorrhea: Yes  Sexually Active: yes   Dyspareunia: No  Contraception: Microgestin/ Loestrin 1.5/30   H/o STI: No   Last pap: no hx  Gardasil: x2      Family History   Problem Relation Age of Onset    Ovarian cancer Maternal Grandmother     Breast cancer Neg Hx     Uterine cancer Neg Hx     Colon cancer Neg Hx        History reviewed. No pertinent past medical history.  History reviewed. No pertinent surgical history.    Current Outpatient Medications:     norethindrone-ethinyl estradiol-iron (MICROGESTIN FE1.5/30) 1.5 mg-30 mcg (21)/75 mg (7) tablet, Take 1 tablet by mouth once daily., Disp: 28 tablet, Rfl: 1    Review of patient's allergies indicates:  No Known Allergies    Social History     Tobacco Use    Smoking status: Never    Smokeless tobacco: Never   Substance Use Topics    Alcohol use: Not Currently    Drug use: Never       Review of Systems   Constitutional:  Negative for appetite change, chills, fatigue, fever and unexpected weight change.   Respiratory:  Negative for cough, shortness of breath and wheezing.    Cardiovascular:  Negative for chest pain, palpitations and leg swelling.   Gastrointestinal:  Positive for nausea. Negative for abdominal pain, blood in stool, constipation, diarrhea, vomiting and reflux.   Endocrine: Negative for diabetes, hair loss, hot flashes, hyperthyroidism and hypothyroidism.   Genitourinary:  Negative for bladder  HPI    DLS: 4/30/2024    Pt here for SLT OS:  Pt states no eye pain or discomfort.     Meds;  Latanoprost QHS OU   Cosopt BID OU   Brimonidine BID OU         Last edited by Tiffany Newman on 5/30/2024  9:15 AM.            Assessment /Plan     For exam results, see Encounter Report.    Primary open angle glaucoma (POAG) of both eyes, moderate stage  -     acetaZOLAMIDE (DIAMOX) 250 MG tablet; Take 1 tablet (250 mg total) by mouth 3 (three) times daily. for 7 days  Dispense: 21 tablet; Refill: 0    Nuclear sclerotic cataract of both eyes        From dr Hitchcock - last saw her 2/20/2024  // glaucoma // followed since 11/2019     Primary open angle glaucoma (POAG) of both eyes, moderate stage           Vinh note form 2/20/2024   (-) FHx. IOp 24 OD, OS. Last 15 OD, 16 OS. Tmax  22 OD, 23 OS. Tmin on drops 12 OD, 13 OS.  Pt failed to f/u from 12/17/2019 to 9/16/2020 and 6/2021 to 3/11/2022.   Pt reports compliance w/eyedrops. Prev pt of Dr Huffman. C/d 0.55 OD, 0.65 OS.   12/28/2022  HVF OD inf arcuate, OS sup arcuate  2/20/2024 HVF OD inf arc and early sup arcuate, OS sup arc and early inf arc  12/28/2022 OCT OD, thin NS, TS, T, TI, General (Borderline N). OS thin TS, TI, T, General (Borderline N and NI).   2/20/2024 OCT OD NS, TS, T, and G, borderline NI and N, OS thin TS, TI, T, and G, borderline N and NI  3/11/2022 Photos  Cont Latanoprost QHS OU.  D/c Timolol BiD OU on 12/15/2020  Cont Cosopt BID OU (start 12/15/2020)  Add Brimonidine BID OU  Importance of drop compliance and f/u discussed with pt.   Educated pt on findings w/understanding.  Referral to Dr Barrientos. Consider benefits of SLT.   Cont w/annual exams.       4/30/2023 - first visit w/ [facility] / glaucoma clinic   Dx 2018 w/ Dr. Huffman - stated on lumigan - but stopped on his own   First visit at [facility] 11/2/2019 - Dr hitchcock // followed w/ Vinh 11/2019 to 2/2024   IOP today 23/21 (on gtts)   + FmHx - mother   Gonio +3 open ou      OLDER TEST - include    4 VF's // 5 OCT's / 1 photos   VF progression   OCT progression   Tmax 24/24 - on gtts     PLAN  Cont all gtts    Latanoprost ou q hs    Cosopt bid ou   Brimonidine bid ou  Rec SLT OS then OD (( Met patient)   SLT OS- done 5/30/2024 - steroid taper over 12 days // IOP up post slt - diamox 250 tid x 7 days   SLT OD pending     If IOP still too high can try rocklatan/rhopressa   If unable to control IOP - consider incisional surgery   In future do glaucoma abstract    "incontinence, decreased libido, dysmenorrhea, dyspareunia, dysuria, flank pain, frequency, genital sores, hematuria, hot flashes, menorrhagia, menstrual problem, pelvic pain, urgency, vaginal bleeding, vaginal discharge, vaginal pain, urinary incontinence, postcoital bleeding, postmenopausal bleeding, vaginal dryness and vaginal odor.   Integumentary:  Negative for rash, acne, hair changes, mole/lesion, breast mass, nipple discharge, breast skin changes and breast tenderness.   Neurological:  Negative for headaches.   Psychiatric/Behavioral:  Negative for depression and sleep disturbance. The patient is not nervous/anxious.    Breast: Negative for lump, mass, mastodynia, nipple discharge, skin changes and tenderness     Physical Exam:   Vitals:    03/21/23 1042   BP: 118/82   BP Location: Left arm   Patient Position: Sitting   Temp: 97.2 °F (36.2 °C)   Weight: 54.4 kg (120 lb)   Height: 5' 1" (1.549 m)       Body mass index is 22.67 kg/m².    Physical Exam  Constitutional:       Appearance: She is well-developed.   Abdominal:      General: Abdomen is flat.   Neurological:      Mental Status: She is alert and oriented to person, place, and time.   Psychiatric:         Attention and Perception: Attention normal.         Mood and Affect: Mood normal. Mood is not anxious or depressed.       Assessment:     There is no problem list on file for this patient.      Health Maintenance Due   Topic Date Due    Hepatitis C Screening  Never done    Lipid Panel  Never done    COVID-19 Vaccine (1) Never done    HIV Screening  Never done    Chlamydia Screening  Never done    Influenza Vaccine (1) 09/01/2022     Health Maintenance Topics with due status: Not Due       Topic Last Completion Date    TETANUS VACCINE 07/29/2014         Plan:    Laury was seen today for contraception.    Diagnoses and all orders for this visit:    Contraception Management  Rx: Xulane patch     Will f/u on patches at Annual appt in May 2023.     Oral " contraceptive use  - Explained common options for contraception including natural family planning, barrier methods, depo-provera, ocps,  patch, nuvaring, IUD, Nexplanon, and sterilization.     - Discussed that pills should be taken at the same time every day to minimize breakthrough bleeding and to decrease failure rate.     - Advised patient that smoking is harmful due to increased risks of stroke, heart attack and blood clots when taking pills. Patient to contact us immediately if she experiences severe abdominal pain, severe chest pain, severe headaches, eye-visual changes, severe leg pain or SOB.     - Discussed that birth control, such as pills, Nuva Ring , Patch or Depo Provera, Nexplanon, IUDs do not protect against STDs.     Discontinue  Loestrin 1.5/30.          Lisy Rico

## 2024-05-30 ENCOUNTER — OFFICE VISIT (OUTPATIENT)
Dept: OPHTHALMOLOGY | Facility: CLINIC | Age: 69
End: 2024-05-30
Payer: MEDICARE

## 2024-05-30 DIAGNOSIS — H25.13 NUCLEAR SCLEROTIC CATARACT OF BOTH EYES: ICD-10-CM

## 2024-05-30 DIAGNOSIS — H40.1132 PRIMARY OPEN ANGLE GLAUCOMA (POAG) OF BOTH EYES, MODERATE STAGE: Primary | ICD-10-CM

## 2024-05-30 PROCEDURE — 99999 PR PBB SHADOW E&M-EST. PATIENT-LVL III: CPT | Mod: PBBFAC,,, | Performed by: OPHTHALMOLOGY

## 2024-05-30 PROCEDURE — 65855 TRABECULOPLASTY LASER SURG: CPT | Mod: RT,S$GLB,, | Performed by: OPHTHALMOLOGY

## 2024-05-30 PROCEDURE — 99499 UNLISTED E&M SERVICE: CPT | Mod: S$GLB,,, | Performed by: OPHTHALMOLOGY

## 2024-05-30 RX ORDER — ACETAZOLAMIDE 250 MG/1
250 TABLET ORAL 3 TIMES DAILY
Qty: 21 TABLET | Refills: 0 | Status: SHIPPED | OUTPATIENT
Start: 2024-05-30 | End: 2024-06-11

## 2024-06-04 RX ORDER — ROSUVASTATIN CALCIUM 40 MG/1
TABLET, COATED ORAL
Qty: 90 TABLET | Refills: 3 | Status: SHIPPED | OUTPATIENT
Start: 2024-06-04

## 2024-06-04 NOTE — TELEPHONE ENCOUNTER
No care due was identified.  [facility] Embedded Care Due Messages. Reference number: [id number].   6/04/2024 7:01:05 AM CDT

## 2024-06-04 NOTE — TELEPHONE ENCOUNTER
Refill Decision Note   Celso Hernandez  is requesting a refill authorization.  Brief Assessment and Rationale for Refill:  Approve     Medication Therapy Plan:         Comments:     Note composed:8:29 AM 06/04/2024

## 2024-06-08 NOTE — PROGRESS NOTES
HPI     Laser Treatment            Comments: SLT OD          Comments    SLT OD TODAY  -S/p SLT OS 5/30/24    1. Mod POAG OU  2. NS OU    MEDS:  Cosopt BID OU  Brimonidine BID OU  Latanoprost QHS OU          Last edited by Mikayla Vázquez MA on 6/13/2024  9:17 AM.            Assessment /Plan     For exam results, see Encounter Report.    Primary open angle glaucoma (POAG) of both eyes, moderate stage    Nuclear sclerotic cataract of both eyes         Vinh note form 2/20/2024   (-) FHx. IOp 24 OD, OS. Last 15 OD, 16 OS. Tmax  22 OD, 23 OS. Tmin on drops 12 OD, 13 OS.  Pt failed to f/u from 12/17/2019 to 9/16/2020 and 6/2021 to 3/11/2022.   Pt reports compliance w/eyedrops. Prev pt of Dr Huffman. C/d 0.55 OD, 0.65 OS.   12/28/2022  HVF OD inf arcuate, OS sup arcuate  2/20/2024 HVF OD inf arc and early sup arcuate, OS sup arc and early inf arc  12/28/2022 OCT OD, thin NS, TS, T, TI, General (Borderline N). OS thin TS, TI, T, General (Borderline N and NI).   2/20/2024 OCT OD NS, TS, T, and G, borderline NI and N, OS thin TS, TI, T, and G, borderline N and NI  3/11/2022 Photos  Cont Latanoprost QHS OU.  D/c Timolol BiD OU on 12/15/2020  Cont Cosopt BID OU (start 12/15/2020)  Add Brimonidine BID OU  Importance of drop compliance and f/u discussed with pt.   Educated pt on findings w/understanding.  Referral to glaucoma clinic - Dr Barrientos / [facility]. Consider benefits of SLT.   Cont w/annual exams.     4/30/2023 - first visit w/ Sejal / glaucoma clinic   Dx 2018 w/ Dr. Huffman - stated on lumigan - but stopped on his own   First visit at [facility] 11/2/2019 - Dr hitchcock // followed w/ Vinh 11/2019 to 2/2024   IOP today 23/21 (on gtts)   + FmHx - mother   Gonio +3 open ou      OLDER TEST - include   4 VF's // 5 OCT's / 1 photos   VF progression   OCT progression   Tmax 24/24 - on gtts     PLAN  Cont all gtts    Latanoprost ou q hs    Cosopt bid ou   Brimonidine bid ou  Rec SLT OS then OD (( Met patient)     SLT OS-  done 5/30/2024 -  SLT OD  6/13/2024 - steroid taper over 12 days - has left over from first eye (IOP ok post slt od- no need for diamox)     If IOP still too high can try rocklatan/rhopressa   If unable to control IOP - consider incisional surgery   In future do glaucoma abstract     F/U 1-2 months - Met for IOP check post slt ou

## 2024-06-10 PROBLEM — R30.0 DYSURIA: Status: RESOLVED | Noted: 2020-08-03 | Resolved: 2024-06-10

## 2024-06-10 PROBLEM — H40.1132 PRIMARY OPEN ANGLE GLAUCOMA OF BOTH EYES, MODERATE STAGE: Status: ACTIVE | Noted: 2024-06-10

## 2024-06-10 PROBLEM — A04.8 BACTERIAL INFECTION DUE TO H. PYLORI: Chronic | Status: RESOLVED | Noted: 2019-11-07 | Resolved: 2024-06-10

## 2024-06-10 PROBLEM — D12.6 ADENOMA OF COLON: Status: RESOLVED | Noted: 2019-10-08 | Resolved: 2024-06-10

## 2024-06-10 NOTE — PROGRESS NOTES
Celso Hernandez presented for a  Medicare AWV and comprehensive Health Risk Assessment today. The following components were reviewed and updated:    Medical history  Family History  Social history  Allergies and Current Medications  Health Risk Assessment  Health Maintenance  Care Team         ** See Completed Assessments for Annual Wellness Visit within the encounter summary.**         The following assessments were completed:  Living Situation  CAGE  Depression Screening  Timed Get Up and Go  Whisper Test  Cognitive Function Screening    Nutrition Screening  ADL Screening  PAQ Screening      Opioid documentation for eAWV      Patient does not have a current opioid prescription.        Review for Substance Use Disorders: Patient does use substance  ; THC intermittently , not daily       Current Outpatient Medications:     dorzolamide-timolol 2-0.5% (COSOPT) 22.3-6.8 mg/mL ophthalmic solution, INSTILL 1 DROP INTO EACH EYE TWICE DAILY, Disp: 30 mL, Rfl: 3    hydrALAZINE (APRESOLINE) 50 MG tablet, Take 1 tablet (50 mg total) by mouth every 8 (eight) hours., Disp: 270 tablet, Rfl: 3    latanoprost 0.005 % ophthalmic solution, INSTILL 1 DROP INTO EACH EYE IN THE EVENING, Disp: 7.5 mL, Rfl: 3    losartan-hydrochlorothiazide 100-12.5 mg (HYZAAR) 100-12.5 mg Tab, TAKE 1 TABLET BY MOUTH ONCE DAILY FOR HIGH BLOOD PRESSURE, Disp: 90 tablet, Rfl: 1    meloxicam (MOBIC) 15 MG tablet, Take 1 tablet by mouth once daily, Disp: 90 tablet, Rfl: 0    metFORMIN (GLUCOPHAGE) 500 MG tablet, Take 1 tablet by mouth once daily with breakfast, Disp: 90 tablet, Rfl: 1    multivit-min-FA-lycopen-lutein (CENTRUM SILVER MEN) 300-600-300 mcg Tab, Take 1 tablet by mouth once daily., Disp: 100 tablet, Rfl: 3    rosuvastatin (CRESTOR) 40 MG Tab, TAKE 1 TABLET BY MOUTH ONCE DAILY REPLACES ATORVASTATIN, Disp: 90 tablet, Rfl: 3    timoloL 0.25 % ophthalmic solution, 1-2 drops 2 (two) times daily., Disp: , Rfl:     brimonidine 0.2% (ALPHAGAN) 0.2 % Drop,  "Place 1 drop into both eyes 2 (two) times a day. (Patient not taking: Reported on 6/11/2024), Disp: 10 mL, Rfl: 11       Vitals:    06/11/24 0758   BP: 134/86   Pulse: 77   SpO2: 97%   Weight: 117.3 kg (258 lb 9.6 oz)   Height: 5' 8" (1.727 m)   PainSc: 0-No pain      Physical Exam  Vitals and nursing note reviewed.   Constitutional:       General: He is not in acute distress.     Appearance: Normal appearance. He is obese. He is not ill-appearing.   HENT:      Head: Normocephalic and atraumatic.      Comments: Hearing loss     Mouth/Throat:      Mouth: Mucous membranes are moist.   Eyes:      General: No scleral icterus.        Right eye: No discharge.         Left eye: No discharge.      Extraocular Movements: Extraocular movements intact.      Conjunctiva/sclera: Conjunctivae normal.   Cardiovascular:      Rate and Rhythm: Normal rate.   Pulmonary:      Effort: Pulmonary effort is normal. No respiratory distress.   Musculoskeletal:      Cervical back: Normal range of motion.   Skin:     General: Skin is warm and dry.      Findings: No rash.   Neurological:      Mental Status: He is alert and oriented to person, place, and time.   Psychiatric:         Mood and Affect: Mood normal.         Behavior: Behavior normal. Behavior is cooperative.         Cognition and Memory: Cognition and memory normal.               Diagnoses and health risks identified today and associated recommendations/orders:    1. Encounter for preventive health examination  - Chart reviewed. Problem list updated. Discussed current medical diagnosis, current medications, medical/surgical/family/social history; updated provider list; documented vital signs; identified any cognitive impairment; and updated risk factor list. Addressed any outstanding health maintenance. Provided patient with personalized health advice. Continue to follow up with PCP and any specialists.     2. Type 2 diabetes mellitus with hyperlipidemia  Chronic; stable on current " treatment plan; follow up with PCP  - taking statin     3. Atherosclerosis of aorta  Chronic; stable on current treatment plan; follow up with PCP  - taking statin     4. Severe obesity (BMI 35.0-39.9) with comorbidity  - Recommendation for healthy diet and increasing exercise as tolerated with goal of 150min/week . Recommend weight loss      5. Hypertension associated with type 2 diabetes mellitus  Chronic; stable on current treatment plan; follow up with PCP  - taking hydralazine, losartan-HCTZ    6. Type 2 diabetes mellitus with cataract  Chronic; stable on current treatment plan; follow up with PCP  - follow up with ophthalmology     7. DDD (degenerative disc disease), thoracic  Chronic; stable on current treatment plan; follow up with PCP  - taking meloxicam prn     8. Facet arthropathy, cervical  Chronic; stable on current treatment plan; follow up with PCP  - taking meloxicam prn     9. Thoracic radiculopathy  Chronic; stable on current treatment plan; follow up with PCP  - taking meloxicam prn     10. BPH with obstruction/lower urinary tract symptoms  Chronic; stable on current treatment plan; follow up with PCP  - not currently taking medication; follow up with urology     11. Vitamin D deficiency  Chronic; stable on current treatment plan; follow up with PCP  Recommend vit d supplements     12. Primary osteoarthritis of left knee  Chronic; stable on current treatment plan; follow up with PCP  - taking meloxicam prn     13. Primary open angle glaucoma of both eyes, moderate stage  Chronic; stable on current treatment plan; follow up with PCP  - follow up with ophthalmology and optometry     14. Nuclear sclerotic cataract of both eyes  Chronic; stable on current treatment plan; follow up with PCP  - follow up with ophthalmology and optometry     15. Hearing loss, unspecified hearing loss type, unspecified laterality  - hearing loss reported; agrees with evaluation   - Ambulatory referral/consult to ENT;  Future  - Ambulatory referral/consult to Audiology; Future    16. Encounter for Medicare annual wellness exam  - Ambulatory Referral/Consult to Enhanced Annual Wellness Visit (eAWV)      Provided Celso with a 5-10 year written screening schedule and personal prevention plan. Recommendations were developed using the USPSTF age appropriate recommendations. Education, counseling, and referrals were provided as needed. After Visit Summary printed and given to patient which includes a list of additional screenings\tests needed.    Follow up in about 1 year (around 6/11/2025) for your next annual wellness visit.    Stefania Negrete, FNP-C    Advance Care Planning     I offered to discuss advanced care planning, including how to pick a person who would make decisions for you if you were unable to make them for yourself, called a health care power of , and what kind of decisions you might make such as use of life sustaining treatments such as ventilators and tube feeding when faced with a life limiting illness recorded on a living will that they will need to know. (How you want to be cared for as you near the end of your natural life)     X Patient is interested in learning more about how to make advanced directives.  I provided them paperwork and offered to discuss this with them.

## 2024-06-11 ENCOUNTER — OFFICE VISIT (OUTPATIENT)
Dept: FAMILY MEDICINE | Facility: CLINIC | Age: 69
End: 2024-06-11
Payer: MEDICARE

## 2024-06-11 VITALS
HEART RATE: 77 BPM | WEIGHT: 258.63 LBS | HEIGHT: 68 IN | SYSTOLIC BLOOD PRESSURE: 134 MMHG | DIASTOLIC BLOOD PRESSURE: 86 MMHG | OXYGEN SATURATION: 97 % | BODY MASS INDEX: 39.2 KG/M2

## 2024-06-11 DIAGNOSIS — I70.0 ATHEROSCLEROSIS OF AORTA: ICD-10-CM

## 2024-06-11 DIAGNOSIS — E66.01 SEVERE OBESITY (BMI 35.0-39.9) WITH COMORBIDITY: Chronic | ICD-10-CM

## 2024-06-11 DIAGNOSIS — E11.69 TYPE 2 DIABETES MELLITUS WITH HYPERLIPIDEMIA: ICD-10-CM

## 2024-06-11 DIAGNOSIS — Z00.00 ENCOUNTER FOR MEDICARE ANNUAL WELLNESS EXAM: ICD-10-CM

## 2024-06-11 DIAGNOSIS — I15.2 HYPERTENSION ASSOCIATED WITH TYPE 2 DIABETES MELLITUS: ICD-10-CM

## 2024-06-11 DIAGNOSIS — H91.90 HEARING LOSS, UNSPECIFIED HEARING LOSS TYPE, UNSPECIFIED LATERALITY: ICD-10-CM

## 2024-06-11 DIAGNOSIS — H25.13 NUCLEAR SCLEROTIC CATARACT OF BOTH EYES: ICD-10-CM

## 2024-06-11 DIAGNOSIS — M51.34 DDD (DEGENERATIVE DISC DISEASE), THORACIC: ICD-10-CM

## 2024-06-11 DIAGNOSIS — H40.1132 PRIMARY OPEN ANGLE GLAUCOMA OF BOTH EYES, MODERATE STAGE: ICD-10-CM

## 2024-06-11 DIAGNOSIS — E11.36 TYPE 2 DIABETES MELLITUS WITH CATARACT: ICD-10-CM

## 2024-06-11 DIAGNOSIS — N13.8 BPH WITH OBSTRUCTION/LOWER URINARY TRACT SYMPTOMS: ICD-10-CM

## 2024-06-11 DIAGNOSIS — E11.59 HYPERTENSION ASSOCIATED WITH TYPE 2 DIABETES MELLITUS: ICD-10-CM

## 2024-06-11 DIAGNOSIS — M47.812 FACET ARTHROPATHY, CERVICAL: ICD-10-CM

## 2024-06-11 DIAGNOSIS — E78.5 TYPE 2 DIABETES MELLITUS WITH HYPERLIPIDEMIA: ICD-10-CM

## 2024-06-11 DIAGNOSIS — M17.12 PRIMARY OSTEOARTHRITIS OF LEFT KNEE: Chronic | ICD-10-CM

## 2024-06-11 DIAGNOSIS — E55.9 VITAMIN D DEFICIENCY: Chronic | ICD-10-CM

## 2024-06-11 DIAGNOSIS — Z00.00 ENCOUNTER FOR PREVENTIVE HEALTH EXAMINATION: Primary | ICD-10-CM

## 2024-06-11 DIAGNOSIS — N40.1 BPH WITH OBSTRUCTION/LOWER URINARY TRACT SYMPTOMS: ICD-10-CM

## 2024-06-11 DIAGNOSIS — M54.14 THORACIC RADICULOPATHY: ICD-10-CM

## 2024-06-11 PROCEDURE — G0439 PPPS, SUBSEQ VISIT: HCPCS | Mod: S$GLB,,, | Performed by: NURSE PRACTITIONER

## 2024-06-11 PROCEDURE — 1160F RVW MEDS BY RX/DR IN RCRD: CPT | Mod: CPTII,S$GLB,, | Performed by: NURSE PRACTITIONER

## 2024-06-11 PROCEDURE — 3072F LOW RISK FOR RETINOPATHY: CPT | Mod: CPTII,S$GLB,, | Performed by: NURSE PRACTITIONER

## 2024-06-11 PROCEDURE — 1101F PT FALLS ASSESS-DOCD LE1/YR: CPT | Mod: CPTII,S$GLB,, | Performed by: NURSE PRACTITIONER

## 2024-06-11 PROCEDURE — 1159F MED LIST DOCD IN RCRD: CPT | Mod: CPTII,S$GLB,, | Performed by: NURSE PRACTITIONER

## 2024-06-11 PROCEDURE — 1126F AMNT PAIN NOTED NONE PRSNT: CPT | Mod: CPTII,S$GLB,, | Performed by: NURSE PRACTITIONER

## 2024-06-11 PROCEDURE — 3044F HG A1C LEVEL LT 7.0%: CPT | Mod: CPTII,S$GLB,, | Performed by: NURSE PRACTITIONER

## 2024-06-11 PROCEDURE — 3079F DIAST BP 80-89 MM HG: CPT | Mod: CPTII,S$GLB,, | Performed by: NURSE PRACTITIONER

## 2024-06-11 PROCEDURE — 1158F ADVNC CARE PLAN TLK DOCD: CPT | Mod: CPTII,S$GLB,, | Performed by: NURSE PRACTITIONER

## 2024-06-11 PROCEDURE — 1170F FXNL STATUS ASSESSED: CPT | Mod: CPTII,S$GLB,, | Performed by: NURSE PRACTITIONER

## 2024-06-11 PROCEDURE — 99999 PR PBB SHADOW E&M-EST. PATIENT-LVL V: CPT | Mod: PBBFAC,,, | Performed by: NURSE PRACTITIONER

## 2024-06-11 PROCEDURE — 3288F FALL RISK ASSESSMENT DOCD: CPT | Mod: CPTII,S$GLB,, | Performed by: NURSE PRACTITIONER

## 2024-06-11 PROCEDURE — 3075F SYST BP GE 130 - 139MM HG: CPT | Mod: CPTII,S$GLB,, | Performed by: NURSE PRACTITIONER

## 2024-06-11 NOTE — PATIENT INSTRUCTIONS
[facility] DEPARTMENT  You should receive a gift card from [facility] for completing the medicare wellness visit. You can try calling # [phone] ([employer] [employer] department) to ask about your giftcard. Please wait 1 week to call to ensure that [facility] has received documentation for proof of medicare visit.             Counseling and Referral of Other Preventative  (Italic type indicates deductible and co-insurance are waived)    Patient Name: Celso Hernandez  Today's Date: 6/11/2024    Health Maintenance         Date Due Completion Date    Eye Exam 06/19/2024 6/19/2023    Shingles Vaccine (1 of 2) 06/20/2024 (Originally 7/16/2004) ---    COVID-19 Vaccine (4 - 2023-24 season) 06/21/2024 (Originally 9/1/2023) 12/21/2021    RSV Vaccine (Age 60+ and Pregnant patients) (1 - 1-dose 60+ series) 06/21/2024 (Originally 7/16/2014) ---    PROSTATE-SPECIFIC ANTIGEN 09/26/2024 9/26/2023    Diabetes Urine Screening 09/26/2024 9/26/2023    Hemoglobin A1c 10/04/2024 4/4/2024    Lipid Panel 04/04/2025 4/4/2024    Foot Exam 04/11/2025 4/11/2024    TETANUS VACCINE 06/20/2026 6/20/2016    Colorectal Cancer Screening 02/28/2034 2/29/2024          Orders Placed This Encounter   Procedures    Ambulatory referral/consult to ENT    Ambulatory referral/consult to Audiology       The following information is provided to all patients.  This information is to help you find resources for any of the problems found today that may be affecting your health:                  Living healthy guide: www.[facility].[place].gov      Understanding Diabetes: www.diabetes.org      Eating healthy: www.cdc.gov/healthyweight      CDC home safety checklist: www.cdc.gov/steadi/patient.html      Agency on Aging: www.goea.[place].gov      Alcoholics anonymous (AA): www.aa.org      Physical Activity: www.dougie.nih.gov/dz7lngj      Tobacco use: www.quitwithusla.org

## 2024-06-13 ENCOUNTER — OFFICE VISIT (OUTPATIENT)
Dept: OPHTHALMOLOGY | Facility: CLINIC | Age: 69
End: 2024-06-13
Payer: MEDICARE

## 2024-06-13 DIAGNOSIS — H25.13 NUCLEAR SCLEROTIC CATARACT OF BOTH EYES: ICD-10-CM

## 2024-06-13 DIAGNOSIS — H40.1132 PRIMARY OPEN ANGLE GLAUCOMA (POAG) OF BOTH EYES, MODERATE STAGE: Primary | ICD-10-CM

## 2024-06-13 PROCEDURE — 99999 PR PBB SHADOW E&M-EST. PATIENT-LVL II: CPT | Mod: PBBFAC,,, | Performed by: OPHTHALMOLOGY

## 2024-06-13 PROCEDURE — 65855 TRABECULOPLASTY LASER SURG: CPT | Mod: RT,S$GLB,, | Performed by: OPHTHALMOLOGY

## 2024-06-13 PROCEDURE — 99499 UNLISTED E&M SERVICE: CPT | Mod: S$GLB,,, | Performed by: OPHTHALMOLOGY

## 2024-06-25 ENCOUNTER — CLINICAL SUPPORT (OUTPATIENT)
Dept: OTOLARYNGOLOGY | Facility: CLINIC | Age: 69
End: 2024-06-25
Payer: MEDICARE

## 2024-06-25 ENCOUNTER — OFFICE VISIT (OUTPATIENT)
Dept: OTOLARYNGOLOGY | Facility: CLINIC | Age: 69
End: 2024-06-25
Payer: MEDICARE

## 2024-06-25 VITALS
DIASTOLIC BLOOD PRESSURE: 91 MMHG | HEART RATE: 82 BPM | BODY MASS INDEX: 38.63 KG/M2 | SYSTOLIC BLOOD PRESSURE: 162 MMHG | WEIGHT: 254.06 LBS

## 2024-06-25 DIAGNOSIS — H91.90 HEARING LOSS, UNSPECIFIED HEARING LOSS TYPE, UNSPECIFIED LATERALITY: ICD-10-CM

## 2024-06-25 DIAGNOSIS — H90.3 SENSORINEURAL HEARING LOSS (SNHL) OF BOTH EARS: ICD-10-CM

## 2024-06-25 DIAGNOSIS — H90.3 SENSORINEURAL HEARING LOSS (SNHL) OF BOTH EARS: Primary | ICD-10-CM

## 2024-06-25 DIAGNOSIS — H93.13 TINNITUS OF BOTH EARS: ICD-10-CM

## 2024-06-25 DIAGNOSIS — H93.13 TINNITUS OF BOTH EARS: Primary | ICD-10-CM

## 2024-06-25 PROCEDURE — 99999 PR PBB SHADOW E&M-EST. PATIENT-LVL I: CPT | Mod: PBBFAC,,,

## 2024-06-25 PROCEDURE — 3044F HG A1C LEVEL LT 7.0%: CPT | Mod: CPTII,S$GLB,, | Performed by: NURSE PRACTITIONER

## 2024-06-25 PROCEDURE — 1101F PT FALLS ASSESS-DOCD LE1/YR: CPT | Mod: CPTII,S$GLB,, | Performed by: NURSE PRACTITIONER

## 2024-06-25 PROCEDURE — 3072F LOW RISK FOR RETINOPATHY: CPT | Mod: CPTII,S$GLB,, | Performed by: NURSE PRACTITIONER

## 2024-06-25 PROCEDURE — 3008F BODY MASS INDEX DOCD: CPT | Mod: CPTII,S$GLB,, | Performed by: NURSE PRACTITIONER

## 2024-06-25 PROCEDURE — 3080F DIAST BP >= 90 MM HG: CPT | Mod: CPTII,S$GLB,, | Performed by: NURSE PRACTITIONER

## 2024-06-25 PROCEDURE — 1160F RVW MEDS BY RX/DR IN RCRD: CPT | Mod: CPTII,S$GLB,, | Performed by: NURSE PRACTITIONER

## 2024-06-25 PROCEDURE — 99203 OFFICE O/P NEW LOW 30 MIN: CPT | Mod: S$GLB,,, | Performed by: NURSE PRACTITIONER

## 2024-06-25 PROCEDURE — 92567 TYMPANOMETRY: CPT | Mod: S$GLB,,,

## 2024-06-25 PROCEDURE — 3077F SYST BP >= 140 MM HG: CPT | Mod: CPTII,S$GLB,, | Performed by: NURSE PRACTITIONER

## 2024-06-25 PROCEDURE — 99999 PR PBB SHADOW E&M-EST. PATIENT-LVL III: CPT | Mod: PBBFAC,,, | Performed by: NURSE PRACTITIONER

## 2024-06-25 PROCEDURE — 1126F AMNT PAIN NOTED NONE PRSNT: CPT | Mod: CPTII,S$GLB,, | Performed by: NURSE PRACTITIONER

## 2024-06-25 PROCEDURE — 92557 COMPREHENSIVE HEARING TEST: CPT | Mod: S$GLB,,,

## 2024-06-25 PROCEDURE — 1159F MED LIST DOCD IN RCRD: CPT | Mod: CPTII,S$GLB,, | Performed by: NURSE PRACTITIONER

## 2024-06-25 PROCEDURE — 3288F FALL RISK ASSESSMENT DOCD: CPT | Mod: CPTII,S$GLB,, | Performed by: NURSE PRACTITIONER

## 2024-06-25 NOTE — PROGRESS NOTES
"Celso Hernandez, a 68 y.o. male was seen today in the clinic for an audiologic evaluation. The patient's primary complaint was long term hearing loss.  Mr. Hernandez reports experiencing tinnitus which he describes as a "helicopter" sound. He denies ear pain, drainage, and dizziness. Mr. Hernandez reports noise exposure while in the .     Pure tone testing revealed mild sensorineural hearing loss, bilaterally. Speech reception thresholds were obtained at 30 dBHL in the right ear and 30 dBHL in the left ear. Speech discrimination scores were 100% in the right ear and 100% in the left ear. Tympanometry revealed Type A tympanograms in both ears.    Results were reviewed with the patient and the recommendation of a hearing aid consultation was discussed.    Recommendations:  Otologic evaluation  Annual audiologic evaluation  Hearing protection in noise  Hearing aid consultation        "

## 2024-06-25 NOTE — PROGRESS NOTES
Chief Complaint   Patient presents with    Hearing Loss       HPI:  Celso Hernandez is a very pleasant 68 y.o. male referred to me by Stefania Negrete in consultation for evaluation of hearing loss.  He reports hearing loss that has been gradually progressing over the last few years.  He has not noted any difference in hearing between the ears, with either ear being the worse hearing ear. He has noted tinnitus in both ears. He has not had any recent issues with ear pain or ear drainage. He denies a family history of hearing loss, and has not had any previous otologic surgery. He has a history of significant loud noise exposure (). He denies issues with dizziness.        Past Medical History:   Diagnosis Date    Arthritis     BPH (benign prostatic hyperplasia)     Cataract     Colon polyps     Diabetes mellitus type II     Glaucoma     Hyperlipidemia     Hypertension     Multiple duodenal ulcers 10/08/2019    Unspecified hemorrhoids without mention of complication      Social History     Socioeconomic History    Marital status:    Tobacco Use    Smoking status: Never     Passive exposure: Never    Smokeless tobacco: Never   Substance and Sexual Activity    Alcohol use: Yes     Alcohol/week: 1.0 standard drink of alcohol     Types: 1 Standard drinks or equivalent per week     Comment: 1-2 drinks/month    Drug use: Yes     Types: Marijuana     Comment: 3 times per week    Sexual activity: Yes     Partners: Female     Social Determinants of Health     Financial Resource Strain: Low Risk  (6/11/2024)    Overall Financial Resource Strain (CARDIA)     Difficulty of Paying Living Expenses: Not hard at all   Food Insecurity: No Food Insecurity (6/11/2024)    Hunger Vital Sign     Worried About Running Out of Food in the Last Year: Never true     Ran Out of Food in the Last Year: Never true   Transportation Needs: No Transportation Needs (6/11/2024)    PRAPARE - Transportation     Lack of Transportation  (Medical): No     Lack of Transportation (Non-Medical): No   Physical Activity: Insufficiently Active (6/11/2024)    Exercise Vital Sign     Days of Exercise per Week: 1 day     Minutes of Exercise per Session: 30 min   Stress: No Stress Concern Present (6/11/2024)    [employer] [place] [employer] - Occupational Stress Questionnaire     Feeling of Stress : Not at all   Housing Stability: Low Risk  (6/11/2024)    Housing Stability Vital Sign     Unable to Pay for Housing in the Last Year: No     Homeless in the Last Year: No      Past Surgical History:   Procedure Laterality Date    COLONOSCOPY N/A 10/08/2019    Procedure: COLONOSCOPY suprep;  Surgeon: Landon Guajardo MD;  Location: [facility] ENDO;  Service: General;  Laterality: N/A;    COLONOSCOPY N/A 02/29/2024    Procedure: COLONOSCOPY;  Surgeon: Miguel Lorenzo MD;  Location: [facility] ENDO;  Service: Endoscopy;  Laterality: N/A;    COLONOSCOPY W/ POLYPECTOMY  10/08/2019    CYSTOSCOPY N/A 08/05/2020    Procedure: CYSTOSCOPY;  Surgeon: Arcadio Vaughn MD;  [facility];  Service: Urology;  Laterality: N/A;    CYSTOSCOPY WITH URETHRAL DILATION N/A 11/10/2021    Procedure: CYSTOSCOPY, WITH URETHRAL DILATION;  Surgeon: Arcadio Vaughn MD;  Location: [facility] OR 1ST FLR;  Service: Urology;  Laterality: N/A;  30 MINUTES     DILATION OF URETHRA N/A 08/05/2020    Procedure: DILATION, URETHRA;  Surgeon: Arcadio Vaughn MD;  Location: [facility] OR 2ND FLR;  Service: Urology;  Laterality: N/A;    EPIDURAL STEROID INJECTION INTO THORACIC SPINE N/A 01/04/2022    Procedure: Thoracic Epidural Steroid Injection T5-6 (No Sedation) ;  Surgeon: Connie Mejia Jr., MD;  Location: [facility] PAIN MGT;  Service: Pain Management;  Laterality: N/A;    ESOPHAGOGASTRODUODENOSCOPY  10/08/2019    w/biopsies    ESOPHAGOGASTRODUODENOSCOPY N/A 10/08/2019    Procedure: EGD (ESOPHAGOGASTRODUODENOSCOPY);  Surgeon: Landon Guajardo MD;  Location: [facility] ENDO;  Service: General;  Laterality: N/A;     NASAL SINUS SURGERY  11/2022    with outside MD    none      SELECTIVE LASER TRABECULOPLASTY Bilateral 06/2024        TRANSURETHRAL RESECTION OF PROSTATE N/A 05/27/2020    Procedure: TURP (TRANSURETHRAL RESECTION OF PROSTATE) bipolar;  Surgeon: Arcadio Vaughn MD;  Location: [facility] OR [address] [place] [address];  Service: Urology;  Laterality: N/A;  2hr     Family History   Problem Relation Name Age of Onset    Hypertension Mother      Stroke Mother      Hyperlipidemia Mother      Glaucoma Mother      Diabetes Father      Cancer Sister          breast    Hypertension Sister      Heart disease Sister      Hearing loss Sister      No Known Problems Daughter x2     No Known Problems Son x1     Amblyopia Neg Hx      Blindness Neg Hx      Cataracts Neg Hx      Macular degeneration Neg Hx      Retinal detachment Neg Hx      Strabismus Neg Hx           Review of Systems  General: negative for chills, fever or weight loss  Psychological: negative for mood changes or depression  Ophthalmic: negative for blurry vision, photophobia or eye pain  ENT: see HPI  Respiratory: no cough, shortness of breath, or wheezing  Cardiovascular: no chest pain or dyspnea on exertion  Gastrointestinal: no abdominal pain, change in bowel habits, or black/ bloody stools  Musculoskeletal: negative for gait disturbance or muscular weakness  Neurological: no syncope or seizures; no ataxia  Dermatological: negative for puritis,  rash and jaundice  Hematologic/lymphatic: no easy bruising, no new lumps or bumps      Physical Exam:    Vitals:    06/25/24 1026   BP: (!) 162/91   Pulse: 82       Constitutional: Well appearing / communicating without difficutly.  NAD.  Eyes: EOM I Bilaterally  Head/Face: Normocephalic.  Negative paranasal sinus pressure/tenderness.  Salivary glands WNL.  House Brackmann I Bilaterally.    Right Ear: Auricle normal appearance. External Auditory Canal within normal limits no lesions or masses,TM w/o masses/lesions/perforations. TM  mobility noted.   Left Ear: Auricle normal appearance. External Auditory Canal within normal limits no lesions or masses,TM w/o masses/lesions/perforations. TM mobility noted.  Nose: No gross nasal septal deviation. Inferior Turbinates 3+ bilaterally. No septal perforation. No masses/lesions. External nasal skin appears normal without masses/lesions.  Oral Cavity: Gingiva/lips within normal limits.  Dentition/gingiva healthy appearing. Mucus membranes moist. Floor of mouth soft, no masses palpated. Oral Tongue mobile. Hard Palate appears normal.    Oropharynx: Base of tongue appears normal. No masses/lesions noted. Tonsillar fossa/pharyngeal wall without lesions. Posterior oropharynx WNL.  Soft palate without masses. Midline uvula.   Neck/Lymphatic: No LAD I-VI bilaterally.  No thyromegaly.  No masses noted on exam.      Diagnostic studies:  Audiogram interpreted personally by me and discussed in detail with the patient today.   Pure tone testing revealed mild sensorineural hearing loss, bilaterally. Speech reception thresholds were obtained at 30 dBHL in the right ear and 30 dBHL in the left ear. Speech discrimination scores were 100% in the right ear and 100% in the left ear. Tympanometry revealed Type A tympanograms in both ears.       Assessment:    ICD-10-CM ICD-9-CM    1. Sensorineural hearing loss (SNHL) of both ears  H90.3 389.18       2. Hearing loss, unspecified hearing loss type, unspecified laterality  H91.90 389.9 Ambulatory referral/consult to ENT      3. Tinnitus of both ears  H93.13 388.30         The primary encounter diagnosis was Sensorineural hearing loss (SNHL) of both ears. Diagnoses of Hearing loss, unspecified hearing loss type, unspecified laterality and Tinnitus of both ears were also pertinent to this visit.      Plan:  No orders of the defined types were placed in this encounter.    We reviewed the patient's recent audiogram and hearing loss in detail.  We also discussed that he is a good  candidate for hearing aids, if and when he the patient is motivated.   He is not interested in getting hearing aids at this time. We also discussed the use hearing protection when exposed to loud noise, including lawn equipment. Recommend audiogram in 1 year.     We also discussed that tinnitus is most often caused by a hearing loss, and that as the hair cells are damaged, either genetic or as a result of loud noise exposure, they then cause tinnitus.  Some patients find that restricting the salt or caffeine in their diet helps.  Tinnitus tends to be louder in times of stress and fatigue, and may decrease with time.  Sound machines may also be an effective masking technique if needed at night.      Thank you kindly for allowing me to participate in the patient's care.       Mia Mcallister NP

## 2024-08-10 NOTE — PROGRESS NOTES
HPI    DLS: 6/13/2024    Pt here for 2 Month IOP Check;  S/P SLT OD- 6/13/2024  S/P SLT OS- 5/30/2024    Pt states no eye pain or discomfort.    Meds;  Cosopt BID OU  Brimonidine BID OU  Latanoprost QHS OU    1. Mod POAG OU   2. NS OU     Last edited by Tiffany Newman on 8/13/2024  8:45 AM.            Assessment /Plan     For exam results, see Encounter Report.    Primary open angle glaucoma (POAG) of both eyes, moderate stage    Nuclear sclerotic cataract of both eyes         Vinh note form 2/20/2024   (-) FHx.   IOp 24 OD, OS. Last 15 OD, 16 OS.   Tmax  22 OD, 23 OS.   Tmin on drops 12 OD, 13 OS.    Pt failed to f/u from 12/17/2019 to 9/16/2020 and 6/2021 to 3/11/2022.   Pt reports compliance w/eyedrops.   Prev pt of Dr Huffman.   C/d 0.55 OD, 0.65 OS.     12/28/2022  HVF OD inf arcuate, OS sup arcuate  2/20/2024 HVF OD inf arc and early sup arcuate, OS sup arc and early inf arc    12/28/2022 OCT OD, thin NS, TS, T, TI, General (Borderline N). OS thin TS, TI, T, General (Borderline N and NI).   2/20/2024 OCT OD NS, TS, T, and G, borderline NI and N, OS thin TS, TI, T, and G, borderline N and NI    3/11/2022 Photos    Cont Latanoprost QHS OU.  D/c Timolol BiD OU on 12/15/2020  Cont Cosopt BID OU (start 12/15/2020)  Add Brimonidine BID OU  Importance of drop compliance and f/u discussed with pt.   Educated pt on findings w/understanding.  Referral to glaucoma clinic - Dr Barrientos / [facility]. Consider benefits of SLT.   Cont w/annual exams.          Glaucoma (type and duration)    dx 2018 - Dr Huffman (began lumigan and then stopped on his own) // re-started on gtts w/ Dr hitchcock 11/2019   First HVF   [facility] -12/2019   First photos   2019   Treatment / Drops started   2018 - stopped // re-started 2019            Family history    + Mother         Glaucoma meds    cospt / brimonidine / latanoprost         H/O adverse rxn to glaucoma drops    none        LASERS    SLT od - 6/13/2024 - good resp 23--> 12-13 // SLT os  5/30/2023 - good resp 22--> 12-13         GLAUCOMA SURGERIES    none        OTHER EYE SURGERIES    none        CDR    ? Per [facility] - check photos         Tbase    24 ou        Tmax    24 ou            Ttarget   14-15 max (+ prog with IOP's 17-18)           HVF    4 test 2019 to  2024 - initiall early IAD / --> to dense IALT and SAD  od /  / + VF loss os- initially SALT --> SALT and IAD (( + prog over 5 year OU)         Gonio    +3 ou        CCT    550/566        OCT    5 test 2019 to 2024 - RNFL - + loss od // + loss  os (? Prog over the years )         Disc photos    3/2022    - Ttoday    12/12   - Test done today    IOP // response to SLT's (good ou)       4/30/2023 - first visit w/ Se[facility] / glaucoma clinic   Dx 2018 w/ Dr. Huffman - stated on lumigan - but stopped on his own   First visit at [facility] 11/2/2019 - Dr hitchcock // followed w/ Vinh 11/2019 to 2/2024   IOP  23/21 (on gtts)   + FmHx - mother   Gonio +3 open ou      OLDER TEST - include   4 VF's // 5 OCT's / 1 photos   VF progression   OCT progression   Tmax 24/24 - on gtts     PLAN  8/13/2024   SLT OS- done 5/30/2024 - good resp - 23-->12-13  SLT OD  6/13/2024 -  good resp 22--> 12-13     Cont all gtts    Latanoprost ou q hs    Cosopt bid ou   Brimonidine bid ou    If IOP goes too high can try rocklatan/rhopressa   If unable to control IOP - consider incisional surgery ( marked prog ou over last 4-5 years)     As pt has severe stage glaucoma - will cont to follow in glaucoma clinic   If needs new glasses in future can refer back to dr hitchcock     F/U 3 months - IOP check // will repeat HVF's in the new year

## 2024-08-12 RX ORDER — METFORMIN HYDROCHLORIDE 500 MG/1
TABLET ORAL
Qty: 90 TABLET | Refills: 0 | Status: SHIPPED | OUTPATIENT
Start: 2024-08-12

## 2024-08-12 NOTE — TELEPHONE ENCOUNTER
Refill Decision Note   Celso Hernandez  is requesting a refill authorization.  Brief Assessment and Rationale for Refill:  Approve     Medication Therapy Plan:         Pharmacist review requested: Yes   Comments:     Note composed:3:00 PM 08/12/2024

## 2024-08-12 NOTE — TELEPHONE ENCOUNTER
Refill Routing Note   Medication(s) are not appropriate for processing by [facility] for the following reason(s):        Drug-disease interaction    [facility] action(s):  Defer      Medication Therapy Plan: FLOS; BPH with obstruction/lower urinary tract symptoms    Pharmacist review requested: Yes     Appointments  past 12m or future 3m with PCP    Date Provider   Last Visit   4/11/2024 Inder Quarles MD   Next Visit   10/17/2024 Inder Quarles MD   ED visits in past 90 days: 0        Note composed:2:19 PM 08/12/2024

## 2024-08-12 NOTE — TELEPHONE ENCOUNTER
Care Due:                  Date            Visit Type   Department     Provider  --------------------------------------------------------------------------------                                EP -                              PRIMARY      [facility] INTERNAL  Last Visit: 04-      CARE ([facility])   MEDICINE       Inderharry Quarles                              EP -                              PRIMARY      [facility] INTERNAL  Next Visit: 10-      CARE ([facility])   MEDICINE       Inderej Quarles                                                            Last  Test          Frequency    Reason                     Performed    Due Date  --------------------------------------------------------------------------------    HBA1C.......  6 months...  metFORMIN................  04-   10-    Health [facility] Embedded Care Due Messages. Reference number: [id number].   8/12/2024 12:52:16 PM CDT

## 2024-08-13 ENCOUNTER — OFFICE VISIT (OUTPATIENT)
Dept: OPHTHALMOLOGY | Facility: CLINIC | Age: 69
End: 2024-08-13
Payer: MEDICARE

## 2024-08-13 DIAGNOSIS — H25.13 NUCLEAR SCLEROTIC CATARACT OF BOTH EYES: ICD-10-CM

## 2024-08-13 DIAGNOSIS — H40.1132 PRIMARY OPEN ANGLE GLAUCOMA (POAG) OF BOTH EYES, MODERATE STAGE: Primary | ICD-10-CM

## 2024-08-13 PROCEDURE — 1126F AMNT PAIN NOTED NONE PRSNT: CPT | Mod: CPTII,S$GLB,, | Performed by: OPHTHALMOLOGY

## 2024-08-13 PROCEDURE — 1101F PT FALLS ASSESS-DOCD LE1/YR: CPT | Mod: CPTII,S$GLB,, | Performed by: OPHTHALMOLOGY

## 2024-08-13 PROCEDURE — 3288F FALL RISK ASSESSMENT DOCD: CPT | Mod: CPTII,S$GLB,, | Performed by: OPHTHALMOLOGY

## 2024-08-13 PROCEDURE — 3044F HG A1C LEVEL LT 7.0%: CPT | Mod: CPTII,S$GLB,, | Performed by: OPHTHALMOLOGY

## 2024-08-13 PROCEDURE — 1159F MED LIST DOCD IN RCRD: CPT | Mod: CPTII,S$GLB,, | Performed by: OPHTHALMOLOGY

## 2024-08-13 PROCEDURE — 1160F RVW MEDS BY RX/DR IN RCRD: CPT | Mod: CPTII,S$GLB,, | Performed by: OPHTHALMOLOGY

## 2024-08-13 PROCEDURE — 99999 PR PBB SHADOW E&M-EST. PATIENT-LVL II: CPT | Mod: PBBFAC,,, | Performed by: OPHTHALMOLOGY

## 2024-08-13 PROCEDURE — 99214 OFFICE O/P EST MOD 30 MIN: CPT | Mod: S$GLB,,, | Performed by: OPHTHALMOLOGY

## 2024-08-13 NOTE — Clinical Note
Pt had a good initial response to slt's ou - will monitor to see if he maintains it. I will check him back in 3 months and repeat his VF's in the new year. Thanks for sending him over - he has unfortunately progressed a lot over the last 4 years. He may yet need rhopressa as well of even surgical intervention in the future .

## 2024-10-07 NOTE — LETTER
April 27, 2020      Inder Quarles MD  [address] [facility] [address]  [place] [address]           [facility]  [address], [address]  [place] [address]  Phone: [phone]  Fax: [phone]          Patient: Celso Hernandez   MR Number: [id number]   YOB: 1955   Date of Visit: 4/27/2020       Dear Dr. Inder Quarles:    Thank you for referring Celso Hernandez to me for evaluation. Attached you will find relevant portions of my assessment and plan of care.    If you have questions, please do not hesitate to call me. I look forward to following Celso Hernandez along with you.    Sincerely,    Mehrdad Mares, DPRADHA    Enclosure  CC:  No Recipients    If you would like to receive this communication electronically, please contact [email or web address] or [phone] to request more information on [facility] access.    For providers and/or their staff who would like to refer a patient to [facility], please contact us through our one-stop-shop provider referral line, [facility], at [phone].    If you feel you have received this communication in error or would no longer like to receive these types of communications, please e-mail [email or web address]          18

## 2024-10-09 DIAGNOSIS — E11.9 TYPE 2 DIABETES MELLITUS WITHOUT COMPLICATION: ICD-10-CM

## 2024-10-10 ENCOUNTER — LAB VISIT (OUTPATIENT)
Dept: LAB | Facility: HOSPITAL | Age: 69
End: 2024-10-10
Attending: INTERNAL MEDICINE
Payer: MEDICARE

## 2024-10-10 DIAGNOSIS — E78.2 MIXED HYPERLIPIDEMIA: ICD-10-CM

## 2024-10-10 DIAGNOSIS — I10 ESSENTIAL HYPERTENSION: ICD-10-CM

## 2024-10-10 DIAGNOSIS — E11.69 TYPE 2 DIABETES MELLITUS WITH HYPERLIPIDEMIA: ICD-10-CM

## 2024-10-10 DIAGNOSIS — E66.01 SEVERE OBESITY (BMI 35.0-39.9) WITH COMORBIDITY: ICD-10-CM

## 2024-10-10 DIAGNOSIS — E78.5 TYPE 2 DIABETES MELLITUS WITH HYPERLIPIDEMIA: ICD-10-CM

## 2024-10-10 LAB
ALBUMIN SERPL BCP-MCNC: 3.5 G/DL (ref 3.5–5.2)
ALP SERPL-CCNC: 65 U/L (ref 55–135)
ALT SERPL W/O P-5'-P-CCNC: 25 U/L (ref 10–44)
ANION GAP SERPL CALC-SCNC: 11 MMOL/L (ref 8–16)
AST SERPL-CCNC: 25 U/L (ref 10–40)
BILIRUB SERPL-MCNC: 0.7 MG/DL (ref 0.1–1)
BUN SERPL-MCNC: 17 MG/DL (ref 8–23)
CALCIUM SERPL-MCNC: 10.1 MG/DL (ref 8.7–10.5)
CHLORIDE SERPL-SCNC: 104 MMOL/L (ref 95–110)
CHOLEST SERPL-MCNC: 209 MG/DL (ref 120–199)
CHOLEST/HDLC SERPL: 5.1 {RATIO} (ref 2–5)
CO2 SERPL-SCNC: 20 MMOL/L (ref 23–29)
COMPLEXED PSA SERPL-MCNC: 0.83 NG/ML (ref 0–4)
CREAT SERPL-MCNC: 1.1 MG/DL (ref 0.5–1.4)
EST. GFR  (NO RACE VARIABLE): >60 ML/MIN/1.73 M^2
ESTIMATED AVG GLUCOSE: 117 MG/DL (ref 68–131)
GLUCOSE SERPL-MCNC: 101 MG/DL (ref 70–110)
HBA1C MFR BLD: 5.7 % (ref 4–5.6)
HDLC SERPL-MCNC: 41 MG/DL (ref 40–75)
HDLC SERPL: 19.6 % (ref 20–50)
LDLC SERPL CALC-MCNC: 131.2 MG/DL (ref 63–159)
NONHDLC SERPL-MCNC: 168 MG/DL
POTASSIUM SERPL-SCNC: 3.7 MMOL/L (ref 3.5–5.1)
PROT SERPL-MCNC: 7.4 G/DL (ref 6–8.4)
SODIUM SERPL-SCNC: 135 MMOL/L (ref 136–145)
TRIGL SERPL-MCNC: 184 MG/DL (ref 30–150)
TSH SERPL DL<=0.005 MIU/L-ACNC: 1.8 UIU/ML (ref 0.4–4)

## 2024-10-10 PROCEDURE — 84153 ASSAY OF PSA TOTAL: CPT | Performed by: INTERNAL MEDICINE

## 2024-10-10 PROCEDURE — 80053 COMPREHEN METABOLIC PANEL: CPT | Performed by: INTERNAL MEDICINE

## 2024-10-10 PROCEDURE — 84443 ASSAY THYROID STIM HORMONE: CPT | Performed by: INTERNAL MEDICINE

## 2024-10-10 PROCEDURE — 80061 LIPID PANEL: CPT | Performed by: INTERNAL MEDICINE

## 2024-10-10 PROCEDURE — 83036 HEMOGLOBIN GLYCOSYLATED A1C: CPT | Performed by: INTERNAL MEDICINE

## 2024-10-17 ENCOUNTER — OFFICE VISIT (OUTPATIENT)
Dept: INTERNAL MEDICINE | Facility: CLINIC | Age: 69
End: 2024-10-17
Payer: MEDICARE

## 2024-10-17 VITALS
BODY MASS INDEX: 39.99 KG/M2 | TEMPERATURE: 98 F | SYSTOLIC BLOOD PRESSURE: 164 MMHG | OXYGEN SATURATION: 98 % | WEIGHT: 263.88 LBS | HEIGHT: 68 IN | RESPIRATION RATE: 15 BRPM | HEART RATE: 94 BPM | DIASTOLIC BLOOD PRESSURE: 100 MMHG

## 2024-10-17 DIAGNOSIS — R21 RASH: ICD-10-CM

## 2024-10-17 DIAGNOSIS — I10 ESSENTIAL HYPERTENSION: ICD-10-CM

## 2024-10-17 DIAGNOSIS — E78.5 TYPE 2 DIABETES MELLITUS WITH HYPERLIPIDEMIA: Primary | ICD-10-CM

## 2024-10-17 DIAGNOSIS — Z23 NEED FOR VACCINATION: ICD-10-CM

## 2024-10-17 DIAGNOSIS — H40.1132 PRIMARY OPEN ANGLE GLAUCOMA OF BOTH EYES, MODERATE STAGE: ICD-10-CM

## 2024-10-17 DIAGNOSIS — E78.2 MIXED HYPERLIPIDEMIA: ICD-10-CM

## 2024-10-17 DIAGNOSIS — E11.69 TYPE 2 DIABETES MELLITUS WITH HYPERLIPIDEMIA: Primary | ICD-10-CM

## 2024-10-17 PROCEDURE — 99999 PR PBB SHADOW E&M-EST. PATIENT-LVL IV: CPT | Mod: PBBFAC,,, | Performed by: INTERNAL MEDICINE

## 2024-10-17 RX ORDER — EZETIMIBE 10 MG/1
10 TABLET ORAL DAILY
Qty: 90 TABLET | Refills: 3 | Status: SHIPPED | OUTPATIENT
Start: 2024-10-17 | End: 2025-10-17

## 2024-10-17 RX ORDER — HYDRALAZINE HYDROCHLORIDE 100 MG/1
100 TABLET, FILM COATED ORAL 2 TIMES DAILY
Qty: 180 TABLET | Refills: 3 | Status: SHIPPED | OUTPATIENT
Start: 2024-10-17

## 2024-10-17 RX ORDER — TRIAMCINOLONE ACETONIDE 1 MG/G
CREAM TOPICAL 2 TIMES DAILY
Qty: 80 G | Refills: 1 | Status: SHIPPED | OUTPATIENT
Start: 2024-10-17

## 2024-10-17 NOTE — PROGRESS NOTES
Subjective:       Patient ID: Celso Hernandez is a 69 y.o. male.    Chief Complaint: Follow-up (6 mo)    History of Present Illness    Celso presents today for follow up.    HYPERTENSION:  He reports a recent blood pressure reading of 164/100. Home readings typically range from 145-150. He is currently taking hydralazine 50 mg 3 times daily and losartan HCT daily, with approximately a month's supply of hydralazine remaining. He denies significant changes in diet, though mentions possibly eating more recently.    DIABETES AND CHOLESTEROL:  He reports well-controlled diabetes with a recent blood sugar of 101 and HbA1c of 5.7%. However, cholesterol levels are elevated with total cholesterol at 209 and LDL at 131. He is taking rosuvastatin for cholesterol management.    WEIGHT MANAGEMENT:  He reports a slight increase in weight from 261 to 263 lbs, acknowledging only a minor increase in food intake.    OCULAR HEALTH:  He reports gradual deterioration of distance vision over the past 4-5 months. He has a history of eye surgery to lower intraocular pressure and currently uses three eye drops to treat glaucoma. He wears distance glasses for driving. There is a family history of eye conditions, with his mother having had either glaucoma or cataracts.    SLEEP AND URINARY HABITS:  He reports being a restless sleeper, typically waking up twice a night to urinate, but does not consider this a significant problem. He denies any discomfort, burning, or other urinary symptoms.    DERMATOLOGICAL CONCERNS:  He reports an itchy rash on his feet and the back of his legs, present for approximately 6 months. The rash starts as a small area and then spreads. He has been using OTC cortisone 10 to help control the itching.    MUSCULOSKELETAL AND NEUROLOGICAL SYMPTOMS:  He reports occasional foot swelling at night that resolves by morning. He experiences numbness in his legs after prolonged sitting, particularly after periods of physical  activity. The numbness occurs when attempting to stand up but resolves within a few seconds of movement and weight-bearing. He denies numbness in arms or changes in sensation elsewhere, and denies headaches.    CARDIOVASCULAR SYMPTOMS:  He reports experiencing brief lightheadedness when standing up quickly, particularly when transitioning from a seated to standing position. He denies any problems with balance while walking.    RESPIRATORY ISSUES:  He reports experiencing sinus flare-ups associated with weather changes, causing him to breathe harder.    SOCIAL HISTORY AND LIFESTYLE:  He has been retired for 4 years. He reports no regular exercise routine but is careful about his salt intake.    VACCINATIONS:  He is due for his annual flu vaccine, having last received the influenza vaccine in October of the previous year.      ROS:  Constitutional: +weight gain, +lightheadedness  Eyes: +vision changes  Respiratory: +difficulty breathing  Genitourinary: -dysuria, +nocturia  Integumentary: +rash  Neurological: -numbness, -difficulty walking              Physical Exam  Vitals and nursing note reviewed.   Constitutional:       General: He is not in acute distress.     Appearance: Normal appearance. He is well-developed.   HENT:      Head: Normocephalic and atraumatic.   Eyes:      General: No scleral icterus.     Extraocular Movements: Extraocular movements intact.      Conjunctiva/sclera: Conjunctivae normal.   Neck:      Thyroid: No thyromegaly.      Vascular: No JVD.   Cardiovascular:      Rate and Rhythm: Normal rate and regular rhythm.      Heart sounds: Normal heart sounds. No murmur heard.     No gallop.   Pulmonary:      Effort: Pulmonary effort is normal. No respiratory distress.      Breath sounds: Normal breath sounds. No wheezing or rales.   Abdominal:      General: Bowel sounds are normal.      Palpations: Abdomen is soft. There is no mass.      Tenderness: There is no abdominal tenderness.   Musculoskeletal:          General: No tenderness. Normal range of motion.      Cervical back: Normal range of motion and neck supple.   Lymphadenopathy:      Cervical: No cervical adenopathy.   Skin:     General: Skin is warm and dry.      Findings: Rash (Hyperpigmented maculopapular eruption is noted on both lower legs and ankles.) present.      Comments: No foot lesions are present.   Neurological:      Mental Status: He is alert and oriented to person, place, and time.      Cranial Nerves: No cranial nerve deficit.      Comments: Sensory examination is intact in both feet on monofilament testing.   Psychiatric:         Mood and Affect: Mood normal.         Behavior: Behavior normal.         Protective Sensation (w/ 10 gram monofilament):  Right: Intact  Left: Intact    Visual Inspection:  Normal -  Bilateral    Pedal Pulses:   Right: Present  Left: Present    Posterior Tibialis Pulses:   Right:Present  Left: Present    Lab Visit on 10/10/2024   Component Date Value Ref Range Status    Sodium 10/10/2024 135 (L)  136 - 145 mmol/L Final    Potassium 10/10/2024 3.7  3.5 - 5.1 mmol/L Final    Chloride 10/10/2024 104  95 - 110 mmol/L Final    CO2 10/10/2024 20 (L)  23 - 29 mmol/L Final    Glucose 10/10/2024 101  70 - 110 mg/dL Final    BUN 10/10/2024 17  8 - 23 mg/dL Final    Creatinine 10/10/2024 1.1  0.5 - 1.4 mg/dL Final    Calcium 10/10/2024 10.1  8.7 - 10.5 mg/dL Final    Total Protein 10/10/2024 7.4  6.0 - 8.4 g/dL Final    Albumin 10/10/2024 3.5  3.5 - 5.2 g/dL Final    Total Bilirubin 10/10/2024 0.7  0.1 - 1.0 mg/dL Final    Comment: For infants and newborns, interpretation of results should be based  on gestational age, weight and in agreement with clinical  observations.    Premature Infant recommended reference ranges:  Up to 24 hours.............<8.0 mg/dL  Up to 48 hours............<12.0 mg/dL  3-5 days..................<15.0 mg/dL  6-29 days.................<15.0 mg/dL      Alkaline Phosphatase 10/10/2024 65  55 - 135 U/L  Final    AST 10/10/2024 25  10 - 40 U/L Final    ALT 10/10/2024 25  10 - 44 U/L Final    eGFR 10/10/2024 >60  >60 mL/min/1.73 m^2 Final    Anion Gap 10/10/2024 11  8 - 16 mmol/L Final    Cholesterol 10/10/2024 209 (H)  120 - 199 mg/dL Final    Comment: The National Cholesterol Education Program (NCEP) has set the  following guidelines (reference ranges) for Cholesterol:  Optimal.....................<200 mg/dL  Borderline High.............200-239 mg/dL  High........................> or = 240 mg/dL      Triglycerides 10/10/2024 184 (H)  30 - 150 mg/dL Final    Comment: The National Cholesterol Education Program (NCEP) has set the  following guidelines (reference values) for triglycerides:  Normal......................<150 mg/dL  Borderline High.............150-199 mg/dL  High........................200-499 mg/dL      HDL 10/10/2024 41  40 - 75 mg/dL Final    Comment: The National Cholesterol Education Program (NCEP) has set the  following guidelines (reference values) for HDL Cholesterol:  Low...............<40 mg/dL  Optimal...........>60 mg/dL      LDL Cholesterol 10/10/2024 131.2  63.0 - 159.0 mg/dL Final    Comment: The National Cholesterol Education Program (NCEP) has set the  following guidelines (reference values) for LDL Cholesterol:  Optimal.......................<130 mg/dL  Borderline High...............130-159 mg/dL  High..........................160-189 mg/dL  Very High.....................>190 mg/dL      HDL/Cholesterol Ratio 10/10/2024 19.6 (L)  20.0 - 50.0 % Final    Total Cholesterol/HDL Ratio 10/10/2024 5.1 (H)  2.0 - 5.0 Final    Non-HDL Cholesterol 10/10/2024 168  mg/dL Final    Comment: Risk category and Non-HDL cholesterol goals:  Coronary heart disease (CHD)or equivalent (10-year risk of CHD >20%):  Non-HDL cholesterol goal     <130 mg/dL  Two or more CHD risk factors and 10-year risk of CHD <= 20%:  Non-HDL cholesterol goal     <160 mg/dL  0 to 1 CHD risk factor:  Non-HDL cholesterol goal      <190 mg/dL      TSH 10/10/2024 1.802  0.400 - 4.000 uIU/mL Final    Hemoglobin A1C 10/10/2024 5.7 (H)  4.0 - 5.6 % Final    Comment: ADA Screening Guidelines:  5.7-6.4%  Consistent with prediabetes  >or=6.5%  Consistent with diabetes    High levels of fetal hemoglobin interfere with the HbA1C  assay. Heterozygous hemoglobin variants (HbS, HgC, etc)do  not significantly interfere with this assay.   However, presence of multiple variants may affect accuracy.      Estimated Avg Glucose 10/10/2024 117  68 - 131 mg/dL Final    PSA, Screen 10/10/2024 0.83  0.00 - 4.00 ng/mL Final    Comment: The testing method is a chemiluminescent microparticle immunoassay   manufactured by [employer] and performed on the    or   [employer] system. Values obtained with different assay manufacturers   for   methods may be different and cannot be used interchangeably.  PSA Expected levels:  Hormonal Therapy: <0.05 ng/ml  Prostatectomy: <0.01 ng/ml  Radiation Therapy: <1.00 ng/ml         Assessment & Plan:     Assessment & Plan     Assessed blood pressure control, noting elevation despite recent increase in hydralazine dosage   Evaluated cholesterol management, noting LDL remains elevated despite current statin therapy   Considered postural lightheadedness as potential side effect of antihypertensive medication   Reviewed lab results, noting well-controlled blood sugar (A1c 5.7%), normal kidney and liver function   Assessed prostate health, noting PSA remains low at 0.83   Evaluated thyroid function, finding it within normal limits    HYPERTENSION:   Explained potential relationship between blood pressure medication and postural lightheadedness.   Celso to be cautious when changing positions to avoid lightheadedness.   Celso to continue to be mindful of salt intake in diet.   Increased hydralazine from 50 mg 3 times daily to 100 mg twice daily.   Continued losartan HCT daily.   Follow up in 3-4 weeks for blood pressure recheck  with nurse.   Contact office if experiencing increased dizziness or weakness with new medication regimen.    HYPERLIPIDEMIA:   Started ezetimibe 10 mg daily, to be taken with rosuvastatin for improved cholesterol management.   Continued rosuvastatin for cholesterol.    RASH:   Started triamcinolone cream for rash on lower legs and feet, to be used as directed.   Referred to dermatology for evaluation of persistent rash on lower legs and feet.    RSV VACCINATION:   Explained RSV vaccine recommendation for adults 60 and over.    COVID-19 VACCINATION:   Discussed updated COVID shot availability through pharmacy.    INFLUENZA VACCINATION:   Administered flu vaccine in office.    MEDICATIONS/SUPPLEMENTS:   Celso to adjust medication schedule to take doses earlier in the day for better adherence.    OTHER INSTRUCTIONS:   Explained that numbness after prolonged sitting is likely due to pressure on nerves.       Total visit time was 40 minutes.    Follow up in about 4 months (around 2/17/2025).     Inder Quarles MD

## 2024-10-22 RX ORDER — LOSARTAN POTASSIUM AND HYDROCHLOROTHIAZIDE 12.5; 1 MG/1; MG/1
TABLET ORAL
Qty: 90 TABLET | Refills: 3 | Status: SHIPPED | OUTPATIENT
Start: 2024-10-22

## 2024-10-22 NOTE — TELEPHONE ENCOUNTER
Refill Routing Note   Medication(s) are not appropriate for processing by [facility] for the following reason(s):        Required vitals abnormal    [facility] action(s):  Defer               Appointments  past 12m or future 3m with PCP    Date Provider   Last Visit   10/17/2024 Inder Quarles MD   Next Visit   4/3/2025 Inder Quarles MD   ED visits in past 90 days: 0        Note composed:10:47 AM 10/22/2024

## 2024-10-22 NOTE — TELEPHONE ENCOUNTER
No care due was identified.  [facility] Embedded Care Due Messages. Reference number: [id number].   10/22/2024 7:01:03 AM CDT

## 2024-11-06 RX ORDER — METFORMIN HYDROCHLORIDE 500 MG/1
TABLET ORAL
Qty: 90 TABLET | Refills: 1 | Status: SHIPPED | OUTPATIENT
Start: 2024-11-06

## 2024-11-06 NOTE — TELEPHONE ENCOUNTER
No care due was identified.  Health [facility] Embedded Care Due Messages. Reference number: [id number].   11/06/2024 7:01:54 AM CST

## 2024-11-06 NOTE — TELEPHONE ENCOUNTER
Refill Decision Note   Celso Hernandez  is requesting a refill authorization.  Brief Assessment and Rationale for Refill:  Approve     Medication Therapy Plan:         Pharmacist review requested: Yes   Extended chart review required: Yes   Comments:     Note composed:2:12 PM 11/06/2024

## 2024-11-06 NOTE — TELEPHONE ENCOUNTER
Refill Routing Note   Medication(s) are not appropriate for processing by [facility] for the following reason(s):     DDI not previously overridden by current provider--after initial override, the Refill Center will be able to continue overrides      Drug-disease interaction: metFORMIN and BPH with obstruction/lower urinary tract symptoms     [facility] action(s):  Defer           Pharmacist review requested: Yes     Appointments  past 12m or future 3m with PCP    Date Provider   Last Visit   10/17/2024 Inder Quarles MD   Next Visit   4/3/2025 Inder Quarles MD   ED visits in past 90 days: 0        Note composed:2:07 PM 11/06/2024            No

## 2024-11-09 NOTE — PLAN OF CARE
Pt prepped for sx, vss, nad, wife at bedside   Quality 47: Advance Care Plan: Advance Care Planning discussed and documented; advance care plan or surrogate decision maker documented in the medical record. Quality 226: Preventive Care And Screening: Tobacco Use: Screening And Cessation Intervention: Patient screened for tobacco use and is an ex/non-smoker Detail Level: Detailed Quality 130: Documentation Of Current Medications In The Medical Record: Current Medications Documented Quality 431: Preventive Care And Screening: Unhealthy Alcohol Use - Screening: Patient not identified as an unhealthy alcohol user when screened for unhealthy alcohol use using a systematic screening method Name And Contact Information For Health Care Proxy: Kroy [phone]

## 2024-11-20 DIAGNOSIS — H40.1132 PRIMARY OPEN ANGLE GLAUCOMA (POAG) OF BOTH EYES, MODERATE STAGE: ICD-10-CM

## 2024-11-20 RX ORDER — DORZOLAMIDE HYDROCHLORIDE AND TIMOLOL MALEATE 20; 5 MG/ML; MG/ML
1 SOLUTION/ DROPS OPHTHALMIC 2 TIMES DAILY
Qty: 30 ML | Refills: 3 | Status: SHIPPED | OUTPATIENT
Start: 2024-11-20

## 2024-11-25 ENCOUNTER — TELEPHONE (OUTPATIENT)
Dept: OPTOMETRY | Facility: CLINIC | Age: 69
End: 2024-11-25
Payer: MEDICARE

## 2024-11-25 NOTE — TELEPHONE ENCOUNTER
----- Message from Dasha sent at 11/25/2024 12:57 PM CST -----  Regarding: Rx Fax Request  Type: Rx for eye glasses     Who Called:Celso Hernandez    Would the patient rather a call back or a response via [facility]? Call back    Best Call Back Number: [phone]    Additional Information:Patient asking to speak to office about his rx for eyes and if 02/2024 rx can be faxed to [facility]

## 2024-12-05 ENCOUNTER — OFFICE VISIT (OUTPATIENT)
Dept: OPTOMETRY | Facility: CLINIC | Age: 69
End: 2024-12-05
Payer: MEDICARE

## 2024-12-05 DIAGNOSIS — H52.4 HYPEROPIA WITH ASTIGMATISM AND PRESBYOPIA, BILATERAL: Primary | ICD-10-CM

## 2024-12-05 DIAGNOSIS — H52.203 HYPEROPIA WITH ASTIGMATISM AND PRESBYOPIA, BILATERAL: Primary | ICD-10-CM

## 2024-12-05 DIAGNOSIS — H52.03 HYPEROPIA WITH ASTIGMATISM AND PRESBYOPIA, BILATERAL: Primary | ICD-10-CM

## 2024-12-05 PROCEDURE — 99999 PR PBB SHADOW E&M-EST. PATIENT-LVL I: CPT | Mod: PBBFAC,,, | Performed by: OPTOMETRIST

## 2024-12-05 PROCEDURE — 1159F MED LIST DOCD IN RCRD: CPT | Mod: CPTII,S$GLB,, | Performed by: OPTOMETRIST

## 2024-12-05 PROCEDURE — 92015 DETERMINE REFRACTIVE STATE: CPT | Mod: S$GLB,,, | Performed by: OPTOMETRIST

## 2024-12-05 PROCEDURE — 3044F HG A1C LEVEL LT 7.0%: CPT | Mod: CPTII,S$GLB,, | Performed by: OPTOMETRIST

## 2024-12-05 PROCEDURE — 1160F RVW MEDS BY RX/DR IN RCRD: CPT | Mod: CPTII,S$GLB,, | Performed by: OPTOMETRIST

## 2024-12-05 NOTE — PROGRESS NOTES
TAYLOR    KEITH: 06/24  Chief complaint (CC): Patient is here for a refraction today.  Patient   broke his glasses and needs new ones.  Patient had noticed that reading   wasn't as clear as it used to be with the glasses.  Glasses? +  Contacts? -  H/o eye surgery, injections or laser: SLT OU  H/o eye injury: -  Known eye conditions? See above  Family h/o eye conditions? Mother with glaucoma  Eye gtts? Using latanoprost OU Q HS, Cosopt BID OU and brimonidine BID OU      (-) Flashes (-)  Floaters (-) Mucous   (-)  Tearing (-) Itching (-) Burning   (-) Headaches (-) Eye Pain/discomfort (-) Irritation   (-)  Redness (-) Double vision (-) Blurry vision    Diabetic? +  A1c? Hemoglobin A1C       Date                     Value               Ref Range             Status                10/10/2024               5.7 (H)             4.0 - 5.6 %           Final                 04/04/2024               5.8 (H)             4.0 - 5.6 %           Final                 09/26/2023               5.7 (H)             4.0 - 5.6 %           Final                    Last edited by Estella Kebede on 12/5/2024 10:03 AM.            Assessment /Plan     For exam results, see Encounter Report.    Hyperopia with astigmatism and presbyopia, bilateral      SRx released to patient. Patient educated on lens options. Cont f/u w/Dr Post. RTC 1 year.

## 2024-12-07 NOTE — PROGRESS NOTES
HPI     Glaucoma            Comments: 4 month IOP ck and pt states no changes since last exam           Comments    DLS: 8/13/24    1. POAG OU  2. NS OU    MEDS:  Cosopt BID OU  Brimonidine BID OU  Latanoprost QHS OU          Last edited by Mikayla Vázquez MA on 12/10/2024  8:43 AM.            Assessment /Plan     For exam results, see Encounter Report.    Primary open angle glaucoma (POAG) of both eyes, moderate stage    Nuclear sclerotic cataract of both eyes         Vinh note form 2/20/2024   (-) FHx.   IOp 24 OD, OS. Last 15 OD, 16 OS.   Tmax  22 OD, 23 OS.   Tmin on drops 12 OD, 13 OS.    Pt failed to f/u from 12/17/2019 to 9/16/2020 and 6/2021 to 3/11/2022.   Pt reports compliance w/eyedrops.   Prev pt of Dr Huffman.   C/d 0.55 OD, 0.65 OS.     12/28/2022  HVF OD inf arcuate, OS sup arcuate  2/20/2024 HVF OD inf arc and early sup arcuate, OS sup arc and early inf arc    12/28/2022 OCT OD, thin NS, TS, T, TI, General (Borderline N). OS thin TS, TI, T, General (Borderline N and NI).   2/20/2024 OCT OD NS, TS, T, and G, borderline NI and N, OS thin TS, TI, T, and G, borderline N and NI    3/11/2022 Photos    Cont Latanoprost QHS OU.  D/c Timolol BiD OU on 12/15/2020  Cont Cosopt BID OU (start 12/15/2020)  Add Brimonidine BID OU  Importance of drop compliance and f/u discussed with pt.   Educated pt on findings w/understanding.  Referral to glaucoma clinic - Dr Barrientos / [facility]. Consider benefits of SLT.   Cont w/annual exams.          Glaucoma (type and duration)    dx 2018 - Dr Huffman (began lumigan and then stopped on his own) // re-started on gtts w/ Dr hitchcock 11/2019   First HVF   [facility] -12/2019   First photos   2019   Treatment / Drops started   2018 - stopped // re-started 2019            Family history    + Mother         Glaucoma meds    cospt / brimonidine / latanoprost         H/O adverse rxn to glaucoma drops    none        LASERS    SLT od - 6/13/2024 - good resp 23--> 12-13 // SLT os 5/30/2023 -  good resp 22--> 12-13         GLAUCOMA SURGERIES    none        OTHER EYE SURGERIES    none        CDR    ? Per [facility] - check photos         Tbase    24 ou        Tmax    24 ou            Ttarget   14-15 max (+ prog with IOP's 17-18)           HVF    4 test 2019 to  2024 - initiall early IAD / --> to dense IALT and SAD  od /  / + VF loss os- initially SALT --> SALT and IAD (( + prog over 5 year OU)         Gonio    +3 ou        CCT    550/566        OCT    5 test 2019 to 2024 - RNFL - + loss od // + loss  os (? Prog over the years )         Disc photos    3/2022    - Ttoday    19/23 ( up from 12/12 - did NOT use cosopt or brimonidine this morning)   - Test done today    IOP //       4/30/2023 - first visit w/ [facility] / glaucoma clinic   Dx 2018 w/ Dr. Huffman - stated on lumigan - but stopped on his own   First visit at [facility] 11/2/2019 - Dr hitchcock // followed w/ Vinh 11/2019 to 2/2024   IOP  23/21 (on gtts)   + FmHx - mother   Gonio +3 open ou      OLDER TEST - include   4 VF's // 5 OCT's / 1 photos   VF progression   OCT progression   Tmax 24/24 - on gtts     PLAN  8/13/2024   SLT OS- done 5/30/2024 - good resp - 23-->12-13  SLT OD  6/13/2024 -  good resp 22--> 12-13     Cont all gtts    Latanoprost ou q hs    Cosopt bid ou   Brimonidine bid ou    12/10/2024   IOP up 12-->19 od // 12-->23 os (did not use cosopt or brimonidine this morning)  Rec rocklatan  Pt want to stay with present meds and re-check     F/U 2 months IOP check - if too high change to rocklatan (apt made)  F/U 4 months with HVF / DFE / OCT (apt made)     If IOP goes too high can try rocklatan/rhopressa   If unable to control IOP - consider incisional surgery ( marked prog ou over last 4-5 years)     As pt has severe stage glaucoma - will cont to follow in glaucoma clinic   If needs new glasses in future can refer back to dr hitchcock

## 2024-12-10 ENCOUNTER — OFFICE VISIT (OUTPATIENT)
Dept: OPHTHALMOLOGY | Facility: CLINIC | Age: 69
End: 2024-12-10
Payer: MEDICARE

## 2024-12-10 DIAGNOSIS — H40.1132 PRIMARY OPEN ANGLE GLAUCOMA (POAG) OF BOTH EYES, MODERATE STAGE: Primary | ICD-10-CM

## 2024-12-10 DIAGNOSIS — H25.13 NUCLEAR SCLEROTIC CATARACT OF BOTH EYES: ICD-10-CM

## 2024-12-10 PROCEDURE — 1160F RVW MEDS BY RX/DR IN RCRD: CPT | Mod: CPTII,S$GLB,, | Performed by: OPHTHALMOLOGY

## 2024-12-10 PROCEDURE — 99214 OFFICE O/P EST MOD 30 MIN: CPT | Mod: S$GLB,,, | Performed by: OPHTHALMOLOGY

## 2024-12-10 PROCEDURE — 3288F FALL RISK ASSESSMENT DOCD: CPT | Mod: CPTII,S$GLB,, | Performed by: OPHTHALMOLOGY

## 2024-12-10 PROCEDURE — 1101F PT FALLS ASSESS-DOCD LE1/YR: CPT | Mod: CPTII,S$GLB,, | Performed by: OPHTHALMOLOGY

## 2024-12-10 PROCEDURE — 99999 PR PBB SHADOW E&M-EST. PATIENT-LVL III: CPT | Mod: PBBFAC,,, | Performed by: OPHTHALMOLOGY

## 2024-12-10 PROCEDURE — 1159F MED LIST DOCD IN RCRD: CPT | Mod: CPTII,S$GLB,, | Performed by: OPHTHALMOLOGY

## 2024-12-10 PROCEDURE — 3044F HG A1C LEVEL LT 7.0%: CPT | Mod: CPTII,S$GLB,, | Performed by: OPHTHALMOLOGY

## 2024-12-10 PROCEDURE — 1126F AMNT PAIN NOTED NONE PRSNT: CPT | Mod: CPTII,S$GLB,, | Performed by: OPHTHALMOLOGY

## 2024-12-10 RX ORDER — LATANOPROST 50 UG/ML
1 SOLUTION/ DROPS OPHTHALMIC NIGHTLY
Qty: 7.5 ML | Refills: 3 | Status: SHIPPED | OUTPATIENT
Start: 2024-12-10

## 2024-12-26 ENCOUNTER — TELEPHONE (OUTPATIENT)
Dept: OPTOMETRY | Facility: CLINIC | Age: 69
End: 2024-12-26
Payer: MEDICARE

## 2024-12-26 NOTE — TELEPHONE ENCOUNTER
----- Message from Charlotte sent at 12/26/2024  2:13 PM CST -----  Type:  Needs Medical Advice    Who Called: Celso  Symptoms (please be specific): Recheck on glasses   How long has patient had these symptoms:    Pharmacy name and phone #:    Would the patient rather a call back or a response via [facility]? call  Best Call Back Number:  [phone]  Additional Information:

## 2025-01-07 ENCOUNTER — OFFICE VISIT (OUTPATIENT)
Dept: OPTOMETRY | Facility: CLINIC | Age: 70
End: 2025-01-07
Payer: MEDICARE

## 2025-01-07 DIAGNOSIS — H52.03 HYPEROPIA WITH ASTIGMATISM AND PRESBYOPIA, BILATERAL: ICD-10-CM

## 2025-01-07 DIAGNOSIS — H52.203 HYPEROPIA WITH ASTIGMATISM AND PRESBYOPIA, BILATERAL: ICD-10-CM

## 2025-01-07 DIAGNOSIS — H04.123 DRY EYE SYNDROME OF BOTH EYES: Primary | ICD-10-CM

## 2025-01-07 DIAGNOSIS — H52.4 HYPEROPIA WITH ASTIGMATISM AND PRESBYOPIA, BILATERAL: ICD-10-CM

## 2025-01-07 PROCEDURE — 99213 OFFICE O/P EST LOW 20 MIN: CPT | Mod: S$GLB,,, | Performed by: OPTOMETRIST

## 2025-01-07 PROCEDURE — 1160F RVW MEDS BY RX/DR IN RCRD: CPT | Mod: CPTII,S$GLB,, | Performed by: OPTOMETRIST

## 2025-01-07 PROCEDURE — 1159F MED LIST DOCD IN RCRD: CPT | Mod: CPTII,S$GLB,, | Performed by: OPTOMETRIST

## 2025-01-07 PROCEDURE — 99999 PR PBB SHADOW E&M-EST. PATIENT-LVL II: CPT | Mod: PBBFAC,,, | Performed by: OPTOMETRIST

## 2025-01-07 NOTE — PROGRESS NOTES
TAYLOR    KEITH: 12/24 with Dr. Post  Chief complaint (CC): Patient is here for an RX check today.  Patient had   new glasses made and distance seems fine but near doesn't seem to be as   strong as his old ones.  Patient feels he sees better near with his old   glasses.  Glasses? +  Contacts? -  H/o eye surgery, injections or laser: SLT OU  H/o eye injury: -  Known eye conditions? See above  Family h/o eye conditions? Mother with glaucoma  Eye gtts? Using latanoprost OU Q HS, Cosopt BID OU and brimonidine BID OU        (-) Flashes (-)  Floaters (-) Mucous   (-)  Tearing (-) Itching (-) Burning   (-) Headaches (-) Eye Pain/discomfort (-) Irritation   (-)  Redness (-) Double vision (-) Blurry vision     Diabetic? +  A1c? Hemoglobin A1C       Date                     Value               Ref Range             Status                10/10/2024               5.7 (H)             4.0 - 5.6 %           Final                 04/04/2024               5.8 (H)             4.0 - 5.6 %           Final                 09/26/2023               5.7 (H)             4.0 - 5.6 %           Final                Last edited by Estella Kebede on 1/7/2025  1:53 PM.            Assessment /Plan     For exam results, see Encounter Report.    Dry eye syndrome of both eyes    Hyperopia with astigmatism and presbyopia, bilateral    Recommend iVizia, Systane Ultra or Refresh Optive BID-TID OU to aid with symptoms of dry eyes.    Pt presents today with issues with glasses purchased on 12/13/2025 with rx from 12/5/2025. Pt reports issues w/reading. Pt is new to PALs and has only worn years ago but since wore reading only. Pt gives similar MRX today and vision confirmed in trial frame. Pt would like split Rx.  Rx change.

## 2025-01-13 ENCOUNTER — OFFICE VISIT (OUTPATIENT)
Dept: DERMATOLOGY | Facility: CLINIC | Age: 70
End: 2025-01-13
Payer: MEDICARE

## 2025-01-13 DIAGNOSIS — R21 RASH: ICD-10-CM

## 2025-01-13 DIAGNOSIS — L30.8 ASTEATOTIC DERMATITIS: Primary | ICD-10-CM

## 2025-01-13 DIAGNOSIS — I87.2 STASIS DERMATITIS: ICD-10-CM

## 2025-01-13 PROCEDURE — 99203 OFFICE O/P NEW LOW 30 MIN: CPT | Mod: S$GLB,,, | Performed by: DERMATOLOGY

## 2025-01-13 PROCEDURE — 1159F MED LIST DOCD IN RCRD: CPT | Mod: CPTII,S$GLB,, | Performed by: DERMATOLOGY

## 2025-01-13 PROCEDURE — 99999 PR PBB SHADOW E&M-EST. PATIENT-LVL II: CPT | Mod: PBBFAC,,, | Performed by: DERMATOLOGY

## 2025-01-13 RX ORDER — TRIAMCINOLONE ACETONIDE 1 MG/G
CREAM TOPICAL 2 TIMES DAILY
Qty: 80 G | Refills: 1 | Status: CANCELLED | OUTPATIENT
Start: 2025-01-13

## 2025-01-13 NOTE — PROGRESS NOTES
Subjective:      Patient ID:  Celso Hernandez is a 69 y.o. male who presents for   Chief Complaint   Patient presents with    Rash     The patient reports having a rash for 3 months. It has since gone away with triamcinolone. He reports it was on legs. Dry legs during winter. He uses tide, occaional colonge. He has occasional swelling of legs. The patient occasionally wears compression socks. + hx of diabetes.  Occasionally moisturizes with Vaseline and generic lotion.     Review of Systems   Skin:  Positive for rash.   Objective:   Physical Exam   Skin:               Diagram Legend     Erythematous scaling macule/papule c/w actinic keratosis       Vascular papule c/w angioma      Pigmented verrucoid papule/plaque c/w seborrheic keratosis      Yellow umbilicated papule c/w sebaceous hyperplasia      Irregularly shaped tan macule c/w lentigo     1-2 mm smooth white papules consistent with Milia      Movable subcutaneous cyst with punctum c/w epidermal inclusion cyst      Subcutaneous movable cyst c/w pilar cyst      Firm pink to brown papule c/w dermatofibroma      Pedunculated fleshy papule(s) c/w skin tag(s)      Evenly pigmented macule c/w junctional nevus     Mildly variegated pigmented, slightly irregular-bordered macule c/w mildly atypical nevus      Flesh colored to evenly pigmented papule c/w intradermal nevus       Pink pearly papule/plaque c/w basal cell carcinoma      Erythematous hyperkeratotic cursted plaque c/w SCC      Surgical scar with no sign of skin cancer recurrence      Open and closed comedones      Inflammatory papules and pustules      Verrucoid papule consistent consistent with wart     Erythematous eczematous patches and plaques     Dystrophic onycholytic nail with subungual debris c/w onychomycosis     Umbilicated papule    Erythematous-base heme-crusted tan verrucoid plaque consistent with inflamed seborrheic keratosis     Erythematous Silvery Scaling Plaque c/w Psoriasis     See  annotation      Assessment / Plan:        Asteatotic dermatitis  Stasis dermatitis  More PIH present today but clinically could be component of both stasis dermatitis and asteotic eczema. Discussed diagnosis and treatment options   -Encourage compression socks every day   -Continue triamcinolone 0.1% cream BID PRN for active rash. Pt declined refill and stated he has a lot.   -Encourage moisturization with thicker creams like CeraVe, Eucerin daily.   -encourage fragrance free products and avoidance of perfumes      RTC PRN

## 2025-01-23 NOTE — TELEPHONE ENCOUNTER
Care Due:                  Date            Visit Type   Department     Provider  --------------------------------------------------------------------------------                                EP -                              PRIMARY      [facility] INTERNAL  Last Visit: 10-      CARE ([facility])   MEDICINE       Inderharry Quarles                              EP -                              PRIMARY      [facility] INTERNAL  Next Visit: 04-      [facility] ([facility])   MEDICINE       Inder A  Willam                                                            Last  Test          Frequency    Reason                     Performed    Due Date  --------------------------------------------------------------------------------    CBC.........  12 months..  hydrALAZINE, meloxicam...  04- 03-    HBA1C.......  6 months...  metFORMIN................  10-   04-    [facility] Embedded Care Due Messages. Reference number: [id number].   1/23/2025 12:14:13 PM CST

## 2025-01-24 RX ORDER — MELOXICAM 15 MG/1
TABLET ORAL
Qty: 90 TABLET | Refills: 0 | Status: SHIPPED | OUTPATIENT
Start: 2025-01-24

## 2025-01-24 NOTE — TELEPHONE ENCOUNTER
Refill Routing Note   Medication(s) are not appropriate for processing by [facility] for the following reason(s):      Medication outside of protocol    [facility] action(s):  Route Care Due:  None identified     Medication Therapy Plan: FLOS      Appointments  past 12m or future 3m with PCP    Date Provider   Last Visit   10/17/2024 Inder Quarles MD   Next Visit   4/3/2025 Inder Quarles MD   ED visits in past 90 days: 0        Note composed:9:01 PM 01/23/2025

## 2025-01-30 DIAGNOSIS — Z00.00 ENCOUNTER FOR MEDICARE ANNUAL WELLNESS EXAM: ICD-10-CM

## 2025-02-05 NOTE — PROGRESS NOTES
[facility] Established Clinic Visit    Referred by: Dr. Inder Quarles   Reason for referral: Primary osteoarthritis of right knee     CC:   Chief Complaint   Patient presents with    Knee Pain     Interval Update 02/06/2025:   Patient return to clinic for right knee pain.  Pain worse in the medial aspect of the right knee.  Incident occurred 3 days ago, patient was going down the stairs and his right knee gave out, he fell to the ground onto the right knee.  He states he fell onto grass.  Denies hitting his head.  Denies loss of consciousness.  That day the pain was significant, but has since improved. Patient made the appointment and wanted to come in just to make sure everything is alright. Patient has never had this happen before, he has no h/o surgeries or injections to the R knee. Patient continues to take meloxicam which is helping his joint pain. He does note pain in the mornings in his bilateral knee joints.     When the pain was at its worse, he describes it as sharp anterior knee pain that became aching. He also endorses a strain sensation to his medial knee.     Patient also complains of intermittent hip pain, worse in the left.  He also complains of axial neck pain.  Denies any radicular symptoms.    Interval History 10/10/23:  Celso Hernandez returns today for follow up for right gluteal/buttocks pain that has been ongoing for the last 3-4 months.  He is intermittent pain described as soreness pain is worse when sitting pain is mitigated when shifting weight off of his right gluteal area.  Denies any radiating symptoms down either leg the worst of his pain is 3/4/10.  He denies any recent incident or trauma denies any bowel bladder dysfunction,.  Patient states that he does a lot of sitting as he is a  he also watches television during the day for 2-3 hours every day.  He does take occasionally meloxicam 7.5 which she states does help.      Current Pain  "Scales:  Current: 0/10              Typical Range: 0-4/10     Interval History(08/09/2023):  Celso Hernandez returns today for follow up for neck and upper back pain.  He reports completing physical therapy and has now establish a home exercise plan at home.  He states that his neck pain is stable at the moment and is doing fine.    Currently, the neck pain is stable.        Current Pain Scales:  Current: 0/10              Typical Range: 2-4/10     Interval Updates  05/08/[id number]-xopa-old male presents today with neck and upper back pain.  Onset 5-6 days pain is specifically located at the base of his neck and radiates into his head.  Denies any headaches.  Pain is constant, pain is described as a strain and pulling he denies any radicular symptoms in either arm , denies any profound weakness, denies any paresthesia like symptoms.  Pain is aggravated with certain movements and laying down.  Pain is mitigated with ibuprofen 800 mg t.i.d..  He denies any recent incident or trauma he does state that he felt like he slept wrong and woke up the next morning feeling pain in the neck and into his head.    Interval Updates:   2/10/2022 - Mr. Hernandez is following up for  Low-back Pain and Mid-back Pain is currently rate 0/10 with a weekly range 2-5/10.  It is described as Aching, Grabbing and Tight.  He  Is reporting mild to no symptoms today, he states he has no pain when performing normal ADLs, he reports pain is exacerbated when he  "tries to do to much" He did also report midback /thoroacic pain with mild twisting but pain is stable at this point.     1/19/2022  -  Mary returns to clinic s/p Thoracic Epidural Steroid Injection at T5-6 on 1/4/22 with 60% relief.  He reports a pain intensity 1/10 today with a weekly range of 1-2/10.   He is reporting mild soarness in his thoracic area, he is reporting being " free of pain after the injection" his pain returned after working outside in his garage yesterday. Overall " much better following his injection. He is reporting certain movements increase his pain but other than that he  is better.     Subjective:   Celso Hernandez is a 69 y.o. male who has a past medical history of Arthritis, BPH (benign prostatic hyperplasia), Cataract, Colon polyps, Diabetes mellitus type II, Glaucoma, Hyperlipidemia, Hypertension, Multiple duodenal ulcers, and Unspecified hemorrhoids without mention of complication. He complains of pain as described below.    Location:  Right gluteal pain  Onset:  3-4 months  Radiation:  None  Timing: constant  Current Pain Score: 7/10  Weekly Pain Range: 2-10/10  Quality:  Pulling and strain type pain  Worsened by: exercise, extension, lifting, lying down, sitting and walking for more than several minutes  Improved by: medications ibuprofen 800 mg    Previous Therapies:  PT/OT: Denies for neck pain  HEP: Yes, stretching at home and walking.  TENS: No  Interventions:  01/04/2022 Thoracic Epidural Steroid Injection at T5-6  Surgery: Denies localized surgery  Opioids:  Adjuvants:     Current Pain Medications:  Ibuprofen 800 mg t.i.d.     Assessment & Plan:  Problem List Items Addressed This Visit    None  Visit Diagnoses       Primary osteoarthritis of right knee    -  Primary    Relevant Orders    X-ray Knee Ortho Bilateral with Flexion    Ambulatory Referral/Consult to Physical Therapy/Occupational Therapy    Hip pain, unspecified laterality        Relevant Orders    X-Ray Hips Bilateral 2 View Incl AP Pelvis    Acute pain of right knee        Cervical spondylosis        Cervicalgia                12/17/21 - Celso Hernandez is a 69 y.o. male who  has a past medical history of Arthritis, BPH (benign prostatic hyperplasia), Cataract, Colon polyps, Diabetes mellitus type II, Glaucoma, Hyperlipidemia, Hypertension, Multiple duodenal ulcers (10/08/2019), and Unspecified hemorrhoids without mention of complication.  By history and examination this patient has chronic  mid/upper back pain with right T5-6 radiculopathy.  The underlying cause cause is facet arthritis and degenerative disc disease with a large, obstructive osteophyte on the right side as seen on CT chest.  MRI is appropriate prior to an injections to assess soft tissue and vasculature.  We discussed the underlying diagnoses and multiple treatment options including non-opioid medications, opioid medications, interventional procedures, physical therapy and home exercise.  The risks and benefits of each treatment option were discussed and all questions were answered.        1/19/2022- Celso Hernandez is a 69 y.o. male who  has a past medical history of Arthritis, BPH (benign prostatic hyperplasia), Cataract, Colon polyps, Diabetes mellitus type II, Glaucoma, Hyperlipidemia, Hypertension, Multiple duodenal ulcers (10/08/2019), and Unspecified hemorrhoids without mention of complication.  By history and examination this patient has chronic mid/upper back pain with a right T5-6   Radiculopathy that responded well following a thoracic GABRIELA targeting T5-6.  Colonic causes appear to be facet arthritis and degenerative disc disease with a large obstructive osteophyte on the right side.  Upon review of his thoracic MRI ordered per Dr. Mejia results did show a extradural degenerative changes resulting in right T6 neural foraminal stenosis.  Secondly intradural extramedullary T2 signal abnormality at the level of the T4-5.  The radiologist include a differential diagnosis that included a nerve sheet to were such as a  schwannoma or a neurofibroma as well as other neoplasms.  Radiologist recommended further evaluation of this patient's thoracic area, discussed with the patient today that I will order a thoracic MRI with contrast to further evaluate. Long discussion with patient regarding  Results of his MRI, I will also refer to Oncology for further evaluation but I would like this patient to see a [facility] following a hip  obtaining new images.    02/10/[birth year]40-89-cxve-old male with history of chronic mid to low back pain, he presents today to review an updated thoracic MRI.  There was concerned per radiology on previous thoracic MRI with differential diagnosis that includes a nerve sheet to wear such as a schwannoma or a neurofibroma as well as other neoplasms.  I contacted Oncology and did not appear to be a malignancy.  Was a non malignant entity.  He contacted patient and made him aware of this.  Today his pain is 0/10, he does experience mild midthoracic low back pain when performing duties outside of his normal ADLs.  I recommended physical therapy patient is amenable to this plan.    05/08/[id number]-hgnr-old male presents today acute/subacute neck pain beginning at the patient's his neck radiating his head.  Etiology is unclear at this point.  Based on history the patient may be suffering facet arthritis of the cervical, differential diagnosis be myofascial pain.  Cause of his symptoms are unclear he did state that he slept wrong on a pillow and woke up the next morning with this pain.  He denies any radiating symptoms do not suspect any degenerative disc disease or central and/or neural foraminal stenosis.  Ibuprofen has helped his pain prior to come into clinic today but it only lasts 4-5 hours.  He and I discussed plan below and was agreed upon during our clinic visit today.    08/09/2023 68-year-old pleasant male neck pain upper back pain.  He reports having attended physical therapy and completing.  Based on my history and exam his pain generators were from facet arthritis and he also had a cervical myofascial component to his pain which both have improved through physical therapy.  I recommended he establish a home exercise plan patient verbalized understanding and agreed.    10/10/2023- 68-year-old male that is known to our clinic presents with right gluteal/buttocks pain that has been ongoing for the last 3-4 months.  Based  on my history and exam the patient is experiencing gluteal pain my differential diagnosis will include sacroiliitis right-sided on.  He has no radicular symptoms he denies any paresthesia like symptoms.  I do believe the patient does a lot of sitting during the day I think would benefit him to establish home exercise plan that focuses on gluteal stretches which I will provide him with today.    2/6/2024 - Patient presents to clinic for R knee pain after a mechanical fall. Patient initially experienced sharp anterior knee pain that has resolved over the past few days. On exam, patient has mild tenderness to palpation and negative anterior drawer. Discussed proceeding with conservative management which the patient is on board with. RICE for knee pain.  He also complains chronic hip pain that radiates to the groin worse in the left.  He also complains of axial cervical pain consistent with facet arthropathy.  We discussed interventional procedures for these chronic pains including intra-articular hip injections as well as cervical medial branch blocks/RFA.  Patient would prefer to treat conservatively with physical therapy for now.      Treatment Plan:   Procedures: We discussed interventional procedures for these chronic pains including intra-articular hip injections as well as cervical medial branch blocks/RFA.  PT/OT/HEP: I have stressed the importance of physical activity and a home exercise plan to help with pain and improve health.  Referral placed to physical therapy.  Medications:    - no new medications prescribed at this visit.  Educated patient on chronic use of Mobic.   -  Reviewed and consistent with medication use as prescribed.  Imaging:  Prior cervical x-ray reviewed.  I will obtain x-rays of the hips and knees for further assessment.  Follow Up: RTC 6 weeks, or sooner if needed    Ray Garcia M.D.  PGY-5  Interventional Pain Management Fellow  [facility]  Pager: [phone]    I  personally evaluated and examined the patient.  I reviewed the trainee's HPI, physical examination, assessment, and plan. I agree with the findings and treatment plan.    Casie Miguel DO   Interventional Pain Management        Imagin22 MRI Thoracic Spine W WO Contrast     Narrative & Impression  EXAMINATION:  MRI THORACIC SPINE W WO CONTRAST     CLINICAL HISTORY:  Attention to T4-5 level.  Evaluate for underlying mass.     TECHNIQUE:  Pre and postcontrast MR imaging of the thoracic spine was performed utilizing 10 mL Gadavist intravenous contrast.     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     No advanced marrow edema or osseous destructive process is identified.  Multilevel mild disc bulging flattens the thecal sac without cord impingement.  Marked degenerative foraminal stenosis on the left at T 4-5 and to the right at T5-6 is again noted.     The spinal cord maintains normal signal intensity.     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     No enhancing lesion.  Degenerative foraminal stenosis on the left at T4-5 and on the right at T5-6.        Electronically signed by: Trent Graves  Date:                                            2022  Time:                                           17:11        CT Chest Without Contrast  Impression:  No worrisome finding on the chest CT.  Mild atherosclerotic plaque of the aorta and mild coronary artery calcification  Severe foraminal narrowing on the right at T5-6 due to significant facet joint osseous hypertrophy.  Electronically signed by: Halley Lundberg MD  Date:                                            2021  Time:                                       "     08:56    X-Ray Lumbar Spine Ap And Lateral 11/21/2019  FINDINGS:  Two views AP lateral.  There is mild DJD and DISH.  Alignment is normal.  No fracture dislocation bone destruction.  No trauma seen.  Impression:  No acute process seen.  DJD and DISH.      Review of Systems:  Review of Systems   Constitutional:  Negative for chills and fever.   HENT:  Negative for nosebleeds.    Eyes:  Negative for blurred vision and pain.   Respiratory:  Negative for hemoptysis.    Cardiovascular:  Negative for chest pain and palpitations.   Gastrointestinal:  Negative for heartburn, nausea and vomiting.   Genitourinary:  Negative for dysuria and hematuria.   Musculoskeletal:  Positive for joint pain. Negative for myalgias.   Skin:  Negative for rash.   Neurological:  Negative for seizures and loss of consciousness.   Endo/Heme/Allergies:  Does not bruise/bleed easily.   Psychiatric/Behavioral:  Negative for hallucinations.        Physical Exam:  Vitals:    02/06/25 0952   BP: 122/75   Pulse: 90   Weight: 119.7 kg (263 lb 14.3 oz)   Height: 5' 8" (1.727 m)   PainSc:   2   PainLoc: Knee       General    Nursing note and vitals reviewed.  Constitutional: He is oriented to person, place, and time. He appears well-developed and well-nourished. No distress.   HENT:   Head: Normocephalic and atraumatic.   Nose: Nose normal.   Eyes: Conjunctivae and EOM are normal. Pupils are equal, round, and reactive to light. Right eye exhibits no discharge. Left eye exhibits no discharge. No scleral icterus.   Neck: No JVD present.   Cardiovascular:  Intact distal pulses.            Pulmonary/Chest: Effort normal. No respiratory distress.   Abdominal: He exhibits no distension.   Neurological: He is alert and oriented to person, place, and time. Coordination normal.   Psychiatric: He has a normal mood and affect. His behavior is normal. Judgment and thought content normal.     General Musculoskeletal Exam   Gait: normal       Right Knee Exam "     Inspection   Erythema: absent  Scars: absent  Swelling: present  Deformity: absent  Bruising: absent    Tenderness   The patient is tender to palpation of the medial joint line and MCL.    Range of Motion   The patient has normal right knee ROM.    Tests   Ligament Examination   Lachman: normal (-1 to 2mm)   PCL-Posterior Drawer: normal (0 to 2mm)     Patella   Passive Patellar Tilt: neutral  Patellar Tracking: normalBack (L-Spine & T-Spine) / Neck (C-Spine) Exam     Back (L-Spine & T-Spine) Range of Motion   Extension:  normal   Flexion:  normal   Lateral bend right:  normal Back lateral bend right: leaning to the right exacerbates pain.  Lateral bend left:  normal   Rotation right:  normal   Rotation left:  normal       Muscle Strength   Right Lower Extremity   Hip Abduction: 5/5   Quadriceps:  5/5   Hamstrin/5   Left Lower Extremity   Hip Abduction: 5/5   Quadriceps:  5/5   Hamstrin/5

## 2025-02-06 ENCOUNTER — HOSPITAL ENCOUNTER (OUTPATIENT)
Dept: RADIOLOGY | Facility: HOSPITAL | Age: 70
Discharge: HOME | End: 2025-02-06
Attending: STUDENT IN AN ORGANIZED HEALTH CARE EDUCATION/TRAINING PROGRAM
Payer: MEDICARE

## 2025-02-06 ENCOUNTER — PATIENT MESSAGE (OUTPATIENT)
Dept: PAIN MEDICINE | Facility: CLINIC | Age: 70
End: 2025-02-06

## 2025-02-06 ENCOUNTER — OFFICE VISIT (OUTPATIENT)
Dept: PAIN MEDICINE | Facility: CLINIC | Age: 70
End: 2025-02-06
Payer: MEDICARE

## 2025-02-06 VITALS
SYSTOLIC BLOOD PRESSURE: 122 MMHG | DIASTOLIC BLOOD PRESSURE: 75 MMHG | HEIGHT: 68 IN | HEART RATE: 90 BPM | BODY MASS INDEX: 39.99 KG/M2 | WEIGHT: 263.88 LBS

## 2025-02-06 DIAGNOSIS — M17.11 PRIMARY OSTEOARTHRITIS OF RIGHT KNEE: ICD-10-CM

## 2025-02-06 DIAGNOSIS — M25.559 HIP PAIN, UNSPECIFIED LATERALITY: ICD-10-CM

## 2025-02-06 DIAGNOSIS — M47.812 CERVICAL SPONDYLOSIS: ICD-10-CM

## 2025-02-06 DIAGNOSIS — M17.11 PRIMARY OSTEOARTHRITIS OF RIGHT KNEE: Primary | ICD-10-CM

## 2025-02-06 DIAGNOSIS — M25.561 ACUTE PAIN OF RIGHT KNEE: ICD-10-CM

## 2025-02-06 DIAGNOSIS — M54.2 CERVICALGIA: ICD-10-CM

## 2025-02-06 PROCEDURE — 99999 PR PBB SHADOW E&M-EST. PATIENT-LVL IV: CPT | Mod: PBBFAC,,, | Performed by: STUDENT IN AN ORGANIZED HEALTH CARE EDUCATION/TRAINING PROGRAM

## 2025-02-06 PROCEDURE — 73564 X-RAY EXAM KNEE 4 OR MORE: CPT | Mod: TC,50,FY

## 2025-02-06 PROCEDURE — 73564 X-RAY EXAM KNEE 4 OR MORE: CPT | Mod: 26,50,, | Performed by: RADIOLOGY

## 2025-02-06 PROCEDURE — 3078F DIAST BP <80 MM HG: CPT | Mod: CPTII,S$GLB,, | Performed by: STUDENT IN AN ORGANIZED HEALTH CARE EDUCATION/TRAINING PROGRAM

## 2025-02-06 PROCEDURE — 73521 X-RAY EXAM HIPS BI 2 VIEWS: CPT | Mod: TC,FY

## 2025-02-06 PROCEDURE — 1101F PT FALLS ASSESS-DOCD LE1/YR: CPT | Mod: CPTII,S$GLB,, | Performed by: STUDENT IN AN ORGANIZED HEALTH CARE EDUCATION/TRAINING PROGRAM

## 2025-02-06 PROCEDURE — 3074F SYST BP LT 130 MM HG: CPT | Mod: CPTII,S$GLB,, | Performed by: STUDENT IN AN ORGANIZED HEALTH CARE EDUCATION/TRAINING PROGRAM

## 2025-02-06 PROCEDURE — 99214 OFFICE O/P EST MOD 30 MIN: CPT | Mod: S$GLB,,, | Performed by: STUDENT IN AN ORGANIZED HEALTH CARE EDUCATION/TRAINING PROGRAM

## 2025-02-06 PROCEDURE — 3008F BODY MASS INDEX DOCD: CPT | Mod: CPTII,S$GLB,, | Performed by: STUDENT IN AN ORGANIZED HEALTH CARE EDUCATION/TRAINING PROGRAM

## 2025-02-06 PROCEDURE — G2211 COMPLEX E/M VISIT ADD ON: HCPCS | Mod: S$GLB,,, | Performed by: STUDENT IN AN ORGANIZED HEALTH CARE EDUCATION/TRAINING PROGRAM

## 2025-02-06 PROCEDURE — 1125F AMNT PAIN NOTED PAIN PRSNT: CPT | Mod: CPTII,S$GLB,, | Performed by: STUDENT IN AN ORGANIZED HEALTH CARE EDUCATION/TRAINING PROGRAM

## 2025-02-06 PROCEDURE — 73521 X-RAY EXAM HIPS BI 2 VIEWS: CPT | Mod: 26,,, | Performed by: RADIOLOGY

## 2025-02-06 PROCEDURE — 1159F MED LIST DOCD IN RCRD: CPT | Mod: CPTII,S$GLB,, | Performed by: STUDENT IN AN ORGANIZED HEALTH CARE EDUCATION/TRAINING PROGRAM

## 2025-02-06 PROCEDURE — 3288F FALL RISK ASSESSMENT DOCD: CPT | Mod: CPTII,S$GLB,, | Performed by: STUDENT IN AN ORGANIZED HEALTH CARE EDUCATION/TRAINING PROGRAM

## 2025-02-12 ENCOUNTER — CLINICAL SUPPORT (OUTPATIENT)
Dept: REHABILITATION | Facility: HOSPITAL | Age: 70
End: 2025-02-12
Attending: STUDENT IN AN ORGANIZED HEALTH CARE EDUCATION/TRAINING PROGRAM
Payer: MEDICARE

## 2025-02-12 DIAGNOSIS — M17.11 PRIMARY OSTEOARTHRITIS OF RIGHT KNEE: ICD-10-CM

## 2025-02-12 DIAGNOSIS — R29.898 DECREASED STRENGTH OF LOWER EXTREMITY: ICD-10-CM

## 2025-02-12 DIAGNOSIS — M25.661 DECREASED RANGE OF MOTION (ROM) OF RIGHT KNEE: Primary | ICD-10-CM

## 2025-02-12 PROCEDURE — 97110 THERAPEUTIC EXERCISES: CPT | Mod: PN

## 2025-02-12 PROCEDURE — 97161 PT EVAL LOW COMPLEX 20 MIN: CPT | Mod: PN

## 2025-02-12 NOTE — PROGRESS NOTES
Outpatient Rehab    Physical Therapy Evaluation    Patient Name: Celso Hernandez  MRN: [id number]  YOB: 1955  Today's Date: 2/12/2025    Therapy Diagnosis:   Encounter Diagnoses   Name Primary?    Primary osteoarthritis of right knee     Decreased range of motion (ROM) of right knee Yes    Decreased strength of lower extremity      Physician: Casie Miguel DO    Physician Orders: Eval and Treat  Medical Diagnosis: M17.11 (ICD-10-CM) - Primary osteoarthritis of right knee     Visit # / Visits Authorized:  1 / 1   Date of Evaluation:  2/12/2025   Insurance Authorization Period: 2/6/25 to 2/6/26  Plan of Care Certification:  2/12/2025 to 4/11/25      Time In: 1500   Time Out: 1600  Total Time: 60   Total Billable Time: 60    Intake Outcome Measure for FOTO Survey    Therapist reviewed FOTO scores for Celso Hernandez on 2/12/2025.   FOTO report - see Media section or FOTO account episode details.     Intake Score: 40%         Subjective   History of Present Illness  Celso is a 69 y.o. male who reports to physical therapy with a chief concern of R knee, B hip and neck pain. According to the patient's chart, Celso has a past medical history of Arthritis, BPH (benign prostatic hyperplasia), Cataract, Colon polyps, Diabetes mellitus type II, Glaucoma, Hyperlipidemia, Hypertension, Multiple duodenal ulcers, and Unspecified hemorrhoids without mention of complication. Celso has a past surgical history that includes none; Esophagogastroduodenoscopy (10/08/2019); Colonoscopy w/ polypectomy (10/08/2019); Esophagogastroduodenoscopy (N/A, 10/08/2019); Colonoscopy (N/A, 10/08/2019); Transurethral resection of prostate (N/A, 05/27/2020); Cystoscopy (N/A, 08/05/2020); Dilation of urethra (N/A, 08/05/2020); Cystoscopy with urethral dilation (N/A, 11/10/2021); Epidural steroid injection into thoracic spine (N/A, 01/04/2022); Nasal sinus surgery (11/2022); Colonoscopy (N/A, 02/29/2024); and SELECTIVE LASER TRABECULOPLASTY  "(Bilateral, 06/2024).    The patient reports a medical diagnosis of M17.11 (ICD-10-CM) - Primary osteoarthritis of right knee.    Diagnostic tests related to this condition: X-ray.   X-Ray Details: "FINDINGS:  Bilateral knee DJD with scattered marginal osteophytes.  Significant changes at the bilateral medial compartments with moderate-advanced joint space loss.  No acute fracture, dislocation, or osseous destruction.     Impression:     As above."    History of Present Condition/Illness: 9 days ago, patient was going down the stairs and his right knee gave out on last step, he fell to the ground onto grass onto the right knee. Denies hitting his head.  Denies loss of consciousness. He did have some swelling after. Knee pain is mild to moderate at the moment. Reports arthritis in bilateral knees. Swelling is nearly completely resolved today. Knee pain is located anteriorly and medially. No prior history of instability. Knee pain does not interfere with sleep but wakes with stiffness that improves gradually as he gets moving. Pain with stairs, turning, bending/squatting, some pain with dressing lower extremities. Also has chronic neck pain, and hip pain.     Activities of Daily Living  Social history was obtained from Patient.    General Prior Level of Function Comments: independent with ADLs  General Current Level of Function Comments: pain with ADLs  Patient Responsibilities: Home management, Personal ADL, Yard work, Community mobility    Previously independent with activities of daily living? Yes     Currently independent with activities of daily living? Yes          Previously independent with instrumental activities of daily living? Yes     Currently independent with instrumental activities of daily living? Yes              Pain     Patient reports a current pain level of 2/10. Pain at best is reported as 0/10. Pain at worst is reported as 6/10.   Location: R knee  Clinical Progression (since onset): " Improved  Pain Qualities: Sharp, Tightness  Pain-Relieving Factors: Medications - prescription, Ice, Elevation  Pain-Aggravating Factors: Bending, Kneeling, Lifting, Squatting, Standing, Walking  Numbness in legs from sitting.       Treatment History  Treatments  Previously Received Treatments: No  Currently Receiving Treatments: No    Living Arrangements  Living Situation  Housing: Home independently  Living Arrangements: Spouse/significant other  Support Systems: Spouse/significant other, Family members    Home Setup  Type of Structure: House  Home Access: Stairs with rails  Entrance Stairs - Number of Steps: 6  Number of Levels in Home: One level        Employment  Patient does not report that: Does the patient's condition impact their ability to work?  Employment Status: Retired         Past Medical History/Physical Systems Review:   Celso Hernandez  has a past medical history of Arthritis, BPH (benign prostatic hyperplasia), Cataract, Colon polyps, Diabetes mellitus type II, Glaucoma, Hyperlipidemia, Hypertension, Multiple duodenal ulcers, and Unspecified hemorrhoids without mention of complication.    Celso Hernandez  has a past surgical history that includes none; Esophagogastroduodenoscopy (10/08/2019); Colonoscopy w/ polypectomy (10/08/2019); Esophagogastroduodenoscopy (N/A, 10/08/2019); Colonoscopy (N/A, 10/08/2019); Transurethral resection of prostate (N/A, 05/27/2020); Cystoscopy (N/A, 08/05/2020); Dilation of urethra (N/A, 08/05/2020); Cystoscopy with urethral dilation (N/A, 11/10/2021); Epidural steroid injection into thoracic spine (N/A, 01/04/2022); Nasal sinus surgery (11/2022); Colonoscopy (N/A, 02/29/2024); and SELECTIVE LASER TRABECULOPLASTY (Bilateral, 06/2024).    Celso has a current medication list which includes the following prescription(s): brimonidine 0.2%, dorzolamide-timolol 2-0.5%, ezetimibe, hydralazine, latanoprost, losartan-hydrochlorothiazide 100-12.5 mg, meloxicam,  metformin, centrum silver men, rosuvastatin, and triamcinolone acetonide 0.1%.    Review of patient's allergies indicates:   Allergen Reactions    Keflex [cephalexin] Itching and Rash     Scrotal and groin itching         Objective   Posture                 Bilaterally, knee position is: Flexed       Knee Observations            Lower Extremity Sensation  Right Lumbar/Lower Extremity Sensation  Intact: Light Touch       Left Lumbar/Lower Extremity Sensation  Intact: Light Touch                Knee Palpation  Right Knee Palpation  Abnormal: Bony Prominence/Bursa  Right Knee Bony Prominence/Bursa Palpation Observations: bony enlargement and tenderness to medial femoral condyle                    Knee Range of Motion   Right Knee   Active (deg) Passive (deg) Pain   Flexion 113   Yes   Extension 6 5 Yes       Left Knee   Active (deg) Passive (deg) Pain   Flexion 110       Extension 5 5                        Hip Strength - Planes of Motion   Right Strength Right Pain Left Strength Left  Pain   Flexion (L2) 4+ Yes 5     Extension 4   4     ABduction 4 Yes 4 Yes   ADduction 5   5     Internal Rotation 5   4+ Yes   External Rotation 4+ Yes 4+ Yes       Knee Strength   Right Strength Right Pain Left Strength Left  Pain   Flexion (S2)  (12.2kg)    (11.5kg) Yes (hip pain)   Prone Flexion           Extension (L3)  (14.5kg) Yes (knee pain)  (9.2kg) Yes (hip pain)              Lumbar/Pelvic Girdle Special Tests  Pelvic Girdle / Sacrum Tests  Positive: Right REY and Left REY         Hip Special Tests  Intra-Articular/Impingement Tests  Positive: Right REY and Left REY           Knee Patellar Screening       Right Patellar Mobility  Hypomobile: Medial, Lateral, Superior, and Inferior  Left Patellar Mobility  Hypomobile: Medial, Lateral, Superior, and Inferior              Ambulation Assistance Required  Surface With  Assistive Device Without Assistive Device Details   Level   Independent      Uneven         Curb        "    Gait Analysis  Base of Support: Wide  Gait Pattern: Antalgic  Walking Speed: Decreased            Knee Observations During Gait  Bilateral: Knee Decreased Extension in Midstance         Treatment:  Therapeutic Exercise  Therapeutic Exercise Activity 1: quad sets 10x5"  Therapeutic Exercise Activity 2: SLR 10x  Therapeutic Exercise Activity 3: seated heel slides 10x  Therapeutic Exercise Activity 4: hip abd isometric 5x30"    Assessment & Plan   Assessment  Celso presents with a condition of Low complexity.   Presentation of Symptoms: Stable  Will Comorbidities Impact Care: Yes  See past medical history     Functional Limitations: Activity tolerance, Decreased ambulation distance/endurance, Completing self-care activities, Functional mobility, Pain with ADLs/IADLs, Painful locomotion/ambulation, Participating in leisure activities, Performing household chores, Range of motion, Sitting tolerance, Squatting, Standing tolerance  Impairments: Impaired physical strength, Lack of appropriate home exercise program, Pain with functional activity, Abnormal or restricted range of motion, Activity intolerance, Abnormal gait    Patient Goal for Therapy (PT): decrease knee pain  Prognosis: Good  Assessment Details: Patient is a 69 y.o. M with a medical diagnosis of M17.11 (ICD-10-CM) - Primary osteoarthritis of right knee. Patient with additional complaints of hip and neck pain which he will reach out to MD for referral. Knee signs and symptoms consistent with medical diagnosis. Patient presents with decreased knee range of motion, decreased lower extremity strength, impaired balance and gait, decreased joint mobility, and decreased functional mobility. These impairments impact ability to stand, walk, bend, dress, and perform daily activities without pain.     Plan  From a physical therapy perspective, the patient would benefit from: Skilled Rehab Services    Planned therapy interventions include: Therapeutic exercise, " Therapeutic activities, Neuromuscular re-education, Manual therapy, and Gait training.    Planned modalities to include: Cryotherapy (cold pack), Electrical stimulation - attended, Electrical stimulation - passive/unattended, and Thermotherapy (hot pack).        Visit Frequency: 2 times Per Week for 8 Weeks.       This plan was discussed with Patient and Therapy assistant.   Discussion participants: Agreed Upon Plan of Care             Patient's spiritual, cultural, and educational needs considered and patient agreeable to plan of care and goals.           Goals:   Active       Changing body position       Patient will demonstrate moving sit to stand 8+x in 30 sec       Start:  02/12/25    Expected End:  04/11/25               Functional outcome       Patient will show a significant change in FOTO patient-reported outcome tool to demonstrate subjective improvement       Start:  02/12/25    Expected End:  04/11/25            Patient will demonstrate independence in home program for support of progression       Start:  02/12/25    Expected End:  03/14/25               Pain       Patient will report pain of 4/10 max demonstrating a reduction of overall pain       Start:  02/12/25    Expected End:  03/14/25               Range of Motion       Patient will achieve right knee ROM of  3-120 degrees        Start:  02/12/25    Expected End:  03/14/25               Strength       Patient will achieve bilateral hip abduction strength of 4+/5       Start:  02/12/25    Expected End:  04/11/25            Patient will improve bilateral knee flexion strength by 5kg        Start:  02/12/25    Expected End:  04/11/25            Patient will improve right knee extension strength by 5kg       Start:  02/12/25    Expected End:  04/11/25                Radha Lares, PT, DPT

## 2025-02-13 DIAGNOSIS — M54.2 CERVICALGIA: Primary | ICD-10-CM

## 2025-02-13 DIAGNOSIS — M25.569 KNEE PAIN, UNSPECIFIED CHRONICITY, UNSPECIFIED LATERALITY: ICD-10-CM

## 2025-02-14 ENCOUNTER — CLINICAL SUPPORT (OUTPATIENT)
Dept: REHABILITATION | Facility: HOSPITAL | Age: 70
End: 2025-02-14
Payer: MEDICARE

## 2025-02-14 ENCOUNTER — DOCUMENTATION ONLY (OUTPATIENT)
Dept: REHABILITATION | Facility: HOSPITAL | Age: 70
End: 2025-02-14
Payer: MEDICARE

## 2025-02-14 DIAGNOSIS — R29.898 DECREASED STRENGTH OF LOWER EXTREMITY: Primary | ICD-10-CM

## 2025-02-14 PROCEDURE — 97112 NEUROMUSCULAR REEDUCATION: CPT | Mod: PN,CQ

## 2025-02-14 PROCEDURE — 97140 MANUAL THERAPY 1/> REGIONS: CPT | Mod: PN,CQ

## 2025-02-14 PROCEDURE — 97530 THERAPEUTIC ACTIVITIES: CPT | Mod: PN,CQ

## 2025-02-14 NOTE — PROGRESS NOTES
Physical therapist and physical therapy assistant(s) met face to face to discuss patient's treatment plan and progress towards established goals. Pt will be seen by a physical therapist minimally every 6th visit or every 30 days.    YAIMA YU, PT, DPT

## 2025-02-14 NOTE — PROGRESS NOTES
"  Outpatient Rehab    Physical Therapy Visit    Patient Name: Celso Hernandez  MRN: [id number]  YOB: 1955  Today's Date: 2/14/2025    Therapy Diagnosis:   Encounter Diagnosis   Name Primary?    Decreased strength of lower extremity Yes     Physician: Casie Miguel DO    Physician Orders: Eval and Treat  Medical Diagnosis: M17.11 (ICD-10-CM) - Primary osteoarthritis of right knee      Visit # / Visits Authorized:  1 / 20  Date of Evaluation:  2/12/2025   Insurance Authorization Period: 2/6/25 to 2/6/26  Plan of Care Certification:  2/12/2025 to 4/11/25      Time In: 1100   Time Out: 1155  Total Time: 55   Total Billable Time: 55 1:1 (2NMR, 1MT, 1TA)    FOTO:  Intake Score:  %  Survey Score 1:  %  Survey Score 2:  %         Subjective   Primary complaint of bilateral knee stiffness (R>L).  Pain reported as 1/10. Bilateral knees    Objective            Treatment:  Therapeutic Exercise  Therapeutic Exercise Activity 1: Seated heel slides 5" 2 x 10    Manual Therapy  Manual Therapy Activity 1: Patellar mobs - bilateral knees    Balance/Neuromuscular Re-Education  Balance/Neuromuscular Re-Education Activity 1: Quad sets 5" 2 x 10  Balance/Neuromuscular Re-Education Activity 2: SAQ 2 x 10  Balance/Neuromuscular Re-Education Activity 3: SLR 2 x 10  Balance/Neuromuscular Re-Education Activity 4: Side-lying hip abd 2 x 10  Balance/Neuromuscular Re-Education Activity 5: LAQ 2 x 10 RTB  Balance/Neuromuscular Re-Education Activity 6: Hip abd iso 5 x 30"    Therapeutic Activity  Therapeutic Activity 1: Leg Press 4 plates 2 x 10  Therapeutic Activity 2: Standing heel raises 2 x 10    Assessment & Plan   Assessment: Patient is a 69 y.o. M with a medical diagnosis of M17.11 (ICD-10-CM) - Primary osteoarthritis of right knee. Patient presents with low level joint irritability. Primary complaint of stiffness. Recently has new orders for neck. Will be evaluated for neck at next session with evaluating PT. Treatment " focused       Patient will continue to benefit from skilled outpatient physical therapy to address the deficits listed in the problem list box on initial evaluation, provide pt/family education and to maximize pt's level of independence in the home and community environment.     Patient's spiritual, cultural, and educational needs considered and patient agreeable to plan of care and goals.           Plan:      Goals:   Active       Changing body position       Patient will demonstrate moving sit to stand 8+x in 30 sec (Progressing)       Start:  02/12/25    Expected End:  04/11/25               Functional outcome       Patient will show a significant change in FOTO patient-reported outcome tool to demonstrate subjective improvement (Progressing)       Start:  02/12/25    Expected End:  04/11/25            Patient will demonstrate independence in home program for support of progression (Progressing)       Start:  02/12/25    Expected End:  03/14/25               Pain       Patient will report pain of 4/10 max demonstrating a reduction of overall pain (Progressing)       Start:  02/12/25    Expected End:  03/14/25               Range of Motion       Patient will achieve right knee ROM of  3-120 degrees  (Progressing)       Start:  02/12/25    Expected End:  03/14/25               Strength       Patient will achieve bilateral hip abduction strength of 4+/5 (Progressing)       Start:  02/12/25    Expected End:  04/11/25            Patient will improve bilateral knee flexion strength by 5kg  (Progressing)       Start:  02/12/25    Expected End:  04/11/25            Patient will improve right knee extension strength by 5kg (Progressing)       Start:  02/12/25    Expected End:  04/11/25                Lucy Gaming, PTA

## 2025-02-18 ENCOUNTER — CLINICAL SUPPORT (OUTPATIENT)
Dept: REHABILITATION | Facility: HOSPITAL | Age: 70
End: 2025-02-18
Payer: MEDICARE

## 2025-02-18 DIAGNOSIS — M25.661 DECREASED RANGE OF MOTION (ROM) OF RIGHT KNEE: ICD-10-CM

## 2025-02-18 DIAGNOSIS — R29.898 DECREASED RANGE OF MOTION OF NECK: ICD-10-CM

## 2025-02-18 DIAGNOSIS — M17.11 PRIMARY OSTEOARTHRITIS OF RIGHT KNEE: Primary | ICD-10-CM

## 2025-02-18 DIAGNOSIS — M54.2 CERVICALGIA: ICD-10-CM

## 2025-02-18 DIAGNOSIS — R29.898 DECREASED STRENGTH OF LOWER EXTREMITY: ICD-10-CM

## 2025-02-18 PROCEDURE — 97112 NEUROMUSCULAR REEDUCATION: CPT | Mod: PN

## 2025-02-18 PROCEDURE — 97110 THERAPEUTIC EXERCISES: CPT | Mod: PN

## 2025-02-18 PROCEDURE — 97140 MANUAL THERAPY 1/> REGIONS: CPT | Mod: PN
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
## 2025-02-22 NOTE — PROGRESS NOTES
HPI    DLS: 12/10/2024    Pt here for 2 Month IOP Check;  Pt states no eye pain but vision is cloudy.     Meds:  Cosopt BID OU   Brimonidine BID OU   Latanoprost QHS OU     1. Mod POAG OU   2. NS OU     Last edited by Tiffany Newman on 2/25/2025  8:57 AM.            Assessment /Plan     For exam results, see Encounter Report.    Primary open angle glaucoma (POAG) of both eyes, severe stage    Nuclear sclerotic cataract of both eyes    Visual field defect of both eyes    History of selective laser trabeculoplasty           Vinh note form 2/20/2024   (-) FHx.   IOp 24 OD, OS. Last 15 OD, 16 OS.   Tmax  22 OD, 23 OS.   Tmin on drops 12 OD, 13 OS.    Pt failed to f/u from 12/17/2019 to 9/16/2020 and 6/2021 to 3/11/2022.   Pt reports compliance w/eyedrops.   Prev pt of Dr Huffman.   C/d 0.55 OD, 0.65 OS.     12/28/2022  HVF OD inf arcuate, OS sup arcuate  2/20/2024 HVF OD inf arc and early sup arcuate, OS sup arc and early inf arc    12/28/2022 OCT OD, thin NS, TS, T, TI, General (Borderline N). OS thin TS, TI, T, General (Borderline N and NI).   2/20/2024 OCT OD NS, TS, T, and G, borderline NI and N, OS thin TS, TI, T, and G, borderline N and NI    3/11/2022 Photos    Cont Latanoprost QHS OU.  D/c Timolol BiD OU on 12/15/2020  Cont Cosopt BID OU (start 12/15/2020)  Add Brimonidine BID OU  Importance of drop compliance and f/u discussed with pt.   Educated pt on findings w/understanding.  Referral to glaucoma clinic - Dr Barrientos / [facility]. Consider benefits of SLT.   Cont w/annual exams.          Glaucoma (type and duration)    dx 2018 - Dr Huffman (began lumigan and then stopped on his own) // re-started on gtts w/ Dr hitchcock 11/2019   First HVF   [facility] -12/2019   First photos   2019   Treatment / Drops started   2018 - stopped // re-started 2019            Family history    + Mother         Glaucoma meds    cospt / brimonidine / latanoprost         H/O adverse rxn to glaucoma drops    none        LASERS    SLT od -  6/13/2024 - good resp 23--> 12-13 // SLT os 5/30/2023 - good resp 22--> 12-13         GLAUCOMA SURGERIES    none        OTHER EYE SURGERIES    none        CDR    ? Per [facility] - check photos         Tbase    24 ou        Tmax    24 ou            Ttarget   14-15 max (+ prog with IOP's 17-18)           HVF    4 test 2019 to  2024 - initiall early IAD / --> to dense IALT and SAD  od /  / + VF loss os- initially SALT --> SALT and IAD (( + prog over 5 year OU)         Gonio    +3 ou        CCT    550/566        OCT    5 test 2019 to 2024 - RNFL - + loss od // + loss  os (? Prog over the years )         Disc photos    3/2022    - Ttoday    16/17 (down from 19/23w/ better adherence ) - IOP still above target of 15 ou  ( up from 12/12 - did NOT use cosopt or brimonidine this morning)   - Test done today    IOP // with better adherence       4/30/2023 - first visit w/ [facility] / glaucoma clinic   Dx 2018 w/ Dr. Huffman - stated on lumigan - but stopped on his own   First visit at [facility] 11/2/2019 - Dr hitchcock // followed w/ Vinh 11/2019 to 2/2024   IOP  23/21 (on gtts)   + FmHx - mother   Gonio +3 open ou      OLDER TEST - include   4 VF's // 5 OCT's / 1 photos   VF progression   OCT progression   Tmax 24/24 - on gtts     PLAN  8/13/2024   SLT OS- done 5/30/2024 - good resp - 23-->12-13  SLT OD  6/13/2024 -  good resp 22--> 12-13     Cont all gtts    Latanoprost ou q hs    Cosopt bid ou   Brimonidine bid ou    12/10/2024   IOP up 12-->19 od // 12-->23 os (did not use cosopt or brimonidine this morning)  Rec rocklatan  Pt want to stay with present meds and re-check      2/25/2025   IOP better with better adherence but still above target of 15 ou   19/23--> 16/17   Rec change latanoprost to rocklatan (pt declines)    Keep F/U 2 months with HVF / DFE / OCT  already has appt - if any prog - try and get pt to try rhopressa /  rockalatn)   If unable to control IOP - consider incisional surgery ( marked prog ou over last 4-5  years)     As pt has severe stage glaucoma - will cont to follow in glaucoma clinic   If needs new glasses in future can refer back to dr hitchcock

## 2025-02-24 ENCOUNTER — CLINICAL SUPPORT (OUTPATIENT)
Dept: REHABILITATION | Facility: HOSPITAL | Age: 70
End: 2025-02-24
Payer: MEDICARE

## 2025-02-24 DIAGNOSIS — M54.2 CERVICALGIA: ICD-10-CM

## 2025-02-24 DIAGNOSIS — R29.898 DECREASED STRENGTH OF LOWER EXTREMITY: Primary | ICD-10-CM

## 2025-02-24 DIAGNOSIS — R29.898 DECREASED RANGE OF MOTION OF NECK: ICD-10-CM

## 2025-02-24 DIAGNOSIS — M17.11 PRIMARY OSTEOARTHRITIS OF RIGHT KNEE: ICD-10-CM

## 2025-02-24 DIAGNOSIS — M25.661 DECREASED RANGE OF MOTION (ROM) OF RIGHT KNEE: ICD-10-CM

## 2025-02-24 PROCEDURE — 97112 NEUROMUSCULAR REEDUCATION: CPT | Mod: PN

## 2025-02-24 PROCEDURE — 97140 MANUAL THERAPY 1/> REGIONS: CPT | Mod: PN

## 2025-02-24 NOTE — PROGRESS NOTES
"  Outpatient Rehab    Physical Therapy Visit    Patient Name: Celso Hernandez  MRN: [id number]  YOB: 1955  Encounter Date: 2/24/2025    Therapy Diagnosis:   Encounter Diagnoses   Name Primary?    Decreased strength of lower extremity Yes    Primary osteoarthritis of right knee     Decreased range of motion (ROM) of right knee     Decreased range of motion of neck     Cervicalgia      Physician: Casie Miguel DO    Physician Orders: Eval and Treat  Medical Diagnosis: M17.11 (ICD-10-CM) - Primary osteoarthritis of right knee   M54.2 (ICD-10-CM) - Cervicalgia   M25.569 (ICD-10-CM) - Knee pain, unspecified chronicity, unspecified laterality       Visit # / Visits Authorized:  3 / 20  Date of Evaluation:  2/12/2025   Insurance Authorization Period: 2/6/25 to 2/6/26  Plan of Care Certification:  2/12/2025 to 4/11/25      Time In: 1105   Time Out: 1200  Total Time: 55   Total Billable Time: 55    FOTO:  Intake Score:  %  Survey Score 1:  %  Survey Score 2:  %         Subjective   minimal complaints of neck pain today, just ROM restriction. complaints of L hip and knee pain. he is having more pain in L hip, difficulty weight bearing last week. pain in buttock radiating to knee and foot..  Pain reported as 3/10. 1/10 Bilateral knees, 4/10 L hip; 0/10 neck L > R.    Objective            Treatment:  Therapeutic Exercise  Therapeutic Exercise Activity 1: Seated heel slides 5" 2 x 10  Therapeutic Exercise Activity 2: SLR 10x- hold next  Therapeutic Exercise Activity 3: snags 15x rotation    Manual Therapy  Manual Therapy Activity 2: LAD L  Manual Therapy Activity 4: lateral hip distraction L    Balance/Neuromuscular Re-Education  Balance/Neuromuscular Re-Education Activity 1: Quad sets 5" 2 x 10 B  Balance/Neuromuscular Re-Education Activity 2: SAQ 2 x 10- only able to perform 1x10 L due to hip pain  Balance/Neuromuscular Re-Education Activity 5: LAQ 2 x 10 RTB  Balance/Neuromuscular Re-Education Activity 6: " "Hip abd iso 5 x 30"  Balance/Neuromuscular Re-Education Activity 7: upper cervical nod 2x10    Assessment & Plan   Assessment: Patient is a 69 y.o. M with a medical diagnosis of M17.11 (ICD-10-CM) - Primary osteoarthritis of right knee. additional referral for M54.2 (ICD-10-CM) - Cervicalgia  M25.569 (ICD-10-CM) - Knee pain, unspecified chronicity, unspecified laterality.  pt demonstrating chronic neck pain with mobility deficits and chronic hip and knee pain. significant cervical joint mobility restrictions present, L > R. today, L hip limited by pain, unable to complete full repetitions secondary to pain. symptoms appear to have radicular component. good response to L LAD. hold SLR in future visits due to exacerbation of pain. continue to progress as tolerated.  Evaluation/Treatment Tolerance: Patient limited by pain    Patient will continue to benefit from skilled outpatient physical therapy to address the deficits listed in the problem list box on initial evaluation, provide pt/family education and to maximize pt's level of independence in the home and community environment.     Patient's spiritual, cultural, and educational needs considered and patient agreeable to plan of care and goals.           Plan: cont POC with addition of cervical mobility and postural strengthening    Goals:   Active       Changing body position       Patient will demonstrate moving sit to stand 8+x in 30 sec (Progressing)       Start:  02/12/25    Expected End:  04/11/25               Functional outcome       Patient will show a significant change in FOTO patient-reported outcome tool to demonstrate subjective improvement (Progressing)       Start:  02/12/25    Expected End:  04/11/25            Patient will demonstrate independence in home program for support of progression (Progressing)       Start:  02/12/25    Expected End:  03/14/25               Pain       Patient will report pain of 4/10 max demonstrating a reduction of overall " pain (Progressing)       Start:  02/12/25    Expected End:  03/14/25               Range of Motion       Patient will achieve right knee ROM of  3-120 degrees  (Progressing)       Start:  02/12/25    Expected End:  03/14/25               Strength       Patient will achieve bilateral hip abduction strength of 4+/5 (Progressing)       Start:  02/12/25    Expected End:  04/11/25            Patient will improve bilateral knee flexion strength by 5kg  (Progressing)       Start:  02/12/25    Expected End:  04/11/25            Patient will improve right knee extension strength by 5kg (Progressing)       Start:  02/12/25    Expected End:  04/11/25               cervical goals       patient will improve cervical rotation 10 degrees (Progressing)       Start:  02/18/25    Expected End:  04/11/25            patient will improve cervical extension to 30 deg (Progressing)       Start:  02/18/25    Expected End:  04/11/25                Radha Lares, PT, DPT

## 2025-02-25 ENCOUNTER — OFFICE VISIT (OUTPATIENT)
Dept: OPHTHALMOLOGY | Facility: CLINIC | Age: 70
End: 2025-02-25
Payer: MEDICARE

## 2025-02-25 DIAGNOSIS — H53.40 VISUAL FIELD DEFECT OF BOTH EYES: ICD-10-CM

## 2025-02-25 DIAGNOSIS — H25.13 NUCLEAR SCLEROTIC CATARACT OF BOTH EYES: ICD-10-CM

## 2025-02-25 DIAGNOSIS — H40.1133 PRIMARY OPEN ANGLE GLAUCOMA (POAG) OF BOTH EYES, SEVERE STAGE: Primary | ICD-10-CM

## 2025-02-25 DIAGNOSIS — Z98.890 HISTORY OF SELECTIVE LASER TRABECULOPLASTY: ICD-10-CM

## 2025-02-25 PROCEDURE — 3288F FALL RISK ASSESSMENT DOCD: CPT | Mod: CPTII,S$GLB,, | Performed by: OPHTHALMOLOGY

## 2025-02-25 PROCEDURE — 99214 OFFICE O/P EST MOD 30 MIN: CPT | Mod: S$GLB,,, | Performed by: OPHTHALMOLOGY

## 2025-02-25 PROCEDURE — 1159F MED LIST DOCD IN RCRD: CPT | Mod: CPTII,S$GLB,, | Performed by: OPHTHALMOLOGY

## 2025-02-25 PROCEDURE — 1160F RVW MEDS BY RX/DR IN RCRD: CPT | Mod: CPTII,S$GLB,, | Performed by: OPHTHALMOLOGY

## 2025-02-25 PROCEDURE — 1101F PT FALLS ASSESS-DOCD LE1/YR: CPT | Mod: CPTII,S$GLB,, | Performed by: OPHTHALMOLOGY

## 2025-02-25 PROCEDURE — 99999 PR PBB SHADOW E&M-EST. PATIENT-LVL II: CPT | Mod: PBBFAC,,, | Performed by: OPHTHALMOLOGY

## 2025-02-28 ENCOUNTER — CLINICAL SUPPORT (OUTPATIENT)
Dept: REHABILITATION | Facility: HOSPITAL | Age: 70
End: 2025-02-28
Payer: MEDICARE

## 2025-02-28 DIAGNOSIS — R29.898 DECREASED STRENGTH OF LOWER EXTREMITY: Primary | ICD-10-CM

## 2025-02-28 PROCEDURE — 97140 MANUAL THERAPY 1/> REGIONS: CPT | Mod: PN,CQ

## 2025-02-28 PROCEDURE — 97112 NEUROMUSCULAR REEDUCATION: CPT | Mod: PN,CQ

## 2025-02-28 NOTE — PROGRESS NOTES
"  Outpatient Rehab    Physical Therapy Visit    Patient Name: Celso Hernandez  MRN: [id number]  YOB: 1955  Encounter Date: 2/28/2025    Therapy Diagnosis:   Encounter Diagnosis   Name Primary?    Decreased strength of lower extremity Yes     Physician: Casie Miguel DO    Physician Orders: Eval and Treat  Medical Diagnosis: M17.11 (ICD-10-CM) - Primary osteoarthritis of right knee   M54.2 (ICD-10-CM) - Cervicalgia   M25.569 (ICD-10-CM) - Knee pain, unspecified chronicity, unspecified laterality       Visit # / Visits Authorized:  4 / 20  Date of Evaluation:  2/12/2025   Insurance Authorization Period: 2/6/25 to 2/6/26  Plan of Care Certification:  2/12/2025 to 4/11/25      Time In: 1100   Time Out: 1155  Total Time: 55   Total Billable Time: 30 min 1:1    FOTO:  Intake Score:  %  Survey Score 1:  %  Survey Score 2:  %         Subjective   minimal complaints of neck pain primary complaint of stiffness/ROM restrictions L>R. L hip and knee pain low irritability today.  Pain reported as 1/10. 1/10 Bilateral knees, 1/10 L hip; 0/10 neck L > R.    Objective            Treatment:  Therapeutic Exercise  Therapeutic Exercise Activity 1: Seated heel slides 5" 2 x 10  Therapeutic Exercise Activity 2: snags 15x rotation  Therapeutic Exercise Activity 3: seated lumbar flexion 10x peanut    Manual Therapy  Manual Therapy Activity 1: STM/MFR to cervical paraspinals/ bilateral UT  Manual Therapy Activity 2: LAD L    Balance/Neuromuscular Re-Education  Balance/Neuromuscular Re-Education Activity 1: Quad sets 5" 2 x 10 B  Balance/Neuromuscular Re-Education Activity 2: Hip abd iso 5 x 30"  Balance/Neuromuscular Re-Education Activity 3: LAQ 2 x 10 RTB  Balance/Neuromuscular Re-Education Activity 4: Chin Tucks 5" 2 x10  Balance/Neuromuscular Re-Education Activity 5: upper cervical nod 2x10  Balance/Neuromuscular Re-Education Activity 6: Serratus punch 2 x 10         Assessment & Plan   Assessment: Patient is a 69 y.o. " M with a medical diagnosis of M17.11 (ICD-10-CM) - Primary osteoarthritis of right knee. additional referral for M54.2 (ICD-10-CM) - Cervicalgia  M25.569 (ICD-10-CM) - Knee pain, unspecified chronicity, unspecified laterality.  pt demonstrating chronic neck pain with mobility deficits and chronic hip and knee pain. significant cervical joint mobility restrictions present, L > R. today. Low level irriatibily in hip and bilateral knees. Good tolerance to treatment focused on cervical mobility, postural strengthening and GTPS program. Monitor response and continue to progress as tolerated.       Patient will continue to benefit from skilled outpatient physical therapy to address the deficits listed in the problem list box on initial evaluation, provide pt/family education and to maximize pt's level of independence in the home and community environment.     Patient's spiritual, cultural, and educational needs considered and patient agreeable to plan of care and goals.           Plan: cont POC with addition of cervical mobility and postural strengthening    Goals:   Active       Changing body position       Patient will demonstrate moving sit to stand 8+x in 30 sec (Progressing)       Start:  02/12/25    Expected End:  04/11/25               Functional outcome       Patient will show a significant change in FOTO patient-reported outcome tool to demonstrate subjective improvement (Progressing)       Start:  02/12/25    Expected End:  04/11/25            Patient will demonstrate independence in home program for support of progression (Progressing)       Start:  02/12/25    Expected End:  03/14/25               Pain       Patient will report pain of 4/10 max demonstrating a reduction of overall pain (Progressing)       Start:  02/12/25    Expected End:  03/14/25               Range of Motion       Patient will achieve right knee ROM of  3-120 degrees  (Progressing)       Start:  02/12/25    Expected End:  03/14/25                Strength       Patient will achieve bilateral hip abduction strength of 4+/5 (Progressing)       Start:  02/12/25    Expected End:  04/11/25            Patient will improve bilateral knee flexion strength by 5kg  (Progressing)       Start:  02/12/25    Expected End:  04/11/25            Patient will improve right knee extension strength by 5kg (Progressing)       Start:  02/12/25    Expected End:  04/11/25               cervical goals       patient will improve cervical rotation 10 degrees (Progressing)       Start:  02/18/25    Expected End:  04/11/25            patient will improve cervical extension to 30 deg (Progressing)       Start:  02/18/25    Expected End:  04/11/25                Lucy Gaming, PTA

## 2025-03-05 ENCOUNTER — CLINICAL SUPPORT (OUTPATIENT)
Dept: REHABILITATION | Facility: HOSPITAL | Age: 70
End: 2025-03-05
Payer: MEDICARE

## 2025-03-05 DIAGNOSIS — M17.11 PRIMARY OSTEOARTHRITIS OF RIGHT KNEE: ICD-10-CM

## 2025-03-05 DIAGNOSIS — R29.898 DECREASED STRENGTH OF LOWER EXTREMITY: Primary | ICD-10-CM

## 2025-03-05 DIAGNOSIS — M54.2 CERVICALGIA: ICD-10-CM

## 2025-03-05 DIAGNOSIS — R29.898 DECREASED RANGE OF MOTION OF NECK: ICD-10-CM

## 2025-03-05 DIAGNOSIS — M25.661 DECREASED RANGE OF MOTION (ROM) OF RIGHT KNEE: ICD-10-CM

## 2025-03-05 PROCEDURE — 97112 NEUROMUSCULAR REEDUCATION: CPT | Mod: PN

## 2025-03-05 PROCEDURE — 97140 MANUAL THERAPY 1/> REGIONS: CPT | Mod: PN

## 2025-03-05 PROCEDURE — 97110 THERAPEUTIC EXERCISES: CPT | Mod: PN

## 2025-03-05 NOTE — PROGRESS NOTES
"  Outpatient Rehab    Physical Therapy Visit    Patient Name: Celso Hernandez  MRN: [id number]  YOB: 1955  Encounter Date: 3/5/2025    Therapy Diagnosis:   Encounter Diagnoses   Name Primary?    Decreased strength of lower extremity Yes    Primary osteoarthritis of right knee     Decreased range of motion (ROM) of right knee     Decreased range of motion of neck     Cervicalgia      Physician: Casie Miguel DO    Physician Orders: Eval and Treat  Medical Diagnosis: M17.11 (ICD-10-CM) - Primary osteoarthritis of right knee   M54.2 (ICD-10-CM) - Cervicalgia   M25.569 (ICD-10-CM) - Knee pain, unspecified chronicity, unspecified laterality       Visit # / Visits Authorized:  5 / 20  Date of Evaluation:  2/12/2025   Insurance Authorization Period: 2/6/25 to 2/6/26  Plan of Care Certification:  2/12/2025 to 4/11/25      Time In: 1005   Time Out: 1100  Total Time: 55   Total Billable Time: 55 min 1:1    FOTO:  Intake Score:  %  Survey Score 1:  %  Survey Score 2:  %         Subjective   no knee or neck pain, just hip pain.  Pain reported as 2/10. 0/10 Bilateral knees, 2/10 L hip; 0/10 neck L > R.    Objective            Treatment:  Therapeutic Exercise  Therapeutic Exercise Activity 1: Seated heel slides 5" 2 x 10  Therapeutic Exercise Activity 2: snags 15x rotation, +extension 20x  Therapeutic Exercise Activity 4: DKTC peanut 20x    Manual Therapy  Manual Therapy Activity 2: LAD L  Manual Therapy Activity 3: patellar mobilizations B  Manual Therapy Activity 4: lateral hip distraction L    Balance/Neuromuscular Re-Education  Balance/Neuromuscular Re-Education Activity 1: Quad sets 5" 2 x 10 B  Balance/Neuromuscular Re-Education Activity 2: Hip abd iso black TB 2' 5"  Balance/Neuromuscular Re-Education Activity 3: LAQ 2 x 10 RTB  Balance/Neuromuscular Re-Education Activity 4: Chin Tucks 5" 2 x10  Balance/Neuromuscular Re-Education Activity 6: Serratus punch 2 x 10 4#  Balance/Neuromuscular Re-Education " Activity 7: hooklying marchjose cruz black TB 2x10         Assessment & Plan   Assessment: Patient is a 69 y.o. M with a medical diagnosis of M17.11 (ICD-10-CM) - Primary osteoarthritis of right knee. additional referral for M54.2 (ICD-10-CM) - Cervicalgia  M25.569 (ICD-10-CM) - Knee pain, unspecified chronicity, unspecified laterality.  pt demonstrating chronic neck pain with mobility deficits and chronic hip and knee pain. significant cervical joint mobility restrictions present, L > R.  Low level irriatibily in neck, hip and bilateral knees. focused more on hip restrictions today. no increased pain with progressions in today's visit. will continue to benefit from skilled PT to address ongoing impairments.  Evaluation/Treatment Tolerance: Patient tolerated treatment well    Patient will continue to benefit from skilled outpatient physical therapy to address the deficits listed in the problem list box on initial evaluation, provide pt/family education and to maximize pt's level of independence in the home and community environment.     Patient's spiritual, cultural, and educational needs considered and patient agreeable to plan of care and goals.           Plan: cont POC with addition of cervical mobility and postural strengthening    Goals:   Active       Changing body position       Patient will demonstrate moving sit to stand 8+x in 30 sec (Progressing)       Start:  02/12/25    Expected End:  04/11/25               Functional outcome       Patient will show a significant change in FOTO patient-reported outcome tool to demonstrate subjective improvement (Progressing)       Start:  02/12/25    Expected End:  04/11/25            Patient will demonstrate independence in home program for support of progression (Progressing)       Start:  02/12/25    Expected End:  03/14/25               Pain       Patient will report pain of 4/10 max demonstrating a reduction of overall pain (Progressing)       Start:  02/12/25    Expected  End:  03/14/25               Range of Motion       Patient will achieve right knee ROM of  3-120 degrees  (Progressing)       Start:  02/12/25    Expected End:  03/14/25               Strength       Patient will achieve bilateral hip abduction strength of 4+/5 (Progressing)       Start:  02/12/25    Expected End:  04/11/25            Patient will improve bilateral knee flexion strength by 5kg  (Progressing)       Start:  02/12/25    Expected End:  04/11/25            Patient will improve right knee extension strength by 5kg (Progressing)       Start:  02/12/25    Expected End:  04/11/25               cervical goals       patient will improve cervical rotation 10 degrees (Progressing)       Start:  02/18/25    Expected End:  04/11/25            patient will improve cervical extension to 30 deg (Progressing)       Start:  02/18/25    Expected End:  04/11/25                Radha Lares, PT, DPT

## 2025-03-07 ENCOUNTER — CLINICAL SUPPORT (OUTPATIENT)
Dept: REHABILITATION | Facility: HOSPITAL | Age: 70
End: 2025-03-07
Payer: MEDICARE

## 2025-03-07 DIAGNOSIS — M17.11 PRIMARY OSTEOARTHRITIS OF RIGHT KNEE: ICD-10-CM

## 2025-03-07 DIAGNOSIS — R29.898 DECREASED STRENGTH OF LOWER EXTREMITY: Primary | ICD-10-CM

## 2025-03-07 DIAGNOSIS — M25.661 DECREASED RANGE OF MOTION (ROM) OF RIGHT KNEE: ICD-10-CM

## 2025-03-07 DIAGNOSIS — R29.898 DECREASED RANGE OF MOTION OF NECK: ICD-10-CM

## 2025-03-07 DIAGNOSIS — M54.2 CERVICALGIA: ICD-10-CM

## 2025-03-07 PROCEDURE — 97140 MANUAL THERAPY 1/> REGIONS: CPT | Mod: PN

## 2025-03-07 PROCEDURE — 97112 NEUROMUSCULAR REEDUCATION: CPT | Mod: PN

## 2025-03-07 NOTE — PROGRESS NOTES
"  Outpatient Rehab    Physical Therapy Visit    Patient Name: Celso Hernandez  MRN: [id number]  YOB: 1955  Encounter Date: 3/7/2025    Therapy Diagnosis:   Encounter Diagnoses   Name Primary?    Decreased strength of lower extremity Yes    Primary osteoarthritis of right knee     Decreased range of motion (ROM) of right knee     Decreased range of motion of neck     Cervicalgia        Physician: Casie Miguel DO    Physician Orders: Eval and Treat  Medical Diagnosis: M17.11 (ICD-10-CM) - Primary osteoarthritis of right knee   M54.2 (ICD-10-CM) - Cervicalgia   M25.569 (ICD-10-CM) - Knee pain, unspecified chronicity, unspecified laterality       Visit # / Visits Authorized:  6 / 20  Date of Evaluation:  2/12/2025   Insurance Authorization Period: 2/6/25 to 2/6/26  Plan of Care Certification:  2/12/2025 to 4/11/25      Time In: 1000   Time Out: 1055  Total Time: 55   Total Billable Time: 30 min 1:1    FOTO:  Intake Score: 40%  Survey Score 1: 42%  Survey Score 2:  %         Subjective   he overdid it with the neck exercises and is more sore today. mild hip and knee pain.  Pain reported as 2/10. 2/10 Bilateral knees, 2/10 L hip; 2/10 neck L > R.    Objective            Treatment:  Therapeutic Exercise  Therapeutic Exercise Activity 1: Seated heel slides 5" 2 x 10  Therapeutic Exercise Activity 2: snags 15x rotation, +extension 20x  Therapeutic Exercise Activity 4: DKTC peanut 20x    Manual Therapy  Manual Therapy Activity 1: STM/MFR to cervical paraspinals/ bilateral UT  Manual Therapy Activity 5: cervical mobilizations side glide/upglide grade 3    Balance/Neuromuscular Re-Education  Balance/Neuromuscular Re-Education Activity 1: Quad sets 5" 2 x 10 B  Balance/Neuromuscular Re-Education Activity 2: Hip abd iso black TB 2' 5"  Balance/Neuromuscular Re-Education Activity 4: Chin Tucks 5" 2 x10  Balance/Neuromuscular Re-Education Activity 6: Serratus punch 2 x 10 4#  Balance/Neuromuscular Re-Education " Activity 7: hooklying marches black TB 2x10    Therapeutic Activity  Therapeutic Activity 1: leg press 5 plates  2x10    Assessment & Plan   Assessment: Patient is a 69 y.o. M with a medical diagnosis of M17.11 (ICD-10-CM) - Primary osteoarthritis of right knee. additional referral for M54.2 (ICD-10-CM) - Cervicalgia  M25.569 (ICD-10-CM) - Knee pain, unspecified chronicity, unspecified laterality.  pt demonstrating chronic neck pain with mobility deficits and chronic hip and knee pain. significant cervical joint mobility restrictions present, L > R.  increased neck irritability today. incorporated manual joint mobilization techniques to improve ROM. continuing to work on hip and knee mobility and LE strengthening with no increased pain with progressions today. will continue to benefit from skilled PT to address ongoing impairments.  Evaluation/Treatment Tolerance: Patient tolerated treatment well    Patient will continue to benefit from skilled outpatient physical therapy to address the deficits listed in the problem list box on initial evaluation, provide pt/family education and to maximize pt's level of independence in the home and community environment.     Patient's spiritual, cultural, and educational needs considered and patient agreeable to plan of care and goals.           Plan: cont POC with addition of cervical mobility and postural strengthening    Goals:   Active       Changing body position       Patient will demonstrate moving sit to stand 8+x in 30 sec (Progressing)       Start:  02/12/25    Expected End:  04/11/25               Functional outcome       Patient will show a significant change in FOTO patient-reported outcome tool to demonstrate subjective improvement (Progressing)       Start:  02/12/25    Expected End:  04/11/25            Patient will demonstrate independence in home program for support of progression (Progressing)       Start:  02/12/25    Expected End:  03/14/25               Pain        Patient will report pain of 4/10 max demonstrating a reduction of overall pain (Progressing)       Start:  02/12/25    Expected End:  03/14/25               Range of Motion       Patient will achieve right knee ROM of  3-120 degrees  (Progressing)       Start:  02/12/25    Expected End:  03/14/25               Strength       Patient will achieve bilateral hip abduction strength of 4+/5 (Progressing)       Start:  02/12/25    Expected End:  04/11/25            Patient will improve bilateral knee flexion strength by 5kg  (Progressing)       Start:  02/12/25    Expected End:  04/11/25            Patient will improve right knee extension strength by 5kg (Progressing)       Start:  02/12/25    Expected End:  04/11/25               cervical goals       patient will improve cervical rotation 10 degrees (Progressing)       Start:  02/18/25    Expected End:  04/11/25            patient will improve cervical extension to 30 deg (Progressing)       Start:  02/18/25    Expected End:  04/11/25                Radha Lares, PT, DPT

## 2025-03-11 ENCOUNTER — OFFICE VISIT (OUTPATIENT)
Dept: OTOLARYNGOLOGY | Facility: CLINIC | Age: 70
End: 2025-03-11
Payer: MEDICARE

## 2025-03-11 ENCOUNTER — TELEPHONE (OUTPATIENT)
Dept: SLEEP MEDICINE | Facility: CLINIC | Age: 70
End: 2025-03-11
Payer: MEDICARE

## 2025-03-11 ENCOUNTER — LAB VISIT (OUTPATIENT)
Dept: LAB | Facility: HOSPITAL | Age: 70
End: 2025-03-11
Attending: OTOLARYNGOLOGY
Payer: MEDICARE

## 2025-03-11 VITALS
BODY MASS INDEX: 41.31 KG/M2 | DIASTOLIC BLOOD PRESSURE: 97 MMHG | SYSTOLIC BLOOD PRESSURE: 145 MMHG | WEIGHT: 271.69 LBS | HEART RATE: 84 BPM

## 2025-03-11 DIAGNOSIS — J31.0 CHRONIC RHINITIS: Primary | Chronic | ICD-10-CM

## 2025-03-11 DIAGNOSIS — J31.0 CHRONIC RHINITIS: ICD-10-CM

## 2025-03-11 DIAGNOSIS — J34.2 DEVIATED NASAL SEPTUM: ICD-10-CM

## 2025-03-11 DIAGNOSIS — J34.3 HYPERTROPHY OF BOTH INFERIOR NASAL TURBINATES: ICD-10-CM

## 2025-03-11 PROCEDURE — 86003 ALLG SPEC IGE CRUDE XTRC EA: CPT | Mod: 59 | Performed by: OTOLARYNGOLOGY

## 2025-03-11 PROCEDURE — 99999 PR PBB SHADOW E&M-EST. PATIENT-LVL III: CPT | Mod: PBBFAC,,, | Performed by: OTOLARYNGOLOGY

## 2025-03-11 PROCEDURE — 86003 ALLG SPEC IGE CRUDE XTRC EA: CPT | Performed by: OTOLARYNGOLOGY

## 2025-03-11 PROCEDURE — 36415 COLL VENOUS BLD VENIPUNCTURE: CPT | Performed by: OTOLARYNGOLOGY

## 2025-03-11 RX ORDER — LEVOCETIRIZINE DIHYDROCHLORIDE 5 MG/1
5 TABLET, FILM COATED ORAL NIGHTLY
Qty: 30 TABLET | Refills: 11 | Status: SHIPPED | OUTPATIENT
Start: 2025-03-11 | End: 2026-03-11

## 2025-03-11 RX ORDER — FLUTICASONE PROPIONATE 50 MCG
1 SPRAY, SUSPENSION (ML) NASAL 2 TIMES DAILY
Qty: 11.1 ML | Refills: 11 | Status: SHIPPED | OUTPATIENT
Start: 2025-03-11 | End: 2025-04-10

## 2025-03-11 RX ORDER — AZELASTINE 1 MG/ML
1 SPRAY, METERED NASAL 2 TIMES DAILY
Qty: 30 ML | Refills: 11 | Status: SHIPPED | OUTPATIENT
Start: 2025-03-11 | End: 2025-04-10
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
## 2025-03-11 NOTE — PATIENT INSTRUCTIONS
The patient was given a prescription for a nasal steroid and/or nasal antihistamine nasal spray and we discussed in detail the proper mechanism of use directing the spray away from the nasal septum.  The patient was also instructed to take a daily oral antihistamine (Claritin, Zyrtec, Allegra, or Xyzal).    In addition, we also discussed that it will take 3-4 weeks of daily use to achieve maximal effectiveness.  The patient will please call in 3-4 weeks with their progress.      Allergy testing ordered and referral made.     How do you use a Nasal Corticosteroid Spray?    Make sure you understand your dosing instructions. Spray only the number of prescribed sprays in each nostril. Read the package instructions before using your spray the first time.    Most corticosteroid sprays suggest the following steps:    Wash your hands well.    Gently blow your nose to clear the passageway.    Shake the container several times.    Tilt your head slightly downward.  Use the opposite hand from the nostril you will be spraying to hold the spray bottle.    Block one nostril with your finger.  Insert the nasal applicator into the other nostril.    Aim the spray toward the outer wall of the nostril.  Inhale slowly through the nose and press the .    Breathe out and repeat to apply the prescribed number of sprays.  Repeat these steps for the other nostril.     Avoid sneezing or blowing your nose right after spraying.

## 2025-03-12 ENCOUNTER — CLINICAL SUPPORT (OUTPATIENT)
Dept: REHABILITATION | Facility: HOSPITAL | Age: 70
End: 2025-03-12
Payer: MEDICARE

## 2025-03-12 DIAGNOSIS — R29.898 DECREASED STRENGTH OF LOWER EXTREMITY: Primary | ICD-10-CM

## 2025-03-12 PROCEDURE — 97112 NEUROMUSCULAR REEDUCATION: CPT | Mod: PN,CQ

## 2025-03-12 PROCEDURE — 97110 THERAPEUTIC EXERCISES: CPT | Mod: PN,CQ

## 2025-03-12 NOTE — PROGRESS NOTES
"  Outpatient Rehab    Physical Therapy Visit    Patient Name: Celso Hernandez  MRN: [id number]  YOB: 1955  Encounter Date: 3/12/2025    Therapy Diagnosis:   Encounter Diagnosis   Name Primary?    Decreased strength of lower extremity Yes     Physician: Casie Miguel DO    Physician Orders: Eval and Treat  Medical Diagnosis: M17.11 (ICD-10-CM) - Primary osteoarthritis of right knee   M54.2 (ICD-10-CM) - Cervicalgia   M25.569 (ICD-10-CM) - Knee pain, unspecified chronicity, unspecified laterality       Visit # / Visits Authorized:  7 / 20  Date of Evaluation:  2/12/2025   Insurance Authorization Period: 2/6/25 to 2/6/26  Plan of Care Certification:  2/12/2025 to 4/11/25      Time In: 1000   Time Out: 1055  Total Time: 55   Total Billable Time: 30 min 1:1    FOTO:  Intake Score:  %  Survey Score 1:  %  Survey Score 2:  %         Subjective   No pain at knee hip or neck, primary complaint of stiffness in R knee and neck..  Pain reported as 0/10. 0/10 Bilateral knees, 0/10 L hip; 0/10 neck L > R.    Objective            Treatment:  Therapeutic Exercise  Therapeutic Exercise Activity 1: Bike 5'  Therapeutic Exercise Activity 2: Seated heel slides 5" 2 x 10  Therapeutic Exercise Activity 3: seated lumbar flexion 10x peanut  Therapeutic Exercise Activity 4: DKTC peanut 20x  Therapeutic Exercise Activity 5: Lunge at stairs 2 x 10    Balance/Neuromuscular Re-Education  Balance/Neuromuscular Re-Education Activity 1: Quad sets 5" 2 x 10 B  Balance/Neuromuscular Re-Education Activity 2: Hip abd iso black TB 2' 5"  Balance/Neuromuscular Re-Education Activity 3: LAQ 2 x 10 RTB  Balance/Neuromuscular Re-Education Activity 4: Chin Tucks 5" 2 x10  Balance/Neuromuscular Re-Education Activity 6: Serratus punch 2 x 10 4#    Therapeutic Activity  Therapeutic Activity 1: STS 2 x 10    Assessment & Plan   Assessment: Patient is a 69 y.o. M with a medical diagnosis of M17.11 (ICD-10-CM) - Primary osteoarthritis of right " knee. additional referral for M54.2 (ICD-10-CM) - Cervicalgia  M25.569 (ICD-10-CM) - Knee pain, unspecified chronicity, unspecified laterality.  pt demonstrating chronic neck pain with mobility deficits and chronic hip and knee pain. significant cervical joint mobility restrictions present, L > R. Interval: Pain overall minimal to none, primary complaint of stiffness in neck and right knee. Focused session more on mobility in tolerable range. Good tolerance reporting improved symptoms post session. will continue to benefit from skilled PT to address ongoing impairments.       Patient will continue to benefit from skilled outpatient physical therapy to address the deficits listed in the problem list box on initial evaluation, provide pt/family education and to maximize pt's level of independence in the home and community environment.     Patient's spiritual, cultural, and educational needs considered and patient agreeable to plan of care and goals.           Plan: cont POC with addition of cervical mobility and postural strengthening    Goals:   Active       Changing body position       Patient will demonstrate moving sit to stand 8+x in 30 sec (Progressing)       Start:  02/12/25    Expected End:  04/11/25               Functional outcome       Patient will show a significant change in FOTO patient-reported outcome tool to demonstrate subjective improvement (Progressing)       Start:  02/12/25    Expected End:  04/11/25            Patient will demonstrate independence in home program for support of progression (Progressing)       Start:  02/12/25    Expected End:  03/14/25               Pain       Patient will report pain of 4/10 max demonstrating a reduction of overall pain (Progressing)       Start:  02/12/25    Expected End:  03/14/25               Range of Motion       Patient will achieve right knee ROM of  3-120 degrees  (Progressing)       Start:  02/12/25    Expected End:  03/14/25               Strength        Patient will achieve bilateral hip abduction strength of 4+/5 (Progressing)       Start:  02/12/25    Expected End:  04/11/25            Patient will improve bilateral knee flexion strength by 5kg  (Progressing)       Start:  02/12/25    Expected End:  04/11/25            Patient will improve right knee extension strength by 5kg (Progressing)       Start:  02/12/25    Expected End:  04/11/25               cervical goals       patient will improve cervical rotation 10 degrees (Progressing)       Start:  02/18/25    Expected End:  04/11/25            patient will improve cervical extension to 30 deg (Progressing)       Start:  02/18/25    Expected End:  04/11/25                Lucy Gaming, PTA

## 2025-03-13 ENCOUNTER — TELEPHONE (OUTPATIENT)
Dept: INTERNAL MEDICINE | Facility: CLINIC | Age: 70
End: 2025-03-13
Payer: MEDICARE

## 2025-03-13 DIAGNOSIS — G47.9 SLEEP DISTURBANCE: Primary | ICD-10-CM

## 2025-03-13 NOTE — TELEPHONE ENCOUNTER
Lov 10/17/24, nov 4/3/25  I told him we should put in a referral just in case  Insurance requires.  He has [facility]..    He saw dr ent dr treviño to dr sugey Griffith,. Thinks he needs a referral but  Looks like  ent  already put in referral.    Dr treviño also said he should have sleep study  To rule out apnea , wife witnessed he stopped  Breathing during sleep. Has appt 3/20  If ok with you, need referral entered so we can  schedule.    Please advise once order approved.  Referral pended.  Need help w/ dx.    Thanks hussein

## 2025-03-13 NOTE — TELEPHONE ENCOUNTER
----- Message from Amanda sent at 3/13/2025  2:38 PM CDT -----  Contact: [phone]  [email or web address] Patient is calling for Medical Advice regarding: Pt called and stated that he enrolled in the [facility] and wanted to know if he needs to see a specialist does he need a referral How long has patient had these symptoms:Pharmacy name and phone#:Patient wants a call back or [facility]: Call back Comments:Please advise patient replies from provider may take up to 48 hours.

## 2025-03-14 ENCOUNTER — CLINICAL SUPPORT (OUTPATIENT)
Dept: REHABILITATION | Facility: HOSPITAL | Age: 70
End: 2025-03-14
Payer: MEDICARE

## 2025-03-14 DIAGNOSIS — R29.898 DECREASED STRENGTH OF LOWER EXTREMITY: Primary | ICD-10-CM

## 2025-03-14 LAB
AMER SYCAMORE IGE QN: <0.1 KU/L
FEATHER PANEL #2: <0.1 KU/L

## 2025-03-14 PROCEDURE — 97112 NEUROMUSCULAR REEDUCATION: CPT | Mod: KX,PN

## 2025-03-14 PROCEDURE — 97530 THERAPEUTIC ACTIVITIES: CPT | Mod: KX,PN

## 2025-03-14 NOTE — PROGRESS NOTES
"  Outpatient Rehab    Physical Therapy Visit    Patient Name: Celso Hernandez  MRN: [id number]  YOB: 1955  Encounter Date: 3/14/2025    Therapy Diagnosis:   Encounter Diagnosis   Name Primary?    Decreased strength of lower extremity Yes     Physician: Casie Miguel DO    Physician Orders: Eval and Treat  Medical Diagnosis: M17.11 (ICD-10-CM) - Primary osteoarthritis of right knee   M54.2 (ICD-10-CM) - Cervicalgia   M25.569 (ICD-10-CM) - Knee pain, unspecified chronicity, unspecified laterality       Visit # / Visits Authorized:  8/ 20  Date of Evaluation:  2/12/2025   Insurance Authorization Period: 2/6/25 to 2/6/26  Plan of Care Certification:  2/12/2025 to 4/11/25      Time In: 1000   Time Out: 1100  Total Time: 60   Total Billable Time: 60    FOTO:  Intake Score:  %  Survey Score 1:  %  Survey Score 2:  %         Subjective   Continued stiffness in the AM in right knee and neck..  Pain reported as 0/10. 0/10 Bilateral knees, 0/10 L hip; 0/10 neck L > R.    Objective            Treatment:  Therapeutic Exercise  TE 1: Bike 7'  TE 6: SNAGS/repeated left rotation seated x 10 reps L, 5'' hold  TE 7: Cervical extension with anterior glide(use of towel) x 10 reps, 5'' hold         Balance/Neuromuscular Re-Education  NMR 1: Quad sets 5" 3x 10 B  NMR 3: LAQ 3 x 10 RTB  NMR 5: upper cervical nod 2x10 (seated)    Therapeutic Activity  TA 1: STS 2 x 10  TA 2: +Step-ups, 4'' step 2x10 reps B  TA 3: +Side-step ups, 2x10 reps.    Assessment & Plan   Assessment: Patient is a 69 y.o. M with a medical diagnosis of M17.11 (ICD-10-CM) - Primary osteoarthritis of right knee. additional referral for M54.2 (ICD-10-CM) - Cervicalgia  M25.569 (ICD-10-CM) - Knee pain, unspecified chronicity, unspecified laterality.  No pain to report today in the neck or right knee, just soreness/stiffness.  Addressed functional strength via step-ups, sit<>stand and cervical deep neck flexor activation and AROM with repeated motion with " Addended by: MO FRANCO on: 10/31/2022 12:19 PM     Modules accepted: Orders     overpressure at end-range into painful ranges (left rotation and extension). Improved independent motion with less pain thereafter. Continue POC.  Evaluation/Treatment Tolerance: Patient tolerated treatment well    Patient will continue to benefit from skilled outpatient physical therapy to address the deficits listed in the problem list box on initial evaluation, provide pt/family education and to maximize pt's level of independence in the home and community environment.     Patient's spiritual, cultural, and educational needs considered and patient agreeable to plan of care and goals.           Plan: cont POC with addition of cervical mobility and postural strengthening    Goals:   Active       Changing body position       Patient will demonstrate moving sit to stand 8+x in 30 sec (Progressing)       Start:  02/12/25    Expected End:  04/11/25               Functional outcome       Patient will show a significant change in FOTO patient-reported outcome tool to demonstrate subjective improvement (Progressing)       Start:  02/12/25    Expected End:  04/11/25            Patient will demonstrate independence in home program for support of progression (Progressing)       Start:  02/12/25    Expected End:  03/14/25               Pain       Patient will report pain of 4/10 max demonstrating a reduction of overall pain (Progressing)       Start:  02/12/25    Expected End:  03/14/25               Range of Motion       Patient will achieve right knee ROM of  3-120 degrees  (Progressing)       Start:  02/12/25    Expected End:  03/14/25               Strength       Patient will achieve bilateral hip abduction strength of 4+/5 (Progressing)       Start:  02/12/25    Expected End:  04/11/25            Patient will improve bilateral knee flexion strength by 5kg  (Progressing)       Start:  02/12/25    Expected End:  04/11/25            Patient will improve right knee extension strength by 5kg (Progressing)       Start:   02/12/25    Expected End:  04/11/25               cervical goals       patient will improve cervical rotation 10 degrees (Progressing)       Start:  02/18/25    Expected End:  04/11/25            patient will improve cervical extension to 30 deg (Progressing)       Start:  02/18/25    Expected End:  04/11/25                Cyndy Weathers, PT

## 2025-03-15 LAB
A ALTERNATA IGE QN: 1 KU/L
A FUMIGATUS IGE QN: 0.15 KU/L
ALLERGEN BOXELDER MAPLE TREE IGE: <0.1 KU/L
ALLERGEN MULBERRY TREE IGE: <0.1 KU/L
ALLERGEN PIGWEED IGE: <0.1 KU/L
ALLERGEN WHITE ASH TREE IGE: 0.29 KU/L
BALD CYPRESS IGE QN: 1.33 KU/L
BERMUDA GRASS IGE QN: 2.01 KU/L
C HERBARUM IGE QN: <0.1 KU/L
CAT DANDER IGE QN: 0.28 KU/L
CEDAR IGE QN: 0.66 KU/L
COCKLEBUR IGE QN: 0.64 KU/L
COMMON RAGWEED IGE QN: 1.26 KU/L
D FARINAE IGE QN: 46.6 KU/L
D PTERONYSS IGE QN: 38.9 KU/L
DEPRECATED CEDAR IGE RAST QL: ABNORMAL
DEPRECATED M RACEMOSUS IGE RAST QL: NORMAL
DEPRECATED TIMOTHY IGE RAST QL: ABNORMAL
DOG DANDER IGE QN: 5.03 KU/L
ELDER IGE QN: 0.29 KU/L
ELM CEDAR, IGE: <0.1 KU/L
ENGL PLANTAIN IGE QN: <0.1 KU/L
GOOSEFOOT IGE QN: <0.1 KU/L
JOHNSON GRASS IGE QN: 1.36 KU/L
KENT BLUE GRASS IGE QN: 1.68 KU/L
M RACEMOSUS IGE QN: <0.1 KU/L
MUGWORT IGE QN: 0.21 KU/L
NETTLE IGE QN: 0.11 KU/L
P NOTATUM IGE QN: 0.11 KU/L
PECAN/HICK TREE IGE QN: <0.1 KU/L
RAST CLASS: ABNORMAL
RAST CLASS: NORMAL
SHEEP SORREL IGE QN: <0.1 KU/L
SILVER BIRCH IGE QN: <0.1 KU/L
TIMOTHY IGE QN: 0.6 KU/L
WHITE OAK IGE QN: <0.1 KU/L

## 2025-03-18 ENCOUNTER — OFFICE VISIT (OUTPATIENT)
Dept: ALLERGY | Facility: CLINIC | Age: 70
End: 2025-03-18
Payer: MEDICARE

## 2025-03-18 ENCOUNTER — RESULTS FOLLOW-UP (OUTPATIENT)
Dept: OTOLARYNGOLOGY | Facility: CLINIC | Age: 70
End: 2025-03-18

## 2025-03-18 VITALS — BODY MASS INDEX: 41.23 KG/M2 | WEIGHT: 272.06 LBS | HEIGHT: 68 IN

## 2025-03-18 DIAGNOSIS — J30.89 ALLERGIC RHINITIS DUE TO DUST MITE: Primary | ICD-10-CM

## 2025-03-18 DIAGNOSIS — J30.1 SEASONAL ALLERGIC RHINITIS DUE TO POLLEN: ICD-10-CM

## 2025-03-18 PROCEDURE — 1159F MED LIST DOCD IN RCRD: CPT | Mod: CPTII,S$GLB,, | Performed by: ALLERGY & IMMUNOLOGY

## 2025-03-18 PROCEDURE — 99999 PR PBB SHADOW E&M-EST. PATIENT-LVL III: CPT | Mod: PBBFAC,,, | Performed by: ALLERGY & IMMUNOLOGY

## 2025-03-18 PROCEDURE — 3288F FALL RISK ASSESSMENT DOCD: CPT | Mod: CPTII,S$GLB,, | Performed by: ALLERGY & IMMUNOLOGY

## 2025-03-18 PROCEDURE — 1101F PT FALLS ASSESS-DOCD LE1/YR: CPT | Mod: CPTII,S$GLB,, | Performed by: ALLERGY & IMMUNOLOGY

## 2025-03-18 PROCEDURE — 99204 OFFICE O/P NEW MOD 45 MIN: CPT | Mod: S$GLB,,, | Performed by: ALLERGY & IMMUNOLOGY

## 2025-03-18 PROCEDURE — 3008F BODY MASS INDEX DOCD: CPT | Mod: CPTII,S$GLB,, | Performed by: ALLERGY & IMMUNOLOGY

## 2025-03-18 PROCEDURE — 1126F AMNT PAIN NOTED NONE PRSNT: CPT | Mod: CPTII,S$GLB,, | Performed by: ALLERGY & IMMUNOLOGY

## 2025-03-18 NOTE — PROGRESS NOTES
Labs reviewed and significant allergies noted to trees, weeds, molds, dust mites, grasses, and lesser sensitivities to dogs and cats.  I would recommend an evaluation by allergy/immunology, as already ordered.    Keep follow up with me as planned after allergy evaluation and sleep medicine evaluation.      Stacey Liu MD  [facility]

## 2025-03-18 NOTE — PROGRESS NOTES
Subjective:       Patient ID: Celso Hernandez is a 69 y.o. male.    Chief Complaint:  Allergic Rhinitis , Sinus Problem, Nasal Congestion, and Allergies      HPI:   Patient's symptoms include clear rhinorrhea, nasal congestion, postnasal drip, and sneezing. These symptoms are perennial. Current triggers include exposure to weather changes and pets, spicy food/pepper . Sx's worse inside. The patient has been suffering from these symptoms for approximately 20 years. Nasal congestion worse in last 3-4 years The patient has tried  prn flonase, allegra D in past  with inadequate relief of symptoms. Immunotherapy has never been tried. The patient has never had nasal polyps. The patient has no history of asthma. The patient has a history of eczema. Comes and goes. The patient does not suffer from frequent sinopulmonary infections. The patient has had sinus surgery in the past. Distant sinuplasty. Noted improvement for 6-8 months            Environmental History: Pets in the home: none.  Epi: hardwood floors and [id number] carpeting  Tobacco Smoke in Home: no    Past Medical History:   Diagnosis Date    Arthritis     BPH (benign prostatic hyperplasia)     Cataract     Colon polyps     Diabetes mellitus type II     Glaucoma     Hyperlipidemia     Hypertension     Multiple duodenal ulcers 10/08/2019    Unspecified hemorrhoids without mention of complication          Family History   Problem Relation Name Age of Onset    Hypertension Mother      Stroke Mother      Hyperlipidemia Mother      Glaucoma Mother      Diabetes Father      Cancer Sister          breast    Hypertension Sister      Heart disease Sister      Hearing loss Sister      No Known Problems Daughter x2     No Known Problems Son x1     Amblyopia Neg Hx      Blindness Neg Hx      Cataracts Neg Hx      Macular degeneration Neg Hx      Retinal detachment Neg Hx      Strabismus Neg Hx           Review of Systems   Constitutional:  Negative for activity  change, fatigue and fever.   HENT:  Positive for congestion and postnasal drip. Negative for rhinorrhea, sinus pressure and sneezing.    Eyes:  Negative for discharge, redness and itching.   Respiratory:  Negative for cough, shortness of breath and wheezing.    Cardiovascular:  Negative for chest pain.   Gastrointestinal:  Negative for constipation, diarrhea, nausea and vomiting.   Genitourinary:  Negative for difficulty urinating.   Musculoskeletal:  Negative for joint swelling and myalgias.   Skin:  Negative for rash.   Neurological:  Negative for headaches.   Hematological:  Does not bruise/bleed easily.   Psychiatric/Behavioral:  Negative for behavioral problems and sleep disturbance.           Objective:   Physical Exam  Constitutional:       General: He is not in acute distress.     Appearance: He is well-developed. He is not diaphoretic.   HENT:      Head: Normocephalic and atraumatic.      Right Ear: Tympanic membrane and external ear normal.      Left Ear: Tympanic membrane and external ear normal.      Nose: Congestion present.      Mouth/Throat:      Pharynx: No oropharyngeal exudate.   Eyes:      General:         Right eye: No discharge.         Left eye: No discharge.      Conjunctiva/sclera: Conjunctivae normal.   Neck:      Thyroid: No thyromegaly.   Cardiovascular:      Rate and Rhythm: Normal rate and regular rhythm.   Pulmonary:      Effort: Pulmonary effort is normal. No respiratory distress.      Breath sounds: Normal breath sounds. No wheezing.   Abdominal:      General: Bowel sounds are normal. There is no distension.      Palpations: Abdomen is soft.      Tenderness: There is no abdominal tenderness.   Musculoskeletal:         General: Normal range of motion.      Cervical back: Normal range of motion and neck supple.   Lymphadenopathy:      Cervical: No cervical adenopathy.   Skin:     General: Skin is warm.      Findings: No erythema or rash.   Neurological:      Mental Status: He is alert  "and oriented to person, place, and time.      Motor: No abnormal muscle tone.   Psychiatric:         Behavior: Behavior normal.         Thought Content: Thought content normal.         Judgment: Judgment normal.             No results found for: "IGGSERUM", "IGM", "IGA"     No results found for: "IGE"    Eos #   Date Value Ref Range Status   04/04/2024 0.4 0.0 - 0.5 K/uL Final   09/26/2023 0.3 0.0 - 0.5 K/uL Final   03/27/2023 0.2 0.0 - 0.5 K/uL Final     Eosinophil %   Date Value Ref Range Status   04/04/2024 3.6 0.0 - 8.0 % Final   09/26/2023 2.8 0.0 - 8.0 % Final   03/27/2023 0.9 0.0 - 8.0 % Final        Latest Reference Range & Units 03/11/25 09:50   A. alternata IgE <0.10 kU/L 1.00 (H)   Altern. alternata Class  CLASS 2   Aspergillus Fumigatus IgE <0.10 kU/L 0.15 (H)   A. fumigatus Class  CLASS 0/1   Allergen Sheep [place] ([place]) IgE <0.10 kU/L <0.10   Allergen Sheep [place] ([place]) Class  CLASS 0   Bald Cougar Class  CLASS 2   [place] Grass IgE <0.10 kU/L 2.01 (H)   B[place] Grass Class  CLASS 2   [place] Maple Tree IgE <0.10 kU/L <0.10   Allergen Maple ([place]) Class  CLASS 0   Cat Dander IgE <0.10 kU/L 0.28 (H)   Cat Epithelium Class  CLASS 0/1   St. Mary IgE <0.10 kU/L 0.66 (H)   [place]. Mary Class  CLASS 1   Cladosporium Class  CLASS 0   Cladosporium IgE <0.10 kU/L <0.10   Cocklebur Class  CLASS 1   Cocklebur IgE <0.10 kU/L 0.64 (H)   Cougar <0.10 kU/L 1.33 (H)   D. farinae <0.10 kU/L 46.60 (H)   D. farinae Class  CLASS 4   D. pteronyssinus Dust Mite IgE <0.10 kU/L 38.90 (H)   D. pteronyssinus Class  CLASS 4   Dog Dander IgE <0.10 kU/L 5.03 (H)   Dog Dander Class  CLASS 3   Elm [place], IgE <0.10 kU/L <0.10   Elm [place] Class  CLASS 0   English Plantain IgE <0.10 kU/L <0.10   English Plantain Class  CLASS 0   Feather Panel #2 <0.70 kU/L <0.10   [place] Grass IgE <0.10 kU/L 1.36 (H)   Pedro Grass Class  CLASS 2   Lamb Quarters IgE <0.10 kU/L <0.10   Allergen [place] Quarters Class  CLASS 0   Marshelder IgE " <0.10 kU/L 0.29 (H)   Marshelder Class  CLASS 0/1   Meadow Grass ([place]), IgE <0.10 kU/L 1.68 (H)   Meadow Grass ([place]) Class  CLASS 2   Mucor racemosus, IgE <0.10 kU/L <0.10   Mucor racemosus Class  CLASS 0   [place] Tree IgE <0.10 kU/L <0.10   Allergen [place] Class  CLASS 0   MUGWORT <0.10 kU/L 0.21 (H)   Mugwort Class  CLASS 0/1   NETTLE <0.10 kU/L 0.11 (H)   Nettle Class  CLASS 0/1   [place], Class  CLASS 0   Pecan [place] Tree IgE <0.10 kU/L <0.10   Pecan, Class  CLASS 0   Penicillium IgE <0.10 kU/L 0.11 (H)   Penicillium Class  CLASS 0/1   Pigweed IgE <0.10 kU/L <0.10   Common Pigweed Class  CLASS 0   Ragweed, Short, Class  CLASS 2   Ragweed, Short, IgE <0.10 kU/L 1.26 (H)   RAST Allergen for [place] <0.70 kU/L <0.10   Silver Birch Tree IgE <0.10 kU/L <0.10   Silver Birch Class  CLASS 0   [place] Grass IgE <0.10 kU/L 0.60 (H)   Estuardo Grass Class  CLASS 1   White [place] Tree IgE <0.10 kU/L 0.29 (H)   White [place] Class  CLASS 0/1   [place] Tree IgE <0.10 kU/L <0.10   (H): Data is abnormally high    Assessment:       1. Allergic rhinitis due to dust mite    2. Seasonal allergic rhinitis due to pollen         Plan:       Flonase 2 sen twice daily  Astelin 2 sen twice daily  Prn levocetirizine  [place] use nasal saline  Dust mite precautions  Reviewed test results w pt  Fu 4-6 weeks

## 2025-03-19 ENCOUNTER — CLINICAL SUPPORT (OUTPATIENT)
Dept: REHABILITATION | Facility: HOSPITAL | Age: 70
End: 2025-03-19
Payer: MEDICARE

## 2025-03-19 DIAGNOSIS — R29.898 DECREASED STRENGTH OF LOWER EXTREMITY: Primary | ICD-10-CM

## 2025-03-19 PROCEDURE — 97530 THERAPEUTIC ACTIVITIES: CPT | Mod: PN,CQ

## 2025-03-19 PROCEDURE — 97112 NEUROMUSCULAR REEDUCATION: CPT | Mod: PN,CQ

## 2025-03-19 NOTE — PROGRESS NOTES
"  Outpatient Rehab    Physical Therapy Visit    Patient Name: Celso Hernandez  MRN: [id number]  YOB: 1955  Encounter Date: 3/19/2025    Therapy Diagnosis:   Encounter Diagnosis   Name Primary?    Decreased strength of lower extremity Yes     Physician: Casie Miguel DO    Physician Orders: Eval and Treat  Medical Diagnosis: Primary osteoarthritis of right knee  Cervicalgia    Visit # / Visits Authorized:  9 / 20  Date of Evaluation: 2/12/2025   Insurance Authorization Period: 2/14/2025 to 12/31/2025  Plan of Care Certification:  2/12/2025 to 4/11/25      PT/PTA: PTA   Number of PTA visits since last PT visit:1  Time In: 1000   Time Out: 1055  Total Time: 55   Total Billable Time: 30 min 1:1    FOTO:  Intake Score:  %  Survey Score 1:  %  Survey Score 2:  %         Subjective   Most of stiffness is in lower back and hips. Neck has been feeling better lately..  Pain reported as 0/10. 0/10 Bilateral knees, 0/10 L hip; 0/10 neck L > R.    Objective            Treatment:  Therapeutic Exercise  TE 1: Bike 7'  TE 3: seated lumbar flexion 10x peanut  TE 6: SNAGS/repeated left rotation seated x 10 reps L, 5'' hold  TE 7: Cervical extension with anterior glide(use of towel) x 10 reps, 5'' hold  Balance/Neuromuscular Re-Education  NMR 1: Quad sets 5" 3x 10 B  NMR 3: LAQ 3 x 10 RTB  NMR 4: Chin Tucks 5" 2 x10  NMR 5: upper cervical nod 2x10 (seated)  Therapeutic Activity  TA 1: STS 2 x 10  TA 2: Step-ups, 4'' step 2x10 reps B  TA 3: Side-step ups, 2x10 reps.    Time Entry(in minutes):  Neuromuscular Re-Education Time Entry: 15  Therapeutic Activity Time Entry: 10  Therapeutic Exercise Time Entry: 5    Assessment & Plan   Assessment: Patient is a 69 y.o. M with a medical diagnosis of M17.11 (ICD-10-CM) - Primary osteoarthritis of right knee. additional referral for M54.2 (ICD-10-CM) - Cervicalgia  M25.569 (ICD-10-CM) - Knee pain, unspecified chronicity, unspecified laterality.  No pain to report today in the " neck or right knee, just soreness/stiffness. Tolerating treatment well focused on addressing impairments. Continue POC.       Patient will continue to benefit from skilled outpatient physical therapy to address the deficits listed in the problem list box on initial evaluation, provide pt/family education and to maximize pt's level of independence in the home and community environment.     Patient's spiritual, cultural, and educational needs considered and patient agreeable to plan of care and goals.           Plan: cont POC with addition of cervical mobility and postural strengthening    Goals:   Active       Changing body position       Patient will demonstrate moving sit to stand 8+x in 30 sec (Progressing)       Start:  02/12/25    Expected End:  04/11/25               Functional outcome       Patient will show a significant change in FOTO patient-reported outcome tool to demonstrate subjective improvement (Progressing)       Start:  02/12/25    Expected End:  04/11/25            Patient will demonstrate independence in home program for support of progression (Progressing)       Start:  02/12/25    Expected End:  03/14/25               Pain       Patient will report pain of 4/10 max demonstrating a reduction of overall pain (Progressing)       Start:  02/12/25    Expected End:  03/14/25               Range of Motion       Patient will achieve right knee ROM of  3-120 degrees  (Progressing)       Start:  02/12/25    Expected End:  03/14/25               Strength       Patient will achieve bilateral hip abduction strength of 4+/5 (Progressing)       Start:  02/12/25    Expected End:  04/11/25            Patient will improve bilateral knee flexion strength by 5kg  (Progressing)       Start:  02/12/25    Expected End:  04/11/25            Patient will improve right knee extension strength by 5kg (Progressing)       Start:  02/12/25    Expected End:  04/11/25               cervical goals       patient will improve  cervical rotation 10 degrees (Progressing)       Start:  02/18/25    Expected End:  04/11/25            patient will improve cervical extension to 30 deg (Progressing)       Start:  02/18/25    Expected End:  04/11/25                Lucy Gaming, PTA

## 2025-03-20 ENCOUNTER — OFFICE VISIT (OUTPATIENT)
Dept: SLEEP MEDICINE | Facility: CLINIC | Age: 70
End: 2025-03-20
Attending: PSYCHIATRY & NEUROLOGY
Payer: MEDICARE

## 2025-03-20 VITALS
HEART RATE: 80 BPM | SYSTOLIC BLOOD PRESSURE: 158 MMHG | BODY MASS INDEX: 41.08 KG/M2 | DIASTOLIC BLOOD PRESSURE: 91 MMHG | WEIGHT: 270.19 LBS

## 2025-03-20 DIAGNOSIS — G25.81 RLS (RESTLESS LEGS SYNDROME): ICD-10-CM

## 2025-03-20 DIAGNOSIS — G47.33 OSA (OBSTRUCTIVE SLEEP APNEA): Primary | ICD-10-CM

## 2025-03-20 DIAGNOSIS — G47.9 SLEEP DISTURBANCE: ICD-10-CM

## 2025-03-20 PROCEDURE — 99999 PR PBB SHADOW E&M-EST. PATIENT-LVL IV: CPT | Mod: PBBFAC,,,

## 2025-03-20 NOTE — PATIENT INSTRUCTIONS
I have sent an order for a CPAP machine to [facility] (E phone [phone]).      [facility] will check for approval from your insurance.  The machine and supplies cost about $1000.  It is covered by insurance, but they can let you know how much you would have to pay out of pocket before you get your machine. They will arrange setting you up with your machine and will guide you through the process of choosing the correct size and type of CPAP mask. It usually takes about 2 weeks for a machine to become available. You can receive updates on the status of your machine by calling [facility] (phone [phone]).    When getting used to CPAP, for the first 4 weeks, focus on putting the mask on every night for at least 15 minutes to give yourself a chance to fall asleep with it. As you get used to the mask and the pressure, it will grow more comfortable, the quality of your sleep should improve, and you should be able to wear it for longer and longer periods of time. Insurance companies will often require that by the end of 90 days, you are sleeping with the machine for more than 4 hours on 70% nights over a 30 day period.  If you have difficulty with your mask leaking or causing discomfort, please contact the equipment supplier to try a different style of mask.    NOTE:    You will need a follow up visit 31-90 days after receiving the machine to ensure effective treatment and compliance for insurance purposes    Sincerely,  Rohit Parr NP

## 2025-03-20 NOTE — PROGRESS NOTES
CC: Snoring, Sleep Apnea, and Apnea     Referred by Pcp, No for a sleep evaluation.     HPI     Subjective    HPI:  Sleep Apnea:  Symptoms:    [x] Loud snoring[x] Witnessed apneas [x] Gasping [x] Choking[x] Interrupted Sleep [x]  Nocturia [x]  Non refreshing sleep [x] Excessive daytime sleepiness   []  Morning headaches [x] Nasal Congestion [] Dry Mouth [] Excessive Daytime Fatigue [] None  Duration: 10  [] Days  [] Months  [x] Years  Comments: previously diagnosed with MAKI 2005, setup with CPAP, unable to tolerate due to mask discomfort/pressure. Machine was destroyed in derek, never followed up after that. Would like to resume cpap therapy due to continued symptoms.     Wake up Feeling: [] Refreshed  [x] Non refreshed [] Occasionally Tired  Do You Take Naps: [x] Yes [] No Number of Naps: 1     Restless Legs Syndrome (RLS):  Symptoms:     [x] Uncomfortable leg sensations at night[x] Urge to move legs while resting/sleeping [] Nocturnal leg cramps [] None   Duration:    [] Days  [] Months  [x] Years   Frequency:  [] Occasionally [] Times per week [x] Daily [] Weekly [] Rarely  Comments: episodes usually last around 5 minutes, stretches which helps       The patient was screened and denies symptoms concerning: MAKI and RLS    Daytime Impact:  Falling Asleep: at Work/School [x] Yes [] No   Falling Asleep While Driving: [x] Yes [] No   Interfering With:    [x] Short term memory [x] Concentration  [] Work  [] Social life [] None        3/20/2025    10:04 AM   EPWORTH SLEEPINESS SCALE TOTAL SCORE    Total score 15        Proxy-reported     (Validated Sleepiness Questionnaire with higher score indicating greater sleepiness (0-24)    STOP BANG Questionnaire  Patient diagnosed with Obstructive Sleep Apnea?: No  Has loud snoring: Yes  Disturbed sleep, daytime fatigue, daytime somnolence: Yes  Observed to have interrupted breathing during sleep: Yes  Takes medication for high blood pressure: Yes  Not taking BP medication but  supposed to be: No  Recent BMI (Calculated): 41.4  Is BMI greater than 35 kg/m2?: 1=Yes  Age older than 50 years old?: 1=Yes  Has large neck size >40cm (15.7in., large male shirt size, large male collar size >16): Yes  Gender - Male: 1=Yes  STOP-Bang Total Score: 8  (Validated Stop Bang Questionnaire with higher score indicating greater risk of MAKI (0-2, 3-4, 5-8)) Risk: High        3/20/2025    10:03 AM   Sleep Clinic New Patient   What time do you go to bed on a week day? (Give a range) 9-11pm   What time do you go to bed on a day off? (Give a range) 9-11pm   How long does it take you to fall asleep? (Give a range) 5-15mins   On average, how many times per night do you wake up? 6   How long does it take you to fall back into sleep? (Give a range) 0-10mins   What time do you wake up to start your day on a week day? (Give a range) 7-9am   What time do you wake up to start your day on a day off? (Give a range) 7-9am   What time do you get out of bed? (Give a range) 7-9am   On average, how many hours do you sleep? 8hrs   On average, how many naps do you take per day? 1   Rate your sleep quality from 0 to 5 (0-poor, 5-great). 3   Have you experienced:  Weight gain?   How much weight have you lost or gained (in lbs.) in the last year? 15lbs   On average, how many times per night do you go to the bathroom?  4   Have you ever had a sleep study/CPAP machine/surgery for sleep apnea? Yes   Have you ever had a CPAP machine for sleep apnea? Yes   Have you ever had surgery for sleep apnea? No       Current Sleep Medication(s): None     Previous Sleep Medication(s): None     Sleep Factors:  Sleep Environment: Cool, Dark, and Comfortable  Caffeine: 1 beverages, last beverage at  am  Bed Partner: spouse, living together  Alcohol: none  Sleep Disorder Family History: None  Sleep Problems: naps during the day        Objective    Records Reviewed:   Lab Results   Component Value Date    TSH 1.802 10/10/2024    CO2 20 (L) 10/10/2024  "   HGBA1C 5.7 (H) 10/10/2024      No echocardiogram results found for the past 12 months  Sleep Studies: Diagnostic: Date: 2005, .Split Night Study, AHI: Not Reported, RDI: 55.1, Min O2: 86%, severe MAKI, CPAP at 12 cm H20 resolved sleep apnea    Objective:  Vitals: Blood pressure (!) 158/91, pulse 80, weight 122.6 kg (270 lb 3.2 oz).   Exam:  Mallampati Score: IV (only hard palate visible)  Neck: Size:  17"  Gen: Well Appearing, demonstrates insight  Skin: No rash or lesions on bridge of nose or mouth          Assessment & Plan  Sleep disturbance    Orders:    Ambulatory referral/consult to Sleep Disorders    MAKI (obstructive sleep apnea)  Ordered new CPAP today, may need re qualification study since baseline study is from 2005. Will order PSG if CPAP machine denied.     Follow up 3 months for compliance visit    Diagnostic Testing:  Polysomnogram (PSG) is indicated due to comorbid conditions: Uncontrolled HTN, Obesity, and Diabetes with symptoms: snoring, gasping for air, witnessed apneas, interrupted sleep, nocturia, restless sleep, non refreshing sleep, excessive daytime sleepiness, ESS: 15/24, difficulty with focusing, and short term memory concerns  Education & Treatment Options:  Discussed the etiology and consequences of untreated MAKI, including risks of cardiovascular disease, hypertension, stroke, metabolic dysfunction, and excessive daytime sleepiness.  Reviewed treatment options:  Positive Airway Pressure (PAP) therapy, Weight loss Positional therapy, Oral appliance therapy, Inspire therapy (hypoglossal nerve stimulation)   Lifestyle Modifications: Advised weight management, alcohol reduction, and optimizing sleep hygiene.  Safety Precautions: Recommended avoiding driving if experiencing excessive daytime sleepiness.  Next Steps:  If MAKI is confirmed, patient agrees to trial APAP  If PAP therapy is initiated, follow-up within 31-90 days to assess compliance and effectiveness.  Results will be communicated " by Mychart   Orders:    CPAP FOR HOME USE    RLS (restless legs syndrome)  Will treat sleep apnea and then reassess in 3 months during compliance visit.   Discussed:   Stretching  Consider Iron Panel  Consider trial of gabapentin if symptoms worsened/unresolved.

## 2025-03-21 ENCOUNTER — OFFICE VISIT (OUTPATIENT)
Dept: PAIN MEDICINE | Facility: CLINIC | Age: 70
End: 2025-03-21
Payer: MEDICARE

## 2025-03-21 ENCOUNTER — CLINICAL SUPPORT (OUTPATIENT)
Dept: REHABILITATION | Facility: HOSPITAL | Age: 70
End: 2025-03-21
Payer: MEDICARE

## 2025-03-21 VITALS
HEART RATE: 86 BPM | SYSTOLIC BLOOD PRESSURE: 153 MMHG | WEIGHT: 270.81 LBS | DIASTOLIC BLOOD PRESSURE: 87 MMHG | BODY MASS INDEX: 41.04 KG/M2 | HEIGHT: 68 IN

## 2025-03-21 DIAGNOSIS — M79.641 PAIN IN BOTH HANDS: ICD-10-CM

## 2025-03-21 DIAGNOSIS — R29.898 DECREASED STRENGTH OF LOWER EXTREMITY: Primary | ICD-10-CM

## 2025-03-21 DIAGNOSIS — M17.11 PRIMARY OSTEOARTHRITIS OF RIGHT KNEE: Primary | ICD-10-CM

## 2025-03-21 DIAGNOSIS — M79.642 PAIN IN BOTH HANDS: ICD-10-CM

## 2025-03-21 DIAGNOSIS — M16.0 PRIMARY OSTEOARTHRITIS OF BOTH HIPS: ICD-10-CM

## 2025-03-21 DIAGNOSIS — E11.42 DIABETIC PERIPHERAL NEUROPATHY: ICD-10-CM

## 2025-03-21 PROCEDURE — 97112 NEUROMUSCULAR REEDUCATION: CPT | Mod: PN,CQ

## 2025-03-21 PROCEDURE — 97110 THERAPEUTIC EXERCISES: CPT | Mod: PN,CQ

## 2025-03-21 PROCEDURE — 99999 PR PBB SHADOW E&M-EST. PATIENT-LVL III: CPT | Mod: PBBFAC,,, | Performed by: NURSE PRACTITIONER

## 2025-03-21 PROCEDURE — 97530 THERAPEUTIC ACTIVITIES: CPT | Mod: PN,CQ

## 2025-03-21 NOTE — PROGRESS NOTES
"  Outpatient Rehab    Physical Therapy Visit    Patient Name: Celso Hernandez  MRN: [id number]  YOB: 1955  Encounter Date: 3/21/2025    Therapy Diagnosis:   Encounter Diagnosis   Name Primary?    Decreased strength of lower extremity Yes     Physician: Casie Miguel DO    Physician Orders: Eval and Treat  Medical Diagnosis: Primary osteoarthritis of right knee  Cervicalgia    Visit # / Visits Authorized:  10 / 20  Date of Evaluation: 2/12/2025   Insurance Authorization Period: 2/14/2025 to 12/31/2025  Plan of Care Certification:  2/12/2025 to 4/11/25      PT/PTA: PTA   Number of PTA visits since last PT visit:2  Time In: 1000   Time Out: 1055  Total Time: 55   Total Billable Time: 55 min 1:1    FOTO:  Intake Score:  %  Survey Score 1:  %  Survey Score 2:  %         Subjective   Primary complaint of bilateral hip soreness. Mild neck stiffness.  Pain reported as 0/10. 0/10 Bilateral knees, 0/10 L hip; 0/10 neck L > R.    Objective            Treatment:  Therapeutic Exercise  TE 1: Bike 7'  TE 3: seated lumbar flexion 10x3 peanut  TE 6: SNAGS/repeated left rotation seated x 10 reps L, 5'' hold  TE 7: Cervical extension with anterior glide(use of towel) x 10 reps, 5'' hold  Balance/Neuromuscular Re-Education  NMR 1: Cybex knee extension 15# 2 x 10 dL  NMR 2: Chin Tucks 5" 2 x10  Therapeutic Activity  TA 1: STS 2 x 10 yellow weighted ball  TA 2: Step-ups, 4'' step 2x10 reps B  TA 3: Side-step ups, 2x10 reps.    Time Entry(in minutes):  Neuromuscular Re-Education Time Entry: 10  Therapeutic Activity Time Entry: 15  Therapeutic Exercise Time Entry: 30    Assessment & Plan   Assessment: Patient is a 69 y.o. M with a medical diagnosis of M17.11 (ICD-10-CM) - Primary osteoarthritis of right knee. additional referral for M54.2 (ICD-10-CM) - Cervicalgia  M25.569 (ICD-10-CM) - Knee pain, unspecified chronicity, unspecified laterality.  No pain to report today in the neck or right knee, just soreness/stiffness. " Addition of cybex knee extension machine with fair tolerance but brief shooting pain on right hip with final repetitions. Completely reslolved following rest break. All other exercises tolerated well. Continue POC.       Patient will continue to benefit from skilled outpatient physical therapy to address the deficits listed in the problem list box on initial evaluation, provide pt/family education and to maximize pt's level of independence in the home and community environment.     Patient's spiritual, cultural, and educational needs considered and patient agreeable to plan of care and goals.           Plan: cont POC with addition of cervical mobility and postural strengthening    Goals:   Active       Changing body position       Patient will demonstrate moving sit to stand 8+x in 30 sec (Progressing)       Start:  02/12/25    Expected End:  04/11/25               Functional outcome       Patient will show a significant change in FOTO patient-reported outcome tool to demonstrate subjective improvement (Progressing)       Start:  02/12/25    Expected End:  04/11/25            Patient will demonstrate independence in home program for support of progression (Progressing)       Start:  02/12/25    Expected End:  03/14/25               Pain       Patient will report pain of 4/10 max demonstrating a reduction of overall pain (Progressing)       Start:  02/12/25    Expected End:  03/14/25               Range of Motion       Patient will achieve right knee ROM of  3-120 degrees  (Progressing)       Start:  02/12/25    Expected End:  03/14/25               Strength       Patient will achieve bilateral hip abduction strength of 4+/5 (Progressing)       Start:  02/12/25    Expected End:  04/11/25            Patient will improve bilateral knee flexion strength by 5kg  (Progressing)       Start:  02/12/25    Expected End:  04/11/25            Patient will improve right knee extension strength by 5kg (Progressing)       Start:   02/12/25    Expected End:  04/11/25               cervical goals       patient will improve cervical rotation 10 degrees (Progressing)       Start:  02/18/25    Expected End:  04/11/25            patient will improve cervical extension to 30 deg (Progressing)       Start:  02/18/25    Expected End:  04/11/25                Lucy Gaming, PTA

## 2025-03-21 NOTE — PROGRESS NOTES
[facility] Established Clinic Visit    Referred by: Dr. Inder Quarles   Reason for referral: * No diagnoses found *     CC:   Chief Complaint   Patient presents with    Follow-up    Knee Pain     Rt      Interval Update 03/21/2026: Patient return to clinic for follow-up on right knee pain. He has previously ordered x-rays of his hips and knees which we will review. Additionally patient reporting numbness in hands and fingers that has been going on intermittently for several years, worse in the am once he moves hands and fingers numbness dissipates. He reports not feeling numbness during the day.  Patient denies any recent incident or trauma denies any bowel or bladder function denies saddle anesthesia he continues to participate in physical therapy which is helping his knees and hip pain.    Interval Update 02/06/2025:   Patient return to clinic for right knee pain.  Pain worse in the medial aspect of the right knee.  Incident occurred 3 days ago, patient was going down the stairs and his right knee gave out, he fell to the ground onto the right knee.  He states he fell onto grass.  Denies hitting his head.  Denies loss of consciousness.  That day the pain was significant, but has since improved. Patient made the appointment and wanted to come in just to make sure everything is alright. Patient has never had this happen before, he has no h/o surgeries or injections to the R knee. Patient continues to take meloxicam which is helping his joint pain. He does note pain in the mornings in his bilateral knee joints.     When the pain was at its worse, he describes it as sharp anterior knee pain that became aching. He also endorses a strain sensation to his medial knee.     Patient also complains of intermittent hip pain, worse in the left.  He also complains of axial neck pain.  Denies any radicular symptoms.    Interval History 10/10/23:  Celso Hernandez returns today for follow up for right  gluteal/buttocks pain that has been ongoing for the last 3-4 months.  He is intermittent pain described as soreness pain is worse when sitting pain is mitigated when shifting weight off of his right gluteal area.  Denies any radiating symptoms down either leg the worst of his pain is 3/4/10.  He denies any recent incident or trauma denies any bowel bladder dysfunction,.  Patient states that he does a lot of sitting as he is a  he also watches television during the day for 2-3 hours every day.  He does take occasionally meloxicam 7.5 which she states does help.      Current Pain Scales:  Current: 0/10              Typical Range: 0-4/10     Interval History(08/09/2023):  Celso Hernandez returns today for follow up for neck and upper back pain.  He reports completing physical therapy and has now establish a home exercise plan at home.  He states that his neck pain is stable at the moment and is doing fine.    Currently, the neck pain is stable.        Current Pain Scales:  Current: 0/10              Typical Range: 2-4/10     Interval Updates  05/08/[id number]-hhff-old male presents today with neck and upper back pain.  Onset 5-6 days pain is specifically located at the base of his neck and radiates into his head.  Denies any headaches.  Pain is constant, pain is described as a strain and pulling he denies any radicular symptoms in either arm , denies any profound weakness, denies any paresthesia like symptoms.  Pain is aggravated with certain movements and laying down.  Pain is mitigated with ibuprofen 800 mg t.i.d..  He denies any recent incident or trauma he does state that he felt like he slept wrong and woke up the next morning feeling pain in the neck and into his head.    Interval Updates:   2/10/2022 - Mr. Hernandez is following up for  Low-back Pain and Mid-back Pain is currently rate 0/10 with a weekly range 2-5/10.  It is described as Aching, Grabbing and Tight.  He  Is reporting mild to no  "symptoms today, he states he has no pain when performing normal ADLs, he reports pain is exacerbated when he  "tries to do to much" He did also report midback /thoroacic pain with mild twisting but pain is stable at this point.     1/19/2022  -  Mary returns to clinic s/p Thoracic Epidural Steroid Injection at T5-6 on 1/4/22 with 60% relief.  He reports a pain intensity 1/10 today with a weekly range of 1-2/10.   He is reporting mild soarness in his thoracic area, he is reporting being " free of pain after the injection" his pain returned after working outside in his garage yesterday. Overall much better following his injection. He is reporting certain movements increase his pain but other than that he  is better.     Subjective:   Celso Hernandez is a 69 y.o. male who has a past medical history of Arthritis, BPH (benign prostatic hyperplasia), Cataract, Colon polyps, Diabetes mellitus type II, Glaucoma, Hyperlipidemia, Hypertension, Multiple duodenal ulcers, and Unspecified hemorrhoids without mention of complication. He complains of pain as described below.    Location:  Right gluteal pain  Onset:  3-4 months  Radiation:  None  Timing: constant  Current Pain Score: 7/10  Weekly Pain Range: 2-10/10  Quality:  Pulling and strain type pain  Worsened by: exercise, extension, lifting, lying down, sitting and walking for more than several minutes  Improved by: medications ibuprofen 800 mg    Previous Therapies:  PT/OT: Denies for neck pain  HEP: Yes, stretching at home and walking.  TENS: No  Interventions:  01/04/2022 Thoracic Epidural Steroid Injection at T5-6  Surgery: Denies localized surgery  Opioids:  Adjuvants:     Current Pain Medications:  Ibuprofen 800 mg t.i.d.     Assessment & Plan:  Problem List Items Addressed This Visit    None        12/17/21 - Celso Hernandez is a 69 y.o. male who  has a past medical history of Arthritis, BPH (benign prostatic hyperplasia), Cataract, Colon polyps, Diabetes mellitus " type II, Glaucoma, Hyperlipidemia, Hypertension, Multiple duodenal ulcers (10/08/2019), and Unspecified hemorrhoids without mention of complication.  By history and examination this patient has chronic mid/upper back pain with right T5-6 radiculopathy.  The underlying cause cause is facet arthritis and degenerative disc disease with a large, obstructive osteophyte on the right side as seen on CT chest.  MRI is appropriate prior to an injections to assess soft tissue and vasculature.  We discussed the underlying diagnoses and multiple treatment options including non-opioid medications, opioid medications, interventional procedures, physical therapy and home exercise.  The risks and benefits of each treatment option were discussed and all questions were answered.        1/19/2022- Celso Hernandez is a 69 y.o. male who  has a past medical history of Arthritis, BPH (benign prostatic hyperplasia), Cataract, Colon polyps, Diabetes mellitus type II, Glaucoma, Hyperlipidemia, Hypertension, Multiple duodenal ulcers (10/08/2019), and Unspecified hemorrhoids without mention of complication.  By history and examination this patient has chronic mid/upper back pain with a right T5-6   Radiculopathy that responded well following a thoracic GABRIELA targeting T5-6.  Colonic causes appear to be facet arthritis and degenerative disc disease with a large obstructive osteophyte on the right side.  Upon review of his thoracic MRI ordered per Dr. Mejia results did show a extradural degenerative changes resulting in right T6 neural foraminal stenosis.  Secondly intradural extramedullary T2 signal abnormality at the level of the T4-5.  The radiologist include a differential diagnosis that included a nerve sheet to were such as a  schwannoma or a neurofibroma as well as other neoplasms.  Radiologist recommended further evaluation of this patient's thoracic area, discussed with the patient today that I will order a thoracic MRI with contrast  to further evaluate. Long discussion with patient regarding  Results of his MRI, I will also refer to Oncology for further evaluation but I would like this patient to see a college E following a hip obtaining new images.    02/10/7354-25-tuyg-old male with history of chronic mid to low back pain, he presents today to review an updated thoracic MRI.  There was concerned per radiology on previous thoracic MRI with differential diagnosis that includes a nerve sheet to wear such as a schwannoma or a neurofibroma as well as other neoplasms.  I contacted Oncology and did not appear to be a malignancy.  Was a non malignant entity.  He contacted patient and made him aware of this.  Today his pain is 0/10, he does experience mild midthoracic low back pain when performing duties outside of his normal ADLs.  I recommended physical therapy patient is amenable to this plan.    05/08/[id number]-rrnf-old male presents today acute/subacute neck pain beginning at the patient's his neck radiating his head.  Etiology is unclear at this point.  Based on history the patient may be suffering facet arthritis of the cervical, differential diagnosis be myofascial pain.  Cause of his symptoms are unclear he did state that he slept wrong on a pillow and woke up the next morning with this pain.  He denies any radiating symptoms do not suspect any degenerative disc disease or central and/or neural foraminal stenosis.  Ibuprofen has helped his pain prior to come into clinic today but it only lasts 4-5 hours.  He and I discussed plan below and was agreed upon during our clinic visit today.    08/09/2023 68-year-old pleasant male neck pain upper back pain.  He reports having attended physical therapy and completing.  Based on my history and exam his pain generators were from facet arthritis and he also had a cervical myofascial component to his pain which both have improved through physical therapy.  I recommended he establish a home exercise plan  patient verbalized understanding and agreed.    10/10/2023- 68-year-old male that is known to our clinic presents with right gluteal/buttocks pain that has been ongoing for the last 3-4 months.  Based on my history and exam the patient is experiencing gluteal pain my differential diagnosis will include sacroiliitis right-sided on.  He has no radicular symptoms he denies any paresthesia like symptoms.  I do believe the patient does a lot of sitting during the day I think would benefit him to establish home exercise plan that focuses on gluteal stretches which I will provide him with today.    2/6/2024 - Patient presents to clinic for R knee pain after a mechanical fall. Patient initially experienced sharp anterior knee pain that has resolved over the past few days. On exam, patient has mild tenderness to palpation and negative anterior drawer. Discussed proceeding with conservative management which the patient is on board with. RICE for knee pain.  He also complains chronic hip pain that radiates to the groin worse in the left.  He also complains of axial cervical pain consistent with facet arthropathy.  We discussed interventional procedures for these chronic pains including intra-articular hip injections as well as cervical medial branch blocks/RFA.  Patient would prefer to treat conservatively with physical therapy for now.    03/21/[id number]-zefg-old pleasant male with a history of chronic bilateral hip pain and right knee pain.  Currently participating in physical therapy based on history and exam patient is experiencing right knee pain related to osteoarthritis and hip pain likely related to osteoarthritis.  He is participating in physical therapy which is helping his pain did also present with numbness and tingling intermittently in his hands feet which is likely related to diabetic peripheral neuropathy due to his history of diabetes.  The numbness and tingling in his pain 8 are not significant he reports them  worse in the morning and then dissipates throughout the day.  We discussed considering gabapentin however he refused this today.  Discuss specific exercises to help with diabetic peripheral neuropathy.  Recommended he continue and complete physical therapy see plan agreed upon below.      Treatment Plan:   Procedures: We discussed interventional procedures for these chronic pains including intra-articular hip injections as well as cervical medial branch blocks/RFA.  PT/OT/HEP:  Continuing I have stressed the importance of physical activity and a home exercise plan to help with pain and improve health.  Referral placed to physical therapy.  Medications:  May have to consider gabapentin if diabetic peripheral neuropathy gets worse in his hands and feet   - no new medications prescribed at this visit.  Educated patient on chronic use of Mobic.   -  Reviewed and consistent with medication use as prescribed.  Imaging:  Previous imaging reviewed and discussed in detail.   Follow Up: RTC 3-4 months or sooner if needed    ÁNGEL Cabrales  [facility]        Imagin[facility] MRI Thoracic Spine W WO Contrast     Narrative & Impression  EXAMINATION:  MRI THORACIC SPINE W WO CONTRAST     CLINICAL HISTORY:  Attention to T4-5 level.  Evaluate for underlying mass.     TECHNIQUE:  Pre and postcontrast MR imaging of the thoracic spine was performed utilizing 10 mL Gadavist intravenous contrast.     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     No advanced marrow edema or osseous destructive process is identified.  Multilevel mild disc bulging flattens the thecal sac without cord impingement.  Marked degenerative foraminal stenosis on the left at T 4-5 and to the right at T5-6 is again noted.    "  The spinal cord maintains normal signal intensity.     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     No enhancing lesion.  Degenerative foraminal stenosis on the left at T4-5 and on the right at T5-6.        Electronically signed by: Trent Graves  Date:                                            01/24/2022  Time:                                           17:11        CT Chest Without Contrast  Impression:  No worrisome finding on the chest CT.  Mild atherosclerotic plaque of the aorta and mild coronary artery calcification  Severe foraminal narrowing on the right at T5-6 due to significant facet joint osseous hypertrophy.  Electronically signed by: aHlley Lundberg MD  Date:                                            09/29/2021  Time:                                           08:56    X-Ray Lumbar Spine Ap And Lateral 11/21/2019  FINDINGS:  Two views AP lateral.  There is mild DJD and DISH.  Alignment is normal.  No fracture dislocation bone destruction.  No trauma seen.  Impression:  No acute process seen.  DJD and DISH.      Review of Systems:  Review of Systems   Constitutional:  Negative for chills and fever.   HENT:  Negative for nosebleeds.    Eyes:  Negative for blurred vision and pain.   Respiratory:  Negative for hemoptysis.    Cardiovascular:  Negative for chest pain and palpitations.   Gastrointestinal:  Negative for heartburn, nausea and vomiting.   Genitourinary:  Negative for dysuria and hematuria.   Musculoskeletal:  Positive for joint pain. Negative for myalgias.   Skin:  Negative for rash.   Neurological:  Negative for seizures and loss of consciousness.   Endo/Heme/Allergies:  Does not bruise/bleed easily.   Psychiatric/Behavioral:  Negative for hallucinations.        Physical Exam:  Vitals:    03/21/25 1317   BP: (!) 153/87   Pulse: 86   Weight: 122.8 kg (270 lb 13.4 oz)   Height: 5' 8" (1.727 m)   PainSc:   2 "   PainLoc: Knee         General    Nursing note and vitals reviewed.  Constitutional: He is oriented to person, place, and time. He appears well-developed and well-nourished. No distress.   HENT:   Head: Normocephalic and atraumatic.   Nose: Nose normal.   Eyes: Conjunctivae and EOM are normal. Pupils are equal, round, and reactive to light. Right eye exhibits no discharge. Left eye exhibits no discharge. No scleral icterus.   Neck: No JVD present.   Cardiovascular:  Intact distal pulses.            Pulmonary/Chest: Effort normal. No respiratory distress.   Abdominal: He exhibits no distension.   Neurological: He is alert and oriented to person, place, and time. Coordination normal.   Psychiatric: He has a normal mood and affect. His behavior is normal. Judgment and thought content normal.     General Musculoskeletal Exam   Gait: normal       Right Knee Exam     Inspection   Erythema: absent  Scars: absent  Swelling: present  Deformity: absent  Bruising: absent    Tenderness   The patient is tender to palpation of the medial joint line and MCL.    Range of Motion   The patient has normal right knee ROM.    Tests   Ligament Examination   Lachman: normal (-1 to 2mm)   PCL-Posterior Drawer: normal (0 to 2mm)     Patella   Passive Patellar Tilt: neutral  Patellar Tracking: normalBack (L-Spine & T-Spine) / Neck (C-Spine) Exam     Back (L-Spine & T-Spine) Range of Motion   Extension:  normal   Flexion:  normal   Lateral bend right:  normal Back lateral bend right: leaning to the right exacerbates pain.  Lateral bend left:  normal   Rotation right:  normal   Rotation left:  normal       Muscle Strength   Right Lower Extremity   Hip Abduction: 5/5   Quadriceps:  5/5   Hamstrin/5   Left Lower Extremity   Hip Abduction: 5/5   Quadriceps:  5/5   Hamstrin/5

## 2025-03-25 ENCOUNTER — DOCUMENTATION ONLY (OUTPATIENT)
Dept: REHABILITATION | Facility: HOSPITAL | Age: 70
End: 2025-03-25
Payer: MEDICARE

## 2025-03-25 NOTE — PROGRESS NOTES
Physical therapist and physical therapy assistant(s) met face to face to discuss patient's treatment plan and progress towards established goals. Pt will be seen by a physical therapist minimally every 6th visit or every 30 days.    Lucy Gaming PTA

## 2025-03-26 ENCOUNTER — CLINICAL SUPPORT (OUTPATIENT)
Dept: REHABILITATION | Facility: HOSPITAL | Age: 70
End: 2025-03-26
Payer: MEDICARE

## 2025-03-26 DIAGNOSIS — H40.1132 PRIMARY OPEN ANGLE GLAUCOMA (POAG) OF BOTH EYES, MODERATE STAGE: ICD-10-CM

## 2025-03-26 DIAGNOSIS — R29.898 DECREASED STRENGTH OF LOWER EXTREMITY: Primary | ICD-10-CM

## 2025-03-26 PROCEDURE — 97112 NEUROMUSCULAR REEDUCATION: CPT | Mod: PN,CQ

## 2025-03-26 PROCEDURE — 97110 THERAPEUTIC EXERCISES: CPT | Mod: PN,CQ

## 2025-03-26 PROCEDURE — 97530 THERAPEUTIC ACTIVITIES: CPT | Mod: PN,CQ

## 2025-03-26 NOTE — PROGRESS NOTES
"  Outpatient Rehab    Physical Therapy Visit    Patient Name: Celso Hernandez  MRN: [id number]  YOB: 1955  Encounter Date: 3/26/2025    Therapy Diagnosis:   Encounter Diagnosis   Name Primary?    Decreased strength of lower extremity Yes     Physician: Casie Miguel DO    Physician Orders: Eval and Treat  Medical Diagnosis: Primary osteoarthritis of right knee  Cervicalgia    Visit # / Visits Authorized:  11 / 20  Insurance Authorization Period: 2/14/2025 to 12/31/2025  Date of Evaluation: 2/12/2025   Plan of Care Certification: 2/12/2025 to 4/11/25      PT/PTA: PTA   Number of PTA visits since last PT visit:3  Time In: 1000   Time Out: 1055  Total Time: 55   Total Billable Time: 55    FOTO:  Intake Score:  %  Survey Score 1:  %  Survey Score 2:  %         Subjective   Primary complaint of bilateral hip soreness. Mild neck stiffness.  Pain reported as 0/10. 0/10 Bilateral knees, 0/10 L hip; 0/10 neck L > R.    Objective            Treatment:  Therapeutic Exercise  TE 1: Bike 7'  TE 3: seated lumbar flexion 10x3 peanut  TE 5: Lunge at stairs 2 x 10  TE 6: SNAGS/repeated left rotation seated x 10 reps L, 5'' hold  TE 7: Cervical extension with anterior glide(use of towel) x 10 reps, 5'' hold  Balance/Neuromuscular Re-Education  NMR 1: Cybex knee extension 15# 2 x 10 dL  NMR 2: Chin Tucks 5" 2 x10  NMR 3: Hip abd iso 30" x 5 (90 sec rest break between sets)  Therapeutic Activity  TA 1: STS 2 x 10 yellow weighted ball  TA 2: Step-ups, 4'' step 2x10 reps B    Time Entry(in minutes):  Neuromuscular Re-Education Time Entry: 15  Therapeutic Activity Time Entry: 15  Therapeutic Exercise Time Entry: 25    Assessment & Plan   Assessment: Patient is a 69 y.o. M with a medical diagnosis of M17.11 (ICD-10-CM) - Primary osteoarthritis of right knee. additional referral for M54.2 (ICD-10-CM) - Cervicalgia  M25.569 (ICD-10-CM) - Knee pain, unspecified chronicity, unspecified laterality.  No pain to report today in " the neck or right knee, just soreness/stiffness. Treatment continues to consist of addressing cervical mobility along with hip and quad strengthening for better tolerance to functional activities. Continue POC.  Evaluation/Treatment Tolerance: Patient tolerated treatment well    Patient will continue to benefit from skilled outpatient physical therapy to address the deficits listed in the problem list box on initial evaluation, provide pt/family education and to maximize pt's level of independence in the home and community environment.     Patient's spiritual, cultural, and educational needs considered and patient agreeable to plan of care and goals.           Plan: cont POC with addition of cervical mobility and postural strengthening    Goals:   Active       Changing body position       Patient will demonstrate moving sit to stand 8+x in 30 sec (Progressing)       Start:  02/12/25    Expected End:  04/11/25               Functional outcome       Patient will show a significant change in FOTO patient-reported outcome tool to demonstrate subjective improvement (Progressing)       Start:  02/12/25    Expected End:  04/11/25            Patient will demonstrate independence in home program for support of progression (Progressing)       Start:  02/12/25    Expected End:  03/14/25               Pain       Patient will report pain of 4/10 max demonstrating a reduction of overall pain (Progressing)       Start:  02/12/25    Expected End:  03/14/25               Range of Motion       Patient will achieve right knee ROM of  3-120 degrees  (Progressing)       Start:  02/12/25    Expected End:  03/14/25               Strength       Patient will achieve bilateral hip abduction strength of 4+/5 (Progressing)       Start:  02/12/25    Expected End:  04/11/25            Patient will improve bilateral knee flexion strength by 5kg  (Progressing)       Start:  02/12/25    Expected End:  04/11/25            Patient will improve right  knee extension strength by 5kg (Progressing)       Start:  02/12/25    Expected End:  04/11/25               cervical goals       patient will improve cervical rotation 10 degrees (Progressing)       Start:  02/18/25    Expected End:  04/11/25            patient will improve cervical extension to 30 deg (Progressing)       Start:  02/18/25    Expected End:  04/11/25                Lucy Gaming, PTA

## 2025-03-27 ENCOUNTER — LAB VISIT (OUTPATIENT)
Dept: LAB | Facility: HOSPITAL | Age: 70
End: 2025-03-27
Attending: INTERNAL MEDICINE
Payer: MEDICARE

## 2025-03-27 DIAGNOSIS — E11.69 TYPE 2 DIABETES MELLITUS WITH HYPERLIPIDEMIA: ICD-10-CM

## 2025-03-27 DIAGNOSIS — E78.5 TYPE 2 DIABETES MELLITUS WITH HYPERLIPIDEMIA: ICD-10-CM

## 2025-03-27 DIAGNOSIS — E11.9 TYPE 2 DIABETES MELLITUS WITHOUT COMPLICATION: ICD-10-CM

## 2025-03-27 DIAGNOSIS — E78.2 MIXED HYPERLIPIDEMIA: ICD-10-CM

## 2025-03-27 DIAGNOSIS — I10 ESSENTIAL HYPERTENSION: ICD-10-CM

## 2025-03-27 LAB
ABSOLUTE EOSINOPHIL (OHS): 0.33 K/UL
ABSOLUTE MONOCYTE (OHS): 1.36 K/UL (ref 0.3–1)
ABSOLUTE NEUTROPHIL COUNT (OHS): 5.71 K/UL (ref 1.8–7.7)
ALBUMIN SERPL BCP-MCNC: 3.6 G/DL (ref 3.5–5.2)
ALBUMIN/CREAT UR: 10.6 UG/MG
ALP SERPL-CCNC: 53 UNIT/L (ref 40–150)
ALT SERPL W/O P-5'-P-CCNC: 37 UNIT/L (ref 10–44)
ANION GAP (OHS): 10 MMOL/L (ref 8–16)
AST SERPL-CCNC: 35 UNIT/L (ref 11–45)
BASOPHILS # BLD AUTO: 0.1 K/UL
BASOPHILS NFR BLD AUTO: 0.9 %
BILIRUB SERPL-MCNC: 0.6 MG/DL (ref 0.1–1)
BUN SERPL-MCNC: 21 MG/DL (ref 8–23)
CALCIUM SERPL-MCNC: 9.8 MG/DL (ref 8.7–10.5)
CHLORIDE SERPL-SCNC: 105 MMOL/L (ref 95–110)
CHOLEST SERPL-MCNC: 119 MG/DL (ref 120–199)
CHOLEST/HDLC SERPL: 3 {RATIO} (ref 2–5)
CO2 SERPL-SCNC: 23 MMOL/L (ref 23–29)
CREAT SERPL-MCNC: 1.2 MG/DL (ref 0.5–1.4)
CREAT UR-MCNC: 142 MG/DL (ref 23–375)
EAG (OHS): 140 MG/DL (ref 68–131)
ERYTHROCYTE [DISTWIDTH] IN BLOOD BY AUTOMATED COUNT: 13.6 % (ref 11.5–14.5)
GFR SERPLBLD CREATININE-BSD FMLA CKD-EPI: >60 ML/MIN/1.73/M2
GLUCOSE SERPL-MCNC: 114 MG/DL (ref 70–110)
HBA1C MFR BLD: 6.5 % (ref 4–5.6)
HCT VFR BLD AUTO: 50 % (ref 40–54)
HDLC SERPL-MCNC: 40 MG/DL (ref 40–75)
HDLC SERPL: 33.6 % (ref 20–50)
HGB BLD-MCNC: 16.8 GM/DL (ref 14–18)
IMM GRANULOCYTES # BLD AUTO: 0.05 K/UL (ref 0–0.04)
IMM GRANULOCYTES NFR BLD AUTO: 0.4 % (ref 0–0.5)
LDLC SERPL CALC-MCNC: 58.8 MG/DL (ref 63–159)
LYMPHOCYTES # BLD AUTO: 3.88 K/UL (ref 1–4.8)
MCH RBC QN AUTO: 30.2 PG (ref 27–50)
MCHC RBC AUTO-ENTMCNC: 33.6 G/DL (ref 32–36)
MCV RBC AUTO: 90 FL (ref 82–98)
MICROALBUMIN UR-MCNC: 15 UG/ML (ref ?–5000)
NONHDLC SERPL-MCNC: 79 MG/DL
NUCLEATED RBC (/100WBC) (OHS): 0 /100 WBC
PLATELET # BLD AUTO: 199 K/UL (ref 150–450)
PMV BLD AUTO: 10.1 FL (ref 9.2–12.9)
POTASSIUM SERPL-SCNC: 4.1 MMOL/L (ref 3.5–5.1)
PROT SERPL-MCNC: 7.6 GM/DL (ref 6–8.4)
RBC # BLD AUTO: 5.56 M/UL (ref 4.6–6.2)
RELATIVE EOSINOPHIL (OHS): 2.9 %
RELATIVE LYMPHOCYTE (OHS): 33.9 % (ref 18–48)
RELATIVE MONOCYTE (OHS): 11.9 % (ref 4–15)
RELATIVE NEUTROPHIL (OHS): 50 % (ref 38–73)
SODIUM SERPL-SCNC: 138 MMOL/L (ref 136–145)
TRIGL SERPL-MCNC: 101 MG/DL (ref 30–150)
WBC # BLD AUTO: 11.43 K/UL (ref 3.9–12.7)

## 2025-03-27 PROCEDURE — 83036 HEMOGLOBIN GLYCOSYLATED A1C: CPT

## 2025-03-27 PROCEDURE — 36415 COLL VENOUS BLD VENIPUNCTURE: CPT

## 2025-03-27 PROCEDURE — 82040 ASSAY OF SERUM ALBUMIN: CPT

## 2025-03-27 PROCEDURE — 82570 ASSAY OF URINE CREATININE: CPT

## 2025-03-27 PROCEDURE — 85025 COMPLETE CBC W/AUTO DIFF WBC: CPT

## 2025-03-27 PROCEDURE — 82465 ASSAY BLD/SERUM CHOLESTEROL: CPT

## 2025-03-28 ENCOUNTER — CLINICAL SUPPORT (OUTPATIENT)
Dept: REHABILITATION | Facility: HOSPITAL | Age: 70
End: 2025-03-28
Payer: MEDICARE

## 2025-03-28 DIAGNOSIS — R29.898 DECREASED STRENGTH OF LOWER EXTREMITY: Primary | ICD-10-CM

## 2025-03-28 PROCEDURE — 97110 THERAPEUTIC EXERCISES: CPT | Mod: PN

## 2025-03-28 PROCEDURE — 97530 THERAPEUTIC ACTIVITIES: CPT | Mod: PN

## 2025-03-28 PROCEDURE — 97112 NEUROMUSCULAR REEDUCATION: CPT | Mod: PN

## 2025-03-28 NOTE — PROGRESS NOTES
"  Outpatient Rehab    Physical Therapy Visit    Patient Name: Celso Hernandez  MRN: [id number]  YOB: 1955  Encounter Date: 3/28/2025    Therapy Diagnosis:   Encounter Diagnosis   Name Primary?    Decreased strength of lower extremity Yes     Physician: Casie Miguel DO    Physician Orders: Eval and Treat  Medical Diagnosis: Primary osteoarthritis of right knee  Cervicalgia    Visit # / Visits Authorized:  12 / 20  Insurance Authorization Period: 2/14/2025 to 12/31/2025  Date of Evaluation: 2/12/2025   Plan of Care Certification: 2/12/2025 to 4/11/25      PT/PTA:     Number of PTA visits since last PT visit:   Time In: 1000   Time Out: 1055  Total Time: 55   Total Billable Time: 55    FOTO:  Intake Score: 40%  Survey Score 1: 42%  Survey Score 2: 42%         Subjective   he had an episode of hip and buttock pain radiating down the R leg into calf and had to lay down for 3 hours. reports no pain at the moment but stiffness in hips, knees ( R >L)  and neck..  Pain reported as 0/10. 0/10 Bilateral knees, 0/10 L hip; 0/10 neck L > R.    Objective            Treatment:  Therapeutic Exercise  TE 4: DKTC peanut 20x  TE 6: SNAGS/repeated left rotation seated x 10 reps L, 5'' hold  TE 7: Cervical extension with anterior glide(use of towel) x 15 reps, 5'' hold  Manual Therapy  MT 2: LAD R grade III-V  Balance/Neuromuscular Re-Education  NMR 1: Cybex knee extension 15# 3 x 10 dL  NMR 2: Chin Tucks 5" 2 x10  NMR 6: Serratus punch 2 x 10 4#  NMR 8: BKFO stretch 20x B  Therapeutic Activity  TA 1: STS 2 x 10 yellow weighted ball  TA 2: Step-ups, 6'' step 2x10 reps B  TA 3: Side-step ups, 2x10 reps 6in  TA 4: deadlift to step + yoga block 12# 1x10    Time Entry(in minutes):  Manual Therapy Time Entry: 5  Neuromuscular Re-Education Time Entry: 25  Therapeutic Activity Time Entry: 15  Therapeutic Exercise Time Entry: 10    Assessment & Plan   Assessment: Patient is a 69 y.o. M with a medical diagnosis of M17.11 " (ICD-10-CM) - Primary osteoarthritis of right knee. additional referral for M54.2 (ICD-10-CM) - Cervicalgia  M25.569 (ICD-10-CM) - Knee pain, unspecified chronicity, unspecified laterality.  primary complaint of stiffness in hips, knees, neck. episode of radiating pain yesterday which appeared radicular in nature. performed LAD for back and hip mobility today. continued to work on mobility and quad/hip strengthening with good tolerance.  Continue POC.  Evaluation/Treatment Tolerance: Patient tolerated treatment well    Patient will continue to benefit from skilled outpatient physical therapy to address the deficits listed in the problem list box on initial evaluation, provide pt/family education and to maximize pt's level of independence in the home and community environment.     Patient's spiritual, cultural, and educational needs considered and patient agreeable to plan of care and goals.           Plan: cont POC with addition of cervical mobility and postural strengthening    Goals:   Active       Changing body position       Patient will demonstrate moving sit to stand 8+x in 30 sec (Progressing)       Start:  02/12/25    Expected End:  04/11/25               Functional outcome       Patient will show a significant change in FOTO patient-reported outcome tool to demonstrate subjective improvement (Progressing)       Start:  02/12/25    Expected End:  04/11/25            Patient will demonstrate independence in home program for support of progression (Progressing)       Start:  02/12/25    Expected End:  03/14/25               Range of Motion       Patient will achieve right knee ROM of  3-120 degrees  (Progressing)       Start:  02/12/25    Expected End:  03/14/25               Strength       Patient will achieve bilateral hip abduction strength of 4+/5 (Progressing)       Start:  02/12/25    Expected End:  04/11/25            Patient will improve bilateral knee flexion strength by 5kg  (Progressing)        Start:  02/12/25    Expected End:  04/11/25            Patient will improve right knee extension strength by 5kg (Progressing)       Start:  02/12/25    Expected End:  04/11/25               cervical goals       patient will improve cervical rotation 10 degrees (Progressing)       Start:  02/18/25    Expected End:  04/11/25            patient will improve cervical extension to 30 deg (Progressing)       Start:  02/18/25    Expected End:  04/11/25              Resolved       Pain       Patient will report pain of 4/10 max demonstrating a reduction of overall pain (Met)       Start:  02/12/25    Expected End:  03/14/25    Resolved:  03/28/25             Radha Lares, PT, DPT

## 2025-03-31 RX ORDER — BRIMONIDINE TARTRATE 2 MG/ML
1 SOLUTION/ DROPS OPHTHALMIC 2 TIMES DAILY
Qty: 10 ML | Refills: 0 | Status: SHIPPED | OUTPATIENT
Start: 2025-03-31

## 2025-04-02 ENCOUNTER — CLINICAL SUPPORT (OUTPATIENT)
Dept: REHABILITATION | Facility: HOSPITAL | Age: 70
End: 2025-04-02
Payer: MEDICARE

## 2025-04-02 DIAGNOSIS — R29.898 DECREASED STRENGTH OF LOWER EXTREMITY: Primary | ICD-10-CM

## 2025-04-02 PROCEDURE — 97110 THERAPEUTIC EXERCISES: CPT | Mod: PN,CQ

## 2025-04-02 PROCEDURE — 97530 THERAPEUTIC ACTIVITIES: CPT | Mod: PN,CQ

## 2025-04-02 PROCEDURE — 97112 NEUROMUSCULAR REEDUCATION: CPT | Mod: PN,CQ

## 2025-04-02 NOTE — PROGRESS NOTES
"  Outpatient Rehab    Physical Therapy Visit    Patient Name: Celso Hernandez  MRN: [id number]  YOB: 1955  Encounter Date: 4/2/2025    Therapy Diagnosis:   Encounter Diagnosis   Name Primary?    Decreased strength of lower extremity Yes     Physician: Casie Miguel DO    Physician Orders: Eval and Treat  Medical Diagnosis: Primary osteoarthritis of right knee  Cervicalgia    Visit # / Visits Authorized:  13 / 20  Insurance Authorization Period: 2/14/2025 to 12/31/2025  Date of Evaluation: 2/12/2025   Plan of Care Certification: 2/12/2025 to 4/11/25      PT/PTA: PTA   Number of PTA visits since last PT visit:1  Time In: 1000   Time Out: 1055  Total Time: 55   Total Billable Time: 55    FOTO:  Intake Score:  %  Survey Score 1:  %  Survey Score 2:  %         Subjective   Most recently having shooting pain down RLE. No pain at knees, hips or neck just stiffness.  Pain reported as 0/10. 0/10 Bilateral knees, 0/10 L hip; 0/10 neck L > R.    Objective            Treatment:  Therapeutic Exercise  TE 4: DKTC peanut 20x  TE 6: SNAGS/repeated left rotation seated x 10 reps L, 5'' hold  TE 7: Cervical extension with anterior glide(use of towel) x 15 reps, 5'' hold  Balance/Neuromuscular Re-Education  NMR 1: Cybex knee extension 15# 3 x 10 dL  NMR 2: Chin Tucks 5" 2 x10  NMR 6: Serratus punch 2 x 10 4#  NMR 8: BKFO stretch 20x B  Therapeutic Activity  TA 1: STS 2 x 10 yellow weighted ball  TA 2: Step-ups, 6'' step 2x10 reps B  TA 3: Side-step ups, 2x10 reps 6in  TA 4: deadlift to step + yoga block 12# 1x10    Time Entry(in minutes):  Neuromuscular Re-Education Time Entry: 20  Therapeutic Activity Time Entry: 25  Therapeutic Exercise Time Entry: 10    Assessment & Plan   Assessment: Patient is a 69 y.o. M with a medical diagnosis of M17.11 (ICD-10-CM) - Primary osteoarthritis of right knee. additional referral for M54.2 (ICD-10-CM) - Cervicalgia  M25.569 (ICD-10-CM) - Knee pain, unspecified chronicity, " unspecified laterality.  primary complaint of stiffness in hips, knees, neck. Ongoing radiating pain in RLE past few days but denies any currently. Continued to work on mobility and quad/hip strengthening with good tolerance. Mild to moderate fatigue post session. Monitor response.  Continue POC.  Evaluation/Treatment Tolerance: Patient tolerated treatment well    Patient will continue to benefit from skilled outpatient physical therapy to address the deficits listed in the problem list box on initial evaluation, provide pt/family education and to maximize pt's level of independence in the home and community environment.     Patient's spiritual, cultural, and educational needs considered and patient agreeable to plan of care and goals.           Plan: cont POC with addition of cervical mobility and postural strengthening    Goals:   Active       Changing body position       Patient will demonstrate moving sit to stand 8+x in 30 sec (Progressing)       Start:  02/12/25    Expected End:  04/11/25               Functional outcome       Patient will show a significant change in FOTO patient-reported outcome tool to demonstrate subjective improvement (Progressing)       Start:  02/12/25    Expected End:  04/11/25            Patient will demonstrate independence in home program for support of progression (Progressing)       Start:  02/12/25    Expected End:  03/14/25               Range of Motion       Patient will achieve right knee ROM of  3-120 degrees  (Progressing)       Start:  02/12/25    Expected End:  03/14/25               Strength       Patient will achieve bilateral hip abduction strength of 4+/5 (Progressing)       Start:  02/12/25    Expected End:  04/11/25            Patient will improve bilateral knee flexion strength by 5kg  (Progressing)       Start:  02/12/25    Expected End:  04/11/25            Patient will improve right knee extension strength by 5kg (Progressing)       Start:  02/12/25    Expected  End:  04/11/25               cervical goals       patient will improve cervical rotation 10 degrees (Progressing)       Start:  02/18/25    Expected End:  04/11/25            patient will improve cervical extension to 30 deg (Progressing)       Start:  02/18/25    Expected End:  04/11/25              Resolved       Pain       Patient will report pain of 4/10 max demonstrating a reduction of overall pain (Met)       Start:  02/12/25    Expected End:  03/14/25    Resolved:  03/28/25             Lucy Gaming, PTA

## 2025-04-03 ENCOUNTER — OFFICE VISIT (OUTPATIENT)
Dept: INTERNAL MEDICINE | Facility: CLINIC | Age: 70
End: 2025-04-03
Payer: MEDICARE

## 2025-04-03 VITALS
RESPIRATION RATE: 16 BRPM | HEIGHT: 68 IN | TEMPERATURE: 98 F | BODY MASS INDEX: 40.09 KG/M2 | HEART RATE: 90 BPM | DIASTOLIC BLOOD PRESSURE: 88 MMHG | WEIGHT: 264.56 LBS | SYSTOLIC BLOOD PRESSURE: 150 MMHG | OXYGEN SATURATION: 97 %

## 2025-04-03 DIAGNOSIS — E66.01 SEVERE OBESITY (BMI 35.0-39.9) WITH COMORBIDITY: ICD-10-CM

## 2025-04-03 DIAGNOSIS — E78.5 TYPE 2 DIABETES MELLITUS WITH HYPERLIPIDEMIA: Primary | ICD-10-CM

## 2025-04-03 DIAGNOSIS — N13.8 BPH WITH OBSTRUCTION/LOWER URINARY TRACT SYMPTOMS: ICD-10-CM

## 2025-04-03 DIAGNOSIS — E11.69 TYPE 2 DIABETES MELLITUS WITH HYPERLIPIDEMIA: Primary | ICD-10-CM

## 2025-04-03 DIAGNOSIS — R06.02 SOB (SHORTNESS OF BREATH) ON EXERTION: ICD-10-CM

## 2025-04-03 DIAGNOSIS — I10 ESSENTIAL HYPERTENSION: ICD-10-CM

## 2025-04-03 DIAGNOSIS — E78.2 MIXED HYPERLIPIDEMIA: ICD-10-CM

## 2025-04-03 DIAGNOSIS — G25.81 RLS (RESTLESS LEGS SYNDROME): ICD-10-CM

## 2025-04-03 DIAGNOSIS — G47.33 OSA (OBSTRUCTIVE SLEEP APNEA): ICD-10-CM

## 2025-04-03 DIAGNOSIS — Z12.5 ENCOUNTER FOR SCREENING FOR MALIGNANT NEOPLASM OF PROSTATE: ICD-10-CM

## 2025-04-03 DIAGNOSIS — N40.1 BPH WITH OBSTRUCTION/LOWER URINARY TRACT SYMPTOMS: ICD-10-CM

## 2025-04-03 PROCEDURE — 3008F BODY MASS INDEX DOCD: CPT | Mod: CPTII,S$GLB,, | Performed by: INTERNAL MEDICINE

## 2025-04-03 PROCEDURE — 1160F RVW MEDS BY RX/DR IN RCRD: CPT | Mod: CPTII,S$GLB,, | Performed by: INTERNAL MEDICINE

## 2025-04-03 PROCEDURE — 1101F PT FALLS ASSESS-DOCD LE1/YR: CPT | Mod: CPTII,S$GLB,, | Performed by: INTERNAL MEDICINE

## 2025-04-03 PROCEDURE — 3079F DIAST BP 80-89 MM HG: CPT | Mod: CPTII,S$GLB,, | Performed by: INTERNAL MEDICINE

## 2025-04-03 PROCEDURE — 1125F AMNT PAIN NOTED PAIN PRSNT: CPT | Mod: CPTII,S$GLB,, | Performed by: INTERNAL MEDICINE

## 2025-04-03 PROCEDURE — 3288F FALL RISK ASSESSMENT DOCD: CPT | Mod: CPTII,S$GLB,, | Performed by: INTERNAL MEDICINE

## 2025-04-03 PROCEDURE — 3077F SYST BP >= 140 MM HG: CPT | Mod: CPTII,S$GLB,, | Performed by: INTERNAL MEDICINE

## 2025-04-03 PROCEDURE — 99999 PR PBB SHADOW E&M-EST. PATIENT-LVL IV: CPT | Mod: PBBFAC,,, | Performed by: INTERNAL MEDICINE

## 2025-04-03 PROCEDURE — 99215 OFFICE O/P EST HI 40 MIN: CPT | Mod: S$GLB,,, | Performed by: INTERNAL MEDICINE

## 2025-04-03 PROCEDURE — 3044F HG A1C LEVEL LT 7.0%: CPT | Mod: CPTII,S$GLB,, | Performed by: INTERNAL MEDICINE

## 2025-04-03 PROCEDURE — 3061F NEG MICROALBUMINURIA REV: CPT | Mod: CPTII,S$GLB,, | Performed by: INTERNAL MEDICINE

## 2025-04-03 PROCEDURE — 3066F NEPHROPATHY DOC TX: CPT | Mod: CPTII,S$GLB,, | Performed by: INTERNAL MEDICINE

## 2025-04-03 PROCEDURE — 1159F MED LIST DOCD IN RCRD: CPT | Mod: CPTII,S$GLB,, | Performed by: INTERNAL MEDICINE
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
## 2025-04-04 ENCOUNTER — CLINICAL SUPPORT (OUTPATIENT)
Dept: REHABILITATION | Facility: HOSPITAL | Age: 70
End: 2025-04-04
Payer: MEDICARE

## 2025-04-04 DIAGNOSIS — R29.898 DECREASED STRENGTH OF LOWER EXTREMITY: Primary | ICD-10-CM

## 2025-04-04 PROCEDURE — 97530 THERAPEUTIC ACTIVITIES: CPT | Mod: PN,CQ

## 2025-04-04 PROCEDURE — 97112 NEUROMUSCULAR REEDUCATION: CPT | Mod: PN,CQ

## 2025-04-04 NOTE — PROGRESS NOTES
"  Outpatient Rehab    Physical Therapy Visit    Patient Name: Celso Hernandez  MRN: [id number]  YOB: 1955  Encounter Date: 4/4/2025    Therapy Diagnosis:   Encounter Diagnosis   Name Primary?    Decreased strength of lower extremity Yes     Physician: Casie Miguel DO    Physician Orders: Eval and Treat  Medical Diagnosis: Primary osteoarthritis of right knee  Cervicalgia    Visit # / Visits Authorized:  14 / 20  Insurance Authorization Period: 2/14/2025 to 12/31/2025  Date of Evaluation: 2/12/2025   Plan of Care Certification: 2/12/2025 to 4/11/25      PT/PTA: PTA   Number of PTA visits since last PT visit:2  Time In: 1000   Time Out: 1055  Total Time: 55   Total Billable Time: 30    FOTO:  Intake Score:  %  Survey Score 1:  %  Survey Score 2:  %         Subjective   Overall stiffness has improved. Recent improvement in RLE radicular/shooting pain..  Pain reported as 0/10. 0/10 Bilateral knees, 0/10 L hip; 0/10 neck L > R.    Objective            Treatment:  Therapeutic Exercise  TE 1: Bike 7'  TE 3: seated lumbar flexion 10x3 peanut  TE 5: Lunge at stairs 2 x 10  TE 6: SNAGS/repeated left rotation seated x 10 reps L, 5'' hold  TE 7: Cervical extension with anterior glide(use of towel) x 15 reps, 5'' hold  Balance/Neuromuscular Re-Education  NMR 1: Cybex knee extension 15# 3 x 10 dL  NMR 2: Chin Tucks 5" 2 x10  NMR 3: Serratus punch 2 x 10 4#  NMR 4: Chin Tucks 5" 2 x10  NMR 5: SAPD 2 x 10 RTB  NMR 6: Rows 2 x 10 RTB  Therapeutic Activity  TA 1: STS 2 x 10 yellow weighted ball  TA 2: Step-ups, 6'' step 2x10 reps B  TA 3: Heel raise 2 x 10  TA 4: deadlift to step + yoga block 12# 1x10    Time Entry(in minutes):  Neuromuscular Re-Education Time Entry: 15  Therapeutic Activity Time Entry: 10  Therapeutic Exercise Time Entry: 5    Assessment & Plan   Assessment: Patient is a 69 y.o. M with a medical diagnosis of M17.11 (ICD-10-CM) - Primary osteoarthritis of right knee. additional referral for M54.2 " (ICD-10-CM) - Cervicalgia  M25.569 (ICD-10-CM) - Knee pain, unspecified chronicity, unspecified laterality. Patient continues to voice gradual improvements in multi joint stiffness. Recent onset of RLE radisular symtoms has improved and almost completely resolved. Continued to work on mobility and quad/hip strengthening along with functional strengthening with good tolerance. Mild to moderate fatigue post session. Monitor response.  Continue POC.  Evaluation/Treatment Tolerance: Patient tolerated treatment well    Patient will continue to benefit from skilled outpatient physical therapy to address the deficits listed in the problem list box on initial evaluation, provide pt/family education and to maximize pt's level of independence in the home and community environment.     Patient's spiritual, cultural, and educational needs considered and patient agreeable to plan of care and goals.           Plan: cont POC with addition of cervical mobility and postural strengthening    Goals:   Active       Changing body position       Patient will demonstrate moving sit to stand 8+x in 30 sec (Progressing)       Start:  02/12/25    Expected End:  04/11/25               Functional outcome       Patient will show a significant change in FOTO patient-reported outcome tool to demonstrate subjective improvement (Progressing)       Start:  02/12/25    Expected End:  04/11/25            Patient will demonstrate independence in home program for support of progression (Progressing)       Start:  02/12/25    Expected End:  03/14/25               Range of Motion       Patient will achieve right knee ROM of  3-120 degrees  (Progressing)       Start:  02/12/25    Expected End:  03/14/25               Strength       Patient will achieve bilateral hip abduction strength of 4+/5 (Progressing)       Start:  02/12/25    Expected End:  04/11/25            Patient will improve bilateral knee flexion strength by 5kg  (Progressing)       Start:   02/12/25    Expected End:  04/11/25            Patient will improve right knee extension strength by 5kg (Progressing)       Start:  02/12/25    Expected End:  04/11/25               cervical goals       patient will improve cervical rotation 10 degrees (Progressing)       Start:  02/18/25    Expected End:  04/11/25            patient will improve cervical extension to 30 deg (Progressing)       Start:  02/18/25    Expected End:  04/11/25              Resolved       Pain       Patient will report pain of 4/10 max demonstrating a reduction of overall pain (Met)       Start:  02/12/25    Expected End:  03/14/25    Resolved:  03/28/25             Lucy Gaming, PTA

## 2025-04-07 ENCOUNTER — HOSPITAL ENCOUNTER (OUTPATIENT)
Dept: CARDIOLOGY | Facility: HOSPITAL | Age: 70
Discharge: HOME | End: 2025-04-07
Attending: INTERNAL MEDICINE
Payer: MEDICARE

## 2025-04-07 VITALS — BODY MASS INDEX: 40.01 KG/M2 | WEIGHT: 264 LBS | HEIGHT: 68 IN

## 2025-04-07 DIAGNOSIS — R06.02 SOB (SHORTNESS OF BREATH) ON EXERTION: ICD-10-CM

## 2025-04-07 PROCEDURE — 93306 TTE W/DOPPLER COMPLETE: CPT | Mod: 26,,, | Performed by: STUDENT IN AN ORGANIZED HEALTH CARE EDUCATION/TRAINING PROGRAM

## 2025-04-07 PROCEDURE — 93306 TTE W/DOPPLER COMPLETE: CPT

## 2025-04-08 LAB
APICAL FOUR CHAMBER EJECTION FRACTION: 67 %
APICAL TWO CHAMBER EJECTION FRACTION: 51 %
ASCENDING AORTA: 3.46 CM
AV INDEX (PROSTH): 0.8
AV MEAN GRADIENT: 4 MMHG
AV PEAK GRADIENT: 7 MMHG
AV VALVE AREA BY VELOCITY RATIO: 2.7 CM²
AV VALVE AREA: 2.8 CM²
AV VELOCITY RATIO: 0.77
BSA FOR ECHO PROCEDURE: 2.4 M2
CV ECHO LV RWT: 0.51 CM
DOP CALC AO PEAK VEL: 1.3 M/S
DOP CALC AO VTI: 26.7 CM
DOP CALC LVOT AREA: 3.5 CM2
DOP CALC LVOT DIAMETER: 2.1 CM
DOP CALC LVOT PEAK VEL: 1 M/S
DOP CALC LVOT STROKE VOLUME: 73.7 CM3
DOP CALC MV VTI: 20.6 CM
DOP CALCLVOT PEAK VEL VTI: 21.3 CM
E WAVE DECELERATION TIME: 169 MSEC
E/A RATIO: 0.85
E/E' RATIO: 10 M/S
ECHO LV POSTERIOR WALL: 1 CM (ref 0.6–1.1)
FRACTIONAL SHORTENING: 35.9 % (ref 28–44)
HR MV ECHO: 82 BPM
INTERVENTRICULAR SEPTUM: 1.1 CM (ref 0.6–1.1)
IVC DIAMETER: 1.53 CM
LEFT ATRIUM AREA SYSTOLIC (APICAL 2 CHAMBER): 15.28 CM2
LEFT ATRIUM AREA SYSTOLIC (APICAL 4 CHAMBER): 9.98 CM2
LEFT ATRIUM VOLUME INDEX MOD: 12 ML/M2
LEFT ATRIUM VOLUME MOD: 27 ML
LEFT INTERNAL DIMENSION IN SYSTOLE: 2.5 CM (ref 2.1–4)
LEFT VENTRICLE DIASTOLIC VOLUME INDEX: 29.57 ML/M2
LEFT VENTRICLE DIASTOLIC VOLUME: 68 ML
LEFT VENTRICLE END DIASTOLIC VOLUME APICAL 2 CHAMBER: 69.01 ML
LEFT VENTRICLE END DIASTOLIC VOLUME APICAL 4 CHAMBER: 73.76 ML
LEFT VENTRICLE END SYSTOLIC VOLUME APICAL 2 CHAMBER: 36.49 ML
LEFT VENTRICLE END SYSTOLIC VOLUME APICAL 4 CHAMBER: 17.29 ML
LEFT VENTRICLE MASS INDEX: 56.9 G/M2
LEFT VENTRICLE SYSTOLIC VOLUME INDEX: 9.6 ML/M2
LEFT VENTRICLE SYSTOLIC VOLUME: 22 ML
LEFT VENTRICULAR INTERNAL DIMENSION IN DIASTOLE: 3.9 CM (ref 3.5–6)
LEFT VENTRICULAR MASS: 131 G
LV LATERAL E/E' RATIO: 11.5 M/S
LV SEPTAL E/E' RATIO: 8.6 M/S
LVED V (TEICH): 67.61 ML
LVES V (TEICH): 21.7 ML
LVOT MG: 2.72 MMHG
LVOT MV: 0.79 CM/S
MV A" WAVE DURATION": 119.89 MSEC
MV MEAN GRADIENT: 3 MMHG
MV PEAK A VEL: 0.81 M/S
MV PEAK E VEL: 0.69 M/S
MV PEAK GRADIENT: 5 MMHG
MV STENOSIS PRESSURE HALF TIME: 49.11 MS
MV VALVE AREA BY CONTINUITY EQUATION: 3.58 CM2
MV VALVE AREA P 1/2 METHOD: 4.48 CM2
OHS CV RV/LV RATIO: 0.87 CM
OHS LV EJECTION FRACTION SIMPSONS BIPLANE MOD: 60 %
PISA TR MAX VEL: 2.2 M/S
PULM VEIN S/D RATIO: 1.76
PV MV: 0.86 M/S
PV PEAK D VEL: 0.34 M/S
PV PEAK GRADIENT: 5 MMHG
PV PEAK S VEL: 0.6 M/S
PV PEAK VELOCITY: 1.16 M/S
RA PRESSURE ESTIMATED: 3 MMHG
RA VOL SYS: 20.98 ML
RIGHT ATRIAL AREA: 10.8 CM2
RIGHT ATRIUM VOLUME AREA LENGTH APICAL 4 CHAMBER: 19.89 ML
RIGHT VENTRICLE DIASTOLIC BASEL DIMENSION: 3.4 CM
RV TB RVSP: 5 MMHG
RV TISSUE DOPPLER FREE WALL SYSTOLIC VELOCITY 1 (APICAL 4 CHAMBER VIEW): 10.19 CM/S
SINUS: 3.04 CM
STJ: 2.74 CM
TDI LATERAL: 0.06 M/S
TDI SEPTAL: 0.08 M/S
TDI: 0.07 M/S
TR MAX PG: 18 MMHG
TRICUSPID ANNULAR PLANE SYSTOLIC EXCURSION: 1.86 CM
TV REST PULMONARY ARTERY PRESSURE: 22 MMHG
Z-SCORE OF LEFT VENTRICULAR DIMENSION IN END DIASTOLE: -8.26
Z-SCORE OF LEFT VENTRICULAR DIMENSION IN END SYSTOLE: -6.06

## 2025-04-09 ENCOUNTER — CLINICAL SUPPORT (OUTPATIENT)
Dept: REHABILITATION | Facility: HOSPITAL | Age: 70
End: 2025-04-09
Payer: MEDICARE

## 2025-04-09 DIAGNOSIS — R29.898 DECREASED STRENGTH OF LOWER EXTREMITY: Primary | ICD-10-CM

## 2025-04-09 PROCEDURE — 97112 NEUROMUSCULAR REEDUCATION: CPT | Mod: PN

## 2025-04-09 PROCEDURE — 97530 THERAPEUTIC ACTIVITIES: CPT | Mod: PN

## 2025-04-09 NOTE — PROGRESS NOTES
"  Outpatient Rehab    Physical Therapy Visit    Patient Name: Celso Hernandez  MRN: [id number]  YOB: 1955  Encounter Date: 4/9/2025    Therapy Diagnosis:   Encounter Diagnosis   Name Primary?    Decreased strength of lower extremity Yes     Physician: Casie Miguel DO    Physician Orders: Eval and Treat  Medical Diagnosis: Primary osteoarthritis of right knee  Cervicalgia    Visit # / Visits Authorized:  15 / 20  Insurance Authorization Period: 2/14/2025 to 12/31/2025  Date of Evaluation: 2/12/2025   Plan of Care Certification: 2/12/2025 to 4/11/25      PT/PTA: PT   Number of PTA visits since last PT visit:1  Time In: 1000   Time Out: 1055  Total Time: 55   Total Billable Time: 30    FOTO:  Intake Score:  %  Survey Score 1:  %  Survey Score 2:  %         Subjective   reports moderate neck stiffness today. some R LE symptoms and soreness in hip. plans to see pain management..  Pain reported as 2/10.      Objective            Treatment:  Therapeutic Exercise  TE 2: Seated heel slides 5" 2 x 10  TE 3: seated lumbar flexion 10x3 peanut  TE 6: SNAGS/repeated left rotation seated x 10 reps L, 5'' hold  TE 7: Cervical extension with anterior glide(use of towel) x 15 reps, 5'' hold  TE 8: hip abd isometrics 5x30"  Balance/Neuromuscular Re-Education  NMR 2: Chin Tucks 5" 2 x10 RTB  NMR 3: Serratus punch 2 x 10 4#  NMR 4: leg press 4 3x10  NMR 7: sciatic nerve glide seated 10x R  NMR 8: BKFO stretch 20x B  Therapeutic Activity  TA 1: STS 2 x 10 yellow weighted ball  TA 2: Step-ups, 6'' step 2x10 reps B    Time Entry(in minutes):  Neuromuscular Re-Education Time Entry: 15  Therapeutic Activity Time Entry: 15    Assessment & Plan   Assessment: Patient is a 69 y.o. M with a medical diagnosis of M17.11 (ICD-10-CM) - Primary osteoarthritis of right knee. additional referral for M54.2 (ICD-10-CM) - Cervicalgia  M25.569 (ICD-10-CM) - Knee pain, unspecified chronicity, unspecified laterality. Patient continues to " voice gradual improvements in multi joint stiffness. pt reported return of R LE symptoms today, reproduced with seated lumbar flexion. L hip pain with return from fall out position. educated on nerve glide to be added to HEP. Mild to moderate fatigue post session. Monitor response.  Continue POC.  Evaluation/Treatment Tolerance: Patient tolerated treatment well    Patient will continue to benefit from skilled outpatient physical therapy to address the deficits listed in the problem list box on initial evaluation, provide pt/family education and to maximize pt's level of independence in the home and community environment.     Patient's spiritual, cultural, and educational needs considered and patient agreeable to plan of care and goals.           Plan: cont POC with addition of cervical mobility and postural strengthening    Goals:   Active       Changing body position       Patient will demonstrate moving sit to stand 8+x in 30 sec (Progressing)       Start:  02/12/25    Expected End:  04/11/25               Functional outcome       Patient will show a significant change in FOTO patient-reported outcome tool to demonstrate subjective improvement (Progressing)       Start:  02/12/25    Expected End:  04/11/25            Patient will demonstrate independence in home program for support of progression (Progressing)       Start:  02/12/25    Expected End:  03/14/25               Range of Motion       Patient will achieve right knee ROM of  3-120 degrees  (Progressing)       Start:  02/12/25    Expected End:  03/14/25               Strength       Patient will achieve bilateral hip abduction strength of 4+/5 (Progressing)       Start:  02/12/25    Expected End:  04/11/25            Patient will improve bilateral knee flexion strength by 5kg  (Progressing)       Start:  02/12/25    Expected End:  04/11/25            Patient will improve right knee extension strength by 5kg (Progressing)       Start:  02/12/25    Expected  End:  04/11/25               cervical goals       patient will improve cervical rotation 10 degrees (Progressing)       Start:  02/18/25    Expected End:  04/11/25            patient will improve cervical extension to 30 deg (Progressing)       Start:  02/18/25    Expected End:  04/11/25              Resolved       Pain       Patient will report pain of 4/10 max demonstrating a reduction of overall pain (Met)       Start:  02/12/25    Expected End:  03/14/25    Resolved:  03/28/25             Radha Lares, PT, DPT

## 2025-04-11 ENCOUNTER — CLINICAL SUPPORT (OUTPATIENT)
Dept: REHABILITATION | Facility: HOSPITAL | Age: 70
End: 2025-04-11
Payer: MEDICARE

## 2025-04-11 DIAGNOSIS — R29.898 DECREASED STRENGTH OF LOWER EXTREMITY: Primary | ICD-10-CM

## 2025-04-11 PROCEDURE — 97112 NEUROMUSCULAR REEDUCATION: CPT | Mod: PN,CQ

## 2025-04-11 PROCEDURE — 97530 THERAPEUTIC ACTIVITIES: CPT | Mod: PN,CQ

## 2025-04-11 NOTE — PROGRESS NOTES
"  Outpatient Rehab    Physical Therapy Visit    Patient Name: Celso Hernandez  MRN: [id number]  YOB: 1955  Encounter Date: 4/11/2025    Therapy Diagnosis:   Encounter Diagnosis   Name Primary?    Decreased strength of lower extremity Yes     Physician: Casie Miguel DO    Physician Orders: Eval and Treat  Medical Diagnosis: Primary osteoarthritis of right knee  Cervicalgia    Visit # / Visits Authorized:  16 / 20  Insurance Authorization Period: 2/14/2025 to 12/31/2025  Date of Evaluation: 2/12/2025   Plan of Care Certification: 2/12/2025 to 4/11/25      PT/PTA:     Number of PTA visits since last PT visit:   Time In: 1000   Time Out: 1055  Total Time: 55   Total Billable Time: 30    FOTO:  Intake Score:  %  Survey Score 1:  %  Survey Score 2:  %         Subjective   reports moderate neck stiffness today. some R lower back pain with radicular symptoms that extend to knee.  Pain reported as 0/10. 0/10 Bilateral knees, 0/10 L hip; 0/10 neck L > R.    Objective            Treatment:  Therapeutic Exercise  TE 1: Bike 5'  TE 2: Seated heel slides 5" 2 x 10  TE 3: seated lumbar flexion 10x3 peanut  TE 6: SNAGS/repeated left rotation seated x 10 reps L, 5'' hold  TE 7: Cervical extension with anterior glide(use of towel) x 15 reps, 5'' hold  TE 8: hip abd isometrics 5x30"  TE 9: Wall slides 2 x 10  Balance/Neuromuscular Re-Education  NMR 1: Cybex knee extension 30# 3 x 10 dL  NMR 2: Chin Tucks 5" 2 x10 RTB  NMR 3: Serratus punch 2 x 10 4#  NMR 4: leg press 4 3x10  NMR 5: SAPD 2 x 10 RTB with contralateral head turns  NMR 6: Rows 3 x 10 RTB  NMR 7: sciatic nerve glide seated 10x2 R  NMR 8: BKFO stretch 20x B  Therapeutic Activity  TA 1: STS 2 x 10 yellow weighted ball  TA 2: Step-ups, 6'' step 2x10 reps B  TA 3: Heel raise 2 x 10    Time Entry(in minutes):  Neuromuscular Re-Education Time Entry: 15  Therapeutic Activity Time Entry: 10  Therapeutic Exercise Time Entry: 5    Assessment & Plan   Assessment: " Patient is a 69 y.o. M with a medical diagnosis of M17.11 (ICD-10-CM) - Primary osteoarthritis of right knee. additional referral for M54.2 (ICD-10-CM) - Cervicalgia  M25.569 (ICD-10-CM) - Knee pain, unspecified chronicity, unspecified laterality. Patient continues to voice gradual improvements in multi joint pain. Primry complaint of stiffness. Continues to have occasional R LE shooting pain but denies any currently.Continued work on neural mobility, GTPS and functional strengthening. Mild to moderate fatigue post session. Monitor response.  Continue POC.       Patient will continue to benefit from skilled outpatient physical therapy to address the deficits listed in the problem list box on initial evaluation, provide pt/family education and to maximize pt's level of independence in the home and community environment.     Patient's spiritual, cultural, and educational needs considered and patient agreeable to plan of care and goals.           Plan: cont POC with addition of cervical mobility and postural strengthening    Goals:   Active       Changing body position       Patient will demonstrate moving sit to stand 8+x in 30 sec (Progressing)       Start:  02/12/25    Expected End:  04/11/25               Functional outcome       Patient will show a significant change in FOTO patient-reported outcome tool to demonstrate subjective improvement (Progressing)       Start:  02/12/25    Expected End:  04/11/25            Patient will demonstrate independence in home program for support of progression (Progressing)       Start:  02/12/25    Expected End:  03/14/25               Range of Motion       Patient will achieve right knee ROM of  3-120 degrees  (Progressing)       Start:  02/12/25    Expected End:  03/14/25               Strength       Patient will achieve bilateral hip abduction strength of 4+/5 (Progressing)       Start:  02/12/25    Expected End:  04/11/25            Patient will improve bilateral knee  flexion strength by 5kg  (Progressing)       Start:  02/12/25    Expected End:  04/11/25            Patient will improve right knee extension strength by 5kg (Progressing)       Start:  02/12/25    Expected End:  04/11/25               cervical goals       patient will improve cervical rotation 10 degrees (Progressing)       Start:  02/18/25    Expected End:  04/11/25            patient will improve cervical extension to 30 deg (Progressing)       Start:  02/18/25    Expected End:  04/11/25              Resolved       Pain       Patient will report pain of 4/10 max demonstrating a reduction of overall pain (Met)       Start:  02/12/25    Expected End:  03/14/25    Resolved:  03/28/25             Lucy Gaming, PTA

## 2025-04-14 ENCOUNTER — TELEPHONE (OUTPATIENT)
Dept: INTERNAL MEDICINE | Facility: CLINIC | Age: 70
End: 2025-04-14
Payer: MEDICARE

## 2025-04-14 ENCOUNTER — RESULTS FOLLOW-UP (OUTPATIENT)
Dept: INTERNAL MEDICINE | Facility: CLINIC | Age: 70
End: 2025-04-14

## 2025-04-14 PROBLEM — N99.110 POSTPROCEDURAL MALE URETHRAL MEATAL STRICTURE: Status: ACTIVE | Noted: 2020-08-03

## 2025-04-14 RX ORDER — HYDRALAZINE HYDROCHLORIDE 50 MG/1
50 TABLET, FILM COATED ORAL EVERY 8 HOURS
Qty: 270 TABLET | Refills: 0 | OUTPATIENT
Start: 2025-04-14

## 2025-04-14 NOTE — PROGRESS NOTES
CC: terminal dribbling,  s/p TURP for BPH with obstruction, hx of urethral stricture    Celso Hernandez is a 69 y.o. man who is here for the evaluation of Benign Prostatic Hypertrophy (Pt states he is here for BPH w/LUTS. States that he wakes up about 5-6 times at night to urinate. Wears diapers/pads during day and changes then twice a day.)    His PCP, Inder Quarels MD   C/o terminal dribbling  Overall his urine flow is still good.  Feels that he is emptying his bladder well.    Denies flank pain, dysuria, hematuria.      C/o HA when he uses viagra or cialis.    Hx of urethral stricture and recurrent UTI. He has a history  fossa navicularis  Procedure(s) Performed: 11/10/21  1. Cystoscopy   2. Urethral dilation  Findings:  - Pinpoint fossa navicularis stricture unable to pass scope, could tolerate passage of a wire  - Dilated with urethral sounds from 12 Fr to 24 Fr  - Scope passed easily showing a short segment stricture at the fossa navicularis only. No other strictures, normal bladder findings.    Hx of urinary retention and BPH with obstruction.  Date: 05/27/2020   Pre-Op Diagnosis: BPH with bladder outlet obstruction   Post-Op Diagnosis: same   Procedure(s) Performed:   TURP  Findings:   Trilobar prostatic enlargement with significant intravesical median lobe  Wide open channel at conclusion of procedure    Past Medical History:   Diagnosis Date    Arthritis     BPH (benign prostatic hyperplasia)     Cataract     Colon polyps     Diabetes mellitus type II     Glaucoma     Hyperlipidemia     Hypertension     Multiple duodenal ulcers 10/08/2019    Unspecified hemorrhoids without mention of complication      Past Surgical History:   Procedure Laterality Date    COLONOSCOPY N/A 10/08/2019    Procedure: COLONOSCOPY suprep;  Surgeon: Landon Guajardo MD;  Location: [facility];  Service: General;  Laterality: N/A;    COLONOSCOPY N/A 02/29/2024    Procedure: COLONOSCOPY;  Surgeon: Miguel Lorenzo MD;   Location: [facility] ENDO;  Service: Endoscopy;  Laterality: N/A;    COLONOSCOPY W/ POLYPECTOMY  10/08/2019    CYSTOSCOPY N/A 08/05/2020    Procedure: CYSTOSCOPY;  Surgeon: Arcadio Vaughn MD;  Location: [facility] OR [facility];  Service: Urology;  Laterality: N/A;    CYSTOSCOPY WITH URETHRAL DILATION N/A 11/10/2021    Procedure: CYSTOSCOPY, WITH URETHRAL DILATION;  Surgeon: Arcadio Vaughn MD;  [facility];  Service: Urology;  Laterality: N/A;  30 MINUTES     DILATION OF URETHRA N/A 08/05/2020    Procedure: DILATION, URETHRA;  Surgeon: Arcadio Vaughn MD;  [facility];  Service: Urology;  Laterality: N/A;    EPIDURAL STEROID INJECTION INTO THORACIC SPINE N/A 01/04/2022    Procedure: Thoracic Epidural Steroid Injection T5-6 (No Sedation) ;  Surgeon: Connie Mejia Jr., MD;  Location: [facility] PAIN MGT;  Service: Pain Management;  Laterality: N/A;    ESOPHAGOGASTRODUODENOSCOPY  10/08/2019    w/biopsies    ESOPHAGOGASTRODUODENOSCOPY N/A 10/08/2019    Procedure: EGD (ESOPHAGOGASTRODUODENOSCOPY);  Surgeon: Landon Guajardo MD;  Location: [facility];  Service: General;  Laterality: N/A;    NASAL SINUS SURGERY  11/2022    with outside MD    none      SELECTIVE LASER TRABECULOPLASTY Bilateral 06/2024        TRANSURETHRAL RESECTION OF PROSTATE N/A 05/27/2020    Procedure: TURP (TRANSURETHRAL RESECTION OF PROSTATE) bipolar;  Surgeon: Arcadio Vaughn MD;  [facility];  Service: Urology;  Laterality: N/A;  2hr     Social History[1]  Family History   Problem Relation Name Age of Onset    Hypertension Mother      Stroke Mother      Hyperlipidemia Mother      Glaucoma Mother      Diabetes Father      Cancer Sister          breast    Hypertension Sister      Heart disease Sister      Hearing loss Sister      No Known Problems Daughter x2     No Known Problems Son x1     Amblyopia Neg Hx      Blindness Neg Hx      Cataracts Neg Hx      Macular degeneration Neg Hx      Retinal detachment Neg Hx       Strabismus Neg Hx       Allergy:  Review of patient's allergies indicates:   Allergen Reactions    Keflex [cephalexin] Itching and Rash     Scrotal and groin itching      Encounter Medications[2]  Review of Systems   ROS  Physical Exam     Vitals:    04/15/25 0855   BP: (!) 157/93   Pulse: 97     Physical Exam  Constitutional:       General: He is not in acute distress.     Appearance: He is well-developed. He is not diaphoretic.   HENT:      Head: Normocephalic and atraumatic.      Right Ear: External ear normal.      Left Ear: External ear normal.      Nose: Nose normal.   Eyes:      Conjunctiva/sclera: Conjunctivae normal.      Pupils: Pupils are equal, round, and reactive to light.   Neck:      Thyroid: No thyromegaly.      Vascular: No JVD.      Trachea: No tracheal deviation.   Cardiovascular:      Rate and Rhythm: Normal rate and regular rhythm.      Heart sounds: Normal heart sounds. No murmur heard.     No friction rub. No gallop.   Pulmonary:      Effort: Pulmonary effort is normal. No respiratory distress.      Breath sounds: Normal breath sounds. No wheezing.   Chest:      Chest wall: No tenderness.   Abdominal:      General: Bowel sounds are normal. There is no distension.      Palpations: Abdomen is soft. There is no mass.      Tenderness: There is no abdominal tenderness. There is no guarding or rebound.   Genitourinary:     Penis: Normal. No tenderness.       Prostate: Normal.      Rectum: Normal.   Musculoskeletal:         General: No tenderness or deformity. Normal range of motion.      Cervical back: Normal range of motion and neck supple.   Lymphadenopathy:      Cervical: No cervical adenopathy.   Skin:     General: Skin is warm and dry.   Neurological:      Mental Status: He is alert and oriented to person, place, and time.   Psychiatric:         Behavior: Behavior normal.         Thought Content: Thought content normal.       LABS:  Lab Results   Component Value Date    PSA 0.83 10/10/2024     PSA 0.73 09/26/2023    PSA 0.98 09/26/2022    PSA 0.71 06/02/2021    PSA 3.4 09/14/2019    PSA 1.9 05/14/2016    PSA 2.5 09/03/2015    PSA 1.6 03/01/2014    PSA 1.7 10/08/2011    PSA 1.7 11/06/2010    PSADIAG 2.8 11/16/2019    PSADIAG 2.2 11/02/2018    PSADIAG 2.5 11/18/2017     Results for orders placed or performed in visit on 11/16/19   Prostate Specific Antigen, Diagnostic    Collection Time: 11/16/19  9:59 AM   Result Value Ref Range    PSA Diagnostic 2.8 0.00 - 4.00 ng/mL   Results for orders placed or performed in visit on 11/02/18   Prostate Specific Antigen, Diagnostic    Collection Time: 11/02/18  7:24 AM   Result Value Ref Range    PSA Diagnostic 2.2 0.00 - 4.00 ng/mL   Results for orders placed or performed in visit on 11/18/17   Prostate Specific Antigen, Diagnostic    Collection Time: 11/18/17  7:50 AM   Result Value Ref Range    PSA Diagnostic 2.5 0.00 - 4.00 ng/mL     Lab Results   Component Value Date    CREATININE 1.2 03/27/2025    CREATININE 1.1 10/10/2024    CREATININE 1.2 04/04/2024     Results for orders placed or performed in visit on 02/07/15   Testosterone    Collection Time: 02/07/15  9:15 AM   Result Value Ref Range    Testosterone, Total 267 195.0 - 1138.0 ng/dL   Results for orders placed or performed in visit on 05/19/06   Testosterone    Collection Time: 05/19/06 10:44 AM   Result Value Ref Range    Testosterone, Total 297.3 241 - 827 ng/dl   Results for orders placed or performed in visit on 11/15/04   Testosterone    Collection Time: 02/17/04  4:00 PM   Result Value Ref Range    Testosterone, Total 259.0 241 - 827 ng/dl     Urine Culture, Routine   Date Value Ref Range Status   04/29/2024 ESCHERICHIA COLI  >100,000 cfu/ml   (A)  Final     Hemoglobin A1C   Date Value Ref Range Status   10/10/2024 5.7 (H) 4.0 - 5.6 % Final     Comment:     ADA Screening Guidelines:  5.7-6.4%  Consistent with prediabetes  >or=6.5%  Consistent with diabetes    High levels of fetal hemoglobin interfere  with the HbA1C  assay. Heterozygous hemoglobin variants (HbS, HgC, etc)do  not significantly interfere with this assay.   However, presence of multiple variants may affect accuracy.     04/04/2024 5.8 (H) 4.0 - 5.6 % Final     Comment:     ADA Screening Guidelines:  5.7-6.4%  Consistent with prediabetes  >or=6.5%  Consistent with diabetes    High levels of fetal hemoglobin interfere with the HbA1C  assay. Heterozygous hemoglobin variants (HbS, HgC, etc)do  not significantly interfere with this assay.   However, presence of multiple variants may affect accuracy.       Hemoglobin A1c   Date Value Ref Range Status   03/27/2025 6.5 (H) 4.0 - 5.6 % Final     Comment:     ADA Screening Guidelines:  5.7-6.4%  Consistent with prediabetes  >=6.5%  Consistent with diabetes    High levels of fetal hemoglobin interfere with the HbA1C  assay. Heterozygous hemoglobin variants (HbS, HgC, etc)do  not significantly interfere with this assay.   However, presence of multiple variants may affect accuracy.       UA all clear    PVR per bladder scan: 36 ml    Assessment and Plan:  Celso was seen today for benign prostatic hypertrophy.    Diagnoses and all orders for this visit:    S/P TURP  -     Cystoscopy; Future    BPH with obstruction/lower urinary tract symptoms  -     Ambulatory referral/consult to Urology  -     Cystoscopy; Future    Post-void dribbling  -     Cystoscopy; Future    Postprocedural male urethral stricture    Other orders  -     LIDOcaine HCl 2% urojet  -     doxycycline tablet 100 mg    Will evaluate his lower urinary tract with cysto, especially to rule out recurrent urethral stricture.    Regarding his ED and HA related to taking viagra or cialis, I suggested him to try lower dose of the pill.  He can take Tylenol prior to cialis or viagra.  If not works for him, he may consider JAY JAY or PEP injection.  He is not interested in PEP injection.  He will try the pills again.  All questions answered.    Follow-up:  Follow  up cysto.         [1]   Social History  Tobacco Use    Smoking status: Never     Passive exposure: Never    Smokeless tobacco: Never   Substance Use Topics    Alcohol use: Yes     Alcohol/week: 1.0 standard drink of alcohol     Types: 1 Standard drinks or equivalent per week     Comment: 1-2 drinks/month    Drug use: Yes     Types: Marijuana     Comment: 3 times per week   [2]   Outpatient Encounter Medications as of 4/15/2025   Medication Sig Dispense Refill    brimonidine 0.2% (ALPHAGAN) 0.2 % Drop INSTILL 1 DROP INTO EACH EYE TWICE DAILY 10 mL 0    dorzolamide-timolol 2-0.5% (COSOPT) 22.3-6.8 mg/mL ophthalmic solution Place 1 drop into both eyes 2 (two) times daily. 30 mL 3    ezetimibe (ZETIA) 10 mg tablet Take 1 tablet (10 mg total) by mouth once daily. For cholesterol control. 90 tablet 3    hydrALAZINE (APRESOLINE) 100 MG tablet Take 1 tablet (100 mg total) by mouth 2 (two) times daily. 180 tablet 3    latanoprost 0.005 % ophthalmic solution Place 1 drop into both eyes every evening. 7.5 mL 3    levocetirizine (XYZAL) 5 MG tablet Take 1 tablet (5 mg total) by mouth every evening. 30 tablet 11    losartan-hydrochlorothiazide 100-12.5 mg (HYZAAR) 100-12.5 mg Tab TAKE 1 TABLET BY MOUTH ONCE DAILY FOR HIGH BLOOD PRESSURE 90 tablet 3    meloxicam (MOBIC) 15 MG tablet Take 1 tablet by mouth once daily 90 tablet 0    metFORMIN (GLUCOPHAGE) 500 MG tablet Take 1 tablet by mouth once daily with breakfast 90 tablet 1    multivit-min-FA-lycopen-lutein (CENTRUM SILVER MEN) 300-600-300 mcg Tab Take 1 tablet by mouth once daily. 100 tablet 3    rosuvastatin (CRESTOR) 40 MG Tab TAKE 1 TABLET BY MOUTH ONCE DAILY REPLACES ATORVASTATIN 90 tablet 3    azelastine (ASTELIN) 137 mcg (0.1 %) nasal spray 1 spray (137 mcg total) by Nasal route 2 (two) times daily. 30 mL 11    [] fluticasone propionate (FLONASE) 50 mcg/actuation nasal spray 1 spray (50 mcg total) by Each Nostril route 2 (two) times a day. 11.1 mL 11     [DISCONTINUED] triamcinolone acetonide 0.1% (KENALOG) 0.1 % cream Apply topically 2 (two) times daily. For skin rash on legs. 80 g 1     No facility-administered encounter medications on file as of 4/15/2025.

## 2025-04-14 NOTE — PROGRESS NOTES
Echocardiogram showed good heart muscle function.  No heart valve abnormalities were noted.  There was no fluid around the heart.

## 2025-04-14 NOTE — TELEPHONE ENCOUNTER
----- Message from Med Assistant Iram sent at 4/14/2025  9:09 AM CDT -----    ----- Message -----  From: Inder Quarles MD  Sent: 4/14/2025   8:33 AM CDT  To: [facility] Staff    Echocardiogram showed good heart muscle function.  No heart valve abnormalities were noted.  There was no fluid around the heart.  ----- Message -----  From: Fredis Saeed MD  Sent: 4/8/2025   8:10 AM CDT  To: Inder Quarles MD

## 2025-04-14 NOTE — LETTER
"     April 14, 2025    Celsolena Hernandez  [address] [place] [address]     [facility]  [address] [place] [address].  [place] [address]  Phone: [phone]  Fax: [phone] Dear Mr. Celso Hernandez:    Below are the results from your recent visit:    Resulted Orders   Echo Saline Bubble? No; Ultrasound enhancing contrast? No   Result Value Ref Range    BSA 2.4 m2    Hernandez's Biplane MOD Ejection Fraction 60 %    A2C EF 51 %    A4C EF 67 %    LVOT stroke volume 73.7 cm3    LVIDd 3.9 3.5 - 6.0 cm    LV Systolic Volume 22 mL    LV Systolic Volume Index 9.6 mL/m2    LVIDs 2.5 2.1 - 4.0 cm    LV ESV A2C 36.49 mL    LV Diastolic Volume 68 mL    LV ESV A4C 17.29 mL    LV Diastolic Volume Index 29.57 mL/m2    LV EDV A2C 69.64182659087784 mL    LV EDV A4C 73.76 mL    Left Ventricular End Systolic Volume by Teichholz Method 21.70 mL    Left Ventricular End Diastolic Volume by Teichholz Method 67.61 mL    IVS 1.1 0.6 - 1.1 cm    LVOT diameter 2.1 cm    LVOT area 3.5 cm2    FS 35.9 28 - 44 %    Left Ventricle Relative Wall Thickness 0.51 cm    PW 1.0 0.6 - 1.1 cm    LV mass 131.0 g    LV Mass Index 56.9 g/m2    MV Peak E Juan 0.69 m/s    TDI LATERAL 0.06 m/s    TDI SEPTAL 0.08 m/s    E/E' ratio 10 m/s    MV Peak A Juan 0.81 m/s    TR Max Juan 2.2 m/s    E/A ratio 0.85     E wave deceleration time 169 msec    MV "A" wave duration [id number] msec    LV SEPTAL E/E' RATIO 8.6 m/s    LV LATERAL E/E' RATIO 11.5 m/s    PV Peak S Juan 0.60 m/s    PV Peak D Juan 0.34 m/s    Pulm vein S/D ratio 1.76     LVOT peak juan 1.0 m/s    Left Ventricular Outflow Tract Mean Velocity 0.79 cm/s    Left Ventricular Outflow Tract Mean Gradient 2.72 mmHg    RV- rojo basal diam 3.4 cm    RV S' 10.19 cm/s    TAPSE 1.86 cm    RV/LV Ratio 0.87 cm    LA Vol (MOD) 27 mL    LACY (MOD) 12 mL/m2    RA area length vol 19.89 mL    RA Area 10.8 cm2    RA Vol 20.98 mL    AV mean gradient 4 mmHg    AV peak gradient 7 mmHg "    Ao peak adam 1.3 m/s    Ao VTI 26.7 cm    LVOT peak VTI 21.3 cm    AV valve area 2.8 cm²    AV Velocity Ratio 0.77     AV index (prosthetic) 0.80     CAROLYNN by Velocity Ratio 2.7 cm²    MV mean gradient 3 mmHg    MV peak gradient 5 mmHg    MV stenosis pressure 1/2 time 49.11 ms    MV valve area p 1/2 method 4.48 cm2    MV valve area by continuity eq 3.58 cm2    MV VTI 20.6 cm    Triscuspid Valve Regurgitation Peak Gradient 18 mmHg    PV PEAK VELOCITY 1.16 m/s    PV peak gradient 5 mmHg    Pulmonary Valve Mean Velocity 0.86 m/s    Sinus 3.04 cm    STJ 2.74 cm    Ascending aorta 3.46 cm    IVC diameter 1.53 cm    Mean e' 0.07 m/s    ZLVIDS -6.06     ZLVIDD -8.26     LA area A4C 9.98 cm2    LA area A2C 15.28 cm2    Mitral Valve Heart Rate 82 bpm    TV resting pulmonary artery pressure 22 mmHg    RV TB RVSP 5 mmHg    Est. RA pres 3 mmHg    Narrative      Left Ventricle: The left ventricle is normal in size. There is   concentric remodeling. There is normal systolic function. Quantitated   ejection fraction is 60%. There is normal diastolic function.    Right Ventricle: The right ventricle is normal in size. Systolic   function is normal. TAPSE is 1.86 cm.    Pulmonary Artery: The estimated pulmonary artery systolic pressure is   22 mmHg.    IVC/SVC: Normal venous pressure at 3 mmHg.    The study was difficult due to patient's poor endocardial   visualization.       Your results are within an acceptable range.    Echocardiogram showed good heart muscle function. No heart valve abnormalities were noted. There was no fluid around the heart.     Please don't hesitate to call our office if you have any questions or concerns.      Sincerely,    Dr Inder Mendez/lb

## 2025-04-14 NOTE — TELEPHONE ENCOUNTER
No care due was identified.  Health [facility] Embedded Care Due Messages. Reference number: [id number].   4/14/2025 7:01:02 AM CDT

## 2025-04-15 ENCOUNTER — OFFICE VISIT (OUTPATIENT)
Dept: UROLOGY | Facility: CLINIC | Age: 70
End: 2025-04-15
Payer: MEDICARE

## 2025-04-15 VITALS
HEART RATE: 97 BPM | HEIGHT: 68 IN | WEIGHT: 266.13 LBS | BODY MASS INDEX: 40.33 KG/M2 | DIASTOLIC BLOOD PRESSURE: 93 MMHG | SYSTOLIC BLOOD PRESSURE: 157 MMHG

## 2025-04-15 DIAGNOSIS — N39.43 POST-VOID DRIBBLING: ICD-10-CM

## 2025-04-15 DIAGNOSIS — N99.114 POSTPROCEDURAL MALE URETHRAL STRICTURE: ICD-10-CM

## 2025-04-15 DIAGNOSIS — N40.1 BPH WITH OBSTRUCTION/LOWER URINARY TRACT SYMPTOMS: ICD-10-CM

## 2025-04-15 DIAGNOSIS — N13.8 BPH WITH OBSTRUCTION/LOWER URINARY TRACT SYMPTOMS: ICD-10-CM

## 2025-04-15 DIAGNOSIS — Z90.79 S/P TURP: Primary | ICD-10-CM

## 2025-04-15 PROCEDURE — 1159F MED LIST DOCD IN RCRD: CPT | Mod: CPTII,S$GLB,, | Performed by: UROLOGY

## 2025-04-15 PROCEDURE — 3044F HG A1C LEVEL LT 7.0%: CPT | Mod: CPTII,S$GLB,, | Performed by: UROLOGY

## 2025-04-15 PROCEDURE — 1101F PT FALLS ASSESS-DOCD LE1/YR: CPT | Mod: CPTII,S$GLB,, | Performed by: UROLOGY

## 2025-04-15 PROCEDURE — 3080F DIAST BP >= 90 MM HG: CPT | Mod: CPTII,S$GLB,, | Performed by: UROLOGY

## 2025-04-15 PROCEDURE — 99999 PR PBB SHADOW E&M-EST. PATIENT-LVL III: CPT | Mod: PBBFAC,,, | Performed by: UROLOGY

## 2025-04-15 PROCEDURE — 51798 US URINE CAPACITY MEASURE: CPT | Mod: S$GLB,,, | Performed by: UROLOGY

## 2025-04-15 PROCEDURE — 3066F NEPHROPATHY DOC TX: CPT | Mod: CPTII,S$GLB,, | Performed by: UROLOGY

## 2025-04-15 PROCEDURE — 3061F NEG MICROALBUMINURIA REV: CPT | Mod: CPTII,S$GLB,, | Performed by: UROLOGY

## 2025-04-15 PROCEDURE — 3077F SYST BP >= 140 MM HG: CPT | Mod: CPTII,S$GLB,, | Performed by: UROLOGY

## 2025-04-15 PROCEDURE — 3008F BODY MASS INDEX DOCD: CPT | Mod: CPTII,S$GLB,, | Performed by: UROLOGY

## 2025-04-15 PROCEDURE — 3288F FALL RISK ASSESSMENT DOCD: CPT | Mod: CPTII,S$GLB,, | Performed by: UROLOGY

## 2025-04-15 PROCEDURE — 1125F AMNT PAIN NOTED PAIN PRSNT: CPT | Mod: CPTII,S$GLB,, | Performed by: UROLOGY

## 2025-04-15 PROCEDURE — 99204 OFFICE O/P NEW MOD 45 MIN: CPT | Mod: S$GLB,,, | Performed by: UROLOGY

## 2025-04-15 RX ORDER — DOXYCYCLINE HYCLATE 100 MG
100 TABLET ORAL ONCE
Status: CANCELLED | OUTPATIENT
Start: 2025-04-15 | End: 2025-04-15

## 2025-04-15 RX ORDER — LIDOCAINE HYDROCHLORIDE 20 MG/ML
JELLY TOPICAL ONCE
Status: CANCELLED | OUTPATIENT
Start: 2025-04-15 | End: 2025-04-15

## 2025-04-16 ENCOUNTER — TELEPHONE (OUTPATIENT)
Dept: UROLOGY | Facility: CLINIC | Age: 70
End: 2025-04-16
Payer: MEDICARE

## 2025-04-16 NOTE — TELEPHONE ENCOUNTER
Spoke with pt re: confirming procedure appt for tomorrow.   Discussed location & arrival time for 8:30AM.   Pt verbalized understanding

## 2025-04-17 ENCOUNTER — PROCEDURE VISIT (OUTPATIENT)
Dept: UROLOGY | Facility: CLINIC | Age: 70
End: 2025-04-17
Payer: MEDICARE

## 2025-04-17 VITALS
WEIGHT: 264.31 LBS | TEMPERATURE: 98 F | SYSTOLIC BLOOD PRESSURE: 120 MMHG | DIASTOLIC BLOOD PRESSURE: 75 MMHG | RESPIRATION RATE: 18 BRPM | BODY MASS INDEX: 40.06 KG/M2 | HEIGHT: 68 IN | HEART RATE: 77 BPM

## 2025-04-17 DIAGNOSIS — N13.8 BPH WITH OBSTRUCTION/LOWER URINARY TRACT SYMPTOMS: ICD-10-CM

## 2025-04-17 DIAGNOSIS — N40.1 BPH WITH OBSTRUCTION/LOWER URINARY TRACT SYMPTOMS: ICD-10-CM

## 2025-04-17 DIAGNOSIS — Z90.79 S/P TURP: ICD-10-CM

## 2025-04-17 DIAGNOSIS — N99.114 POSTPROCEDURAL MALE URETHRAL STRICTURE: Primary | ICD-10-CM

## 2025-04-17 DIAGNOSIS — N39.43 POST-VOID DRIBBLING: ICD-10-CM

## 2025-04-17 PROCEDURE — 52000 CYSTOURETHROSCOPY: CPT | Mod: S$GLB,,, | Performed by: UROLOGY

## 2025-04-17 RX ORDER — LIDOCAINE HYDROCHLORIDE 20 MG/ML
JELLY TOPICAL ONCE
Status: COMPLETED | OUTPATIENT
Start: 2025-04-17 | End: 2025-04-17

## 2025-04-17 RX ORDER — DOXYCYCLINE HYCLATE 100 MG
100 TABLET ORAL ONCE
Status: COMPLETED | OUTPATIENT
Start: 2025-04-17 | End: 2025-04-17

## 2025-04-17 RX ADMIN — LIDOCAINE HYDROCHLORIDE 5 ML: 20 JELLY TOPICAL at 08:04

## 2025-04-17 RX ADMIN — Medication 100 MG: at 08:04

## 2025-04-17 NOTE — PATIENT INSTRUCTIONS
What to Expect After a Cystoscopy  For the next 24-48 hours, you may feel a mild burning when you urinate. This burning is normal and expected. Drink plenty of water to dilute the urine to help relieve the burning sensation. You may also see a small amount of blood in your urine after the procedure.    Unless you are already taking antibiotics, you may be given an antibiotic after the test to prevent infection.    Signs and Symptoms to Report  Call the [facility] at [phone] if you develop any of the following:  Fever of 101 degrees or higher  Chills or persistent bleeding  Inability to urinate

## 2025-04-17 NOTE — PROCEDURES
Cystoscopy    Date/Time: 4/17/2025 8:45 AM    Performed by: Arcadio Vaughn MD  Authorized by: Arcadio Vaughn MD    Comments:      Procedure Date:  04/17/2025      Procedure:  Male Diagnostic Cystourethroscopy    Pre-op diagnosis: terminal dribbling, s/p TURP  Post-op diagnosis: normal cysto  Anesthesia: Local  Surgeon:  Arcadio Vaughn MD    Findings:  Urethra:  Normal urethra.  No meatal stricture noted.  Sphincter: competent.  Prostate: Estimated Length Prostatic Urethra: s/p TURP, wide open channel.  There is recurrent /residual adenoma anteriorly, but not causing the significant obstruction.    Bladder neck: patent with no stricture  Bladder:  Normal bladder.  2+ trabeculation.  Normal ureteral orifices bilaterally.   Moderate trabeculation.     Description of Procedure:                                                         Informed Consent:                                                            - Risks, benefits and alternatives of procedure discussed with               patient and informed consent obtained.       Patient Position:   - Supine. --- Bladder ---   Prep and Drape:   - Patient prepped and draped in usual sterile fashion using povidone     iodine (Betadine).   Instruments:   - 16 Fr flexible cystoscope with 0 degree lens.   Procedure Details:   - Cystoscope passed under vision into bladder.   - Bladder and urethra examined in their entirety with findings as     above.     Conclusion:  1. Terminal dribbling  2. S/p TURP  Recommend to milk out the posterior urethra by pressing up on the perineal area.  Discussed the options of re-do TURP, finasteride.  Pt opted to try conservative therapy    Plan:  Patient was discharged home in a stable condition.  Medications: doxy  Follow up:  as needed

## 2025-04-19 NOTE — TELEPHONE ENCOUNTER
No care due was identified.  Health [facility] Embedded Care Due Messages. Reference number: [id number].   4/19/2025 7:00:52 AM CDT

## 2025-04-22 ENCOUNTER — CLINICAL SUPPORT (OUTPATIENT)
Dept: OPHTHALMOLOGY | Facility: CLINIC | Age: 70
End: 2025-04-22
Payer: MEDICARE

## 2025-04-22 ENCOUNTER — OFFICE VISIT (OUTPATIENT)
Dept: OPHTHALMOLOGY | Facility: CLINIC | Age: 70
End: 2025-04-22
Payer: MEDICARE

## 2025-04-22 DIAGNOSIS — H25.13 NUCLEAR SCLEROTIC CATARACT OF BOTH EYES: ICD-10-CM

## 2025-04-22 DIAGNOSIS — H40.1132 PRIMARY OPEN ANGLE GLAUCOMA (POAG) OF BOTH EYES, MODERATE STAGE: ICD-10-CM

## 2025-04-22 DIAGNOSIS — H40.1133 PRIMARY OPEN ANGLE GLAUCOMA (POAG) OF BOTH EYES, SEVERE STAGE: Primary | ICD-10-CM

## 2025-04-22 DIAGNOSIS — H53.40 VISUAL FIELD DEFECT OF BOTH EYES: ICD-10-CM

## 2025-04-22 DIAGNOSIS — Z98.890 HISTORY OF SELECTIVE LASER TRABECULOPLASTY: ICD-10-CM

## 2025-04-22 PROCEDURE — 92133 CPTRZD OPH DX IMG PST SGM ON: CPT | Mod: S$GLB,,, | Performed by: OPHTHALMOLOGY

## 2025-04-22 PROCEDURE — 99999 PR PBB SHADOW E&M-EST. PATIENT-LVL III: CPT | Mod: PBBFAC,,, | Performed by: OPHTHALMOLOGY

## 2025-04-22 PROCEDURE — 3061F NEG MICROALBUMINURIA REV: CPT | Mod: CPTII,S$GLB,, | Performed by: OPHTHALMOLOGY

## 2025-04-22 PROCEDURE — 92083 EXTENDED VISUAL FIELD XM: CPT | Mod: S$GLB,,, | Performed by: OPHTHALMOLOGY

## 2025-04-22 PROCEDURE — 2023F DILAT RTA XM W/O RTNOPTHY: CPT | Mod: CPTII,S$GLB,, | Performed by: OPHTHALMOLOGY

## 2025-04-22 PROCEDURE — 3288F FALL RISK ASSESSMENT DOCD: CPT | Mod: CPTII,S$GLB,, | Performed by: OPHTHALMOLOGY

## 2025-04-22 PROCEDURE — 3066F NEPHROPATHY DOC TX: CPT | Mod: CPTII,S$GLB,, | Performed by: OPHTHALMOLOGY

## 2025-04-22 PROCEDURE — 3044F HG A1C LEVEL LT 7.0%: CPT | Mod: CPTII,S$GLB,, | Performed by: OPHTHALMOLOGY

## 2025-04-22 PROCEDURE — 1160F RVW MEDS BY RX/DR IN RCRD: CPT | Mod: CPTII,S$GLB,, | Performed by: OPHTHALMOLOGY

## 2025-04-22 PROCEDURE — 1159F MED LIST DOCD IN RCRD: CPT | Mod: CPTII,S$GLB,, | Performed by: OPHTHALMOLOGY

## 2025-04-22 PROCEDURE — 1126F AMNT PAIN NOTED NONE PRSNT: CPT | Mod: CPTII,S$GLB,, | Performed by: OPHTHALMOLOGY

## 2025-04-22 PROCEDURE — 99214 OFFICE O/P EST MOD 30 MIN: CPT | Mod: S$GLB,,, | Performed by: OPHTHALMOLOGY

## 2025-04-22 PROCEDURE — 1100F PTFALLS ASSESS-DOCD GE2>/YR: CPT | Mod: CPTII,S$GLB,, | Performed by: OPHTHALMOLOGY

## 2025-04-22 RX ORDER — MELOXICAM 15 MG/1
15 TABLET ORAL
Qty: 90 TABLET | Refills: 0 | Status: SHIPPED | OUTPATIENT
Start: 2025-04-22

## 2025-04-22 RX ORDER — NETARSUDIL AND LATANOPROST OPHTHALMIC SOLUTION, 0.02%/0.005% .2; .05 MG/ML; MG/ML
1 SOLUTION/ DROPS OPHTHALMIC; TOPICAL DAILY
Qty: 5 ML | Refills: 0 | COMMUNITY
Start: 2025-04-22

## 2025-04-22 NOTE — PROGRESS NOTES
oct done ou     24-2 ss done ou     Rel & Fix =  good ou     Coop =      good     Patient has no allergies to latex or adhesives at this time    Jthomas    Mrx    +1.50 + 1.00 x 150   od    +1.50 + 1.50 x 35   os

## 2025-04-22 NOTE — PROGRESS NOTES
HPI    DLS: 2/25/2025    Pt here for HVF review/OCT;  Pt states no eye pain but having some discomfort with blurry vision.     Meds:  Cosopt BID OU   Brimonidine BID OU   Latanoprost QHS OU   AT's 3-4 X DAY OU    1. Mod POAG OU   2. NS OU     Last edited by Tiffany Newman MA on 4/22/2025  9:15 AM.            Assessment /Plan     For exam results, see Encounter Report.    Primary open angle glaucoma (POAG) of both eyes, severe stage    Nuclear sclerotic cataract of both eyes    Visual field defect of both eyes    History of selective laser trabeculoplasty    Primary open angle glaucoma (POAG) of both eyes, moderate stage  -     Blake Visual Field - OU - Extended - Both Eyes  -     Posterior Segment OCT Optic Nerve- Both eyes         Vinh note form 2/20/2024   (-) FHx.   IOp 24 OD, OS. Last 15 OD, 16 OS.   Tmax  22 OD, 23 OS.   Tmin on drops 12 OD, 13 OS.    Pt failed to f/u from 12/17/2019 to 9/16/2020 and 6/2021 to 3/11/2022.   Pt reports compliance w/eyedrops.   Prev pt of Dr Huffman.   C/d 0.55 OD, 0.65 OS.     12/28/2022  HVF OD inf arcuate, OS sup arcuate  2/20/2024 HVF OD inf arc and early sup arcuate, OS sup arc and early inf arc    12/28/2022 OCT OD, thin NS, TS, T, TI, General (Borderline N). OS thin TS, TI, T, General (Borderline N and NI).   2/20/2024 OCT OD NS, TS, T, and G, borderline NI and N, OS thin TS, TI, T, and G, borderline N and NI    3/11/2022 Photos    Cont Latanoprost QHS OU.  D/c Timolol BiD OU on 12/15/2020  Cont Cosopt BID OU (start 12/15/2020)  Add Brimonidine BID OU  Importance of drop compliance and f/u discussed with pt.   Educated pt on findings w/understanding.  Referral to glaucoma clinic - Dr Barrientos / [facility]. Consider benefits of SLT.   Cont w/annual exams.          Glaucoma (type and duration)    dx 2018 - Dr Huffman (began lumigan and then stopped on his own) // re-started on gtts w/ Dr hitchcock 11/2019   First DEBBIEF   [facility] -12/2019   First photos   2019   Treatment / Drops  started   2018 - stopped // re-started 2019            Family history    + Mother         Glaucoma meds    cospt / brimonidine / latanoprost         H/O adverse rxn to glaucoma drops    none        LASERS    SLT od - 6/13/2024 - good resp 23--> 12-13 // SLT os 5/30/2024 - good resp 22--> 12-13         GLAUCOMA SURGERIES    none        OTHER EYE SURGERIES    none        CDR    0.7/0.85        Tbase    24 ou        Tmax    24 ou            Ttarget   14-15 max (+ prog with IOP's 17-18)           HVF    3 test 2022 to  2025 - initiall early IAD / --> to dense IALT and SAD  od /  / + VF loss os- initially SALT --> SALT and IAD (( + prog over 3- 5 year OU)         Gonio    +3 ou        CCT    550/566        OCT    3 test 2022 to 2025 - RNFL - dec alli out od // dec - all but NS (? Prog over the years )         Disc photos    3/2022    - Ttoday    14/16 (down from 19/23w/ better adherence ) - IOP still above target of 15 ou  ( up from 12/12 - did NOT use cosopt or brimonidine this morning)   - Test done today    IOP // HVF / DFE /OCT       4/30/2023 - first visit w/ [facility] / glaucoma clinic   Dx 2018 w/ Dr. Huffman - started on lumigan - but stopped on his own   First visit at [facility] 11/2/2019 - Dr hitchcock // followed w/ Vinh 11/2019 to 2/2024   IOP  23/21 (on gtts)   + FmHx - mother   Gonio +3 open ou      OLDER TEST - include   4 VF's // 5 OCT's / 1 photos   VF progression   OCT progression   Tmax 24/24 - on gtts     PLAN  8/13/2024   SLT OS- done 5/30/2024 - good resp - 23-->12-13  SLT OD  6/13/2024 -  good resp 22--> 12-13     Cont all gtts    Latanoprost ou q hs    Cosopt bid ou   Brimonidine bid ou    12/10/2024   IOP up 12-->19 od // 12-->23 os (did not use cosopt or brimonidine this morning)  Rec rocklatan  Pt want to stay with present meds and re-check      2/25/2025   IOP better with better adherence but still above target of 15 ou   19/23--> 16/17   Rec change latanoprost to rocklatan (pt  declines)    4/22/2025  IOP ok angel od @ 14 // higher than ideal os @ 16  Cont gtts   cosopt bid - ou   Brim bid - ou   Cont latanoprost od q hs   Add rocklatan os q hs (2 samples given - enough for 4 months)     As pt has severe stage glaucoma - will cont to follow in glaucoma clinic   If needs new glasses in future can refer back to dr hitchcock     F/U 2-3 months - IOP check ou // ? If tolerating rocklatan - os // if so can change to rock ou and send in Rx

## 2025-04-23 ENCOUNTER — CLINICAL SUPPORT (OUTPATIENT)
Dept: REHABILITATION | Facility: HOSPITAL | Age: 70
End: 2025-04-23
Payer: MEDICARE

## 2025-04-23 DIAGNOSIS — M17.11 PRIMARY OSTEOARTHRITIS OF RIGHT KNEE: ICD-10-CM

## 2025-04-23 DIAGNOSIS — M25.661 DECREASED RANGE OF MOTION (ROM) OF RIGHT KNEE: ICD-10-CM

## 2025-04-23 DIAGNOSIS — M54.2 CERVICALGIA: ICD-10-CM

## 2025-04-23 DIAGNOSIS — R29.898 DECREASED RANGE OF MOTION OF NECK: ICD-10-CM

## 2025-04-23 DIAGNOSIS — R29.898 DECREASED STRENGTH OF LOWER EXTREMITY: Primary | ICD-10-CM

## 2025-04-23 PROCEDURE — 97110 THERAPEUTIC EXERCISES: CPT | Mod: PN

## 2025-04-23 PROCEDURE — 97112 NEUROMUSCULAR REEDUCATION: CPT | Mod: PN

## 2025-04-23 NOTE — PROGRESS NOTES
Outpatient Rehab    Physical Therapy Progress Note : Updated Plan of Care    Patient Name: Celso Hernandez  MRN: [id number]  YOB: 1955  Encounter Date: 4/23/2025    Therapy Diagnosis:   Encounter Diagnoses   Name Primary?    Decreased strength of lower extremity Yes    Primary osteoarthritis of right knee     Decreased range of motion (ROM) of right knee     Decreased range of motion of neck     Cervicalgia      Physician: Casie Miguel DO    Physician Orders: Eval and Treat  Medical Diagnosis: Primary osteoarthritis of right knee  Cervicalgia    Visit # / Visits Authorized:  17 / 20  Insurance Authorization Period: 2/14/2025 to 12/31/2025  Date of Evaluation: 2/14/24  Plan of Care Certification: 4/23/25 to 5/23/25     PT/PTA: PT   Number of PTA visits since last PT visit:0  Time In: 0905   Time Out: 1000  Total Time: 55   Total Billable Time: 35    FOTO:  Intake Score: 40%  Survey Score 1: 42%  Survey Score 2: 56%         Subjective   primary complaint of stiffness and soreness. did have several episodes of pain radiating down to calf over the last week or so. scheduling a virtual follow up with Dr. Quintana next week to discuss progress. overall seeing some improvement..  Pain reported as 1/10. 0/10 Bilateral knees, 1/10 L hip; 0/10 neck L > R.    Objective      Upper Extremity Sensation  Right Upper Extremity Sensation  Intact: Light Touch       Left Upper Extremity Sensation  Intact: Light Touch            Lower Extremity Sensation  Right Lumbar/Lower Extremity Sensation  Intact: Light Touch       Left Lumbar/Lower Extremity Sensation  Intact: Light Touch                 Ankle/Foot Swelling  Location of Measurement Right  (cm) Left  (cm)   Metatarsal Head       Figure 8       Malleolus       Mild +1 pitting edema left calf        Subcranial Range of Motion   Active Restricted? Passive Restricted? Pain   Flexion         Protraction         Retraction           Cervical Range of Motion   Active  (deg) Passive (deg) Pain   Flexion 40       Extension 10   Yes   Right Lateral Flexion 10 10     Right Rotation 50 50     Left Lateral Flexion 10 10     Left Rotation 40 (pain R) 40 Yes          Shoulder Range of Motion  Right Shoulder   Active (deg) Passive (deg) Pain   Flexion 150       Extension         Scaption         ABduction         ADduction         Horizontal ABduction         Horizontal ADduction         External Rotation (Shoulder ABducted 0 degrees)         External Rotation (Shoulder ABducted 45 degrees)         External Rotation (Shoulder ABducted 90 degrees)  (T1)       Internal Rotation (Shoulder ABducted 0 degrees)         Internal Rotation (Shoulder ABducted 45 degrees)         Internal Rotation (Shoulder ABducted 90 degrees)           Left Shoulder   Active (deg) Passive (deg) Pain   Flexion 145       Extension         Scaption         ABduction         ADduction         Horizontal ABduction         Horizontal ADduction         External Rotation (Shoulder ABducted 0 degrees)         External Rotation (Shoulder ABducted 45 degrees)         External Rotation (Shoulder ABducted 90 degrees)  (C5)   Yes   Internal Rotation (Shoulder ABducted 0 degrees)         Internal Rotation (Shoulder ABducted 45 degrees)         Internal Rotation (Shoulder ABducted 90 degrees)                  Hip Range of Motion   Right Hip   Active (deg) Passive (deg) Pain   Flexion 80 90     Extension         ABduction         ADduction         External Rotation 90/90 40       External Rotation Prone         Internal Rotation 90/90 20       Internal Rotation Prone             Left Hip   Active (deg) Passive (deg) Pain   Flexion 80 85     Extension         ABduction         ADduction         External Rotation 90/90 40       External Rotation Prone         Internal Rotation 90/90 10   Yes   Internal Rotation Prone                  Knee Range of Motion   Right Knee   Active (deg) Passive (deg) Pain   Flexion 117 119     Extension  4   Yes       Left Knee   Active (deg) Passive (deg) Pain   Flexion 117 117 Yes (hip pain)   Extension 3 3 Yes                Shoulder Strength - Planes of Motion   Right Strength Right Pain Left Strength Left  Pain   Flexion 5   5     Extension 5   5     ABduction 5   5     ADduction           Horizontal ABduction           Horizontal ADduction           Internal Rotation 0° 5   5     Internal Rotation 90°           External Rotation 0° 4+   4+     External Rotation 90°               Elbow Strength   Right Strength Right Pain Left Strength Left  Pain   Flexion (C6) 5   5     Extension (C7) 5   5               Hip Strength - Planes of Motion   Right Strength Right Pain Left Strength Left  Pain   Flexion (L2) 4+ Yes (L side low back) 5 Yes (L low back pain)   Extension           ABduction 4+   4 Yes   ADduction 5   5     Internal Rotation 5   5 Yes (L hip)   External Rotation 4+   4+ Yes (hip pain)       Knee Strength   Right Strength Right Pain Left Strength Left  Pain   Flexion (S2)  (15.9kg)    (16) Yes (L knee)   Prone Flexion           Extension (L3)  (18.9)    (14.8kg)                Lumbar/Pelvic Girdle Special Tests  Pelvic Girdle / Sacrum Tests  Positive: Right REY, Left REY, and Left FADIR         Hip Special Tests  Intra-Articular/Impingement Tests  Positive: Right REY, Left REY, and Left FADIR           Hip Joint Mobility  Left Hip Joint Mobility  Hypomobile: Anterior Capsule, Posterior Capsule, and Lateral Capsule       Knee Patellar Screening       Right Patellar Mobility  Hypomobile: Medial, Lateral, Superior, and Inferior  Left Patellar Mobility  Hypomobile: Medial, Lateral, Superior, and Inferior              Sit to Stand Testing      The patient completed 7 repetitions of a sit to stand transfer in 30 seconds.            Gait Analysis  Base of Support: Wide  Gait Pattern: Antalgic  Walking Speed: Decreased            Gait Analysis Details  Decreased TKE and hip extension      "    Treatment:  Therapeutic Exercise  TE 6: SNAGS/repeated left rotation seated x 10 reps L, 5'' hold  TE 7: Cervical extension with anterior glide(use of towel) x 15 reps, 5'' hold  Balance/Neuromuscular Re-Education  NMR 2: Chin Tucks 5" 2 x10 RTB  NMR 3: Serratus punch 2 x 10 4#  NMR 7: sciatic nerve glide seated 10x2 R  Therapeutic Activity  TA 1: STS 2 x 10 yellow weighted ball-->1x7 30 sec test today    Time Entry(in minutes):  Neuromuscular Re-Education Time Entry: 20  Therapeutic Exercise Time Entry: 15    Assessment & Plan   Assessment  Celso    Will Comorbidities Impact Care: Yes  See past medical history     Functional Limitations: Activity tolerance, Decreased ambulation distance/endurance, Completing self-care activities, Functional mobility, Pain with ADLs/IADLs, Painful locomotion/ambulation, Participating in leisure activities, Performing household chores, Range of motion, Sitting tolerance, Squatting, Standing tolerance  Impairments: Impaired physical strength, Lack of appropriate home exercise program, Pain with functional activity, Abnormal or restricted range of motion, Activity intolerance, Abnormal gait    Prognosis: Good  Assessment Details: Patient is a 69 y.o. M with a medical diagnosis of M17.11 (ICD-10-CM) - Primary osteoarthritis of right knee. Patient with additional complaints of hip and neck pain. Knee signs and symptoms consistent with medical diagnosis. Neck pain chronic with mobility deficits. Patient demonstrates improving knee range of motion, lower extremity strength, and functional mobility via sit to stand and FOTO scores. He continues to have hip pain with decreased hip mobility and impingement signs consistent with hip osteoarthritis left greater than right, especially painful with hip internal rotation. Additional complaints of low back pain with several recent episodes of radiating pain into calf. He also has made small improvements in cervical range of motion with " significant joint mobility restrictions present.  Is is making moderate progress towards goals as noted. FOTO goal met and closed. Ongoing impairments impact ability to stand, walk, bend, dress, turn head, and perform daily activities without pain. He will continue to benefit from skilled PT to address these impairments.    Plan  From a physical therapy perspective, the patient would benefit from: Skilled Rehab Services    Planned therapy interventions include: Therapeutic exercise, Therapeutic activities, Neuromuscular re-education, Manual therapy, and Gait training.    Planned modalities to include: Cryotherapy (cold pack), Electrical stimulation - attended, Electrical stimulation - passive/unattended, and Thermotherapy (hot pack).        Visit Frequency: 2 times Per Week for 4 Weeks.       This plan was discussed with Patient and Therapy assistant.   Discussion participants: Agreed Upon Plan of Care             Patient will continue to benefit from skilled outpatient physical therapy to address the deficits listed in the problem list box on initial evaluation, provide pt/family education and to maximize pt's level of independence in the home and community environment.     Patient's spiritual, cultural, and educational needs considered and patient agreeable to plan of care and goals.           Goals:   Active       Changing body position       Patient will demonstrate moving sit to stand 8+x in 30 sec (Progressing)       Start:  02/12/25    Expected End:  05/23/25               Functional outcome       Patient will show a significant change in FOTO patient-reported outcome tool to demonstrate subjective improvement (Progressing)       Start:  02/12/25    Expected End:  04/11/25            Patient will demonstrate independence in home program for support of progression (Met)       Start:  02/12/25    Expected End:  03/14/25    Resolved:  04/23/25            Range of Motion       Patient will achieve right knee ROM  of  3-120 degrees  (Progressing)       Start:  02/12/25    Expected End:  05/23/25               Strength       Patient will achieve bilateral hip abduction strength of 4+/5 (Progressing)       Start:  02/12/25    Expected End:  05/23/25            Patient will improve bilateral knee flexion strength by 5kg  (Met)       Start:  02/12/25    Expected End:  04/11/25    Resolved:  04/23/25         Patient will improve right knee extension strength by 5kg (Met)       Start:  02/12/25    Expected End:  04/11/25    Resolved:  04/23/25            cervical goals       patient will improve cervical rotation 10 degrees (Progressing)       Start:  02/18/25    Expected End:  05/23/25            patient will improve cervical extension to 30 deg (Progressing)       Start:  02/18/25    Expected End:  05/23/25              Resolved       Pain       Patient will report pain of 4/10 max demonstrating a reduction of overall pain (Met)       Start:  02/12/25    Expected End:  03/14/25    Resolved:  03/28/25             Radha Lares, PT, DPT

## 2025-04-24 ENCOUNTER — DOCUMENTATION ONLY (OUTPATIENT)
Dept: REHABILITATION | Facility: HOSPITAL | Age: 70
End: 2025-04-24
Payer: MEDICARE

## 2025-04-24 NOTE — PROGRESS NOTES
Physical therapist and physical therapy assistant(s) met face to face to discuss patient's treatment plan and progress towards established goals. Pt will be seen by a physical therapist minimally every 6th visit or every 30 days.    Lucy Gaming, PTA  4/24/2025

## 2025-04-25 ENCOUNTER — CLINICAL SUPPORT (OUTPATIENT)
Dept: REHABILITATION | Facility: HOSPITAL | Age: 70
End: 2025-04-25
Payer: MEDICARE

## 2025-04-25 DIAGNOSIS — R29.898 DECREASED STRENGTH OF LOWER EXTREMITY: Primary | ICD-10-CM

## 2025-04-25 PROCEDURE — 97530 THERAPEUTIC ACTIVITIES: CPT | Mod: PN,CQ

## 2025-04-25 PROCEDURE — 97112 NEUROMUSCULAR REEDUCATION: CPT | Mod: PN,CQ

## 2025-04-25 NOTE — PROGRESS NOTES
"  Outpatient Rehab    Physical Therapy Visit    Patient Name: Celso Hernandez  MRN: [id number]  YOB: 1955  Encounter Date: 4/25/2025    Therapy Diagnosis:   Encounter Diagnosis   Name Primary?    Decreased strength of lower extremity Yes     Physician: Casie Miguel DO    Physician Orders: Eval and Treat  Medical Diagnosis: Primary osteoarthritis of right knee  Cervicalgia    Visit # / Visits Authorized:  18 / 20  Insurance Authorization Period: 2/14/2025 to 12/31/2025  Date of Evaluation: 2/14/24   Plan of Care Certification: 4/23/25 to 5/23/25      PT/PTA: PTA   Number of PTA visits since last PT visit:1  Time In: 0800   Time Out: 0855  Total Time: 55   Total Billable Time: 30    FOTO:  Intake Score:  %  Survey Score 1:  %  Survey Score 2:  %         Subjective   Overall pain is minimal to none, primary complaint of stiffness in neck and knee..  Pain reported as 1/10. 0/10 Bilateral knees, 1/10 L hip; 0/10 neck L > R.    Objective            Treatment:  Therapeutic Exercise  TE 3: seated lumbar flexion 10x3 peanut  TE 6: SNAGS/repeated left rotation seated x 10 reps L, 5'' hold  TE 7: Cervical extension with anterior glide(use of towel) x 15 reps, 5'' hold  TE 9: Wall slides 2 x 10  Balance/Neuromuscular Re-Education  NMR 2: Chin Tucks 5" 2 x10 RTB  NMR 3: Serratus punch 2 x 10 4#  NMR 4: Supine flexion 2 x 10 4#  NMR 7: sciatic nerve glide seated 10x2 R  Therapeutic Activity  TA 1: STS 2 x 10 yellow weighted ball  TA 2: Step-ups, 6'' step 2x10 reps B  TA 3: Heel raise 2 x 10    Time Entry(in minutes):  Neuromuscular Re-Education Time Entry: 15  Therapeutic Activity Time Entry: 10  Therapeutic Exercise Time Entry: 5    Assessment & Plan   Assessment: Patient is a 69 y.o. M with a medical diagnosis of M17.11 (ICD-10-CM) - Primary osteoarthritis of right knee. additional referral for M54.2 (ICD-10-CM) - Cervicalgia  M25.569 (ICD-10-CM) - Knee pain, unspecified chronicity, unspecified laterality. " Patient continues to voice gradual improvements in multi joint pain. Primry complaint of stiffness. Continued work on neural mobility, GTPS and functional strengthening. No episodes of radicular symptoms or increased pain. Mild to moderate fatigue post session. Monitor response.  Continue POC.  Evaluation/Treatment Tolerance: Patient tolerated treatment well    Patient will continue to benefit from skilled outpatient physical therapy to address the deficits listed in the problem list box on initial evaluation, provide pt/family education and to maximize pt's level of independence in the home and community environment.     Patient's spiritual, cultural, and educational needs considered and patient agreeable to plan of care and goals.           Plan: cont POC with addition of cervical mobility and postural strengthening    Goals:   Active       Changing body position       Patient will demonstrate moving sit to stand 8+x in 30 sec (Progressing)       Start:  02/12/25    Expected End:  05/23/25               Functional outcome       Patient will show a significant change in FOTO patient-reported outcome tool to demonstrate subjective improvement (Progressing)       Start:  02/12/25    Expected End:  04/11/25            Patient will demonstrate independence in home program for support of progression (Met)       Start:  02/12/25    Expected End:  03/14/25    Resolved:  04/23/25            Range of Motion       Patient will achieve right knee ROM of  3-120 degrees  (Progressing)       Start:  02/12/25    Expected End:  05/23/25               Strength       Patient will achieve bilateral hip abduction strength of 4+/5 (Progressing)       Start:  02/12/25    Expected End:  05/23/25            Patient will improve bilateral knee flexion strength by 5kg  (Met)       Start:  02/12/25    Expected End:  04/11/25    Resolved:  04/23/25         Patient will improve right knee extension strength by 5kg (Met)       Start:  02/12/25     Expected End:  04/11/25    Resolved:  04/23/25            cervical goals       patient will improve cervical rotation 10 degrees (Progressing)       Start:  02/18/25    Expected End:  05/23/25            patient will improve cervical extension to 30 deg (Progressing)       Start:  02/18/25    Expected End:  05/23/25              Resolved       Pain       Patient will report pain of 4/10 max demonstrating a reduction of overall pain (Met)       Start:  02/12/25    Expected End:  03/14/25    Resolved:  03/28/25             Lucy Gaming, PTA

## 2025-04-30 ENCOUNTER — CLINICAL SUPPORT (OUTPATIENT)
Dept: REHABILITATION | Facility: HOSPITAL | Age: 70
End: 2025-04-30
Payer: MEDICARE

## 2025-04-30 ENCOUNTER — OFFICE VISIT (OUTPATIENT)
Dept: PAIN MEDICINE | Facility: CLINIC | Age: 70
End: 2025-04-30
Payer: MEDICARE

## 2025-04-30 VITALS
HEART RATE: 106 BPM | DIASTOLIC BLOOD PRESSURE: 80 MMHG | BODY MASS INDEX: 40.53 KG/M2 | SYSTOLIC BLOOD PRESSURE: 162 MMHG | HEIGHT: 68 IN | WEIGHT: 267.44 LBS

## 2025-04-30 DIAGNOSIS — M54.16 LUMBAR RADICULOPATHY, CHRONIC: Primary | ICD-10-CM

## 2025-04-30 DIAGNOSIS — M47.816 LUMBAR FACET ARTHROPATHY: ICD-10-CM

## 2025-04-30 DIAGNOSIS — M51.362 DEGENERATION OF INTERVERTEBRAL DISC OF LUMBAR REGION WITH DISCOGENIC BACK PAIN AND LOWER EXTREMITY PAIN: ICD-10-CM

## 2025-04-30 DIAGNOSIS — R29.898 DECREASED STRENGTH OF LOWER EXTREMITY: Primary | ICD-10-CM

## 2025-04-30 PROCEDURE — 97530 THERAPEUTIC ACTIVITIES: CPT | Mod: KX,PN

## 2025-04-30 PROCEDURE — 99999 PR PBB SHADOW E&M-EST. PATIENT-LVL IV: CPT | Mod: PBBFAC,,, | Performed by: NURSE PRACTITIONER

## 2025-04-30 PROCEDURE — 97112 NEUROMUSCULAR REEDUCATION: CPT | Mod: KX,PN

## 2025-04-30 RX ORDER — IBUPROFEN 800 MG/1
800 TABLET ORAL EVERY 6 HOURS PRN
COMMUNITY

## 2025-04-30 NOTE — PROGRESS NOTES
[facility] Establ[facility] Clinic Visit    Referred by: Aaareferral Self   Reason for referral: Lumbar radiculopathy, chronic     CC:   Chief Complaint   Patient presents with    Low-back Pain    Leg Pain     Pain going down right leg and bilateral calves are really tender to the touch.    Follow-up       Interval history 4/30/2025:  68 y/o Patient presents with low back pain, right buttock pain, and right leg pain that started around March 30th. He experiences burning, numbness, and tenderness in the affected areas. Pain radiates down to the heel of both feet and worsens with prolonged standing or walking, necessitating lying down for relief. He reports a history of buttock pain, but the leg pain is a new symptom present for about a month.    Pain management includes rest and PT, which has been beneficial. He takes meloxicam regularly, attempting to take it every other day, and uses ibuprofen only for severe pain. Meloxicam helps alleviate some pain and swelling.    He has a history of T spine injections, which have been effective in managing his mid-back pain. He does not have any significant problems with his T spine since the injection, as well as any chest pain, shortness of breath, or upper extremity symptoms.      Interval Update 03/21/2026: Patient return to clinic for follow-up on right knee pain. He has previously ordered x-rays of his hips and knees which we will review. Additionally patient reporting numbness in hands and fingers that has been going on intermittently for several years, worse in the am once he moves hands and fingers numbness dissipates. He reports not feeling numbness during the day.  Patient denies any recent incident or trauma denies any bowel or bladder function denies saddle anesthesia he continues to participate in physical therapy which is helping his knees and hip pain.    Interval Update 02/06/2025:   Patient return to clinic for right knee pain.  Pain  worse in the medial aspect of the right knee.  Incident occurred 3 days ago, patient was going down the stairs and his right knee gave out, he fell to the ground onto the right knee.  He states he fell onto grass.  Denies hitting his head.  Denies loss of consciousness.  That day the pain was significant, but has since improved. Patient made the appointment and wanted to come in just to make sure everything is alright. Patient has never had this happen before, he has no h/o surgeries or injections to the R knee. Patient continues to take meloxicam which is helping his joint pain. He does note pain in the mornings in his bilateral knee joints.     When the pain was at its worse, he describes it as sharp anterior knee pain that became aching. He also endorses a strain sensation to his medial knee.     Patient also complains of intermittent hip pain, worse in the left.  He also complains of axial neck pain.  Denies any radicular symptoms.    Interval History 10/10/23:  Celso Hernandez returns today for follow up for right gluteal/buttocks pain that has been ongoing for the last 3-4 months.  He is intermittent pain described as soreness pain is worse when sitting pain is mitigated when shifting weight off of his right gluteal area.  Denies any radiating symptoms down either leg the worst of his pain is 3/4/10.  He denies any recent incident or trauma denies any bowel bladder dysfunction,.  Patient states that he does a lot of sitting as he is a  he also watches television during the day for 2-3 hours every day.  He does take occasionally meloxicam 7.5 which she states does help.      Current Pain Scales:  Current: 0/10              Typical Range: 0-4/10     Interval History(08/09/2023):  Celso Hernandez returns today for follow up for neck and upper back pain.  He reports completing physical therapy and has now establish a home exercise plan at home.  He states that his neck pain is stable at the moment  "and is doing fine.    Currently, the neck pain is stable.        Current Pain Scales:  Current: 0/10              Typical Range: 2-4/10     Interval Updates  05/08/[id number]-kiud-old male presents today with neck and upper back pain.  Onset 5-6 days pain is specifically located at the base of his neck and radiates into his head.  Denies any headaches.  Pain is constant, pain is described as a strain and pulling he denies any radicular symptoms in either arm , denies any profound weakness, denies any paresthesia like symptoms.  Pain is aggravated with certain movements and laying down.  Pain is mitigated with ibuprofen 800 mg t.i.d..  He denies any recent incident or trauma he does state that he felt like he slept wrong and woke up the next morning feeling pain in the neck and into his head.    Interval Updates:   2/10/2022 - Mr. Hernandez is following up for  Low-back Pain and Mid-back Pain is currently rate 0/10 with a weekly range 2-5/10.  It is described as Aching, Grabbing and Tight.  He  Is reporting mild to no symptoms today, he states he has no pain when performing normal ADLs, he reports pain is exacerbated when he  "tries to do to much" He did also report midback /thoroacic pain with mild twisting but pain is stable at this point.     1/19/2022  -  Mary returns to clinic s/p Thoracic Epidural Steroid Injection at T5-6 on 1/4/22 with 60% relief.  He reports a pain intensity 1/10 today with a weekly range of 1-2/10.   He is reporting mild soarness in his thoracic area, he is reporting being " free of pain after the injection" his pain returned after working outside in his garage yesterday. Overall much better following his injection. He is reporting certain movements increase his pain but other than that he  is better.     Subjective:   Celso Hernandez is a 69 y.o. male who has a past medical history of Arthritis, BPH (benign prostatic hyperplasia), Cataract, Colon polyps, Diabetes mellitus type II, " Glaucoma, Hyperlipidemia, Hypertension, Multiple duodenal ulcers, and Unspecified hemorrhoids without mention of complication. He complains of pain as described below.    Location: low back  Right gluteal pain  Onset:  3-4 months  Radiation:  Right Leg   Timing: constant  Current Pain Score: 7/10  Weekly Pain Range: 2-10/10  Quality:  Pulling and strain type pain  Worsened by: exercise, extension, lifting, lying down, sitting and walking for more than several minutes  Improved by: medications ibuprofen 800 mg    Previous Therapies:  PT/OT: Denies for neck pain  HEP: Yes, stretching at home and walking.  TENS: No  Interventions:  01/04/2022 Thoracic Epidural Steroid Injection at T5-6  Surgery: Denies localized surgery  Opioids:  Adjuvants:     Current Pain Medications:  Ibuprofen 800 mg t.i.d.     Assessment & Plan:  Problem List Items Addressed This Visit    None  Visit Diagnoses         Lumbar radiculopathy, chronic    -  Primary    Relevant Orders    MRI Lumbar Spine Without Contrast    X-Ray Lumbar Spine 5 View      Degeneration of intervertebral disc of lumbar region with discogenic back pain and lower extremity pain        Relevant Orders    MRI Lumbar Spine Without Contrast    X-Ray Lumbar Spine 5 View      Lumbar facet arthropathy        Relevant Orders    MRI Lumbar Spine Without Contrast    X-Ray Lumbar Spine 5 View              12/17/21 - Celso Hernandez is a 69 y.o. male who  has a past medical history of Arthritis, BPH (benign prostatic hyperplasia), Cataract, Colon polyps, Diabetes mellitus type II, Glaucoma, Hyperlipidemia, Hypertension, Multiple duodenal ulcers (10/08/2019), and Unspecified hemorrhoids without mention of complication.  By history and examination this patient has chronic mid/upper back pain with right T5-6 radiculopathy.  The underlying cause cause is facet arthritis and degenerative disc disease with a large, obstructive osteophyte on the right side as seen on CT chest.  MRI is  appropriate prior to an injections to assess soft tissue and vasculature.  We discussed the underlying diagnoses and multiple treatment options including non-opioid medications, opioid medications, interventional procedures, physical therapy and home exercise.  The risks and benefits of each treatment option were discussed and all questions were answered.        1/19/2022- Celso Hernandez is a 69 y.o. male who  has a past medical history of Arthritis, BPH (benign prostatic hyperplasia), Cataract, Colon polyps, Diabetes mellitus type II, Glaucoma, Hyperlipidemia, Hypertension, Multiple duodenal ulcers (10/08/2019), and Unspecified hemorrhoids without mention of complication.  By history and examination this patient has chronic mid/upper back pain with a right T5-6   Radiculopathy that responded well following a thoracic GABRIELA targeting T5-6.  Colonic causes appear to be facet arthritis and degenerative disc disease with a large obstructive osteophyte on the right side.  Upon review of his thoracic MRI ordered per Dr. Mejia results did show a extradural degenerative changes resulting in right T6 neural foraminal stenosis.  Secondly intradural extramedullary T2 signal abnormality at the level of the T4-5.  The radiologist include a differential diagnosis that included a nerve sheet to were such as a  schwannoma or a neurofibroma as well as other neoplasms.  Radiologist recommended further evaluation of this patient's thoracic area, discussed with the patient today that I will order a thoracic MRI with contrast to further evaluate. Long discussion with patient regarding  Results of his MRI, I will also refer to Oncology for further evaluation but I would like this patient to see a college E following a hip obtaining new images.    02/10/9010-28-sxuw-old male with history of chronic mid to low back pain, he presents today to review an updated thoracic MRI.  There was concerned per radiology on previous thoracic MRI  with differential diagnosis that includes a nerve sheet to wear such as a schwannoma or a neurofibroma as well as other neoplasms.  I contacted Oncology and did not appear to be a malignancy.  Was a non malignant entity.  He contacted patient and made him aware of this.  Today his pain is 0/10, he does experience mild midthoracic low back pain when performing duties outside of his normal ADLs.  I recommended physical therapy patient is amenable to this plan.    05/08/[id number]-roqz-old male presents today acute/subacute neck pain beginning at the patient's his neck radiating his head.  Etiology is unclear at this point.  Based on history the patient may be suffering facet arthritis of the cervical, differential diagnosis be myofascial pain.  Cause of his symptoms are unclear he did state that he slept wrong on a pillow and woke up the next morning with this pain.  He denies any radiating symptoms do not suspect any degenerative disc disease or central and/or neural foraminal stenosis.  Ibuprofen has helped his pain prior to come into clinic today but it only lasts 4-5 hours.  He and I discussed plan below and was agreed upon during our clinic visit today.    08/09/2023 68-year-old pleasant male neck pain upper back pain.  He reports having attended physical therapy and completing.  Based on my history and exam his pain generators were from facet arthritis and he also had a cervical myofascial component to his pain which both have improved through physical therapy.  I recommended he establish a home exercise plan patient verbalized understanding and agreed.    10/10/2023- 68-year-old male that is known to our clinic presents with right gluteal/buttocks pain that has been ongoing for the last 3-4 months.  Based on my history and exam the patient is experiencing gluteal pain my differential diagnosis will include sacroiliitis right-sided on.  He has no radicular symptoms he denies any paresthesia like symptoms.  I do  believe the patient does a lot of sitting during the day I think would benefit him to establish home exercise plan that focuses on gluteal stretches which I will provide him with today.    2/6/2024 - Patient presents to clinic for R knee pain after a mechanical fall. Patient initially experienced sharp anterior knee pain that has resolved over the past few days. On exam, patient has mild tenderness to palpation and negative anterior drawer. Discussed proceeding with conservative management which the patient is on board with. RICE for knee pain.  He also complains chronic hip pain that radiates to the groin worse in the left.  He also complains of axial cervical pain consistent with facet arthropathy.  We discussed interventional procedures for these chronic pains including intra-articular hip injections as well as cervical medial branch blocks/RFA.  Patient would prefer to treat conservatively with physical therapy for now.    03/21/[id number]-bnpp-old pleasant male with a history of chronic bilateral hip pain and right knee pain.  Currently participating in physical therapy based on history and exam patient is experiencing right knee pain related to osteoarthritis and hip pain likely related to osteoarthritis.  He is participating in physical therapy which is helping his pain did also present with numbness and tingling intermittently in his hands feet which is likely related to diabetic peripheral neuropathy due to his history of diabetes.  The numbness and tingling in his pain 8 are not significant he reports them worse in the morning and then dissipates throughout the day.  We discussed considering gabapentin however he refused this today.  Discuss specific exercises to help with diabetic peripheral neuropathy.  Recommended he continue and complete physical therapy see plan agreed upon below.    04/30/2025-Celso Hernandez is a 69 y.o. male who  has a past medical history of Arthritis, BPH (benign prostatic  hyperplasia), Cataract, Colon polyps, Diabetes mellitus type II, Glaucoma, Hyperlipidemia, Hypertension, Multiple duodenal ulcers (10/08/2019), and Unspecified hemorrhoids without mention of complication.  By history and examination this patient has chronic low back pain with RIGHT  radiculopathy.  The underlying cause cause is facet arthritis, degenerative disc disease, foraminal stenosis, central canal stenosis, muscle dysfunction, muscles strain, and deconditioning.  Pathology is confirmed by imaging.  We discussed the underlying diagnoses and multiple treatment options including non-opioid medications, interventional procedures, physical therapy, home exercise, core muscle enhancement, activity modification, and weight loss.  The risks and benefits of each treatment option were discussed and all questions were answered.          Treatment Plan:   Procedures:  None at this time.  In the future consider lumbar GABRIELA pending lumbar MRI.  PT/OT/HEP:  Continue participating in physical therapy for low back.  I have stressed the importance of physical activity and a home exercise plan to help with pain and improve health.  Referral placed to physical therapy.  Medications:  May have to consider gabapentin if diabetic peripheral neuropathy gets worse in his hands and feet   - no new medications prescribed at this visit.  Educated patient on chronic use of Mobic.   -  Reviewed and consistent with medication use as prescribed.  Imaging:  Lumbar x-rays and lumbar MRI ordered today to rule out progressive pathology   Follow Up:  After lumbar MRI for ongoing care.    FELISHA CabralesC  [facility]        Imagin22 MRI Thoracic Spine W WO Contrast     Narrative & Impression  EXAMINATION:  MRI THORACIC SPINE W WO CONTRAST     CLINICAL HISTORY:  Attention to T4-5 level.  Evaluate for underlying mass.     TECHNIQUE:  Pre and postcontrast MR imaging of the thoracic spine was performed utilizing 10 mL  Gadavist intravenous contrast.     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     No advanced marrow edema or osseous destructive process is identified.  Multilevel mild disc bulging flattens the thecal sac without cord impingement.  Marked degenerative foraminal stenosis on the left at T 4-5 and to the right at T5-6 is again noted.     The spinal cord maintains normal signal intensity.     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     No enhancing lesion.  Degenerative foraminal stenosis on the left at T4-5 and on the right at T5-6.        Electronically signed by: Trent Graves  Date:                                            01/24/2022  Time:                                           17:11        CT Chest Without Contrast  Impression:  No worrisome finding on the chest CT.  Mild atherosclerotic plaque of the aorta and mild coronary artery calcification  Severe foraminal narrowing on the right at T5-6 due to significant facet joint osseous hypertrophy.  Electronically signed by: Halley Lundberg MD  Date:                                            09/29/2021  Time:                                           08:56    X-Ray Lumbar Spine Ap And Lateral 11/21/2019  FINDINGS:  Two views AP lateral.  There is mild DJD and DISH.  Alignment is normal.  No fracture dislocation bone destruction.  No trauma seen.  Impression:  No acute process seen.  DJD and DISH.      Review of Systems:  Review of Systems   Constitutional:  Negative for chills and fever.   HENT:  Negative for nosebleeds.    Eyes:  Negative for blurred vision and pain.   Respiratory:  Negative for hemoptysis.    Cardiovascular:  Negative for chest pain and palpitations.  "  Gastrointestinal:  Negative for heartburn, nausea and vomiting.   Genitourinary:  Negative for dysuria and hematuria.   Musculoskeletal:  Positive for joint pain. Negative for myalgias.   Skin:  Negative for rash.   Neurological:  Negative for seizures and loss of consciousness.   Endo/Heme/Allergies:  Does not bruise/bleed easily.   Psychiatric/Behavioral:  Negative for hallucinations.        Physical Exam:  Vitals:    04/30/25 1101   BP: (!) 162/80   Pulse: 106   Weight: 121.3 kg (267 lb 6.7 oz)   Height: 5' 8" (1.727 m)   PainSc:   2   PainLoc: Back         General    Nursing note and vitals reviewed.  Constitutional: He is oriented to person, place, and time. He appears well-developed and well-nourished. No distress.   HENT:   Head: Normocephalic and atraumatic.   Nose: Nose normal.   Eyes: Conjunctivae and EOM are normal. Pupils are equal, round, and reactive to light. Right eye exhibits no discharge. Left eye exhibits no discharge. No scleral icterus.   Neck: No JVD present.   Cardiovascular:  Intact distal pulses.            Pulmonary/Chest: Effort normal. No respiratory distress.   Abdominal: He exhibits no distension.   Neurological: He is alert and oriented to person, place, and time. Coordination normal.   Psychiatric: He has a normal mood and affect. His behavior is normal. Judgment and thought content normal.     General Musculoskeletal Exam   Gait: normal       Right Knee Exam     Inspection   Erythema: absent  Scars: absent  Swelling: present  Deformity: absent  Bruising: absent    Tenderness   The patient is tender to palpation of the medial joint line and MCL.    Range of Motion   The patient has normal right knee ROM.    Tests   Ligament Examination   Lachman: normal (-1 to 2mm)   PCL-Posterior Drawer: normal (0 to 2mm)     Patella   Passive Patellar Tilt: neutral  Patellar Tracking: normalBack (L-Spine & T-Spine) / Neck (C-Spine) Exam     Tenderness Posterior midline palpation reveals tenderness " of the Sacrum, Lower L-Spine and Upper L-Spine. Right paramedian tenderness of the Lower L-Spine and Sacrum. Left paramedian tenderness of the Lower L-Spine and Sacrum.     Back (L-Spine & T-Spine) Range of Motion   Extension:  abnormal   Flexion:  abnormal   Lateral bend right:  abnormal Back lateral bend right: leaning to the right exacerbates pain.  Lateral bend left:  abnormal   Rotation right:  abnormal   Rotation left:  abnormal     Comments:  Straight leg raise positive right  Positive pain in the lumbar spine with extension  Positive pain in the lumbar spine with lateral movement bilaterally      Muscle Strength   Right Lower Extremity   Hip Abduction: 5/5   Quadriceps:  5/5   Hamstrin/5   Left Lower Extremity   Hip Abduction: 5/5   Quadriceps:  5/5   Hamstrin/5

## 2025-04-30 NOTE — PROGRESS NOTES
"  Outpatient Rehab    Physical Therapy Visit    Patient Name: Celso Hernandez  MRN: [id number]  YOB: 1955  Encounter Date: 4/30/2025    Therapy Diagnosis:   Encounter Diagnosis   Name Primary?    Decreased strength of lower extremity Yes     Physician: Casie Miguel DO    Physician Orders: Eval and Treat  Medical Diagnosis: Primary osteoarthritis of right knee  Cervicalgia    Visit # / Visits Authorized:  19 / 20  Insurance Authorization Period: 2/14/2025 to 12/31/2025  Date of Evaluation: 2/14/24   Plan of Care Certification: 4/23/25 to 5/23/25      PT/PTA:     Number of PTA visits since last PT visit:   Time In: 0910   Time Out: 1003  Total Time: 53   Total Billable Time: 53    FOTO:  Intake Score:  %  Survey Score 1:  %  Survey Score 2:  %         Subjective   Pain is intermittent but worse recently in low back and into RLE. The longer "I'm up on my legs, the worse it gets."  Hips, knees, neck getting better overall- just stiff today..  Pain reported as 2/10.      Objective            Treatment:  Balance/Neuromuscular Re-Education  NMR 5: SAPD 2 x 10 GTB with contralateral head turns  NMR 6: Rows 2 x 10 GTB  NMR 7: Supine sciatic nerve glides (90/90) 2x10 B  NMR 8: hip abd isometrics 5x30" (still has L hip pain when L side-lying)  NMR 9: Bridges with hip abd (gait belt) 2-3x10  NMR 10: +SLR 2x10 reps B  Therapeutic Activity  TA 2: Step-up/knee drivers, 6'' step 2x10 reps B  TA 3: Heel raise 3 x 10  TA 4: deadlift to step + 9'' step; 12# 1x10  TA 5: Bike 5' L2.5    Time Entry(in minutes):   53    Assessment & Plan   Assessment: Patient is a 69 y.o. M with a medical diagnosis of M17.11 (ICD-10-CM) - Primary osteoarthritis of right knee. additional referral for M54.2 (ICD-10-CM) - Cervicalgia  M25.569 (ICD-10-CM) - Knee pain, unspecified chronicity, unspecified laterality. Patient continues to voice gradual improvements in multi joint pain. Primary complaint of stiffness and flare-up of low back " and radicular pain into right lower extremity (mild today). Addressed core, gluteal activation and sciatic nerve mobility today with good response.  Continue POC. Monitor response.  Evaluation/Treatment Tolerance: Patient tolerated treatment well    Patient will continue to benefit from skilled outpatient physical therapy to address the deficits listed in the problem list box on initial evaluation, provide pt/family education and to maximize pt's level of independence in the home and community environment.     Patient's spiritual, cultural, and educational needs considered and patient agreeable to plan of care and goals.           Plan: cont POC with addition of cervical mobility and postural strengthening    Goals:   Active       Changing body position       Patient will demonstrate moving sit to stand 8+x in 30 sec (Progressing)       Start:  02/12/25    Expected End:  05/23/25               Functional outcome       Patient will show a significant change in FOTO patient-reported outcome tool to demonstrate subjective improvement (Progressing)       Start:  02/12/25    Expected End:  04/11/25            Patient will demonstrate independence in home program for support of progression (Met)       Start:  02/12/25    Expected End:  03/14/25    Resolved:  04/23/25            Range of Motion       Patient will achieve right knee ROM of  3-120 degrees  (Progressing)       Start:  02/12/25    Expected End:  05/23/25               Strength       Patient will achieve bilateral hip abduction strength of 4+/5 (Progressing)       Start:  02/12/25    Expected End:  05/23/25            Patient will improve bilateral knee flexion strength by 5kg  (Met)       Start:  02/12/25    Expected End:  04/11/25    Resolved:  04/23/25         Patient will improve right knee extension strength by 5kg (Met)       Start:  02/12/25    Expected End:  04/11/25    Resolved:  04/23/25            cervical goals       patient will improve cervical  rotation 10 degrees (Progressing)       Start:  02/18/25    Expected End:  05/23/25            patient will improve cervical extension to 30 deg (Progressing)       Start:  02/18/25    Expected End:  05/23/25              Resolved       Pain       Patient will report pain of 4/10 max demonstrating a reduction of overall pain (Met)       Start:  02/12/25    Expected End:  03/14/25    Resolved:  03/28/25             Cyndy Weathers, PT, DPT  4/30/2025

## 2025-05-02 ENCOUNTER — CLINICAL SUPPORT (OUTPATIENT)
Dept: REHABILITATION | Facility: HOSPITAL | Age: 70
End: 2025-05-02
Payer: MEDICARE

## 2025-05-02 DIAGNOSIS — R29.898 DECREASED STRENGTH OF LOWER EXTREMITY: Primary | ICD-10-CM

## 2025-05-02 PROCEDURE — 97112 NEUROMUSCULAR REEDUCATION: CPT | Mod: PN,CQ

## 2025-05-02 PROCEDURE — 97530 THERAPEUTIC ACTIVITIES: CPT | Mod: PN,CQ

## 2025-05-02 NOTE — PROGRESS NOTES
"  Outpatient Rehab    Physical Therapy Visit    Patient Name: Celso Hernandez  MRN: [id number]  YOB: 1955  Encounter Date: 5/2/2025    Therapy Diagnosis:   Encounter Diagnosis   Name Primary?    Decreased strength of lower extremity Yes     Physician: Casie Miguel DO    Physician Orders: Eval and Treat  Medical Diagnosis: Primary osteoarthritis of right knee  Cervicalgia    Visit # / Visits Authorized:  20 / 30  Insurance Authorization Period: 2/14/2025 to 6/6/2025  Date of Evaluation: 2/14/24   Plan of Care Certification: 4/23/25 to 5/23/25      PT/PTA: PTA   Number of PTA visits since last PT visit:1  Time In: 0900   Time Out: 0955  Total Time: 55   Total Billable Time: 30    FOTO:  Intake Score:  %  Survey Score 1:  %  Survey Score 2:  %         Subjective   Overall no pain currently, primary complaint of stiffness mostly in bilateral hips and knees.  Pain reported as 0/10. 0/10 Bilateral knees, 0/10 L hip; 0/10 neck L > R.    Objective            Treatment:  Therapeutic Exercise  TE 3: seated lumbar flexion 10x3 peanut  TE 5: Lunge at stairs 2 x 10  Balance/Neuromuscular Re-Education  NMR 5: SAPD 2 x 10 GTB with contralateral head turns  NMR 6: Rows 2 x 10 GTB  NMR 7: Supine sciatic nerve glides (90/90) 2x10 B  NMR 8: hip abd isometrics 5x30" (still has L hip pain when L side-lying)  NMR 9: Bridges with hip abd (gait belt) 2-3x10  NMR 10: SLR 2x10 reps B  Therapeutic Activity  TA 2: Step-up/knee drivers, 6'' step 2x10 reps B  TA 3: Heel raise 3 x 10  TA 4: deadlift to step + 9'' step; 12# 1x10  TA 5: Bike 5' L2.5    Time Entry(in minutes):  Neuromuscular Re-Education Time Entry: 15  Therapeutic Activity Time Entry: 10  Therapeutic Exercise Time Entry: 5    Assessment & Plan   Assessment: Patient is a 69 y.o. M with a medical diagnosis of M17.11 (ICD-10-CM) - Primary osteoarthritis of right knee. additional referral for M54.2 (ICD-10-CM) - Cervicalgia  M25.569 (ICD-10-CM) - Knee pain, " unspecified chronicity, unspecified laterality. Patient continues to voice gradual improvements in multi joint pain. None currently, primary complaint of stiffness in bilateral hips and knees. Addition of more mobility exercises to address. Will plan to continue to addressed core, gluteal activation and sciatic nerve mobility.  Continue POC. Monitor response.       Patient will continue to benefit from skilled outpatient physical therapy to address the deficits listed in the problem list box on initial evaluation, provide pt/family education and to maximize pt's level of independence in the home and community environment.     Patient's spiritual, cultural, and educational needs considered and patient agreeable to plan of care and goals.           Plan: cont POC with addition of cervical mobility and postural strengthening    Goals:   Active       Changing body position       Patient will demonstrate moving sit to stand 8+x in 30 sec (Progressing)       Start:  02/12/25    Expected End:  05/23/25               Functional outcome       Patient will show a significant change in FOTO patient-reported outcome tool to demonstrate subjective improvement (Progressing)       Start:  02/12/25    Expected End:  04/11/25            Patient will demonstrate independence in home program for support of progression (Met)       Start:  02/12/25    Expected End:  03/14/25    Resolved:  04/23/25            Range of Motion       Patient will achieve right knee ROM of  3-120 degrees  (Progressing)       Start:  02/12/25    Expected End:  05/23/25               Strength       Patient will achieve bilateral hip abduction strength of 4+/5 (Progressing)       Start:  02/12/25    Expected End:  05/23/25            Patient will improve bilateral knee flexion strength by 5kg  (Met)       Start:  02/12/25    Expected End:  04/11/25    Resolved:  04/23/25         Patient will improve right knee extension strength by 5kg (Met)       Start:   02/12/25    Expected End:  04/11/25    Resolved:  04/23/25            cervical goals       patient will improve cervical rotation 10 degrees (Progressing)       Start:  02/18/25    Expected End:  05/23/25            patient will improve cervical extension to 30 deg (Progressing)       Start:  02/18/25    Expected End:  05/23/25              Resolved       Pain       Patient will report pain of 4/10 max demonstrating a reduction of overall pain (Met)       Start:  02/12/25    Expected End:  03/14/25    Resolved:  03/28/25             Lucy Gaming, PTA

## 2025-05-07 ENCOUNTER — CLINICAL SUPPORT (OUTPATIENT)
Dept: REHABILITATION | Facility: HOSPITAL | Age: 70
End: 2025-05-07
Payer: MEDICARE

## 2025-05-07 ENCOUNTER — HOSPITAL ENCOUNTER (OUTPATIENT)
Dept: RADIOLOGY | Facility: HOSPITAL | Age: 70
Discharge: HOME | End: 2025-05-07
Attending: NURSE PRACTITIONER
Payer: MEDICARE

## 2025-05-07 DIAGNOSIS — M54.16 LUMBAR RADICULOPATHY, CHRONIC: ICD-10-CM

## 2025-05-07 DIAGNOSIS — R29.898 DECREASED STRENGTH OF LOWER EXTREMITY: Primary | ICD-10-CM

## 2025-05-07 DIAGNOSIS — M51.362 DEGENERATION OF INTERVERTEBRAL DISC OF LUMBAR REGION WITH DISCOGENIC BACK PAIN AND LOWER EXTREMITY PAIN: ICD-10-CM

## 2025-05-07 DIAGNOSIS — M47.816 LUMBAR FACET ARTHROPATHY: ICD-10-CM

## 2025-05-07 PROCEDURE — 97140 MANUAL THERAPY 1/> REGIONS: CPT | Mod: PN

## 2025-05-07 PROCEDURE — 72148 MRI LUMBAR SPINE W/O DYE: CPT | Mod: TC

## 2025-05-07 PROCEDURE — 97530 THERAPEUTIC ACTIVITIES: CPT | Mod: PN

## 2025-05-07 PROCEDURE — 97112 NEUROMUSCULAR REEDUCATION: CPT | Mod: PN

## 2025-05-07 PROCEDURE — 72148 MRI LUMBAR SPINE W/O DYE: CPT | Mod: 26,,, | Performed by: RADIOLOGY

## 2025-05-07 NOTE — PROGRESS NOTES
"  Outpatient Rehab    Physical Therapy Visit    Patient Name: Celso Hernandez  MRN: [id number]  YOB: 1955  Encounter Date: 5/7/2025    Therapy Diagnosis:   Encounter Diagnosis   Name Primary?    Decreased strength of lower extremity Yes     Physician: Casie Miguel DO    Physician Orders: Eval and Treat  Medical Diagnosis: Primary osteoarthritis of right knee  Cervicalgia    Visit # / Visits Authorized:  21 / 30  Insurance Authorization Period: 2/14/2025 to 6/6/2025  Date of Evaluation: 2/14/24   Plan of Care Certification: 4/23/25 to 5/23/25      PT/PTA: PT   Number of PTA visits since last PT visit:1  Time In: 0904   Time Out: 0957  Total Time: 53   Total Billable Time: 53    FOTO:  Intake Score:  %  Survey Score 1:  %  Survey Score 2:  %         Subjective   primary complaint of back pain radiating into buttock and down R LE to calf. MRI today, result follow up friday. contributing to hip pain. ongoing neck stiffness. knee is feeling better today..    0/10 Bilateral knees, 3/10 L hip; 0/10 neck L > R (stiff)    Objective      Subcranial Range of Motion   Active Restricted? Passive Restricted? Pain   Flexion         Protraction         Retraction           Cervical Range of Motion   Active (deg) Passive (deg) Pain   Flexion         Extension         Right Lateral Flexion         Right Rotation 50       Left Lateral Flexion         Left Rotation 40                      Treatment:  Therapeutic Exercise  TE 1: nustep L3 6'  TE 3: seated lumbar flexion 10x3 peanut  TE 6: SNAGS/repeated left rotation seated x 15 reps L, 5'' hold  TE 7: Cervical extension with anterior glide(use of towel) x 15 reps, 5'' hold  Manual Therapy  MT 2: LAD R  Balance/Neuromuscular Re-Education  NMR 3: Serratus punch 3 x 10 5#  NMR 5: SAPD 2 x 10 B TB with contralateral head turns  NMR 7: Supine sciatic nerve glides (90/90) 2x10 B  NMR 8: hip abd isometrics 5x30" (still has L hip pain when L side-lying)  NMR 9: Bridges with " "hip abd (gait belt) 2-3x10 (25 today then limited by R posterior thigh pain)  NMR 10: cervical rotation isometrics with progressive rotation 5x10" B  Therapeutic Activity  TA 1: STS 2 x 10 Pink weighted ball  TA 2: Step-up/knee drivers contralateral pull down  6'' step 2x10 reps B  TA 3: Heel raise 3 x 10    Time Entry(in minutes):  Manual Therapy Time Entry: 8  Neuromuscular Re-Education Time Entry: 30  Therapeutic Activity Time Entry: 15    Assessment & Plan   Assessment: Patient is a 69 y.o. M with a medical diagnosis of M17.11 (ICD-10-CM) - Primary osteoarthritis of right knee. additional referral for M54.2 (ICD-10-CM) - Cervicalgia  M25.569 (ICD-10-CM) - Knee pain, unspecified chronicity, unspecified laterality. Patient with improving knee pain, ongoing cervical stiffness with firm  end feel. moderate L hip pain with STS.  primary complaint of low back pain and R radicular pain. performed R LAD to improve tolerance for activity.  lumbar MRI later today. Will plan to continue to addressed core, gluteal activation and sciatic nerve mobility.  Continue POC. Monitor response.  Evaluation/Treatment Tolerance: Patient tolerated treatment well    Patient will continue to benefit from skilled outpatient physical therapy to address the deficits listed in the problem list box on initial evaluation, provide pt/family education and to maximize pt's level of independence in the home and community environment.     Patient's spiritual, cultural, and educational needs considered and patient agreeable to plan of care and goals.           Plan: cont POC    Goals:   Active       Changing body position       Patient will demonstrate moving sit to stand 8+x in 30 sec (Progressing)       Start:  02/12/25    Expected End:  05/23/25               Functional outcome       Patient will show a significant change in FOTO patient-reported outcome tool to demonstrate subjective improvement (Progressing)       Start:  02/12/25    Expected " End:  04/11/25            Patient will demonstrate independence in home program for support of progression (Met)       Start:  02/12/25    Expected End:  03/14/25    Resolved:  04/23/25            Range of Motion       Patient will achieve right knee ROM of  3-120 degrees  (Progressing)       Start:  02/12/25    Expected End:  05/23/25               Strength       Patient will achieve bilateral hip abduction strength of 4+/5 (Progressing)       Start:  02/12/25    Expected End:  05/23/25            Patient will improve bilateral knee flexion strength by 5kg  (Met)       Start:  02/12/25    Expected End:  04/11/25    Resolved:  04/23/25         Patient will improve right knee extension strength by 5kg (Met)       Start:  02/12/25    Expected End:  04/11/25    Resolved:  04/23/25            cervical goals       patient will improve cervical rotation 10 degrees (Progressing)       Start:  02/18/25    Expected End:  05/23/25            patient will improve cervical extension to 30 deg (Progressing)       Start:  02/18/25    Expected End:  05/23/25              Resolved       Pain       Patient will report pain of 4/10 max demonstrating a reduction of overall pain (Met)       Start:  02/12/25    Expected End:  03/14/25    Resolved:  03/28/25             Radha Lares, PT, DPT

## 2025-05-09 ENCOUNTER — CLINICAL SUPPORT (OUTPATIENT)
Dept: REHABILITATION | Facility: HOSPITAL | Age: 70
End: 2025-05-09
Payer: MEDICARE

## 2025-05-09 ENCOUNTER — OFFICE VISIT (OUTPATIENT)
Dept: PAIN MEDICINE | Facility: CLINIC | Age: 70
End: 2025-05-09
Payer: MEDICARE

## 2025-05-09 VITALS
HEIGHT: 68 IN | SYSTOLIC BLOOD PRESSURE: 155 MMHG | DIASTOLIC BLOOD PRESSURE: 82 MMHG | BODY MASS INDEX: 40.3 KG/M2 | WEIGHT: 265.88 LBS | HEART RATE: 92 BPM

## 2025-05-09 DIAGNOSIS — M51.362 DEGENERATION OF INTERVERTEBRAL DISC OF LUMBAR REGION WITH DISCOGENIC BACK PAIN AND LOWER EXTREMITY PAIN: ICD-10-CM

## 2025-05-09 DIAGNOSIS — M47.816 LUMBAR FACET ARTHROPATHY: ICD-10-CM

## 2025-05-09 DIAGNOSIS — M54.16 LUMBAR RADICULOPATHY, CHRONIC: Primary | ICD-10-CM

## 2025-05-09 DIAGNOSIS — R29.898 DECREASED STRENGTH OF LOWER EXTREMITY: Primary | ICD-10-CM

## 2025-05-09 PROCEDURE — 97112 NEUROMUSCULAR REEDUCATION: CPT | Mod: PN,CQ

## 2025-05-09 PROCEDURE — 99999 PR PBB SHADOW E&M-EST. PATIENT-LVL III: CPT | Mod: PBBFAC,,, | Performed by: NURSE PRACTITIONER

## 2025-05-09 PROCEDURE — 97110 THERAPEUTIC EXERCISES: CPT | Mod: PN,CQ

## 2025-05-09 RX ORDER — GABAPENTIN 100 MG/1
100 CAPSULE ORAL 3 TIMES DAILY
Qty: 90 CAPSULE | Refills: 0 | Status: SHIPPED | OUTPATIENT
Start: 2025-05-09 | End: 2025-06-08

## 2025-05-09 NOTE — PROGRESS NOTES
[facility] Establ[facility] Visit    Referred by: Dr. Inder Quarles   Reason for referral: Lumbar radiculopathy, chronic     CC:   Chief Complaint   Patient presents with    Low-back Pain    Follow-up    Leg Pain    Hip Pain    Rectal Pain     Interval update 05/09/25:  Patient presents today for MRI results, complaining of bilateral lower back, hips and buttocks that radiates down to both legs and heels. Patient complains of continued paraesthesia like symptoms.   Patient continues to complain of the symptoms he denies any recent incident or trauma denies any profound weakness he is currently still participating in physical therapy for his right knee they also providing  him physical therapy for his low back and bilateral leg pain.     Interval history 4/30/2025:  68 y/o Patient presents with low back pain, right buttock pain, and right leg pain that started around March 30th. He experiences burning, numbness, and tenderness in the affected areas. Pain radiates down to the heel of both feet and worsens with prolonged standing or walking, necessitating lying down for relief. He reports a history of buttock pain, but the leg pain is a new symptom present for about a month.    Pain management includes rest and PT, which has been beneficial. He takes meloxicam regularly, attempting to take it every other day, and uses ibuprofen only for severe pain. Meloxicam helps alleviate some pain and swelling.    He has a history of T spine injections, which have been effective in managing his mid-back pain. He does not have any significant problems with his T spine since the injection, as well as any chest pain, shortness of breath, or upper extremity symptoms.      Interval Update 03/21/2026: Patient return to clinic for follow-up on right knee pain. He has previously ordered x-rays of his hips and knees which we will review. Additionally patient reporting numbness in hands and fingers that has  been going on intermittently for several years, worse in the am once he moves hands and fingers numbness dissipates. He reports not feeling numbness during the day.  Patient denies any recent incident or trauma denies any bowel or bladder function denies saddle anesthesia he continues to participate in physical therapy which is helping his knees and hip pain.    Interval Update 02/06/2025:   Patient return to clinic for right knee pain.  Pain worse in the medial aspect of the right knee.  Incident occurred 3 days ago, patient was going down the stairs and his right knee gave out, he fell to the ground onto the right knee.  He states he fell onto grass.  Denies hitting his head.  Denies loss of consciousness.  That day the pain was significant, but has since improved. Patient made the appointment and wanted to come in just to make sure everything is alright. Patient has never had this happen before, he has no h/o surgeries or injections to the R knee. Patient continues to take meloxicam which is helping his joint pain. He does note pain in the mornings in his bilateral knee joints.     When the pain was at its worse, he describes it as sharp anterior knee pain that became aching. He also endorses a strain sensation to his medial knee.     Patient also complains of intermittent hip pain, worse in the left.  He also complains of axial neck pain.  Denies any radicular symptoms.    Interval History 10/10/23:  Celso Hernandez returns today for follow up for right gluteal/buttocks pain that has been ongoing for the last 3-4 months.  He is intermittent pain described as soreness pain is worse when sitting pain is mitigated when shifting weight off of his right gluteal area.  Denies any radiating symptoms down either leg the worst of his pain is 3/4/10.  He denies any recent incident or trauma denies any bowel bladder dysfunction,.  Patient states that he does a lot of sitting as he is a  he also watches  "television during the day for 2-3 hours every day.  He does take occasionally meloxicam 7.5 which she states does help.      Current Pain Scales:  Current: 0/10              Typical Range: 0-4/10     Interval History(08/09/2023):  Celso Hernandez returns today for follow up for neck and upper back pain.  He reports completing physical therapy and has now establish a home exercise plan at home.  He states that his neck pain is stable at the moment and is doing fine.    Currently, the neck pain is stable.        Current Pain Scales:  Current: 0/10              Typical Range: 2-4/10     Interval Updates  05/08/[id number]-namp-old male presents today with neck and upper back pain.  Onset 5-6 days pain is specifically located at the base of his neck and radiates into his head.  Denies any headaches.  Pain is constant, pain is described as a strain and pulling he denies any radicular symptoms in either arm , denies any profound weakness, denies any paresthesia like symptoms.  Pain is aggravated with certain movements and laying down.  Pain is mitigated with ibuprofen 800 mg t.i.d..  He denies any recent incident or trauma he does state that he felt like he slept wrong and woke up the next morning feeling pain in the neck and into his head.    Interval Updates:   2/10/2022 - Mr. Hernandez is following up for  Low-back Pain and Mid-back Pain is currently rate 0/10 with a weekly range 2-5/10.  It is described as Aching, Grabbing and Tight.  He  Is reporting mild to no symptoms today, he states he has no pain when performing normal ADLs, he reports pain is exacerbated when he  "tries to do to much" He did also report midback /thoroacic pain with mild twisting but pain is stable at this point.     1/19/2022  -  Mary returns to clinic s/p Thoracic Epidural Steroid Injection at T5-6 on 1/4/22 with 60% relief.  He reports a pain intensity 1/10 today with a weekly range of 1-2/10.   He is reporting mild soarness in his thoracic " "area, he is reporting being " free of pain after the injection" his pain returned after working outside in his garage yesterday. Overall much better following his injection. He is reporting certain movements increase his pain but other than that he  is better.     Subjective:   Celso Hernandez is a 69 y.o. male who has a past medical history of Arthritis, BPH (benign prostatic hyperplasia), Cataract, Colon polyps, Diabetes mellitus type II, Glaucoma, Hyperlipidemia, Hypertension, Multiple duodenal ulcers, and Unspecified hemorrhoids without mention of complication. He complains of pain as described below.    Location: low back  Right gluteal pain  Onset:  3-4 months  Radiation:  Right Leg   Timing: constant  Current Pain Score: 7/10  Weekly Pain Range: 2-10/10  Quality:  Pulling and strain type pain  Worsened by: exercise, extension, lifting, lying down, sitting and walking for more than several minutes  Improved by: medications ibuprofen 800 mg    Previous Therapies:  PT/OT: Denies for neck pain  HEP: Yes, stretching at home and walking.  TENS: No  Interventions:  01/04/2022 Thoracic Epidural Steroid Injection at T5-6  Surgery: Denies localized surgery  Opioids:  Adjuvants:     Current Pain Medications:  Ibuprofen 800 mg t.i.d.     Assessment & Plan:  Problem List Items Addressed This Visit    None          12/17/21 - Celso Hernandez is a 69 y.o. male who  has a past medical history of Arthritis, BPH (benign prostatic hyperplasia), Cataract, Colon polyps, Diabetes mellitus type II, Glaucoma, Hyperlipidemia, Hypertension, Multiple duodenal ulcers (10/08/2019), and Unspecified hemorrhoids without mention of complication.  By history and examination this patient has chronic mid/upper back pain with right T5-6 radiculopathy.  The underlying cause cause is facet arthritis and degenerative disc disease with a large, obstructive osteophyte on the right side as seen on CT chest.  MRI is appropriate prior to an injections " to assess soft tissue and vasculature.  We discussed the underlying diagnoses and multiple treatment options including non-opioid medications, opioid medications, interventional procedures, physical therapy and home exercise.  The risks and benefits of each treatment option were discussed and all questions were answered.        1/19/2022- Celso Hernandez is a 69 y.o. male who  has a past medical history of Arthritis, BPH (benign prostatic hyperplasia), Cataract, Colon polyps, Diabetes mellitus type II, Glaucoma, Hyperlipidemia, Hypertension, Multiple duodenal ulcers (10/08/2019), and Unspecified hemorrhoids without mention of complication.  By history and examination this patient has chronic mid/upper back pain with a right T5-6   Radiculopathy that responded well following a thoracic GABRIELA targeting T5-6.  Colonic causes appear to be facet arthritis and degenerative disc disease with a large obstructive osteophyte on the right side.  Upon review of his thoracic MRI ordered per Dr. Mejia results did show a extradural degenerative changes resulting in right T6 neural foraminal stenosis.  Secondly intradural extramedullary T2 signal abnormality at the level of the T4-5.  The radiologist include a differential diagnosis that included a nerve sheet to were such as a  schwannoma or a neurofibroma as well as other neoplasms.  Radiologist recommended further evaluation of this patient's thoracic area, discussed with the patient today that I will order a thoracic MRI with contrast to further evaluate. Long discussion with patient regarding  Results of his MRI, I will also refer to Oncology for further evaluation but I would like this patient to see a college E following a hip obtaining new images.    [birth year]/10/[birth year]67-31-cygg-old male with history of chronic mid to low back pain, he presents today to review an updated thoracic MRI.  There was concerned per radiology on previous thoracic MRI with differential diagnosis that  includes a nerve sheet to wear such as a schwannoma or a neurofibroma as well as other neoplasms.  I contacted Oncology and did not appear to be a malignancy.  Was a non malignant entity.  He contacted patient and made him aware of this.  Today his pain is 0/10, he does experience mild midthoracic low back pain when performing duties outside of his normal ADLs.  I recommended physical therapy patient is amenable to this plan.    05/08/20231321-22-fuht-old male presents today acute/subacute neck pain beginning at the patient's his neck radiating his head.  Etiology is unclear at this point.  Based on history the patient may be suffering facet arthritis of the cervical, differential diagnosis be myofascial pain.  Cause of his symptoms are unclear he did state that he slept wrong on a pillow and woke up the next morning with this pain.  He denies any radiating symptoms do not suspect any degenerative disc disease or central and/or neural foraminal stenosis.  Ibuprofen has helped his pain prior to come into clinic today but it only lasts 4-5 hours.  He and I discussed plan below and was agreed upon during our clinic visit today.    08/09/2023 68-year-old pleasant male neck pain upper back pain.  He reports having attended physical therapy and completing.  Based on my history and exam his pain generators were from facet arthritis and he also had a cervical myofascial component to his pain which both have improved through physical therapy.  I recommended he establish a home exercise plan patient verbalized understanding and agreed.    10/10/2023- 68-year-old male that is known to our clinic presents with right gluteal/buttocks pain that has been ongoing for the last 3-4 months.  Based on my history and exam the patient is experiencing gluteal pain my differential diagnosis will include sacroiliitis right-sided on.  He has no radicular symptoms he denies any paresthesia like symptoms.  I do believe the patient does a lot of  sitting during the day I think would benefit him to establish home exercise plan that focuses on gluteal stretches which I will provide him with today.    2/6/2024 - Patient presents to clinic for R knee pain after a mechanical fall. Patient initially experienced sharp anterior knee pain that has resolved over the past few days. On exam, patient has mild tenderness to palpation and negative anterior drawer. Discussed proceeding with conservative management which the patient is on board with. RICE for knee pain.  He also complains chronic hip pain that radiates to the groin worse in the left.  He also complains of axial cervical pain consistent with facet arthropathy.  We discussed interventional procedures for these chronic pains including intra-articular hip injections as well as cervical medial branch blocks/RFA.  Patient would prefer to treat conservatively with physical therapy for now.    03/21/20252412-70-bhrb-old pleasant male with a history of chronic bilateral hip pain and right knee pain.  Currently participating in physical therapy based on history and exam patient is experiencing right knee pain related to osteoarthritis and hip pain likely related to osteoarthritis.  He is participating in physical therapy which is helping his pain did also present with numbness and tingling intermittently in his hands feet which is likely related to diabetic peripheral neuropathy due to his history of diabetes.  The numbness and tingling in his pain 8 are not significant he reports them worse in the morning and then dissipates throughout the day.  We discussed considering gabapentin however he refused this today.  Discuss specific exercises to help with diabetic peripheral neuropathy.  Recommended he continue and complete physical therapy see plan agreed upon below.    04/30/2025-Celso Hernandez is a 69 y.o. male who  has a past medical history of Arthritis, BPH (benign prostatic hyperplasia), Cataract, Colon polyps,  Diabetes mellitus type II, Glaucoma, Hyperlipidemia, Hypertension, Multiple duodenal ulcers (10/08/2019), and Unspecified hemorrhoids without mention of complication.  By history and examination this patient has chronic low back pain with RIGHT  radiculopathy.  The underlying cause cause is facet arthritis, degenerative disc disease, foraminal stenosis, central canal stenosis, muscle dysfunction, muscles strain, and deconditioning.  Pathology is confirmed by imaging.  We discussed the underlying diagnoses and multiple treatment options including non-opioid medications, interventional procedures, physical therapy, home exercise, core muscle enhancement, activity modification, and weight loss.  The risks and benefits of each treatment option were discussed and all questions were answered.      5/9/2025-Celso Hernandez is a 69 y.o. male who  has a past medical history of Arthritis, BPH (benign prostatic hyperplasia), Cataract, Colon polyps, Diabetes mellitus type II, Glaucoma, Hyperlipidemia, Hypertension, Multiple duodenal ulcers (10/08/2019), and Unspecified hemorrhoids without mention of complication.  By history and examination this patient has chronic low back pain with Bilateral  radiculopathy.  The underlying cause cause is facet arthritis, degenerative disc disease, foraminal stenosis, central canal stenosis, and deconditioning.  Pathology is confirmed by imaging.  We discussed the underlying diagnoses and multiple treatment options including non-opioid medications, interventional procedures, physical therapy, home exercise, core muscle enhancement, activity modification, and weight loss.  The risks and benefits of each treatment option were discussed and all questions were answered.        Treatment Plan:   Procedures:  None at this time.  In the future consider bilateral lumbar TFESI targeting L5-S1  PT/OT/HEP:  Continue participating in physical therapy for low back.  I have stressed the importance of  physical activity and a home exercise plan to help with pain and improve health.  Referral placed to physical therapy.  Medications:  Started gabapentin 100 mg q.h.s. to increase to t.i.d. dosing if tolerable   - no new medications prescribed at this visit.  Educated patient on chronic use of Mobic.   -  Reviewed and consistent with medication use as prescribed.  Imaging:  Previous imaging reviewed and discussed in detail with the patient using images from chart and spine model   Follow Up:  4-5 weeks dose gabapentin    FELISHA CabralesC  [facility] Pain Management    LUMBAR MRI Imaging 2025:  EXAMINATION:  MRI LUMBAR SPINE WITHOUT CONTRAST     CLINICAL HISTORY:  Lumbar radiculopathy, symptoms persist with conservative treatment; Radiculopathy, lumbar region     TECHNIQUE:  Multiplanar, multisequence MR images were acquired from the thoracolumbar junction to the sacrum without the administration of contrast.     COMPARISON:  Lumbar spine radiograph dated 11/22/2019     FINDINGS:  Lumbar spine vertebral body heights appear maintained.  Heterogeneous T1 marrow signal without discrete infiltrative process.  Spinal cord terminus lies at L1-L2.  Remaining visualized prevertebral and paraspinal soft tissues demonstrate no acute abnormality.     T12-L1: No significant spinal canal stenosis or neural foraminal impingement.     L1-L2: Anterior marginal spurring and facet hypertrophy without significant posterior disc herniation, spinal canal stenosis, or neural foraminal impingement.     L2-L3: Anterior marginal spurring and facet hypertrophy without significant posterior disc herniation, spinal canal stenosis, or neural foraminal impingement.     L3-L4: Modest-mild circumferential bulge with facet hypertrophy and flavum thickening.  No significant spinal canal stenosis.  Mild right-sided neural foraminal narrowing.     L4-L5: Modest-mild circumferential bulge more asymmetric to the left with bilateral facet  arthropathy and flavum thickening.  Left lateral recess narrowing and mild left-sided neural foraminal narrowing.     L5-S1: Circumferential bulge with posterior annular fissure and facet arthropathy.  Bilateral neural foraminal narrowing with suspected focal contact of the exiting L5 nerve roots.  There is also abutment anteriorly of the bilateral transiting S1 nerve roots.     Impression:     Lumbar spondylosis with detailed findings as above.       Imagin22 MRI Thoracic Spine W WO Contrast     Narrative & Impression  EXAMINATION:  MRI THORACIC SPINE W WO CONTRAST     CLINICAL HISTORY:  Attention to T4-5 level.  Evaluate for underlying mass.     TECHNIQUE:  Pre and postcontrast MR imaging of the thoracic spine was performed utilizing 10 mL Gadavist intravenous contrast.     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     No advanced marrow edema or osseous destructive process is identified.  Multilevel mild disc bulging flattens the thecal sac without cord impingement.  Marked degenerative foraminal stenosis on the left at T 4-5 and to the right at T5-6 is again noted.     The spinal cord maintains normal signal intensity.     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     No enhancing lesion.  Degenerative foraminal stenosis on the left at T4-5 and on the right at T5-6.        Electronically signed by: Trent Graves  Date:                                            2022  Time:                                           17:11        CT Chest Without Contrast  Impression:  No worrisome finding on the chest CT.  Mild atherosclerotic plaque of the aorta and mild coronary artery calcification  Severe foraminal narrowing on  "the right at T5-6 due to significant facet joint osseous hypertrophy.  Electronically signed by: Halley Lundberg MD  Date:                                            09/29/2021  Time:                                           08:56    X-Ray Lumbar Spine Ap And Lateral 11/21/2019  FINDINGS:  Two views AP lateral.  There is mild DJD and DISH.  Alignment is normal.  No fracture dislocation bone destruction.  No trauma seen.  Impression:  No acute process seen.  DJD and DISH.      Review of Systems:  Review of Systems   Constitutional:  Negative for chills and fever.   HENT:  Negative for nosebleeds.    Eyes:  Negative for blurred vision and pain.   Respiratory:  Negative for hemoptysis.    Cardiovascular:  Negative for chest pain and palpitations.   Gastrointestinal:  Negative for heartburn, nausea and vomiting.   Genitourinary:  Negative for dysuria and hematuria.   Musculoskeletal:  Positive for joint pain. Negative for myalgias.   Skin:  Negative for rash.   Neurological:  Negative for seizures and loss of consciousness.   Endo/Heme/Allergies:  Does not bruise/bleed easily.   Psychiatric/Behavioral:  Negative for hallucinations.        Physical Exam:  Vitals:    05/09/25 1336   Weight: 120.6 kg (265 lb 14 oz)   Height: 5' 8" (1.727 m)   PainSc:   3   PainLoc: Back         General    Nursing note and vitals reviewed.  Constitutional: He is oriented to person, place, and time. He appears well-developed and well-nourished. No distress.   HENT:   Head: Normocephalic and atraumatic.   Nose: Nose normal.   Eyes: Conjunctivae and EOM are normal. Pupils are equal, round, and reactive to light. Right eye exhibits no discharge. Left eye exhibits no discharge. No scleral icterus.   Neck: No JVD present.   Cardiovascular:  Intact distal pulses.            Pulmonary/Chest: Effort normal. No respiratory distress.   Abdominal: He exhibits no distension.   Neurological: He is alert and oriented to person, place, and time. " Coordination normal.   Psychiatric: He has a normal mood and affect. His behavior is normal. Judgment and thought content normal.     General Musculoskeletal Exam   Gait: normal       Right Knee Exam     Inspection   Erythema: absent  Scars: absent  Swelling: present  Deformity: absent  Bruising: absent    Tenderness   The patient is tender to palpation of the medial joint line and MCL.    Range of Motion   The patient has normal right knee ROM.    Tests   Ligament Examination   Lachman: normal (-1 to 2mm)   PCL-Posterior Drawer: normal (0 to 2mm)     Patella   Passive Patellar Tilt: neutral  Patellar Tracking: normalBack (L-Spine & T-Spine) / Neck (C-Spine) Exam     Tenderness Posterior midline palpation reveals tenderness of the Sacrum, Lower L-Spine and Upper L-Spine. Right paramedian tenderness of the Lower L-Spine and Sacrum. Left paramedian tenderness of the Lower L-Spine and Sacrum.     Back (L-Spine & T-Spine) Range of Motion   Extension:  abnormal   Flexion:  abnormal   Lateral bend right:  abnormal Back lateral bend right: leaning to the right exacerbates pain.  Lateral bend left:  abnormal   Rotation right:  abnormal   Rotation left:  abnormal     Comments:  Straight leg raise positive right  Positive pain in the lumbar spine with extension  Positive pain in the lumbar spine with lateral movement bilaterally      Muscle Strength   Right Lower Extremity   Hip Abduction: 5/5   Quadriceps:  5/5   Hamstrin/5   Left Lower Extremity   Hip Abduction: 5/5   Quadriceps:  5/5   Hamstrin/5

## 2025-05-10 RX ORDER — METFORMIN HYDROCHLORIDE 500 MG/1
500 TABLET ORAL
Qty: 90 TABLET | Refills: 1 | Status: SHIPPED | OUTPATIENT
Start: 2025-05-10

## 2025-05-10 NOTE — TELEPHONE ENCOUNTER
No care due was identified.  [facility] Embedded Care Due Messages. Reference number: [id number].   5/10/2025 7:00:54 AM CDT

## 2025-05-11 NOTE — TELEPHONE ENCOUNTER
Refill Authorization Note     Refill Decision Note   Celso Hernandez  is requesting a refill authorization.  Brief Assessment and Rationale for Refill:  Approve     Medication Therapy Plan:       Medication Reconciliation Completed: No   Comments:     No Care Gaps recommended.     Note composed:8:33 PM 05/10/2025

## 2025-05-12 DIAGNOSIS — H40.1132 PRIMARY OPEN ANGLE GLAUCOMA (POAG) OF BOTH EYES, MODERATE STAGE: ICD-10-CM

## 2025-05-12 RX ORDER — BRIMONIDINE TARTRATE 2 MG/ML
1 SOLUTION/ DROPS OPHTHALMIC 2 TIMES DAILY
Qty: 10 ML | Refills: 12 | Status: SHIPPED | OUTPATIENT
Start: 2025-05-12

## 2025-05-12 NOTE — PROGRESS NOTES
"  Outpatient Rehab    Physical Therapy Visit    Patient Name: Celso Hernandez  MRN: [id number]  YOB: 1955  Encounter Date: 5/9/2025    Therapy Diagnosis:   Encounter Diagnosis   Name Primary?    Decreased strength of lower extremity Yes     Physician: Casie Miguel DO    Physician Orders: Eval and Treat  Medical Diagnosis: Primary osteoarthritis of right knee  Cervicalgia    Visit # / Visits Authorized:  22 / 30  Insurance Authorization Period: 2/14/2025 to 6/6/2025  Date of Evaluation: 2/12/2025  Plan of Care Certification: 4/23/2025 to 5/23/2025      PT/PTA: PTA   Number of PTA visits since last PT visit:1  Time In: 1000   Time Out: 1055  Total Time (in minutes): 55   Total Billable Time (in minutes): 30    FOTO:  Intake Score:  %  Survey Score 2:  %  Survey Score 3:  %    Precautions:       Subjective   Ongoing lower back with radicular symptoms into RLE. Has appointment after PT with MD to interpret results..  Pain reported as 3/10. 0/10 Bilateral knees, 3/10 L hip; 0/10 neck L > R (stiff)    Objective            Treatment:  Therapeutic Exercise  TE 1: nustep L3 6'  TE 3: seated lumbar flexion 10x3 peanut  TE 6: SNAGS/repeated left rotation seated x 15 reps L, 5'' hold  TE 7: Cervical extension with anterior glide(use of towel) x 15 reps, 5'' hold  TE 9: Wall slides 2 x 10  Manual Therapy  MT 2: LAD R  Balance/Neuromuscular Re-Education  NMR 3: Serratus punch 3 x 10 5#  NMR 5: SAPD 2 x 10 B TB with contralateral head turns  NMR 6: Rows 2 x 10 GTB  NMR 7: Supine sciatic nerve glides (90/90) 2x10 B  NMR 8: hip abd isometrics 5x30" (still has L hip pain when L side-lying)  NMR 9: Bridges with hip abd (gait belt) 2-3x10 (25 today then limited by R posterior thigh pain)  NMR 10: cervical rotation isometrics with progressive rotation 5x10" B    Time Entry(in minutes):  Manual Therapy Time Entry: 5  Neuromuscular Re-Education Time Entry: 15  Therapeutic Exercise Time Entry: 10    Assessment & Plan "   Assessment: Patient is a 69 y.o. M with a medical diagnosis of M17.11 (ICD-10-CM) - Primary osteoarthritis of right knee. additional referral for M54.2 (ICD-10-CM) - Cervicalgia  M25.569 (ICD-10-CM) - Knee pain, unspecified chronicity, unspecified laterality. Patient with improving knee pain, ongoing cervical stiffness with firm  end feel. Ongoing complaint of low back pain and R radicular symptoms. Some improvement following previous session. Treatment continues to focus on neural mobility and centralizing symptoms. Will plan to continue to addressed core, gluteal activation and sciatic nerve mobility.  Continue POC. Monitor response.  Evaluation/Treatment Tolerance: Patient tolerated treatment well    Patient will continue to benefit from skilled outpatient physical therapy to address the deficits listed in the problem list box on initial evaluation, provide pt/family education and to maximize pt's level of independence in the home and community environment.     Patient's spiritual, cultural, and educational needs considered and patient agreeable to plan of care and goals.           Plan: cont POC    Goals:   Active       Changing body position       Patient will demonstrate moving sit to stand 8+x in 30 sec (Progressing)       Start:  02/12/25    Expected End:  05/23/25               Functional outcome       Patient will show a significant change in FOTO patient-reported outcome tool to demonstrate subjective improvement (Progressing)       Start:  02/12/25    Expected End:  04/11/25            Patient will demonstrate independence in home program for support of progression (Met)       Start:  02/12/25    Expected End:  03/14/25    Resolved:  04/23/25            Range of Motion       Patient will achieve right knee ROM of  3-120 degrees  (Progressing)       Start:  02/12/25    Expected End:  05/23/25               Strength       Patient will achieve bilateral hip abduction strength of 4+/5 (Progressing)        Start:  02/12/25    Expected End:  05/23/25            Patient will improve bilateral knee flexion strength by 5kg  (Met)       Start:  02/12/25    Expected End:  04/11/25    Resolved:  04/23/25         Patient will improve right knee extension strength by 5kg (Met)       Start:  02/12/25    Expected End:  04/11/25    Resolved:  04/23/25            cervical goals       patient will improve cervical rotation 10 degrees (Progressing)       Start:  02/18/25    Expected End:  05/23/25            patient will improve cervical extension to 30 deg (Progressing)       Start:  02/18/25    Expected End:  05/23/25              Resolved       Pain       Patient will report pain of 4/10 max demonstrating a reduction of overall pain (Met)       Start:  02/12/25    Expected End:  03/14/25    Resolved:  03/28/25             Lucy Gaming, PTA

## 2025-05-14 ENCOUNTER — CLINICAL SUPPORT (OUTPATIENT)
Dept: REHABILITATION | Facility: HOSPITAL | Age: 70
End: 2025-05-14
Payer: MEDICARE

## 2025-05-14 DIAGNOSIS — R29.898 DECREASED STRENGTH OF LOWER EXTREMITY: Primary | ICD-10-CM

## 2025-05-14 PROCEDURE — 97112 NEUROMUSCULAR REEDUCATION: CPT | Mod: PN,CQ

## 2025-05-14 PROCEDURE — 97110 THERAPEUTIC EXERCISES: CPT | Mod: PN,CQ

## 2025-05-14 NOTE — PROGRESS NOTES
"  Outpatient Rehab    Physical Therapy Visit    Patient Name: Celso Hernandez  MRN: [id number]  YOB: 1955  Encounter Date: 5/14/2025    Therapy Diagnosis:   Encounter Diagnosis   Name Primary?    Decreased strength of lower extremity Yes     Physician: Casie Miguel DO    Physician Orders: Eval and Treat  Medical Diagnosis: Primary osteoarthritis of right knee  Cervicalgia    Visit # / Visits Authorized:  23 / 30  Insurance Authorization Period: 2/14/2025 to 6/6/2025  Date of Evaluation: 2/12/2025  Plan of Care Certification: 4/23/2025 to 5/23/2025      PT/PTA: PTA   Number of PTA visits since last PT visit:2  Time In:     Time Out:    Total Time (in minutes):     Total Billable Time (in minutes):      FOTO:  Intake Score:  %  Survey Score 2:  %  Survey Score 3:  %    Precautions:       Subjective   Ongoing lower back with radicular symptoms into RLE..  Pain reported as 5/10. 0/10 Bilateral knees, 5/10 L hip; 0/10 neck L > R (stiff)    Objective            Treatment:  Therapeutic Exercise  TE 3: seated lumbar flexion 10x3 peanut  TE 5: Lunge at stairs 2 x 10  TE 6: SNAGS/repeated left rotation seated x 15 reps L, 5'' hold  TE 7: Cervical extension with anterior glide(use of towel) x 15 reps, 5'' hold  Manual Therapy  MT 2: LAD R  Balance/Neuromuscular Re-Education  NMR 3: Serratus punch 3 x 10 5#  NMR 5: SAPD 2 x 10 B TB with contralateral head turns  NMR 6: Rows 2 x 10 GTB  NMR 7: Supine sciatic nerve glides (90/90) 2x10 B  NMR 8: hip abd isometrics 5x30" (still has L hip pain when L side-lying)  NMR 9: Bridges with peanut 3 x 10    Time Entry(in minutes):  Manual Therapy Time Entry: 5  Neuromuscular Re-Education Time Entry: 15  Therapeutic Exercise Time Entry: 10    Assessment & Plan   Assessment: Patient is a 69 y.o. M with a medical diagnosis of M17.11 (ICD-10-CM) - Primary osteoarthritis of right knee. additional referral for M54.2 (ICD-10-CM) - Cervicalgia  M25.569 (ICD-10-CM) - Knee pain, " unspecified chronicity, unspecified laterality. Patient with improving knee pain, ongoing cervical stiffness with firm  end feel. Ongoing complaint of low back pain and R radicular symptoms. Treatment continues to focus on neural mobility and centralizing symptoms. Responds well to LAD reporting some relief in symptoms. Will plan to continue to addressed core, gluteal activation and sciatic nerve mobility.  Continue POC. Monitor response.       Patient will continue to benefit from skilled outpatient physical therapy to address the deficits listed in the problem list box on initial evaluation, provide pt/family education and to maximize pt's level of independence in the home and community environment.     Patient's spiritual, cultural, and educational needs considered and patient agreeable to plan of care and goals.           Plan: cont POC    Goals:   Active       Changing body position       Patient will demonstrate moving sit to stand 8+x in 30 sec (Progressing)       Start:  02/12/25    Expected End:  05/23/25               Functional outcome       Patient will show a significant change in FOTO patient-reported outcome tool to demonstrate subjective improvement (Progressing)       Start:  02/12/25    Expected End:  04/11/25            Patient will demonstrate independence in home program for support of progression (Met)       Start:  02/12/25    Expected End:  03/14/25    Resolved:  04/23/25            Range of Motion       Patient will achieve right knee ROM of  3-120 degrees  (Progressing)       Start:  02/12/25    Expected End:  05/23/25               Strength       Patient will achieve bilateral hip abduction strength of 4+/5 (Progressing)       Start:  02/12/25    Expected End:  05/23/25            Patient will improve bilateral knee flexion strength by 5kg  (Met)       Start:  02/12/25    Expected End:  04/11/25    Resolved:  04/23/25         Patient will improve right knee extension strength by 5kg (Met)        Start:  02/12/25    Expected End:  04/11/25    Resolved:  04/23/25            cervical goals       patient will improve cervical rotation 10 degrees (Progressing)       Start:  02/18/25    Expected End:  05/23/25            patient will improve cervical extension to 30 deg (Progressing)       Start:  02/18/25    Expected End:  05/23/25              Resolved       Pain       Patient will report pain of 4/10 max demonstrating a reduction of overall pain (Met)       Start:  02/12/25    Expected End:  03/14/25    Resolved:  03/28/25             Lucy Gaming, PTA

## 2025-05-16 ENCOUNTER — CLINICAL SUPPORT (OUTPATIENT)
Dept: REHABILITATION | Facility: HOSPITAL | Age: 70
End: 2025-05-16
Payer: MEDICARE

## 2025-05-16 DIAGNOSIS — R29.898 DECREASED STRENGTH OF LOWER EXTREMITY: Primary | ICD-10-CM

## 2025-05-16 PROCEDURE — 97112 NEUROMUSCULAR REEDUCATION: CPT | Mod: PN

## 2025-05-16 PROCEDURE — 97140 MANUAL THERAPY 1/> REGIONS: CPT | Mod: PN

## 2025-05-16 NOTE — PROGRESS NOTES
"  Outpatient Rehab    Physical Therapy Visit    Patient Name: Celso Hernandez  MRN: [id number]  YOB: 1955  Encounter Date: 5/16/2025    Therapy Diagnosis:   Encounter Diagnosis   Name Primary?    Decreased strength of lower extremity Yes     Physician: Casie Miguel DO    Physician Orders: Eval and Treat  Medical Diagnosis: Primary osteoarthritis of right knee  Cervicalgia    Visit # / Visits Authorized:  24 / 30  Insurance Authorization Period: 2/14/2025 to 6/6/2025  Date of Evaluation: 2/12/2025  Plan of Care Certification: 4/23/2025 to 5/23/2025      PT/PTA: PT   Number of PTA visits since last PT visit:2  Time In: 1000   Time Out: 1055  Total Time (in minutes): 55   Total Billable Time (in minutes): 25    FOTO:  Intake Score:  %  Survey Score 2:  %  Survey Score 3:  %    Precautions:       Subjective   pain in hip, buttock and R leg, back. pain with sitting unsupported.    0/10 Bilateral knees, 5/10 L hip; 0/10 neck L > R (stiff)    Objective            Treatment:  Therapeutic Exercise  TE 2: LTR 20x  TE 3: seated lumbar flexion 10x3 peanut  TE 5: Lunge at stairs 2 x 10  TE 6: SNAGS/repeated left rotation seated x 15 reps L, 5'' hold  TE 7: Cervical extension with anterior glide(use of towel) x 15 reps, 5'' hold  Manual Therapy  MT 2: LAD R  Balance/Neuromuscular Re-Education  NMR 5: SAPD 2 x 10 B TB with contralateral head turns  NMR 6: Rows 3 x 10 GTB  NMR 7: Supine sciatic nerve glides (90/90) 2x10 B  NMR 8: hip abd isometrics 5x30" (still has L hip pain when L side-lying)  NMR 9: Bridges 3 x 10 (no peanut today- in use)    Time Entry(in minutes):  Manual Therapy Time Entry: 10  Neuromuscular Re-Education Time Entry: 15    Assessment & Plan   Assessment: Patient is a 69 y.o. M with a medical diagnosis of M17.11 (ICD-10-CM) - Primary osteoarthritis of right knee. additional referral for M54.2 (ICD-10-CM) - Cervicalgia  M25.569 (ICD-10-CM) - Knee pain, unspecified chronicity, unspecified " laterality. Patient with improving knee pain, ongoing cervical stiffness with firm end feel. recently with increased back and radicular pain contributing to hip pain. seeking new referral to add back. Responds well to LAD reporting some relief in symptoms. pt with overall good tolerance for exercsies but did have some complaints of R LE pain with  bridges.  Will plan to continue to addressed core, gluteal activation and sciatic nerve mobility.  Continue POC. Monitor response.  Evaluation/Treatment Tolerance: Patient tolerated treatment well    Patient will continue to benefit from skilled outpatient physical therapy to address the deficits listed in the problem list box on initial evaluation, provide pt/family education and to maximize pt's level of independence in the home and community environment.     Patient's spiritual, cultural, and educational needs considered and patient agreeable to plan of care and goals.           Plan: cont POC    Goals:   Active       Changing body position       Patient will demonstrate moving sit to stand 8+x in 30 sec (Progressing)       Start:  02/12/25    Expected End:  05/23/25               Functional outcome       Patient will show a significant change in FOTO patient-reported outcome tool to demonstrate subjective improvement (Progressing)       Start:  02/12/25    Expected End:  04/11/25            Patient will demonstrate independence in home program for support of progression (Met)       Start:  02/12/25    Expected End:  03/14/25    Resolved:  04/23/25            Range of Motion       Patient will achieve right knee ROM of  3-120 degrees  (Progressing)       Start:  02/12/25    Expected End:  05/23/25               Strength       Patient will achieve bilateral hip abduction strength of 4+/5 (Progressing)       Start:  02/12/25    Expected End:  05/23/25            Patient will improve bilateral knee flexion strength by 5kg  (Met)       Start:  02/12/25    Expected End:   04/11/25    Resolved:  04/23/25         Patient will improve right knee extension strength by 5kg (Met)       Start:  02/12/25    Expected End:  04/11/25    Resolved:  04/23/25            cervical goals       patient will improve cervical rotation 10 degrees (Progressing)       Start:  02/18/25    Expected End:  05/23/25            patient will improve cervical extension to 30 deg (Progressing)       Start:  02/18/25    Expected End:  05/23/25              Resolved       Pain       Patient will report pain of 4/10 max demonstrating a reduction of overall pain (Met)       Start:  02/12/25    Expected End:  03/14/25    Resolved:  03/28/25             Radha Lares, PT, DPT

## 2025-05-20 ENCOUNTER — PATIENT OUTREACH (OUTPATIENT)
Dept: ADMINISTRATIVE | Facility: HOSPITAL | Age: 70
End: 2025-05-20
Payer: MEDICARE

## 2025-05-20 VITALS — DIASTOLIC BLOOD PRESSURE: 82 MMHG | SYSTOLIC BLOOD PRESSURE: 136 MMHG

## 2025-05-21 ENCOUNTER — CLINICAL SUPPORT (OUTPATIENT)
Dept: REHABILITATION | Facility: HOSPITAL | Age: 70
End: 2025-05-21
Payer: MEDICARE

## 2025-05-21 DIAGNOSIS — R29.898 DECREASED STRENGTH OF LOWER EXTREMITY: Primary | ICD-10-CM

## 2025-05-21 DIAGNOSIS — M17.11 PRIMARY OSTEOARTHRITIS OF RIGHT KNEE: ICD-10-CM

## 2025-05-21 DIAGNOSIS — M25.661 DECREASED RANGE OF MOTION (ROM) OF RIGHT KNEE: ICD-10-CM

## 2025-05-21 DIAGNOSIS — R29.898 DECREASED RANGE OF MOTION OF NECK: ICD-10-CM

## 2025-05-21 PROCEDURE — 97140 MANUAL THERAPY 1/> REGIONS: CPT | Mod: PN

## 2025-05-21 PROCEDURE — 97112 NEUROMUSCULAR REEDUCATION: CPT | Mod: PN

## 2025-05-21 PROCEDURE — 97530 THERAPEUTIC ACTIVITIES: CPT | Mod: PN

## 2025-05-21 NOTE — PROGRESS NOTES
"  Outpatient Rehab    Physical Therapy Visit    Patient Name: Celso Hernandez  MRN: [id number]  YOB: 1955  Encounter Date: 5/21/2025    Therapy Diagnosis:   Encounter Diagnoses   Name Primary?    Decreased strength of lower extremity Yes    Decreased range of motion of neck     Decreased range of motion (ROM) of right knee     Primary osteoarthritis of right knee      Physician: Casie Miguel DO    Physician Orders: Eval and Treat  Medical Diagnosis: Primary osteoarthritis of right knee  Cervicalgia    Visit # / Visits Authorized:  25 / 30  Insurance Authorization Period: 2/14/2025 to 6/6/2025  Date of Evaluation: 2/12/2025  Plan of Care Certification: 4/23/2025 to 5/23/2025      PT/PTA: PT   Number of PTA visits since last PT visit:0  Time In: 1005   Time Out: 1100  Total Time (in minutes): 55   Total Billable Time (in minutes): 55    FOTO:  Intake Score:  %  Survey Score 2:  %  Survey Score 3:  %    Precautions:       Subjective   pain in both legs today. worse on R..  Pain reported as 4/10. 0/10 Bilateral knees (mild stiffness) , 5/10 L hip; 0/10 neck L > R (stiff)    Objective            Treatment:  Therapeutic Exercise  TE 3: seated lumbar flexion 10x3 peanut  TE 4: DKTC peanut 15x  TE 5: Lunge at stairs 2 x 10  TE 10: calf stretch 10x10"  Manual Therapy  MT 1: hooklying 90/90 manual lumbar traction  MT 2: LAD B  Balance/Neuromuscular Re-Education  NMR 7: Supine sciatic nerve glides (90/90) 2x10 B  NMR 9: Bridges 3 x 10 (no peanut today- in use)  Therapeutic Activity  TA 6: step up 6in 2x10    Time Entry(in minutes):  Manual Therapy Time Entry: 20  Neuromuscular Re-Education Time Entry: 27  Therapeutic Activity Time Entry: 8    Assessment & Plan   Assessment: Patient is a 69 y.o. M with a medical diagnosis of M17.11 (ICD-10-CM) - Primary osteoarthritis of right knee. additional referral for M54.2 (ICD-10-CM) - Cervicalgia  M25.569 (ICD-10-CM) - Knee pain, unspecified chronicity, unspecified " laterality. Patient with improving knee pain, ongoing cervical stiffness with firm end feel.  continues to have exacerbation of back pain and radicular pain. incorporated manula lumbar traction and LAD with good response. focus of session primarily on mobility. good response to session overall. Will plan to continue to addressed core, gluteal activation and sciatic nerve mobility.  Continue POC. Monitor response.  Evaluation/Treatment Tolerance: Patient tolerated treatment well    Patient will continue to benefit from skilled outpatient physical therapy to address the deficits listed in the problem list box on initial evaluation, provide pt/family education and to maximize pt's level of independence in the home and community environment.     Patient's spiritual, cultural, and educational needs considered and patient agreeable to plan of care and goals.           Plan: cont POC    Goals:   Active       Changing body position       Patient will demonstrate moving sit to stand 8+x in 30 sec (Progressing)       Start:  02/12/25    Expected End:  05/23/25               Functional outcome       Patient will show a significant change in FOTO patient-reported outcome tool to demonstrate subjective improvement (Progressing)       Start:  02/12/25    Expected End:  04/11/25            Patient will demonstrate independence in home program for support of progression (Met)       Start:  02/12/25    Expected End:  03/14/25    Resolved:  04/23/25            Range of Motion       Patient will achieve right knee ROM of  3-120 degrees  (Progressing)       Start:  02/12/25    Expected End:  05/23/25               Strength       Patient will achieve bilateral hip abduction strength of 4+/5 (Progressing)       Start:  02/12/25    Expected End:  05/23/25            Patient will improve bilateral knee flexion strength by 5kg  (Met)       Start:  02/12/25    Expected End:  04/11/25    Resolved:  04/23/25         Patient will improve right  knee extension strength by 5kg (Met)       Start:  02/12/25    Expected End:  04/11/25    Resolved:  04/23/25            cervical goals       patient will improve cervical rotation 10 degrees (Progressing)       Start:  02/18/25    Expected End:  05/23/25            patient will improve cervical extension to 30 deg (Progressing)       Start:  02/18/25    Expected End:  05/23/25              Resolved       Pain       Patient will report pain of 4/10 max demonstrating a reduction of overall pain (Met)       Start:  02/12/25    Expected End:  03/14/25    Resolved:  03/28/25             Radha Lares, PT, DPT

## 2025-05-22 ENCOUNTER — TELEPHONE (OUTPATIENT)
Dept: INTERNAL MEDICINE | Facility: CLINIC | Age: 70
End: 2025-05-22
Payer: MEDICARE

## 2025-05-23 ENCOUNTER — CLINICAL SUPPORT (OUTPATIENT)
Dept: REHABILITATION | Facility: HOSPITAL | Age: 70
End: 2025-05-23
Payer: MEDICARE

## 2025-05-23 DIAGNOSIS — M17.11 PRIMARY OSTEOARTHRITIS OF RIGHT KNEE: ICD-10-CM

## 2025-05-23 DIAGNOSIS — M25.661 DECREASED RANGE OF MOTION (ROM) OF RIGHT KNEE: ICD-10-CM

## 2025-05-23 DIAGNOSIS — R29.898 DECREASED RANGE OF MOTION OF NECK: ICD-10-CM

## 2025-05-23 DIAGNOSIS — M54.2 CERVICALGIA: ICD-10-CM

## 2025-05-23 DIAGNOSIS — R29.898 DECREASED STRENGTH OF LOWER EXTREMITY: Primary | ICD-10-CM

## 2025-05-23 PROCEDURE — 97112 NEUROMUSCULAR REEDUCATION: CPT | Mod: PN

## 2025-05-23 PROCEDURE — 97140 MANUAL THERAPY 1/> REGIONS: CPT | Mod: PN

## 2025-05-23 NOTE — PROGRESS NOTES
Outpatient Rehab    Physical Therapy Progress Note : Updated Plan of Care    Patient Name: Celso Hernandez  MRN: [id number]  YOB: 1955  Encounter Date: 5/23/2025    Therapy Diagnosis:   Encounter Diagnoses   Name Primary?    Decreased strength of lower extremity Yes    Decreased range of motion of neck     Decreased range of motion (ROM) of right knee     Primary osteoarthritis of right knee     Cervicalgia      Physician: Casie Miguel DO    Physician Orders: Eval and Treat  Medical Diagnosis: Primary osteoarthritis of right knee  Cervicalgia    Visit # / Visits Authorized:  26 / 30  Insurance Authorization Period: 2/14/2025 to 6/6/2025  Date of Evaluation: 2/11/25  Plan of Care Certification: 5/23/25 to 7/4/25     PT/PTA: PT   Number of PTA visits since last PT visit:0  Time In: 1000   Time Out: 1055  Total Time (in minutes): 55   Total Billable Time (in minutes): 30    FOTO:  Intake Score: 40%  Survey Score 2: 42%  Survey Score 3: 56 (closed)%    Precautions:       Subjective   a little bit of pain, not too much. overall improving pain. primarily with stiffness in knees, hips, and neck. moderate ongoing L hip pain.  diagnosed with lumbar radiculopathy Bilaterally which is his primary complaint and he will request referral from MD.  he did recently have 1 cervicogenic headache after quickly turning to L..  Pain reported as 4/10. 1/10 Bilateral knees (mild stiffness) , 5/10 L hip; 1/10 neck L > R (stiff)    Objective      Subcranial Range of Motion   Active Restricted? Passive Restricted? Pain   Flexion         Protraction         Retraction           Cervical Range of Motion   Active (deg) Passive (deg) Pain   Flexion         Extension 12   Yes   Right Lateral Flexion         Right Rotation 55       Left Lateral Flexion         Left Rotation 40   Yes          Hip Range of Motion   Right Hip   Active (deg) Passive (deg) Pain   Flexion 90       Extension         ABduction         ADduction          External Rotation 90/90 40       External Rotation Prone         Internal Rotation 90/90 23       Internal Rotation Prone             Left Hip   Active (deg) Passive (deg) Pain   Flexion 90   Yes   Extension         ABduction         ADduction         External Rotation 90/90 40       External Rotation Prone         Internal Rotation 90/90 10   Yes   Internal Rotation Prone                  Knee Range of Motion   Right Knee   Active (deg) Passive (deg) Pain   Flexion 117   Yes   Extension 2 1         Left Knee   Active (deg) Passive (deg) Pain   Flexion 117   Yes   Extension 2 2 Yes                      Hip Strength - Planes of Motion   Right Strength Right Pain Left Strength Left  Pain   Flexion (L2) 4+ Yes 4+ Yes   Extension 4+   5     ABduction 4+ Yes 4 Yes   ADduction 5   5     Internal Rotation 5   5 Yes   External Rotation 4+   4+         Knee Strength   Right Strength Right Pain Left Strength Left  Pain   Flexion (S2)  (15) Yes  (14.3)     Prone Flexion           Extension (L3)  (19.9)    (16.6)                Lumbar/Pelvic Girdle Special Tests  Pelvic Girdle / Sacrum Tests  Positive: Right REY and Left FADIR         Hip Special Tests  Intra-Articular/Impingement Tests  Positive: Right REY and Left FADIR           Hip Joint Mobility  Left Hip Joint Mobility  Hypomobile: Anterior Capsule, Posterior Capsule, Lateral Capsule, and Long Axis Distraction              Sit to Stand Testing      The patient completed 8 repetitions of a sit to stand transfer in 30 seconds.                Treatment:  Therapeutic Exercise  TE 2: LTR 20x  TE 3: seated lumbar flexion 10x3 peanut  TE 4: DKTC peanut 15x  TE 5: Lunge at stairs 2 x 10  TE 8: BKFO 20x  Manual Therapy  MT 2: LAD B  Balance/Neuromuscular Re-Education  NMR 1: open book 10x B  NMR 9: Bridges 3 x 10  Therapeutic Activity  TA 1: STS 2 x 10 Pink weighted ball    Time Entry(in minutes):  Manual Therapy Time Entry: 10  Neuromuscular Re-Education Time Entry:  15  Therapeutic Exercise Time Entry: 5    Assessment & Plan   Assessment  Celso            Functional Limitations: Activity tolerance, Decreased ambulation distance/endurance, Completing self-care activities, Functional mobility, Pain with ADLs/IADLs, Painful locomotion/ambulation, Participating in leisure activities, Performing household chores, Range of motion, Sitting tolerance, Squatting, Standing tolerance  Impairments: Impaired physical strength, Lack of appropriate home exercise program, Pain with functional activity, Abnormal or restricted range of motion, Activity intolerance, Abnormal gait    Patient Goal for Therapy (PT): decrease knee pain  Prognosis: Good  Assessment Details: Patient is a 69 y.o. M with a medical diagnosis of M17.11 (ICD-10-CM) - Primary osteoarthritis of right knee and Cervicalgia. Patient with additional complaints of hip and low back pain, recently diagnosed with bilateral lumbar radiculopathy. Knee signs and symptoms consistent with medical diagnosis. Neck pain chronic with mobility deficits. He has made moderate progress in PT relating to referred conditions. He will benefit from referral for further management of back pain. Patient demonstrates improving knee range of motion, lower extremity strength, and functional mobility via sit to stand. Knees more stiff than painful. He has made limited progress in neck mobility with significant joint restriction with left joint closure positions. He continues to have left hip pain with decreased hip mobility and impingement signs consistent with hip osteoarthritis left greater than right, especially painful with hip internal rotation. Is is making moderate progress towards goals as noted. FOTO goal met and closed. Ongoing impairments impact ability to stand, walk, bend, dress, turn head, and perform daily activities without pain. He will continue to benefit from skilled PT to address these impairments with focus on hips and back primarily.      Plan  From a physical therapy perspective, the patient would benefit from: Skilled Rehab Services    Planned therapy interventions include: Therapeutic exercise, Therapeutic activities, Neuromuscular re-education, Manual therapy, Other (Comment), and Gait training. Dry Needling (prn)  Planned modalities to include: Electrical stimulation - attended, Electrical stimulation - passive/unattended, Thermotherapy (hot pack), and Cryotherapy (cold pack).        Visit Frequency: 2 times Per Week for 6 Weeks.       This plan was discussed with Patient.   Discussion participants: Agreed Upon Plan of Care  Plan details: Frequency and duration of treatment to be adjusted as needed, requesting referral for low back pain.           The patient will continue to benefit from skilled outpatient physical therapy in order to address the deficits listed in the problem list on the initial evaluation, provide patient and family education, and maximize the patients level of independence in the home and community environments.     The patient's spiritual, cultural, and educational needs were considered, and the patient is agreeable to the plan of care and goals.           Goals:   Active       Range of Motion       Patient will achieve right knee ROM of  3-120 degrees  (Progressing)       Start:  02/12/25    Expected End:  05/23/25               Strength       Patient will achieve bilateral hip abduction strength of 4+/5 (Progressing)       Start:  02/12/25    Expected End:  05/23/25            Patient will improve bilateral knee flexion strength by 5kg  (Met)       Start:  02/12/25    Expected End:  04/11/25    Resolved:  04/23/25         Patient will improve right knee extension strength by 5kg (Met)       Start:  02/12/25    Expected End:  04/11/25    Resolved:  04/23/25            cervical goals       patient will improve cervical rotation 10 degrees (Progressing)       Start:  02/18/25    Expected End:  05/23/25             patient will improve cervical extension to 30 deg (Not Progressing)       Start:  02/18/25    Expected End:  05/23/25              Resolved       Changing body position       Patient will demonstrate moving sit to stand 8+x in 30 sec (Met)       Start:  02/12/25    Expected End:  05/23/25    Resolved:  05/23/25            Functional outcome       Patient will show a significant change in FOTO patient-reported outcome tool to demonstrate subjective improvement (Met)       Start:  02/12/25    Expected End:  04/11/25    Resolved:  05/23/25         Patient will demonstrate independence in home program for support of progression (Met)       Start:  02/12/25    Expected End:  03/14/25    Resolved:  04/23/25            Pain       Patient will report pain of 4/10 max demonstrating a reduction of overall pain (Met)       Start:  02/12/25    Expected End:  03/14/25    Resolved:  03/28/25             Radha Lares, PT, DPT

## 2025-05-23 NOTE — TELEPHONE ENCOUNTER
Printed letter in case he needs signed by dr jacob  And released on portal.    I called and told copy of letter released on portal.   He also wanted a copy signed and get papers  He dropped off w/ the request.  I told him I will put the ltter w/ his papers on  Dr jacob's desk to sign & staff will let him know  When ready.

## 2025-05-24 ENCOUNTER — HOSPITAL ENCOUNTER (OUTPATIENT)
Dept: RADIOLOGY | Facility: HOSPITAL | Age: 70
Discharge: HOME | End: 2025-05-24
Attending: NURSE PRACTITIONER
Payer: MEDICARE

## 2025-05-24 DIAGNOSIS — M51.362 DEGENERATION OF INTERVERTEBRAL DISC OF LUMBAR REGION WITH DISCOGENIC BACK PAIN AND LOWER EXTREMITY PAIN: ICD-10-CM

## 2025-05-24 DIAGNOSIS — M54.16 LUMBAR RADICULOPATHY, CHRONIC: ICD-10-CM

## 2025-05-24 DIAGNOSIS — M47.816 LUMBAR FACET ARTHROPATHY: ICD-10-CM

## 2025-05-24 PROCEDURE — 72110 X-RAY EXAM L-2 SPINE 4/>VWS: CPT | Mod: 26,,, | Performed by: RADIOLOGY

## 2025-05-24 PROCEDURE — 72110 X-RAY EXAM L-2 SPINE 4/>VWS: CPT | Mod: TC,FY

## 2025-05-26 ENCOUNTER — OFFICE VISIT (OUTPATIENT)
Dept: INTERNAL MEDICINE | Facility: CLINIC | Age: 70
End: 2025-05-26
Payer: MEDICARE

## 2025-05-26 VITALS
SYSTOLIC BLOOD PRESSURE: 160 MMHG | TEMPERATURE: 98 F | BODY MASS INDEX: 40.16 KG/M2 | HEART RATE: 91 BPM | RESPIRATION RATE: 18 BRPM | OXYGEN SATURATION: 96 % | DIASTOLIC BLOOD PRESSURE: 76 MMHG | WEIGHT: 265 LBS | HEIGHT: 68 IN

## 2025-05-26 DIAGNOSIS — R51.9 CHRONIC NONINTRACTABLE HEADACHE, UNSPECIFIED HEADACHE TYPE: Primary | ICD-10-CM

## 2025-05-26 DIAGNOSIS — I10 ESSENTIAL HYPERTENSION: Chronic | ICD-10-CM

## 2025-05-26 DIAGNOSIS — G89.29 CHRONIC NONINTRACTABLE HEADACHE, UNSPECIFIED HEADACHE TYPE: Primary | ICD-10-CM

## 2025-05-26 DIAGNOSIS — M47.812 FACET ARTHROPATHY, CERVICAL: ICD-10-CM

## 2025-05-26 PROCEDURE — 99999 PR PBB SHADOW E&M-EST. PATIENT-LVL IV: CPT | Mod: PBBFAC,,, | Performed by: NURSE PRACTITIONER

## 2025-05-26 NOTE — PROGRESS NOTES
Subjective:       Patient ID: Celso Hernandez is a 69 y.o. male.    Chief Complaint: Headache (Off and on since '75. Varied times. )    History of Present Illness    CHIEF COMPLAINT:  Mr. Hernandez presents today for evaluation of headaches.    HISTORY OF PRESENT ILLNESS:  Headaches initially starting during  service in the 1970s, with increased frequency and intensity in the last 2-3 months occurring twice monthly. Headache location varies between right side and back of head, with aching and throbbing characteristics. Sharp pain occurs specifically when located in back of head. Associated symptoms include photophobia, phonophobia, and nausea without vomiting. Headaches worsen with exertion, and tinnitus can trigger episodes. He finds relief by taking Tylenol and resting in a quiet dark room with soothing music.    MUSCULOSKELETAL:  He is currently receiving physical therapy for neck pain and limited range of motion.    MEDICATIONS:  He takes blood pressure medication daily at 10am and Meloxicam. He avoids ibuprofen due to concurrent Meloxicam use as advised by pain management, using Tylenol for pain management instead.      ROS:  Head: +headaches  Eyes: +photophobia  ENT: +tinnitus, +sinus pressure  Gastrointestinal: +nausea, -vomiting  Musculoskeletal: +neck pain  Neurological: +lack of focus/concentration         Review of patient's allergies indicates:   Allergen Reactions    Keflex [cephalexin] Itching and Rash     Scrotal and groin itching        Medication List with Changes/Refills   Current Medications    AZELASTINE (ASTELIN) 137 MCG (0.1 %) NASAL SPRAY    1 spray (137 mcg total) by Nasal route 2 (two) times daily.    BRIMONIDINE 0.2% (ALPHAGAN) 0.2 % DROP    Place 1 drop into both eyes 2 (two) times daily.    DORZOLAMIDE-TIMOLOL 2-0.5% (COSOPT) 22.3-6.8 MG/ML OPHTHALMIC SOLUTION    Place 1 drop into both eyes 2 (two) times daily.    EZETIMIBE (ZETIA) 10 MG TABLET    Take 1 tablet (10 mg total) by  "mouth once daily. For cholesterol control.    GABAPENTIN (NEURONTIN) 100 MG CAPSULE    Take 1 capsule (100 mg total) by mouth 3 (three) times daily.    HYDRALAZINE (APRESOLINE) 100 MG TABLET    Take 1 tablet (100 mg total) by mouth 2 (two) times daily.    IBUPROFEN (ADVIL,MOTRIN) 800 MG TABLET    Take 800 mg by mouth every 6 (six) hours as needed for Pain.    LATANOPROST 0.005 % OPHTHALMIC SOLUTION    Place 1 drop into both eyes every evening.    LEVOCETIRIZINE (XYZAL) 5 MG TABLET    Take 1 tablet (5 mg total) by mouth every evening.    LOSARTAN-HYDROCHLOROTHIAZIDE 100-12.5 MG (HYZAAR) 100-12.5 MG TAB    TAKE 1 TABLET BY MOUTH ONCE DAILY FOR HIGH BLOOD PRESSURE    MELOXICAM (MOBIC) 15 MG TABLET    Take 1 tablet by mouth once daily    METFORMIN (GLUCOPHAGE) 500 MG TABLET    Take 1 tablet by mouth once daily with breakfast    MULTIVIT-MIN-FA-LYCOPEN-LUTEIN (CENTRUM SILVER MEN) 300-600-300 MCG TAB    Take 1 tablet by mouth once daily.    NETARSUDIL-LATANOPROST (ROCKLATAN) 0.02-0.005 % DROP    Place 1 drop into the left eye once daily.    ROSUVASTATIN (CRESTOR) 40 MG TAB    TAKE 1 TABLET BY MOUTH ONCE DAILY REPLACES ATORVASTATIN     Objective:   BP (!) 160/76 (BP Location: Left arm, Patient Position: Sitting)   Pulse 91   Temp 97.9 °F (36.6 °C) (Temporal)   Resp 18   Ht 5' 8" (1.727 m)   Wt 120.2 kg (264 lb 15.9 oz)   SpO2 96%   BMI 40.29 kg/m²     Physical Exam  Vitals reviewed.   Constitutional:       Appearance: Normal appearance.   HENT:      Head: Normocephalic and atraumatic.      Right Ear: Tympanic membrane normal.      Left Ear: Tympanic membrane normal.      Mouth/Throat:      Pharynx: Oropharynx is clear.   Eyes:      Extraocular Movements: Extraocular movements intact.      Conjunctiva/sclera: Conjunctivae normal.      Pupils: Pupils are equal, round, and reactive to light.   Pulmonary:      Effort: Pulmonary effort is normal.   Skin:     General: Skin is warm and dry.   Neurological:      Mental " "Status: He is alert and oriented to person, place, and time.      Cranial Nerves: No cranial nerve deficit (CN II - XII grossly intact) or facial asymmetry.      Coordination: Rapid alternating movements normal.       Assessment and Plan:     Assessment & Plan    1. Chronic nonintractable headache, unspecified headache type (Primary)    Advised the patient to keep a headache diary or use "My Migraine Buddy" hoda to identify and log headaches and potential triggers such as weather changes, diet, or sleep patterns.  Instructed the patient to monitor blood pressure during headache episodes.  Recommend ensuring adequate hydration and regular meals.  Continued current treatment with acetaminophen as it is effective for headache relief, to be taken as needed.  Considered Fioricet as potential alternative to acetaminophen if needed in future.  Discussed possibility of prophylactic medication if headaches continue to increase in frequency or severity.    2. Facet arthropathy, cervical    Chronic, continue PT    3. Essential hypertension    Chronic, elevated, has not yet taken BP medication today.              FOLLOW-UP:  - Keep follow up with Dr. Quarles in October.  - Instructed the patient to contact the office if headaches worsen before the scheduled appointment.         This note was generated with the assistance of ambient listening technology. Verbal consent was obtained by the patient and accompanying visitor(s) for the recording of patient appointment to facilitate this note. I attest to having reviewed and edited the generated note for accuracy, though some syntax or spelling errors may persist. Please contact the author of this note for any clarification.      "

## 2025-05-27 ENCOUNTER — TELEPHONE (OUTPATIENT)
Dept: SLEEP MEDICINE | Facility: OTHER | Age: 70
End: 2025-05-27
Payer: MEDICARE

## 2025-05-27 DIAGNOSIS — G47.30 SLEEP APNEA, UNSPECIFIED TYPE: Primary | ICD-10-CM

## 2025-05-29 ENCOUNTER — TELEPHONE (OUTPATIENT)
Dept: SLEEP MEDICINE | Facility: OTHER | Age: 70
End: 2025-05-29
Payer: MEDICARE

## 2025-05-29 ENCOUNTER — RESULTS FOLLOW-UP (OUTPATIENT)
Dept: PAIN MEDICINE | Facility: CLINIC | Age: 70
End: 2025-05-29

## 2025-05-30 ENCOUNTER — CLINICAL SUPPORT (OUTPATIENT)
Dept: REHABILITATION | Facility: HOSPITAL | Age: 70
End: 2025-05-30
Payer: MEDICARE

## 2025-05-30 DIAGNOSIS — R29.898 DECREASED STRENGTH OF LOWER EXTREMITY: Primary | ICD-10-CM

## 2025-05-30 PROCEDURE — 97140 MANUAL THERAPY 1/> REGIONS: CPT | Mod: PN

## 2025-05-30 PROCEDURE — 97112 NEUROMUSCULAR REEDUCATION: CPT | Mod: PN

## 2025-05-30 NOTE — PROGRESS NOTES
"  Outpatient Rehab    Physical Therapy Visit    Patient Name: Celso Hernandez  MRN: [id number]  YOB: 1955  Encounter Date: 5/30/2025    Therapy Diagnosis:   Encounter Diagnosis   Name Primary?    Decreased strength of lower extremity Yes     Physician: Casie Miguel DO    Physician Orders: Eval and Treat  Medical Diagnosis: Primary osteoarthritis of right knee  Cervicalgia    Visit # / Visits Authorized:  27 / 30  Insurance Authorization Period: 2/14/2025 to 6/6/2025  Date of Evaluation: 2/12/2025  Plan of Care Certification: 5/23/2025 to 7/4/2025      PT/PTA: PT   Number of PTA visits since last PT visit:0  Time In: 1300   Time Out: 1355  Total Time (in minutes): 55   Total Billable Time (in minutes): 30    FOTO:  Intake Score:  %  Survey Score 2:  %  Survey Score 3:  %    Precautions:       Subjective   not doing good today. pain primarily radiating down R leg. he reports lumbar L5 S1 radiculopathy. also with arthritis in B hips and R knee. knee feels ok, neck 1-2/10 yesterday he woke up with pain..  Pain reported as 6/10. 1/10 Bilateral knees (mild stiffness) , 5/10 L hip; 1/10 neck L > R (stiff) 6/10 R LE/back    Objective            Treatment:  Therapeutic Exercise  TE 2: LTR 20x  TE 3: seated lumbar flexion 10x3 peanut  TE 4: DKTC peanut 15x  TE 8: BKFO 20x  TE 10: calf stretch 10x10"  Manual Therapy  MT 2: LAD R  Balance/Neuromuscular Re-Education  NMR 1: open book 10x B  NMR 5: SAPD 2 x 10 B TB with contralateral head turns  NMR 6: Rows 3 x 10 BTB  NMR 7: Supine sciatic nerve glides (90/90) 2x10 B  NMR 9: Bridges 3 x 10    Time Entry(in minutes):  Manual Therapy Time Entry: 10  Neuromuscular Re-Education Time Entry: 15  Therapeutic Exercise Time Entry: 5    Assessment & Plan   Assessment: Patient is a 69 y.o. M with a medical diagnosis of M17.11 (ICD-10-CM) - Primary osteoarthritis of right knee. additional referral for M54.2 (ICD-10-CM) - Cervicalgia  M25.569 (ICD-10-CM) - Knee pain, " unspecified chronicity, unspecified laterality. Patient with improving knee pain, ongoing cervical stiffness with firm end feel.  primary complaint of low back pain and R radiculopath. good response to LAD.   focus of session primarily on pain management and mobility. able to progress banded exercises today. reduction in R LE symptoms post treatment.  Will plan to continue to addressed core, gluteal activation and sciatic nerve mobility.  Continue POC. Monitor response.  Evaluation/Treatment Tolerance: Patient tolerated treatment well    Patient will continue to benefit from skilled outpatient physical therapy to address the deficits listed in the problem list box on initial evaluation, provide pt/family education and to maximize pt's level of independence in the home and community environment.     Patient's spiritual, cultural, and educational needs considered and patient agreeable to plan of care and goals.           Plan: cont POC    Goals:   Active       Range of Motion       Patient will achieve right knee ROM of  3-120 degrees  (Progressing)       Start:  02/12/25    Expected End:  05/23/25               Strength       Patient will achieve bilateral hip abduction strength of 4+/5 (Progressing)       Start:  02/12/25    Expected End:  05/23/25            Patient will improve bilateral knee flexion strength by 5kg  (Met)       Start:  02/12/25    Expected End:  04/11/25    Resolved:  04/23/25         Patient will improve right knee extension strength by 5kg (Met)       Start:  02/12/25    Expected End:  04/11/25    Resolved:  04/23/25            cervical goals       patient will improve cervical rotation 10 degrees (Progressing)       Start:  02/18/25    Expected End:  05/23/25            patient will improve cervical extension to 30 deg (Progressing)       Start:  02/18/25    Expected End:  05/23/25              Resolved       Changing body position       Patient will demonstrate moving sit to stand 8+x in 30  sec (Met)       Start:  02/12/25    Expected End:  05/23/25    Resolved:  05/23/25            Functional outcome       Patient will show a significant change in FOTO patient-reported outcome tool to demonstrate subjective improvement (Met)       Start:  02/12/25    Expected End:  04/11/25    Resolved:  05/23/25         Patient will demonstrate independence in home program for support of progression (Met)       Start:  02/12/25    Expected End:  03/14/25    Resolved:  04/23/25            Pain       Patient will report pain of 4/10 max demonstrating a reduction of overall pain (Met)       Start:  02/12/25    Expected End:  03/14/25    Resolved:  03/28/25             Radha Lares, PT, DPT

## 2025-06-05 ENCOUNTER — CLINICAL SUPPORT (OUTPATIENT)
Dept: REHABILITATION | Facility: HOSPITAL | Age: 70
End: 2025-06-05
Payer: MEDICARE

## 2025-06-05 DIAGNOSIS — R29.898 DECREASED STRENGTH OF LOWER EXTREMITY: Primary | ICD-10-CM

## 2025-06-05 PROCEDURE — 97140 MANUAL THERAPY 1/> REGIONS: CPT | Mod: PN,CQ

## 2025-06-05 PROCEDURE — 97112 NEUROMUSCULAR REEDUCATION: CPT | Mod: PN,CQ

## 2025-06-10 ENCOUNTER — TELEPHONE (OUTPATIENT)
Dept: SLEEP MEDICINE | Facility: OTHER | Age: 70
End: 2025-06-10
Payer: MEDICARE

## 2025-06-10 ENCOUNTER — HOSPITAL ENCOUNTER (OUTPATIENT)
Dept: SLEEP MEDICINE | Facility: HOSPITAL | Age: 70
Discharge: HOME | End: 2025-06-10
Payer: MEDICARE

## 2025-06-10 ENCOUNTER — CLINICAL SUPPORT (OUTPATIENT)
Dept: REHABILITATION | Facility: HOSPITAL | Age: 70
End: 2025-06-10
Payer: MEDICARE

## 2025-06-10 DIAGNOSIS — R29.898 DECREASED STRENGTH OF LOWER EXTREMITY: Primary | ICD-10-CM

## 2025-06-10 DIAGNOSIS — G47.30 SLEEP APNEA, UNSPECIFIED TYPE: ICD-10-CM

## 2025-06-10 PROCEDURE — 97110 THERAPEUTIC EXERCISES: CPT | Mod: PN,CQ

## 2025-06-10 PROCEDURE — 95810 POLYSOM 6/> YRS 4/> PARAM: CPT

## 2025-06-10 PROCEDURE — 97112 NEUROMUSCULAR REEDUCATION: CPT | Mod: PN,CQ

## 2025-06-10 NOTE — PROGRESS NOTES
"  Outpatient Rehab    Physical Therapy Visit    Patient Name: Celso Hernandez  MRN: [id number]  YOB: 1955  Encounter Date: 6/10/2025    Therapy Diagnosis:   Encounter Diagnosis   Name Primary?    Decreased strength of lower extremity Yes     Physician: Casie Miguel DO    Physician Orders: Eval and Treat  Medical Diagnosis: Primary osteoarthritis of right knee  Cervicalgia  Surgical Diagnosis: Not applicable for this Episode   Surgical Date: Not applicable for this Episode    Visit # / Visits Authorized:  29 / 30  Insurance Authorization Period: 2/14/2025 to 6/20/2025  Date of Evaluation: 2/12/2025  Plan of Care Certification: 5/23/2025 to 7/4/2025      PT/PTA: PTA   Number of PTA visits since last PT visit:2  Time In: 1100   Time Out: 1155  Total Time (in minutes): 55   Total Billable Time (in minutes): 30    FOTO:  Intake Score:  %  Survey Score 2:  %  Survey Score 3:  %    Precautions:       Subjective   Lower back with radiating symptoms overall unchanged, able to manage temporarily with HEP/stretches but symptoms return soon after..  Pain reported as 6/10. 1/10 Bilateral knees (mild stiffness) , 5/10 L hip; 2/10 neck L > R (stiff) 6/10 R LE/back    Objective            Treatment:  Therapeutic Exercise  TE 2: LTR 20x  TE 3: seated lumbar flexion 10x3 peanut  TE 4: DKTC peanut 15x  TE 8: BKFO 20x  TE 10: calf stretch 10x10"  Manual Therapy  MT 2: LAD R  Balance/Neuromuscular Re-Education  NMR 1: open book 15x B  NMR 5: SAPD 2 x 10 B TB with contralateral head turns  NMR 6: Rows 3 x 10 BTB  NMR 7: Supine sciatic nerve glides (90/90) 2x10 B  NMR 9: Bridges 3 x 10    Time Entry(in minutes):  Manual Therapy Time Entry: 5  Neuromuscular Re-Education Time Entry: 15  Therapeutic Exercise Time Entry: 10    Assessment & Plan   Assessment: Patient is a 69 y.o. M with a medical diagnosis of M17.11 (ICD-10-CM) - Primary osteoarthritis of right knee. additional referral for M54.2 (ICD-10-CM) - Cervicalgia  " M25.569 (ICD-10-CM) - Knee pain, unspecified chronicity, unspecified laterality. Patient with improving knee pain, ongoing cervical stiffness with firm end feel.  primary complaint of low back pain and R radiculopath. Overall symptoms remain the same with only tempporary relief with PT, HEP and stretching. Continued lumbar mobility, certralization of symptoms and lumbar stabilization program. Continue POC. Monitor response.  Evaluation/Treatment Tolerance: Patient tolerated treatment well    The patient will continue to benefit from skilled outpatient physical therapy in order to address the deficits listed in the problem list on the initial evaluation, provide patient and family education, and maximize the patients level of independence in the home and community environments.     The patient's spiritual, cultural, and educational needs were considered, and the patient is agreeable to the plan of care and goals.           Plan: cont POC    Goals:   Active       Range of Motion       Patient will achieve right knee ROM of  3-120 degrees  (Progressing)       Start:  02/12/25    Expected End:  05/23/25               Strength       Patient will achieve bilateral hip abduction strength of 4+/5 (Progressing)       Start:  02/12/25    Expected End:  05/23/25            Patient will improve bilateral knee flexion strength by 5kg  (Met)       Start:  02/12/25    Expected End:  04/11/25    Resolved:  04/23/25         Patient will improve right knee extension strength by 5kg (Met)       Start:  02/12/25    Expected End:  04/11/25    Resolved:  04/23/25            cervical goals       patient will improve cervical rotation 10 degrees (Progressing)       Start:  02/18/25    Expected End:  05/23/25            patient will improve cervical extension to 30 deg (Progressing)       Start:  02/18/25    Expected End:  05/23/25              Resolved       Changing body position       Patient will demonstrate moving sit to stand 8+x in  30 sec (Met)       Start:  02/12/25    Expected End:  05/23/25    Resolved:  05/23/25            Functional outcome       Patient will show a significant change in FOTO patient-reported outcome tool to demonstrate subjective improvement (Met)       Start:  02/12/25    Expected End:  04/11/25    Resolved:  05/23/25         Patient will demonstrate independence in home program for support of progression (Met)       Start:  02/12/25    Expected End:  03/14/25    Resolved:  04/23/25            Pain       Patient will report pain of 4/10 max demonstrating a reduction of overall pain (Met)       Start:  02/12/25    Expected End:  03/14/25    Resolved:  03/28/25             Lucy Gaming, PTA

## 2025-06-11 PROBLEM — G47.30 SLEEP APNEA: Status: ACTIVE | Noted: 2025-06-11

## 2025-06-11 NOTE — PROGRESS NOTES
33 Patient was educated about the purpose of the wires and what data was being collected. Patient was informed that the technician may need to enter the room during the night to fix leads or make adjustments to the equipment.                     Follow up instructions were provided to the patient.

## 2025-06-12 NOTE — TELEPHONE ENCOUNTER
No care due was identified.  [facility] Embedded Care Due Messages. Reference number: [id number].   6/12/2025 1:27:15 PM CDT

## 2025-06-12 NOTE — TELEPHONE ENCOUNTER
Last filled 180 x3  10/17/24 when was on bid.  Now on ev 8 hrs.    [facility] requesting updated rx.    Rx Pended.  Lov 4/3/25   (Saw alvaro 5/26/25)

## 2025-06-13 ENCOUNTER — OFFICE VISIT (OUTPATIENT)
Dept: PAIN MEDICINE | Facility: CLINIC | Age: 70
End: 2025-06-13
Payer: MEDICARE

## 2025-06-13 VITALS
WEIGHT: 265.44 LBS | SYSTOLIC BLOOD PRESSURE: 158 MMHG | DIASTOLIC BLOOD PRESSURE: 85 MMHG | HEART RATE: 91 BPM | HEIGHT: 68 IN | BODY MASS INDEX: 40.23 KG/M2

## 2025-06-13 DIAGNOSIS — M47.816 LUMBAR FACET ARTHROPATHY: ICD-10-CM

## 2025-06-13 DIAGNOSIS — M51.362 DEGENERATION OF INTERVERTEBRAL DISC OF LUMBAR REGION WITH DISCOGENIC BACK PAIN AND LOWER EXTREMITY PAIN: ICD-10-CM

## 2025-06-13 DIAGNOSIS — M54.16 LUMBAR RADICULOPATHY, CHRONIC: Primary | ICD-10-CM

## 2025-06-13 PROCEDURE — 99999 PR PBB SHADOW E&M-EST. PATIENT-LVL IV: CPT | Mod: PBBFAC,,, | Performed by: NURSE PRACTITIONER

## 2025-06-13 RX ORDER — GABAPENTIN 300 MG/1
300 CAPSULE ORAL 2 TIMES DAILY
Qty: 60 CAPSULE | Refills: 2 | Status: SHIPPED | OUTPATIENT
Start: 2025-06-13 | End: 2025-07-13

## 2025-06-13 RX ORDER — HYDRALAZINE HYDROCHLORIDE 50 MG/1
50 TABLET, FILM COATED ORAL EVERY 8 HOURS
Qty: 270 TABLET | Refills: 0 | Status: SHIPPED | OUTPATIENT
Start: 2025-06-13

## 2025-06-13 NOTE — PROGRESS NOTES
[facility] Establ[facility] Visit    Referred by: Dr. Inder Quarles   Reason for referral: Lumbar radiculopathy, chronic     CC:   Chief Complaint   Patient presents with    Low-back Pain    Leg Pain    Foot Pain       Interval history 06/13/2025:  69-year-old male presents today for a follow-up appointment he has a history of chronic low back pain with bilateral leg pain radiates into his lower extremity sometimes into his feet and heels.  He continues to report paresthesia like symptoms I started him on gabapentin gradually 100 mg up to 300 mg daily he did report that he does not feel like the gabapentin is helping he denied any adverse side effects with gabapentin.  He is currently participating in physical therapy which they are performing exercise to help with low back and bilateral leg pain but he originally started PT for neck and shoulder pain he is requesting a new order for lumbar radiculopathy.  Denies any new pain denies any profound weakness denies any bowel or bladder dysfunction at this time.    Interval update 05/09/25:  Patient presents today for MRI results, complaining of bilateral lower back, hips and buttocks that radiates down to both legs and heels. Patient complains of continued paraesthesia like symptoms.   Patient continues to complain of the symptoms he denies any recent incident or trauma denies any profound weakness he is currently still participating in physical therapy for his right knee they also providing  him physical therapy for his low back and bilateral leg pain.     Interval history 4/30/2025:  70 y/o Patient presents with low back pain, right buttock pain, and right leg pain that started around March 30th. He experiences burning, numbness, and tenderness in the affected areas. Pain radiates down to the heel of both feet and worsens with prolonged standing or walking, necessitating lying down for relief. He reports a history of buttock pain, but  the leg pain is a new symptom present for about a month.    Pain management includes rest and PT, which has been beneficial. He takes meloxicam regularly, attempting to take it every other day, and uses ibuprofen only for severe pain. Meloxicam helps alleviate some pain and swelling.    He has a history of T spine injections, which have been effective in managing his mid-back pain. He does not have any significant problems with his T spine since the injection, as well as any chest pain, shortness of breath, or upper extremity symptoms.      Interval Update 03/21/2026: Patient return to clinic for follow-up on right knee pain. He has previously ordered x-rays of his hips and knees which we will review. Additionally patient reporting numbness in hands and fingers that has been going on intermittently for several years, worse in the am once he moves hands and fingers numbness dissipates. He reports not feeling numbness during the day.  Patient denies any recent incident or trauma denies any bowel or bladder function denies saddle anesthesia he continues to participate in physical therapy which is helping his knees and hip pain.    Interval Update 02/06/2025:   Patient return to clinic for right knee pain.  Pain worse in the medial aspect of the right knee.  Incident occurred 3 days ago, patient was going down the stairs and his right knee gave out, he fell to the ground onto the right knee.  He states he fell onto grass.  Denies hitting his head.  Denies loss of consciousness.  That day the pain was significant, but has since improved. Patient made the appointment and wanted to come in just to make sure everything is alright. Patient has never had this happen before, he has no h/o surgeries or injections to the R knee. Patient continues to take meloxicam which is helping his joint pain. He does note pain in the mornings in his bilateral knee joints.     When the pain was at its worse, he describes it as sharp anterior  knee pain that became aching. He also endorses a strain sensation to his medial knee.     Patient also complains of intermittent hip pain, worse in the left.  He also complains of axial neck pain.  Denies any radicular symptoms.    Interval History 10/10/23:  Celso Hernandez returns today for follow up for right gluteal/buttocks pain that has been ongoing for the last 3-4 months.  He is intermittent pain described as soreness pain is worse when sitting pain is mitigated when shifting weight off of his right gluteal area.  Denies any radiating symptoms down either leg the worst of his pain is 3/4/10.  He denies any recent incident or trauma denies any bowel bladder dysfunction,.  Patient states that he does a lot of sitting as he is a  he also watches television during the day for 2-3 hours every day.  He does take occasionally meloxicam 7.5 which she states does help.      Current Pain Scales:  Current: 0/10              Typical Range: 0-4/10     Interval History(08/09/2023):  Celso Hernandez returns today for follow up for neck and upper back pain.  He reports completing physical therapy and has now establish a home exercise plan at home.  He states that his neck pain is stable at the moment and is doing fine.    Currently, the neck pain is stable.        Current Pain Scales:  Current: 0/10              Typical Range: 2-4/10     Interval Updates  05/08/[id number]-eswg-old male presents today with neck and upper back pain.  Onset 5-6 days pain is specifically located at the base of his neck and radiates into his head.  Denies any headaches.  Pain is constant, pain is described as a strain and pulling he denies any radicular symptoms in either arm , denies any profound weakness, denies any paresthesia like symptoms.  Pain is aggravated with certain movements and laying down.  Pain is mitigated with ibuprofen 800 mg t.i.d..  He denies any recent incident or trauma he does state that he felt like he slept  "wrong and woke up the next morning feeling pain in the neck and into his head.    Interval Updates:   2/10/2022 - Mr. Hernandez is following up for  Low-back Pain and Mid-back Pain is currently rate 0/10 with a weekly range 2-5/10.  It is described as Aching, Grabbing and Tight.  He  Is reporting mild to no symptoms today, he states he has no pain when performing normal ADLs, he reports pain is exacerbated when he  "tries to do to much" He did also report midback /thoroacic pain with mild twisting but pain is stable at this point.     1/19/2022  -  Mary returns to clinic s/p Thoracic Epidural Steroid Injection at T5-6 on 1/4/22 with 60% relief.  He reports a pain intensity 1/10 today with a weekly range of 1-2/10.   He is reporting mild soarness in his thoracic area, he is reporting being " free of pain after the injection" his pain returned after working outside in his garage yesterday. Overall much better following his injection. He is reporting certain movements increase his pain but other than that he  is better.     Subjective:   Celso Hernandez is a 69 y.o. male who has a past medical history of Arthritis, BPH (benign prostatic hyperplasia), Cataract, Colon polyps, Diabetes mellitus type II, Glaucoma, Hyperlipidemia, Hypertension, Multiple duodenal ulcers, and Unspecified hemorrhoids without mention of complication. He complains of pain as described below.    Location: low back  Right gluteal pain  Onset:  3-4 months  Radiation:  Right Leg   Timing: constant  Current Pain Score: 7/10  Weekly Pain Range: 2-10/10  Quality:  Pulling and strain type pain  Worsened by: exercise, extension, lifting, lying down, sitting and walking for more than several minutes  Improved by: medications ibuprofen 800 mg    Previous Therapies:  PT/OT: Denies for neck pain  HEP: Yes, stretching at home and walking.  TENS: No  Interventions:  01/04/2022 Thoracic Epidural Steroid Injection at T5-6  Surgery: Denies localized " surgery  Opioids:  Adjuvants:     Current Pain Medications:  Ibuprofen 800 mg t.i.d.     Assessment & Plan:  Problem List Items Addressed This Visit    None  Visit Diagnoses         Lumbar radiculopathy, chronic    -  Primary    Relevant Orders    Ambulatory Referral/Consult to Physical Therapy      Degeneration of intervertebral disc of lumbar region with discogenic back pain and lower extremity pain        Relevant Orders    Ambulatory Referral/Consult to Physical Therapy      Lumbar facet arthropathy        Relevant Orders    Ambulatory Referral/Consult to Physical Therapy                12/17/21 - Cleso Hernandez is a 69 y.o. male who  has a past medical history of Arthritis, BPH (benign prostatic hyperplasia), Cataract, Colon polyps, Diabetes mellitus type II, Glaucoma, Hyperlipidemia, Hypertension, Multiple duodenal ulcers (10/08/2019), and Unspecified hemorrhoids without mention of complication.  By history and examination this patient has chronic mid/upper back pain with right T5-6 radiculopathy.  The underlying cause cause is facet arthritis and degenerative disc disease with a large, obstructive osteophyte on the right side as seen on CT chest.  MRI is appropriate prior to an injections to assess soft tissue and vasculature.  We discussed the underlying diagnoses and multiple treatment options including non-opioid medications, opioid medications, interventional procedures, physical therapy and home exercise.  The risks and benefits of each treatment option were discussed and all questions were answered.        1/19/2022Ariadna Hernandez is a 69 y.o. male who  has a past medical history of Arthritis, BPH (benign prostatic hyperplasia), Cataract, Colon polyps, Diabetes mellitus type II, Glaucoma, Hyperlipidemia, Hypertension, Multiple duodenal ulcers (10/08/2019), and Unspecified hemorrhoids without mention of complication.  By history and examination this patient has chronic mid/upper back pain with a right  T5-6   Radiculopathy that responded well following a thoracic GABRIELA targeting T5-6.  Colonic causes appear to be facet arthritis and degenerative disc disease with a large obstructive osteophyte on the right side.  Upon review of his thoracic MRI ordered per Dr. Mejia results did show a extradural degenerative changes resulting in right T6 neural foraminal stenosis.  Secondly intradural extramedullary T2 signal abnormality at the level of the T4-5.  The radiologist include a differential diagnosis that included a nerve sheet to were such as a  schwannoma or a neurofibroma as well as other neoplasms.  Radiologist recommended further evaluation of this patient's thoracic area, discussed with the patient today that I will order a thoracic MRI with contrast to further evaluate. Long discussion with patient regarding  Results of his MRI, I will also refer to Oncology for further evaluation but I would like this patient to see a college E following a hip obtaining new images.    02/10/1429-01-mvnn-old male with history of chronic mid to low back pain, he presents today to review an updated thoracic MRI.  There was concerned per radiology on previous thoracic MRI with differential diagnosis that includes a nerve sheet to wear such as a schwannoma or a neurofibroma as well as other neoplasms.  I contacted Oncology and did not appear to be a malignancy.  Was a non malignant entity.  He contacted patient and made him aware of this.  Today his pain is 0/10, he does experience mild midthoracic low back pain when performing duties outside of his normal ADLs.  I recommended physical therapy patient is amenable to this plan.    05/08/20236730-49-smbl-old male presents today acute/subacute neck pain beginning at the patient's his neck radiating his head.  Etiology is unclear at this point.  Based on history the patient may be suffering facet arthritis of the cervical, differential diagnosis be myofascial pain.  Cause of his symptoms  are unclear he did state that he slept wrong on a pillow and woke up the next morning with this pain.  He denies any radiating symptoms do not suspect any degenerative disc disease or central and/or neural foraminal stenosis.  Ibuprofen has helped his pain prior to come into clinic today but it only lasts 4-5 hours.  He and I discussed plan below and was agreed upon during our clinic visit today.    08/09/2023 68-year-old pleasant male neck pain upper back pain.  He reports having attended physical therapy and completing.  Based on my history and exam his pain generators were from facet arthritis and he also had a cervical myofascial component to his pain which both have improved through physical therapy.  I recommended he establish a home exercise plan patient verbalized understanding and agreed.    10/10/2023- 68-year-old male that is known to our clinic presents with right gluteal/buttocks pain that has been ongoing for the last 3-4 months.  Based on my history and exam the patient is experiencing gluteal pain my differential diagnosis will include sacroiliitis right-sided on.  He has no radicular symptoms he denies any paresthesia like symptoms.  I do believe the patient does a lot of sitting during the day I think would benefit him to establish home exercise plan that focuses on gluteal stretches which I will provide him with today.    2/6/2024 - Patient presents to clinic for R knee pain after a mechanical fall. Patient initially experienced sharp anterior knee pain that has resolved over the past few days. On exam, patient has mild tenderness to palpation and negative anterior drawer. Discussed proceeding with conservative management which the patient is on board with. RICE for knee pain.  He also complains chronic hip pain that radiates to the groin worse in the left.  He also complains of axial cervical pain consistent with facet arthropathy.  We discussed interventional procedures for these chronic pains  including intra-articular hip injections as well as cervical medial branch blocks/RFA.  Patient would prefer to treat conservatively with physical therapy for now.    03/21/[id number]-xuws-old pleasant male with a history of chronic bilateral hip pain and right knee pain.  Currently participating in physical therapy based on history and exam patient is experiencing right knee pain related to osteoarthritis and hip pain likely related to osteoarthritis.  He is participating in physical therapy which is helping his pain did also present with numbness and tingling intermittently in his hands feet which is likely related to diabetic peripheral neuropathy due to his history of diabetes.  The numbness and tingling in his pain 8 are not significant he reports them worse in the morning and then dissipates throughout the day.  We discussed considering gabapentin however he refused this today.  Discuss specific exercises to help with diabetic peripheral neuropathy.  Recommended he continue and complete physical therapy see plan agreed upon below.    04/30/2025-Celso Hernandez is a 69 y.o. male who  has a past medical history of Arthritis, BPH (benign prostatic hyperplasia), Cataract, Colon polyps, Diabetes mellitus type II, Glaucoma, Hyperlipidemia, Hypertension, Multiple duodenal ulcers (10/08/2019), and Unspecified hemorrhoids without mention of complication.  By history and examination this patient has chronic low back pain with RIGHT  radiculopathy.  The underlying cause cause is facet arthritis, degenerative disc disease, foraminal stenosis, central canal stenosis, muscle dysfunction, muscles strain, and deconditioning.  Pathology is confirmed by imaging.  We discussed the underlying diagnoses and multiple treatment options including non-opioid medications, interventional procedures, physical therapy, home exercise, core muscle enhancement, activity modification, and weight loss.  The risks and benefits of each treatment  option were discussed and all questions were answered.      5/9/2025Klyeigh Hernandez is a 69 y.o. male who  has a past medical history of Arthritis, BPH (benign prostatic hyperplasia), Cataract, Colon polyps, Diabetes mellitus type II, Glaucoma, Hyperlipidemia, Hypertension, Multiple duodenal ulcers (10/08/2019), and Unspecified hemorrhoids without mention of complication.  By history and examination this patient has chronic low back pain with Bilateral  radiculopathy.  The underlying cause cause is facet arthritis, degenerative disc disease, foraminal stenosis, central canal stenosis, and deconditioning.  Pathology is confirmed by imaging.  We discussed the underlying diagnoses and multiple treatment options including non-opioid medications, interventional procedures, physical therapy, home exercise, core muscle enhancement, activity modification, and weight loss.  The risks and benefits of each treatment option were discussed and all questions were answered.      06/13/2025Kyleigh Hernandez is a 69 y.o. male who  has a past medical history of Arthritis, BPH (benign prostatic hyperplasia), Cataract, Colon polyps, Diabetes mellitus type II, Glaucoma, Hyperlipidemia, Hypertension, Multiple duodenal ulcers (10/08/2019), and Unspecified hemorrhoids without mention of complication.  By history and examination this patient has chronic low back pain with bilateral radiculopathy.  The underlying cause cause is facet arthritis, degenerative disc disease, foraminal stenosis, central canal stenosis, and deconditioning.  Pathology is confirmed by imaging.  We discussed the underlying diagnoses and multiple treatment options including non-opioid medications, interventional procedures, physical therapy, home exercise, core muscle enhancement, activity modification, and weight loss.  The risks and benefits of each treatment option were discussed and all questions were answered.          Treatment Plan:   Procedures:  None at this  time.  In the future consider bilateral lumbar TFESI targeting L5-S1  PT/OT/HEP:  New order today for physical therapy to treat lumbar radiculopathy bilaterally I have stressed the importance of physical activity and a home exercise plan to help with pain and improve health.  Referral placed to physical therapy.  Medications:  Continue and increase gabapentin 100 mg t.i.d. to 300 mg b.i.d.   -  Reviewed and consistent with medication use as prescribed.  Imaging:  Previous imaging reviewed and discussed in detail with the patient using images from chart and spine model   Follow Up:  Three-month or sooner if needed after completing physical therapy    ÁNGEL Cabrales  [facility] Pain Management    LUMBAR MRI Imaging 2025:  EXAMINATION:  MRI LUMBAR SPINE WITHOUT CONTRAST     CLINICAL HISTORY:  Lumbar radiculopathy, symptoms persist with conservative treatment; Radiculopathy, lumbar region     TECHNIQUE:  Multiplanar, multisequence MR images were acquired from the thoracolumbar junction to the sacrum without the administration of contrast.     COMPARISON:  Lumbar spine radiograph dated 11/22/2019     FINDINGS:  Lumbar spine vertebral body heights appear maintained.  Heterogeneous T1 marrow signal without discrete infiltrative process.  Spinal cord terminus lies at L1-L2.  Remaining visualized prevertebral and paraspinal soft tissues demonstrate no acute abnormality.     T12-L1: No significant spinal canal stenosis or neural foraminal impingement.     L1-L2: Anterior marginal spurring and facet hypertrophy without significant posterior disc herniation, spinal canal stenosis, or neural foraminal impingement.     L2-L3: Anterior marginal spurring and facet hypertrophy without significant posterior disc herniation, spinal canal stenosis, or neural foraminal impingement.     L3-L4: Modest-mild circumferential bulge with facet hypertrophy and flavum thickening.  No significant spinal canal stenosis.  Mild right-sided  neural foraminal narrowing.     L4-L5: Modest-mild circumferential bulge more asymmetric to the left with bilateral facet arthropathy and flavum thickening.  Left lateral recess narrowing and mild left-sided neural foraminal narrowing.     L5-S1: Circumferential bulge with posterior annular fissure and facet arthropathy.  Bilateral neural foraminal narrowing with suspected focal contact of the exiting L5 nerve roots.  There is also abutment anteriorly of the bilateral transiting S1 nerve roots.     Impression:     Lumbar spondylosis with detailed findings as above.       [facility]agin22 MRI Thoracic Spine W WO Contrast     Narrative & Impression  EXAMINATION:  MRI THORACIC SPINE W WO CONTRAST     CLINICAL HISTORY:  Attention to T4-5 level.  Evaluate for underlying mass.     TECHNIQUE:  Pre and postcontrast MR imaging of the thoracic spine was performed utilizing 10 mL Gadavist intravenous contrast.     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     No advanced marrow edema or osseous destructive process is identified.  Multilevel mild disc bulging flattens the thecal sac without cord impingement.  Marked degenerative foraminal stenosis on the left at T 4-5 and to the right at T5-6 is again noted.     The spinal cord maintains normal signal intensity.     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     No enhancing lesion.  Degenerative foraminal stenosis on the left at T4-5 and on the right at T5-6.        Electronically signed by: Trent Graves  Date:                                            2022  Time:                                           17:11        CT Chest Without Contrast  Impression:  No worrisome finding on the  "chest CT.  Mild atherosclerotic plaque of the aorta and mild coronary artery calcification  Severe foraminal narrowing on the right at T5-6 due to significant facet joint osseous hypertrophy.  Electronically signed by: Halley Lundberg MD  Date:                                            09/29/2021  Time:                                           08:56    X-Ray Lumbar Spine Ap And Lateral 11/21/2019  FINDINGS:  Two views AP lateral.  There is mild DJD and DISH.  Alignment is normal.  No fracture dislocation bone destruction.  No trauma seen.  Impression:  No acute process seen.  DJD and DISH.      Review of Systems:  Review of Systems   Constitutional:  Negative for chills and fever.   HENT:  Negative for nosebleeds.    Eyes:  Negative for blurred vision and pain.   Respiratory:  Negative for hemoptysis.    Cardiovascular:  Negative for chest pain and palpitations.   Gastrointestinal:  Negative for heartburn, nausea and vomiting.   Genitourinary:  Negative for dysuria and hematuria.   Musculoskeletal:  Positive for joint pain. Negative for myalgias.   Skin:  Negative for rash.   Neurological:  Negative for seizures and loss of consciousness.   Endo/Heme/Allergies:  Does not bruise/bleed easily.   Psychiatric/Behavioral:  Negative for hallucinations.        Physical Exam:  Vitals:    06/13/25 0837   BP: (!) 158/85   Pulse: 91   Weight: 120.4 kg (265 lb 6.9 oz)   Height: 5' 8" (1.727 m)   PainSc:   3   PainLoc: Back         General    Nursing note and vitals reviewed.  Constitutional: He is oriented to person, place, and time. He appears well-developed and well-nourished. No distress.   HENT:   Head: Normocephalic and atraumatic.   Nose: Nose normal.   Eyes: Conjunctivae and EOM are normal. Pupils are equal, round, and reactive to light. Right eye exhibits no discharge. Left eye exhibits no discharge. No scleral icterus.   Neck: No JVD present.   Cardiovascular:  Intact distal pulses.            Pulmonary/Chest: " Effort normal. No respiratory distress.   Abdominal: He exhibits no distension.   Neurological: He is alert and oriented to person, place, and time. Coordination normal.   Psychiatric: He has a normal mood and affect. His behavior is normal. Judgment and thought content normal.     General Musculoskeletal Exam   Gait: normal       Right Knee Exam     Inspection   Erythema: absent  Scars: absent  Swelling: present  Deformity: absent  Bruising: absent    Tenderness   The patient is tender to palpation of the medial joint line and MCL.    Range of Motion   The patient has normal right knee ROM.    Tests   Ligament Examination   Lachman: normal (-1 to 2mm)   PCL-Posterior Drawer: normal (0 to 2mm)     Patella   Passive Patellar Tilt: neutral  Patellar Tracking: normalBack (L-Spine & T-Spine) / Neck (C-Spine) Exam     Tenderness Posterior midline palpation reveals tenderness of the Sacrum, Lower L-Spine and Upper L-Spine. Right paramedian tenderness of the Lower L-Spine and Sacrum. Left paramedian tenderness of the Lower L-Spine and Sacrum.     Back (L-Spine & T-Spine) Range of Motion   Extension:  abnormal   Flexion:  abnormal   Lateral bend right:  abnormal Back lateral bend right: leaning to the right exacerbates pain.  Lateral bend left:  abnormal   Rotation right:  abnormal   Rotation left:  abnormal     Comments:  Straight leg raise positive right  Positive pain in the lumbar spine with extension  Positive pain in the lumbar spine with lateral movement bilaterally      Muscle Strength   Right Lower Extremity   Hip Abduction: 5/5   Quadriceps:  5/5   Hamstrin/5   Left Lower Extremity   Hip Abduction: 5/5   Quadriceps:  5/5   Hamstrin/5

## 2025-06-13 NOTE — PROGRESS NOTES
[facility] Established Clinic Visit    Referred by: Dr. Inder Quarles   Reason for referral: * No diagnoses found *     CC:   Chief Complaint   Patient presents with    Low-back Pain    Leg Pain    Foot Pain     Interval update 06/13/25:  Patient presents today for lower back pain that radiates down to right leg and right foot.    Interval update 05/09/25:  Patient presents today for MRI results, complaining of bilateral lower back, hips and buttocks that radiates down to both legs and heels. Patient complains of continued paraesthesia like symptoms.   Patient continues to complain of the symptoms he denies any recent incident or trauma denies any profound weakness he is currently still participating in physical therapy for his right knee they also providing  him physical therapy for his low back and bilateral leg pain.     Interval history 4/30/2025:  70 y/o Patient presents with low back pain, right buttock pain, and right leg pain that started around March 30th. He experiences burning, numbness, and tenderness in the affected areas. Pain radiates down to the heel of both feet and worsens with prolonged standing or walking, necessitating lying down for relief. He reports a history of buttock pain, but the leg pain is a new symptom present for about a month.    Pain management includes rest and PT, which has been beneficial. He takes meloxicam regularly, attempting to take it every other day, and uses ibuprofen only for severe pain. Meloxicam helps alleviate some pain and swelling.    He has a history of T spine injections, which have been effective in managing his mid-back pain. He does not have any significant problems with his T spine since the injection, as well as any chest pain, shortness of breath, or upper extremity symptoms.      Interval Update 03/21/2026: Patient return to clinic for follow-up on right knee pain. He has previously ordered x-rays of his hips and knees which  we will review. Additionally patient reporting numbness in hands and fingers that has been going on intermittently for several years, worse in the am once he moves hands and fingers numbness dissipates. He reports not feeling numbness during the day.  Patient denies any recent incident or trauma denies any bowel or bladder function denies saddle anesthesia he continues to participate in physical therapy which is helping his knees and hip pain.    Interval Update 02/06/2025:   Patient return to clinic for right knee pain.  Pain worse in the medial aspect of the right knee.  Incident occurred 3 days ago, patient was going down the stairs and his right knee gave out, he fell to the ground onto the right knee.  He states he fell onto grass.  Denies hitting his head.  Denies loss of consciousness.  That day the pain was significant, but has since improved. Patient made the appointment and wanted to come in just to make sure everything is alright. Patient has never had this happen before, he has no h/o surgeries or injections to the R knee. Patient continues to take meloxicam which is helping his joint pain. He does note pain in the mornings in his bilateral knee joints.     When the pain was at its worse, he describes it as sharp anterior knee pain that became aching. He also endorses a strain sensation to his medial knee.     Patient also complains of intermittent hip pain, worse in the left.  He also complains of axial neck pain.  Denies any radicular symptoms.    Interval History 10/10/23:  Celso Hernandez returns today for follow up for right gluteal/buttocks pain that has been ongoing for the last 3-4 months.  He is intermittent pain described as soreness pain is worse when sitting pain is mitigated when shifting weight off of his right gluteal area.  Denies any radiating symptoms down either leg the worst of his pain is 3/4/10.  He denies any recent incident or trauma denies any bowel bladder dysfunction,.  Patient  "states that he does a lot of sitting as he is a  he also watches television during the day for 2-3 hours every day.  He does take occasionally meloxicam 7.5 which she states does help.      Current Pain Scales:  Current: 0/10              Typical Range: 0-4/10     Interval History(08/09/2023):  Celso Hernandez returns today for follow up for neck and upper back pain.  He reports completing physical therapy and has now establish a home exercise plan at home.  He states that his neck pain is stable at the moment and is doing fine.    Currently, the neck pain is stable.        Current Pain Scales:  Current: 0/10              Typical Range: 2-4/10     Interval Updates  05/08/[id number]-xcwc-old male presents today with neck and upper back pain.  Onset 5-6 days pain is specifically located at the base of his neck and radiates into his head.  Denies any headaches.  Pain is constant, pain is described as a strain and pulling he denies any radicular symptoms in either arm , denies any profound weakness, denies any paresthesia like symptoms.  Pain is aggravated with certain movements and laying down.  Pain is mitigated with ibuprofen 800 mg t.i.d..  He denies any recent incident or trauma he does state that he felt like he slept wrong and woke up the next morning feeling pain in the neck and into his head.    Interval Updates:   2/10/2022 - Mr. Hernandez is following up for  Low-back Pain and Mid-back Pain is currently rate 0/10 with a weekly range 2-5/10.  It is described as Aching, Grabbing and Tight.  He  Is reporting mild to no symptoms today, he states he has no pain when performing normal ADLs, he reports pain is exacerbated when he  "tries to do to much" He did also report midback /thoroacic pain with mild twisting but pain is stable at this point.     1/19/2022  -  Mary returns to clinic s/p Thoracic Epidural Steroid Injection at T5-6 on 1/4/22 with 60% relief.  He reports a pain intensity 1/10 today " "with a weekly range of 1-2/10.   He is reporting mild soarness in his thoracic area, he is reporting being " free of pain after the injection" his pain returned after working outside in his garage yesterday. Overall much better following his injection. He is reporting certain movements increase his pain but other than that he  is better.     Subjective:   Celso Hernandez is a 69 y.o. male who has a past medical history of Arthritis, BPH (benign prostatic hyperplasia), Cataract, Colon polyps, Diabetes mellitus type II, Glaucoma, Hyperlipidemia, Hypertension, Multiple duodenal ulcers, and Unspecified hemorrhoids without mention of complication. He complains of pain as described below.    Location: low back  Right gluteal pain  Onset:  3-4 months  Radiation:  Right Leg   Timing: constant  Current Pain Score: 7/10  Weekly Pain Range: 2-10/10  Quality:  Pulling and strain type pain  Worsened by: exercise, extension, lifting, lying down, sitting and walking for more than several minutes  Improved by: medications ibuprofen 800 mg    Previous Therapies:  PT/OT: Denies for neck pain  HEP: Yes, stretching at home and walking.  TENS: No  Interventions:  01/04/2022 Thoracic Epidural Steroid Injection at T5-6  Surgery: Denies localized surgery  Opioids:  Adjuvants:     Current Pain Medications:  Ibuprofen 800 mg t.i.d.     Assessment & Plan:  Problem List Items Addressed This Visit    None          12/17/21 - Celso Hernandez is a 69 y.o. male who  has a past medical history of Arthritis, BPH (benign prostatic hyperplasia), Cataract, Colon polyps, Diabetes mellitus type II, Glaucoma, Hyperlipidemia, Hypertension, Multiple duodenal ulcers (10/08/2019), and Unspecified hemorrhoids without mention of complication.  By history and examination this patient has chronic mid/upper back pain with right T5-6 radiculopathy.  The underlying cause cause is facet arthritis and degenerative disc disease with a large, obstructive osteophyte on " the right side as seen on CT chest.  MRI is appropriate prior to an injections to assess soft tissue and vasculature.  We discussed the underlying diagnoses and multiple treatment options including non-opioid medications, opioid medications, interventional procedures, physical therapy and home exercise.  The risks and benefits of each treatment option were discussed and all questions were answered.        1/19/2022- Celso Hernandez is a 69 y.o. male who  has a past medical history of Arthritis, BPH (benign prostatic hyperplasia), Cataract, Colon polyps, Diabetes mellitus type II, Glaucoma, Hyperlipidemia, Hypertension, Multiple duodenal ulcers (10/08/2019), and Unspecified hemorrhoids without mention of complication.  By history and examination this patient has chronic mid/upper back pain with a right T5-6   Radiculopathy that responded well following a thoracic GABRIELA targeting T5-6.  Colonic causes appear to be facet arthritis and degenerative disc disease with a large obstructive osteophyte on the right side.  Upon review of his thoracic MRI ordered per Dr. Mejia results did show a extradural degenerative changes resulting in right T6 neural foraminal stenosis.  Secondly intradural extramedullary T2 signal abnormality at the level of the T4-5.  The radiologist include a differential diagnosis that included a nerve sheet to were such as a  schwannoma or a neurofibroma as well as other neoplasms.  Radiologist recommended further evaluation of this patient's thoracic area, discussed with the patient today that I will order a thoracic MRI with contrast to further evaluate. Long discussion with patient regarding  Results of his MRI, I will also refer to Oncology for further evaluation but I would like this patient to see a college E following a hip obtaining new images.    [birth year]-43-gwff-old male with history of chronic mid to low back pain, he presents today to review an updated thoracic MRI.  There was  concerned per radiology on previous thoracic MRI with differential diagnosis that includes a nerve sheet to wear such as a schwannoma or a neurofibroma as well as other neoplasms.  I contacted Oncology and did not appear to be a malignancy.  Was a non malignant entity.  He contacted patient and made him aware of this.  Today his pain is 0/10, he does experience mild midthoracic low back pain when performing duties outside of his normal ADLs.  I recommended physical therapy patient is amenable to this plan.    05/08/[id number]-tnji-old male presents today acute/subacute neck pain beginning at the patient's his neck radiating his head.  Etiology is unclear at this point.  Based on history the patient may be suffering facet arthritis of the cervical, differential diagnosis be myofascial pain.  Cause of his symptoms are unclear he did state that he slept wrong on a pillow and woke up the next morning with this pain.  He denies any radiating symptoms do not suspect any degenerative disc disease or central and/or neural foraminal stenosis.  Ibuprofen has helped his pain prior to come into clinic today but it only lasts 4-5 hours.  He and I discussed plan below and was agreed upon during our clinic visit today.    08/09/2023 68-year-old pleasant male neck pain upper back pain.  He reports having attended physical therapy and completing.  Based on my history and exam his pain generators were from facet arthritis and he also had a cervical myofascial component to his pain which both have improved through physical therapy.  I recommended he establish a home exercise plan patient verbalized understanding and agreed.    10/10/2023- 68-year-old male that is known to our clinic presents with right gluteal/buttocks pain that has been ongoing for the last 3-4 months.  Based on my history and exam the patient is experiencing gluteal pain my differential diagnosis will include sacroiliitis right-sided on.  He has no radicular symptoms  he denies any paresthesia like symptoms.  I do believe the patient does a lot of sitting during the day I think would benefit him to establish home exercise plan that focuses on gluteal stretches which I will provide him with today.    2/6/2024 - Patient presents to clinic for R knee pain after a mechanical fall. Patient initially experienced sharp anterior knee pain that has resolved over the past few days. On exam, patient has mild tenderness to palpation and negative anterior drawer. Discussed proceeding with conservative management which the patient is on board with. RICE for knee pain.  He also complains chronic hip pain that radiates to the groin worse in the left.  He also complains of axial cervical pain consistent with facet arthropathy.  We discussed interventional procedures for these chronic pains including intra-articular hip injections as well as cervical medial branch blocks/RFA.  Patient would prefer to treat conservatively with physical therapy for now.    03/21/[id number]-oshs-old pleasant male with a history of chronic bilateral hip pain and right knee pain.  Currently participating in physical therapy based on history and exam patient is experiencing right knee pain related to osteoarthritis and hip pain likely related to osteoarthritis.  He is participating in physical therapy which is helping his pain did also present with numbness and tingling intermittently in his hands feet which is likely related to diabetic peripheral neuropathy due to his history of diabetes.  The numbness and tingling in his pain 8 are not significant he reports them worse in the morning and then dissipates throughout the day.  We discussed considering gabapentin however he refused this today.  Discuss specific exercises to help with diabetic peripheral neuropathy.  Recommended he continue and complete physical therapy see plan agreed upon below.    04/30/2025-Celso Hernandez is a 69 y.o. male who  has a past medical  history of Arthritis, BPH (benign prostatic hyperplasia), Cataract, Colon polyps, Diabetes mellitus type II, Glaucoma, Hyperlipidemia, Hypertension, Multiple duodenal ulcers (10/08/2019), and Unspecified hemorrhoids without mention of complication.  By history and examination this patient has chronic low back pain with RIGHT  radiculopathy.  The underlying cause cause is facet arthritis, degenerative disc disease, foraminal stenosis, central canal stenosis, muscle dysfunction, muscles strain, and deconditioning.  Pathology is confirmed by imaging.  We discussed the underlying diagnoses and multiple treatment options including non-opioid medications, interventional procedures, physical therapy, home exercise, core muscle enhancement, activity modification, and weight loss.  The risks and benefits of each treatment option were discussed and all questions were answered.      5/9/2025-Celso Hernandez is a 69 y.o. male who  has a past medical history of Arthritis, BPH (benign prostatic hyperplasia), Cataract, Colon polyps, Diabetes mellitus type II, Glaucoma, Hyperlipidemia, Hypertension, Multiple duodenal ulcers (10/08/2019), and Unspecified hemorrhoids without mention of complication.  By history and examination this patient has chronic low back pain with Bilateral  radiculopathy.  The underlying cause cause is facet arthritis, degenerative disc disease, foraminal stenosis, central canal stenosis, and deconditioning.  Pathology is confirmed by imaging.  We discussed the underlying diagnoses and multiple treatment options including non-opioid medications, interventional procedures, physical therapy, home exercise, core muscle enhancement, activity modification, and weight loss.  The risks and benefits of each treatment option were discussed and all questions were answered.        Treatment Plan:   Procedures:  None at this time.  In the future consider bilateral lumbar TFESI targeting L5-S1  PT/OT/HEP:  Continue  participating in physical therapy for low back.  I have stressed the importance of physical activity and a home exercise plan to help with pain and improve health.  Referral placed to physical therapy.  Medications:  Started gabapentin 100 mg q.h.s. to increase to t.i.d. dosing if tolerable   - no new medications prescribed at this visit.  Educated patient on chronic use of Mobic.   -  Reviewed and consistent with medication use as prescribed.  Imaging:  Previous imaging reviewed and discussed in detail with the patient using images from chart and spine model   Follow Up:  4-5 weeks dose gabapentin    ÁNGEL Cabrales  [facility] Pain Management    LUMBAR MRI Imaging 2025:  EXAMINATION:  MRI LUMBAR SPINE WITHOUT CONTRAST     CLINICAL HISTORY:  Lumbar radiculopathy, symptoms persist with conservative treatment; Radiculopathy, lumbar region     TECHNIQUE:  Multiplanar, multisequence MR images were acquired from the thoracolumbar junction to the sacrum without the administration of contrast.     COMPARISON:  Lumbar spine radiograph dated 11/22/2019     FINDINGS:  Lumbar spine vertebral body heights appear maintained.  Heterogeneous T1 marrow signal without discrete infiltrative process.  Spinal cord terminus lies at L1-L2.  Remaining visualized prevertebral and paraspinal soft tissues demonstrate no acute abnormality.     T12-L1: No significant spinal canal stenosis or neural foraminal impingement.     L1-L2: Anterior marginal spurring and facet hypertrophy without significant posterior disc herniation, spinal canal stenosis, or neural foraminal impingement.     L2-L3: Anterior marginal spurring and facet hypertrophy without significant posterior disc herniation, spinal canal stenosis, or neural foraminal impingement.     L3-L4: Modest-mild circumferential bulge with facet hypertrophy and flavum thickening.  No significant spinal canal stenosis.  Mild right-sided neural foraminal narrowing.     L4-L5:  Modest-mild circumferential bulge more asymmetric to the left with bilateral facet arthropathy and flavum thickening.  Left lateral recess narrowing and mild left-sided neural foraminal narrowing.     L5-S1: Circumferential bulge with posterior annular fissure and facet arthropathy.  Bilateral neural foraminal narrowing with suspected focal contact of the exiting L5 nerve roots.  There is also abutment anteriorly of the bilateral transiting S1 nerve roots.     Impression:     Lumbar spondylosis with detailed findings as above.       [facility]agin22 MRI Thoracic Spine W WO Contrast     Narrative & Impression  EXAMINATION:  MRI THORACIC SPINE W WO CONTRAST     CLINICAL HISTORY:  Attention to T4-5 level.  Evaluate for underlying mass.     TECHNIQUE:  Pre and postcontrast MR imaging of the thoracic spine was performed utilizing 10 mL Gadavist intravenous contrast.     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     No advanced marrow edema or osseous destructive process is identified.  Multilevel mild disc bulging flattens the thecal sac without cord impingement.  Marked degenerative foraminal stenosis on the left at T 4-5 and to the right at T5-6 is again noted.     The spinal cord maintains normal signal intensity.     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     No enhancing lesion.  Degenerative foraminal stenosis on the left at T4-5 and on the right at T5-6.        Electronically signed by: Trent Graves  Date:                                            2022  Time:                                           17:11        CT Chest Without Contrast  Impression:  No worrisome finding on the chest CT.  Mild atherosclerotic plaque  "of the aorta and mild coronary artery calcification  Severe foraminal narrowing on the right at T5-6 due to significant facet joint osseous hypertrophy.  Electronically signed by: Halley Lundberg MD  Date:                                            09/29/2021  Time:                                           08:56    X-Ray Lumbar Spine Ap And Lateral 11/21/2019  FINDINGS:  Two views AP lateral.  There is mild DJD and DISH.  Alignment is normal.  No fracture dislocation bone destruction.  No trauma seen.  Impression:  No acute process seen.  DJD and DISH.      Review of Systems:  Review of Systems   Constitutional:  Negative for chills and fever.   HENT:  Negative for nosebleeds.    Eyes:  Negative for blurred vision and pain.   Respiratory:  Negative for hemoptysis.    Cardiovascular:  Negative for chest pain and palpitations.   Gastrointestinal:  Negative for heartburn, nausea and vomiting.   Genitourinary:  Negative for dysuria and hematuria.   Musculoskeletal:  Positive for joint pain. Negative for myalgias.   Skin:  Negative for rash.   Neurological:  Negative for seizures and loss of consciousness.   Endo/Heme/Allergies:  Does not bruise/bleed easily.   Psychiatric/Behavioral:  Negative for hallucinations.        Physical Exam:  Vitals:    06/13/25 0837   BP: (!) 158/85   Pulse: 91   Weight: 120.4 kg (265 lb 6.9 oz)   Height: 5' 8" (1.727 m)   PainSc:   3   PainLoc: Back         General    Nursing note and vitals reviewed.  Constitutional: He is oriented to person, place, and time. He appears well-developed and well-nourished. No distress.   HENT:   Head: Normocephalic and atraumatic.   Nose: Nose normal.   Eyes: Conjunctivae and EOM are normal. Pupils are equal, round, and reactive to light. Right eye exhibits no discharge. Left eye exhibits no discharge. No scleral icterus.   Neck: No JVD present.   Cardiovascular:  Intact distal pulses.            Pulmonary/Chest: Effort normal. No respiratory distress. "   Abdominal: He exhibits no distension.   Neurological: He is alert and oriented to person, place, and time. Coordination normal.   Psychiatric: He has a normal mood and affect. His behavior is normal. Judgment and thought content normal.     General Musculoskeletal Exam   Gait: normal       Right Knee Exam     Inspection   Erythema: absent  Scars: absent  Swelling: present  Deformity: absent  Bruising: absent    Tenderness   The patient is tender to palpation of the medial joint line and MCL.    Range of Motion   The patient has normal right knee ROM.    Tests   Ligament Examination   Lachman: normal (-1 to 2mm)   PCL-Posterior Drawer: normal (0 to 2mm)     Patella   Passive Patellar Tilt: neutral  Patellar Tracking: normalBack (L-Spine & T-Spine) / Neck (C-Spine) Exam     Tenderness Posterior midline palpation reveals tenderness of the Sacrum, Lower L-Spine and Upper L-Spine. Right paramedian tenderness of the Lower L-Spine and Sacrum. Left paramedian tenderness of the Lower L-Spine and Sacrum.     Back (L-Spine & T-Spine) Range of Motion   Extension:  abnormal   Flexion:  abnormal   Lateral bend right:  abnormal Back lateral bend right: leaning to the right exacerbates pain.  Lateral bend left:  abnormal   Rotation right:  abnormal   Rotation left:  abnormal     Comments:  Straight leg raise positive right  Positive pain in the lumbar spine with extension  Positive pain in the lumbar spine with lateral movement bilaterally      Muscle Strength   Right Lower Extremity   Hip Abduction: 5/5   Quadriceps:  5/5   Hamstrin/5   Left Lower Extremity   Hip Abduction: 5/5   Quadriceps:  5/5   Hamstrin/5

## 2025-06-17 ENCOUNTER — CLINICAL SUPPORT (OUTPATIENT)
Dept: REHABILITATION | Facility: HOSPITAL | Age: 70
End: 2025-06-17
Payer: MEDICARE

## 2025-06-17 DIAGNOSIS — M47.816 LUMBAR FACET ARTHROPATHY: ICD-10-CM

## 2025-06-17 DIAGNOSIS — M54.16 LUMBAR RADICULOPATHY, CHRONIC: ICD-10-CM

## 2025-06-17 DIAGNOSIS — M51.362 DEGENERATION OF INTERVERTEBRAL DISC OF LUMBAR REGION WITH DISCOGENIC BACK PAIN AND LOWER EXTREMITY PAIN: ICD-10-CM

## 2025-06-17 DIAGNOSIS — R29.898 DECREASED STRENGTH OF LOWER EXTREMITY: Primary | ICD-10-CM

## 2025-06-17 PROCEDURE — 97140 MANUAL THERAPY 1/> REGIONS: CPT | Mod: PN

## 2025-06-17 PROCEDURE — 97112 NEUROMUSCULAR REEDUCATION: CPT | Mod: PN

## 2025-06-17 NOTE — PROGRESS NOTES
Outpatient Rehab    Physical Therapy Progress Note : Updated Plan of Care    Patient Name: Celso Hernandez  MRN: [id number]  YOB: 1955  Encounter Date: 6/17/2025    Therapy Diagnosis:   Encounter Diagnoses   Name Primary?    Decreased strength of lower extremity Yes    Lumbar radiculopathy, chronic     Degeneration of intervertebral disc of lumbar region with discogenic back pain and lower extremity pain     Lumbar facet arthropathy      Physician: Casie Miguel DO    Physician Orders: Eval and Treat  Medical Diagnosis: Primary osteoarthritis of right knee  Cervicalgia  Lumbar radiculopathy, chronic  Degeneration of intervertebral disc of lumbar region with discogenic back pain and lower extremity pain  Lumbar facet arthropathy  Surgical Diagnosis: Not applicable for this Episode   Surgical Date: Not applicable for this Episode  Days Since Last Surgery: Not applicable for this Episode    Visit # / Visits Authorized:  30 / 30  Insurance Authorization Period: 2/14/2025 to 6/20/2025  Date of Evaluation: 2/12/2025   Plan of Care Certification: 6/17/2025 to 8/1/2025      PT/PTA: PT   Number of PTA visits since last PT visit:2  Time In: 1100   Time Out: 1156  Total Time (in minutes): 56   Total Billable Time (in minutes): 56    FOTO:  Intake Score: 40%  Survey Score 2: 42%  Survey Score 3: 56%    Precautions:       Subjective   recently saw pain management and got new referral for back. neck pain 1/10, knee feels okay, L hip 1/10, R buttock 4/10. increased tingling in R LE into toes and heels. also tingling in L leg. pt also reports sharp pain in B hands and some numbness in fingertips with hx of neuropathy..  Pain reported as 4/10. neck pain 1/10, knee feels okay, L hip 1/10, R buttock 4/10.    Objective      Spinal Mobility  Hypomobile: Cervical, Thoracic, and Lumbosacral  Cervical Mobility Details: Significant hypomobility throughout C-spine, greatest left side with pain  Lumbosacral Mobility  Details: Pain L4-5, mild pain with sacral p/a, no radicular pain     Spinal Muscle Palpation     Mild tenderness to paraspinals       Mild left side paraspinals         Subcranial Range of Motion   Active Restricted? Passive Restricted? Pain   Flexion Yes Yes     Protraction Yes Yes     Retraction           Cervical Range of Motion   Active (deg) Passive (deg) Pain   Flexion 43       Extension 15   Yes   Right Lateral Flexion         Right Rotation 50       Left Lateral Flexion         Left Rotation 30   Yes          Lumbar Range of Motion   Active (deg) Passive (deg) Pain   Flexion 35   Yes   Extension 13   Yes   Right Lateral Flexion 15   Yes (radiating R LE)   Right Rotation  (50)   Yes (back pain)   Left Lateral Flexion 15       Left Rotation  (50%)                Shoulder Range of Motion  Right Shoulder   Active (deg) Passive (deg) Pain   Flexion 150       Extension         Scaption         ABduction 120       ADduction         Horizontal ABduction         Horizontal ADduction         External Rotation (Shoulder ABducted 0 degrees)         External Rotation (Shoulder ABducted 45 degrees)         External Rotation (Shoulder ABducted 90 degrees)         Internal Rotation (Shoulder ABducted 0 degrees)         Internal Rotation (Shoulder ABducted 45 degrees)         Internal Rotation (Shoulder ABducted 90 degrees)           Left Shoulder   Active (deg) Passive (deg) Pain   Flexion 145       Extension         Scaption         ABduction 120       ADduction         Horizontal ABduction         Horizontal ADduction         External Rotation (Shoulder ABducted 0 degrees)         External Rotation (Shoulder ABducted 45 degrees)         External Rotation (Shoulder ABducted 90 degrees)         Internal Rotation (Shoulder ABducted 0 degrees)         Internal Rotation (Shoulder ABducted 45 degrees)         Internal Rotation (Shoulder ABducted 90 degrees)                  Hip Range of Motion   Right Hip   Active (deg) Passive  (deg) Pain   Flexion 75 90     Extension         ABduction         ADduction         External Rotation 90/90 45       External Rotation Prone         Internal Rotation 90/90 23       Internal Rotation Prone             Left Hip   Active (deg) Passive (deg) Pain   Flexion         Extension         ABduction         ADduction         External Rotation 90/90 40   Yes   External Rotation Prone         Internal Rotation 90/90 10   Yes   Internal Rotation Prone                            Hip Strength - Planes of Motion   Right Strength Right Pain Left Strength Left  Pain   Flexion (L2) 4+ Yes 4+ Yes   Extension 4   4     ABduction 4+ Yes 4+ Yes   ADduction 5   5     Internal Rotation 5   5     External Rotation 4+ Yes 4+ Yes       Knee Strength   Right Strength Right Pain Left Strength Left  Pain   Flexion (S2)  (12.9kg) Yes (post thigh)  (15.8) Yes (knee)   Prone Flexion           Extension (L3)  (20kg) Yes  (20.3kg) Yes              Lumbar/Pelvic Girdle Special Tests       Lumbar Tests - SLR and Tension  Positive: Right Passive Straight Leg Raise                 Pelvic Girdle / Sacrum Tests  Positive: Right REY, Left REY, and Left FADIR  Negative: Right Sacral Spring and Left Sacral Spring         Hip Special Tests  Intra-Articular/Impingement Tests  Positive: Right REY, Left REY, and Left FADIR              Treatment:       Time Entry(in minutes):  Manual Therapy Time Entry: 10  Neuromuscular Re-Education Time Entry: 15  Therapeutic Exercise Time Entry: 5    Assessment & Plan   Assessment  Celos    Presentation of Symptoms: Evolving  Will Comorbidities Impact Care: Yes  See past medical history     Functional Limitations: Activity tolerance, Decreased ambulation distance/endurance, Completing self-care activities, Functional mobility, Pain with ADLs/IADLs, Painful locomotion/ambulation, Participating in leisure activities, Performing household chores, Range of motion, Sitting tolerance, Squatting, Standing  tolerance  Impairments: Impaired physical strength, Lack of appropriate home exercise program, Pain with functional activity, Abnormal or restricted range of motion, Activity intolerance, Abnormal gait  Personal Factors Affecting Prognosis: Pain    Patient Goal for Therapy (PT): decrease pain  Prognosis: Good  Assessment Details: Patient is a 69 y.o. M referred by pain management with a medical diagnosis of M17.11 (ICD-10-CM) - Primary osteoarthritis of right knee and Cervicalgia. Patient with additional complaints of hip and low back pain, recently diagnosed with bilateral lumbar radiculopathy. Knee signs and symptoms consistent with medical diagnosis. Neck pain chronic with mobility deficits.  Additional left hip osteoarthritis. New referral added for low back pain added and evaluated this visit. He has made moderate progress in PT relating to original referred conditions. Neck pain is well controlled, but he continues to have significant cervical mobility impairments which has reached a therapeutic plateau. Knee pain well controlled with improvements in lower extremity strength. Some back pain with knee testing. Ongoing bilateral hip mobility and range of motion deficits left > right. He demonstrates improving functional mobility overall but recently most limited by lumbar impairments. Is is making moderate progress towards goals as noted. FOTO goal met and closed. New evaluation of low back reveals chronic low back pain with right side radiating pain with decreased lumbar mobility with pain at L4-5, impaired lumbar range of motion with radicular pain, and impaired hip strength. Reduction in pain with distraction Ongoing impairments impact ability to stand, walk, bend, dress, and perform daily activities without pain. He will continue to benefit from skilled PT to address these impairments with focus on hips and back primarily.     Plan  From a physical therapy perspective, the patient would benefit from: Skilled  Rehab Services    Planned therapy interventions include: Therapeutic exercise, Therapeutic activities, Neuromuscular re-education, Manual therapy, and Gait training.    Planned modalities to include: Cryotherapy (cold pack), Electrical stimulation - attended, Electrical stimulation - passive/unattended, and Thermotherapy (hot pack).        Visit Frequency: 2 times Per Week for 6 Weeks.       This plan was discussed with Patient and Therapy assistant.   Discussion participants: Agreed Upon Plan of Care  Plan details: Dc neck and knees, continue PT for back and hips           The patient will continue to benefit from skilled outpatient physical therapy in order to address the deficits listed in the problem list on the initial evaluation, provide patient and family education, and maximize the patients level of independence in the home and community environments.     The patient's spiritual, cultural, and educational needs were considered, and the patient is agreeable to the plan of care and goals.           Goals:   Active       Ambulation/movement       Patient will walk 6 min without rest break       Start:  06/17/25    Expected End:  08/01/25               Changing body position       Patient will demonstrate moving sit to stand 10x in 30 sec       Start:  06/17/25    Expected End:  08/01/25               Functional outcome       Patient will demonstrate independence in home program for support of progression       Start:  06/17/25    Expected End:  07/18/25               Pain       Patient will report pain of 3/10 demonstrating a reduction of overall pain       Start:  06/17/25    Expected End:  08/01/25               Range of Motion       Patient will achieve right knee ROM of  3-120 degrees  (Progressing)       Start:  02/12/25    Expected End:  05/23/25               Range of Motion       Patient will achieve spinal flexion to 45 deg       Start:  06/17/25    Expected End:  08/01/25            Patient will  achieve bilateral spinal rotation ROM to 75%       Start:  06/17/25    Expected End:  08/01/25               Strength       Patient will achieve bilateral hip abduction strength of 4+/5 (Progressing)       Start:  02/12/25    Expected End:  05/23/25            Patient will improve bilateral knee flexion strength by 5kg  (Met)       Start:  02/12/25    Expected End:  04/11/25    Resolved:  04/23/25         Patient will improve right knee extension strength by 5kg (Met)       Start:  02/12/25    Expected End:  04/11/25    Resolved:  04/23/25            cervical goals       patient will improve cervical rotation 10 degrees (Unable to Meet)       Start:  02/18/25    Expected End:  05/23/25            patient will improve cervical extension to 30 deg (Unable to Meet)       Start:  02/18/25    Expected End:  05/23/25              Resolved       Changing body position       Patient will demonstrate moving sit to stand 8+x in 30 sec (Met)       Start:  02/12/25    Expected End:  05/23/25    Resolved:  05/23/25            Functional outcome       Patient will show a significant change in FOTO patient-reported outcome tool to demonstrate subjective improvement (Met)       Start:  02/12/25    Expected End:  04/11/25    Resolved:  05/23/25         Patient will demonstrate independence in home program for support of progression (Met)       Start:  02/12/25    Expected End:  03/14/25    Resolved:  04/23/25            Pain       Patient will report pain of 4/10 max demonstrating a reduction of overall pain (Met)       Start:  02/12/25    Expected End:  03/14/25    Resolved:  03/28/25             Radha Lares, PT, DPT

## 2025-06-20 ENCOUNTER — PATIENT MESSAGE (OUTPATIENT)
Dept: SLEEP MEDICINE | Facility: CLINIC | Age: 70
End: 2025-06-20

## 2025-06-20 ENCOUNTER — TELEPHONE (OUTPATIENT)
Dept: SLEEP MEDICINE | Facility: CLINIC | Age: 70
End: 2025-06-20

## 2025-06-20 ENCOUNTER — RESULTS FOLLOW-UP (OUTPATIENT)
Dept: SLEEP MEDICINE | Facility: CLINIC | Age: 70
End: 2025-06-20

## 2025-06-20 DIAGNOSIS — M51.362 DEGENERATION OF INTERVERTEBRAL DISC OF LUMBAR REGION WITH DISCOGENIC BACK PAIN AND LOWER EXTREMITY PAIN: ICD-10-CM

## 2025-06-20 DIAGNOSIS — M47.816 LUMBAR FACET ARTHROPATHY: ICD-10-CM

## 2025-06-20 DIAGNOSIS — M54.16 LUMBAR RADICULOPATHY, CHRONIC: Primary | ICD-10-CM

## 2025-06-20 PROBLEM — G47.33 OSA (OBSTRUCTIVE SLEEP APNEA): Status: ACTIVE | Noted: 2025-06-11

## 2025-06-20 NOTE — TELEPHONE ENCOUNTER
Basilio Cruz,     I'm letting you know that Celso Hernandez's requal PSG has resulted  [facility] has previously sent you the CPAP order.  Can you schedule Celso Hernandez for CPAP ?      Thank you,    L

## 2025-06-23 ENCOUNTER — CLINICAL SUPPORT (OUTPATIENT)
Dept: REHABILITATION | Facility: HOSPITAL | Age: 70
End: 2025-06-23
Payer: MEDICARE

## 2025-06-23 ENCOUNTER — PATIENT OUTREACH (OUTPATIENT)
Dept: ADMINISTRATIVE | Facility: HOSPITAL | Age: 70
End: 2025-06-23
Payer: MEDICARE

## 2025-06-23 VITALS — DIASTOLIC BLOOD PRESSURE: 70 MMHG | SYSTOLIC BLOOD PRESSURE: 126 MMHG

## 2025-06-23 DIAGNOSIS — M47.816 LUMBAR FACET ARTHROPATHY: ICD-10-CM

## 2025-06-23 DIAGNOSIS — M54.16 LUMBAR RADICULOPATHY, CHRONIC: ICD-10-CM

## 2025-06-23 DIAGNOSIS — M51.362 DEGENERATION OF INTERVERTEBRAL DISC OF LUMBAR REGION WITH DISCOGENIC BACK PAIN AND LOWER EXTREMITY PAIN: ICD-10-CM

## 2025-06-23 DIAGNOSIS — R29.898 DECREASED STRENGTH OF LOWER EXTREMITY: Primary | ICD-10-CM

## 2025-06-23 PROCEDURE — 97112 NEUROMUSCULAR REEDUCATION: CPT | Mod: PN,CQ

## 2025-06-23 PROCEDURE — 97110 THERAPEUTIC EXERCISES: CPT | Mod: PN,CQ

## 2025-06-23 NOTE — PROGRESS NOTES
Outpatient Rehab    Physical Therapy Visit    Patient Name: Celso Hernandez  MRN: [id number]  YOB: 1955  Encounter Date: 6/23/2025    Therapy Diagnosis:   Encounter Diagnoses   Name Primary?    Lumbar radiculopathy, chronic     Degeneration of intervertebral disc of lumbar region with discogenic back pain and lower extremity pain     Lumbar facet arthropathy     Decreased strength of lower extremity Yes     Physician: Coleman Quintana FNP    Physician Orders: Eval and Treat  Medical Diagnosis: Primary osteoarthritis of right knee  Cervicalgia  Lumbar radiculopathy, chronic  Degeneration of intervertebral disc of lumbar region with discogenic back pain and lower extremity pain  Lumbar facet arthropathy  Surgical Diagnosis: Not applicable for this Episode   Surgical Date: Not applicable for this Episode  Days Since Last Surgery: Not applicable for this Episode    Visit # / Visits Authorized:  31 / 42  Insurance Authorization Period: 2/14/2025 to 8/1/2025  Date of Evaluation: 2/12/2025  Plan of Care Certification: 6/17/2025 to 8/1/2025      PT/PTA: PTA   Number of PTA visits since last PT visit:1  Time In: 0900   Time Out: 0955  Total Time (in minutes): 55   Total Billable Time (in minutes): 30    FOTO:  Intake Score:  %  Survey Score 2:  %  Survey Score 3:  %    Precautions:       Subjective   No new complaints. Symptoms about the same..  Pain reported as 4/10. L hip 1/10, R buttock 4/10.    Objective            Treatment:  Therapeutic Exercise  TE 2: LTR 20x  TE 3: seated lumbar flexion 10x3 peanut  TE 4: DKTC peanut 2 x 10  Manual Therapy  MT 2: LAD R  Balance/Neuromuscular Re-Education  NMR 1: open book 15x B  NMR 5: SAPD 2 x 10 BTB lvl I-II  NMR 6: Rows 3 x 10 BTB  NMR 7: Supine sciatic nerve glides (90/90) 2x10 B  NMR 9: Bridges 3 x 10    Time Entry(in minutes):  Manual Therapy Time Entry: 5  Neuromuscular Re-Education Time Entry: 15  Therapeutic Exercise Time Entry: 10    Assessment & Plan    Assessment: Patient is a 69 y.o. M with a medical diagnosis of M17.11 (ICD-10-CM) - Primary osteoarthritis of right knee. additional referral for M54.2 (ICD-10-CM) - Cervicalgia  M25.569 (ICD-10-CM) - Knee pain, unspecified chronicity, unspecified laterality. Patient with primary complaint of lower back pain with radiating symptoms into RLE. Overall symptoms remain the same with only tempporary relief with PT, HEP and stretching. Continued lumbar mobility, certralization of symptoms and lumbar stabilization program. Continue POC. Monitor response.  Evaluation/Treatment Tolerance: Patient tolerated treatment well    The patient will continue to benefit from skilled outpatient physical therapy in order to address the deficits listed in the problem list on the initial evaluation, provide patient and family education, and maximize the patients level of independence in the home and community environments.     The patient's spiritual, cultural, and educational needs were considered, and the patient is agreeable to the plan of care and goals.           Plan: cont POC    Goals:   Active       Ambulation/movement       Patient will walk 6 min without rest break (Progressing)       Start:  06/17/25    Expected End:  08/01/25               Changing body position       Patient will demonstrate moving sit to stand 10x in 30 sec (Progressing)       Start:  06/17/25    Expected End:  08/01/25               Functional outcome       Patient will demonstrate independence in home program for support of progression (Progressing)       Start:  06/17/25    Expected End:  07/18/25               Pain       Patient will report pain of 3/10 demonstrating a reduction of overall pain (Progressing)       Start:  06/17/25    Expected End:  08/01/25               Range of Motion       Patient will achieve right knee ROM of  3-120 degrees  (Progressing)       Start:  02/12/25    Expected End:  05/23/25               Range of Motion        Patient will achieve spinal flexion to 45 deg (Progressing)       Start:  06/17/25    Expected End:  08/01/25            Patient will achieve bilateral spinal rotation ROM to 75% (Progressing)       Start:  06/17/25    Expected End:  08/01/25               Strength       Patient will achieve bilateral hip abduction strength of 4+/5 (Progressing)       Start:  02/12/25    Expected End:  05/23/25            Patient will improve bilateral knee flexion strength by 5kg  (Met)       Start:  02/12/25    Expected End:  04/11/25    Resolved:  04/23/25         Patient will improve right knee extension strength by 5kg (Met)       Start:  02/12/25    Expected End:  04/11/25    Resolved:  04/23/25            cervical goals       patient will improve cervical rotation 10 degrees (Progressing)       Start:  02/18/25    Expected End:  05/23/25            patient will improve cervical extension to 30 deg (Progressing)       Start:  02/18/25    Expected End:  05/23/25              Resolved       Changing body position       Patient will demonstrate moving sit to stand 8+x in 30 sec (Met)       Start:  02/12/25    Expected End:  05/23/25    Resolved:  05/23/25            Functional outcome       Patient will show a significant change in FOTO patient-reported outcome tool to demonstrate subjective improvement (Met)       Start:  02/12/25    Expected End:  04/11/25    Resolved:  05/23/25         Patient will demonstrate independence in home program for support of progression (Met)       Start:  02/12/25    Expected End:  03/14/25    Resolved:  04/23/25            Pain       Patient will report pain of 4/10 max demonstrating a reduction of overall pain (Met)       Start:  02/12/25    Expected End:  03/14/25    Resolved:  03/28/25             Lucy Gaming PTA

## 2025-06-26 ENCOUNTER — CLINICAL SUPPORT (OUTPATIENT)
Dept: REHABILITATION | Facility: HOSPITAL | Age: 70
End: 2025-06-26
Payer: MEDICARE

## 2025-06-26 DIAGNOSIS — R29.898 DECREASED STRENGTH OF LOWER EXTREMITY: Primary | ICD-10-CM

## 2025-06-26 PROCEDURE — 97112 NEUROMUSCULAR REEDUCATION: CPT | Mod: PN

## 2025-06-26 PROCEDURE — 97140 MANUAL THERAPY 1/> REGIONS: CPT | Mod: PN

## 2025-06-26 NOTE — PROGRESS NOTES
"  Outpatient Rehab    Physical Therapy Visit    Patient Name: Celso Hernandez  MRN: [id number]  YOB: 1955  Encounter Date: 6/26/2025    Therapy Diagnosis:   Encounter Diagnosis   Name Primary?    Decreased strength of lower extremity Yes     Physician: Coleman Quintana FNP    Physician Orders: Eval and Treat  Medical Diagnosis: Primary osteoarthritis of right knee  Cervicalgia  Surgical Diagnosis: Not applicable for this Episode   Surgical Date: Not applicable for this Episode  Days Since Last Surgery: Not applicable for this Episode    Visit # / Visits Authorized:  32 / 42  Insurance Authorization Period: 2/14/2025 to 8/1/2025  Date of Evaluation: 2/12/2025  Plan of Care Certification: 6/17/2025 to 8/1/2025      PT/PTA: PT   Number of PTA visits since last PT visit:1  Time In: 0900   Time Out: 0956  Total Time (in minutes): 56   Total Billable Time (in minutes): 30    FOTO:  Intake Score:  %  Survey Score 2:  %  Survey Score 3:  %    Precautions:       Subjective   back stiffness and B radiating pain L > R..  Pain reported as 4/10. L hip 1/10, R buttock 4/10.    Objective            Treatment:  Therapeutic Exercise  TE 2: LTR 20x  TE 3: seated lumbar flexion 10x3 peanut  TE 4: DKTC peanut 2 x 10  TE 8: BKFO 20x  TE 10: calf stretch 10x10"  Manual Therapy  MT 2: LAD B  Balance/Neuromuscular Re-Education  NMR 1: open book 15x B  NMR 2: reverse march 2x5  NMR 5: SAPD 2 x 10 BTB lvl I-III  NMR 6: Rows 3 x 10 BTB  NMR 7: Supine sciatic nerve glides (90/90) 2x10 B  NMR 9: Bridges 3 x 10    Time Entry(in minutes):  Manual Therapy Time Entry: 10  Neuromuscular Re-Education Time Entry: 20    Assessment & Plan   Assessment: Patient is a 69 y.o. M with a medical diagnosis of M17.11 (ICD-10-CM) - Primary osteoarthritis of right knee. additional referral for M54.2 (ICD-10-CM) - Cervicalgia  M25.569 (ICD-10-CM) - Knee pain, unspecified chronicity, unspecified laterality. Patient with primary complaint of lower back " pain with radiating symptoms into RLE. Patient presents with back pain and moderate tingling today. Good response to LAD. Progressed core strengthening this visit with good tolerance. Will continue lumbar mobility, certralization of symptoms and lumbar stabilization program. Continue POC. Monitor response.  Evaluation/Treatment Tolerance: Patient tolerated treatment well    The patient will continue to benefit from skilled outpatient physical therapy in order to address the deficits listed in the problem list on the initial evaluation, provide patient and family education, and maximize the patients level of independence in the home and community environments.     The patient's spiritual, cultural, and educational needs were considered, and the patient is agreeable to the plan of care and goals.           Plan: cont POC    Goals:   Active       Ambulation/movement       Patient will walk 6 min without rest break (Progressing)       Start:  06/17/25    Expected End:  08/01/25               Changing body position       Patient will demonstrate moving sit to stand 10x in 30 sec (Progressing)       Start:  06/17/25    Expected End:  08/01/25               Pain       Patient will report pain of 3/10 demonstrating a reduction of overall pain (Progressing)       Start:  06/17/25    Expected End:  08/01/25               Range of Motion       Patient will achieve right knee ROM of  3-120 degrees  (Progressing)       Start:  02/12/25    Expected End:  05/23/25               Range of Motion       Patient will achieve spinal flexion to 45 deg (Progressing)       Start:  06/17/25    Expected End:  08/01/25            Patient will achieve bilateral spinal rotation ROM to 75% (Progressing)       Start:  06/17/25    Expected End:  08/01/25               Strength       Patient will achieve bilateral hip abduction strength of 4+/5 (Progressing)       Start:  02/12/25    Expected End:  05/23/25            Patient will improve  bilateral knee flexion strength by 5kg  (Met)       Start:  02/12/25    Expected End:  04/11/25    Resolved:  04/23/25         Patient will improve right knee extension strength by 5kg (Met)       Start:  02/12/25    Expected End:  04/11/25    Resolved:  04/23/25            cervical goals       patient will improve cervical rotation 10 degrees (Progressing)       Start:  02/18/25    Expected End:  05/23/25            patient will improve cervical extension to 30 deg (Progressing)       Start:  02/18/25    Expected End:  05/23/25              Resolved       Changing body position       Patient will demonstrate moving sit to stand 8+x in 30 sec (Met)       Start:  02/12/25    Expected End:  05/23/25    Resolved:  05/23/25            Functional outcome       Patient will show a significant change in FOTO patient-reported outcome tool to demonstrate subjective improvement (Met)       Start:  02/12/25    Expected End:  04/11/25    Resolved:  05/23/25         Patient will demonstrate independence in home program for support of progression (Met)       Start:  02/12/25    Expected End:  03/14/25    Resolved:  04/23/25            Functional outcome       Patient will demonstrate independence in home program for support of progression (Met)       Start:  06/17/25    Expected End:  07/18/25    Resolved:  06/26/25            Pain       Patient will report pain of 4/10 max demonstrating a reduction of overall pain (Met)       Start:  02/12/25    Expected End:  03/14/25    Resolved:  03/28/25             Radha Lares, PT, DPT

## 2025-06-30 ENCOUNTER — CLINICAL SUPPORT (OUTPATIENT)
Dept: REHABILITATION | Facility: HOSPITAL | Age: 70
End: 2025-06-30
Payer: MEDICARE

## 2025-06-30 ENCOUNTER — OFFICE VISIT (OUTPATIENT)
Dept: FAMILY MEDICINE | Facility: CLINIC | Age: 70
End: 2025-06-30
Payer: MEDICARE

## 2025-06-30 VITALS
HEART RATE: 97 BPM | OXYGEN SATURATION: 97 % | HEIGHT: 68 IN | DIASTOLIC BLOOD PRESSURE: 86 MMHG | BODY MASS INDEX: 39.76 KG/M2 | WEIGHT: 262.38 LBS | SYSTOLIC BLOOD PRESSURE: 138 MMHG

## 2025-06-30 DIAGNOSIS — I70.0 ATHEROSCLEROSIS OF AORTA: ICD-10-CM

## 2025-06-30 DIAGNOSIS — E11.42 DIABETIC POLYNEUROPATHY ASSOCIATED WITH TYPE 2 DIABETES MELLITUS: ICD-10-CM

## 2025-06-30 DIAGNOSIS — Z00.00 ENCOUNTER FOR MEDICARE ANNUAL WELLNESS EXAM: Primary | ICD-10-CM

## 2025-06-30 DIAGNOSIS — E78.2 MIXED HYPERLIPIDEMIA: Chronic | ICD-10-CM

## 2025-06-30 DIAGNOSIS — E11.59 HYPERTENSION ASSOCIATED WITH TYPE 2 DIABETES MELLITUS: ICD-10-CM

## 2025-06-30 DIAGNOSIS — E11.36 TYPE 2 DIABETES MELLITUS WITH CATARACT: ICD-10-CM

## 2025-06-30 DIAGNOSIS — H40.1132 PRIMARY OPEN ANGLE GLAUCOMA OF BOTH EYES, MODERATE STAGE: ICD-10-CM

## 2025-06-30 DIAGNOSIS — Z74.09 OTHER REDUCED MOBILITY: ICD-10-CM

## 2025-06-30 DIAGNOSIS — R29.898 DECREASED STRENGTH OF LOWER EXTREMITY: Primary | ICD-10-CM

## 2025-06-30 DIAGNOSIS — E66.01 SEVERE OBESITY (BMI 35.0-39.9) WITH COMORBIDITY: ICD-10-CM

## 2025-06-30 DIAGNOSIS — E11.69 TYPE 2 DIABETES MELLITUS WITH HYPERLIPIDEMIA: ICD-10-CM

## 2025-06-30 DIAGNOSIS — E78.5 TYPE 2 DIABETES MELLITUS WITH HYPERLIPIDEMIA: ICD-10-CM

## 2025-06-30 DIAGNOSIS — I15.2 HYPERTENSION ASSOCIATED WITH TYPE 2 DIABETES MELLITUS: ICD-10-CM

## 2025-06-30 PROBLEM — M51.362 DEGENERATION OF INTERVERTEBRAL DISC OF LUMBAR REGION WITH DISCOGENIC BACK PAIN AND LOWER EXTREMITY PAIN: Status: ACTIVE | Noted: 2022-03-28

## 2025-06-30 PROCEDURE — 99999 PR PBB SHADOW E&M-EST. PATIENT-LVL IV: CPT | Mod: PBBFAC,,, | Performed by: NURSE PRACTITIONER

## 2025-06-30 PROCEDURE — 97112 NEUROMUSCULAR REEDUCATION: CPT | Mod: PN,CQ

## 2025-06-30 PROCEDURE — 97140 MANUAL THERAPY 1/> REGIONS: CPT | Mod: PN,CQ

## 2025-06-30 RX ORDER — FLUTICASONE PROPIONATE 50 MCG
1 SPRAY, SUSPENSION (ML) NASAL 2 TIMES DAILY
COMMUNITY
Start: 2025-06-07

## 2025-06-30 NOTE — PROGRESS NOTES
"  Celso Hernandez presented for a  Medicare AWV and comprehensive Health Risk Assessment today. The following components were reviewed and updated:    Medical history  Family History  Social history  Allergies and Current Medications  Health Risk Assessment  Health Maintenance  Care Team         ** See Completed Assessments for Annual Wellness Visit within the encounter summary.**         The following assessments were completed:  Living Situation  CAGE  Depression Screening  Timed Get Up and Go  Whisper Test  Cognitive Function Screening      Nutrition Screening  ADL Screening  PAQ Screening      Opioid documentation:      Patient does not have a current opioid prescription.        Vitals:    06/30/25 1301   BP: 138/86   BP Location: Left arm   Patient Position: Sitting   Pulse: 97   SpO2: 97%   Weight: 119 kg (262 lb 5.6 oz)   Height: 5' 8" (1.727 m)     Body mass index is 39.89 kg/m².    Physical Exam  Vitals reviewed.   Constitutional:       General: He is not in acute distress.     Appearance: Normal appearance. He is well-developed and well-groomed. He is obese.   HENT:      Head: Normocephalic.   Cardiovascular:      Rate and Rhythm: Normal rate.   Pulmonary:      Effort: Pulmonary effort is normal. No respiratory distress.   Skin:     Coloration: Skin is not pale.   Neurological:      Mental Status: He is alert and oriented to person, place, and time.      Coordination: Coordination normal.      Gait: Gait abnormal (slowed but stable).   Psychiatric:         Attention and Perception: Attention normal.         Mood and Affect: Mood and affect normal.         Speech: Speech normal.         Behavior: Behavior normal. Behavior is cooperative.             Diagnoses and health risks identified today and associated recommendations/orders:    1. Encounter for Medicare annual wellness exam  - Referral to Enhanced Annual Wellness Visit (eAWV) M+5    2. Type 2 diabetes mellitus with hyperlipidemia  Chronic; stable on " metformin, Zetia, and rosuvastatin medication. Follow up with PCP.    3. Hypertension associated with type 2 diabetes mellitus  Chronic; stable on Hyzaar and hydralazine medication. Follow up with PCP.    4. Type 2 diabetes mellitus with cataract  Chronic; stable on metformin medication. Followed by Ophthalmology.    5. Diabetic polyneuropathy associated with type 2 diabetes mellitus  Chronic; stable on gabapentin medication. Follow up with PCP.    6. Atherosclerosis of aorta  Chronic; stable on Zetia and rosuvastatin medication. Follow up with PCP.    7. Mixed hyperlipidemia  Chronic; stable on Zetia and rosuvastatin medication. Follow up with PCP.    8. Primary open angle glaucoma of both eyes, moderate stage  Chronic; stable on Alphagan, Cosopt, and latanoprost drops. Followed by Ophthalmology.    9. Other reduced mobility  Ambulates independently; slightly slowed but stable gait. Follow up with PCP.    10. Severe obesity (BMI 35.0-39.9) with comorbidity  Eat a low salt/low fat ADA diet and discussed importance of engaging in physical activity at least 5x/week for a minimum of 30 min/day.        Provided Celso with a 5-10 year written screening schedule and personal prevention plan. Recommendations were developed using the USPSTF age appropriate recommendations. Education, counseling, and referrals were provided as needed. After Visit Summary given to patient which includes a list of additional screenings/tests needed.    Follow up for your next annual wellness visit.    Steph Rosado NP      Advance Care Planning     I offered to discuss advanced care planning, including how to pick a person who would make decisions for you if you were unable to make them for yourself, called a health care power of , and what kind of decisions you might make such as use of life sustaining treatments such as ventilators and tube feeding when faced with a life limiting illness recorded on a living will that they will  need to know. (How you want to be cared for as you near the end of your natural life)     X  Patient has advanced directives forms and agrees to provide copies to the institution once completed.

## 2025-06-30 NOTE — PATIENT INSTRUCTIONS
Counseling and Referral of Other Preventative  (Italic type indicates deductible and co-insurance are waived)    Patient Name: Celso Hernandez  Today's Date: 6/30/2025    Health Maintenance       Date Due Completion Date    Shingles Vaccine (1 of 2) Never done ---    RSV Vaccine (Age 60+ and Pregnant patients) (1 - Risk 60-74 years 1-dose series) Never done ---    COVID-19 Vaccine (4 - 2024-25 season) 09/01/2024 12/21/2021    Hemoglobin A1c 09/27/2025 3/27/2025    PROSTATE-SPECIFIC ANTIGEN 10/10/2025 10/10/2024    Foot Exam 10/17/2025 10/17/2024    Diabetes Urine Screening 03/27/2026 3/27/2025    Lipid Panel 03/27/2026 3/27/2025    Diabetic Eye Exam 04/22/2026 4/22/2025    High Dose Statin 05/26/2026 5/26/2025    TETANUS VACCINE 06/20/2026 6/20/2016    Colorectal Cancer Screening 02/28/2034 2/29/2024        No orders of the defined types were placed in this encounter.      The following information is provided to all patients.  This information is to help you find resources for any of the problems found today that may be affecting your health:                  Living healthy guide: www.[facility].[place].gov      Understanding Diabetes: www.diabetes.org      Eating healthy: www.cdc.gov/healthyweight      [facility] home safety checklist: www.cdc.gov/steadi/patient.html      Agency on Aging: www.goea.[place].[facility]      Alcoholics anonymous (AA): www.aa.org      Physical Activity: www.dougie.nih.gov/jp4ewke      Tobacco use: www.quitwithusla.org

## 2025-06-30 NOTE — PROGRESS NOTES
"  Outpatient Rehab    Physical Therapy Visit    Patient Name: Celso Hernandez  MRN: [id number]  YOB: 1955  Encounter Date: 6/30/2025    Therapy Diagnosis:   Encounter Diagnosis   Name Primary?    Decreased strength of lower extremity Yes     Physician: Coleman Quintana FNP    Physician Orders: Eval and Treat  Medical Diagnosis: Primary osteoarthritis of right knee  Cervicalgia  Surgical Diagnosis: Not applicable for this Episode   Surgical Date: Not applicable for this Episode  Days Since Last Surgery: Not applicable for this Episode    Visit # / Visits Authorized:  33 / 42  Insurance Authorization Period: 2/14/2025 to 8/1/2025  Date of Evaluation: 2/12/2025  Plan of Care Certification: 6/17/2025 to 8/1/2025      PT/PTA: PTA   Number of PTA visits since last PT visit:1  Time In: 0900   Time Out: 1000  Total Time (in minutes): 60   Total Billable Time (in minutes): 60    FOTO:  Intake Score:  %  Survey Score 2:  %  Survey Score 3:  %    Precautions:       Subjective   Reports minimal change in symptoms. Ongoing radicular symptoms down RLE..  Pain reported as 6/10. L hip 1/10, R buttock 6/10.    Objective            Treatment:  Therapeutic Exercise  TE 2: LTR 20x  TE 3: seated lumbar flexion 10x3 peanut  TE 4: DKTC peanut 2 x 10  TE 10: calf stretch 10x10"  Manual Therapy  MT 2: LAD B  Balance/Neuromuscular Re-Education  NMR 1: open book 15x B  NMR 2: reverse march 2x5  NMR 3: BKFO 20x  NMR 5: SAPD 2 x 10 BTB lvl I-III  NMR 6: Rows 3 x 10 BTB  NMR 7: Supine sciatic nerve glides (90/90) 2x10 B  NMR 9: Bridges 3 x 10    Time Entry(in minutes):  Manual Therapy Time Entry: 10  Neuromuscular Re-Education Time Entry: 40  Therapeutic Exercise Time Entry: 10    Assessment & Plan   Assessment: Patient is a 69 y.o. M with a medical diagnosis of M17.11 (ICD-10-CM) - Primary osteoarthritis of right knee. additional referral for M54.2 (ICD-10-CM) - Cervicalgia  M25.569 (ICD-10-CM) - Knee pain, unspecified chronicity, " unspecified laterality. Patient with primary complaint of lower back pain with radiating symptoms into RLE. Patient presents with back pain and moderate level radicular symptoms down RLE today. Continues to respond well to LAD reporting decreased pain but minimal carryover of benefits between sessions. Will continue lumbar mobility, certralization of symptoms and lumbar stabilization program. Continue POC. Monitor response.  Evaluation/Treatment Tolerance: Patient tolerated treatment well    The patient will continue to benefit from skilled outpatient physical therapy in order to address the deficits listed in the problem list on the initial evaluation, provide patient and family education, and maximize the patients level of independence in the home and community environments.     The patient's spiritual, cultural, and educational needs were considered, and the patient is agreeable to the plan of care and goals.           Plan: cont POC    Goals:   Active       Ambulation/movement       Patient will walk 6 min without rest break (Progressing)       Start:  06/17/25    Expected End:  08/01/25               Changing body position       Patient will demonstrate moving sit to stand 10x in 30 sec (Progressing)       Start:  06/17/25    Expected End:  08/01/25               Pain       Patient will report pain of 3/10 demonstrating a reduction of overall pain (Progressing)       Start:  06/17/25    Expected End:  08/01/25               Range of Motion       Patient will achieve right knee ROM of  3-120 degrees  (Progressing)       Start:  02/12/25    Expected End:  05/23/25               Range of Motion       Patient will achieve spinal flexion to 45 deg (Progressing)       Start:  06/17/25    Expected End:  08/01/25            Patient will achieve bilateral spinal rotation ROM to 75% (Progressing)       Start:  06/17/25    Expected End:  08/01/25               Strength       Patient will achieve bilateral hip abduction  strength of 4+/5 (Progressing)       Start:  02/12/25    Expected End:  05/23/25            Patient will improve bilateral knee flexion strength by 5kg  (Met)       Start:  02/12/25    Expected End:  04/11/25    Resolved:  04/23/25         Patient will improve right knee extension strength by 5kg (Met)       Start:  02/12/25    Expected End:  04/11/25    Resolved:  04/23/25            cervical goals       patient will improve cervical rotation 10 degrees (Progressing)       Start:  02/18/25    Expected End:  05/23/25            patient will improve cervical extension to 30 deg (Progressing)       Start:  02/18/25    Expected End:  05/23/25              Resolved       Changing body position       Patient will demonstrate moving sit to stand 8+x in 30 sec (Met)       Start:  02/12/25    Expected End:  05/23/25    Resolved:  05/23/25            Functional outcome       Patient will show a significant change in FOTO patient-reported outcome tool to demonstrate subjective improvement (Met)       Start:  02/12/25    Expected End:  04/11/25    Resolved:  05/23/25         Patient will demonstrate independence in home program for support of progression (Met)       Start:  02/12/25    Expected End:  03/14/25    Resolved:  04/23/25            Functional outcome       Patient will demonstrate independence in home program for support of progression (Met)       Start:  06/17/25    Expected End:  07/18/25    Resolved:  06/26/25            Pain       Patient will report pain of 4/10 max demonstrating a reduction of overall pain (Met)       Start:  02/12/25    Expected End:  03/14/25    Resolved:  03/28/25             Lucy Gaming PTA

## 2025-07-03 ENCOUNTER — CLINICAL SUPPORT (OUTPATIENT)
Dept: REHABILITATION | Facility: HOSPITAL | Age: 70
End: 2025-07-03
Payer: MEDICARE

## 2025-07-03 DIAGNOSIS — R29.898 DECREASED STRENGTH OF LOWER EXTREMITY: Primary | ICD-10-CM

## 2025-07-03 PROCEDURE — 97110 THERAPEUTIC EXERCISES: CPT | Mod: PN,CQ

## 2025-07-03 PROCEDURE — 97112 NEUROMUSCULAR REEDUCATION: CPT | Mod: PN,CQ

## 2025-07-03 NOTE — PROGRESS NOTES
"  Outpatient Rehab    Physical Therapy Visit    Patient Name: Celso Hernandez  MRN: [id number]  YOB: 1955  Encounter Date: 7/3/2025    Therapy Diagnosis:   Encounter Diagnosis   Name Primary?    Decreased strength of lower extremity Yes     Physician: Coleman Quintana FNP    Physician Orders: Eval and Treat  Medical Diagnosis: Primary osteoarthritis of right knee  Cervicalgia  Surgical Diagnosis: Not applicable for this Episode   Surgical Date: Not applicable for this Episode  Days Since Last Surgery: Not applicable for this Episode    Visit # / Visits Authorized:  34 / 42  Insurance Authorization Period: 2/14/2025 to 8/1/2025  Date of Evaluation: 2/12/2025  Plan of Care Certification: 6/17/2025 to 8/1/2025      PT/PTA: PTA   Number of PTA visits since last PT visit:2  Time In: 1000   Time Out: 1055  Total Time (in minutes): 55   Total Billable Time (in minutes): 30    FOTO:  Intake Score:  %  Survey Score 2:  %  Survey Score 3:  %    Precautions:       Subjective   Pain about the same, Usually feels better after PT but "only for a little while". states stretches at home also only offer temporary relief from symptoms.  Pain reported as 5/10. L hip 1/10, R buttock 5/10.    Objective            Treatment:  Therapeutic Exercise  TE 2: LTR 20x  TE 3: seated lumbar flexion 10x3 peanut  TE 4: DKTC peanut 2 x 10  TE 10: supine calf stretch 10x10"  Manual Therapy  MT 2: LAD B  Balance/Neuromuscular Re-Education  NMR 1: open book 15x B  NMR 3: BKFO 20x  NMR 4: side-lying clamshell 2 x 10 ea  NMR 5: SAPD 2 x 10 BTB lvl I-III  NMR 6: Rows 3 x 10 BTB  NMR 7: Supine sciatic nerve glides (90/90) 2x10 B  NMR 9: Bridges 3 x 10    Time Entry(in minutes):  Manual Therapy Time Entry: 5  Neuromuscular Re-Education Time Entry: 15  Therapeutic Exercise Time Entry: 10    Assessment & Plan   Assessment: Patient is a 69 y.o. M with a medical diagnosis of M17.11 (ICD-10-CM) - Primary osteoarthritis of right knee. additional " referral for M54.2 (ICD-10-CM) - Cervicalgia  M25.569 (ICD-10-CM) - Knee pain, unspecified chronicity, unspecified laterality. Patient with primary complaint of lower back pain with radiating symptoms into RLE. Patient presents with back pain and moderate level radicular symptoms down RLE today. PT so far only offering short term relief with minimal carryover. Continues to respond well to LAD reporting decreased pain. Continued neural mobility and lumbar stabilization program. Continue POC. Monitor response.  Evaluation/Treatment Tolerance: Patient tolerated treatment well    The patient will continue to benefit from skilled outpatient physical therapy in order to address the deficits listed in the problem list on the initial evaluation, provide patient and family education, and maximize the patients level of independence in the home and community environments.     The patient's spiritual, cultural, and educational needs were considered, and the patient is agreeable to the plan of care and goals.           Plan: cont POC    Goals:   Active       Ambulation/movement       Patient will walk 6 min without rest break (Progressing)       Start:  06/17/25    Expected End:  08/01/25               Changing body position       Patient will demonstrate moving sit to stand 10x in 30 sec (Progressing)       Start:  06/17/25    Expected End:  08/01/25               Pain       Patient will report pain of 3/10 demonstrating a reduction of overall pain (Progressing)       Start:  06/17/25    Expected End:  08/01/25               Range of Motion       Patient will achieve right knee ROM of  3-120 degrees  (Progressing)       Start:  02/12/25    Expected End:  05/23/25               Range of Motion       Patient will achieve spinal flexion to 45 deg (Progressing)       Start:  06/17/25    Expected End:  08/01/25            Patient will achieve bilateral spinal rotation ROM to 75% (Progressing)       Start:  06/17/25    Expected End:   08/01/25               Strength       Patient will achieve bilateral hip abduction strength of 4+/5 (Progressing)       Start:  02/12/25    Expected End:  05/23/25            Patient will improve bilateral knee flexion strength by 5kg  (Met)       Start:  02/12/25    Expected End:  04/11/25    Resolved:  04/23/25         Patient will improve right knee extension strength by 5kg (Met)       Start:  02/12/25    Expected End:  04/11/25    Resolved:  04/23/25            cervical goals       patient will improve cervical rotation 10 degrees (Progressing)       Start:  02/18/25    Expected End:  05/23/25            patient will improve cervical extension to 30 deg (Progressing)       Start:  02/18/25    Expected End:  05/23/25              Resolved       Changing body position       Patient will demonstrate moving sit to stand 8+x in 30 sec (Met)       Start:  02/12/25    Expected End:  05/23/25    Resolved:  05/23/25            Functional outcome       Patient will show a significant change in FOTO patient-reported outcome tool to demonstrate subjective improvement (Met)       Start:  02/12/25    Expected End:  04/11/25    Resolved:  05/23/25         Patient will demonstrate independence in home program for support of progression (Met)       Start:  02/12/25    Expected End:  03/14/25    Resolved:  04/23/25            Functional outcome       Patient will demonstrate independence in home program for support of progression (Met)       Start:  06/17/25    Expected End:  07/18/25    Resolved:  06/26/25            Pain       Patient will report pain of 4/10 max demonstrating a reduction of overall pain (Met)       Start:  02/12/25    Expected End:  03/14/25    Resolved:  03/28/25             Lucy Gaming PTA

## 2025-07-05 NOTE — PROGRESS NOTES
HPI    DLS: 4/22/2025    Pt states on Saturday he became sensitive to light and developed a   headache took some tylenol and it went away after a few hours. Pt states   he does notice floaters every now and then but this is nothing new, denies   any flashes or diplopia.     Meds:  Cosopt BID OU  Brimonidine BID OU  Latanoprost QHS OD ONLY  Rocklatan QHS OS ONLY    1. Mod POAG OU   2. NS OU     Last edited by Tiffany Newman MA on 7/8/2025  8:56 AM.            Assessment /Plan     For exam results, see Encounter Report.    Primary open angle glaucoma (POAG) of both eyes, severe stage  -     netarsudiL-latanoprost (ROCKLATAN) 0.02-0.005 % Drop; Place 1 drop into both eyes every evening.  Dispense: 2.5 mL; Refill: 12    Nuclear sclerotic cataract of both eyes    Visual field defect of both eyes    History of selective laser trabeculoplasty         Vinh note form 2/20/2024   (-) FHx.   IOp 24 OD, OS. Last 15 OD, 16 OS.   Tmax  22 OD, 23 OS.   Tmin on drops 12 OD, 13 OS.    Pt failed to f/u from 12/17/2019 to 9/16/2020 and 6/2021 to 3/11/2022.   Pt reports compliance w/eyedrops.   Prev pt of Dr Huffman.   C/d 0.55 OD, 0.65 OS.     12/28/2022  HVF OD inf arcuate, OS sup arcuate  2/20/2024 HVF OD inf arc and early sup arcuate, OS sup arc and early inf arc    12/28/2022 OCT OD, thin NS, TS, T, TI, General (Borderline N). OS thin TS, TI, T, General (Borderline N and NI).   2/20/2024 OCT OD NS, TS, T, and G, borderline NI and N, OS thin TS, TI, T, and G, borderline N and NI    3/11/2022 Photos    Cont Latanoprost QHS OU.  D/c Timolol BiD OU on 12/15/2020  Cont Cosopt BID OU (start 12/15/2020)  Add Brimonidine BID OU  Importance of drop compliance and f/u discussed with pt.   Educated pt on findings w/understanding.  Referral to glaucoma clinic - Dr Barrientos / [facility]. Consider benefits of SLT.   Cont w/annual exams.          Glaucoma (type and duration)    dx 2018 - Dr Huffman (began lumigan and then stopped on his own) //  re-started on gtts w/ Dr hitchcock 11/2019   First HVF   [facility] -12/2019   First photos   2019   Treatment / Drops started   2018 - stopped // re-started 2019            Family history    + Mother         Glaucoma meds    cospt / brimonidine / latanoprost         H/O adverse rxn to glaucoma drops    none        LASERS    SLT od - 6/13/2024 - good resp 23--> 12-13 // SLT os 5/30/2024 - good resp 22--> 12-13         GLAUCOMA SURGERIES    none        OTHER EYE SURGERIES    none        CDR    0.7/0.85        Tbase    24 ou        Tmax    24 ou            Ttarget   14-15 max (+ prog with IOP's 17-18)           HVF    3 test 2022 to  2025 - initiall early IAD / --> to dense IALT and SAD  od /  / + VF loss os- initially SALT --> SALT and IAD (( + prog over 3- 5 year OU)         Gonio    +3 ou        CCT    550/566        OCT    3 test 2022 to 2025 - RNFL - dec alli out od // dec - all but NS (? Prog over the years )         Disc photos    3/2022    - Ttoday    12/13 (IOP os better with rocklatan - tolerating ok)  down from 16-->13  target of 15 ou   - Test done today    IOP w/ addition of rockaltan os       4/30/2023 - first visit w/ [facility] / glaucoma clinic   Dx 2018 w/ Dr. Huffman - started on lumigan - but stopped on his own   First visit at [facility] 11/2/2019 - Dr hitchcock // followed w/ Vinh 11/2019 to 2/2024   IOP  23/21 (on gtts)   + FmHx - mother   Gonio +3 open ou      OLDER TEST - include   4 VF's // 5 OCT's / 1 photos   VF progression   OCT progression   Tmax 24/24 - on gtts     SLT OS- done 5/30/2024 - good resp - 23-->12-13  SLT OD  6/13/2024 -  good resp 22--> 12-13       PLAN   4/22/2025  IOP ok angel od @ 14 // higher than ideal os @ 16  Cont gtts   cosopt bid - ou   Brim bid - ou   Cont latanoprost od q hs   Add rocklatan os q hs (2 samples given - enough for 4 months)     7/8/2025   IOP better os with addition of rocklatan- tolerating ok  Adjsut gtts   Cosopt bid ou  Brim ou bid  Rockalatan OU q hs - 1  sample given and Rx sent - he will let us know if it is outrageously expensive     As pt has severe stage glaucoma - will cont to follow in glaucoma clinic   If needs new glasses in future can refer back to dr hitchcock     F/U 4  months - IOP check ou / gonio  - Met

## 2025-07-08 ENCOUNTER — OFFICE VISIT (OUTPATIENT)
Dept: OPHTHALMOLOGY | Facility: CLINIC | Age: 70
End: 2025-07-08
Payer: MEDICARE

## 2025-07-08 ENCOUNTER — CLINICAL SUPPORT (OUTPATIENT)
Dept: REHABILITATION | Facility: HOSPITAL | Age: 70
End: 2025-07-08
Payer: MEDICARE

## 2025-07-08 DIAGNOSIS — H40.1133 PRIMARY OPEN ANGLE GLAUCOMA (POAG) OF BOTH EYES, SEVERE STAGE: Primary | ICD-10-CM

## 2025-07-08 DIAGNOSIS — H25.13 NUCLEAR SCLEROTIC CATARACT OF BOTH EYES: ICD-10-CM

## 2025-07-08 DIAGNOSIS — R29.898 DECREASED STRENGTH OF LOWER EXTREMITY: Primary | ICD-10-CM

## 2025-07-08 DIAGNOSIS — Z98.890 HISTORY OF SELECTIVE LASER TRABECULOPLASTY: ICD-10-CM

## 2025-07-08 DIAGNOSIS — H53.40 VISUAL FIELD DEFECT OF BOTH EYES: ICD-10-CM

## 2025-07-08 PROCEDURE — 3066F NEPHROPATHY DOC TX: CPT | Mod: CPTII,S$GLB,, | Performed by: OPHTHALMOLOGY

## 2025-07-08 PROCEDURE — 1160F RVW MEDS BY RX/DR IN RCRD: CPT | Mod: CPTII,S$GLB,, | Performed by: OPHTHALMOLOGY

## 2025-07-08 PROCEDURE — 99214 OFFICE O/P EST MOD 30 MIN: CPT | Mod: S$GLB,,, | Performed by: OPHTHALMOLOGY

## 2025-07-08 PROCEDURE — 97140 MANUAL THERAPY 1/> REGIONS: CPT | Mod: PN

## 2025-07-08 PROCEDURE — 3061F NEG MICROALBUMINURIA REV: CPT | Mod: CPTII,S$GLB,, | Performed by: OPHTHALMOLOGY

## 2025-07-08 PROCEDURE — 3044F HG A1C LEVEL LT 7.0%: CPT | Mod: CPTII,S$GLB,, | Performed by: OPHTHALMOLOGY

## 2025-07-08 PROCEDURE — 1159F MED LIST DOCD IN RCRD: CPT | Mod: CPTII,S$GLB,, | Performed by: OPHTHALMOLOGY

## 2025-07-08 PROCEDURE — 99999 PR PBB SHADOW E&M-EST. PATIENT-LVL I: CPT | Mod: PBBFAC,,, | Performed by: OPHTHALMOLOGY

## 2025-07-08 PROCEDURE — 97112 NEUROMUSCULAR REEDUCATION: CPT | Mod: PN

## 2025-07-08 RX ORDER — NETARSUDIL AND LATANOPROST OPHTHALMIC SOLUTION, 0.02%/0.005% .2; .05 MG/ML; MG/ML
1 SOLUTION/ DROPS OPHTHALMIC; TOPICAL NIGHTLY
Qty: 2.5 ML | Refills: 12 | Status: SHIPPED | OUTPATIENT
Start: 2025-07-08

## 2025-07-08 NOTE — PROGRESS NOTES
"  Outpatient Rehab    Physical Therapy Visit    Patient Name: Celso Hernandez  MRN: [id number]  YOB: 1955  Encounter Date: 7/8/2025    Therapy Diagnosis:   Encounter Diagnosis   Name Primary?    Decreased strength of lower extremity Yes     Physician: Coleman Quintana FNP    Physician Orders: Eval and Treat  Medical Diagnosis: Primary osteoarthritis of right knee  Cervicalgia  Surgical Diagnosis: Not applicable for this Episode   Surgical Date: Not applicable for this Episode  Days Since Last Surgery: Not applicable for this Episode    Visit # / Visits Authorized:  35 / 42  Insurance Authorization Period: 2/14/2025 to 8/1/2025  Date of Evaluation: 2/12/2025  Plan of Care Certification: 6/17/2025 to 8/1/2025      PT/PTA: PT   Number of PTA visits since last PT visit:2  Time In: 1105   Time Out: 1200  Total Time (in minutes): 55   Total Billable Time (in minutes): 30    FOTO:  Intake Score:  %  Survey Score 2:  %  Survey Score 3:  %    Precautions:       Subjective   a little pain down R leg but less than its been.  Pain reported as 2/10. L hip 1/10, R buttock 5/10.    Objective            Treatment:  Therapeutic Exercise  TE 2: LTR 20x  TE 3: seated lumbar flexion 10x3 peanut  TE 4: DKTC peanut 2 x 10  TE 10: supine calf stretch 10x10"  Manual Therapy  MT 2: LAD R  Balance/Neuromuscular Re-Education  NMR 1: open book 15x B  NMR 2: reverse march (up/up, down/ down) 2x5  NMR 4: side-lying clamshell 2 x 10 ea  NMR 5: SAPD 2 x 10 BTB lvl I-III  NMR 6: Rows 3 x 10 BTB  NMR 7: Supine sciatic nerve glides (90/90) 2x10 B  NMR 9: Bridges 3 x 10  Therapeutic Activity  TA 1: STS elevated mat 2x10 4#    Time Entry(in minutes):  Manual Therapy Time Entry: 8  Neuromuscular Re-Education Time Entry: 22    Assessment & Plan   Assessment: Patient is a 69 y.o. M with a medical diagnosis of M54.16 (ICD-10-CM) - Lumbar radiculopathy, chronic M51.362 (ICD-10-CM) - Degeneration of intervertebral disc of lumbar region with " discogenic back pain and lower extremity pain M47.816 (ICD-10-CM) - Lumbar facet arthropathy . Patient with primary complaint of lower back pain with radiating symptoms into RLE. Patient presents with decreased back pain though ongoing radicular symptoms to right lower extremity. Continues to respond well to LAD reporting decreased pain. Continued neural mobility and lumbar stabilization program. Able to resume sit to stand this visit with elevated surface. Continue POC. Monitor response.  Evaluation/Treatment Tolerance: Patient tolerated treatment well    The patient will continue to benefit from skilled outpatient physical therapy in order to address the deficits listed in the problem list on the initial evaluation, provide patient and family education, and maximize the patients level of independence in the home and community environments.     The patient's spiritual, cultural, and educational needs were considered, and the patient is agreeable to the plan of care and goals.           Plan: cont POC focused on back    Goals:   Active       Ambulation/movement       Patient will walk 6 min without rest break (Progressing)       Start:  06/17/25    Expected End:  08/01/25               Changing body position       Patient will demonstrate moving sit to stand 10x in 30 sec (Progressing)       Start:  06/17/25    Expected End:  08/01/25               Pain       Patient will report pain of 3/10 demonstrating a reduction of overall pain (Progressing)       Start:  06/17/25    Expected End:  08/01/25               Range of Motion       Patient will achieve right knee ROM of  3-120 degrees  (Progressing)       Start:  02/12/25    Expected End:  05/23/25               Range of Motion       Patient will achieve spinal flexion to 45 deg (Progressing)       Start:  06/17/25    Expected End:  08/01/25            Patient will achieve bilateral spinal rotation ROM to 75% (Progressing)       Start:  06/17/25    Expected End:   08/01/25               Strength       Patient will achieve bilateral hip abduction strength of 4+/5 (Progressing)       Start:  02/12/25    Expected End:  05/23/25            Patient will improve bilateral knee flexion strength by 5kg  (Met)       Start:  02/12/25    Expected End:  04/11/25    Resolved:  04/23/25         Patient will improve right knee extension strength by 5kg (Met)       Start:  02/12/25    Expected End:  04/11/25    Resolved:  04/23/25            cervical goals       patient will improve cervical rotation 10 degrees (Progressing)       Start:  02/18/25    Expected End:  05/23/25            patient will improve cervical extension to 30 deg (Progressing)       Start:  02/18/25    Expected End:  05/23/25              Resolved       Changing body position       Patient will demonstrate moving sit to stand 8+x in 30 sec (Met)       Start:  02/12/25    Expected End:  05/23/25    Resolved:  05/23/25            Functional outcome       Patient will show a significant change in FOTO patient-reported outcome tool to demonstrate subjective improvement (Met)       Start:  02/12/25    Expected End:  04/11/25    Resolved:  05/23/25         Patient will demonstrate independence in home program for support of progression (Met)       Start:  02/12/25    Expected End:  03/14/25    Resolved:  04/23/25            Functional outcome       Patient will demonstrate independence in home program for support of progression (Met)       Start:  06/17/25    Expected End:  07/18/25    Resolved:  06/26/25            Pain       Patient will report pain of 4/10 max demonstrating a reduction of overall pain (Met)       Start:  02/12/25    Expected End:  03/14/25    Resolved:  03/28/25             Radha Lares, PT, DPT

## 2025-07-10 ENCOUNTER — CLINICAL SUPPORT (OUTPATIENT)
Dept: REHABILITATION | Facility: HOSPITAL | Age: 70
End: 2025-07-10
Payer: MEDICARE

## 2025-07-10 DIAGNOSIS — R29.898 DECREASED STRENGTH OF LOWER EXTREMITY: Primary | ICD-10-CM

## 2025-07-10 PROCEDURE — 97110 THERAPEUTIC EXERCISES: CPT | Mod: PN,CQ

## 2025-07-10 PROCEDURE — 97112 NEUROMUSCULAR REEDUCATION: CPT | Mod: PN,CQ

## 2025-07-10 NOTE — PROGRESS NOTES
"  Outpatient Rehab    Physical Therapy Visit    Patient Name: Celso Hernandez  MRN: [id number]  YOB: 1955  Encounter Date: 7/10/2025    Therapy Diagnosis:   Encounter Diagnosis   Name Primary?    Decreased strength of lower extremity Yes     Physician: Coleman Quintana FNP    Physician Orders: Eval and Treat  Medical Diagnosis: Primary osteoarthritis of right knee  Cervicalgia  Surgical Diagnosis: Not applicable for this Episode   Surgical Date: Not applicable for this Episode  Days Since Last Surgery: Not applicable for this Episode    Visit # / Visits Authorized:  36 / 42  Insurance Authorization Period: 2/14/2025 to 8/1/2025  Date of Evaluation: 2/12/2025  Plan of Care Certification: 6/17/2025 to 8/1/2025      PT/PTA: PTA   Number of PTA visits since last PT visit:1  Time In: 0900   Time Out: 0955  Total Time (in minutes): 55   Total Billable Time (in minutes): 30    FOTO:  Intake Score:  %  Survey Score 2:  %  Survey Score 3:  %    Precautions:       Subjective   Seeing some improvements in lower back pain, still having the same radicular symptoms..  Pain reported as 2/10. L hip 1/10, R buttock 5/10.    Objective            Treatment:  Therapeutic Exercise  TE 2: LTR 20x  TE 3: seated lumbar flexion 10x3 peanut  TE 4: DKTC peanut 2 x 10  TE 8: BKFO 20x  TE 10: supine calf stretch 10x10"  Balance/Neuromuscular Re-Education  NMR 1: open book 15x B  NMR 2: reverse march (up/up, down/ down) 2x5  NMR 3: BKFO 20x  NMR 4: side-lying clamshell 2 x 10 ea  NMR 5: SAPD 2 x 10 BTB lvl I-III  NMR 6: Rows 3 x 10 BTB  NMR 7: Supine sciatic nerve glides (90/90) 2x10 B  NMR 9: Bridges 3 x 10  Therapeutic Activity  TA 1: STS elevated mat 2x10 4#    Time Entry(in minutes):  Neuromuscular Re-Education Time Entry: 15  Therapeutic Activity Time Entry: 5  Therapeutic Exercise Time Entry: 10    Assessment & Plan   Assessment: Patient is a 69 y.o. M with a medical diagnosis of M54.16 (ICD-10-CM) - Lumbar radiculopathy, " chronic M51.362 (ICD-10-CM) - Degeneration of intervertebral disc of lumbar region with discogenic back pain and lower extremity pain M47.816 (ICD-10-CM) - Lumbar facet arthropathy . Patient with primary complaint of lower back pain with radiating symptoms into RLE. Lower back pain continues to be better controlled although radicular symptoms to right lower extremity remain the same. Continued neural mobility and lumbar stabilization program. Continue POC. Monitor response.  Evaluation/Treatment Tolerance: Patient tolerated treatment well    The patient will continue to benefit from skilled outpatient physical therapy in order to address the deficits listed in the problem list on the initial evaluation, provide patient and family education, and maximize the patients level of independence in the home and community environments.     The patient's spiritual, cultural, and educational needs were considered, and the patient is agreeable to the plan of care and goals.           Plan: cont POC focused on back    Goals:   Active       Ambulation/movement       Patient will walk 6 min without rest break (Progressing)       Start:  06/17/25    Expected End:  08/01/25               Changing body position       Patient will demonstrate moving sit to stand 10x in 30 sec (Progressing)       Start:  06/17/25    Expected End:  08/01/25               Pain       Patient will report pain of 3/10 demonstrating a reduction of overall pain (Progressing)       Start:  06/17/25    Expected End:  08/01/25               Range of Motion       Patient will achieve right knee ROM of  3-120 degrees  (Progressing)       Start:  02/12/25    Expected End:  05/23/25               Range of Motion       Patient will achieve spinal flexion to 45 deg (Progressing)       Start:  06/17/25    Expected End:  08/01/25            Patient will achieve bilateral spinal rotation ROM to 75% (Progressing)       Start:  06/17/25    Expected End:  08/01/25                Strength       Patient will achieve bilateral hip abduction strength of 4+/5 (Progressing)       Start:  02/12/25    Expected End:  05/23/25            Patient will improve bilateral knee flexion strength by 5kg  (Met)       Start:  02/12/25    Expected End:  04/11/25    Resolved:  04/23/25         Patient will improve right knee extension strength by 5kg (Met)       Start:  02/12/25    Expected End:  04/11/25    Resolved:  04/23/25            cervical goals       patient will improve cervical rotation 10 degrees (Progressing)       Start:  02/18/25    Expected End:  05/23/25            patient will improve cervical extension to 30 deg (Progressing)       Start:  02/18/25    Expected End:  05/23/25              Resolved       Changing body position       Patient will demonstrate moving sit to stand 8+x in 30 sec (Met)       Start:  02/12/25    Expected End:  05/23/25    Resolved:  05/23/25            Functional outcome       Patient will show a significant change in FOTO patient-reported outcome tool to demonstrate subjective improvement (Met)       Start:  02/12/25    Expected End:  04/11/25    Resolved:  05/23/25         Patient will demonstrate independence in home program for support of progression (Met)       Start:  02/12/25    Expected End:  03/14/25    Resolved:  04/23/25            Functional outcome       Patient will demonstrate independence in home program for support of progression (Met)       Start:  06/17/25    Expected End:  07/18/25    Resolved:  06/26/25            Pain       Patient will report pain of 4/10 max demonstrating a reduction of overall pain (Met)       Start:  02/12/25    Expected End:  03/14/25    Resolved:  03/28/25             Lucy Gaming, ELIE

## 2025-07-14 ENCOUNTER — CLINICAL SUPPORT (OUTPATIENT)
Dept: REHABILITATION | Facility: HOSPITAL | Age: 70
End: 2025-07-14
Payer: MEDICARE

## 2025-07-14 DIAGNOSIS — R29.898 DECREASED STRENGTH OF LOWER EXTREMITY: Primary | ICD-10-CM

## 2025-07-14 PROCEDURE — 97112 NEUROMUSCULAR REEDUCATION: CPT | Mod: PN,CQ

## 2025-07-14 PROCEDURE — 97530 THERAPEUTIC ACTIVITIES: CPT | Mod: PN,CQ

## 2025-07-14 PROCEDURE — 97110 THERAPEUTIC EXERCISES: CPT | Mod: PN,CQ

## 2025-07-14 NOTE — PROGRESS NOTES
"  Outpatient Rehab    Physical Therapy Visit    Patient Name: Celso Hernandez  MRN: [id number]  YOB: 1955  Encounter Date: 7/14/2025    Therapy Diagnosis:   Encounter Diagnosis   Name Primary?    Decreased strength of lower extremity Yes     Physician: Coleman Quintana FNP    Physician Orders: Eval and Treat  Medical Diagnosis: Primary osteoarthritis of right knee  Cervicalgia  Surgical Diagnosis: Not applicable for this Episode   Surgical Date: Not applicable for this Episode  Days Since Last Surgery: Not applicable for this Episode    Visit # / Visits Authorized:  37 / 42  Insurance Authorization Period: 2/14/2025 to 8/1/2025  Date of Evaluation: 2/12/2025  Plan of Care Certification: 6/17/2025 to 8/1/2025      PT/PTA: PTA   Number of PTA visits since last PT visit:2  Time In: 1000   Time Out: 1055  Total Time (in minutes): 55   Total Billable Time (in minutes): 40    FOTO:  Intake Score: 40%  Survey Score 2: 42%  Survey Score 3: 56%    Precautions:         Subjective   Lower back and knee pain overall low pain, mild stiffness, Primary complaint of radicular pain down RLE.  Pain reported as 2/10. L hip 1/10, R buttock 2/10,    Objective            Treatment:  Therapeutic Exercise  TE 2: LTR 20x  TE 3: seated lumbar flexion 10x3 peanut  TE 4: DKTC peanut 2 x 10  TE 5: open book 15x B  TE 10: supine calf stretch 10x10"  Balance/Neuromuscular Re-Education  NMR 2: reverse march (up/up, down/ down) 2x5  NMR 3: BKFO 20x  NMR 4: side-lying clamshell 3 x 10 ea  NMR 5: SAPD 2 x 10 BTB lvl I-III  NMR 6: Rows 3 x 10 BTB  NMR 7: Supine sciatic nerve glides (90/90) 2x10 B  NMR 9: Bridges 3 x 10  Therapeutic Activity  TA 1: STS elevated mat 2x10 5#    Time Entry(in minutes):  Neuromuscular Re-Education Time Entry: 20  Therapeutic Activity Time Entry: 5  Therapeutic Exercise Time Entry: 15    Assessment & Plan   Assessment: Patient is a 69 y.o. M with a medical diagnosis of M54.16 (ICD-10-CM) - Lumbar radiculopathy, " chronic M51.362 (ICD-10-CM) - Degeneration of intervertebral disc of lumbar region with discogenic back pain and lower extremity pain M47.816 (ICD-10-CM) - Lumbar facet arthropathy . Patient with primary complaint of lower back pain with radiating symptoms into RLE. Lower back pain continues to be better controlled although radicular symptoms to right lower extremity remain primary complaint. Continued neural mobility and lumbar stabilization program. Mild increased hip pain (2/10) following standing exercises but overall tolerated well. Continue POC. Monitor response.  Evaluation/Treatment Tolerance: Patient tolerated treatment well    The patient will continue to benefit from skilled outpatient physical therapy in order to address the deficits listed in the problem list on the initial evaluation, provide patient and family education, and maximize the patients level of independence in the home and community environments.     The patient's spiritual, cultural, and educational needs were considered, and the patient is agreeable to the plan of care and goals.           Plan: cont POC focused on back    Goals:   Active       Ambulation/movement       Patient will walk 6 min without rest break (Progressing)       Start:  06/17/25    Expected End:  08/01/25               Changing body position       Patient will demonstrate moving sit to stand 10x in 30 sec (Progressing)       Start:  06/17/25    Expected End:  08/01/25               Pain       Patient will report pain of 3/10 demonstrating a reduction of overall pain (Progressing)       Start:  06/17/25    Expected End:  08/01/25               Range of Motion       Patient will achieve right knee ROM of  3-120 degrees  (Progressing)       Start:  02/12/25    Expected End:  05/23/25               Range of Motion       Patient will achieve spinal flexion to 45 deg (Progressing)       Start:  06/17/25    Expected End:  08/01/25            Patient will achieve bilateral  spinal rotation ROM to 75% (Progressing)       Start:  06/17/25    Expected End:  08/01/25               Strength       Patient will achieve bilateral hip abduction strength of 4+/5 (Progressing)       Start:  02/12/25    Expected End:  05/23/25            Patient will improve bilateral knee flexion strength by 5kg  (Met)       Start:  02/12/25    Expected End:  04/11/25    Resolved:  04/23/25         Patient will improve right knee extension strength by 5kg (Met)       Start:  02/12/25    Expected End:  04/11/25    Resolved:  04/23/25            cervical goals       patient will improve cervical rotation 10 degrees (Progressing)       Start:  02/18/25    Expected End:  05/23/25            patient will improve cervical extension to 30 deg (Progressing)       Start:  02/18/25    Expected End:  05/23/25              Resolved       Changing body position       Patient will demonstrate moving sit to stand 8+x in 30 sec (Met)       Start:  02/12/25    Expected End:  05/23/25    Resolved:  05/23/25            Functional outcome       Patient will show a significant change in FOTO patient-reported outcome tool to demonstrate subjective improvement (Met)       Start:  02/12/25    Expected End:  04/11/25    Resolved:  05/23/25         Patient will demonstrate independence in home program for support of progression (Met)       Start:  02/12/25    Expected End:  03/14/25    Resolved:  04/23/25            Functional outcome       Patient will demonstrate independence in home program for support of progression (Met)       Start:  06/17/25    Expected End:  07/18/25    Resolved:  06/26/25            Pain       Patient will report pain of 4/10 max demonstrating a reduction of overall pain (Met)       Start:  02/12/25    Expected End:  03/14/25    Resolved:  03/28/25             Lucy Gaming PTA

## 2025-07-17 ENCOUNTER — CLINICAL SUPPORT (OUTPATIENT)
Dept: REHABILITATION | Facility: HOSPITAL | Age: 70
End: 2025-07-17
Payer: MEDICARE

## 2025-07-17 DIAGNOSIS — R29.898 DECREASED STRENGTH OF LOWER EXTREMITY: Primary | ICD-10-CM

## 2025-07-17 PROCEDURE — 97530 THERAPEUTIC ACTIVITIES: CPT | Mod: PN

## 2025-07-17 PROCEDURE — 97112 NEUROMUSCULAR REEDUCATION: CPT | Mod: PN

## 2025-07-17 PROCEDURE — 97110 THERAPEUTIC EXERCISES: CPT | Mod: PN

## 2025-07-17 PROCEDURE — 97140 MANUAL THERAPY 1/> REGIONS: CPT | Mod: PN

## 2025-07-17 NOTE — PROGRESS NOTES
"  Outpatient Rehab    Physical Therapy Visit    Patient Name: Celso Hernandez  MRN: [id number]  YOB: 1955  Encounter Date: 7/17/2025    Therapy Diagnosis:   Encounter Diagnosis   Name Primary?    Decreased strength of lower extremity Yes     Physician: Coleman Quintana FNP    Physician Orders: Eval and Treat  Medical Diagnosis: Primary osteoarthritis of right knee  Cervicalgia  Surgical Diagnosis: Not applicable for this Episode   Surgical Date: Not applicable for this Episode  Days Since Last Surgery: Not applicable for this Episode    Visit # / Visits Authorized:  38 / 42  Insurance Authorization Period: 2/14/2025 to 8/1/2025  Date of Evaluation: 2/12/2025  Plan of Care Certification: 6/17/2025 to 8/1/2025      PT/PTA: PT   Number of PTA visits since last PT visit:2  Time In: 1005   Time Out: 1100  Total Time (in minutes): 55   Total Billable Time (in minutes): 55    FOTO:  Intake Score: 40%  Survey Score 2: 42%  Survey Score 3: 56%    Precautions:         Subjective   Lower back today Primary complaint of radicular pain down RLE but also having mild L side tingling.  Pain reported as 5/10. back, L hip 2/10, R LE 5/10,    Objective            Treatment:  Therapeutic Exercise  TE 1: SKTC 10x10"  TE 2: LTR 20x  TE 3: seated lumbar flexion 10x3 peanut  TE 4: DKTC peanut 2 x 10  TE 5: open book 15x B  Manual Therapy  MT 2: LAD B  Balance/Neuromuscular Re-Education  NMR 3: defer BKFO  NMR 4: side-lying clamshell 1 x 10 ea  NMR 5: SAPD 2 x 10 BTB lvl I-III  NMR 7: Supine sciatic nerve glides 2x10 B  NMR 8: sidelying hip abduction from bolster 11x then pain  NMR 9: Bridges 3 x 10  Therapeutic Activity  TA 1: STS elevated mat 2x10 5#  TA 2: pallof press GTB 15x    Time Entry(in minutes):  Manual Therapy Time Entry: 15  Neuromuscular Re-Education Time Entry: 15  Therapeutic Activity Time Entry: 10  Therapeutic Exercise Time Entry: 15    Assessment & Plan   Assessment: Patient is a 69 y.o. M with a medical " diagnosis of M54.16 (ICD-10-CM) - Lumbar radiculopathy, chronic M51.362 (ICD-10-CM) - Degeneration of intervertebral disc of lumbar region with discogenic back pain and lower extremity pain M47.816 (ICD-10-CM) - Lumbar facet arthropathy. Patient with primary complaint of lower back pain with radiating symptoms into RLE, some left symptoms reported today.  Good response to LAD bilateral with 2 patient pain reduction. Followed with mobility and flexion based interventions to reduce radicular symptoms. Increased hip pain with sit to stand, all other exercises tolerated well. Continue POC. Monitor response.  Evaluation/Treatment Tolerance: Patient tolerated treatment well    The patient will continue to benefit from skilled outpatient physical therapy in order to address the deficits listed in the problem list on the initial evaluation, provide patient and family education, and maximize the patients level of independence in the home and community environments.     The patient's spiritual, cultural, and educational needs were considered, and the patient is agreeable to the plan of care and goals.           Plan: cont POC focused on back    Goals:   Active       Ambulation/movement       Patient will walk 6 min without rest break (Progressing)       Start:  06/17/25    Expected End:  08/01/25               Changing body position       Patient will demonstrate moving sit to stand 10x in 30 sec (Progressing)       Start:  06/17/25    Expected End:  08/01/25               Pain       Patient will report pain of 3/10 demonstrating a reduction of overall pain (Progressing)       Start:  06/17/25    Expected End:  08/01/25               Range of Motion       Patient will achieve right knee ROM of  3-120 degrees  (Progressing)       Start:  02/12/25    Expected End:  05/23/25               Range of Motion       Patient will achieve spinal flexion to 45 deg (Progressing)       Start:  06/17/25    Expected End:  08/01/25             Patient will achieve bilateral spinal rotation ROM to 75% (Progressing)       Start:  06/17/25    Expected End:  08/01/25               Strength       Patient will achieve bilateral hip abduction strength of 4+/5 (Progressing)       Start:  02/12/25    Expected End:  05/23/25            Patient will improve bilateral knee flexion strength by 5kg  (Met)       Start:  02/12/25    Expected End:  04/11/25    Resolved:  04/23/25         Patient will improve right knee extension strength by 5kg (Met)       Start:  02/12/25    Expected End:  04/11/25    Resolved:  04/23/25            cervical goals       patient will improve cervical rotation 10 degrees (Adequate for Care Transition)       Start:  02/18/25    Expected End:  05/23/25            patient will improve cervical extension to 30 deg (Unable to Meet)       Start:  02/18/25    Expected End:  05/23/25              Resolved       Changing body position       Patient will demonstrate moving sit to stand 8+x in 30 sec (Met)       Start:  02/12/25    Expected End:  05/23/25    Resolved:  05/23/25            Functional outcome       Patient will show a significant change in FOTO patient-reported outcome tool to demonstrate subjective improvement (Met)       Start:  02/12/25    Expected End:  04/11/25    Resolved:  05/23/25         Patient will demonstrate independence in home program for support of progression (Met)       Start:  02/12/25    Expected End:  03/14/25    Resolved:  04/23/25            Functional outcome       Patient will demonstrate independence in home program for support of progression (Met)       Start:  06/17/25    Expected End:  07/18/25    Resolved:  06/26/25            Pain       Patient will report pain of 4/10 max demonstrating a reduction of overall pain (Met)       Start:  02/12/25    Expected End:  03/14/25    Resolved:  03/28/25             Radha Lares, PT, DPT

## 2025-07-18 RX ORDER — ROSUVASTATIN CALCIUM 40 MG/1
TABLET, COATED ORAL
Qty: 90 TABLET | Refills: 2 | Status: SHIPPED | OUTPATIENT
Start: 2025-07-18

## 2025-07-18 RX ORDER — MELOXICAM 15 MG/1
15 TABLET ORAL
Qty: 90 TABLET | Refills: 0 | Status: SHIPPED | OUTPATIENT
Start: 2025-07-18

## 2025-07-18 NOTE — TELEPHONE ENCOUNTER
Refill Routing Note   Medication(s) are not appropriate for processing by [facility] for the following reason(s):        Outside of protocol    [facility] action(s):  Approve  Route   Requires labs : Yes             Appointments  past 12m or future 3m with PCP    Date Provider   Last Visit   4/3/2025 Inder Quarles MD   Next Visit   10/9/2025 Inder Quarles MD   ED visits in past 90 days: 0        Note composed:1:08 PM 07/18/2025

## 2025-07-18 NOTE — TELEPHONE ENCOUNTER
Care Due:                  Date            Visit Type   Department     Provider  --------------------------------------------------------------------------------                                EP -                              PRIMARY      [facility] INTERNAL  Last Visit: 04-      CARE ([facility])   MEDICINE       Inderharry Quarles                              EP -                              PRIMARY      [facility] INTERNAL  Next Visit: 10-      CARE ([facility])   MEDICINE       Inderej Quarles                                                            Last  Test          Frequency    Reason                     Performed    Due Date  --------------------------------------------------------------------------------    HBA1C.......  6 months...  metFORMIN................  03- 09-    [employer] Embedded Care Due Messages. Reference number: [id number].   7/18/2025 7:01:52 AM CDT

## 2025-07-21 ENCOUNTER — CLINICAL SUPPORT (OUTPATIENT)
Dept: REHABILITATION | Facility: HOSPITAL | Age: 70
End: 2025-07-21
Payer: MEDICARE

## 2025-07-21 DIAGNOSIS — M25.661 DECREASED RANGE OF MOTION (ROM) OF RIGHT KNEE: ICD-10-CM

## 2025-07-21 DIAGNOSIS — R29.898 DECREASED RANGE OF MOTION OF NECK: ICD-10-CM

## 2025-07-21 DIAGNOSIS — M54.2 CERVICALGIA: Primary | ICD-10-CM

## 2025-07-21 DIAGNOSIS — M47.812 FACET ARTHROPATHY, CERVICAL: ICD-10-CM

## 2025-07-21 PROCEDURE — 97530 THERAPEUTIC ACTIVITIES: CPT | Mod: PN,CQ

## 2025-07-21 PROCEDURE — 97112 NEUROMUSCULAR REEDUCATION: CPT | Mod: PN,CQ

## 2025-07-21 PROCEDURE — 97110 THERAPEUTIC EXERCISES: CPT | Mod: PN,CQ

## 2025-07-21 PROCEDURE — 97140 MANUAL THERAPY 1/> REGIONS: CPT | Mod: PN,CQ

## 2025-07-21 NOTE — PROGRESS NOTES
"  Outpatient Rehab    Physical Therapy Visit    Patient Name: Celso Hernandez  MRN: [id number]  YOB: 1955  Encounter Date: 7/21/2025    Therapy Diagnosis:   Encounter Diagnoses   Name Primary?    Cervicalgia Yes    Facet arthropathy, cervical     Decreased range of motion of neck     Decreased range of motion (ROM) of right knee      Physician: Coleman Quintana FNP    Physician Orders: Eval and Treat  Medical Diagnosis: Primary osteoarthritis of right knee  Cervicalgia  Surgical Diagnosis: Not applicable for this Episode   Surgical Date: Not applicable for this Episode  Days Since Last Surgery: Not applicable for this Episode    Visit # / Visits Authorized:  39 / 42  Insurance Authorization Period: 2/14/2025 to 8/1/2025  Date of Evaluation: 5/15/2023  Plan of Care Certification:       PT/PTA: PTA   Number of PTA visits since last PT visit:3  Time In: 1000   Time Out: 1100  Total Time (in minutes): 60   Total Billable Time (in minutes): 55    FOTO:  Intake Score (%): Not applicable for this Episode  Survey Score 2 (%): Not applicable for this Episode  Survey Score 3 (%): Not applicable for this Episode    Precautions:         Subjective   Pt reports he is doind exercises at home..    2/10 R LE, 1/10 L LE, lumbar 2/10    Objective            Treatment:  Therapeutic Exercise  TE 1: SKTC 10x10"  TE 2: LTR 20x  TE 3: seated lumbar flexion 10x3 peanut  TE 4: DKTC peanut 2 x 10  TE 5: open book 15x B  TE 9: Blue TB Low Rows 2x15  Manual Therapy  MT 2: LAD B  Balance/Neuromuscular Re-Education  NMR 3: defer BKFO  NMR 4: side-lying clamshell 1 x 10 ea  NMR 5: SAPD 2 x 10 BTB lvl I-III  NMR 7: Supine sciatic nerve glides 2x10 B DEFER PAIN  NMR 9: Bridges 3 x 10  Therapeutic Activity  TA 1: STS elevated mat 2x10 5#  TA 2: pallof press GTB 15x2      Time Entry(in minutes):  Manual Therapy Time Entry: 10  Neuromuscular Re-Education Time Entry: 20  Therapeutic Activity Time Entry: 10  Therapeutic Exercise Time Entry: " 15    Assessment & Plan   Assessment: Patient is a 69 y.o. 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 Patient with primary complaint of lower back pain with radiating symptoms into RLE, some left symptoms reported today.   Pt arrived to session with reports of mild lumbar / B LE pain. No reports of increased pain, rated below 3/10 pain scale. Pt completed today's treatment focusing on lumbar strengthening / ROM within tolerance and no provocation of pain.  Reviewed log rolling for ergonomic & safer sup<>sit transition avoiding lumbar straining.   Evaluation/Treatment Tolerance: Patient tolerated treatment well    The patient will continue to benefit from skilled outpatient physical therapy in order to address the deficits listed in the problem list on the initial evaluation, provide patient and family education, and maximize the patients level of independence in the home and community environments.     The patient's spiritual, cultural, and educational needs were considered, and the patient is agreeable to the plan of care and goals.           Plan: cont POC focused on lumbar ROM/ strengthening    Goals:   Active         Ambulation/movement         Patient will walk 6 min without rest break (Progressing)         Start:  06/17/25    Expected End:  08/01/25                 Changing body position         Patient will demonstrate moving sit to stand 10x in 30 sec (Progressing)         Start:  06/17/25    Expected End:  08/01/25                 Pain         Patient will report pain of 3/10 demonstrating a reduction of overall pain (Progressing)         Start:  06/17/25    Expected End:  08/01/25                 Range of Motion         Patient will achieve right knee ROM of  3-120 degrees  (Progressing)          Start:  02/12/25    Expected End:  05/23/25                 Range of Motion         Patient will achieve spinal flexion to 45 deg (Progressing)         Start:  06/17/25    Expected End:  08/01/25              Patient will achieve bilateral spinal rotation ROM to 75% (Progressing)         Start:  06/17/25    Expected End:  08/01/25                 Strength         Patient will achieve bilateral hip abduction strength of 4+/5 (Progressing)         Start:  02/12/25    Expected End:  05/23/25              Patient will improve bilateral knee flexion strength by 5kg  (Met)         Start:  02/12/25    Expected End:  04/11/25    Resolved:  04/23/25           Patient will improve right knee extension strength by 5kg (Met)         Start:  02/12/25    Expected End:  04/11/25    Resolved:  04/23/25              cervical goals         patient will improve cervical rotation 10 degrees (Adequate for Care Transition)         Start:  02/18/25    Expected End:  05/23/25              patient will improve cervical extension to 30 deg (Unable to Meet)         Start:  02/18/25    Expected End:  05/23/25               Resolved         Changing body position         Patient will demonstrate moving sit to stand 8+x in 30 sec (Met)         Start:  02/12/25    Expected End:  05/23/25    Resolved:  05/23/25              Functional outcome         Patient will show a significant change in FOTO patient-reported outcome tool to demonstrate subjective improvement (Met)         Start:  02/12/25    Expected End:  04/11/25    Resolved:  05/23/25           Patient will demonstrate independence in home program for support of progression (Met)         Start:  02/12/25    Expected End:  03/14/25    Resolved:  04/23/25              Functional outcome         Patient will demonstrate independence in home program for support of progression (Met)         Start:  06/17/25    Expected End:  07/18/25    Resolved:  06/26/25              Pain         Patient will  report pain of 4/10 max demonstrating a reduction of overall pain (Met)         Start:  02/12/25    Expected End:  03/14/25    Resolved:  03/28/25             Jerod Parnell PTA

## 2025-07-24 ENCOUNTER — CLINICAL SUPPORT (OUTPATIENT)
Dept: REHABILITATION | Facility: HOSPITAL | Age: 70
End: 2025-07-24
Payer: MEDICARE

## 2025-07-24 DIAGNOSIS — M47.816 LUMBAR FACET ARTHROPATHY: ICD-10-CM

## 2025-07-24 DIAGNOSIS — M51.362 DEGENERATION OF INTERVERTEBRAL DISC OF LUMBAR REGION WITH DISCOGENIC BACK PAIN AND LOWER EXTREMITY PAIN: ICD-10-CM

## 2025-07-24 DIAGNOSIS — R29.898 DECREASED STRENGTH OF LOWER EXTREMITY: Primary | ICD-10-CM

## 2025-07-24 DIAGNOSIS — M54.16 LUMBAR RADICULOPATHY, CHRONIC: ICD-10-CM

## 2025-07-24 PROCEDURE — 97112 NEUROMUSCULAR REEDUCATION: CPT | Mod: PN

## 2025-07-24 PROCEDURE — 97140 MANUAL THERAPY 1/> REGIONS: CPT | Mod: PN

## 2025-07-24 NOTE — PROGRESS NOTES
"  Outpatient Rehab    Physical Therapy Visit    Patient Name: Celso Hernandez  MRN: [id number]  YOB: 1955  Encounter Date: 7/24/2025    Therapy Diagnosis:   Encounter Diagnoses   Name Primary?    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 back pain and lower extremity pain     Lumbar radiculopathy, chronic      Physician: Coleman Quintana FNP    Physician Orders: Eval and Treat  Medical Diagnosis: Primary osteoarthritis of right knee  Cervicalgia  Surgical Diagnosis: Not applicable for this Episode   Surgical Date: Not applicable for this Episode  Days Since Last Surgery: Not applicable for this Episode    Visit # / Visits Authorized:  40 / 42  Insurance Authorization Period: 2/14/2025 to 8/1/2025  Date of Evaluation: 2/12/2025  Plan of Care Certification: 6/17/2025 to 8/1/2025      PT/PTA: PT   Number of PTA visits since last PT visit:1  Time In: 1000   Time Out: 1056  Total Time (in minutes): 56   Total Billable Time (in minutes): 30    FOTO:  Intake Score (%): 40  Survey Score 2 (%): 42  Survey Score 3 (%): 56    Precautions:         Subjective   today is not his worst day..  Pain reported as 2/10. 2/10 R LE, 1/10 L LE, lumbar 2/10    Objective            Treatment:  Therapeutic Exercise  TE 1: SKTC 10x10"  TE 2: LTR 20x  TE 3: seated lumbar flexion 10x3 peanut  TE 4: DKTC peanut 2 x 10  TE 5: open book 15x B  TE 9: Blue TB Low Rows 2x15  TE 10: supine calf stretch 10x10"  Manual Therapy  MT 2: LAD B  Balance/Neuromuscular Re-Education  NMR 2: reverse march (up/up, down/ down) 2x5  NMR 4: side-lying clamshell 1 x 10 ea  NMR 7: Supine sciatic nerve glides 2x10 B  NMR 9: Bridges 3 x 10      Time Entry(in minutes):  Manual Therapy Time Entry: 10  Neuromuscular Re-Education Time Entry: 20    Assessment & Plan   Assessment: Patient is a 69 y.o. M with a medical diagnosis of M54.16 (ICD-10-CM) - Lumbar " radiculopathy, chronic M51.362 (ICD-10-CM) - Degeneration of intervertebral disc of lumbar region with discogenic back pain and lower extremity pain M47.816 (ICD-10-CM) - Lumbar facet arthropathy. Patient with primary complaint of lower back pain with radiating symptoms into RLE, some left symptoms reported today.  Pain low grade at start of session.  Good response to LAD with reduction in tingling. Good tolerance to lumbar strengthening / ROM with no provocation of pain. Slower performance of activities today, unable to complete banded exercises.   Evaluation/Treatment Tolerance: Patient tolerated treatment well    The patient will continue to benefit from skilled outpatient physical therapy in order to address the deficits listed in the problem list on the initial evaluation, provide patient and family education, and maximize the patients level of independence in the home and community environments.     The patient's spiritual, cultural, and educational needs were considered, and the patient is agreeable to the plan of care and goals.           Plan: cont POC focused on lumbar ROM/ strengthening    Goals:   Active         Ambulation/movement         Patient will walk 6 min without rest break (Progressing)         Start:  06/17/25    Expected End:  08/01/25                 Changing body position         Patient will demonstrate moving sit to stand 10x in 30 sec (Progressing)         Start:  06/17/25    Expected End:  08/01/25                 Pain         Patient will report pain of 3/10 demonstrating a reduction of overall pain (Progressing)         Start:  06/17/25    Expected End:  08/01/25                 Range of Motion         Patient will achieve right knee ROM of  3-120 degrees  (Progressing)         Start:  02/12/25    Expected End:  05/23/25                 Range of Motion         Patient will achieve spinal flexion to 45 deg (Progressing)         Start:  06/17/25    Expected End:  08/01/25               Patient will achieve bilateral spinal rotation ROM to 75% (Progressing)         Start:  06/17/25    Expected End:  08/01/25                 Strength         Patient will achieve bilateral hip abduction strength of 4+/5 (Progressing)         Start:  02/12/25    Expected End:  05/23/25              Patient will improve bilateral knee flexion strength by 5kg  (Met)         Start:  02/12/25    Expected End:  04/11/25    Resolved:  04/23/25           Patient will improve right knee extension strength by 5kg (Met)         Start:  02/12/25    Expected End:  04/11/25    Resolved:  04/23/25              cervical goals         patient will improve cervical rotation 10 degrees (Adequate for Care Transition)         Start:  02/18/25    Expected End:  05/23/25              patient will improve cervical extension to 30 deg (Unable to Meet)         Start:  02/18/25    Expected End:  05/23/25               Resolved         Changing body position         Patient will demonstrate moving sit to stand 8+x in 30 sec (Met)         Start:  02/12/25    Expected End:  05/23/25    Resolved:  05/23/25              Functional outcome         Patient will show a significant change in FOTO patient-reported outcome tool to demonstrate subjective improvement (Met)         Start:  02/12/25    Expected End:  04/11/25    Resolved:  05/23/25           Patient will demonstrate independence in home program for support of progression (Met)         Start:  02/12/25    Expected End:  03/14/25    Resolved:  04/23/25              Functional outcome         Patient will demonstrate independence in home program for support of progression (Met)         Start:  06/17/25    Expected End:  07/18/25    Resolved:  06/26/25              Pain         Patient will report pain of 4/10 max demonstrating a reduction of overall pain (Met)         Start:  02/12/25    Expected End:  03/14/25    Resolved:  03/28/25             Radha Lares, PT, DPT

## 2025-07-28 ENCOUNTER — CLINICAL SUPPORT (OUTPATIENT)
Dept: REHABILITATION | Facility: HOSPITAL | Age: 70
End: 2025-07-28
Payer: MEDICARE

## 2025-07-28 DIAGNOSIS — M47.816 LUMBAR FACET ARTHROPATHY: ICD-10-CM

## 2025-07-28 DIAGNOSIS — R29.898 DECREASED STRENGTH OF LOWER EXTREMITY: Primary | ICD-10-CM

## 2025-07-28 DIAGNOSIS — M54.16 LUMBAR RADICULOPATHY, CHRONIC: ICD-10-CM

## 2025-07-28 DIAGNOSIS — M51.362 DEGENERATION OF INTERVERTEBRAL DISC OF LUMBAR REGION WITH DISCOGENIC BACK PAIN AND LOWER EXTREMITY PAIN: ICD-10-CM

## 2025-07-28 PROCEDURE — 97140 MANUAL THERAPY 1/> REGIONS: CPT | Mod: PN

## 2025-07-28 PROCEDURE — 97112 NEUROMUSCULAR REEDUCATION: CPT | Mod: PN

## 2025-07-28 NOTE — PROGRESS NOTES
"  Outpatient Rehab    Physical Therapy Visit    Patient Name: Celso Hernandez  MRN: [id number]  YOB: 1955  Encounter Date: 7/28/2025    Therapy Diagnosis:   Encounter Diagnoses   Name Primary?    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 back pain and lower extremity pain     Lumbar radiculopathy, chronic      Physician: Coleman Quintana FNP    Physician Orders: Eval and Treat  Medical Diagnosis: Primary osteoarthritis of right knee  Cervicalgia  Surgical Diagnosis: Not applicable for this Episode   Surgical Date: Not applicable for this Episode  Days Since Last Surgery: Not applicable for this Episode    Visit # / Visits Authorized:  41 / 42  Insurance Authorization Period: 2/14/2025 to 8/1/2025  Date of Evaluation: 2/12/2025  Plan of Care Certification: 6/17/2025 to 8/1/2025      PT/PTA: PT   Number of PTA visits since last PT visit:0  Time In: 1007   Time Out: 1100  Total Time (in minutes): 53   Total Billable Time (in minutes): 30    FOTO:  Intake Score (%): 40  Survey Score 2 (%): 42  Survey Score 3 (%): 56    Precautions:         Subjective   today is "malorie bad". had an active day yesterday and did more walking today..    5/10 R LE, 2/10 L LE, lumbar 2/10    Objective            Treatment:  Therapeutic Exercise  TE 1: SKTC 10x10"  TE 2: LTR 20x  TE 3: seated lumbar flexion 10x3 peanut  TE 4: DKTC peanut 3 x 10  TE 5: open book 15x B  TE 9: Blue TB Low Rows 2x15  TE 10: supine calf stretch 10x10"  Manual Therapy  MT 2: LAD B  Balance/Neuromuscular Re-Education  NMR 2: reverse march (up/up, down/ down) 2x5  NMR 5: SAPD 2 x 10 BTB lvl I-III  NMR 6: Rows 3 x 10 BTB  NMR 7: Supine sciatic nerve glides 2x10 B  NMR 9: Bridges 3 x 10      Time Entry(in minutes):  Manual Therapy Time Entry: 10  Neuromuscular Re-Education Time Entry: 20    Assessment & Plan   Assessment: Patient is a 69 y.o. M with a medical " diagnosis of M54.16 (ICD-10-CM) - Lumbar radiculopathy, chronic M51.362 (ICD-10-CM) - Degeneration of intervertebral disc of lumbar region with discogenic back pain and lower extremity pain M47.816 (ICD-10-CM) - Lumbar facet arthropathy. Patient with primary complaint of lower back pain with radiating symptoms into RLE, some left symptoms reported today.  Pain moderate at start of session.  Good response to LAD with reduction in tingling. Patient performed flexion based movements for centralization with good response. He reported good improvement in symptoms post session. One remaining authorized visit.   Evaluation/Treatment Tolerance: Patient tolerated treatment well    The patient will continue to benefit from skilled outpatient physical therapy in order to address the deficits listed in the problem list on the initial evaluation, provide patient and family education, and maximize the patients level of independence in the home and community environments.     The patient's spiritual, cultural, and educational needs were considered, and the patient is agreeable to the plan of care and goals.           Plan: cont POC focused on lumbar ROM/ strengthening    Goals:   Active         Ambulation/movement         Patient will walk 6 min without rest break (Progressing)         Start:  06/17/25    Expected End:  08/01/25                 Changing body position         Patient will demonstrate moving sit to stand 10x in 30 sec (Progressing)         Start:  06/17/25    Expected End:  08/01/25                 Pain         Patient will report pain of 3/10 demonstrating a reduction of overall pain (Progressing)         Start:  06/17/25    Expected End:  08/01/25                 Range of Motion         Patient will achieve right knee ROM of  3-120 degrees  (Progressing)         Start:  02/12/25    Expected End:  05/23/25                 Range of Motion         Patient will achieve spinal flexion to 45 deg (Progressing)          Start:  06/17/25    Expected End:  08/01/25              Patient will achieve bilateral spinal rotation ROM to 75% (Progressing)         Start:  06/17/25    Expected End:  08/01/25                 Strength         Patient will achieve bilateral hip abduction strength of 4+/5 (Progressing)         Start:  02/12/25    Expected End:  05/23/25              Patient will improve bilateral knee flexion strength by 5kg  (Met)         Start:  02/12/25    Expected End:  04/11/25    Resolved:  04/23/25           Patient will improve right knee extension strength by 5kg (Met)         Start:  02/12/25    Expected End:  04/11/25    Resolved:  04/23/25              cervical goals         patient will improve cervical rotation 10 degrees (Adequate for Care Transition)         Start:  02/18/25    Expected End:  05/23/25              patient will improve cervical extension to 30 deg (Unable to Meet)         Start:  02/18/25    Expected End:  05/23/25               Resolved         Changing body position         Patient will demonstrate moving sit to stand 8+x in 30 sec (Met)         Start:  02/12/25    Expected End:  05/23/25    Resolved:  05/23/25              Functional outcome         Patient will show a significant change in FOTO patient-reported outcome tool to demonstrate subjective improvement (Met)         Start:  02/12/25    Expected End:  04/11/25    Resolved:  05/23/25           Patient will demonstrate independence in home program for support of progression (Met)         Start:  02/12/25    Expected End:  03/14/25    Resolved:  04/23/25              Functional outcome         Patient will demonstrate independence in home program for support of progression (Met)         Start:  06/17/25    Expected End:  07/18/25    Resolved:  06/26/25              Pain         Patient will report pain of 4/10 max demonstrating a reduction of overall pain (Met)         Start:  02/12/25    Expected End:  03/14/25    Resolved:  03/28/25              Radha Lares, PT, DPT

## 2025-07-31 ENCOUNTER — CLINICAL SUPPORT (OUTPATIENT)
Dept: REHABILITATION | Facility: HOSPITAL | Age: 70
End: 2025-07-31
Payer: MEDICARE

## 2025-07-31 DIAGNOSIS — R29.898 DECREASED STRENGTH OF LOWER EXTREMITY: Primary | ICD-10-CM

## 2025-07-31 PROCEDURE — 97110 THERAPEUTIC EXERCISES: CPT | Mod: PN

## 2025-07-31 PROCEDURE — 97530 THERAPEUTIC ACTIVITIES: CPT | Mod: PN

## 2025-07-31 NOTE — PROGRESS NOTES
Outpatient Rehab    Physical Therapy Progress Note : Updated Plan of Care    Patient Name: Celso Hernandez  MRN: [id number]  YOB: 1955  Encounter Date: 7/31/2025    Therapy Diagnosis:   Encounter Diagnosis   Name Primary?    Decreased strength of lower extremity Yes     Physician: Coleman Quintana FNP    Physician Orders: Eval and Treat  Medical Diagnosis: Primary osteoarthritis of right knee  Cervicalgia  Surgical Diagnosis: Not applicable for this Episode   Surgical Date: Not applicable for this Episode  Days Since Last Surgery: Not applicable for this Episode    Visit # / Visits Authorized: 42 / 42  Insurance Authorization Period: 2/14/2025 to 8/1/2025  Date of Evaluation: 2/12/2025   Plan of Care Certification: 7/31/2025 to 8/29/25     PT/PTA: PT   Number of PTA visits since last PT visit:0  Time In: 1005   Time Out: 1100  Total Time (in minutes): 55   Total Billable Time (in minutes): 30    FOTO:  Intake Score (%): 40  Survey Score 2 (%): 42  Survey Score 3 (%): 56    Precautions:       Subjective   Pt reports ongoing pain in right leg greater than Left. numbness and tingling in R more than L. mild to moderate back pain. continues to have difficulty with transitions and walking. feels like he is walking on coals when he walks..    4/10 R LE, 2/10 L LE, lumbar 2/10    Objective      Lumbar Range of Motion   Active (deg) Passive (deg) Pain   Flexion 35   Yes   Extension 15   Yes (increased tingling R)   Right Lateral Flexion 15   Yes   Right Rotation  (25%)   Yes   Left Lateral Flexion 15   Yes   Left Rotation  (25%)   Yes            Knee Range of Motion   Right Knee   Active (deg) Passive (deg) Pain   Flexion 120 120     Extension 2 1         Left Knee   Active (deg) Passive (deg) Pain   Flexion 120 120 Yes (hip)   Extension 1 1                        Hip Strength - Planes of Motion   Right Strength Right Pain Left Strength Left  Pain   Flexion (L2) 4+   4+ Yes (hip pain)   Extension 4 Yes 4    "  ABduction 4+   4     ADduction 5   5     Internal Rotation 5   4+ Yes   External Rotation 5   4+ Yes       Knee Strength   Right Strength Right Pain Left Strength Left  Pain   Flexion (S2)  (15.3kg)    (14.8kg)     Prone Flexion           Extension (L3)  (16.6kg)    (15.8kg)            Ankle/Foot Strength - Planes of Motion   Right Strength Right Pain Left Strength Left  Pain   Dorsiflexion (L4) 5   5     Plantar Flexion (S1)           Inversion           Eversion           Great Toe Flexion           Great Toe Extension (L5)           Lesser Toes Flexion           Lesser Toes Extension                     Sit to Stand Testing      The patient completed 9 repetitions of a sit to stand transfer in 30 seconds.            Gait Analysis  Base of Support: Wide  Gait Pattern: Antalgic            Gait Analysis Details  364ft          Treatment:  Therapeutic Exercise  TE 1: SKTC 10x10"  TE 2: LTR 20x  TE 3: seated lumbar flexion 10x3 peanut  TE 4: DKTC peanut 3 x 10  TE 5: open book 15x B  TE 8: BKFO 20x  TE 9: Blue TB Low Rows 2x15  TE 10: supine calf stretch 10x10"  Therapeutic Activity  TA 1: STS elevated mat 2x10 5#  TA 3: 2MWT  TA 4: objective measures    Time Entry(in minutes):  Therapeutic Activity Time Entry: 15  Therapeutic Exercise Time Entry: 15    Assessment & Plan   Assessment  Patient is a 69 y.o. 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 Patient with primary complaint of lower back pain with radiating symptoms into RLE and lower intensity left lower extremity symptoms. Pain has remained moderate in recent visits with worsened radicular symptoms during standing activities. Good response to LAD with reduction in tingling. He demonstrates decreased spinal range of motion today. Some improvement in " lower extremity strength. Decreased radicular symptoms with flexion based movements.  Gradual improvements in functional mobility. He continues to have difficulty with standing and walking and performance of ADLs. He will benefit from continued PT to address his lumbar complaints. Recommended follow up with pain management for additional pain control.   Evaluation/Treatment Tolerance: Patient tolerated treatment well  Plan  From a physical therapy perspective, the patient would benefit from: Skilled Rehab Services    Planned therapy interventions include: Therapeutic exercise, Therapeutic activities, Neuromuscular re-education, Manual therapy, and Gait training.    Planned modalities to include: Cryotherapy (cold pack), Electrical stimulation - attended, Electrical stimulation - passive/unattended, and Thermotherapy (hot pack).        Visit Frequency: 2 times Per Week for 4 Weeks.       This plan was discussed with Patient and Therapy assistant.   Discussion participants: Agreed Upon Plan of Care  Plan details: Dc neck and knees, continue PT for back and hips           The patient will continue to benefit from skilled outpatient physical therapy in order to address the deficits listed in the problem list on the initial evaluation, provide patient and family education, and maximize the patients level of independence in the home and community environments.     The patient's spiritual, cultural, and educational needs were considered, and the patient is agreeable to the plan of care and goals.           Goals:   Active       Ambulation/movement       Patient will walk 6 min without rest break (Progressing)       Start:  06/17/25    Expected End:  08/01/25               Changing body position       Patient will demonstrate moving sit to stand 10x in 30 sec (Progressing)       Start:  06/17/25    Expected End:  08/01/25               Pain       Patient will report pain of 3/10 demonstrating a reduction of overall pain  (Progressing)       Start:  06/17/25    Expected End:  08/01/25               Range of Motion       Patient will achieve spinal flexion to 45 deg (Progressing)       Start:  06/17/25    Expected End:  08/01/25            Patient will achieve bilateral spinal rotation ROM to 75% (Progressing)       Start:  06/17/25    Expected End:  08/01/25               Strength       Patient will achieve bilateral hip abduction strength of 4+/5 (Progressing)       Start:  02/12/25    Expected End:  08/29/25            Patient will improve bilateral knee flexion strength by 5kg  (Met)       Start:  02/12/25    Expected End:  04/11/25    Resolved:  04/23/25         Patient will improve right knee extension strength by 5kg (Met)       Start:  02/12/25    Expected End:  04/11/25    Resolved:  04/23/25           Resolved       Changing body position       Patient will demonstrate moving sit to stand 8+x in 30 sec (Met)       Start:  02/12/25    Expected End:  05/23/25    Resolved:  05/23/25            Functional outcome       Patient will show a significant change in FOTO patient-reported outcome tool to demonstrate subjective improvement (Met)       Start:  02/12/25    Expected End:  04/11/25    Resolved:  05/23/25         Patient will demonstrate independence in home program for support of progression (Met)       Start:  02/12/25    Expected End:  03/14/25    Resolved:  04/23/25            Functional outcome       Patient will demonstrate independence in home program for support of progression (Met)       Start:  06/17/25    Expected End:  07/18/25    Resolved:  06/26/25            Pain       Patient will report pain of 4/10 max demonstrating a reduction of overall pain (Met)       Start:  02/12/25    Expected End:  03/14/25    Resolved:  03/28/25            Range of Motion       Patient will achieve right knee ROM of  3-120 degrees  (Met)       Start:  02/12/25    Expected End:  08/29/25    Resolved:  07/31/25             Radha  Arnaud, PT, DPT

## 2025-08-04 ENCOUNTER — TELEPHONE (OUTPATIENT)
Dept: INTERNAL MEDICINE | Facility: CLINIC | Age: 70
End: 2025-08-04
Payer: MEDICARE

## 2025-08-04 NOTE — TELEPHONE ENCOUNTER
Copied from CRM #[id number]. Topic: Medications - Medication Question  >> Aug 4, 2025 12:34 PM Hilary wrote:  .1MEDICALADVICE     Patient is calling for Medical Advice regarding: Pt is calling in regards to his medication. Pt stated that he would like to speak to DR Quarles about his medication due to [facility] wanting to change the pts medication. Please call and advise. Thank you.    How long has patient had these symptoms:N/A    Pharmacy name and phone#: N/A    Patient wants a call back or thru [facility], provide patient's call back phone number: [phone] Patient    Comments:    Please advise patient replies from provider may take up to 48 hours.

## 2025-08-04 NOTE — TELEPHONE ENCOUNTER
"[facility] wants to change his meds.  & he wants to make sure you agree.    Other enc says :  " Contacted patient for HTN follow-up. BP average has improved compared to last month. Patient reports he has been eating more salads and fruits, not eating as much late at night. He endorses compliance with BP medications. Discussed option to switch losartan-HCTZ 100-12.5 mg to olmesartan 40-25 mg as more potent ARB and higher dose HCTZ in attempts to titrate off hydralazine, however patient would like to discuss this with Dr. Quarles first prior to making changes. He reports he will contact Dr. Quarles on his own and declines writer sending message.      Current BP regimen:  - losartan-HCTZ 100-12.5 mg qAM  - hydralazine 100 mg BID  "      Can I tell him you agree with the plan?   I called & told him I'd get you to revu and I would get  Back with him.    Please advise. Thanks hussein     "

## 2025-08-05 NOTE — TELEPHONE ENCOUNTER
I agree with changing losartan-HCTZ to olmesartan/HCTZ in an attempt to better control the patient's blood pressure.  These medications are in the same class with olmesartan-HCTZ being slightly stronger and longer acting.

## 2025-08-12 ENCOUNTER — TELEPHONE (OUTPATIENT)
Facility: CLINIC | Age: 70
End: 2025-08-12
Payer: MEDICARE

## 2025-08-12 DIAGNOSIS — G47.33 OBSTRUCTIVE SLEEP APNEA: Primary | ICD-10-CM

## 2025-08-13 ENCOUNTER — CLINICAL SUPPORT (OUTPATIENT)
Dept: REHABILITATION | Facility: HOSPITAL | Age: 70
End: 2025-08-13
Payer: MEDICARE

## 2025-08-13 DIAGNOSIS — R29.898 DECREASED STRENGTH OF LOWER EXTREMITY: Primary | ICD-10-CM

## 2025-08-13 PROCEDURE — 97140 MANUAL THERAPY 1/> REGIONS: CPT | Mod: PN

## 2025-08-13 PROCEDURE — 97110 THERAPEUTIC EXERCISES: CPT | Mod: PN

## 2025-08-13 PROCEDURE — 97530 THERAPEUTIC ACTIVITIES: CPT | Mod: PN

## 2025-08-13 PROCEDURE — 97112 NEUROMUSCULAR REEDUCATION: CPT | Mod: PN

## 2025-08-15 ENCOUNTER — TELEPHONE (OUTPATIENT)
Dept: PODIATRY | Facility: CLINIC | Age: 70
End: 2025-08-15
Payer: MEDICARE

## 2025-08-18 ENCOUNTER — CLINICAL SUPPORT (OUTPATIENT)
Dept: REHABILITATION | Facility: HOSPITAL | Age: 70
End: 2025-08-18
Payer: MEDICARE

## 2025-08-18 DIAGNOSIS — R29.898 DECREASED STRENGTH OF LOWER EXTREMITY: Primary | ICD-10-CM

## 2025-08-18 PROCEDURE — 97112 NEUROMUSCULAR REEDUCATION: CPT | Mod: PN,CQ

## 2025-08-18 PROCEDURE — 97530 THERAPEUTIC ACTIVITIES: CPT | Mod: PN,CQ

## 2025-08-21 ENCOUNTER — CLINICAL SUPPORT (OUTPATIENT)
Dept: REHABILITATION | Facility: HOSPITAL | Age: 70
End: 2025-08-21
Payer: MEDICARE

## 2025-08-21 DIAGNOSIS — R29.898 DECREASED STRENGTH OF LOWER EXTREMITY: Primary | ICD-10-CM

## 2025-08-21 PROCEDURE — 97112 NEUROMUSCULAR REEDUCATION: CPT | Mod: PN,CQ

## 2025-08-21 PROCEDURE — 97530 THERAPEUTIC ACTIVITIES: CPT | Mod: PN,CQ

## 2025-08-29 ENCOUNTER — CLINICAL SUPPORT (OUTPATIENT)
Dept: REHABILITATION | Facility: HOSPITAL | Age: 70
End: 2025-08-29
Payer: MEDICARE

## (undated) DEVICE — SEE MEDLINE ITEM 146313

## (undated) DEVICE — SYR 50ML CATH TIP

## (undated) DEVICE — LEGGINGS 48X31 INCH

## (undated) DEVICE — TUBING SUCTION SET FOR ENDOMAT

## (undated) DEVICE — UNDERGLOVE BIOGEL PI SZ 6.5 LF

## (undated) DEVICE — SEE MEDLINE ITEM 152487

## (undated) DEVICE — TRAY CYSTO BASIN

## (undated) DEVICE — GUIDEWIRE PTFE .038INX145CM

## (undated) DEVICE — CATH POLLACK OPEN-END FLEXI-TI

## (undated) DEVICE — SEE MEDLINE ITEM 157110

## (undated) DEVICE — SOL 0.9% NACL IRRI.IN STERIL

## (undated) DEVICE — ELECTRODE LOOP CUTTING BIPOLAR

## (undated) DEVICE — SOL WATER STRL IRR 1000ML

## (undated) DEVICE — SET IRR URLGY 2LINE UNIV SPIKE

## (undated) DEVICE — URINARY DRAINAGE BAG

## (undated) DEVICE — SOL IRR WATER STRL 3000 ML

## (undated) DEVICE — CUP MEDICINE STERILE 2OZ

## (undated) DEVICE — PACK PERI/GYN OPTIMA

## (undated) DEVICE — Device

## (undated) DEVICE — SOL IRRI STRL WATER 1000ML

## (undated) DEVICE — SYR 50CC LL

## (undated) DEVICE — UNDERGLOVES BIOGEL PI SZ 7 LF

## (undated) DEVICE — GOWN SMARTGOWN LVL4 X-LONG XL

## (undated) DEVICE — BAG LINGEMAN DRAIN UROLOGY

## (undated) DEVICE — SEE MEDLINE ITEM 157181

## (undated) DEVICE — PACK CYSTO

## (undated) DEVICE — TRAY MINOR GEN SURG

## (undated) DEVICE — SOL IRR NACL .9% 3000ML

## (undated) DEVICE — SET CYSTO IRRIGATION UNIV SPIK

## (undated) DEVICE — SOL 9P NACL IRR PIC IL

## (undated) DEVICE — SEE MEDLINE ITEM 152622

## (undated) DEVICE — GLOVE BIOGEL ECLIPSE SZ 7.5

## (undated) DEVICE — SEE MEDLINE ITEM 154981

## (undated) DEVICE — LUBRICANT SURGILUBE 2 OZ